# Patient Record
Sex: MALE | Race: WHITE | NOT HISPANIC OR LATINO | Employment: FULL TIME | URBAN - METROPOLITAN AREA
[De-identification: names, ages, dates, MRNs, and addresses within clinical notes are randomized per-mention and may not be internally consistent; named-entity substitution may affect disease eponyms.]

---

## 2018-01-19 ENCOUNTER — OFFICE VISIT (OUTPATIENT)
Dept: INTERNAL MEDICINE | Facility: CLINIC | Age: 58
End: 2018-01-19

## 2018-01-19 VITALS
BODY MASS INDEX: 30.77 KG/M2 | DIASTOLIC BLOOD PRESSURE: 80 MMHG | SYSTOLIC BLOOD PRESSURE: 132 MMHG | HEART RATE: 88 BPM | RESPIRATION RATE: 16 BRPM | WEIGHT: 203 LBS | HEIGHT: 68 IN | OXYGEN SATURATION: 95 %

## 2018-01-19 DIAGNOSIS — E11.9 TYPE 2 DIABETES MELLITUS WITHOUT COMPLICATION, WITHOUT LONG-TERM CURRENT USE OF INSULIN (HCC): Primary | ICD-10-CM

## 2018-01-19 DIAGNOSIS — M1A.09X0 CHRONIC GOUT OF MULTIPLE SITES, UNSPECIFIED CAUSE: ICD-10-CM

## 2018-01-19 DIAGNOSIS — E78.2 MIXED HYPERLIPIDEMIA: ICD-10-CM

## 2018-01-19 DIAGNOSIS — R35.1 NOCTURIA: ICD-10-CM

## 2018-01-19 DIAGNOSIS — I10 ESSENTIAL HYPERTENSION: ICD-10-CM

## 2018-01-19 PROBLEM — M10.9 GOUT OF MULTIPLE SITES: Status: ACTIVE | Noted: 2018-01-19

## 2018-01-19 LAB
ALBUMIN SERPL-MCNC: 4.9 G/DL (ref 3.5–5.2)
ALBUMIN/GLOB SERPL: 2.3 G/DL
ALP SERPL-CCNC: 66 U/L (ref 40–129)
ALT SERPL-CCNC: 33 U/L (ref 5–41)
AST SERPL-CCNC: 29 U/L (ref 5–40)
BASOPHILS # BLD AUTO: 0.04 10*3/MM3 (ref 0–0.2)
BASOPHILS NFR BLD AUTO: 0.5 % (ref 0–2)
BILIRUB SERPL-MCNC: 0.4 MG/DL (ref 0.2–1.2)
BUN SERPL-MCNC: 17 MG/DL (ref 6–20)
BUN/CREAT SERPL: 13.8 (ref 7–25)
CALCIUM SERPL-MCNC: 9.8 MG/DL (ref 8.6–10.5)
CHLORIDE SERPL-SCNC: 100 MMOL/L (ref 98–107)
CHOLEST SERPL-MCNC: 185 MG/DL (ref 0–200)
CO2 SERPL-SCNC: 28.2 MMOL/L (ref 22–29)
CREAT SERPL-MCNC: 1.23 MG/DL (ref 0.76–1.27)
EOSINOPHIL # BLD AUTO: 0.15 10*3/MM3 (ref 0.1–0.3)
EOSINOPHIL NFR BLD AUTO: 1.8 % (ref 0–4)
ERYTHROCYTE [DISTWIDTH] IN BLOOD BY AUTOMATED COUNT: 13 % (ref 11.5–14.5)
GLOBULIN SER CALC-MCNC: 2.1 GM/DL
GLUCOSE SERPL-MCNC: 113 MG/DL (ref 65–99)
HBA1C MFR BLD: 7 % (ref 4.8–5.6)
HCT VFR BLD AUTO: 44.1 % (ref 42–52)
HDLC SERPL-MCNC: 36 MG/DL (ref 40–60)
HGB BLD-MCNC: 14.8 G/DL (ref 14–18)
IMM GRANULOCYTES # BLD: 0.04 10*3/MM3 (ref 0–0.03)
IMM GRANULOCYTES NFR BLD: 0.5 % (ref 0–0.5)
LDLC SERPL CALC-MCNC: 103 MG/DL (ref 0–100)
LDLC/HDLC SERPL: 2.86 {RATIO}
LYMPHOCYTES # BLD AUTO: 1.88 10*3/MM3 (ref 0.6–4.8)
LYMPHOCYTES NFR BLD AUTO: 21.9 % (ref 20–45)
MCH RBC QN AUTO: 29.3 PG (ref 27–31)
MCHC RBC AUTO-ENTMCNC: 33.6 G/DL (ref 31–37)
MCV RBC AUTO: 87.3 FL (ref 80–94)
MONOCYTES # BLD AUTO: 0.6 10*3/MM3 (ref 0–1)
MONOCYTES NFR BLD AUTO: 7 % (ref 3–8)
NEUTROPHILS # BLD AUTO: 5.86 10*3/MM3 (ref 1.5–8.3)
NEUTROPHILS NFR BLD AUTO: 68.3 % (ref 45–70)
NRBC BLD AUTO-RTO: 0 /100 WBC (ref 0–0)
PLATELET # BLD AUTO: 299 10*3/MM3 (ref 140–500)
POTASSIUM SERPL-SCNC: 4.6 MMOL/L (ref 3.5–5.2)
PROT SERPL-MCNC: 7 G/DL (ref 6–8.5)
PSA SERPL-MCNC: 1.17 NG/ML (ref 0–4)
RBC # BLD AUTO: 5.05 10*6/MM3 (ref 4.7–6.1)
SODIUM SERPL-SCNC: 141 MMOL/L (ref 136–145)
TRIGL SERPL-MCNC: 230 MG/DL (ref 0–150)
TSH SERPL DL<=0.005 MIU/L-ACNC: 2.5 MIU/ML (ref 0.27–4.2)
URATE SERPL-MCNC: 5.2 MG/DL (ref 3.4–7)
VLDLC SERPL CALC-MCNC: 46 MG/DL (ref 8–32)
WBC # BLD AUTO: 8.57 10*3/MM3 (ref 4.8–10.8)

## 2018-01-19 PROCEDURE — 99204 OFFICE O/P NEW MOD 45 MIN: CPT | Performed by: INTERNAL MEDICINE

## 2018-01-19 RX ORDER — AMLODIPINE BESYLATE 5 MG/1
5 TABLET ORAL DAILY
Qty: 90 TABLET | Refills: 3 | Status: SHIPPED | OUTPATIENT
Start: 2018-01-19 | End: 2018-04-02 | Stop reason: SDUPTHER

## 2018-01-19 RX ORDER — ASCORBIC ACID 500 MG
500 TABLET ORAL DAILY
COMMUNITY
End: 2022-12-02 | Stop reason: SDDI

## 2018-01-19 RX ORDER — CHOLECALCIFEROL (VITAMIN D3) 25 MCG
500 TABLET,CHEWABLE ORAL DAILY
COMMUNITY
End: 2022-12-02 | Stop reason: SDDI

## 2018-01-19 RX ORDER — ALLOPURINOL 300 MG/1
300 TABLET ORAL DAILY
COMMUNITY
End: 2018-01-19 | Stop reason: SDUPTHER

## 2018-01-19 RX ORDER — LISINOPRIL AND HYDROCHLOROTHIAZIDE 20; 12.5 MG/1; MG/1
1 TABLET ORAL DAILY
Qty: 90 TABLET | Refills: 3 | Status: SHIPPED | OUTPATIENT
Start: 2018-01-19 | End: 2019-06-28

## 2018-01-19 RX ORDER — FENOFIBRATE 160 MG/1
160 TABLET ORAL DAILY
COMMUNITY
End: 2018-01-19 | Stop reason: SDUPTHER

## 2018-01-19 RX ORDER — METOPROLOL TARTRATE 50 MG/1
50 TABLET, FILM COATED ORAL 2 TIMES DAILY
COMMUNITY
End: 2018-01-19 | Stop reason: SDUPTHER

## 2018-01-19 RX ORDER — IBUPROFEN 800 MG/1
800 TABLET ORAL EVERY 6 HOURS PRN
Qty: 30 TABLET | Refills: 1 | Status: SHIPPED | OUTPATIENT
Start: 2018-01-19 | End: 2019-05-02 | Stop reason: SDUPTHER

## 2018-01-19 RX ORDER — ALLOPURINOL 300 MG/1
300 TABLET ORAL 2 TIMES DAILY
Qty: 180 TABLET | Refills: 3 | Status: SHIPPED | OUTPATIENT
Start: 2018-01-19 | End: 2019-02-01 | Stop reason: SDUPTHER

## 2018-01-19 RX ORDER — METOPROLOL TARTRATE 50 MG/1
50 TABLET, FILM COATED ORAL EVERY 12 HOURS SCHEDULED
Qty: 180 TABLET | Refills: 3 | Status: SHIPPED | OUTPATIENT
Start: 2018-01-19 | End: 2019-02-01 | Stop reason: SDUPTHER

## 2018-01-19 RX ORDER — AMLODIPINE BESYLATE 5 MG/1
5 TABLET ORAL DAILY
COMMUNITY
End: 2018-01-19 | Stop reason: SDUPTHER

## 2018-01-19 RX ORDER — FENOFIBRATE 160 MG/1
160 TABLET ORAL DAILY
Qty: 90 TABLET | Refills: 3 | Status: SHIPPED | OUTPATIENT
Start: 2018-01-19 | End: 2018-05-03 | Stop reason: SDUPTHER

## 2018-01-19 NOTE — PROGRESS NOTES
Subjective     Pablo Mendoza is a 57 y.o. male, who presents with a chief complaint of   Chief Complaint   Patient presents with   • [facility]       HPI     56 yo female with pmhx HTN, HL, DM2 - on treatment for several years.  Stat HTN medication in 1995.    Prior history of TORSTEN - CPAP in past but got better.     Diverticulitis - prior colonoscopy x 2.       Also longstanding gout,well controlled at present.     Previously seeing Dr. Franco.  He was working on diet and exercise with pretty regularly.  He is very stressed and has trouble keeping his weight off.  Recent divorce 2013.  Believes he had parasites and fungi in his job.  Had personally travelled to foreign countries and believes he got infected with these parasites.   He did not ever see diverticulitis.      The following portions of the patient's history were reviewed and updated as appropriate: allergies, current medications, past family history, past medical history, past social history, past surgical history and problem list.    Allergies: Review of patient's allergies indicates no known allergies.    Current Outpatient Prescriptions:   •  allopurinol (ZYLOPRIM) 300 MG tablet, Take 1 tablet by mouth 2 (Two) Times a Day., Disp: 180 tablet, Rfl: 3  •  amLODIPine (NORVASC) 5 MG tablet, Take 1 tablet by mouth Daily., Disp: 90 tablet, Rfl: 3  •  Cholecalciferol (VITAMIN D3) 5000 units capsule capsule, Take 5,000 Units by mouth Daily., Disp: , Rfl:   •  cyanocobalamin (VITAMIN B-12) 2500 MCG tablet tablet, Take 500 mcg by mouth Daily., Disp: , Rfl:   •  fenofibrate 160 MG tablet, Take 1 tablet by mouth Daily., Disp: 90 tablet, Rfl: 3  •  lisinopril 10 MG tablet 20 mg, hydrochlorothiazide 25 MG tablet 12.5 mg, Take 1 dose by mouth Daily., Disp: 90 tablet, Rfl: 3  •  metFORMIN (GLUCOPHAGE) 1000 MG tablet, Take 1 tablet by mouth 2 (Two) Times a Day With Meals., Disp: 180 tablet, Rfl: 2  •  metoprolol tartrate (LOPRESSOR) 50 MG tablet, Take 1  "tablet by mouth Every 12 (Twelve) Hours., Disp: 180 tablet, Rfl: 3  •  vitamin C (ASCORBIC ACID) 500 MG tablet, Take 500 mg by mouth Daily., Disp: , Rfl:   Medications Discontinued During This Encounter   Medication Reason   • allopurinol (ZYLOPRIM) 100 MG tablet Dose adjustment   • UNKNOWN TO PATIENT Therapy completed   • allopurinol (ZYLOPRIM) 300 MG tablet Reorder   • amLODIPine (NORVASC) 5 MG tablet Reorder   • fenofibrate 160 MG tablet Reorder   • lisinopril 10 MG tablet 20 mg, hydrochlorothiazide 25 MG tablet 12.5 mg Reorder   • metFORMIN (GLUCOPHAGE) 1000 MG tablet Reorder   • metoprolol tartrate (LOPRESSOR) 50 MG tablet Reorder       Review of Systems   Constitutional:        Lower energy   Eyes: Negative.    Respiratory: Negative.    Cardiovascular: Negative.  Negative for chest pain and palpitations.   Gastrointestinal: Negative.  Negative for abdominal pain, nausea and rectal pain.   Endocrine: Negative.    Genitourinary: Negative.    Neurological:        Claims stroke in 2007, last IA 2015, records here   Psychiatric/Behavioral: Negative.        Objective     /80  Pulse 88  Resp 16  Ht 172.7 cm (68\")  Wt 92.1 kg (203 lb)  SpO2 95%  BMI 30.87 kg/m2      Physical Exam   Constitutional: He is oriented to person, place, and time. He appears well-developed and well-nourished.   HENT:   Head: Normocephalic and atraumatic.   Right Ear: External ear normal.   Left Ear: External ear normal.   Nose: Nose normal.   Mouth/Throat: No oropharyngeal exudate.   Eyes: Conjunctivae and EOM are normal. Pupils are equal, round, and reactive to light.   Neck: Normal range of motion. Neck supple. No JVD present. No tracheal deviation present. No thyromegaly present.   Cardiovascular: Normal rate, regular rhythm, normal heart sounds and intact distal pulses.  Exam reveals no gallop and no friction rub.    No murmur heard.  Pulmonary/Chest: Effort normal and breath sounds normal.   Abdominal: Soft. Bowel sounds are " normal. He exhibits no distension and no mass. There is no tenderness. There is no rebound and no guarding. No hernia.   Genitourinary: Prostate normal and penis normal. Rectal exam shows guaiac negative stool. No penile tenderness.   Musculoskeletal: Normal range of motion. He exhibits no edema.   Neurological: He is alert and oriented to person, place, and time. He has normal reflexes.   Skin: Skin is warm and dry.   Psychiatric: He has a normal mood and affect. His behavior is normal. Judgment and thought content normal.   Nursing note and vitals reviewed.      Lab Results (most recent)     None          Results for orders placed or performed during the hospital encounter of 09/21/16   POCT glucose   Result Value Ref Range    Glucose 178 (A) 70 - 130 mg/dL       Assessment/Plan   Pablo was seen today for establish care.    Diagnoses and all orders for this visit:    Type 2 diabetes mellitus without complication, without long-term current use of insulin  -     Hemoglobin A1c  -     Comprehensive metabolic panel  -     TSH  -     Uric acid  -     CBC w AUTO Differential  -     metFORMIN (GLUCOPHAGE) 1000 MG tablet; Take 1 tablet by mouth 2 (Two) Times a Day With Meals.    Mixed hyperlipidemia  -     Lipid Panel With LDL/HDL Ratio  -     fenofibrate 160 MG tablet; Take 1 tablet by mouth Daily.    Essential hypertension  -     Comprehensive metabolic panel  -     amLODIPine (NORVASC) 5 MG tablet; Take 1 tablet by mouth Daily.  -     lisinopril 10 MG tablet 20 mg, hydrochlorothiazide 25 MG tablet 12.5 mg; Take 1 dose by mouth Daily.  -     metoprolol tartrate (LOPRESSOR) 50 MG tablet; Take 1 tablet by mouth Every 12 (Twelve) Hours.    Chronic gout of multiple sites, unspecified cause  -     allopurinol (ZYLOPRIM) 300 MG tablet; Take 1 tablet by mouth 2 (Two) Times a Day.    Nocturia  -     PSA Screen      Keep taking vitamin D regularly  ED- prn viagra ok, no new script today    Return in about 6 months (around  7/19/2018) for Annual physical.    Damián Brown MD  01/19/2018

## 2018-01-23 ENCOUNTER — TELEPHONE (OUTPATIENT)
Dept: INTERNAL MEDICINE | Facility: CLINIC | Age: 58
End: 2018-01-23

## 2018-01-23 NOTE — TELEPHONE ENCOUNTER
Patient advised.    ----- Message from Damián Brown MD sent at 1/23/2018  8:08 AM EST -----  1. Cholesterol not to goal.  His TG also elevated. Stay on fenofibrate, but really need to watch all fried foods and limit meat.  2.  Diabetes at goal.   3. Thyroid, gout and PSA are in good range  4.  Prior anemia has corrected.  FU 6months.

## 2018-04-02 ENCOUNTER — OFFICE VISIT (OUTPATIENT)
Dept: INTERNAL MEDICINE | Facility: CLINIC | Age: 58
End: 2018-04-02

## 2018-04-02 VITALS
RESPIRATION RATE: 16 BRPM | SYSTOLIC BLOOD PRESSURE: 146 MMHG | WEIGHT: 203 LBS | TEMPERATURE: 98.6 F | DIASTOLIC BLOOD PRESSURE: 84 MMHG | HEART RATE: 85 BPM | BODY MASS INDEX: 30.77 KG/M2 | HEIGHT: 68 IN | OXYGEN SATURATION: 98 %

## 2018-04-02 DIAGNOSIS — B96.89 ACUTE BACTERIAL SINUSITIS: ICD-10-CM

## 2018-04-02 DIAGNOSIS — G43.801 OTHER MIGRAINE WITH STATUS MIGRAINOSUS, NOT INTRACTABLE: ICD-10-CM

## 2018-04-02 DIAGNOSIS — G44.40 MEDICATION OVERUSE HEADACHE: ICD-10-CM

## 2018-04-02 DIAGNOSIS — I10 ESSENTIAL HYPERTENSION: Primary | ICD-10-CM

## 2018-04-02 DIAGNOSIS — J01.90 ACUTE BACTERIAL SINUSITIS: ICD-10-CM

## 2018-04-02 PROBLEM — G43.909 MIGRAINE HEADACHE: Status: ACTIVE | Noted: 2018-04-02

## 2018-04-02 PROCEDURE — 96372 THER/PROPH/DIAG INJ SC/IM: CPT | Performed by: INTERNAL MEDICINE

## 2018-04-02 PROCEDURE — 99214 OFFICE O/P EST MOD 30 MIN: CPT | Performed by: INTERNAL MEDICINE

## 2018-04-02 RX ORDER — CEFDINIR 300 MG/1
300 CAPSULE ORAL 2 TIMES DAILY
Qty: 14 CAPSULE | Refills: 0 | Status: SHIPPED | OUTPATIENT
Start: 2018-04-02 | End: 2018-04-09

## 2018-04-02 RX ORDER — RIZATRIPTAN BENZOATE 10 MG/1
10 TABLET ORAL ONCE AS NEEDED
Qty: 15 TABLET | Refills: 0 | Status: SHIPPED | OUTPATIENT
Start: 2018-04-02 | End: 2019-08-05 | Stop reason: SDUPTHER

## 2018-04-02 RX ORDER — AMLODIPINE BESYLATE 10 MG/1
10 TABLET ORAL DAILY
Qty: 30 TABLET | Refills: 3 | Status: SHIPPED | OUTPATIENT
Start: 2018-04-02 | End: 2018-07-27 | Stop reason: SDUPTHER

## 2018-04-02 NOTE — PROGRESS NOTES
Pablo Mendoza is a 57 y.o. male, who presents with a chief complaint of   Chief Complaint   Patient presents with   • Migraine     x 2 months   • Sinus Problem       56 yo M with history of stroke/TIA's per him, HTN and migraines here with worsening headache for the last 2 months.     He puts a medication under his tongue one per week for headache, unsure of it's name and not on his med list. He has also used anti-histamines and ibuprofen that has not helped. He does work around chemicals in factory. He takes Ibuprofen 800mg once every other day.    He does have vision issues that he describes as light and dark alternating prior to his headache that he has had for the last 2 months. The pain that he has now is in his forehead in the front as well as sinus pressure. He was taking medication with pseudophed, tylenol and anti-histamine in addition to the above.     He has congestion and sinus drainage. No fever and no sick contacts. No tingling or numbness. BP has been running higher over the last few weeks, normal for him is usually in the 120 range.          The following portions of the patient's history were reviewed and updated as appropriate: allergies, current medications, past family history, past medical history, past social history, past surgical history and problem list.    Allergies: Review of patient's allergies indicates no known allergies.    Current Outpatient Prescriptions:   •  allopurinol (ZYLOPRIM) 300 MG tablet, Take 1 tablet by mouth 2 (Two) Times a Day., Disp: 180 tablet, Rfl: 3  •  amLODIPine (NORVASC) 10 MG tablet, Take 1 tablet by mouth Daily., Disp: 30 tablet, Rfl: 3  •  Cholecalciferol (VITAMIN D3) 5000 units capsule capsule, Take 5,000 Units by mouth Daily., Disp: , Rfl:   •  cyanocobalamin (VITAMIN B-12) 2500 MCG tablet tablet, Take 500 mcg by mouth Daily., Disp: , Rfl:   •  fenofibrate 160 MG tablet, Take 1 tablet by mouth Daily., Disp: 90 tablet, Rfl: 3  •  ibuprofen  "(ADVIL,MOTRIN) 800 MG tablet, Take 1 tablet by mouth Every 6 (Six) Hours As Needed for Mild Pain ., Disp: 30 tablet, Rfl: 1  •  lisinopril-hydrochlorothiazide (PRINZIDE,ZESTORETIC) 20-12.5 MG per tablet, Take 1 tablet by mouth Daily., Disp: 90 tablet, Rfl: 3  •  metFORMIN (GLUCOPHAGE) 1000 MG tablet, Take 1 tablet by mouth 2 (Two) Times a Day With Meals., Disp: 180 tablet, Rfl: 2  •  metoprolol tartrate (LOPRESSOR) 50 MG tablet, Take 1 tablet by mouth Every 12 (Twelve) Hours., Disp: 180 tablet, Rfl: 3  •  vitamin C (ASCORBIC ACID) 500 MG tablet, Take 500 mg by mouth Daily., Disp: , Rfl:   •  cefdinir (OMNICEF) 300 MG capsule, Take 1 capsule by mouth 2 (Two) Times a Day for 7 days., Disp: 14 capsule, Rfl: 0  •  rizatriptan (MAXALT) 10 MG tablet, Take 1 tablet by mouth 1 (One) Time As Needed for Migraine for up to 1 dose. May repeat in 2 hours if needed, Disp: 15 tablet, Rfl: 0  Medications Discontinued During This Encounter   Medication Reason   • amLODIPine (NORVASC) 5 MG tablet Reorder       Review of Systems   Constitutional: Positive for fatigue. Negative for chills and fever.   HENT: Positive for congestion, sinus pain and sinus pressure.    Respiratory: Negative for cough.    Neurological: Positive for headaches. Negative for seizures, speech difficulty and numbness.             /84 (BP Location: Left arm, Patient Position: Sitting, Cuff Size: Adult)   Pulse 85   Temp 98.6 °F (37 °C) (Oral)   Resp 16   Ht 172.7 cm (67.99\")   Wt 92.1 kg (203 lb)   SpO2 98%   BMI 30.87 kg/m²       Physical Exam   Constitutional: He is oriented to person, place, and time. He appears well-developed and well-nourished. No distress.   HENT:   Head: Normocephalic and atraumatic.   Right Ear: Hearing, tympanic membrane and external ear normal.   Left Ear: Hearing, tympanic membrane and external ear normal.   Nose: Right sinus exhibits frontal sinus tenderness. Left sinus exhibits frontal sinus tenderness.   Mouth/Throat: " Uvula is midline and oropharynx is clear and moist. No oropharyngeal exudate, posterior oropharyngeal edema or posterior oropharyngeal erythema.   Eyes: Conjunctivae are normal. Right eye exhibits no discharge. Left eye exhibits no discharge. No scleral icterus.   Neck: Neck supple.   Cardiovascular: Normal rate, regular rhythm and normal heart sounds.  Exam reveals no gallop and no friction rub.    No murmur heard.  Pulmonary/Chest: Effort normal and breath sounds normal. No respiratory distress. He has no wheezes. He has no rales.   Lymphadenopathy:     He has no cervical adenopathy.   Neurological: He is alert and oriented to person, place, and time. He displays no tremor. No cranial nerve deficit or sensory deficit. Coordination and gait normal.   Skin: Skin is warm. No rash noted.   Psychiatric: His behavior is normal. His mood appears anxious. His speech is tangential.   Nursing note and vitals reviewed.        Results for orders placed or performed in visit on 01/19/18   Lipid Panel With LDL/HDL Ratio   Result Value Ref Range    Total Cholesterol 185 0 - 200 mg/dL    Triglycerides 230 (H) 0 - 150 mg/dL    HDL Cholesterol 36 (L) 40 - 60 mg/dL    VLDL Cholesterol 46 (H) 8 - 32 mg/dL    LDL Cholesterol  103 (H) 0 - 100 mg/dL    LDL/HDL Ratio 2.86    Hemoglobin A1c   Result Value Ref Range    Hemoglobin A1C 7.00 (H) 4.80 - 5.60 %   Comprehensive metabolic panel   Result Value Ref Range    Glucose 113 (H) 65 - 99 mg/dL    BUN 17 6 - 20 mg/dL    Creatinine 1.23 0.76 - 1.27 mg/dL    eGFR Non African Am 61 >60 mL/min/1.73    eGFR African Am 74 >60 mL/min/1.73    BUN/Creatinine Ratio 13.8 7.0 - 25.0    Sodium 141 136 - 145 mmol/L    Potassium 4.6 3.5 - 5.2 mmol/L    Chloride 100 98 - 107 mmol/L    Total CO2 28.2 22.0 - 29.0 mmol/L    Calcium 9.8 8.6 - 10.5 mg/dL    Total Protein 7.0 6.0 - 8.5 g/dL    Albumin 4.90 3.50 - 5.20 g/dL    Globulin 2.1 gm/dL    A/G Ratio 2.3 g/dL    Total Bilirubin 0.4 0.2 - 1.2 mg/dL     Alkaline Phosphatase 66 40 - 129 U/L    AST (SGOT) 29 5 - 40 U/L    ALT (SGPT) 33 5 - 41 U/L   TSH   Result Value Ref Range    TSH 2.500 0.270 - 4.200 mIU/mL   Uric acid   Result Value Ref Range    Uric Acid 5.2 3.4 - 7.0 mg/dL   PSA Screen   Result Value Ref Range    PSA 1.170 0.000 - 4.000 ng/mL   CBC w AUTO Differential   Result Value Ref Range    WBC 8.57 4.80 - 10.80 10*3/mm3    RBC 5.05 4.70 - 6.10 10*6/mm3    Hemoglobin 14.8 14.0 - 18.0 g/dL    Hematocrit 44.1 42.0 - 52.0 %    MCV 87.3 80.0 - 94.0 fL    MCH 29.3 27.0 - 31.0 pg    MCHC 33.6 31.0 - 37.0 g/dL    RDW 13.0 11.5 - 14.5 %    Platelets 299 140 - 500 10*3/mm3    Neutrophil Rel % 68.3 45.0 - 70.0 %    Lymphocyte Rel % 21.9 20.0 - 45.0 %    Monocyte Rel % 7.0 3.0 - 8.0 %    Eosinophil Rel % 1.8 0.0 - 4.0 %    Basophil Rel % 0.5 0.0 - 2.0 %    Neutrophils Absolute 5.86 1.50 - 8.30 10*3/mm3    Lymphocytes Absolute 1.88 0.60 - 4.80 10*3/mm3    Monocytes Absolute 0.60 0.00 - 1.00 10*3/mm3    Eosinophils Absolute 0.15 0.10 - 0.30 10*3/mm3    Basophils Absolute 0.04 0.00 - 0.20 10*3/mm3    Immature Granulocyte Rel % 0.5 0.0 - 0.5 %    Immature Grans Absolute 0.04 (H) 0.00 - 0.03 10*3/mm3    nRBC 0.0 0.0 - 0.0 /100 WBC           Pablo was seen today for migraine and sinus problem.    Diagnoses and all orders for this visit:    Essential hypertension  -     amLODIPine (NORVASC) 10 MG tablet; Take 1 tablet by mouth Daily.    Other migraine with status migrainosus, not intractable  -     ketorolac (TORADOL) injection 60 mg; Inject 2 mL into the shoulder, thigh, or buttocks 1 (One) Time.    Acute bacterial sinusitis  -     ketorolac (TORADOL) injection 60 mg; Inject 2 mL into the shoulder, thigh, or buttocks 1 (One) Time.    Medication overuse headache    Other orders  -     rizatriptan (MAXALT) 10 MG tablet; Take 1 tablet by mouth 1 (One) Time As Needed for Migraine for up to 1 dose. May repeat in 2 hours if needed  -     cefdinir (OMNICEF) 300 MG capsule; Take  1 capsule by mouth 2 (Two) Times a Day for 7 days.        I think that patient has multiple reasons for headache including HTN, medication over use and possible sinus infection. I am going up on his Norvasc today to 10mg and have asked him to stop all the OTC medication that he is taking for headache including Tylenol cold and sinus, ibuprofen and the anti-histamine.     He was given Toradol shot today to help with headache and will use Maxalt for acute migraine only. If needing more than twice per week, then may consider adding BB to regimen for prophylaxis particularly if BP is still up. We could also consider switching out ACE/HCZT for ARB which might give him better control.     I reviewed his MRI and MRA from 11/2015 when he had stroke like symptoms, but all was normal. Neurology felt that this was factitious in nature.     Follow up with Dr. WORKMAN in one week and if he chooses, can establish with Dr. DOMINGO who say him previously and who is his original PMD. Discussed case with Dr. WORKMAN     Return in about 7 days (around 4/9/2018) for Recheck of headache with  .    Sumi Long MD  04/02/2018

## 2018-04-10 ENCOUNTER — HOSPITAL ENCOUNTER (OUTPATIENT)
Dept: GENERAL RADIOLOGY | Facility: HOSPITAL | Age: 58
Discharge: HOME | End: 2018-04-10
Attending: INTERNAL MEDICINE | Admitting: INTERNAL MEDICINE

## 2018-04-10 ENCOUNTER — OFFICE VISIT (OUTPATIENT)
Dept: INTERNAL MEDICINE | Facility: CLINIC | Age: 58
End: 2018-04-10

## 2018-04-10 VITALS
DIASTOLIC BLOOD PRESSURE: 88 MMHG | HEIGHT: 68 IN | SYSTOLIC BLOOD PRESSURE: 128 MMHG | OXYGEN SATURATION: 97 % | BODY MASS INDEX: 30.46 KG/M2 | WEIGHT: 201 LBS | RESPIRATION RATE: 16 BRPM | HEART RATE: 89 BPM

## 2018-04-10 DIAGNOSIS — J32.0 CHRONIC MAXILLARY SINUSITIS: ICD-10-CM

## 2018-04-10 DIAGNOSIS — J30.1 CHRONIC ALLERGIC RHINITIS DUE TO POLLEN, UNSPECIFIED SEASONALITY: ICD-10-CM

## 2018-04-10 DIAGNOSIS — I10 ESSENTIAL HYPERTENSION: ICD-10-CM

## 2018-04-10 DIAGNOSIS — G43.801 OTHER MIGRAINE WITH STATUS MIGRAINOSUS, NOT INTRACTABLE: Primary | ICD-10-CM

## 2018-04-10 PROCEDURE — 70220 X-RAY EXAM OF SINUSES: CPT

## 2018-04-10 PROCEDURE — 99214 OFFICE O/P EST MOD 30 MIN: CPT | Performed by: INTERNAL MEDICINE

## 2018-04-10 RX ORDER — LORATADINE 10 MG/1
10 TABLET ORAL DAILY
Qty: 30 TABLET | Refills: 0 | Status: SHIPPED | OUTPATIENT
Start: 2018-04-10 | End: 2018-05-09 | Stop reason: SDUPTHER

## 2018-04-10 NOTE — PROGRESS NOTES
Pablo Mendoza is a 57 y.o. male, who presents with a chief complaint of   Chief Complaint   Patient presents with   • Migraine     follow up       HPI     56yo male with pmhx migraine, HTN. He reports TIA in past, but the notes from the neurology physician do not agree about that, concern about factitious nature to the complaints at the time.  He did see me previously, once a pt of Dr. DOMINGO.     He struggles with HTN, although now much better controlled. He is doing better on increased norvasc.  Still on lisinopril hctz.      He did stop otc medications and was given maxalt, which he had to use once, which did help. He denies any fever, still congestion and headache all over his head.  Went to [place] for the weekend which did help. He works around oils and solvents and wonders if that is making his headache worse.  No vision changes or photophobia.  Still has mild headache today which is top of head. No lacrimation.  No other neurologic symptoms.           The following portions of the patient's history were reviewed and updated as appropriate: allergies, current medications, past family history, past medical history, past social history, past surgical history and problem list.    Allergies: Review of patient's allergies indicates no known allergies.    Current Outpatient Prescriptions:   •  allopurinol (ZYLOPRIM) 300 MG tablet, Take 1 tablet by mouth 2 (Two) Times a Day., Disp: 180 tablet, Rfl: 3  •  amLODIPine (NORVASC) 10 MG tablet, Take 1 tablet by mouth Daily., Disp: 30 tablet, Rfl: 3  •  Cholecalciferol (VITAMIN D3) 5000 units capsule capsule, Take 5,000 Units by mouth Daily., Disp: , Rfl:   •  cyanocobalamin (VITAMIN B-12) 2500 MCG tablet tablet, Take 500 mcg by mouth Daily., Disp: , Rfl:   •  fenofibrate 160 MG tablet, Take 1 tablet by mouth Daily., Disp: 90 tablet, Rfl: 3  •  ibuprofen (ADVIL,MOTRIN) 800 MG tablet, Take 1 tablet by mouth Every 6 (Six) Hours As Needed for Mild Pain ., Disp: 30  "tablet, Rfl: 1  •  lisinopril-hydrochlorothiazide (PRINZIDE,ZESTORETIC) 20-12.5 MG per tablet, Take 1 tablet by mouth Daily., Disp: 90 tablet, Rfl: 3  •  metFORMIN (GLUCOPHAGE) 1000 MG tablet, Take 1 tablet by mouth 2 (Two) Times a Day With Meals., Disp: 180 tablet, Rfl: 2  •  metoprolol tartrate (LOPRESSOR) 50 MG tablet, Take 1 tablet by mouth Every 12 (Twelve) Hours., Disp: 180 tablet, Rfl: 3  •  rizatriptan (MAXALT) 10 MG tablet, Take 1 tablet by mouth 1 (One) Time As Needed for Migraine for up to 1 dose. May repeat in 2 hours if needed, Disp: 15 tablet, Rfl: 0  •  vitamin C (ASCORBIC ACID) 500 MG tablet, Take 500 mg by mouth Daily., Disp: , Rfl:   •  loratadine (CLARITIN) 10 MG tablet, Take 1 tablet by mouth Daily., Disp: 30 tablet, Rfl: 0  There are no discontinued medications.    Review of Systems   Constitutional: Positive for fatigue. Negative for appetite change.   Eyes: Negative for discharge.   Respiratory: Negative.    Cardiovascular: Negative.    Genitourinary: Negative.    Musculoskeletal: Negative.    Neurological: Positive for headaches.   Hematological: Negative.    Psychiatric/Behavioral: Negative.    All other systems reviewed and are negative.            /88   Pulse 89   Resp 16   Ht 172.7 cm (68\")   Wt 91.2 kg (201 lb)   SpO2 97%   BMI 30.56 kg/m²       Physical Exam   Constitutional: He appears well-developed and well-nourished.   HENT:   Head: Normocephalic and atraumatic.   Right Ear: External ear normal.   Left Ear: External ear normal.   Cardiovascular: Normal rate and regular rhythm.    No murmur heard.  Pulmonary/Chest: Effort normal and breath sounds normal. No respiratory distress.   Abdominal: Soft.   Musculoskeletal: Normal range of motion. He exhibits no edema.   Neurological: He is alert.   nonfocal   Skin: Skin is warm. Capillary refill takes less than 2 seconds.   Psychiatric: He has a normal mood and affect. His behavior is normal.   Nursing note and vitals " reviewed.      Lab Results (most recent)     None          Results for orders placed or performed in visit on 01/19/18   Lipid Panel With LDL/HDL Ratio   Result Value Ref Range    Total Cholesterol 185 0 - 200 mg/dL    Triglycerides 230 (H) 0 - 150 mg/dL    HDL Cholesterol 36 (L) 40 - 60 mg/dL    VLDL Cholesterol 46 (H) 8 - 32 mg/dL    LDL Cholesterol  103 (H) 0 - 100 mg/dL    LDL/HDL Ratio 2.86    Hemoglobin A1c   Result Value Ref Range    Hemoglobin A1C 7.00 (H) 4.80 - 5.60 %   Comprehensive metabolic panel   Result Value Ref Range    Glucose 113 (H) 65 - 99 mg/dL    BUN 17 6 - 20 mg/dL    Creatinine 1.23 0.76 - 1.27 mg/dL    eGFR Non African Am 61 >60 mL/min/1.73    eGFR African Am 74 >60 mL/min/1.73    BUN/Creatinine Ratio 13.8 7.0 - 25.0    Sodium 141 136 - 145 mmol/L    Potassium 4.6 3.5 - 5.2 mmol/L    Chloride 100 98 - 107 mmol/L    Total CO2 28.2 22.0 - 29.0 mmol/L    Calcium 9.8 8.6 - 10.5 mg/dL    Total Protein 7.0 6.0 - 8.5 g/dL    Albumin 4.90 3.50 - 5.20 g/dL    Globulin 2.1 gm/dL    A/G Ratio 2.3 g/dL    Total Bilirubin 0.4 0.2 - 1.2 mg/dL    Alkaline Phosphatase 66 40 - 129 U/L    AST (SGOT) 29 5 - 40 U/L    ALT (SGPT) 33 5 - 41 U/L   TSH   Result Value Ref Range    TSH 2.500 0.270 - 4.200 mIU/mL   Uric acid   Result Value Ref Range    Uric Acid 5.2 3.4 - 7.0 mg/dL   PSA Screen   Result Value Ref Range    PSA 1.170 0.000 - 4.000 ng/mL   CBC w AUTO Differential   Result Value Ref Range    WBC 8.57 4.80 - 10.80 10*3/mm3    RBC 5.05 4.70 - 6.10 10*6/mm3    Hemoglobin 14.8 14.0 - 18.0 g/dL    Hematocrit 44.1 42.0 - 52.0 %    MCV 87.3 80.0 - 94.0 fL    MCH 29.3 27.0 - 31.0 pg    MCHC 33.6 31.0 - 37.0 g/dL    RDW 13.0 11.5 - 14.5 %    Platelets 299 140 - 500 10*3/mm3    Neutrophil Rel % 68.3 45.0 - 70.0 %    Lymphocyte Rel % 21.9 20.0 - 45.0 %    Monocyte Rel % 7.0 3.0 - 8.0 %    Eosinophil Rel % 1.8 0.0 - 4.0 %    Basophil Rel % 0.5 0.0 - 2.0 %    Neutrophils Absolute 5.86 1.50 - 8.30 10*3/mm3     Lymphocytes Absolute 1.88 0.60 - 4.80 10*3/mm3    Monocytes Absolute 0.60 0.00 - 1.00 10*3/mm3    Eosinophils Absolute 0.15 0.10 - 0.30 10*3/mm3    Basophils Absolute 0.04 0.00 - 0.20 10*3/mm3    Immature Granulocyte Rel % 0.5 0.0 - 0.5 %    Immature Grans Absolute 0.04 (H) 0.00 - 0.03 10*3/mm3    nRBC 0.0 0.0 - 0.0 /100 WBC           Pablo was seen today for migraine.    Diagnoses and all orders for this visit:    Other migraine with status migrainosus, not intractable    Essential hypertension    Chronic maxillary sinusitis  -     XR sinuses 3+ vw; Future    Chronic allergic rhinitis due to pollen, unspecified seasonality  -     loratadine (CLARITIN) 10 MG tablet; Take 1 tablet by mouth Daily.    DM per report well controlled. Doubt exacerbation leading to any sort of symptoms.   BP much better controlled  Recommend adding claritin back  X ray to ensure no chronic sinusitis.  Continue maxalt prn.  Monitor BP at home.       Return in about 4 weeks (around 5/8/2018) for Recheck.    Damián Brown MD  04/10/2018

## 2018-04-10 NOTE — PATIENT INSTRUCTIONS
Pablo was seen today for migraine.    Diagnoses and all orders for this visit:    Other migraine with status migrainosus, not intractable    Essential hypertension    Chronic maxillary sinusitis  -     XR sinuses 3+ vw; Future    Chronic allergic rhinitis due to pollen, unspecified seasonality  -     loratadine (CLARITIN) 10 MG tablet; Take 1 tablet by mouth Daily.    DM per report well controlled. Doubt exacerbation leading to any sort of symptoms.   BP much better controlled  Recommend adding claritin back  X ray to ensure no chronic sinusitis.

## 2018-04-11 ENCOUNTER — TELEPHONE (OUTPATIENT)
Dept: INTERNAL MEDICINE | Facility: CLINIC | Age: 58
End: 2018-04-11

## 2018-04-11 NOTE — TELEPHONE ENCOUNTER
----- Message from Damián Brown MD sent at 4/11/2018  7:28 AM EDT -----  Sinus x ray is negative, I bet his congestion is largely all allergy which is good news. He does not need more antibiotic.  I think he needs to stick to allergy medication and nasal rinses for now.  He is welcome to start antihistamine like claritin which we discussed. It sounded like headaches are getting better.  I want him to FU in 4 weeks if he is not getting better.    Pt was given the results of xrays and other info from Dr. Brown.dg

## 2018-05-03 DIAGNOSIS — E78.2 MIXED HYPERLIPIDEMIA: ICD-10-CM

## 2018-05-03 RX ORDER — FENOFIBRATE 160 MG/1
160 TABLET ORAL DAILY
Qty: 112 TABLET | Refills: 3 | Status: SHIPPED | OUTPATIENT
Start: 2018-05-03 | End: 2018-05-03 | Stop reason: SDUPTHER

## 2018-05-03 RX ORDER — FENOFIBRATE 160 MG/1
160 TABLET ORAL DAILY
Qty: 112 TABLET | Refills: 3 | Status: SHIPPED | OUTPATIENT
Start: 2018-05-03 | End: 2019-06-28

## 2018-05-09 DIAGNOSIS — J30.1 CHRONIC ALLERGIC RHINITIS DUE TO POLLEN, UNSPECIFIED SEASONALITY: ICD-10-CM

## 2018-05-09 RX ORDER — LORATADINE 10 MG/1
TABLET ORAL
Qty: 30 TABLET | Refills: 0 | Status: SHIPPED | OUTPATIENT
Start: 2018-05-09

## 2018-06-04 ENCOUNTER — TELEPHONE (OUTPATIENT)
Dept: INTERNAL MEDICINE | Facility: CLINIC | Age: 58
End: 2018-06-04

## 2018-06-04 NOTE — TELEPHONE ENCOUNTER
----- Message from Miryam Degroot MA sent at 6/1/2018  4:21 PM EDT -----  Needs appt  ----- Message -----  From: Damián Brown MD  Sent: 6/1/2018   4:06 PM  To: Miryam Degroot MA, Kathy Ocampo MA    Sorry he will need appt.  ----- Message -----  From: Miryam Degroot MA  Sent: 6/1/2018  12:58 PM  To: Damián Brown MD, Kathy Ocampo MA        ----- Message -----  From: Sumi Jackelin  Sent: 6/1/2018  12:49 PM  To: [facility] [place]    Pt is wanting a referral. He sees Dr Brown. Wants to see a hemotologist. His step mother just passed away from [facility]. He is saying that he has all the symptoms of this. No immediately family history of this.   States he has had all of the symptoms over the last twenty years.   Call back is [phone].   He states he has gone over this with Dr Brown some but we should have the records from Dr Sutherland's office.

## 2018-06-06 ENCOUNTER — OFFICE VISIT (OUTPATIENT)
Dept: INTERNAL MEDICINE | Facility: CLINIC | Age: 58
End: 2018-06-06

## 2018-06-06 VITALS
DIASTOLIC BLOOD PRESSURE: 70 MMHG | WEIGHT: 205 LBS | HEIGHT: 68 IN | SYSTOLIC BLOOD PRESSURE: 136 MMHG | RESPIRATION RATE: 16 BRPM | HEART RATE: 76 BPM | OXYGEN SATURATION: 98 % | BODY MASS INDEX: 31.07 KG/M2 | TEMPERATURE: 98.4 F

## 2018-06-06 DIAGNOSIS — R53.83 OTHER FATIGUE: ICD-10-CM

## 2018-06-06 DIAGNOSIS — M1A.09X0 CHRONIC GOUT OF MULTIPLE SITES, UNSPECIFIED CAUSE: Primary | ICD-10-CM

## 2018-06-06 DIAGNOSIS — E11.9 TYPE 2 DIABETES MELLITUS WITHOUT COMPLICATION, WITHOUT LONG-TERM CURRENT USE OF INSULIN (HCC): ICD-10-CM

## 2018-06-06 DIAGNOSIS — M19.90 ARTHRITIS: ICD-10-CM

## 2018-06-06 DIAGNOSIS — I10 ESSENTIAL HYPERTENSION: ICD-10-CM

## 2018-06-06 LAB
BILIRUB BLD-MCNC: NEGATIVE MG/DL
CLARITY, POC: CLEAR
COLOR UR: YELLOW
GLUCOSE UR STRIP-MCNC: NEGATIVE MG/DL
KETONES UR QL: NEGATIVE
LEUKOCYTE EST, POC: NEGATIVE
NITRITE UR-MCNC: NEGATIVE MG/ML
PH UR: 5.5 [PH] (ref 5–8)
PROT UR STRIP-MCNC: NEGATIVE MG/DL
RBC # UR STRIP: NEGATIVE /UL
SP GR UR: 1.01 (ref 1–1.03)
UROBILINOGEN UR QL: NORMAL

## 2018-06-06 PROCEDURE — 81003 URINALYSIS AUTO W/O SCOPE: CPT | Performed by: INTERNAL MEDICINE

## 2018-06-06 PROCEDURE — 99214 OFFICE O/P EST MOD 30 MIN: CPT | Performed by: INTERNAL MEDICINE

## 2018-06-06 NOTE — PROGRESS NOTES
Pablo Mendoza is a 57 y.o. male, who presents with a chief complaint of   Chief Complaint   Patient presents with   • Hyperlipidemia   • Hypertension       HPI     58 yo male with obesity, HTN, HL, DM2 - last hba1c 1/2018 7.0  He is a previous patient of Dr. Sutherland and was able to review his prior records.    He is worried today about multiple complaints. He just lost his stepmother to some sort of vasculitis. He does not know specifics although she was on hemodialysis.  He brings several pages from the internet as screen shots to validate that his symptoms are consistent with vasculitis.  He is very respectful today but is insistent there is something wrong. He is a new patient to me within the last year.  He is also concerned about gadolinium poisoning, which he read about online.     His current symptoms include chronic headache.  Prior SANCHEZ does include MRI/MRA 11/10/15.  He had stutter in past with gabapentin that resolved.  He denies any syncope or presyncope.     TORSTEN on CPAP, compliant.     Most pain in his head is diffuse. Recent water view negative, done at our last visit because he was insistent on antibiotic therapy without what I though was good reason.  Still have some sinus congestion. Using allergy medication every day as flonase and antihistamine which he admits is helping.     He complains of severe fatigue, but does go to work regularly. He states he has scarring in his lungs although no history of restrictive or obstructive lung disease.    He tells a story about vasculitis. He has seen Dr. Benz in the past. Was diagnosed with migraine. He claims to have had a TIA, although the records do not show this. He is not a great historian, I think a little overhwhelmed today. He denies any new weakness or tremor.    The following portions of the patient's history were reviewed and updated as appropriate: allergies, current medications, past family history, past medical history, past social history,  past surgical history and problem list.    Allergies: Patient has no known allergies.    Current Outpatient Prescriptions:   •  allopurinol (ZYLOPRIM) 300 MG tablet, Take 1 tablet by mouth 2 (Two) Times a Day., Disp: 180 tablet, Rfl: 3  •  amLODIPine (NORVASC) 10 MG tablet, Take 1 tablet by mouth Daily., Disp: 30 tablet, Rfl: 3  •  Cholecalciferol (VITAMIN D3) 5000 units capsule capsule, Take 5,000 Units by mouth Daily., Disp: , Rfl:   •  cyanocobalamin (VITAMIN B-12) 2500 MCG tablet tablet, Take 500 mcg by mouth Daily., Disp: , Rfl:   •  fenofibrate 160 MG tablet, Take 1 tablet by mouth Daily., Disp: 112 tablet, Rfl: 3  •  ibuprofen (ADVIL,MOTRIN) 800 MG tablet, Take 1 tablet by mouth Every 6 (Six) Hours As Needed for Mild Pain ., Disp: 30 tablet, Rfl: 1  •  lisinopril-hydrochlorothiazide (PRINZIDE,ZESTORETIC) 20-12.5 MG per tablet, Take 1 tablet by mouth Daily., Disp: 90 tablet, Rfl: 3  •  loratadine (CLARITIN) 10 MG tablet, TAKE ONE TABLET BY MOUTH DAILY, Disp: 30 tablet, Rfl: 0  •  metFORMIN (GLUCOPHAGE) 1000 MG tablet, Take 1 tablet by mouth 2 (Two) Times a Day With Meals., Disp: 180 tablet, Rfl: 2  •  metoprolol tartrate (LOPRESSOR) 50 MG tablet, Take 1 tablet by mouth Every 12 (Twelve) Hours., Disp: 180 tablet, Rfl: 3  •  rizatriptan (MAXALT) 10 MG tablet, Take 1 tablet by mouth 1 (One) Time As Needed for Migraine for up to 1 dose. May repeat in 2 hours if needed, Disp: 15 tablet, Rfl: 0  •  vitamin C (ASCORBIC ACID) 500 MG tablet, Take 500 mg by mouth Daily., Disp: , Rfl:   There are no discontinued medications.    Review of Systems   Constitutional: Positive for fatigue.   Respiratory: Negative for shortness of breath and wheezing.    Gastrointestinal:        Diverticulitis in past   Genitourinary: Negative for difficulty urinating, flank pain and hematuria.   Musculoskeletal: Positive for arthralgias and myalgias.        Polyarticular mild arthritis, history of gout.   Neurological: Positive for headaches.  "  Psychiatric/Behavioral: The patient is nervous/anxious.    All other systems reviewed and are negative.            /70   Pulse 76   Temp 98.4 °F (36.9 °C)   Resp 16   Ht 172.7 cm (68\")   Wt 93 kg (205 lb)   SpO2 98%   BMI 31.17 kg/m²       Physical Exam   Constitutional: He is oriented to person, place, and time. He appears well-developed and well-nourished.   HENT:   Head: Normocephalic and atraumatic.   Right Ear: External ear normal.   Left Ear: External ear normal.   Mouth/Throat: Oropharynx is clear and moist.   Eyes: EOM are normal. Pupils are equal, round, and reactive to light. Right eye exhibits no discharge. Left eye exhibits no discharge.   Neck: Normal range of motion. No tracheal deviation present. No thyromegaly present.   Cardiovascular: Normal rate and regular rhythm.    No murmur heard.  Pulmonary/Chest: Effort normal and breath sounds normal. No respiratory distress.   Abdominal: Soft. He exhibits no distension. There is no tenderness.   Musculoskeletal: Normal range of motion. He exhibits no edema.   Lymphadenopathy:     He has no cervical adenopathy.   Neurological: He is alert and oriented to person, place, and time.   He is appropriate for education level   Skin: Capillary refill takes less than 2 seconds.   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      Lab Results (most recent)     None          Results for orders placed or performed in visit on 06/06/18   POC Urinalysis Dipstick, Automated   Result Value Ref Range    Color Yellow Yellow, Straw, Dark Yellow, Simona    Clarity, UA Clear Clear    Specific Gravity  1.010 1.005 - 1.030    pH, Urine 5.5 5.0 - 8.0    Leukocytes Negative Negative    Nitrite, UA Negative Negative    Protein, POC Negative Negative mg/dL    Glucose, UA Negative Negative, 1000 mg/dL (3+) mg/dL    Ketones, UA Negative Negative    Urobilinogen, UA Normal Normal    Bilirubin Negative " Negative    Blood, UA Negative Negative     MRI/MRA brain reviewed  UA today clear  Labs from 1/2018 and prior reviewed  Records from Dr. Sutherland reviewed - 71 pages in media      Pablo was seen today for hyperlipidemia and hypertension.    Diagnoses and all orders for this visit:    Chronic gout of multiple sites, unspecified cause  -     Uric Acid  -     POC Urinalysis Dipstick, Automated    Other fatigue  -     Sedimentation rate, automated  -     TSH  -     Vitamin B12  -     Vitamin D 25 hydroxy  -     FELY  -     Hepatitis C antibody  -     POC Urinalysis Dipstick, Automated    Essential hypertension  -     Comprehensive metabolic panel  -     CBC w AUTO Differential  -     POC Urinalysis Dipstick, Automated    Type 2 diabetes mellitus without complication, without long-term current use of insulin  -     Hemoglobin A1c  -     POC Urinalysis Dipstick, Automated    Arthritis  -     TSH  -     POC Urinalysis Dipstick, Automated    I suspect his fatigue is multifactorial. I reviewed old records today with him in an effort to reassure him. Dr. Sutherland had previously run FELY and MPO which were negative. MRA not consistent with vasculitis.    He has obesity and TORSTEN which are more likely to cause chronic fatigue  UA today is clear.  Reassurance that there is no proteinuria or hematuria    Cognition seems appropriate. May consider neurocog testing in future.    Prior hypogonadism - defer checking that today.  It is a consideration for future  Prior B12 on lower side with Dr. Sutherland in 2014 - will supplement.     I also reassured him that his step mother is not related genetically to him.  He voiced understanding  Prior granulomas on CXR -reassurance, he was worried I think about renopulmonary vasculitis.  Did not do anca testing today, FELY will reflex if positive.   Return if symptoms worsen or fail to improve.    Damián Brown MD  06/06/2018

## 2018-06-07 LAB
25(OH)D3+25(OH)D2 SERPL-MCNC: 48.2 NG/ML
ALBUMIN SERPL-MCNC: 4.6 G/DL (ref 3.5–5.2)
ALBUMIN/GLOB SERPL: 2 G/DL
ALP SERPL-CCNC: 65 U/L (ref 40–129)
ALT SERPL-CCNC: 35 U/L (ref 5–41)
ANA SER QL: NEGATIVE
AST SERPL-CCNC: 31 U/L (ref 5–40)
BASOPHILS # BLD AUTO: 0.05 10*3/MM3 (ref 0–0.2)
BASOPHILS NFR BLD AUTO: 0.6 % (ref 0–2)
BILIRUB SERPL-MCNC: 0.3 MG/DL (ref 0.2–1.2)
BUN SERPL-MCNC: 27 MG/DL (ref 6–20)
BUN/CREAT SERPL: 21.3 (ref 7–25)
CALCIUM SERPL-MCNC: 10.3 MG/DL (ref 8.6–10.5)
CHLORIDE SERPL-SCNC: 103 MMOL/L (ref 98–107)
CO2 SERPL-SCNC: 25.4 MMOL/L (ref 22–29)
CREAT SERPL-MCNC: 1.27 MG/DL (ref 0.76–1.27)
EOSINOPHIL # BLD AUTO: 0.12 10*3/MM3 (ref 0.1–0.3)
EOSINOPHIL NFR BLD AUTO: 1.4 % (ref 0–4)
ERYTHROCYTE [DISTWIDTH] IN BLOOD BY AUTOMATED COUNT: 13.1 % (ref 11.5–14.5)
ERYTHROCYTE [SEDIMENTATION RATE] IN BLOOD BY WESTERGREN METHOD: 15 MM/HR (ref 0–20)
GFR SERPLBLD CREATININE-BSD FMLA CKD-EPI: 59 ML/MIN/1.73
GFR SERPLBLD CREATININE-BSD FMLA CKD-EPI: 71 ML/MIN/1.73
GLOBULIN SER CALC-MCNC: 2.3 GM/DL
GLUCOSE SERPL-MCNC: 192 MG/DL (ref 65–99)
HBA1C MFR BLD: 7.7 % (ref 4.8–5.6)
HCT VFR BLD AUTO: 41.3 % (ref 42–52)
HCV AB S/CO SERPL IA: <0.1 S/CO RATIO (ref 0–0.9)
HGB BLD-MCNC: 14.1 G/DL (ref 14–18)
IMM GRANULOCYTES # BLD: 0.03 10*3/MM3 (ref 0–0.03)
IMM GRANULOCYTES NFR BLD: 0.3 % (ref 0–0.5)
LYMPHOCYTES # BLD AUTO: 1.79 10*3/MM3 (ref 0.6–4.8)
LYMPHOCYTES NFR BLD AUTO: 20.5 % (ref 20–45)
MCH RBC QN AUTO: 29.9 PG (ref 27–31)
MCHC RBC AUTO-ENTMCNC: 34.1 G/DL (ref 31–37)
MCV RBC AUTO: 87.7 FL (ref 80–94)
MONOCYTES # BLD AUTO: 0.5 10*3/MM3 (ref 0–1)
MONOCYTES NFR BLD AUTO: 5.7 % (ref 3–8)
NEUTROPHILS # BLD AUTO: 6.24 10*3/MM3 (ref 1.5–8.3)
NEUTROPHILS NFR BLD AUTO: 71.5 % (ref 45–70)
NRBC BLD AUTO-RTO: 0 /100 WBC (ref 0–0)
PLATELET # BLD AUTO: 265 10*3/MM3 (ref 140–500)
POTASSIUM SERPL-SCNC: 4.4 MMOL/L (ref 3.5–5.2)
PROT SERPL-MCNC: 6.9 G/DL (ref 6–8.5)
RBC # BLD AUTO: 4.71 10*6/MM3 (ref 4.7–6.1)
SODIUM SERPL-SCNC: 141 MMOL/L (ref 136–145)
TSH SERPL DL<=0.005 MIU/L-ACNC: 2.31 MIU/ML (ref 0.27–4.2)
URATE SERPL-MCNC: 6.2 MG/DL (ref 3.4–7)
VIT B12 SERPL-MCNC: 579 PG/ML
WBC # BLD AUTO: 8.73 10*3/MM3 (ref 4.8–10.8)

## 2018-06-27 ENCOUNTER — TELEPHONE (OUTPATIENT)
Dept: INTERNAL MEDICINE | Facility: CLINIC | Age: 58
End: 2018-06-27

## 2018-06-27 NOTE — TELEPHONE ENCOUNTER
Patient is aware    ----- Message from Damián Brown MD sent at 6/27/2018  8:09 AM EDT -----  Diabetes number is a little up.  Recommend avoid bread, rice and noodle. b12 is a little low. Recommend B complex. D is in great shape. Gout not controlled.

## 2018-07-27 DIAGNOSIS — I10 ESSENTIAL HYPERTENSION: ICD-10-CM

## 2018-07-27 RX ORDER — AMLODIPINE BESYLATE 10 MG/1
TABLET ORAL
Qty: 90 TABLET | Refills: 2 | Status: SHIPPED | OUTPATIENT
Start: 2018-07-27 | End: 2019-04-24 | Stop reason: SDUPTHER

## 2018-08-10 ENCOUNTER — OFFICE VISIT (OUTPATIENT)
Dept: INTERNAL MEDICINE | Facility: CLINIC | Age: 58
End: 2018-08-10

## 2018-08-10 VITALS
OXYGEN SATURATION: 97 % | RESPIRATION RATE: 16 BRPM | BODY MASS INDEX: 30.71 KG/M2 | HEART RATE: 81 BPM | DIASTOLIC BLOOD PRESSURE: 78 MMHG | HEIGHT: 68 IN | WEIGHT: 202.6 LBS | SYSTOLIC BLOOD PRESSURE: 132 MMHG

## 2018-08-10 DIAGNOSIS — I10 ESSENTIAL HYPERTENSION: ICD-10-CM

## 2018-08-10 DIAGNOSIS — E78.2 MIXED HYPERLIPIDEMIA: ICD-10-CM

## 2018-08-10 DIAGNOSIS — Z00.00 ROUTINE HEALTH MAINTENANCE: Primary | ICD-10-CM

## 2018-08-10 DIAGNOSIS — E11.9 TYPE 2 DIABETES MELLITUS WITHOUT COMPLICATION, WITHOUT LONG-TERM CURRENT USE OF INSULIN (HCC): ICD-10-CM

## 2018-08-10 LAB
ALBUMIN SERPL-MCNC: 4.9 G/DL (ref 3.5–5.2)
ALBUMIN/GLOB SERPL: 1.8 G/DL
ALP SERPL-CCNC: 71 U/L (ref 40–129)
ALT SERPL-CCNC: 28 U/L (ref 5–41)
AST SERPL-CCNC: 34 U/L (ref 5–40)
BASOPHILS # BLD AUTO: 0.06 10*3/MM3 (ref 0–0.2)
BASOPHILS NFR BLD AUTO: 0.7 % (ref 0–2)
BILIRUB BLD-MCNC: NEGATIVE MG/DL
BILIRUB SERPL-MCNC: 0.5 MG/DL (ref 0.2–1.2)
BUN SERPL-MCNC: 36 MG/DL (ref 6–20)
BUN/CREAT SERPL: 20.5 (ref 7–25)
CALCIUM SERPL-MCNC: 10.1 MG/DL (ref 8.6–10.5)
CHLORIDE SERPL-SCNC: 100 MMOL/L (ref 98–107)
CHOLEST SERPL-MCNC: 163 MG/DL (ref 0–200)
CLARITY, POC: CLEAR
CO2 SERPL-SCNC: 25.2 MMOL/L (ref 22–29)
COLOR UR: YELLOW
CREAT SERPL-MCNC: 1.76 MG/DL (ref 0.76–1.27)
EOSINOPHIL # BLD AUTO: 0.23 10*3/MM3 (ref 0.1–0.3)
EOSINOPHIL NFR BLD AUTO: 2.9 % (ref 0–4)
ERYTHROCYTE [DISTWIDTH] IN BLOOD BY AUTOMATED COUNT: 12.8 % (ref 11.5–14.5)
GLOBULIN SER CALC-MCNC: 2.7 GM/DL
GLUCOSE SERPL-MCNC: 173 MG/DL (ref 65–99)
GLUCOSE UR STRIP-MCNC: NEGATIVE MG/DL
HBA1C MFR BLD: 7.3 % (ref 4.8–5.6)
HCT VFR BLD AUTO: 42.7 % (ref 42–52)
HDLC SERPL-MCNC: 32 MG/DL (ref 40–60)
HGB BLD-MCNC: 14.5 G/DL (ref 14–18)
IMM GRANULOCYTES # BLD: 0.04 10*3/MM3 (ref 0–0.03)
IMM GRANULOCYTES NFR BLD: 0.5 % (ref 0–0.5)
KETONES UR QL: NEGATIVE
LDLC SERPL CALC-MCNC: 84 MG/DL (ref 0–100)
LDLC/HDLC SERPL: 2.63 {RATIO}
LEUKOCYTE EST, POC: ABNORMAL
LYMPHOCYTES # BLD AUTO: 1.5 10*3/MM3 (ref 0.6–4.8)
LYMPHOCYTES NFR BLD AUTO: 18.7 % (ref 20–45)
MCH RBC QN AUTO: 30.1 PG (ref 27–31)
MCHC RBC AUTO-ENTMCNC: 34 G/DL (ref 31–37)
MCV RBC AUTO: 88.8 FL (ref 80–94)
MONOCYTES # BLD AUTO: 0.55 10*3/MM3 (ref 0–1)
MONOCYTES NFR BLD AUTO: 6.8 % (ref 3–8)
NEUTROPHILS # BLD AUTO: 5.65 10*3/MM3 (ref 1.5–8.3)
NEUTROPHILS NFR BLD AUTO: 70.4 % (ref 45–70)
NITRITE UR-MCNC: NEGATIVE MG/ML
NRBC BLD AUTO-RTO: 0 /100 WBC (ref 0–0)
PH UR: 5.5 [PH] (ref 5–8)
PLATELET # BLD AUTO: 298 10*3/MM3 (ref 140–500)
POTASSIUM SERPL-SCNC: 5 MMOL/L (ref 3.5–5.2)
PROT SERPL-MCNC: 7.6 G/DL (ref 6–8.5)
PROT UR STRIP-MCNC: NEGATIVE MG/DL
RBC # BLD AUTO: 4.81 10*6/MM3 (ref 4.7–6.1)
RBC # UR STRIP: NEGATIVE /UL
SODIUM SERPL-SCNC: 140 MMOL/L (ref 136–145)
SP GR UR: 1.01 (ref 1–1.03)
TRIGL SERPL-MCNC: 235 MG/DL (ref 0–150)
TSH SERPL DL<=0.005 MIU/L-ACNC: 1.9 MIU/ML (ref 0.27–4.2)
URATE SERPL-MCNC: 7.3 MG/DL (ref 3.4–7)
UROBILINOGEN UR QL: NORMAL
VLDLC SERPL CALC-MCNC: 47 MG/DL (ref 8–32)
WBC # BLD AUTO: 8.03 10*3/MM3 (ref 4.8–10.8)

## 2018-08-10 PROCEDURE — 99396 PREV VISIT EST AGE 40-64: CPT | Performed by: INTERNAL MEDICINE

## 2018-08-10 PROCEDURE — 93000 ELECTROCARDIOGRAM COMPLETE: CPT | Performed by: INTERNAL MEDICINE

## 2018-08-10 PROCEDURE — 81003 URINALYSIS AUTO W/O SCOPE: CPT | Performed by: INTERNAL MEDICINE

## 2018-08-10 NOTE — PROGRESS NOTES
Pablo Mendoza is a 57 y.o. male, who presents with a chief complaint of   Chief Complaint   Patient presents with   • Annual Exam       HPI   58 yo male with pmhx gout, HTN, HL, DM, migraine, TORSTEN   Here for his wellness exam.   Previously here in June concerned he had PAN.  We had long discussion about his symptoms, but he was concerned because an unrelated person developed this problem.  Did HCV and autoimmune screening labs which were normal.     He does continue to have exposure to particulates at work. He does not wear a mask.     He does avoid bread but eats carbs fairly regularly. Has not lost weight.       The following portions of the patient's history were reviewed and updated as appropriate: allergies, current medications, past family history, past medical history, past social history, past surgical history and problem list.    Allergies: Patient has no known allergies.    Current Outpatient Prescriptions:   •  allopurinol (ZYLOPRIM) 300 MG tablet, Take 1 tablet by mouth 2 (Two) Times a Day., Disp: 180 tablet, Rfl: 3  •  amLODIPine (NORVASC) 10 MG tablet, TAKE ONE TABLET BY MOUTH DAILY, Disp: 90 tablet, Rfl: 2  •  Cholecalciferol (VITAMIN D3) 5000 units capsule capsule, Take 5,000 Units by mouth Daily., Disp: , Rfl:   •  cyanocobalamin (VITAMIN B-12) 2500 MCG tablet tablet, Take 500 mcg by mouth Daily., Disp: , Rfl:   •  fenofibrate 160 MG tablet, Take 1 tablet by mouth Daily., Disp: 112 tablet, Rfl: 3  •  ibuprofen (ADVIL,MOTRIN) 800 MG tablet, Take 1 tablet by mouth Every 6 (Six) Hours As Needed for Mild Pain ., Disp: 30 tablet, Rfl: 1  •  lisinopril-hydrochlorothiazide (PRINZIDE,ZESTORETIC) 20-12.5 MG per tablet, Take 1 tablet by mouth Daily., Disp: 90 tablet, Rfl: 3  •  loratadine (CLARITIN) 10 MG tablet, TAKE ONE TABLET BY MOUTH DAILY, Disp: 30 tablet, Rfl: 0  •  metFORMIN (GLUCOPHAGE) 1000 MG tablet, Take 1 tablet by mouth 2 (Two) Times a Day With Meals., Disp: 180 tablet, Rfl: 2  •   "metoprolol tartrate (LOPRESSOR) 50 MG tablet, Take 1 tablet by mouth Every 12 (Twelve) Hours., Disp: 180 tablet, Rfl: 3  •  rizatriptan (MAXALT) 10 MG tablet, Take 1 tablet by mouth 1 (One) Time As Needed for Migraine for up to 1 dose. May repeat in 2 hours if needed, Disp: 15 tablet, Rfl: 0  •  vitamin C (ASCORBIC ACID) 500 MG tablet, Take 500 mg by mouth Daily., Disp: , Rfl:   There are no discontinued medications.    Review of Systems   Constitutional: Negative for activity change, appetite change, fatigue, fever and unexpected weight change.   HENT: Positive for congestion. Negative for ear pain, sinus pressure, sore throat, trouble swallowing and voice change.    Respiratory: Negative.    Cardiovascular: Negative.  Negative for chest pain, palpitations and leg swelling.   Gastrointestinal: Negative for diarrhea, nausea and vomiting.   Endocrine: Negative.    Genitourinary: Negative for decreased urine volume and urgency.        No erectile problems  No nocturia   Musculoskeletal: Positive for back pain.        Intermittent gout  Mild back pain   Allergic/Immunologic: Positive for environmental allergies.   Neurological: Positive for headaches. Negative for dizziness.        1-2 per month headache better with maxalt.   Hematological: Negative.    Psychiatric/Behavioral: Negative.    All other systems reviewed and are negative.            /78   Pulse 81   Resp 16   Ht 172.7 cm (68\")   Wt 91.9 kg (202 lb 9.6 oz)   SpO2 97%   BMI 30.81 kg/m²       Physical Exam   Constitutional: He is oriented to person, place, and time. He appears well-developed and well-nourished.   HENT:   Head: Normocephalic and atraumatic.   Right Ear: External ear normal.   Left Ear: External ear normal.   Nose: Nose normal.   Mouth/Throat: No oropharyngeal exudate.   Eyes: Pupils are equal, round, and reactive to light. Conjunctivae and EOM are normal.   Neck: Normal range of motion. Neck supple. No thyromegaly present. "   Cardiovascular: Normal rate, regular rhythm, normal heart sounds and intact distal pulses.  Exam reveals no gallop and no friction rub.    No murmur heard.  Pulmonary/Chest: Effort normal and breath sounds normal.   Abdominal: Soft. Bowel sounds are normal. He exhibits no distension.   Musculoskeletal: Normal range of motion. He exhibits no edema.   Neurological: He is alert and oriented to person, place, and time. He has normal reflexes.   Skin: Skin is warm and dry. Capillary refill takes less than 2 seconds.   Mild facial rosacea   Psychiatric: He has a normal mood and affect. His behavior is normal. Judgment and thought content normal.   Nursing note and vitals reviewed.      Lab Results (most recent)     None          Results for orders placed or performed in visit on 06/06/18   Comprehensive metabolic panel   Result Value Ref Range    Glucose 192 (H) 65 - 99 mg/dL    BUN 27 (H) 6 - 20 mg/dL    Creatinine 1.27 0.76 - 1.27 mg/dL    eGFR Non African Am 59 (L) >60 mL/min/1.73    eGFR African Am 71 >60 mL/min/1.73    BUN/Creatinine Ratio 21.3 7.0 - 25.0    Sodium 141 136 - 145 mmol/L    Potassium 4.4 3.5 - 5.2 mmol/L    Chloride 103 98 - 107 mmol/L    Total CO2 25.4 22.0 - 29.0 mmol/L    Calcium 10.3 8.6 - 10.5 mg/dL    Total Protein 6.9 6.0 - 8.5 g/dL    Albumin 4.60 3.50 - 5.20 g/dL    Globulin 2.3 gm/dL    A/G Ratio 2.0 g/dL    Total Bilirubin 0.3 0.2 - 1.2 mg/dL    Alkaline Phosphatase 65 40 - 129 U/L    AST (SGOT) 31 5 - 40 U/L    ALT (SGPT) 35 5 - 41 U/L   Sedimentation rate, automated   Result Value Ref Range    Sed Rate 15 0 - 20 mm/hr   TSH   Result Value Ref Range    TSH 2.310 0.270 - 4.200 mIU/mL   Vitamin B12   Result Value Ref Range    Vitamin B-12 579 232-1,245 pg/mL   Vitamin D 25 hydroxy   Result Value Ref Range    25 Hydroxy, Vitamin D 48.2 ng/ml   FELY   Result Value Ref Range    FELY Direct Negative Negative   Hepatitis C antibody   Result Value Ref Range    Hep C Virus Ab <0.1 0.0 - 0.9 s/co  ratio   Uric Acid   Result Value Ref Range    Uric Acid 6.2 3.4 - 7.0 mg/dL   Hemoglobin A1c   Result Value Ref Range    Hemoglobin A1C 7.70 (H) 4.80 - 5.60 %   POC Urinalysis Dipstick, Automated   Result Value Ref Range    Color Yellow Yellow, Straw, Dark Yellow, Simona    Clarity, UA Clear Clear    Specific Gravity  1.010 1.005 - 1.030    pH, Urine 5.5 5.0 - 8.0    Leukocytes Negative Negative    Nitrite, UA Negative Negative    Protein, POC Negative Negative mg/dL    Glucose, UA Negative Negative, 1000 mg/dL (3+) mg/dL    Ketones, UA Negative Negative    Urobilinogen, UA Normal Normal    Bilirubin Negative Negative    Blood, UA Negative Negative   CBC w AUTO Differential   Result Value Ref Range    WBC 8.73 4.80 - 10.80 10*3/mm3    RBC 4.71 4.70 - 6.10 10*6/mm3    Hemoglobin 14.1 14.0 - 18.0 g/dL    Hematocrit 41.3 (L) 42.0 - 52.0 %    MCV 87.7 80.0 - 94.0 fL    MCH 29.9 27.0 - 31.0 pg    MCHC 34.1 31.0 - 37.0 g/dL    RDW 13.1 11.5 - 14.5 %    Platelets 265 140 - 500 10*3/mm3    Neutrophil Rel % 71.5 (H) 45.0 - 70.0 %    Lymphocyte Rel % 20.5 20.0 - 45.0 %    Monocyte Rel % 5.7 3.0 - 8.0 %    Eosinophil Rel % 1.4 0.0 - 4.0 %    Basophil Rel % 0.6 0.0 - 2.0 %    Neutrophils Absolute 6.24 1.50 - 8.30 10*3/mm3    Lymphocytes Absolute 1.79 0.60 - 4.80 10*3/mm3    Monocytes Absolute 0.50 0.00 - 1.00 10*3/mm3    Eosinophils Absolute 0.12 0.10 - 0.30 10*3/mm3    Basophils Absolute 0.05 0.00 - 0.20 10*3/mm3    Immature Granulocyte Rel % 0.3 0.0 - 0.5 %    Immature Grans Absolute 0.03 0.00 - 0.03 10*3/mm3    nRBC 0.0 0.0 - 0.0 /100 WBC       ECG 12 Lead  Date/Time: 8/10/2018 8:46 AM  Performed by: MADI WATERMAN  Authorized by: MADI WATERMAN   Rhythm: sinus rhythm  Rate: normal  BPM: 63  ST Segments: ST segments normal  T Waves: T waves normal  QRS axis: normal  Other: no other findings                  Pablo was seen today for annual exam.    Diagnoses and all orders for this visit:    Routine health maintenance  -      Comprehensive metabolic panel  -     PSA SCREENING  -     TSH  -     Uric acid  -     Lipid Panel With LDL/HDL Ratio  -     Hemoglobin A1c  -     CBC w AUTO Differential    Type 2 diabetes mellitus without complication, without long-term current use of insulin (CMS/[facility])    Essential hypertension    Mixed hyperlipidemia      Declines both pna and shingles shot.  Would like to stay on nutritional supplements instead.     No Follow-up on file.    Damián Brown MD  08/10/2018

## 2018-08-12 LAB
BACTERIA UR CULT: NO GROWTH
BACTERIA UR CULT: NORMAL

## 2018-08-24 ENCOUNTER — LAB (OUTPATIENT)
Dept: INTERNAL MEDICINE | Facility: CLINIC | Age: 58
End: 2018-08-24

## 2018-08-24 DIAGNOSIS — E11.9 TYPE 2 DIABETES MELLITUS WITHOUT COMPLICATION, WITHOUT LONG-TERM CURRENT USE OF INSULIN (HCC): ICD-10-CM

## 2018-08-24 DIAGNOSIS — I10 ESSENTIAL HYPERTENSION: ICD-10-CM

## 2018-08-24 DIAGNOSIS — E78.2 MIXED HYPERLIPIDEMIA: Primary | ICD-10-CM

## 2018-08-24 DIAGNOSIS — R53.83 OTHER FATIGUE: ICD-10-CM

## 2018-08-25 LAB
ALBUMIN SERPL-MCNC: 4.7 G/DL (ref 3.5–5.2)
ALBUMIN/GLOB SERPL: 1.8 G/DL
ALP SERPL-CCNC: 67 U/L (ref 40–129)
ALT SERPL-CCNC: 27 U/L (ref 5–41)
APPEARANCE UR: CLEAR
AST SERPL-CCNC: 31 U/L (ref 5–40)
BACTERIA #/AREA URNS HPF: NORMAL /HPF
BASOPHILS # BLD AUTO: 0.06 10*3/MM3 (ref 0–0.2)
BASOPHILS NFR BLD AUTO: 0.7 % (ref 0–2)
BILIRUB SERPL-MCNC: 0.4 MG/DL (ref 0.2–1.2)
BILIRUB UR QL STRIP: NEGATIVE
BUN SERPL-MCNC: 22 MG/DL (ref 6–20)
BUN/CREAT SERPL: 19.1 (ref 7–25)
CALCIUM SERPL-MCNC: 10.2 MG/DL (ref 8.6–10.5)
CHLORIDE SERPL-SCNC: 101 MMOL/L (ref 98–107)
CHOLEST SERPL-MCNC: 167 MG/DL (ref 0–200)
CO2 SERPL-SCNC: 25.6 MMOL/L (ref 22–29)
COLOR UR: YELLOW
CREAT SERPL-MCNC: 1.15 MG/DL (ref 0.76–1.27)
EOSINOPHIL # BLD AUTO: 0.16 10*3/MM3 (ref 0.1–0.3)
EOSINOPHIL NFR BLD AUTO: 1.8 % (ref 0–4)
EPI CELLS #/AREA URNS HPF: NORMAL /HPF
ERYTHROCYTE [DISTWIDTH] IN BLOOD BY AUTOMATED COUNT: 12.8 % (ref 11.5–14.5)
GLOBULIN SER CALC-MCNC: 2.6 GM/DL
GLUCOSE SERPL-MCNC: 128 MG/DL (ref 65–99)
GLUCOSE UR QL: NEGATIVE
HBA1C MFR BLD: 7.3 % (ref 4.8–5.6)
HCT VFR BLD AUTO: 42.4 % (ref 42–52)
HDLC SERPL-MCNC: 36 MG/DL (ref 40–60)
HGB BLD-MCNC: 14.4 G/DL (ref 14–18)
HGB UR QL STRIP: NEGATIVE
IMM GRANULOCYTES # BLD: 0.03 10*3/MM3 (ref 0–0.03)
IMM GRANULOCYTES NFR BLD: 0.3 % (ref 0–0.5)
KETONES UR QL STRIP: NEGATIVE
LDLC SERPL CALC-MCNC: 95 MG/DL (ref 0–100)
LDLC/HDLC SERPL: 2.64 {RATIO}
LEUKOCYTE ESTERASE UR QL STRIP: NEGATIVE
LYMPHOCYTES # BLD AUTO: 1.72 10*3/MM3 (ref 0.6–4.8)
LYMPHOCYTES NFR BLD AUTO: 19.7 % (ref 20–45)
MCH RBC QN AUTO: 30 PG (ref 27–31)
MCHC RBC AUTO-ENTMCNC: 34 G/DL (ref 31–37)
MCV RBC AUTO: 88.3 FL (ref 80–94)
MICRO URNS: ABNORMAL
MONOCYTES # BLD AUTO: 0.47 10*3/MM3 (ref 0–1)
MONOCYTES NFR BLD AUTO: 5.4 % (ref 3–8)
MUCOUS THREADS URNS QL MICRO: PRESENT /HPF
NEUTROPHILS # BLD AUTO: 6.31 10*3/MM3 (ref 1.5–8.3)
NEUTROPHILS NFR BLD AUTO: 72.1 % (ref 45–70)
NITRITE UR QL STRIP: NEGATIVE
NRBC BLD AUTO-RTO: 0 /100 WBC (ref 0–0)
PH UR STRIP: 5.5 [PH] (ref 5–7.5)
PLATELET # BLD AUTO: 288 10*3/MM3 (ref 140–500)
POTASSIUM SERPL-SCNC: 4.7 MMOL/L (ref 3.5–5.2)
PROT SERPL-MCNC: 7.3 G/DL (ref 6–8.5)
PROT UR QL STRIP: ABNORMAL
RBC # BLD AUTO: 4.8 10*6/MM3 (ref 4.7–6.1)
RBC #/AREA URNS HPF: NORMAL /HPF
SODIUM SERPL-SCNC: 141 MMOL/L (ref 136–145)
SP GR UR: 1.02 (ref 1–1.03)
TRIGL SERPL-MCNC: 179 MG/DL (ref 0–150)
TSH SERPL DL<=0.005 MIU/L-ACNC: 1.72 MIU/ML (ref 0.27–4.2)
URINALYSIS REFLEX: ABNORMAL
UROBILINOGEN UR STRIP-MCNC: 0.2 MG/DL (ref 0.2–1)
VLDLC SERPL CALC-MCNC: 35.8 MG/DL (ref 8–32)
WBC # BLD AUTO: 8.75 10*3/MM3 (ref 4.8–10.8)
WBC #/AREA URNS HPF: NORMAL /HPF

## 2018-09-07 ENCOUNTER — OFFICE VISIT (OUTPATIENT)
Dept: INTERNAL MEDICINE | Facility: CLINIC | Age: 58
End: 2018-09-07

## 2018-09-07 VITALS
BODY MASS INDEX: 30.8 KG/M2 | WEIGHT: 203.2 LBS | RESPIRATION RATE: 16 BRPM | OXYGEN SATURATION: 98 % | HEIGHT: 68 IN | HEART RATE: 81 BPM | SYSTOLIC BLOOD PRESSURE: 150 MMHG | DIASTOLIC BLOOD PRESSURE: 90 MMHG

## 2018-09-07 DIAGNOSIS — I10 ESSENTIAL HYPERTENSION: Primary | ICD-10-CM

## 2018-09-07 DIAGNOSIS — E11.9 TYPE 2 DIABETES MELLITUS WITHOUT COMPLICATION, WITHOUT LONG-TERM CURRENT USE OF INSULIN (HCC): ICD-10-CM

## 2018-09-07 PROCEDURE — 99214 OFFICE O/P EST MOD 30 MIN: CPT | Performed by: INTERNAL MEDICINE

## 2018-09-07 NOTE — PATIENT INSTRUCTIONS
Pablo was seen today for hyperlipidemia and diabetes.    Diagnoses and all orders for this visit:    Essential hypertension    Type 2 diabetes mellitus without complication, without long-term current use of insulin (CMS/[facility])      Will stay off lisinopril due to renal dysfunction  In 6-12 months will do a trial of losartan 50, will need for renoprotection  Cotniue norvasc 10  Check daily bp and write down  Check Bp after 15 min of rest  Avoid ibuprofen, aleve as much as possible  Tylenol is a better option       Return in about 6 months (around 3/7/2019) for Recheck.

## 2018-09-07 NOTE — PROGRESS NOTES
Pablo Mendoza is a 57 y.o. male, who presents with a chief complaint of   Chief Complaint   Patient presents with   • Hyperlipidemia   • Diabetes       HPI     56 yo male with recent trouble with increased creatinine, corrected to normal for his baseline to 1.2 after off ace inhibitor.  He does take advil as well.  He reports home bp off lisinopril is 130/80.  He is otherwise doing well.  No Cp or palpitations. No increase in headaches.       The following portions of the patient's history were reviewed and updated as appropriate: allergies, current medications, past family history, past medical history, past social history, past surgical history and problem list.    Allergies: Lisinopril-hydrochlorothiazide    Current Outpatient Prescriptions:   •  allopurinol (ZYLOPRIM) 300 MG tablet, Take 1 tablet by mouth 2 (Two) Times a Day., Disp: 180 tablet, Rfl: 3  •  amLODIPine (NORVASC) 10 MG tablet, TAKE ONE TABLET BY MOUTH DAILY, Disp: 90 tablet, Rfl: 2  •  Cholecalciferol (VITAMIN D3) 5000 units capsule capsule, Take 5,000 Units by mouth Daily., Disp: , Rfl:   •  cyanocobalamin (VITAMIN B-12) 2500 MCG tablet tablet, Take 500 mcg by mouth Daily., Disp: , Rfl:   •  fenofibrate 160 MG tablet, Take 1 tablet by mouth Daily., Disp: 112 tablet, Rfl: 3  •  ibuprofen (ADVIL,MOTRIN) 800 MG tablet, Take 1 tablet by mouth Every 6 (Six) Hours As Needed for Mild Pain ., Disp: 30 tablet, Rfl: 1  •  lisinopril-hydrochlorothiazide (PRINZIDE,ZESTORETIC) 20-12.5 MG per tablet, Take 1 tablet by mouth Daily., Disp: 90 tablet, Rfl: 3  •  loratadine (CLARITIN) 10 MG tablet, TAKE ONE TABLET BY MOUTH DAILY, Disp: 30 tablet, Rfl: 0  •  metFORMIN (GLUCOPHAGE) 1000 MG tablet, Take 1 tablet by mouth 2 (Two) Times a Day With Meals., Disp: 180 tablet, Rfl: 2  •  metoprolol tartrate (LOPRESSOR) 50 MG tablet, Take 1 tablet by mouth Every 12 (Twelve) Hours., Disp: 180 tablet, Rfl: 3  •  rizatriptan (MAXALT) 10 MG tablet, Take 1 tablet by mouth  "1 (One) Time As Needed for Migraine for up to 1 dose. May repeat in 2 hours if needed, Disp: 15 tablet, Rfl: 0  •  vitamin C (ASCORBIC ACID) 500 MG tablet, Take 500 mg by mouth Daily., Disp: , Rfl:   There are no discontinued medications.    Review of Systems   Constitutional: Negative.    HENT: Negative.    Respiratory: Negative.    Cardiovascular: Negative.    Gastrointestinal: Negative.    Genitourinary: Negative.         Recent renal issue as discussed   Musculoskeletal: Negative.    Neurological: Negative.    Hematological: Negative.    Psychiatric/Behavioral: Negative.              /90   Pulse 81   Resp 16   Ht 172.7 cm (68\")   Wt 92.2 kg (203 lb 3.2 oz)   SpO2 98%   BMI 30.90 kg/m²       Physical Exam   Constitutional: He appears well-developed and well-nourished.   HENT:   Head: Normocephalic and atraumatic.   Cardiovascular: Normal rate and regular rhythm.    No murmur heard.  Pulmonary/Chest: Effort normal and breath sounds normal. No respiratory distress.   Musculoskeletal: He exhibits no edema.   Neurological: He is alert.   Skin: Skin is warm. Capillary refill takes less than 2 seconds.   Psychiatric: He has a normal mood and affect. His behavior is normal.   Nursing note and vitals reviewed.      Lab Results (most recent)     None          Results for orders placed or performed in visit on 08/24/18   Comprehensive Metabolic Panel   Result Value Ref Range    Glucose 128 (H) 65 - 99 mg/dL    BUN 22 (H) 6 - 20 mg/dL    Creatinine 1.15 0.76 - 1.27 mg/dL    eGFR Non African Am 66 >60 mL/min/1.73    eGFR African Am 80 >60 mL/min/1.73    BUN/Creatinine Ratio 19.1 7.0 - 25.0    Sodium 141 136 - 145 mmol/L    Potassium 4.7 3.5 - 5.2 mmol/L    Chloride 101 98 - 107 mmol/L    Total CO2 25.6 22.0 - 29.0 mmol/L    Calcium 10.2 8.6 - 10.5 mg/dL    Total Protein 7.3 6.0 - 8.5 g/dL    Albumin 4.70 3.50 - 5.20 g/dL    Globulin 2.6 gm/dL    A/G Ratio 1.8 g/dL    Total Bilirubin 0.4 0.2 - 1.2 mg/dL    " Alkaline Phosphatase 67 40 - 129 U/L    AST (SGOT) 31 5 - 40 U/L    ALT (SGPT) 27 5 - 41 U/L   Lipid Panel With LDL / HDL Ratio   Result Value Ref Range    Total Cholesterol 167 0 - 200 mg/dL    Triglycerides 179 (H) 0 - 150 mg/dL    HDL Cholesterol 36 (L) 40 - 60 mg/dL    VLDL Cholesterol 35.8 (H) 8 - 32 mg/dL    LDL Cholesterol  95 0 - 100 mg/dL    LDL/HDL Ratio 2.64    Hemoglobin A1c   Result Value Ref Range    Hemoglobin A1C 7.30 (H) 4.80 - 5.60 %   TSH   Result Value Ref Range    TSH 1.720 0.270 - 4.200 mIU/mL   Urinalysis With Culture If Indicated - Urine, Clean Catch   Result Value Ref Range    Specific Gravity, UA 1.016 1.005 - 1.030    pH, UA 5.5 5.0 - 7.5    Color, UA Yellow Yellow    Appearance, UA Clear Clear    Leukocytes, UA Negative Negative    Protein 2+ (A) Negative/Trace    Glucose, UA Negative Negative    Ketones Negative Negative    Blood, UA Negative Negative    Bilirubin, UA Negative Negative    Urobilinogen, UA 0.2 0.2 - 1.0 mg/dL    Nitrite, UA Negative Negative    Microscopic Examination See below:     Urinalysis Reflex Comment    Microscopic Examination   Result Value Ref Range    WBC, UA 0-5 0 - 5 /hpf    RBC, UA 0-2 0 - 2 /hpf    Epithelial Cells (non renal) 0-10 0 - 10 /hpf    Mucus, UA Present Not Estab. /hpf    Bacteria, UA None seen None seen/Few /hpf   CBC & Differential   Result Value Ref Range    WBC 8.75 4.80 - 10.80 10*3/mm3    RBC 4.80 4.70 - 6.10 10*6/mm3    Hemoglobin 14.4 14.0 - 18.0 g/dL    Hematocrit 42.4 42.0 - 52.0 %    MCV 88.3 80.0 - 94.0 fL    MCH 30.0 27.0 - 31.0 pg    MCHC 34.0 31.0 - 37.0 g/dL    RDW 12.8 11.5 - 14.5 %    Platelets 288 140 - 500 10*3/mm3    Neutrophil Rel % 72.1 (H) 45.0 - 70.0 %    Lymphocyte Rel % 19.7 (L) 20.0 - 45.0 %    Monocyte Rel % 5.4 3.0 - 8.0 %    Eosinophil Rel % 1.8 0.0 - 4.0 %    Basophil Rel % 0.7 0.0 - 2.0 %    Neutrophils Absolute 6.31 1.50 - 8.30 10*3/mm3    Lymphocytes Absolute 1.72 0.60 - 4.80 10*3/mm3    Monocytes Absolute 0.47  0.00 - 1.00 10*3/mm3    Eosinophils Absolute 0.16 0.10 - 0.30 10*3/mm3    Basophils Absolute 0.06 0.00 - 0.20 10*3/mm3    Immature Granulocyte Rel % 0.3 0.0 - 0.5 %    Immature Grans Absolute 0.03 0.00 - 0.03 10*3/mm3    nRBC 0.0 0.0 - 0.0 /100 WBC           Pablo was seen today for hyperlipidemia and diabetes.    Diagnoses and all orders for this visit:    Essential hypertension    Type 2 diabetes mellitus without complication, without long-term current use of insulin (CMS/[facility])      Will stay off lisinopril due to renal dysfunction  In 6-12 months will do a trial of losartan 50, will need for renoprotection  Cotniue norvasc 10  Check daily bp and write down  Check Bp after 15 min of rest  Avoid ibuprofen, aleve as much as possible  Tylenol is a better option   If more than 140/90    Return in about 6 months (around 3/7/2019) for Recheck - labs first.    Damián Brown MD  09/07/2018

## 2018-11-21 DIAGNOSIS — E11.9 TYPE 2 DIABETES MELLITUS WITHOUT COMPLICATION, WITHOUT LONG-TERM CURRENT USE OF INSULIN (HCC): ICD-10-CM

## 2019-02-01 DIAGNOSIS — E11.9 TYPE 2 DIABETES MELLITUS WITHOUT COMPLICATION, WITHOUT LONG-TERM CURRENT USE OF INSULIN (HCC): ICD-10-CM

## 2019-02-01 DIAGNOSIS — I10 ESSENTIAL HYPERTENSION: ICD-10-CM

## 2019-02-01 DIAGNOSIS — M1A.09X0 CHRONIC GOUT OF MULTIPLE SITES, UNSPECIFIED CAUSE: ICD-10-CM

## 2019-02-01 RX ORDER — ALLOPURINOL 300 MG/1
TABLET ORAL
Qty: 180 TABLET | Refills: 1 | Status: SHIPPED | OUTPATIENT
Start: 2019-02-01 | End: 2019-07-29 | Stop reason: SDUPTHER

## 2019-02-01 RX ORDER — METOPROLOL TARTRATE 50 MG/1
TABLET, FILM COATED ORAL
Qty: 180 TABLET | Refills: 1 | Status: SHIPPED | OUTPATIENT
Start: 2019-02-01 | End: 2019-03-08 | Stop reason: SDUPTHER

## 2019-03-04 ENCOUNTER — TELEPHONE (OUTPATIENT)
Dept: INTERNAL MEDICINE | Facility: CLINIC | Age: 59
End: 2019-03-04

## 2019-03-04 NOTE — TELEPHONE ENCOUNTER
3/4 - lm for pt to call and set up appointment for BP  And also make appt with Nurse Practitioner - Faith Samayoa    ----- Message from ANUSHA Dalton sent at 3/4/2019  9:02 AM EST -----  He will need a BP follow up appt.    ----- Message -----  From: Kathy Ocampo MA  Sent: 3/1/2019   3:26 PM  To: ANUSHA Dalton    Can you help advise on this? Thank you sorry  ----- Message -----  From: Miryam Degroot MA  Sent: 3/1/2019   2:22 PM  To: Damián Brown MD        ----- Message -----  From: Sumi Benítez  Sent: 3/1/2019   2:10 PM  To: [facility] Floresge2 [facility] Clinical Pool    Pt saw alyce. States his BP has been running high for about two weeks. This is the first time he has attempted to call us about this. States its running about 160/100. He is wanting to switch to jayla but is wanting to know what to do in the mean time. Call back is [phone].

## 2019-03-08 ENCOUNTER — OFFICE VISIT (OUTPATIENT)
Dept: INTERNAL MEDICINE | Facility: CLINIC | Age: 59
End: 2019-03-08

## 2019-03-08 VITALS
SYSTOLIC BLOOD PRESSURE: 150 MMHG | RESPIRATION RATE: 14 BRPM | HEART RATE: 87 BPM | TEMPERATURE: 98.4 F | DIASTOLIC BLOOD PRESSURE: 78 MMHG | WEIGHT: 199 LBS | HEIGHT: 68 IN | BODY MASS INDEX: 30.16 KG/M2 | OXYGEN SATURATION: 99 %

## 2019-03-08 DIAGNOSIS — R80.9 PROTEINURIA, UNSPECIFIED TYPE: ICD-10-CM

## 2019-03-08 DIAGNOSIS — E78.2 MIXED HYPERLIPIDEMIA: Primary | ICD-10-CM

## 2019-03-08 DIAGNOSIS — E78.2 MIXED HYPERLIPIDEMIA: ICD-10-CM

## 2019-03-08 DIAGNOSIS — E11.9 TYPE 2 DIABETES MELLITUS WITHOUT COMPLICATION, WITHOUT LONG-TERM CURRENT USE OF INSULIN (HCC): ICD-10-CM

## 2019-03-08 DIAGNOSIS — J30.9 ALLERGIC RHINITIS, UNSPECIFIED SEASONALITY, UNSPECIFIED TRIGGER: ICD-10-CM

## 2019-03-08 DIAGNOSIS — I10 ESSENTIAL HYPERTENSION: ICD-10-CM

## 2019-03-08 LAB
ALBUMIN SERPL-MCNC: 4.3 G/DL (ref 3.5–5.2)
ALBUMIN/GLOB SERPL: 1.5 G/DL
ALP SERPL-CCNC: 85 U/L (ref 40–129)
ALT SERPL-CCNC: 32 U/L (ref 5–41)
APPEARANCE UR: CLEAR
AST SERPL-CCNC: 38 U/L (ref 5–40)
BACTERIA #/AREA URNS HPF: ABNORMAL /HPF
BASOPHILS # BLD AUTO: 0.06 10*3/MM3 (ref 0–0.2)
BASOPHILS NFR BLD AUTO: 0.8 % (ref 0–1.5)
BILIRUB SERPL-MCNC: 0.6 MG/DL (ref 0.2–1.2)
BILIRUB UR QL STRIP: NEGATIVE
BUN SERPL-MCNC: 12 MG/DL (ref 6–20)
BUN/CREAT SERPL: 12.1 (ref 7–25)
CALCIUM SERPL-MCNC: 9.4 MG/DL (ref 8.6–10.5)
CHLORIDE SERPL-SCNC: 101 MMOL/L (ref 98–107)
CO2 SERPL-SCNC: 26.5 MMOL/L (ref 22–29)
COLOR UR: (no result)
CREAT SERPL-MCNC: 0.99 MG/DL (ref 0.76–1.27)
CREAT UR-MCNC: 73.9 MG/DL
EOSINOPHIL # BLD AUTO: 0.16 10*3/MM3 (ref 0–0.4)
EOSINOPHIL NFR BLD AUTO: 2.1 % (ref 0.3–6.2)
EPI CELLS #/AREA URNS HPF: ABNORMAL /HPF
ERYTHROCYTE [DISTWIDTH] IN BLOOD BY AUTOMATED COUNT: 14 % (ref 12.3–15.4)
GLOBULIN SER CALC-MCNC: 2.9 GM/DL
GLUCOSE SERPL-MCNC: 169 MG/DL (ref 65–99)
GLUCOSE UR QL: NEGATIVE
HBA1C MFR BLD: 8 % (ref 4.8–5.6)
HCT VFR BLD AUTO: 38.4 % (ref 37.5–51)
HGB BLD-MCNC: 12.7 G/DL (ref 13–17.7)
HGB UR QL STRIP: NEGATIVE
IMM GRANULOCYTES # BLD AUTO: 0.05 10*3/MM3 (ref 0–0.05)
IMM GRANULOCYTES NFR BLD AUTO: 0.7 % (ref 0–0.5)
KETONES UR QL STRIP: NEGATIVE
LEUKOCYTE ESTERASE UR QL STRIP: NEGATIVE
LYMPHOCYTES # BLD AUTO: 2.57 10*3/MM3 (ref 0.7–3.1)
LYMPHOCYTES NFR BLD AUTO: 34 % (ref 19.6–45.3)
MCH RBC QN AUTO: 29.6 PG (ref 26.6–33)
MCHC RBC AUTO-ENTMCNC: 33.1 G/DL (ref 31.5–35.7)
MCV RBC AUTO: 89.5 FL (ref 79–97)
MONOCYTES # BLD AUTO: 0.46 10*3/MM3 (ref 0.1–0.9)
MONOCYTES NFR BLD AUTO: 6.1 % (ref 5–12)
NEUTROPHILS # BLD AUTO: 4.25 10*3/MM3 (ref 1.4–7)
NEUTROPHILS NFR BLD AUTO: 56.3 % (ref 42.7–76)
NITRITE UR QL STRIP: NEGATIVE
NRBC BLD AUTO-RTO: 0 /100 WBC (ref 0–0)
PH UR STRIP: 7 [PH] (ref 4.5–8)
PLATELET # BLD AUTO: 256 10*3/MM3 (ref 140–450)
POTASSIUM SERPL-SCNC: 4.5 MMOL/L (ref 3.5–5.2)
PROT SERPL-MCNC: 7.2 G/DL (ref 6–8.5)
PROT UR QL STRIP: (no result)
PROT UR-MCNC: 125 MG/DL
PROT/CREAT UR: 1691.5 MG/G CREA
RBC # BLD AUTO: 4.29 10*6/MM3 (ref 4.14–5.8)
RBC #/AREA URNS HPF: ABNORMAL /HPF
SODIUM SERPL-SCNC: 139 MMOL/L (ref 136–145)
SP GR UR: 1.02 (ref 1–1.03)
T4 FREE SERPL-MCNC: 1.03 NG/DL (ref 0.93–1.7)
TSH SERPL DL<=0.005 MIU/L-ACNC: 2.56 MIU/ML (ref 0.27–4.2)
URATE SERPL-MCNC: 3.8 MG/DL (ref 3.4–7)
UROBILINOGEN UR STRIP-MCNC: (no result) MG/DL
WBC # BLD AUTO: 7.55 10*3/MM3 (ref 3.4–10.8)
WBC #/AREA URNS HPF: ABNORMAL /HPF

## 2019-03-08 PROCEDURE — 99214 OFFICE O/P EST MOD 30 MIN: CPT | Performed by: INTERNAL MEDICINE

## 2019-03-08 RX ORDER — ASPIRIN 81 MG/1
81 TABLET ORAL DAILY
COMMUNITY
End: 2019-11-28

## 2019-03-08 RX ORDER — TRIAMCINOLONE ACETONIDE 55 UG/1
2 SPRAY, METERED NASAL DAILY
Qty: 1 BOTTLE | Refills: 3 | Status: SHIPPED | OUTPATIENT
Start: 2019-03-08 | End: 2019-06-28

## 2019-03-08 RX ORDER — METOPROLOL TARTRATE 75 MG/1
50 TABLET, FILM COATED ORAL EVERY 12 HOURS
Qty: 180 TABLET | Refills: 0 | Status: SHIPPED | OUTPATIENT
Start: 2019-03-08 | End: 2019-03-12 | Stop reason: SDUPTHER

## 2019-03-08 NOTE — PROGRESS NOTES
"      Pablo Mendoza is a 58 y.o. male, who presents with a chief complaint of   Chief Complaint   Patient presents with   • Hypertension   • Blood in Urine       HPI   Pt has had recent issues with his bp's being elevated.  He uses wrist cuff to check bp at home. Highest readings 170/100's.  Normal for him is around 138/80.  He is on amlodipine, metoprolol. He was on lisinopril but this was stopped about 6 mo ago bc renal dysfunction.  The amlodipine was started at that time.  He was told to avoid NSAID and he only takes the med PRN fo headaches.   He is on asa 81mg daily.     He has had dots of blood in his underwear from urine lately.  No flank pain or abd pain.  No hx kidney stones.     He has had sinus issues for the past week.  He has had lots of cough,congestion, and fatigue.  No current fever.      Uncontrolled diabetes - last a1c 7.3 about 7 mo ago.  He says his glucoses have been a little \"high\"  He has been doing well lately so he hasnt been checking glucoses lately.  He says his range us usually 115-130's.      HLD - on fenofibrate.      The following portions of the patient's history were reviewed and updated as appropriate: allergies, current medications, past family history, past medical history, past social history, past surgical history and problem list.    Allergies: Lisinopril-hydrochlorothiazide    Review of Systems   Constitutional: Negative.    HENT: Negative.    Eyes: Negative.    Respiratory: Negative.    Cardiovascular: Negative.    Gastrointestinal: Negative.    Endocrine: Negative.    Genitourinary: Negative.    Musculoskeletal: Negative.    Skin: Negative.    Allergic/Immunologic: Negative.    Neurological: Negative.    Hematological: Negative.    Psychiatric/Behavioral: Negative.    All other systems reviewed and are negative.            Wt Readings from Last 3 Encounters:   03/08/19 90.3 kg (199 lb)   09/07/18 92.2 kg (203 lb 3.2 oz)   08/10/18 91.9 kg (202 lb 9.6 oz)     Temp " Readings from Last 3 Encounters:   03/08/19 98.4 °F (36.9 °C)   06/06/18 98.4 °F (36.9 °C)   04/02/18 98.6 °F (37 °C) (Oral)     BP Readings from Last 3 Encounters:   03/08/19 150/78   09/07/18 150/90   08/10/18 132/78     Pulse Readings from Last 3 Encounters:   03/08/19 87   09/07/18 81   08/10/18 81     Body mass index is 30.26 kg/m².  @LASTSAO2(3)@    Physical Exam   Constitutional: He is oriented to person, place, and time. He appears well-developed and well-nourished. No distress.   HENT:   Head: Normocephalic and atraumatic.   Right Ear: External ear normal.   Left Ear: External ear normal.   Nose: Nose normal.   Mouth/Throat: Oropharynx is clear and moist.   Eyes: Conjunctivae and EOM are normal. Pupils are equal, round, and reactive to light.   Neck: Normal range of motion. Neck supple.   Cardiovascular: Normal rate, regular rhythm, normal heart sounds and intact distal pulses.   Pulmonary/Chest: Effort normal and breath sounds normal. No respiratory distress. He has no wheezes.   Musculoskeletal: Normal range of motion.   Normal gait   Neurological: He is alert and oriented to person, place, and time.   Skin: Skin is warm and dry.   Psychiatric: He has a normal mood and affect. His behavior is normal. Judgment and thought content normal.   Nursing note and vitals reviewed.      Results for orders placed or performed in visit on 08/24/18   Comprehensive Metabolic Panel   Result Value Ref Range    Glucose 128 (H) 65 - 99 mg/dL    BUN 22 (H) 6 - 20 mg/dL    Creatinine 1.15 0.76 - 1.27 mg/dL    eGFR Non African Am 66 >60 mL/min/1.73    eGFR African Am 80 >60 mL/min/1.73    BUN/Creatinine Ratio 19.1 7.0 - 25.0    Sodium 141 136 - 145 mmol/L    Potassium 4.7 3.5 - 5.2 mmol/L    Chloride 101 98 - 107 mmol/L    Total CO2 25.6 22.0 - 29.0 mmol/L    Calcium 10.2 8.6 - 10.5 mg/dL    Total Protein 7.3 6.0 - 8.5 g/dL    Albumin 4.70 3.50 - 5.20 g/dL    Globulin 2.6 gm/dL    A/G Ratio 1.8 g/dL    Total Bilirubin 0.4 0.2  - 1.2 mg/dL    Alkaline Phosphatase 67 40 - 129 U/L    AST (SGOT) 31 5 - 40 U/L    ALT (SGPT) 27 5 - 41 U/L   Lipid Panel With LDL / HDL Ratio   Result Value Ref Range    Total Cholesterol 167 0 - 200 mg/dL    Triglycerides 179 (H) 0 - 150 mg/dL    HDL Cholesterol 36 (L) 40 - 60 mg/dL    VLDL Cholesterol 35.8 (H) 8 - 32 mg/dL    LDL Cholesterol  95 0 - 100 mg/dL    LDL/HDL Ratio 2.64    Hemoglobin A1c   Result Value Ref Range    Hemoglobin A1C 7.30 (H) 4.80 - 5.60 %   TSH   Result Value Ref Range    TSH 1.720 0.270 - 4.200 mIU/mL   Urinalysis With Culture If Indicated - Urine, Clean Catch   Result Value Ref Range    Specific Gravity, UA 1.016 1.005 - 1.030    pH, UA 5.5 5.0 - 7.5    Color, UA Yellow Yellow    Appearance, UA Clear Clear    Leukocytes, UA Negative Negative    Protein 2+ (A) Negative/Trace    Glucose, UA Negative Negative    Ketones Negative Negative    Blood, UA Negative Negative    Bilirubin, UA Negative Negative    Urobilinogen, UA 0.2 0.2 - 1.0 mg/dL    Nitrite, UA Negative Negative    Microscopic Examination See below:     Urinalysis Reflex Comment    Microscopic Examination   Result Value Ref Range    WBC, UA 0-5 0 - 5 /hpf    RBC, UA 0-2 0 - 2 /hpf    Epithelial Cells (non renal) 0-10 0 - 10 /hpf    Mucus, UA Present Not Estab. /hpf    Bacteria, UA None seen None seen/Few /hpf   CBC & Differential   Result Value Ref Range    WBC 8.75 4.80 - 10.80 10*3/mm3    RBC 4.80 4.70 - 6.10 10*6/mm3    Hemoglobin 14.4 14.0 - 18.0 g/dL    Hematocrit 42.4 42.0 - 52.0 %    MCV 88.3 80.0 - 94.0 fL    MCH 30.0 27.0 - 31.0 pg    MCHC 34.0 31.0 - 37.0 g/dL    RDW 12.8 11.5 - 14.5 %    Platelets 288 140 - 500 10*3/mm3    Neutrophil Rel % 72.1 (H) 45.0 - 70.0 %    Lymphocyte Rel % 19.7 (L) 20.0 - 45.0 %    Monocyte Rel % 5.4 3.0 - 8.0 %    Eosinophil Rel % 1.8 0.0 - 4.0 %    Basophil Rel % 0.7 0.0 - 2.0 %    Neutrophils Absolute 6.31 1.50 - 8.30 10*3/mm3    Lymphocytes Absolute 1.72 0.60 - 4.80 10*3/mm3    Monocytes  Absolute 0.47 0.00 - 1.00 10*3/mm3    Eosinophils Absolute 0.16 0.10 - 0.30 10*3/mm3    Basophils Absolute 0.06 0.00 - 0.20 10*3/mm3    Immature Granulocyte Rel % 0.3 0.0 - 0.5 %    Immature Grans Absolute 0.03 0.00 - 0.03 10*3/mm3    nRBC 0.0 0.0 - 0.0 /100 WBC           Pablo was seen today for hypertension and blood in urine.    Diagnoses and all orders for this visit:    Mixed hyperlipidemia  -     Protein / Creatinine Ratio, Urine - Urine, Clean Catch; Future  -     Comprehensive Metabolic Panel; Future  -     CBC & Differential; Future  -     T4, Free; Future  -     TSH; Future  -     Hemoglobin A1c; Future  -     Uric acid; Future    Essential hypertension  -     Protein / Creatinine Ratio, Urine - Urine, Clean Catch; Future  -     Comprehensive Metabolic Panel; Future  -     CBC & Differential; Future  -     T4, Free; Future  -     TSH; Future  -     Hemoglobin A1c; Future  -     Uric acid; Future  -     metoprolol tartrate 75 MG tablet; Take 50 mg by mouth Every 12 (Twelve) Hours.    Type 2 diabetes mellitus without complication, without long-term current use of insulin (CMS/[facility])  -     Protein / Creatinine Ratio, Urine - Urine, Clean Catch; Future  -     Comprehensive Metabolic Panel; Future  -     CBC & Differential; Future  -     T4, Free; Future  -     TSH; Future  -     Hemoglobin A1c; Future  -     Uric acid; Future    Proteinuria, unspecified type  -     Protein / Creatinine Ratio, Urine - Urine, Clean Catch; Future  -     Comprehensive Metabolic Panel; Future  -     CBC & Differential; Future  -     T4, Free; Future  -     TSH; Future  -     Hemoglobin A1c; Future  -     Uric acid; Future  -     Urinalysis With Microscopic - Urine, Clean Catch; Future    Allergic rhinitis, unspecified seasonality, unspecified trigger  -     Triamcinolone Acetonide (NASACORT) 55 MCG/ACT nasal inhaler; 2 sprays into the nostril(s) as directed by provider Daily.          Outpatient Medications Prior to Visit    Medication Sig Dispense Refill   • allopurinol (ZYLOPRIM) 300 MG tablet TAKE ONE TABLET BY MOUTH TWICE A  tablet 1   • amLODIPine (NORVASC) 10 MG tablet TAKE ONE TABLET BY MOUTH DAILY 90 tablet 2   • aspirin 81 MG EC tablet Take 81 mg by mouth Daily.     • Cholecalciferol (VITAMIN D3) 5000 units capsule capsule Take 5,000 Units by mouth Daily.     • cyanocobalamin (VITAMIN B-12) 2500 MCG tablet tablet Take 500 mcg by mouth Daily.     • fenofibrate 160 MG tablet Take 1 tablet by mouth Daily. 112 tablet 3   • ibuprofen (ADVIL,MOTRIN) 800 MG tablet Take 1 tablet by mouth Every 6 (Six) Hours As Needed for Mild Pain . 30 tablet 1   • lisinopril-hydrochlorothiazide (PRINZIDE,ZESTORETIC) 20-12.5 MG per tablet Take 1 tablet by mouth Daily. 90 tablet 3   • loratadine (CLARITIN) 10 MG tablet TAKE ONE TABLET BY MOUTH DAILY 30 tablet 0   • metFORMIN (GLUCOPHAGE) 1000 MG tablet TAKE ONE TABLET BY MOUTH TWICE A DAY WITH MEALS 180 tablet 1   • rizatriptan (MAXALT) 10 MG tablet Take 1 tablet by mouth 1 (One) Time As Needed for Migraine for up to 1 dose. May repeat in 2 hours if needed 15 tablet 0   • vitamin C (ASCORBIC ACID) 500 MG tablet Take 500 mg by mouth Daily.     • metoprolol tartrate (LOPRESSOR) 50 MG tablet TAKE ONE TABLET BY MOUTH EVERY 12 HOURS 180 tablet 1     No facility-administered medications prior to visit.      New Medications Ordered This Visit   Medications   • Triamcinolone Acetonide (NASACORT) 55 MCG/ACT nasal inhaler     Si sprays into the nostril(s) as directed by provider Daily.     Dispense:  1 bottle     Refill:  3   • metoprolol tartrate 75 MG tablet     Sig: Take 50 mg by mouth Every 12 (Twelve) Hours.     Dispense:  180 tablet     Refill:  0     [unfilled]  Medications Discontinued During This Encounter   Medication Reason   • metoprolol tartrate (LOPRESSOR) 50 MG tablet Reorder         Return in about 1 month (around 2019) for Recheck.

## 2019-03-12 DIAGNOSIS — I10 ESSENTIAL HYPERTENSION: ICD-10-CM

## 2019-03-12 RX ORDER — METOPROLOL TARTRATE 75 MG/1
50 TABLET, FILM COATED ORAL EVERY 12 HOURS
Qty: 180 TABLET | Refills: 1 | Status: SHIPPED | OUTPATIENT
Start: 2019-03-12 | End: 2019-03-13 | Stop reason: SDUPTHER

## 2019-03-13 DIAGNOSIS — I10 ESSENTIAL HYPERTENSION: ICD-10-CM

## 2019-03-13 RX ORDER — METOPROLOL TARTRATE 75 MG/1
50 TABLET, FILM COATED ORAL EVERY 12 HOURS
Qty: 270 TABLET | Refills: 1 | Status: SHIPPED | OUTPATIENT
Start: 2019-03-13 | End: 2019-03-18 | Stop reason: SDUPTHER

## 2019-03-18 DIAGNOSIS — I10 ESSENTIAL HYPERTENSION: ICD-10-CM

## 2019-03-18 RX ORDER — METOPROLOL TARTRATE 50 MG/1
TABLET, FILM COATED ORAL
Qty: 270 TABLET | Refills: 1 | Status: SHIPPED | OUTPATIENT
Start: 2019-03-18 | End: 2019-04-12 | Stop reason: SDUPTHER

## 2019-04-12 ENCOUNTER — OFFICE VISIT (OUTPATIENT)
Dept: INTERNAL MEDICINE | Facility: CLINIC | Age: 59
End: 2019-04-12

## 2019-04-12 VITALS
WEIGHT: 199 LBS | BODY MASS INDEX: 30.16 KG/M2 | HEIGHT: 68 IN | HEART RATE: 71 BPM | OXYGEN SATURATION: 98 % | RESPIRATION RATE: 14 BRPM | SYSTOLIC BLOOD PRESSURE: 142 MMHG | DIASTOLIC BLOOD PRESSURE: 70 MMHG

## 2019-04-12 DIAGNOSIS — E11.9 TYPE 2 DIABETES MELLITUS WITHOUT COMPLICATION, WITHOUT LONG-TERM CURRENT USE OF INSULIN (HCC): ICD-10-CM

## 2019-04-12 DIAGNOSIS — I10 ESSENTIAL HYPERTENSION: ICD-10-CM

## 2019-04-12 DIAGNOSIS — R21 RASH: Primary | ICD-10-CM

## 2019-04-12 PROCEDURE — 99214 OFFICE O/P EST MOD 30 MIN: CPT | Performed by: INTERNAL MEDICINE

## 2019-04-12 RX ORDER — METOPROLOL TARTRATE 100 MG/1
100 TABLET ORAL 2 TIMES DAILY
Qty: 180 TABLET | Refills: 1 | Status: SHIPPED | OUTPATIENT
Start: 2019-04-12 | End: 2019-04-12 | Stop reason: SDUPTHER

## 2019-04-12 RX ORDER — METOPROLOL TARTRATE 100 MG/1
100 TABLET ORAL 2 TIMES DAILY
Qty: 180 TABLET | Refills: 1 | Status: SHIPPED | OUTPATIENT
Start: 2019-04-12 | End: 2019-04-15 | Stop reason: SDUPTHER

## 2019-04-12 RX ORDER — NYSTATIN 100000 U/G
CREAM TOPICAL 2 TIMES DAILY
Qty: 30 G | Refills: 0 | Status: SHIPPED | OUTPATIENT
Start: 2019-04-12 | End: 2021-01-29 | Stop reason: SDDI

## 2019-04-12 NOTE — PROGRESS NOTES
Pablo Mendoza is a 58 y.o. male, who presents with a chief complaint of   Chief Complaint   Patient presents with   • Diabetes   • Hypertension       HPI   Pt here for follow up.     DM - a1c had been up to 8.0.  He was changed from metformin to janumet xr.  Tolerating new med well.  He feels like his sx were more related to diet rather than medications.  had been eating lots of fruits.  He says that glucoses are now back down to the 115-130 range when he is fasting in the morning.        HTN - most bp's in 130's at home.  When he did increase his metoprolol to 100mg bid at times and this got bp down to 120/80's. Ha's better when bp down.     He has a rash on his right thumb and right middle finger.      The following portions of the patient's history were reviewed and updated as appropriate: allergies, current medications, past family history, past medical history, past social history, past surgical history and problem list.    Allergies: Lisinopril-hydrochlorothiazide    Review of Systems   Constitutional: Negative.    HENT: Negative.    Eyes: Negative.    Respiratory: Negative.    Cardiovascular: Negative.    Gastrointestinal: Negative.    Endocrine: Negative.    Genitourinary: Negative.    Musculoskeletal: Negative.    Skin: Negative.    Allergic/Immunologic: Negative.    Neurological: Negative.    Hematological: Negative.    Psychiatric/Behavioral: Negative.    All other systems reviewed and are negative.            Wt Readings from Last 3 Encounters:   04/12/19 90.3 kg (199 lb)   03/08/19 90.3 kg (199 lb)   09/07/18 92.2 kg (203 lb 3.2 oz)     Temp Readings from Last 3 Encounters:   03/08/19 98.4 °F (36.9 °C)   06/06/18 98.4 °F (36.9 °C)   04/02/18 98.6 °F (37 °C) (Oral)     BP Readings from Last 3 Encounters:   04/12/19 142/70   03/08/19 150/78   09/07/18 150/90     Pulse Readings from Last 3 Encounters:   04/12/19 71   03/08/19 87   09/07/18 81     Body mass index is 30.26  kg/m².  @LASTSAO2(3)@    Physical Exam   Constitutional: He is oriented to person, place, and time. He appears well-developed and well-nourished. No distress.   HENT:   Head: Normocephalic and atraumatic.   Right Ear: External ear normal.   Left Ear: External ear normal.   Nose: Nose normal.   Mouth/Throat: Oropharynx is clear and moist.   Eyes: Conjunctivae and EOM are normal. Pupils are equal, round, and reactive to light.   Neck: Normal range of motion. Neck supple.   Cardiovascular: Normal rate, regular rhythm, normal heart sounds and intact distal pulses.   Pulmonary/Chest: Effort normal and breath sounds normal. No respiratory distress. He has no wheezes.   Musculoskeletal: Normal range of motion.   Normal gait   Neurological: He is alert and oriented to person, place, and time.   Skin: Skin is warm and dry.   Psychiatric: He has a normal mood and affect. His behavior is normal. Judgment and thought content normal.   Nursing note and vitals reviewed.      Results for orders placed or performed in visit on 03/08/19   Uric acid   Result Value Ref Range    Uric Acid 3.8 3.4 - 7.0 mg/dL   Hemoglobin A1c   Result Value Ref Range    Hemoglobin A1C 8.00 (H) 4.80 - 5.60 %   TSH   Result Value Ref Range    TSH 2.560 0.270 - 4.200 mIU/mL   T4, Free   Result Value Ref Range    Free T4 1.03 0.93 - 1.70 ng/dL   Comprehensive Metabolic Panel   Result Value Ref Range    Glucose 169 (H) 65 - 99 mg/dL    BUN 12 6 - 20 mg/dL    Creatinine 0.99 0.76 - 1.27 mg/dL    eGFR Non African Am 78 >60 mL/min/1.73    eGFR African Am 95 >60 mL/min/1.73    BUN/Creatinine Ratio 12.1 7.0 - 25.0    Sodium 139 136 - 145 mmol/L    Potassium 4.5 3.5 - 5.2 mmol/L    Chloride 101 98 - 107 mmol/L    Total CO2 26.5 22.0 - 29.0 mmol/L    Calcium 9.4 8.6 - 10.5 mg/dL    Total Protein 7.2 6.0 - 8.5 g/dL    Albumin 4.30 3.50 - 5.20 g/dL    Globulin 2.9 gm/dL    A/G Ratio 1.5 g/dL    Total Bilirubin 0.6 0.2 - 1.2 mg/dL    Alkaline Phosphatase 85 40 - 129  U/L    AST (SGOT) 38 5 - 40 U/L    ALT (SGPT) 32 5 - 41 U/L   Microscopic Examination -   Result Value Ref Range    WBC, UA 0-2 (A) None Seen /HPF    RBC, UA Comment None Seen /HPF    Epithelial Cells (non renal) Comment /HPF    Bacteria, UA Comment None Seen /HPF   Urinalysis With Microscopic - Urine, Clean Catch   Result Value Ref Range    Specific Gravity, UA 1.020 1.003 - 1.030    pH, UA 7.0 4.5 - 8.0    Color, UA Straw     Appearance, UA Clear Clear    Leukocytes, UA Negative Negative    Protein See below: (A) Negative    Glucose, UA Negative Negative    Ketones Negative Negative    Blood, UA Negative Negative    Bilirubin, UA Negative Negative    Urobilinogen, UA Comment     Nitrite, UA Negative Negative   CBC & Differential   Result Value Ref Range    WBC 7.55 3.40 - 10.80 10*3/mm3    RBC 4.29 4.14 - 5.80 10*6/mm3    Hemoglobin 12.7 (L) 13.0 - 17.7 g/dL    Hematocrit 38.4 37.5 - 51.0 %    MCV 89.5 79.0 - 97.0 fL    MCH 29.6 26.6 - 33.0 pg    MCHC 33.1 31.5 - 35.7 g/dL    RDW 14.0 12.3 - 15.4 %    Platelets 256 140 - 450 10*3/mm3    Neutrophil Rel % 56.3 42.7 - 76.0 %    Lymphocyte Rel % 34.0 19.6 - 45.3 %    Monocyte Rel % 6.1 5.0 - 12.0 %    Eosinophil Rel % 2.1 0.3 - 6.2 %    Basophil Rel % 0.8 0.0 - 1.5 %    Neutrophils Absolute 4.25 1.40 - 7.00 10*3/mm3    Lymphocytes Absolute 2.57 0.70 - 3.10 10*3/mm3    Monocytes Absolute 0.46 0.10 - 0.90 10*3/mm3    Eosinophils Absolute 0.16 0.00 - 0.40 10*3/mm3    Basophils Absolute 0.06 0.00 - 0.20 10*3/mm3    Immature Granulocyte Rel % 0.7 (H) 0.0 - 0.5 %    Immature Grans Absolute 0.05 0.00 - 0.05 10*3/mm3    nRBC 0.0 0.0 - 0.0 /100 WBC           Pablo was seen today for diabetes and hypertension.    Diagnoses and all orders for this visit:    Rash  -     nystatin (MYCOSTATIN) 578031 UNIT/GM cream; Apply  topically to the appropriate area as directed 2 (Two) Times a Day.    Type 2 diabetes mellitus without complication, without long-term current use of insulin  (CMS/[facility]) - Cont janumet.  Doing better.    Essential hypertension - increase metoprolol  -     metoprolol tartrate (LOPRESSOR) 100 MG tablet; Take 1 tablet by mouth 2 (Two) Times a Day.          Outpatient Medications Prior to Visit   Medication Sig Dispense Refill   • allopurinol (ZYLOPRIM) 300 MG tablet TAKE ONE TABLET BY MOUTH TWICE A  tablet 1   • amLODIPine (NORVASC) 10 MG tablet TAKE ONE TABLET BY MOUTH DAILY 90 tablet 2   • aspirin 81 MG EC tablet Take 81 mg by mouth Daily.     • Cholecalciferol (VITAMIN D3) 5000 units capsule capsule Take 5,000 Units by mouth Daily.     • cyanocobalamin (VITAMIN B-12) 2500 MCG tablet tablet Take 500 mcg by mouth Daily.     • fenofibrate 160 MG tablet Take 1 tablet by mouth Daily. 112 tablet 3   • ibuprofen (ADVIL,MOTRIN) 800 MG tablet Take 1 tablet by mouth Every 6 (Six) Hours As Needed for Mild Pain . 30 tablet 1   • lisinopril-hydrochlorothiazide (PRINZIDE,ZESTORETIC) 20-12.5 MG per tablet Take 1 tablet by mouth Daily. 90 tablet 3   • loratadine (CLARITIN) 10 MG tablet TAKE ONE TABLET BY MOUTH DAILY 30 tablet 0   • rizatriptan (MAXALT) 10 MG tablet Take 1 tablet by mouth 1 (One) Time As Needed for Migraine for up to 1 dose. May repeat in 2 hours if needed 15 tablet 0   • SITagliptin-metFORMIN HCl ER (JANUMET XR)  MG tablet Take 1 tablet by mouth 2 (Two) Times a Day. 60 tablet 2   • Triamcinolone Acetonide (NASACORT) 55 MCG/ACT nasal inhaler 2 sprays into the nostril(s) as directed by provider Daily. 1 bottle 3   • vitamin C (ASCORBIC ACID) 500 MG tablet Take 500 mg by mouth Daily.     • metFORMIN (GLUCOPHAGE) 1000 MG tablet TAKE ONE TABLET BY MOUTH TWICE A DAY WITH MEALS 180 tablet 1   • metoprolol tartrate (LOPRESSOR) 50 MG tablet TAKE    1 1/2  TABS  PO  tablet 1     No facility-administered medications prior to visit.      New Medications Ordered This Visit   Medications   • nystatin (MYCOSTATIN) 045023 UNIT/GM cream     Sig: Apply  topically to  the appropriate area as directed 2 (Two) Times a Day.     Dispense:  30 g     Refill:  0   • metoprolol tartrate (LOPRESSOR) 100 MG tablet     Sig: Take 1 tablet by mouth 2 (Two) Times a Day.     Dispense:  180 tablet     Refill:  1     Dose increased to 100mg po bid.     [unfilled]  Medications Discontinued During This Encounter   Medication Reason   • metFORMIN (GLUCOPHAGE) 1000 MG tablet    • metoprolol tartrate (LOPRESSOR) 50 MG tablet Reorder   • metoprolol tartrate (LOPRESSOR) 100 MG tablet Reorder         Return in about 2 months (around 6/12/2019) for Recheck, labs.

## 2019-04-15 DIAGNOSIS — I10 ESSENTIAL HYPERTENSION: ICD-10-CM

## 2019-04-15 RX ORDER — METOPROLOL TARTRATE 100 MG/1
100 TABLET ORAL 2 TIMES DAILY
Qty: 180 TABLET | Refills: 1 | Status: SHIPPED | OUTPATIENT
Start: 2019-04-15 | End: 2020-05-11

## 2019-04-24 DIAGNOSIS — I10 ESSENTIAL HYPERTENSION: ICD-10-CM

## 2019-04-24 RX ORDER — AMLODIPINE BESYLATE 10 MG/1
TABLET ORAL
Qty: 90 TABLET | Refills: 1 | Status: SHIPPED | OUTPATIENT
Start: 2019-04-24 | End: 2019-10-31 | Stop reason: SDUPTHER

## 2019-05-02 ENCOUNTER — OFFICE VISIT (OUTPATIENT)
Dept: INTERNAL MEDICINE | Facility: CLINIC | Age: 59
End: 2019-05-02

## 2019-05-02 VITALS
TEMPERATURE: 98.2 F | HEART RATE: 67 BPM | WEIGHT: 199 LBS | OXYGEN SATURATION: 99 % | HEIGHT: 68 IN | SYSTOLIC BLOOD PRESSURE: 120 MMHG | DIASTOLIC BLOOD PRESSURE: 68 MMHG | BODY MASS INDEX: 30.16 KG/M2 | RESPIRATION RATE: 16 BRPM

## 2019-05-02 DIAGNOSIS — L03.116 CELLULITIS OF LEFT FOOT: Primary | ICD-10-CM

## 2019-05-02 PROCEDURE — 99213 OFFICE O/P EST LOW 20 MIN: CPT | Performed by: NURSE PRACTITIONER

## 2019-05-02 RX ORDER — IBUPROFEN 800 MG/1
800 TABLET ORAL EVERY 8 HOURS PRN
Qty: 30 TABLET | Refills: 1 | Status: SHIPPED | OUTPATIENT
Start: 2019-05-02 | End: 2019-11-21 | Stop reason: SDUPTHER

## 2019-05-02 RX ORDER — SULFAMETHOXAZOLE AND TRIMETHOPRIM 800; 160 MG/1; MG/1
1 TABLET ORAL 2 TIMES DAILY
Qty: 20 TABLET | Refills: 0 | Status: SHIPPED | OUTPATIENT
Start: 2019-05-02 | End: 2019-10-04

## 2019-05-02 NOTE — PATIENT INSTRUCTIONS
Cellulitis, Adult  Cellulitis is a skin infection. The infected area is usually red and tender. This condition occurs most often in the arms and lower legs. The infection can travel to the muscles, blood, and underlying tissue and become serious. It is very important to get treated for this condition.  What are the causes?  Cellulitis is caused by bacteria. The bacteria enter through a break in the skin, such as a cut, burn, insect bite, open sore, or crack.  What increases the risk?  This condition is more likely to occur in people who:  · Have a weak defense system (immune system).  · Have open wounds on the skin such as cuts, burns, bites, and scrapes. Bacteria can enter the body through these open wounds.  · Are older.  · Have diabetes.  · Have a type of long-lasting (chronic) liver disease (cirrhosis) or kidney disease.  · Use IV drugs.    What are the signs or symptoms?  Symptoms of this condition include:  · Redness, streaking, or spotting on the skin.  · Swollen area of the skin.  · Tenderness or pain when an area of the skin is touched.  · Warm skin.  · Fever.  · Chills.  · Blisters.    How is this diagnosed?  This condition is diagnosed based on a medical history and physical exam. You may also have tests, including:  · Blood tests.  · Lab tests.  · Imaging tests.    How is this treated?  Treatment for this condition may include:  · Medicines, such as antibiotic medicines or antihistamines.  · Supportive care, such as rest and application of cold or warm cloths (cold or warm compresses) to the skin.  · Hospital care, if the condition is severe.    The infection usually gets better within 1-2 days of treatment.  Follow these instructions at home:  · Take over-the-counter and prescription medicines only as told by your health care provider.  · If you were prescribed an antibiotic medicine, take it as told by your health care provider. Do not stop taking the antibiotic even if you start to feel  better.  · Drink enough fluid to keep your urine clear or pale yellow.  · Do not touch or rub the infected area.  · Raise (elevate) the infected area above the level of your heart while you are sitting or lying down.  · Apply warm or cold compresses to the affected area as told by your health care provider.  · Keep all follow-up visits as told by your health care provider. This is important. These visits let your health care provider make sure a more serious infection is not developing.  Contact a health care provider if:  · You have a fever.  · Your symptoms do not improve within 1-2 days of starting treatment.  · Your bone or joint underneath the infected area becomes painful after the skin has healed.  · Your infection returns in the same area or another area.  · You notice a swollen bump in the infected area.  · You develop new symptoms.  · You have a general ill feeling (malaise) with muscle aches and pains.  Get help right away if:  · Your symptoms get worse.  · You feel very sleepy.  · You develop vomiting or diarrhea that persists.  · You notice red streaks coming from the infected area.  · Your red area gets larger or turns dark in color.  This information is not intended to replace advice given to you by your health care provider. Make sure you discuss any questions you have with your health care provider.  Document Released: 09/27/2006 Document Revised: 04/27/2017 Document Reviewed: 10/26/2016  [employer] Interactive Patient Education © 2019 [employer]

## 2019-05-02 NOTE — PROGRESS NOTES
"Chief Complaint   Patient presents with   • Ankle Pain     LT   • Nail Problem     Toe nail problem; Big toe on LT foot    • Leg Pain     LT   • Leg Swelling     LT   • Fatigue       Subjective   Pablo YUSEF Mendoza is a 58 y.o. male is being seen for an acute appointment for left foot pain for 1 week. He began with a \"sock irritation\" on the top of foot. Then, he developed pain, swelling, and great toe nail discoloration. He elevated it. He reports an increaase in symptoms for the past week. He is a diabetic, but he has not checked his glucose in over a week. He reports that this is due to brother having a stroke. He does not have podiatrist.     History of Present Illness     Current Outpatient Medications on File Prior to Visit   Medication Sig Dispense Refill   • allopurinol (ZYLOPRIM) 300 MG tablet TAKE ONE TABLET BY MOUTH TWICE A  tablet 1   • amLODIPine (NORVASC) 10 MG tablet TAKE ONE TABLET BY MOUTH DAILY 90 tablet 1   • aspirin 81 MG EC tablet Take 81 mg by mouth Daily.     • Cholecalciferol (VITAMIN D3) 5000 units capsule capsule Take 5,000 Units by mouth Daily.     • cyanocobalamin (VITAMIN B-12) 2500 MCG tablet tablet Take 500 mcg by mouth Daily.     • fenofibrate 160 MG tablet Take 1 tablet by mouth Daily. 112 tablet 3   • ibuprofen (ADVIL,MOTRIN) 800 MG tablet Take 1 tablet by mouth Every 6 (Six) Hours As Needed for Mild Pain . 30 tablet 1   • lisinopril-hydrochlorothiazide (PRINZIDE,ZESTORETIC) 20-12.5 MG per tablet Take 1 tablet by mouth Daily. 90 tablet 3   • loratadine (CLARITIN) 10 MG tablet TAKE ONE TABLET BY MOUTH DAILY 30 tablet 0   • metoprolol tartrate (LOPRESSOR) 100 MG tablet Take 1 tablet by mouth 2 (Two) Times a Day. 180 tablet 1   • nystatin (MYCOSTATIN) 191570 UNIT/GM cream Apply  topically to the appropriate area as directed 2 (Two) Times a Day. 30 g 0   • rizatriptan (MAXALT) 10 MG tablet Take 1 tablet by mouth 1 (One) Time As Needed for Migraine for up to 1 dose. May repeat in 2 " hours if needed 15 tablet 0   • SITagliptin-metFORMIN HCl ER (JANUMET XR)  MG tablet Take 1 tablet by mouth 2 (Two) Times a Day. 60 tablet 2   • Triamcinolone Acetonide (NASACORT) 55 MCG/ACT nasal inhaler 2 sprays into the nostril(s) as directed by provider Daily. 1 bottle 3   • vitamin C (ASCORBIC ACID) 500 MG tablet Take 500 mg by mouth Daily.       No current facility-administered medications on file prior to visit.        The following portions of the patient's history were reviewed and updated as appropriate: allergies, current medications, past family history, past medical history, past social history, past surgical history and problem list.    Review of Systems   Constitutional: Negative.    HENT: Negative.    Cardiovascular: Negative for chest pain, palpitations and leg swelling.   Gastrointestinal: Negative.    Musculoskeletal: Positive for arthralgias.   Skin: Positive for rash.   Allergic/Immunologic: Negative.    Neurological: Negative.    Psychiatric/Behavioral: Negative.        Objective   Physical Exam   Constitutional: He is oriented to person, place, and time. He appears well-developed and well-nourished.   HENT:   Head: Normocephalic.   Cardiovascular: Normal rate, regular rhythm and normal heart sounds.   No murmur heard.  Pulmonary/Chest: Effort normal and breath sounds normal. No stridor. No respiratory distress.   Musculoskeletal: He exhibits edema (Left pedal 1 plus).    Pablo had a diabetic foot exam performed today.  Vascular Status -  His left foot exhibits normal foot vasculature  and no edema.  Skin Integrity  -   His left foot skin is not intact (3cm by 2cm erythematous abrasion to left anterior foot. Warm to touch. )..  Neurological: He is alert and oriented to person, place, and time.   Psychiatric: He has a normal mood and affect. His behavior is normal.   Vitals reviewed.      Assessment/Plan    Diagnosis Plan   1. Cellulitis of left foot  sulfamethoxazole-trimethoprim  (BACTRIM DS,SEPTRA DS) 800-160 MG per tablet    ibuprofen (ADVIL,MOTRIN) 800 MG tablet       Discussed etiology of cellulitis. Apply low Heat to Left foot twice daily, elevate above heart.     Keep a blood glucose, bring in next week.     Follow up with Dr. Hood in 1 week

## 2019-05-10 ENCOUNTER — OFFICE VISIT (OUTPATIENT)
Dept: INTERNAL MEDICINE | Facility: CLINIC | Age: 59
End: 2019-05-10

## 2019-05-10 VITALS
HEIGHT: 68 IN | TEMPERATURE: 98.4 F | RESPIRATION RATE: 16 BRPM | WEIGHT: 195.6 LBS | BODY MASS INDEX: 29.64 KG/M2 | SYSTOLIC BLOOD PRESSURE: 126 MMHG | OXYGEN SATURATION: 99 % | DIASTOLIC BLOOD PRESSURE: 68 MMHG | HEART RATE: 68 BPM

## 2019-05-10 DIAGNOSIS — L03.116 CELLULITIS OF LEFT FOOT: Primary | ICD-10-CM

## 2019-05-10 DIAGNOSIS — B35.1 ONYCHOMYCOSIS: ICD-10-CM

## 2019-05-10 DIAGNOSIS — E11.9 TYPE 2 DIABETES MELLITUS WITHOUT COMPLICATION, WITHOUT LONG-TERM CURRENT USE OF INSULIN (HCC): ICD-10-CM

## 2019-05-10 DIAGNOSIS — B35.6 JOCK ITCH: ICD-10-CM

## 2019-05-10 DIAGNOSIS — R04.0 EPISTAXIS: ICD-10-CM

## 2019-05-10 PROCEDURE — 99214 OFFICE O/P EST MOD 30 MIN: CPT | Performed by: INTERNAL MEDICINE

## 2019-05-10 RX ORDER — CLOTRIMAZOLE 1 %
CREAM (GRAM) TOPICAL 2 TIMES DAILY
Qty: 28 G | Refills: 0 | Status: SHIPPED | OUTPATIENT
Start: 2019-05-10 | End: 2019-06-28

## 2019-05-10 RX ORDER — TERBINAFINE HYDROCHLORIDE 250 MG/1
250 TABLET ORAL DAILY
Qty: 90 TABLET | Refills: 0 | Status: SHIPPED | OUTPATIENT
Start: 2019-05-10 | End: 2019-11-28

## 2019-05-10 NOTE — PROGRESS NOTES
Pablo Mendoza is a 58 y.o. male, who presents with a chief complaint of   Chief Complaint   Patient presents with   • Cellulitis     follow up       HPI   Pt here for follow up on left foot cellulitis.  He is on bactrim and doing much better.  He still has a black spot on the left great toe.      DM - glucoses have been high over the past week and now glucoses trending down as the cellulitis improving.  Last night he was 178 and this am 148.      His brother is in the hospital here at [place] in the ICU.  He had a stroke and has been very sick.  This stress has made it hard for him to stay on track with his diet.      He also has a rash in his groin that he thinks is ring worm.    Right nostril cont to bleed. Past sinus/nasal surgery    The following portions of the patient's history were reviewed and updated as appropriate: allergies, current medications, past family history, past medical history, past social history, past surgical history and problem list.    Allergies: Lisinopril-hydrochlorothiazide    Review of Systems   Constitutional: Negative.    HENT: Negative.    Eyes: Negative.    Respiratory: Negative.    Cardiovascular: Negative.    Gastrointestinal: Negative.    Endocrine: Negative.    Genitourinary: Negative.    Musculoskeletal: Negative.    Skin: Negative.    Allergic/Immunologic: Negative.    Neurological: Negative.    Hematological: Negative.    Psychiatric/Behavioral: Negative.    All other systems reviewed and are negative.            Wt Readings from Last 3 Encounters:   05/10/19 88.7 kg (195 lb 9.6 oz)   05/02/19 90.3 kg (199 lb)   04/12/19 90.3 kg (199 lb)     Temp Readings from Last 3 Encounters:   05/10/19 98.4 °F (36.9 °C)   05/02/19 98.2 °F (36.8 °C) (Oral)   03/08/19 98.4 °F (36.9 °C)     BP Readings from Last 3 Encounters:   05/10/19 126/68   05/02/19 120/68   04/12/19 142/70     Pulse Readings from Last 3 Encounters:   05/10/19 68   05/02/19 67   04/12/19 71     Body mass  index is 29.74 kg/m².  @LASTSAO2(3)@    Physical Exam   Constitutional: He is oriented to person, place, and time. He appears well-developed and well-nourished. No distress.   HENT:   Head: Normocephalic and atraumatic.   Right Ear: External ear normal.   Left Ear: External ear normal.   Nose: Nose normal.   Mouth/Throat: Oropharynx is clear and moist.   Eyes: Conjunctivae and EOM are normal. Pupils are equal, round, and reactive to light.   Neck: Normal range of motion. Neck supple.   Cardiovascular: Normal rate, regular rhythm, normal heart sounds and intact distal pulses.   Pulmonary/Chest: Effort normal and breath sounds normal. No respiratory distress. He has no wheezes.   Musculoskeletal: Normal range of motion.   Normal gait   Neurological: He is alert and oriented to person, place, and time.   Skin: Skin is warm and dry. Rash noted.   Right groin with salmon colored rash with central clearing.  Left great toe with yellowed/thick nail   Psychiatric: He has a normal mood and affect. His behavior is normal. Judgment and thought content normal.   Nursing note and vitals reviewed.      Results for orders placed or performed in visit on 03/08/19   Uric acid   Result Value Ref Range    Uric Acid 3.8 3.4 - 7.0 mg/dL   Hemoglobin A1c   Result Value Ref Range    Hemoglobin A1C 8.00 (H) 4.80 - 5.60 %   TSH   Result Value Ref Range    TSH 2.560 0.270 - 4.200 mIU/mL   T4, Free   Result Value Ref Range    Free T4 1.03 0.93 - 1.70 ng/dL   Comprehensive Metabolic Panel   Result Value Ref Range    Glucose 169 (H) 65 - 99 mg/dL    BUN 12 6 - 20 mg/dL    Creatinine 0.99 0.76 - 1.27 mg/dL    eGFR Non African Am 78 >60 mL/min/1.73    eGFR African Am 95 >60 mL/min/1.73    BUN/Creatinine Ratio 12.1 7.0 - 25.0    Sodium 139 136 - 145 mmol/L    Potassium 4.5 3.5 - 5.2 mmol/L    Chloride 101 98 - 107 mmol/L    Total CO2 26.5 22.0 - 29.0 mmol/L    Calcium 9.4 8.6 - 10.5 mg/dL    Total Protein 7.2 6.0 - 8.5 g/dL    Albumin 4.30 3.50 -  5.20 g/dL    Globulin 2.9 gm/dL    A/G Ratio 1.5 g/dL    Total Bilirubin 0.6 0.2 - 1.2 mg/dL    Alkaline Phosphatase 85 40 - 129 U/L    AST (SGOT) 38 5 - 40 U/L    ALT (SGPT) 32 5 - 41 U/L   Microscopic Examination -   Result Value Ref Range    WBC, UA 0-2 (A) None Seen /HPF    RBC, UA Comment None Seen /HPF    Epithelial Cells (non renal) Comment /HPF    Bacteria, UA Comment None Seen /HPF   Urinalysis With Microscopic - Urine, Clean Catch   Result Value Ref Range    Specific Gravity, UA 1.020 1.003 - 1.030    pH, UA 7.0 4.5 - 8.0    Color, UA Straw     Appearance, UA Clear Clear    Leukocytes, UA Negative Negative    Protein See below: (A) Negative    Glucose, UA Negative Negative    Ketones Negative Negative    Blood, UA Negative Negative    Bilirubin, UA Negative Negative    Urobilinogen, UA Comment     Nitrite, UA Negative Negative   CBC & Differential   Result Value Ref Range    WBC 7.55 3.40 - 10.80 10*3/mm3    RBC 4.29 4.14 - 5.80 10*6/mm3    Hemoglobin 12.7 (L) 13.0 - 17.7 g/dL    Hematocrit 38.4 37.5 - 51.0 %    MCV 89.5 79.0 - 97.0 fL    MCH 29.6 26.6 - 33.0 pg    MCHC 33.1 31.5 - 35.7 g/dL    RDW 14.0 12.3 - 15.4 %    Platelets 256 140 - 450 10*3/mm3    Neutrophil Rel % 56.3 42.7 - 76.0 %    Lymphocyte Rel % 34.0 19.6 - 45.3 %    Monocyte Rel % 6.1 5.0 - 12.0 %    Eosinophil Rel % 2.1 0.3 - 6.2 %    Basophil Rel % 0.8 0.0 - 1.5 %    Neutrophils Absolute 4.25 1.40 - 7.00 10*3/mm3    Lymphocytes Absolute 2.57 0.70 - 3.10 10*3/mm3    Monocytes Absolute 0.46 0.10 - 0.90 10*3/mm3    Eosinophils Absolute 0.16 0.00 - 0.40 10*3/mm3    Basophils Absolute 0.06 0.00 - 0.20 10*3/mm3    Immature Granulocyte Rel % 0.7 (H) 0.0 - 0.5 %    Immature Grans Absolute 0.05 0.00 - 0.05 10*3/mm3    nRBC 0.0 0.0 - 0.0 /100 WBC           Pablo was seen today for cellulitis.    Diagnoses and all orders for this visit:    Cellulitis of left foot - much improved.  Finish course of bactrim.    Type 2 diabetes mellitus without  complication, without long-term current use of insulin (CMS/HCC) - cont close monitoring.    Jock itch  -     clotrimazole (LOTRIMIN) 1 % cream; Apply  topically to the appropriate area as directed 2 (Two) Times a Day.    Onychomycosis  -     terbinafine (LAMISIL) 250 MG tablet; Take 1 tablet by mouth Daily.          Outpatient Medications Prior to Visit   Medication Sig Dispense Refill   • allopurinol (ZYLOPRIM) 300 MG tablet TAKE ONE TABLET BY MOUTH TWICE A  tablet 1   • amLODIPine (NORVASC) 10 MG tablet TAKE ONE TABLET BY MOUTH DAILY 90 tablet 1   • aspirin 81 MG EC tablet Take 81 mg by mouth Daily.     • Cholecalciferol (VITAMIN D3) 5000 units capsule capsule Take 5,000 Units by mouth Daily.     • cyanocobalamin (VITAMIN B-12) 2500 MCG tablet tablet Take 500 mcg by mouth Daily.     • fenofibrate 160 MG tablet Take 1 tablet by mouth Daily. 112 tablet 3   • ibuprofen (ADVIL,MOTRIN) 800 MG tablet Take 1 tablet by mouth Every 8 (Eight) Hours As Needed for Mild Pain . 30 tablet 1   • lisinopril-hydrochlorothiazide (PRINZIDE,ZESTORETIC) 20-12.5 MG per tablet Take 1 tablet by mouth Daily. 90 tablet 3   • loratadine (CLARITIN) 10 MG tablet TAKE ONE TABLET BY MOUTH DAILY 30 tablet 0   • metoprolol tartrate (LOPRESSOR) 100 MG tablet Take 1 tablet by mouth 2 (Two) Times a Day. 180 tablet 1   • nystatin (MYCOSTATIN) 564756 UNIT/GM cream Apply  topically to the appropriate area as directed 2 (Two) Times a Day. 30 g 0   • rizatriptan (MAXALT) 10 MG tablet Take 1 tablet by mouth 1 (One) Time As Needed for Migraine for up to 1 dose. May repeat in 2 hours if needed 15 tablet 0   • SITagliptin-metFORMIN HCl ER (JANUMET XR)  MG tablet Take 1 tablet by mouth 2 (Two) Times a Day. 60 tablet 2   • sulfamethoxazole-trimethoprim (BACTRIM DS,SEPTRA DS) 800-160 MG per tablet Take 1 tablet by mouth 2 (Two) Times a Day. 20 tablet 0   • Triamcinolone Acetonide (NASACORT) 55 MCG/ACT nasal inhaler 2 sprays into the nostril(s) as  directed by provider Daily. 1 bottle 3   • vitamin C (ASCORBIC ACID) 500 MG tablet Take 500 mg by mouth Daily.       No facility-administered medications prior to visit.      New Medications Ordered This Visit   Medications   • clotrimazole (LOTRIMIN) 1 % cream     Sig: Apply  topically to the appropriate area as directed 2 (Two) Times a Day.     Dispense:  28 g     Refill:  0   • terbinafine (LAMISIL) 250 MG tablet     Sig: Take 1 tablet by mouth Daily.     Dispense:  90 tablet     Refill:  0     [unfilled]  There are no discontinued medications.      Return if symptoms worsen or fail to improve.

## 2019-05-30 DIAGNOSIS — E11.9 TYPE 2 DIABETES MELLITUS WITHOUT COMPLICATION, WITHOUT LONG-TERM CURRENT USE OF INSULIN (HCC): ICD-10-CM

## 2019-05-30 DIAGNOSIS — R53.83 OTHER FATIGUE: ICD-10-CM

## 2019-05-30 DIAGNOSIS — E78.2 MIXED HYPERLIPIDEMIA: Primary | ICD-10-CM

## 2019-05-30 DIAGNOSIS — M1A.09X0 CHRONIC GOUT OF MULTIPLE SITES, UNSPECIFIED CAUSE: ICD-10-CM

## 2019-06-04 ENCOUNTER — RESULTS ENCOUNTER (OUTPATIENT)
Dept: INTERNAL MEDICINE | Facility: CLINIC | Age: 59
End: 2019-06-04

## 2019-06-04 DIAGNOSIS — E78.2 MIXED HYPERLIPIDEMIA: ICD-10-CM

## 2019-06-04 DIAGNOSIS — R53.83 OTHER FATIGUE: ICD-10-CM

## 2019-06-04 DIAGNOSIS — E11.9 TYPE 2 DIABETES MELLITUS WITHOUT COMPLICATION, WITHOUT LONG-TERM CURRENT USE OF INSULIN (HCC): ICD-10-CM

## 2019-06-04 DIAGNOSIS — M1A.09X0 CHRONIC GOUT OF MULTIPLE SITES, UNSPECIFIED CAUSE: ICD-10-CM

## 2019-06-07 LAB
ALBUMIN SERPL-MCNC: 4.6 G/DL (ref 3.5–5.2)
ALBUMIN/GLOB SERPL: 1.6 G/DL
ALP SERPL-CCNC: 66 U/L (ref 39–117)
ALT SERPL-CCNC: 20 U/L (ref 1–41)
AST SERPL-CCNC: 32 U/L (ref 1–40)
BASOPHILS # BLD AUTO: 0.04 10*3/MM3 (ref 0–0.2)
BASOPHILS NFR BLD AUTO: 0.6 % (ref 0–1.5)
BILIRUB SERPL-MCNC: 0.4 MG/DL (ref 0.2–1.2)
BUN SERPL-MCNC: 16 MG/DL (ref 6–20)
BUN/CREAT SERPL: 13.4 (ref 7–25)
CALCIUM SERPL-MCNC: 10.5 MG/DL (ref 8.6–10.5)
CHLORIDE SERPL-SCNC: 106 MMOL/L (ref 98–107)
CHOLEST SERPL-MCNC: 184 MG/DL (ref 0–200)
CO2 SERPL-SCNC: 26.1 MMOL/L (ref 22–29)
CREAT SERPL-MCNC: 1.19 MG/DL (ref 0.76–1.27)
EOSINOPHIL # BLD AUTO: 0.18 10*3/MM3 (ref 0–0.4)
EOSINOPHIL NFR BLD AUTO: 2.7 % (ref 0.3–6.2)
ERYTHROCYTE [DISTWIDTH] IN BLOOD BY AUTOMATED COUNT: 13.4 % (ref 12.3–15.4)
GLOBULIN SER CALC-MCNC: 2.8 GM/DL
GLUCOSE SERPL-MCNC: 148 MG/DL (ref 65–99)
HBA1C MFR BLD: 6.9 % (ref 4.8–5.6)
HCT VFR BLD AUTO: 43.7 % (ref 37.5–51)
HDLC SERPL-MCNC: 34 MG/DL (ref 40–60)
HGB BLD-MCNC: 14.3 G/DL (ref 13–17.7)
IMM GRANULOCYTES # BLD AUTO: 0.03 10*3/MM3 (ref 0–0.05)
IMM GRANULOCYTES NFR BLD AUTO: 0.5 % (ref 0–0.5)
LDLC SERPL CALC-MCNC: 115 MG/DL (ref 0–100)
LDLC/HDLC SERPL: 3.39 {RATIO}
LYMPHOCYTES # BLD AUTO: 1.98 10*3/MM3 (ref 0.7–3.1)
LYMPHOCYTES NFR BLD AUTO: 29.8 % (ref 19.6–45.3)
MCH RBC QN AUTO: 28.8 PG (ref 26.6–33)
MCHC RBC AUTO-ENTMCNC: 32.7 G/DL (ref 31.5–35.7)
MCV RBC AUTO: 87.9 FL (ref 79–97)
MONOCYTES # BLD AUTO: 0.45 10*3/MM3 (ref 0.1–0.9)
MONOCYTES NFR BLD AUTO: 6.8 % (ref 5–12)
NEUTROPHILS # BLD AUTO: 3.96 10*3/MM3 (ref 1.7–7)
NEUTROPHILS NFR BLD AUTO: 59.6 % (ref 42.7–76)
NRBC BLD AUTO-RTO: 0 /100 WBC (ref 0–0.2)
PLATELET # BLD AUTO: 304 10*3/MM3 (ref 140–450)
POTASSIUM SERPL-SCNC: 4.8 MMOL/L (ref 3.5–5.2)
PROT SERPL-MCNC: 7.4 G/DL (ref 6–8.5)
RBC # BLD AUTO: 4.97 10*6/MM3 (ref 4.14–5.8)
SODIUM SERPL-SCNC: 143 MMOL/L (ref 136–145)
TRIGL SERPL-MCNC: 173 MG/DL (ref 0–150)
URATE SERPL-MCNC: 4.5 MG/DL (ref 3.4–7)
VIT B12 SERPL-MCNC: 468 PG/ML (ref 211–946)
VLDLC SERPL CALC-MCNC: 34.6 MG/DL
WBC # BLD AUTO: 6.64 10*3/MM3 (ref 3.4–10.8)

## 2019-06-10 RX ORDER — SITAGLIPTIN AND METFORMIN HYDROCHLORIDE 1000; 50 MG/1; MG/1
TABLET, FILM COATED, EXTENDED RELEASE ORAL
Qty: 180 TABLET | Refills: 1 | Status: SHIPPED | OUTPATIENT
Start: 2019-06-10 | End: 2019-12-09 | Stop reason: SDUPTHER

## 2019-06-28 ENCOUNTER — OFFICE VISIT (OUTPATIENT)
Dept: INTERNAL MEDICINE | Facility: CLINIC | Age: 59
End: 2019-06-28

## 2019-06-28 VITALS
SYSTOLIC BLOOD PRESSURE: 120 MMHG | TEMPERATURE: 98 F | BODY MASS INDEX: 30.07 KG/M2 | OXYGEN SATURATION: 96 % | DIASTOLIC BLOOD PRESSURE: 62 MMHG | RESPIRATION RATE: 18 BRPM | HEART RATE: 72 BPM | HEIGHT: 68 IN | WEIGHT: 198.4 LBS

## 2019-06-28 DIAGNOSIS — E11.9 TYPE 2 DIABETES MELLITUS WITHOUT COMPLICATION, WITHOUT LONG-TERM CURRENT USE OF INSULIN (HCC): Primary | ICD-10-CM

## 2019-06-28 DIAGNOSIS — E78.2 MIXED HYPERLIPIDEMIA: ICD-10-CM

## 2019-06-28 DIAGNOSIS — I10 ESSENTIAL HYPERTENSION: ICD-10-CM

## 2019-06-28 DIAGNOSIS — B35.1 ONYCHOMYCOSIS: ICD-10-CM

## 2019-06-28 PROCEDURE — 99214 OFFICE O/P EST MOD 30 MIN: CPT | Performed by: INTERNAL MEDICINE

## 2019-06-28 RX ORDER — ATORVASTATIN CALCIUM 20 MG/1
20 TABLET, FILM COATED ORAL DAILY
Qty: 90 TABLET | Refills: 1 | Status: SHIPPED | OUTPATIENT
Start: 2019-06-28 | End: 2019-12-23

## 2019-06-28 NOTE — PROGRESS NOTES
Pablo Mendoza is a 58 y.o. male, who presents with a chief complaint of   Chief Complaint   Patient presents with   • Follow-up   • Hyperlipidemia   • Hypertension   • Rash     DURING WORK HOURS AND HEAT AND SOCKS       HPI   Pt here for follow up.      DM - a1c had been up to 8.0-> 6.9.  He was changed from metformin to janumet xr.  Tolerating new med well.  He feels like his sx were more related to diet rather than medications.  He is trying to eat a much healthier diet. He says that glucoses are now back down to the 115-130 range when he is fasting in the morning.         HTN - most bp's in 130's at home.  When he did increase his metoprolol to 100mg bid at times and this got bp down to 120/80's. Ha's better when bp down.   Renal function back to normal off ace-I     His brother was in the hospital here at [place] in the ICU bc of a stroke.  He is now in rehab and making slow improvements.       Onychomycosis - on terbinafine and doing well.      Past sinus/nasal surgery and nose bleeds.  Sx now much better so he didn't end up going to see ent.       Pt is getting red bumps on legs when he gets red and sweaty.  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      hld - on fenofibrate bc it was cheaper.      The following portions of the patient's history were reviewed and updated as appropriate: allergies, current medications, past family history, past medical history, past social history, past surgical history and problem list.    Allergies: Lisinopril-hydrochlorothiazide    Review of Systems   Constitutional: Negative.    HENT: Negative.    Eyes: Negative.    Respiratory: Negative.    Cardiovascular: Negative.    Gastrointestinal: Negative.    Endocrine: Negative.    Genitourinary: Negative.    Musculoskeletal: Negative.    Skin: Negative.    Allergic/Immunologic: Negative.    Neurological: Negative.    Hematological: Negative.    Psychiatric/Behavioral: Negative.    All other systems reviewed and are  negative.            Wt Readings from Last 3 Encounters:   06/28/19 90 kg (198 lb 6.4 oz)   05/10/19 88.7 kg (195 lb 9.6 oz)   05/02/19 90.3 kg (199 lb)     Temp Readings from Last 3 Encounters:   06/28/19 98 °F (36.7 °C) (Oral)   05/10/19 98.4 °F (36.9 °C)   05/02/19 98.2 °F (36.8 °C) (Oral)     BP Readings from Last 3 Encounters:   06/28/19 120/62   05/10/19 126/68   05/02/19 120/68     Pulse Readings from Last 3 Encounters:   06/28/19 72   05/10/19 68   05/02/19 67     Body mass index is 30.17 kg/m².  @LASTSAO2(3)@    Physical Exam   Constitutional: He is oriented to person, place, and time. He appears well-developed and well-nourished. No distress.   HENT:   Head: Normocephalic and atraumatic.   Right Ear: External ear normal.   Left Ear: External ear normal.   Nose: Nose normal.   Mouth/Throat: Oropharynx is clear and moist.   Eyes: Conjunctivae and EOM are normal. Pupils are equal, round, and reactive to light.   Neck: Normal range of motion. Neck supple.   Cardiovascular: Normal rate, regular rhythm, normal heart sounds and intact distal pulses.   Pulmonary/Chest: Effort normal and breath sounds normal. No respiratory distress. He has no wheezes.   Musculoskeletal: Normal range of motion.   Normal gait   Neurological: He is alert and oriented to person, place, and time.   Skin: Skin is warm and dry.   Psychiatric: He has a normal mood and affect. His behavior is normal. Judgment and thought content normal.   Nursing note and vitals reviewed.      Results for orders placed or performed in visit on 06/04/19   Lipid Panel With LDL / HDL Ratio   Result Value Ref Range    Total Cholesterol 184 0 - 200 mg/dL    Triglycerides 173 (H) 0 - 150 mg/dL    HDL Cholesterol 34 (L) 40 - 60 mg/dL    VLDL Cholesterol 34.6 mg/dL    LDL Cholesterol  115 (H) 0 - 100 mg/dL    LDL/HDL Ratio 3.39    Hemoglobin A1c   Result Value Ref Range    Hemoglobin A1C 6.90 (H) 4.80 - 5.60 %   Comprehensive Metabolic Panel   Result Value Ref  Range    Glucose 148 (H) 65 - 99 mg/dL    BUN 16 6 - 20 mg/dL    Creatinine 1.19 0.76 - 1.27 mg/dL    eGFR Non African Am 63 >60 mL/min/1.73    eGFR African Am 77 >60 mL/min/1.73    BUN/Creatinine Ratio 13.4 7.0 - 25.0    Sodium 143 136 - 145 mmol/L    Potassium 4.8 3.5 - 5.2 mmol/L    Chloride 106 98 - 107 mmol/L    Total CO2 26.1 22.0 - 29.0 mmol/L    Calcium 10.5 8.6 - 10.5 mg/dL    Total Protein 7.4 6.0 - 8.5 g/dL    Albumin 4.60 3.50 - 5.20 g/dL    Globulin 2.8 gm/dL    A/G Ratio 1.6 g/dL    Total Bilirubin 0.4 0.2 - 1.2 mg/dL    Alkaline Phosphatase 66 39 - 117 U/L    AST (SGOT) 32 1 - 40 U/L    ALT (SGPT) 20 1 - 41 U/L   Vitamin B12   Result Value Ref Range    Vitamin B-12 468 211 - 946 pg/mL   Uric Acid   Result Value Ref Range    Uric Acid 4.5 3.4 - 7.0 mg/dL   CBC & Differential   Result Value Ref Range    WBC 6.64 3.40 - 10.80 10*3/mm3    RBC 4.97 4.14 - 5.80 10*6/mm3    Hemoglobin 14.3 13.0 - 17.7 g/dL    Hematocrit 43.7 37.5 - 51.0 %    MCV 87.9 79.0 - 97.0 fL    MCH 28.8 26.6 - 33.0 pg    MCHC 32.7 31.5 - 35.7 g/dL    RDW 13.4 12.3 - 15.4 %    Platelets 304 140 - 450 10*3/mm3    Neutrophil Rel % 59.6 42.7 - 76.0 %    Lymphocyte Rel % 29.8 19.6 - 45.3 %    Monocyte Rel % 6.8 5.0 - 12.0 %    Eosinophil Rel % 2.7 0.3 - 6.2 %    Basophil Rel % 0.6 0.0 - 1.5 %    Neutrophils Absolute 3.96 1.70 - 7.00 10*3/mm3    Lymphocytes Absolute 1.98 0.70 - 3.10 10*3/mm3    Monocytes Absolute 0.45 0.10 - 0.90 10*3/mm3    Eosinophils Absolute 0.18 0.00 - 0.40 10*3/mm3    Basophils Absolute 0.04 0.00 - 0.20 10*3/mm3    Immature Granulocyte Rel % 0.5 0.0 - 0.5 %    Immature Grans Absolute 0.03 0.00 - 0.05 10*3/mm3    nRBC 0.0 0.0 - 0.2 /100 WBC           Pablo was seen today for follow-up, hyperlipidemia, hypertension and rash.    Diagnoses and all orders for this visit:    Type 2 diabetes mellitus without complication, without long-term current use of insulin (CMS/[facility]) - encouraged healthy diet/exercise.  Cont current  meds    Mixed hyperlipidemia - stop fibrate and change to statin  -     atorvastatin (LIPITOR) 20 MG tablet; Take 1 tablet by mouth Daily.    Onychomycosis - on lamisil month #2    Essential hypertension - cont current meds.       Rash - discussed changing socks to a moisture wick type to decrease breakouts.    Outpatient Medications Prior to Visit   Medication Sig Dispense Refill   • allopurinol (ZYLOPRIM) 300 MG tablet TAKE ONE TABLET BY MOUTH TWICE A  tablet 1   • amLODIPine (NORVASC) 10 MG tablet TAKE ONE TABLET BY MOUTH DAILY 90 tablet 1   • aspirin 81 MG EC tablet Take 81 mg by mouth Daily.     • Cholecalciferol (VITAMIN D3) 5000 units capsule capsule Take 5,000 Units by mouth Daily.     • cyanocobalamin (VITAMIN B-12) 2500 MCG tablet tablet Take 500 mcg by mouth Daily.     • ibuprofen (ADVIL,MOTRIN) 800 MG tablet Take 1 tablet by mouth Every 8 (Eight) Hours As Needed for Mild Pain . 30 tablet 1   • JANUMET XR  MG tablet TAKE ONE TABLET BY MOUTH TWICE A  tablet 1   • loratadine (CLARITIN) 10 MG tablet TAKE ONE TABLET BY MOUTH DAILY 30 tablet 0   • metoprolol tartrate (LOPRESSOR) 100 MG tablet Take 1 tablet by mouth 2 (Two) Times a Day. 180 tablet 1   • nystatin (MYCOSTATIN) 552624 UNIT/GM cream Apply  topically to the appropriate area as directed 2 (Two) Times a Day. 30 g 0   • rizatriptan (MAXALT) 10 MG tablet Take 1 tablet by mouth 1 (One) Time As Needed for Migraine for up to 1 dose. May repeat in 2 hours if needed 15 tablet 0   • sulfamethoxazole-trimethoprim (BACTRIM DS,SEPTRA DS) 800-160 MG per tablet Take 1 tablet by mouth 2 (Two) Times a Day. 20 tablet 0   • terbinafine (LAMISIL) 250 MG tablet Take 1 tablet by mouth Daily. 90 tablet 0   • vitamin C (ASCORBIC ACID) 500 MG tablet Take 500 mg by mouth Daily.     • fenofibrate 160 MG tablet Take 1 tablet by mouth Daily. 112 tablet 3   • clotrimazole (LOTRIMIN) 1 % cream Apply  topically to the appropriate area as directed 2 (Two) Times  a Day. 28 g 0   • lisinopril-hydrochlorothiazide (PRINZIDE,ZESTORETIC) 20-12.5 MG per tablet Take 1 tablet by mouth Daily. 90 tablet 3   • Triamcinolone Acetonide (NASACORT) 55 MCG/ACT nasal inhaler 2 sprays into the nostril(s) as directed by provider Daily. 1 bottle 3     No facility-administered medications prior to visit.      New Medications Ordered This Visit   Medications   • atorvastatin (LIPITOR) 20 MG tablet     Sig: Take 1 tablet by mouth Daily.     Dispense:  90 tablet     Refill:  1     [unfilled]  Medications Discontinued During This Encounter   Medication Reason   • clotrimazole (LOTRIMIN) 1 % cream *Therapy completed   • lisinopril-hydrochlorothiazide (PRINZIDE,ZESTORETIC) 20-12.5 MG per tablet *Therapy completed   • Triamcinolone Acetonide (NASACORT) 55 MCG/ACT nasal inhaler *Therapy completed   • fenofibrate 160 MG tablet          Return in about 3 months (around 9/28/2019) for Recheck, labs.

## 2019-07-29 DIAGNOSIS — M1A.09X0 CHRONIC GOUT OF MULTIPLE SITES, UNSPECIFIED CAUSE: ICD-10-CM

## 2019-07-29 RX ORDER — ALLOPURINOL 300 MG/1
300 TABLET ORAL 2 TIMES DAILY
Qty: 180 TABLET | Refills: 1 | Status: SHIPPED | OUTPATIENT
Start: 2019-07-29 | End: 2020-01-29

## 2019-08-05 RX ORDER — RIZATRIPTAN BENZOATE 10 MG/1
TABLET ORAL
Qty: 15 TABLET | Refills: 0 | Status: SHIPPED | OUTPATIENT
Start: 2019-08-05 | End: 2020-02-06 | Stop reason: SDUPTHER

## 2019-09-12 ENCOUNTER — TELEPHONE (OUTPATIENT)
Dept: INTERNAL MEDICINE | Facility: CLINIC | Age: 59
End: 2019-09-12

## 2019-09-12 NOTE — TELEPHONE ENCOUNTER
Spoke with patient, states he had a recent run-in with a new supervisor that was requesting further documentation but that it is no longer needed.

## 2019-09-12 NOTE — TELEPHONE ENCOUNTER
----- Message from Cristel Quintana MA sent at 9/11/2019  4:23 PM EDT -----      ----- Message -----  From: Sumi Benítez  Sent: 9/10/2019   1:00 PM  To: [facility]    Pt states he is needing some sort of medical statement or records for his work stating he has chronic migraines. States they have his FMLA ppw but are needing something more. Can we send over notes about his migraines and a letter stating he does in fact have these? Pt sees Dr Hood. Call back is [phone].

## 2019-09-18 ENCOUNTER — TELEPHONE (OUTPATIENT)
Dept: INTERNAL MEDICINE | Facility: CLINIC | Age: 59
End: 2019-09-18

## 2019-09-18 NOTE — TELEPHONE ENCOUNTER
----- Message from Kayla King sent at 9/18/2019  1:34 PM EDT -----  Contact: patient  tamara pt    Patient called to request to speak to medical assistant in regards to his migraines. He is in need of an explanation for migraines, as to why he would need to close his eyes at his desk. He has been accused of sleeping at work while he had his eyes closed do to a headache.  Also need information clarification on his diabetes.  Please call to discuss or he can come in to speak to dr mcdonald if necessary.  Please call him back  [phone]

## 2019-09-26 ENCOUNTER — TELEPHONE (OUTPATIENT)
Dept: INTERNAL MEDICINE | Facility: CLINIC | Age: 59
End: 2019-09-26

## 2019-09-26 DIAGNOSIS — E78.2 MIXED HYPERLIPIDEMIA: Primary | ICD-10-CM

## 2019-09-26 DIAGNOSIS — E11.9 TYPE 2 DIABETES MELLITUS WITHOUT COMPLICATION, WITHOUT LONG-TERM CURRENT USE OF INSULIN (HCC): ICD-10-CM

## 2019-09-27 ENCOUNTER — LAB (OUTPATIENT)
Dept: INTERNAL MEDICINE | Facility: CLINIC | Age: 59
End: 2019-09-27

## 2019-09-27 DIAGNOSIS — E11.9 TYPE 2 DIABETES MELLITUS WITHOUT COMPLICATION, WITHOUT LONG-TERM CURRENT USE OF INSULIN (HCC): ICD-10-CM

## 2019-09-27 DIAGNOSIS — E78.2 MIXED HYPERLIPIDEMIA: ICD-10-CM

## 2019-09-27 LAB
ALBUMIN SERPL-MCNC: 4.3 G/DL (ref 3.5–5.2)
ALBUMIN/GLOB SERPL: 1.6 G/DL
ALP SERPL-CCNC: 111 U/L (ref 39–117)
ALT SERPL-CCNC: 20 U/L (ref 1–41)
AST SERPL-CCNC: 24 U/L (ref 1–40)
BASOPHILS # BLD AUTO: 0.04 10*3/MM3 (ref 0–0.2)
BASOPHILS NFR BLD AUTO: 0.6 % (ref 0–1.5)
BILIRUB SERPL-MCNC: 0.6 MG/DL (ref 0.2–1.2)
BUN SERPL-MCNC: 13 MG/DL (ref 6–20)
BUN/CREAT SERPL: 14.6 (ref 7–25)
CALCIUM SERPL-MCNC: 9.3 MG/DL (ref 8.6–10.5)
CHLORIDE SERPL-SCNC: 104 MMOL/L (ref 98–107)
CO2 SERPL-SCNC: 27.1 MMOL/L (ref 22–29)
CREAT SERPL-MCNC: 0.89 MG/DL (ref 0.76–1.27)
EOSINOPHIL # BLD AUTO: 0.1 10*3/MM3 (ref 0–0.4)
EOSINOPHIL NFR BLD AUTO: 1.4 % (ref 0.3–6.2)
ERYTHROCYTE [DISTWIDTH] IN BLOOD BY AUTOMATED COUNT: 13.7 % (ref 12.3–15.4)
GLOBULIN SER CALC-MCNC: 2.7 GM/DL
GLUCOSE SERPL-MCNC: 158 MG/DL (ref 65–99)
HBA1C MFR BLD: 6.8 % (ref 4.8–5.6)
HCT VFR BLD AUTO: 43.8 % (ref 37.5–51)
HGB BLD-MCNC: 14.3 G/DL (ref 13–17.7)
IMM GRANULOCYTES # BLD AUTO: 0.04 10*3/MM3 (ref 0–0.05)
IMM GRANULOCYTES NFR BLD AUTO: 0.6 % (ref 0–0.5)
LYMPHOCYTES # BLD AUTO: 1.94 10*3/MM3 (ref 0.7–3.1)
LYMPHOCYTES NFR BLD AUTO: 27 % (ref 19.6–45.3)
MCH RBC QN AUTO: 28.9 PG (ref 26.6–33)
MCHC RBC AUTO-ENTMCNC: 32.6 G/DL (ref 31.5–35.7)
MCV RBC AUTO: 88.7 FL (ref 79–97)
MONOCYTES # BLD AUTO: 0.42 10*3/MM3 (ref 0.1–0.9)
MONOCYTES NFR BLD AUTO: 5.8 % (ref 5–12)
NEUTROPHILS # BLD AUTO: 4.65 10*3/MM3 (ref 1.7–7)
NEUTROPHILS NFR BLD AUTO: 64.6 % (ref 42.7–76)
NRBC BLD AUTO-RTO: 0 /100 WBC (ref 0–0.2)
PLATELET # BLD AUTO: 204 10*3/MM3 (ref 140–450)
POTASSIUM SERPL-SCNC: 4.5 MMOL/L (ref 3.5–5.2)
PROT SERPL-MCNC: 7 G/DL (ref 6–8.5)
RBC # BLD AUTO: 4.94 10*6/MM3 (ref 4.14–5.8)
SODIUM SERPL-SCNC: 142 MMOL/L (ref 136–145)
TSH SERPL DL<=0.005 MIU/L-ACNC: 3.83 UIU/ML (ref 0.27–4.2)
WBC # BLD AUTO: 7.19 10*3/MM3 (ref 3.4–10.8)

## 2019-10-04 ENCOUNTER — OFFICE VISIT (OUTPATIENT)
Dept: INTERNAL MEDICINE | Facility: CLINIC | Age: 59
End: 2019-10-04

## 2019-10-04 VITALS
HEIGHT: 68 IN | SYSTOLIC BLOOD PRESSURE: 142 MMHG | HEART RATE: 71 BPM | RESPIRATION RATE: 16 BRPM | DIASTOLIC BLOOD PRESSURE: 88 MMHG | OXYGEN SATURATION: 96 % | WEIGHT: 200.6 LBS | BODY MASS INDEX: 30.4 KG/M2

## 2019-10-04 DIAGNOSIS — I10 ESSENTIAL HYPERTENSION: ICD-10-CM

## 2019-10-04 DIAGNOSIS — E11.9 TYPE 2 DIABETES MELLITUS WITHOUT COMPLICATION, WITHOUT LONG-TERM CURRENT USE OF INSULIN (HCC): ICD-10-CM

## 2019-10-04 DIAGNOSIS — Z23 NEED FOR VACCINATION: Primary | ICD-10-CM

## 2019-10-04 DIAGNOSIS — E78.2 MIXED HYPERLIPIDEMIA: ICD-10-CM

## 2019-10-04 DIAGNOSIS — G43.801 OTHER MIGRAINE WITH STATUS MIGRAINOSUS, NOT INTRACTABLE: ICD-10-CM

## 2019-10-04 LAB
CHOLEST SERPL-MCNC: 114 MG/DL (ref 0–200)
HDLC SERPL-MCNC: 33 MG/DL (ref 40–60)
LDLC SERPL CALC-MCNC: 46 MG/DL (ref 0–100)
LDLC/HDLC SERPL: 1.41 {RATIO}
TRIGL SERPL-MCNC: 173 MG/DL (ref 0–150)
VLDLC SERPL CALC-MCNC: 34.6 MG/DL

## 2019-10-04 PROCEDURE — 90715 TDAP VACCINE 7 YRS/> IM: CPT | Performed by: INTERNAL MEDICINE

## 2019-10-04 PROCEDURE — 99214 OFFICE O/P EST MOD 30 MIN: CPT | Performed by: INTERNAL MEDICINE

## 2019-10-04 PROCEDURE — 90471 IMMUNIZATION ADMIN: CPT | Performed by: INTERNAL MEDICINE

## 2019-10-04 RX ORDER — HYDROCHLOROTHIAZIDE 25 MG/1
25 TABLET ORAL DAILY
Qty: 90 TABLET | Refills: 0 | Status: SHIPPED | OUTPATIENT
Start: 2019-10-04 | End: 2020-01-02

## 2019-10-04 NOTE — PROGRESS NOTES
Pablo Mendoza is a 58 y.o. male, who presents with a chief complaint of   Chief Complaint   Patient presents with   • Headache       HPI   Pt here for follow up.      DM - a1c had been up to 8.0-> 6.9->6.8.  He was changed from metformin to janumet xr.  Tolerating new med well.  He feels like his sx were more related to diet rather than medications.  He is trying to eat a much healthier diet. He says that glucoses are now back down to the 115-130 range when he is fasting in the morning.    last eye exam 2 years ago.       HTN - bp up some today.  No dizziness.  Renal function back to normal off ace-I.  He has had more LE edema lately.  He hasnt been eating as well as he should for a while.  No cp/soa.  He is tired all the time.      His brother was in the hospital here at [facility] in the ICU bc of a stroke.  His brother is finally now home but has lots of right sided weakness, can't talk well, and struggles with memory and communication.  He had a previous injury to the left hand from a  so it has been even harder for him to compensate.      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  He used to wear dark glasses and this did help but he isnt allowed to wear them now bc of safety issues - they want everyone to be able to see eyes to make eye contact for communication.  He now will sometimes sit in quiet areas with eyes closed to help with this.   He does fine in the morning but often gets headaches from this situation.  He takes ibuprofen if the headaches are bad.  He has a hx of migraine and possibly chronic tension headaches.  He says work has [employer] papers about his headaches but is requesting another letter to document symptoms.     The following portions of the patient's history were reviewed and updated as appropriate: allergies, current medications, past family history, past  medical history, past social history, past surgical history and problem list.    Allergies: Lisinopril    Review of Systems   Constitutional: Negative.    HENT: Negative.    Eyes: Negative.    Respiratory: Negative.    Cardiovascular: Negative.    Gastrointestinal: Negative.    Endocrine: Negative.    Genitourinary: Negative.    Musculoskeletal: Negative.    Skin: Negative.    Allergic/Immunologic: Negative.    Neurological: Negative.    Hematological: Negative.    Psychiatric/Behavioral: Negative.    All other systems reviewed and are negative.            Wt Readings from Last 3 Encounters:   10/04/19 91 kg (200 lb 9.6 oz)   06/28/19 90 kg (198 lb 6.4 oz)   05/10/19 88.7 kg (195 lb 9.6 oz)     Temp Readings from Last 3 Encounters:   06/28/19 98 °F (36.7 °C) (Oral)   05/10/19 98.4 °F (36.9 °C)   05/02/19 98.2 °F (36.8 °C) (Oral)     BP Readings from Last 3 Encounters:   10/04/19 142/88   06/28/19 120/62   05/10/19 126/68     Pulse Readings from Last 3 Encounters:   10/04/19 71   06/28/19 72   05/10/19 68     Body mass index is 30.5 kg/m².  @LASTSAO2(3)@    Physical Exam   Constitutional: He is oriented to person, place, and time. He appears well-developed and well-nourished. No distress.   HENT:   Head: Normocephalic and atraumatic.   Right Ear: External ear normal.   Left Ear: External ear normal.   Nose: Nose normal.   Mouth/Throat: Oropharynx is clear and moist.   Eyes: Conjunctivae and EOM are normal. Pupils are equal, round, and reactive to light.   Neck: Normal range of motion. Neck supple.   Cardiovascular: Normal rate, regular rhythm, normal heart sounds and intact distal pulses.   Pulmonary/Chest: Effort normal and breath sounds normal. No respiratory distress. He has no wheezes.   Musculoskeletal: Normal range of motion.   Normal gait   Neurological: He is alert and oriented to person, place, and time.   Skin: Skin is warm and dry.   Psychiatric: He has a normal mood and affect. His behavior is normal.  Judgment and thought content normal.   Nursing note and vitals reviewed.      Results for orders placed or performed in visit on 09/27/19   Comprehensive Metabolic Panel   Result Value Ref Range    Glucose 158 (H) 65 - 99 mg/dL    BUN 13 6 - 20 mg/dL    Creatinine 0.89 0.76 - 1.27 mg/dL    eGFR Non African Am 88 >60 mL/min/1.73    eGFR African Am 107 >60 mL/min/1.73    BUN/Creatinine Ratio 14.6 7.0 - 25.0    Sodium 142 136 - 145 mmol/L    Potassium 4.5 3.5 - 5.2 mmol/L    Chloride 104 98 - 107 mmol/L    Total CO2 27.1 22.0 - 29.0 mmol/L    Calcium 9.3 8.6 - 10.5 mg/dL    Total Protein 7.0 6.0 - 8.5 g/dL    Albumin 4.30 3.50 - 5.20 g/dL    Globulin 2.7 gm/dL    A/G Ratio 1.6 g/dL    Total Bilirubin 0.6 0.2 - 1.2 mg/dL    Alkaline Phosphatase 111 39 - 117 U/L    AST (SGOT) 24 1 - 40 U/L    ALT (SGPT) 20 1 - 41 U/L   Hemoglobin A1c   Result Value Ref Range    Hemoglobin A1C 6.80 (H) 4.80 - 5.60 %   TSH Rfx On Abnormal To Free T4   Result Value Ref Range    TSH 3.830 0.270 - 4.200 uIU/mL   CBC & Differential   Result Value Ref Range    WBC 7.19 3.40 - 10.80 10*3/mm3    RBC 4.94 4.14 - 5.80 10*6/mm3    Hemoglobin 14.3 13.0 - 17.7 g/dL    Hematocrit 43.8 37.5 - 51.0 %    MCV 88.7 79.0 - 97.0 fL    MCH 28.9 26.6 - 33.0 pg    MCHC 32.6 31.5 - 35.7 g/dL    RDW 13.7 12.3 - 15.4 %    Platelets 204 140 - 450 10*3/mm3    Neutrophil Rel % 64.6 42.7 - 76.0 %    Lymphocyte Rel % 27.0 19.6 - 45.3 %    Monocyte Rel % 5.8 5.0 - 12.0 %    Eosinophil Rel % 1.4 0.3 - 6.2 %    Basophil Rel % 0.6 0.0 - 1.5 %    Neutrophils Absolute 4.65 1.70 - 7.00 10*3/mm3    Lymphocytes Absolute 1.94 0.70 - 3.10 10*3/mm3    Monocytes Absolute 0.42 0.10 - 0.90 10*3/mm3    Eosinophils Absolute 0.10 0.00 - 0.40 10*3/mm3    Basophils Absolute 0.04 0.00 - 0.20 10*3/mm3    Immature Granulocyte Rel % 0.6 (H) 0.0 - 0.5 %    Immature Grans Absolute 0.04 0.00 - 0.05 10*3/mm3    nRBC 0.0 0.0 - 0.2 /100 WBC           Pablo was seen today for headache.    Diagnoses  and all orders for this visit:    Need for vaccination  -     Tdap Vaccine Greater Than or Equal To 6yo IM    Type 2 diabetes mellitus without complication, without long-term current use of insulin (CMS/HCC)    Mixed hyperlipidemia  -     Lipid Panel With LDL / HDL Ratio    Essential hypertension  -     hydroCHLOROthiazide (HYDRODIURIL) 25 MG tablet; Take 1 tablet by mouth Daily.    Other migraine with status migrainosus, not intractable - cont conservative methods to help control noise and bright light exposure to help control migraine frequency.     Encouraged healthy diet and exercise.  Lower salt intake.  Ov/labs in 3mo.     Encouraged pt to get eye exam    Declined flu and pneumonia vaccinations but ok with tdap    Outpatient Medications Prior to Visit   Medication Sig Dispense Refill   • allopurinol (ZYLOPRIM) 300 MG tablet Take 1 tablet by mouth 2 (Two) Times a Day. 180 tablet 1   • amLODIPine (NORVASC) 10 MG tablet TAKE ONE TABLET BY MOUTH DAILY 90 tablet 1   • aspirin 81 MG EC tablet Take 81 mg by mouth Daily.     • atorvastatin (LIPITOR) 20 MG tablet Take 1 tablet by mouth Daily. 90 tablet 1   • Cholecalciferol (VITAMIN D3) 5000 units capsule capsule Take 5,000 Units by mouth Daily.     • cyanocobalamin (VITAMIN B-12) 2500 MCG tablet tablet Take 500 mcg by mouth Daily.     • ibuprofen (ADVIL,MOTRIN) 800 MG tablet Take 1 tablet by mouth Every 8 (Eight) Hours As Needed for Mild Pain . 30 tablet 1   • JANUMET XR  MG tablet TAKE ONE TABLET BY MOUTH TWICE A  tablet 1   • loratadine (CLARITIN) 10 MG tablet TAKE ONE TABLET BY MOUTH DAILY 30 tablet 0   • metoprolol tartrate (LOPRESSOR) 100 MG tablet Take 1 tablet by mouth 2 (Two) Times a Day. 180 tablet 1   • nystatin (MYCOSTATIN) 374136 UNIT/GM cream Apply  topically to the appropriate area as directed 2 (Two) Times a Day. 30 g 0   • rizatriptan (MAXALT) 10 MG tablet TAKE ONE TABLET BY MOUTH AT ONSET OF HEADACHE; MAY REPEAT ONE TABLET IN 2 HOURS IF  NEEDED. 15 tablet 0   • terbinafine (LAMISIL) 250 MG tablet Take 1 tablet by mouth Daily. 90 tablet 0   • vitamin C (ASCORBIC ACID) 500 MG tablet Take 500 mg by mouth Daily.     • sulfamethoxazole-trimethoprim (BACTRIM DS,SEPTRA DS) 800-160 MG per tablet Take 1 tablet by mouth 2 (Two) Times a Day. 20 tablet 0     No facility-administered medications prior to visit.      New Medications Ordered This Visit   Medications   • hydroCHLOROthiazide (HYDRODIURIL) 25 MG tablet     Sig: Take 1 tablet by mouth Daily.     Dispense:  90 tablet     Refill:  0     [unfilled]  Medications Discontinued During This Encounter   Medication Reason   • sulfamethoxazole-trimethoprim (BACTRIM DS,SEPTRA DS) 800-160 MG per tablet *Therapy completed         Return in about 3 months (around 1/4/2020) for Recheck.

## 2019-10-07 ENCOUNTER — TELEPHONE (OUTPATIENT)
Dept: INTERNAL MEDICINE | Facility: CLINIC | Age: 59
End: 2019-10-07

## 2019-10-07 NOTE — TELEPHONE ENCOUNTER
----- Message from Liana Hood MD sent at 10/5/2019  9:08 AM EDT -----  Call pt about labs.  Cholesterol much better.  Cont meds.

## 2019-10-23 ENCOUNTER — TELEPHONE (OUTPATIENT)
Dept: INTERNAL MEDICINE | Facility: CLINIC | Age: 59
End: 2019-10-23

## 2019-10-23 NOTE — TELEPHONE ENCOUNTER
Patient advised as per below and voiced understanding.    ----- Message from Sumi Long MD sent at 10/23/2019  9:36 AM EDT -----  Contact: PATIENT  Would stay on HCTZ and try compression stockings which he can get online or through DME place like [facility]. Would follow up with Dr. Hoffman regarding issue. Reduce salt and elevate as well. Call if pain in legs or raised areas or fever or anything else worrisome.   ----- Message -----  From: Cristel Quintana MA  Sent: 10/23/2019   9:35 AM  To: Sumi Long MD        ----- Message -----  From: Christin Hale  Sent: 10/23/2019   9:09 AM  To: [facility]    PATIENT CALLED TO SAY THAT DR. HOFFMAN CHANGED HIS BP MED BECAUSE HIS LEGS WERE SWELLING. THEY STOPPED SWELLING, BUT NOW IT IS COMING BACK. WHAT DOES SHE RECOMMEND?    ALSO, A BIG THANK YOU TO THE PERSON WHO DID THE PAPERWORK FOR HIS JOB.

## 2019-10-31 DIAGNOSIS — I10 ESSENTIAL HYPERTENSION: ICD-10-CM

## 2019-11-01 RX ORDER — AMLODIPINE BESYLATE 10 MG/1
TABLET ORAL
Qty: 30 TABLET | Refills: 5 | Status: SHIPPED | OUTPATIENT
Start: 2019-11-01 | End: 2020-04-29

## 2019-11-21 DIAGNOSIS — L03.116 CELLULITIS OF LEFT FOOT: ICD-10-CM

## 2019-11-21 RX ORDER — IBUPROFEN 800 MG/1
TABLET ORAL
Qty: 30 TABLET | Refills: 0 | Status: SHIPPED | OUTPATIENT
Start: 2019-11-21 | End: 2020-02-06 | Stop reason: SDUPTHER

## 2019-11-27 ENCOUNTER — TELEPHONE (OUTPATIENT)
Dept: INTERNAL MEDICINE | Facility: CLINIC | Age: 59
End: 2019-11-27

## 2019-11-27 NOTE — TELEPHONE ENCOUNTER
Patient advised as per below.  Suggested he go to UC over weekend if gets worse.  Patient voiced understanding.

## 2019-11-27 NOTE — TELEPHONE ENCOUNTER
Patient asking if some eye drops can be sent to pharmacy for what feels like a scratch with eye being red and build up of sticky substance.    [facility]

## 2019-11-28 ENCOUNTER — HOSPITAL ENCOUNTER (EMERGENCY)
Facility: HOSPITAL | Age: 59
Discharge: HOME | End: 2019-11-28
Attending: EMERGENCY MEDICINE | Admitting: EMERGENCY MEDICINE

## 2019-11-28 VITALS
OXYGEN SATURATION: 95 % | DIASTOLIC BLOOD PRESSURE: 95 MMHG | HEIGHT: 67 IN | BODY MASS INDEX: 31.39 KG/M2 | RESPIRATION RATE: 16 BRPM | WEIGHT: 200 LBS | HEART RATE: 82 BPM | TEMPERATURE: 98.4 F | SYSTOLIC BLOOD PRESSURE: 157 MMHG

## 2019-11-28 DIAGNOSIS — H10.9 CONJUNCTIVITIS OF LEFT EYE, UNSPECIFIED CONJUNCTIVITIS TYPE: Primary | ICD-10-CM

## 2019-11-28 PROCEDURE — 99282 EMERGENCY DEPT VISIT SF MDM: CPT | Performed by: EMERGENCY MEDICINE

## 2019-11-28 PROCEDURE — 99283 EMERGENCY DEPT VISIT LOW MDM: CPT

## 2019-11-28 RX ORDER — CIPROFLOXACIN HYDROCHLORIDE 3.5 MG/ML
1 SOLUTION/ DROPS TOPICAL 4 TIMES DAILY
Qty: 10 ML | Refills: 0 | Status: SHIPPED | OUTPATIENT
Start: 2019-11-28 | End: 2019-12-05

## 2019-11-28 RX ORDER — PROPARACAINE HYDROCHLORIDE 5 MG/ML
2 SOLUTION/ DROPS OPHTHALMIC ONCE
Status: COMPLETED | OUTPATIENT
Start: 2019-11-28 | End: 2019-11-28

## 2019-11-28 RX ADMIN — FLUORESCEIN SODIUM 1 STRIP: 1 STRIP OPHTHALMIC at 10:53

## 2019-11-28 RX ADMIN — PROPARACAINE HYDROCHLORIDE 2 DROP: 5 SOLUTION/ DROPS OPHTHALMIC at 10:52

## 2019-12-10 RX ORDER — SITAGLIPTIN AND METFORMIN HYDROCHLORIDE 1000; 50 MG/1; MG/1
TABLET, FILM COATED, EXTENDED RELEASE ORAL
Qty: 60 TABLET | Refills: 0 | Status: SHIPPED | OUTPATIENT
Start: 2019-12-10 | End: 2020-01-10

## 2019-12-23 DIAGNOSIS — E78.2 MIXED HYPERLIPIDEMIA: ICD-10-CM

## 2019-12-23 RX ORDER — ATORVASTATIN CALCIUM 20 MG/1
TABLET, FILM COATED ORAL
Qty: 30 TABLET | Refills: 0 | Status: SHIPPED | OUTPATIENT
Start: 2019-12-23 | End: 2020-01-21

## 2020-01-01 DIAGNOSIS — I10 ESSENTIAL HYPERTENSION: ICD-10-CM

## 2020-01-02 RX ORDER — HYDROCHLOROTHIAZIDE 25 MG/1
TABLET ORAL
Qty: 30 TABLET | Refills: 0 | Status: SHIPPED | OUTPATIENT
Start: 2020-01-02 | End: 2020-01-29

## 2020-01-04 LAB
ALBUMIN SERPL-MCNC: 4.5 G/DL (ref 3.5–5.2)
ALBUMIN/GLOB SERPL: 1.7 G/DL
ALP SERPL-CCNC: 125 U/L (ref 39–117)
ALT SERPL-CCNC: 24 U/L (ref 1–41)
AST SERPL-CCNC: 32 U/L (ref 1–40)
BASOPHILS # BLD AUTO: 0.06 10*3/MM3 (ref 0–0.2)
BASOPHILS NFR BLD AUTO: 0.6 % (ref 0–1.5)
BILIRUB SERPL-MCNC: 0.5 MG/DL (ref 0.2–1.2)
BUN SERPL-MCNC: 20 MG/DL (ref 6–20)
BUN/CREAT SERPL: 19.2 (ref 7–25)
CALCIUM SERPL-MCNC: 9.5 MG/DL (ref 8.6–10.5)
CHLORIDE SERPL-SCNC: 97 MMOL/L (ref 98–107)
CO2 SERPL-SCNC: 25.6 MMOL/L (ref 22–29)
CREAT SERPL-MCNC: 1.04 MG/DL (ref 0.76–1.27)
EOSINOPHIL # BLD AUTO: 0.19 10*3/MM3 (ref 0–0.4)
EOSINOPHIL NFR BLD AUTO: 2 % (ref 0.3–6.2)
ERYTHROCYTE [DISTWIDTH] IN BLOOD BY AUTOMATED COUNT: 14.4 % (ref 12.3–15.4)
GLOBULIN SER CALC-MCNC: 2.7 GM/DL
GLUCOSE SERPL-MCNC: 199 MG/DL (ref 65–99)
HBA1C MFR BLD: 8.1 % (ref 4.8–5.6)
HCT VFR BLD AUTO: 43 % (ref 37.5–51)
HGB BLD-MCNC: 14.9 G/DL (ref 13–17.7)
IMM GRANULOCYTES # BLD AUTO: 0.04 10*3/MM3 (ref 0–0.05)
IMM GRANULOCYTES NFR BLD AUTO: 0.4 % (ref 0–0.5)
LYMPHOCYTES # BLD AUTO: 2.13 10*3/MM3 (ref 0.7–3.1)
LYMPHOCYTES NFR BLD AUTO: 21.9 % (ref 19.6–45.3)
MCH RBC QN AUTO: 29.3 PG (ref 26.6–33)
MCHC RBC AUTO-ENTMCNC: 34.7 G/DL (ref 31.5–35.7)
MCV RBC AUTO: 84.6 FL (ref 79–97)
MONOCYTES # BLD AUTO: 0.5 10*3/MM3 (ref 0.1–0.9)
MONOCYTES NFR BLD AUTO: 5.1 % (ref 5–12)
NEUTROPHILS # BLD AUTO: 6.79 10*3/MM3 (ref 1.7–7)
NEUTROPHILS NFR BLD AUTO: 70 % (ref 42.7–76)
NRBC BLD AUTO-RTO: 0 /100 WBC (ref 0–0.2)
PLATELET # BLD AUTO: 258 10*3/MM3 (ref 140–450)
POTASSIUM SERPL-SCNC: 4.2 MMOL/L (ref 3.5–5.2)
PROT SERPL-MCNC: 7.2 G/DL (ref 6–8.5)
RBC # BLD AUTO: 5.08 10*6/MM3 (ref 4.14–5.8)
SODIUM SERPL-SCNC: 139 MMOL/L (ref 136–145)
T4 FREE SERPL-MCNC: 1.14 NG/DL (ref 0.93–1.7)
TSH SERPL DL<=0.005 MIU/L-ACNC: 5.83 UIU/ML (ref 0.27–4.2)
WBC # BLD AUTO: 9.71 10*3/MM3 (ref 3.4–10.8)

## 2020-01-06 ENCOUNTER — TELEPHONE (OUTPATIENT)
Dept: INTERNAL MEDICINE | Facility: CLINIC | Age: 60
End: 2020-01-06

## 2020-01-06 NOTE — TELEPHONE ENCOUNTER
----- Message from Liana Hood MD sent at 1/4/2020 10:52 AM EST -----  a1c 8.1. Will discuss at upcoming appt.

## 2020-01-10 ENCOUNTER — OFFICE VISIT (OUTPATIENT)
Dept: INTERNAL MEDICINE | Facility: CLINIC | Age: 60
End: 2020-01-10

## 2020-01-10 VITALS
BODY MASS INDEX: 32.02 KG/M2 | TEMPERATURE: 98.2 F | HEART RATE: 67 BPM | SYSTOLIC BLOOD PRESSURE: 128 MMHG | OXYGEN SATURATION: 95 % | WEIGHT: 204 LBS | HEIGHT: 67 IN | RESPIRATION RATE: 16 BRPM | DIASTOLIC BLOOD PRESSURE: 80 MMHG

## 2020-01-10 DIAGNOSIS — E78.2 MIXED HYPERLIPIDEMIA: ICD-10-CM

## 2020-01-10 DIAGNOSIS — R79.89 ABNORMAL THYROID BLOOD TEST: ICD-10-CM

## 2020-01-10 DIAGNOSIS — E11.9 TYPE 2 DIABETES MELLITUS WITHOUT COMPLICATION, WITHOUT LONG-TERM CURRENT USE OF INSULIN (HCC): Primary | ICD-10-CM

## 2020-01-10 DIAGNOSIS — I10 ESSENTIAL HYPERTENSION: ICD-10-CM

## 2020-01-10 PROCEDURE — 99214 OFFICE O/P EST MOD 30 MIN: CPT | Performed by: INTERNAL MEDICINE

## 2020-01-10 RX ORDER — SITAGLIPTIN AND METFORMIN HYDROCHLORIDE 1000; 50 MG/1; MG/1
TABLET, FILM COATED, EXTENDED RELEASE ORAL
Qty: 60 TABLET | Refills: 0 | Status: SHIPPED | OUTPATIENT
Start: 2020-01-10 | End: 2020-02-12

## 2020-01-10 NOTE — PROGRESS NOTES
Pablo Mendoza is a 59 y.o. male, who presents with a chief complaint of   Chief Complaint   Patient presents with   • Follow-up     3 x month f/u, lab results   • Diabetes   • Hypertension   • Nasal Congestion     on tuesday night, chills, using otc cold/flu rx, feeling better today   • Edema     bilateral at times, lower extrem        HPI   Pt here for follow up.      DM - a1c had been up to 8.0-> 6.9->6.8->8.1.  Pt has recently moved and is trying to get back on track. He has just moved to an apartment by himself.  He was taking care of his brother after he had a stroke.  His brother has now gone to [place] to say with his daughter.  The pt feels like he has much less stress now.      HTN - bp up some today.  No dizziness.  Renal function back to normal off ace-I.  He has had more LE edema lately.  He hasnt been eating as well as he should for a while.  No cp/soa.  He is tired all the time.     hld - on statin. No cramps/myalgias.     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  When he is on his feet all the time he has more LE edema.  His headaches have been much better since he has been on HCTZ.    He has had a cold this week but is now starting to feel better.     tsh slightly elevated.  No skin/hair changes.     The following portions of the patient's history were reviewed and updated as appropriate: allergies, current medications, past family history, past medical history, past social history, past surgical history and problem list.    Allergies: Lisinopril    Review of Systems   Constitutional: Negative.    HENT: Negative.    Eyes: Negative.    Respiratory: Negative.    Cardiovascular: Negative.    Gastrointestinal: Negative.    Endocrine: Negative.    Genitourinary: Negative.    Musculoskeletal: Negative.    Skin: Negative.    Allergic/Immunologic: Negative.    Neurological: Negative.    Hematological: Negative.    Psychiatric/Behavioral:  Negative.    All other systems reviewed and are negative.            Wt Readings from Last 3 Encounters:   01/10/20 92.5 kg (204 lb)   11/28/19 90.7 kg (200 lb)   10/04/19 91 kg (200 lb 9.6 oz)     Temp Readings from Last 3 Encounters:   01/10/20 98.2 °F (36.8 °C) (Oral)   11/28/19 98.4 °F (36.9 °C) (Oral)   06/28/19 98 °F (36.7 °C) (Oral)     BP Readings from Last 3 Encounters:   01/10/20 128/80   11/28/19 157/95   10/04/19 142/88     Pulse Readings from Last 3 Encounters:   01/10/20 67   11/28/19 82   10/04/19 71     Body mass index is 31.95 kg/m².  @LASTSAO2(3)@    Physical Exam   Constitutional: He is oriented to person, place, and time. He appears well-developed and well-nourished. No distress.   HENT:   Head: Normocephalic and atraumatic.   Right Ear: External ear normal.   Left Ear: External ear normal.   Nose: Nose normal.   Mouth/Throat: Oropharynx is clear and moist.   Eyes: Pupils are equal, round, and reactive to light. Conjunctivae and EOM are normal.   Neck: Normal range of motion. Neck supple.   Cardiovascular: Normal rate, regular rhythm, normal heart sounds and intact distal pulses.   Pulmonary/Chest: Effort normal and breath sounds normal. No respiratory distress. He has no wheezes.   Musculoskeletal: Normal range of motion.   Normal gait   Neurological: He is alert and oriented to person, place, and time.   Skin: Skin is warm and dry.   Psychiatric: He has a normal mood and affect. His behavior is normal. Judgment and thought content normal.   Nursing note and vitals reviewed.      Results for orders placed or performed in visit on 10/04/19   Lipid Panel With LDL / HDL Ratio   Result Value Ref Range    Total Cholesterol 114 0 - 200 mg/dL    Triglycerides 173 (H) 0 - 150 mg/dL    HDL Cholesterol 33 (L) 40 - 60 mg/dL    VLDL Cholesterol 34.6 mg/dL    LDL Cholesterol  46 0 - 100 mg/dL    LDL/HDL Ratio 1.41            Pablo was seen today for follow-up, diabetes, hypertension, nasal congestion and  edema.    Diagnoses and all orders for this visit:    Type 2 diabetes mellitus without complication, without long-term current use of insulin (CMS/[facility])    Essential hypertension    Mixed hyperlipidemia    Abnormal thyroid blood test      Cont current meds.  Discussed healthy diet/exercise.      Outpatient Medications Prior to Visit   Medication Sig Dispense Refill   • allopurinol (ZYLOPRIM) 300 MG tablet Take 1 tablet by mouth 2 (Two) Times a Day. 180 tablet 1   • amLODIPine (NORVASC) 10 MG tablet TAKE ONE TABLET BY MOUTH DAILY 30 tablet 5   • atorvastatin (LIPITOR) 20 MG tablet TAKE ONE TABLET BY MOUTH DAILY 30 tablet 0   • Cholecalciferol (VITAMIN D3) 5000 units capsule capsule Take 5,000 Units by mouth Daily.     • cyanocobalamin (VITAMIN B-12) 2500 MCG tablet tablet Take 500 mcg by mouth Daily.     • hydroCHLOROthiazide (HYDRODIURIL) 25 MG tablet TAKE ONE TABLET BY MOUTH DAILY 30 tablet 0   • ibuprofen (ADVIL,MOTRIN) 800 MG tablet TAKE ONE TABLET BY MOUTH EVERY 8 HOURS AS NEEDED FOR MILD PAIN 30 tablet 0   • JANUMET XR  MG tablet TAKE ONE TABLET BY MOUTH TWICE A DAY 60 tablet 0   • loratadine (CLARITIN) 10 MG tablet TAKE ONE TABLET BY MOUTH DAILY 30 tablet 0   • metoprolol tartrate (LOPRESSOR) 100 MG tablet Take 1 tablet by mouth 2 (Two) Times a Day. 180 tablet 1   • nystatin (MYCOSTATIN) 934315 UNIT/GM cream Apply  topically to the appropriate area as directed 2 (Two) Times a Day. 30 g 0   • rizatriptan (MAXALT) 10 MG tablet TAKE ONE TABLET BY MOUTH AT ONSET OF HEADACHE; MAY REPEAT ONE TABLET IN 2 HOURS IF NEEDED. 15 tablet 0   • vitamin C (ASCORBIC ACID) 500 MG tablet Take 500 mg by mouth Daily.       No facility-administered medications prior to visit.      No orders of the defined types were placed in this encounter.    [unfilled]  There are no discontinued medications.      Return in about 3 months (around 4/10/2020) for Recheck, labs.

## 2020-01-21 DIAGNOSIS — E78.2 MIXED HYPERLIPIDEMIA: ICD-10-CM

## 2020-01-21 RX ORDER — ATORVASTATIN CALCIUM 20 MG/1
TABLET, FILM COATED ORAL
Qty: 30 TABLET | Refills: 0 | Status: SHIPPED | OUTPATIENT
Start: 2020-01-21 | End: 2020-02-18

## 2020-01-29 DIAGNOSIS — M1A.09X0 CHRONIC GOUT OF MULTIPLE SITES, UNSPECIFIED CAUSE: ICD-10-CM

## 2020-01-29 DIAGNOSIS — I10 ESSENTIAL HYPERTENSION: ICD-10-CM

## 2020-01-29 RX ORDER — HYDROCHLOROTHIAZIDE 25 MG/1
TABLET ORAL
Qty: 30 TABLET | Refills: 0 | Status: SHIPPED | OUTPATIENT
Start: 2020-01-29 | End: 2020-02-28

## 2020-01-29 RX ORDER — ALLOPURINOL 300 MG/1
TABLET ORAL
Qty: 60 TABLET | Refills: 0 | Status: SHIPPED | OUTPATIENT
Start: 2020-01-29 | End: 2020-02-28

## 2020-02-06 ENCOUNTER — OFFICE VISIT (OUTPATIENT)
Dept: INTERNAL MEDICINE | Facility: CLINIC | Age: 60
End: 2020-02-06

## 2020-02-06 VITALS
SYSTOLIC BLOOD PRESSURE: 138 MMHG | TEMPERATURE: 98.5 F | WEIGHT: 200 LBS | RESPIRATION RATE: 16 BRPM | DIASTOLIC BLOOD PRESSURE: 84 MMHG | OXYGEN SATURATION: 98 % | BODY MASS INDEX: 31.39 KG/M2 | HEART RATE: 68 BPM | HEIGHT: 67 IN

## 2020-02-06 DIAGNOSIS — G43.801 OTHER MIGRAINE WITH STATUS MIGRAINOSUS, NOT INTRACTABLE: Primary | ICD-10-CM

## 2020-02-06 PROCEDURE — 99213 OFFICE O/P EST LOW 20 MIN: CPT | Performed by: NURSE PRACTITIONER

## 2020-02-06 PROCEDURE — 96372 THER/PROPH/DIAG INJ SC/IM: CPT | Performed by: NURSE PRACTITIONER

## 2020-02-06 RX ORDER — IBUPROFEN 800 MG/1
800 TABLET ORAL EVERY 8 HOURS PRN
Qty: 30 TABLET | Refills: 0 | Status: SHIPPED | OUTPATIENT
Start: 2020-02-06 | End: 2020-05-20

## 2020-02-06 RX ORDER — RIZATRIPTAN BENZOATE 10 MG/1
10 TABLET ORAL ONCE
Qty: 15 TABLET | Refills: 0 | Status: SHIPPED | OUTPATIENT
Start: 2020-02-06 | End: 2023-03-15

## 2020-02-06 RX ORDER — KETOROLAC TROMETHAMINE 30 MG/ML
60 INJECTION, SOLUTION INTRAMUSCULAR; INTRAVENOUS ONCE
Status: COMPLETED | OUTPATIENT
Start: 2020-02-06 | End: 2020-02-06

## 2020-02-06 RX ADMIN — KETOROLAC TROMETHAMINE 60 MG: 30 INJECTION, SOLUTION INTRAMUSCULAR; INTRAVENOUS at 16:39

## 2020-02-06 NOTE — PROGRESS NOTES
"Subjective   Pablo Mendoza is a 59 y.o. male presenting today for   Chief Complaint   Patient presents with   • Migraine     past 2 days, off work past 2 days, painful, pt was better last month due to starting HCTZ   • Dizziness       Migraine    This is a new problem. Episode onset: 2 days ago. The problem occurs constantly. The problem has been unchanged. The pain is located in the bilateral, frontal and parietal region. The pain quality is similar to prior headaches. Quality: pressure. The pain is at a severity of 7/10. Associated symptoms include dizziness and nausea. Pertinent negatives include no fever, rhinorrhea or vomiting. Treatments tried: maxalt and ibu. The treatment provided mild relief.        The following portions of the patient's history were reviewed and updated as appropriate: allergies, current medications, past family history, past medical history, past social history, past surgical history and problem list.    Review of Systems   Constitutional: Negative.  Negative for fever.   HENT: Negative.  Negative for congestion and rhinorrhea.    Eyes: Negative.    Respiratory: Negative.  Negative for shortness of breath.    Cardiovascular: Negative.  Negative for chest pain.   Gastrointestinal: Positive for nausea. Negative for diarrhea and vomiting.   Genitourinary: Negative.    Musculoskeletal: Negative.    Skin: Negative.    Neurological: Positive for dizziness and headache.   Psychiatric/Behavioral: Negative.        Objective   Vitals:    02/06/20 1522   BP: 138/84   BP Location: Left arm   Patient Position: Sitting   Cuff Size: Large Adult   Pulse: 68   Resp: 16   Temp: 98.5 °F (36.9 °C)   TempSrc: Oral   SpO2: 98%   Weight: 90.7 kg (200 lb)   Height: 170.2 cm (67\")     Body mass index is 31.32 kg/m².  Nursing notes and vitals reviewed.    Physical Exam   Constitutional: He is oriented to person, place, and time. He appears well-developed and well-nourished. No distress.   HENT:   Right Ear: " Tympanic membrane, external ear and ear canal normal.   Left Ear: Tympanic membrane, external ear and ear canal normal.   Nose: Nose normal.   Mouth/Throat: Uvula is midline, oropharynx is clear and moist and mucous membranes are normal.   Eyes: Pupils are equal, round, and reactive to light. Conjunctivae and EOM are normal.   Neck: Neck supple. No thyroid mass and no thyromegaly present.   Cardiovascular: Regular rhythm, S1 normal, S2 normal and normal pulses. Exam reveals no gallop and no friction rub.   No murmur heard.  Pulmonary/Chest: Effort normal and breath sounds normal. He has no wheezes. He has no rhonchi. He has no rales.   Lymphadenopathy:     He has no cervical adenopathy.   Neurological: He is alert and oriented to person, place, and time. He has normal strength and normal reflexes. No cranial nerve deficit or sensory deficit. Coordination and gait normal.   Psychiatric: He has a normal mood and affect. His speech is normal and behavior is normal. Thought content normal. He is attentive.         Assessment/Plan   Pablo was seen today for migraine and dizziness.    Diagnoses and all orders for this visit:    Other migraine with status migrainosus, not intractable  -     rizatriptan (MAXALT) 10 MG tablet; Take 1 tablet by mouth 1 (One) Time for 1 dose. AT ONSET OF HEADACHE MAY REPEAT ONE TABLET IN 2 HOURS IF NEEDED  -     ibuprofen (ADVIL,MOTRIN) 800 MG tablet; Take 1 tablet by mouth Every 8 (Eight) Hours As Needed for Mild Pain .  -     ketorolac (TORADOL) injection 60 mg          Return if symptoms worsen or fail to improve and as previously scheduled w/ PCP.

## 2020-02-12 RX ORDER — SITAGLIPTIN AND METFORMIN HYDROCHLORIDE 1000; 50 MG/1; MG/1
TABLET, FILM COATED, EXTENDED RELEASE ORAL
Qty: 60 TABLET | Refills: 0 | Status: SHIPPED | OUTPATIENT
Start: 2020-02-12 | End: 2020-03-11

## 2020-02-18 DIAGNOSIS — E78.2 MIXED HYPERLIPIDEMIA: ICD-10-CM

## 2020-02-18 RX ORDER — ATORVASTATIN CALCIUM 20 MG/1
TABLET, FILM COATED ORAL
Qty: 30 TABLET | Refills: 0 | Status: SHIPPED | OUTPATIENT
Start: 2020-02-18 | End: 2020-03-18

## 2020-02-28 DIAGNOSIS — M1A.09X0 CHRONIC GOUT OF MULTIPLE SITES, UNSPECIFIED CAUSE: ICD-10-CM

## 2020-02-28 DIAGNOSIS — I10 ESSENTIAL HYPERTENSION: ICD-10-CM

## 2020-02-28 RX ORDER — ALLOPURINOL 300 MG/1
TABLET ORAL
Qty: 60 TABLET | Refills: 0 | Status: SHIPPED | OUTPATIENT
Start: 2020-02-28 | End: 2020-03-31

## 2020-02-28 RX ORDER — HYDROCHLOROTHIAZIDE 25 MG/1
TABLET ORAL
Qty: 30 TABLET | Refills: 0 | Status: SHIPPED | OUTPATIENT
Start: 2020-02-28 | End: 2020-03-31

## 2020-03-11 ENCOUNTER — HOSPITAL ENCOUNTER (EMERGENCY)
Facility: HOSPITAL | Age: 60
Discharge: HOME | End: 2020-03-11
Attending: EMERGENCY MEDICINE | Admitting: EMERGENCY MEDICINE

## 2020-03-11 ENCOUNTER — APPOINTMENT (OUTPATIENT)
Dept: CT IMAGING | Facility: HOSPITAL | Age: 60
End: 2020-03-11

## 2020-03-11 VITALS
BODY MASS INDEX: 30.31 KG/M2 | RESPIRATION RATE: 18 BRPM | HEART RATE: 66 BPM | DIASTOLIC BLOOD PRESSURE: 84 MMHG | SYSTOLIC BLOOD PRESSURE: 148 MMHG | OXYGEN SATURATION: 95 % | TEMPERATURE: 98.4 F | HEIGHT: 68 IN | WEIGHT: 200 LBS

## 2020-03-11 DIAGNOSIS — G43.709 CHRONIC MIGRAINE WITHOUT AURA WITHOUT STATUS MIGRAINOSUS, NOT INTRACTABLE: Primary | ICD-10-CM

## 2020-03-11 LAB
ALBUMIN SERPL-MCNC: 4.5 G/DL (ref 3.5–5.2)
ALBUMIN/GLOB SERPL: 1.2 G/DL
ALP SERPL-CCNC: 116 U/L (ref 39–117)
ALT SERPL W P-5'-P-CCNC: 25 U/L (ref 1–41)
ANION GAP SERPL CALCULATED.3IONS-SCNC: 15.3 MMOL/L (ref 5–15)
APTT PPP: 29.2 SECONDS (ref 24.3–38.1)
AST SERPL-CCNC: 42 U/L (ref 1–40)
BACTERIA UR QL AUTO: ABNORMAL /HPF
BASOPHILS # BLD AUTO: 0.07 10*3/MM3 (ref 0–0.2)
BASOPHILS NFR BLD AUTO: 0.7 % (ref 0–1.5)
BILIRUB SERPL-MCNC: 0.6 MG/DL (ref 0.2–1.2)
BILIRUB UR QL STRIP: NEGATIVE
BUN BLD-MCNC: 20 MG/DL (ref 6–20)
BUN/CREAT SERPL: 21.1 (ref 7–25)
CALCIUM SPEC-SCNC: 10.6 MG/DL (ref 8.6–10.5)
CHLORIDE SERPL-SCNC: 102 MMOL/L (ref 98–107)
CLARITY UR: CLEAR
CO2 SERPL-SCNC: 27.7 MMOL/L (ref 22–29)
COLOR UR: YELLOW
CREAT BLD-MCNC: 0.95 MG/DL (ref 0.76–1.27)
DEPRECATED RDW RBC AUTO: 42.9 FL (ref 37–54)
EOSINOPHIL # BLD AUTO: 0.16 10*3/MM3 (ref 0–0.4)
EOSINOPHIL NFR BLD AUTO: 1.6 % (ref 0.3–6.2)
ERYTHROCYTE [DISTWIDTH] IN BLOOD BY AUTOMATED COUNT: 13.6 % (ref 12.3–15.4)
GFR SERPL CREATININE-BSD FRML MDRD: 82 ML/MIN/1.73
GLOBULIN UR ELPH-MCNC: 3.8 GM/DL
GLUCOSE BLD-MCNC: 177 MG/DL (ref 65–99)
GLUCOSE UR STRIP-MCNC: NEGATIVE MG/DL
HCT VFR BLD AUTO: 46.2 % (ref 37.5–51)
HGB BLD-MCNC: 15.7 G/DL (ref 13–17.7)
HGB UR QL STRIP.AUTO: NEGATIVE
HYALINE CASTS UR QL AUTO: ABNORMAL /LPF
IMM GRANULOCYTES # BLD AUTO: 0.06 10*3/MM3 (ref 0–0.05)
IMM GRANULOCYTES NFR BLD AUTO: 0.6 % (ref 0–0.5)
INR PPP: 1.04 (ref 0.9–1.1)
KETONES UR QL STRIP: NEGATIVE
LEUKOCYTE ESTERASE UR QL STRIP.AUTO: NEGATIVE
LYMPHOCYTES # BLD AUTO: 2.06 10*3/MM3 (ref 0.7–3.1)
LYMPHOCYTES NFR BLD AUTO: 20 % (ref 19.6–45.3)
MCH RBC QN AUTO: 29.7 PG (ref 26.6–33)
MCHC RBC AUTO-ENTMCNC: 34 G/DL (ref 31.5–35.7)
MCV RBC AUTO: 87.3 FL (ref 79–97)
MONOCYTES # BLD AUTO: 0.54 10*3/MM3 (ref 0.1–0.9)
MONOCYTES NFR BLD AUTO: 5.2 % (ref 5–12)
NEUTROPHILS # BLD AUTO: 7.4 10*3/MM3 (ref 1.7–7)
NEUTROPHILS NFR BLD AUTO: 71.9 % (ref 42.7–76)
NITRITE UR QL STRIP: NEGATIVE
NRBC BLD AUTO-RTO: 0 /100 WBC (ref 0–0.2)
PH UR STRIP.AUTO: 5.5 [PH] (ref 4.5–8)
PLATELET # BLD AUTO: 246 10*3/MM3 (ref 140–450)
PMV BLD AUTO: 10.6 FL (ref 6–12)
POTASSIUM BLD-SCNC: 4.2 MMOL/L (ref 3.5–5.2)
PROT SERPL-MCNC: 8.3 G/DL (ref 6–8.5)
PROT UR QL STRIP: ABNORMAL
PROTHROMBIN TIME: 13.3 SECONDS (ref 12.1–15)
RBC # BLD AUTO: 5.29 10*6/MM3 (ref 4.14–5.8)
RBC # UR: ABNORMAL /HPF
REF LAB TEST METHOD: ABNORMAL
SODIUM BLD-SCNC: 145 MMOL/L (ref 136–145)
SP GR UR STRIP: 1.02 (ref 1–1.03)
SQUAMOUS #/AREA URNS HPF: ABNORMAL /HPF
UROBILINOGEN UR QL STRIP: ABNORMAL
WBC NRBC COR # BLD: 10.29 10*3/MM3 (ref 3.4–10.8)
WBC UR QL AUTO: ABNORMAL /HPF

## 2020-03-11 PROCEDURE — 93005 ELECTROCARDIOGRAM TRACING: CPT | Performed by: EMERGENCY MEDICINE

## 2020-03-11 PROCEDURE — P9612 CATHETERIZE FOR URINE SPEC: HCPCS

## 2020-03-11 PROCEDURE — 70450 CT HEAD/BRAIN W/O DYE: CPT

## 2020-03-11 PROCEDURE — 85610 PROTHROMBIN TIME: CPT | Performed by: EMERGENCY MEDICINE

## 2020-03-11 PROCEDURE — 80053 COMPREHEN METABOLIC PANEL: CPT | Performed by: EMERGENCY MEDICINE

## 2020-03-11 PROCEDURE — 81001 URINALYSIS AUTO W/SCOPE: CPT | Performed by: EMERGENCY MEDICINE

## 2020-03-11 PROCEDURE — 99283 EMERGENCY DEPT VISIT LOW MDM: CPT

## 2020-03-11 PROCEDURE — 93010 ELECTROCARDIOGRAM REPORT: CPT | Performed by: INTERNAL MEDICINE

## 2020-03-11 PROCEDURE — 85025 COMPLETE CBC W/AUTO DIFF WBC: CPT | Performed by: EMERGENCY MEDICINE

## 2020-03-11 PROCEDURE — 85730 THROMBOPLASTIN TIME PARTIAL: CPT | Performed by: EMERGENCY MEDICINE

## 2020-03-11 PROCEDURE — 99284 EMERGENCY DEPT VISIT MOD MDM: CPT | Performed by: EMERGENCY MEDICINE

## 2020-03-11 RX ORDER — SODIUM CHLORIDE 0.9 % (FLUSH) 0.9 %
10 SYRINGE (ML) INJECTION AS NEEDED
Status: DISCONTINUED | OUTPATIENT
Start: 2020-03-11 | End: 2020-03-11 | Stop reason: HOSPADM

## 2020-03-11 RX ORDER — SITAGLIPTIN AND METFORMIN HYDROCHLORIDE 1000; 50 MG/1; MG/1
TABLET, FILM COATED, EXTENDED RELEASE ORAL
Qty: 60 TABLET | Refills: 0 | Status: SHIPPED | OUTPATIENT
Start: 2020-03-11 | End: 2020-04-07

## 2020-03-11 NOTE — TELEPHONE ENCOUNTER
Patient is calling and wants to make an appointment today if possible. His legs are very swollen today, and he can hardly walk. He took off of work today because they are so painful and swollen.  Also, patient is having more severe migraines than they were before, and having nose bleeds with them.    Can we get him in today if possible?   .

## 2020-03-11 NOTE — TELEPHONE ENCOUNTER
I relayed the message regarding the nose bleeds / migraines. Patient was more concerned about the pain in his legs. He said that the swelling has went down because he has had them propped up, but they are still very painful.  In this discussion he said that he does not have a migraine right now, but he is feeling pressure. I advised him to go directly to the ER as dr mcdonald recommended. Patient agreed to go to ER.

## 2020-03-11 NOTE — ED PROVIDER NOTES
EMERGENCY DEPARTMENT ENCOUNTER      Room Number: 6/06      HPI:    Chief complaint: Headache, dizziness, ataxia, mild confusion, blurred vision and pedal edema    Location: Headache is frontal and bitemporal and the pedal edema is bilateral    Quality/Severity: Moderate symptoms    Timing/Duration: Patient relates gradual worsening of symptoms over the past 3 months    Modifying Factors: Being on feet for prolonged periods of time increases pedal edema and these partially resolved with rest    Associated Symptoms: None    Narrative: Pt is a 59 y.o. male who presents complaining of multiple complaints as noted above.  Patient thinks that he may be having problems with TIAs or possibly even a stroke.  Patient relates that some 8 or 9 years ago he was diagnosed with poor cerebral circulation on the right side.  Patient also states TIAs in 2015 and 2017.  Somewhat contradictory information and that the patient did have MRI of the brain with and without contrast in 2015 which were read as completely normal.  Patient states that since early December he is noticed more trouble with pedal edema, chronic migraines, forgetfulness and feeling off balance.  Patient taken off of lisinopril last summer due to renal dysfunction.  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      PMD: Liana Hood MD    REVIEW OF SYSTEMS  Review of Systems   Constitutional: Negative for fatigue and fever.   HENT: Negative for congestion.    Eyes: Positive for visual disturbance (Intermittently blurred).   Respiratory: Negative for cough, shortness of breath and wheezing.    Cardiovascular: Positive for leg swelling. Negative for chest pain and palpitations.   Gastrointestinal: Negative for abdominal pain, diarrhea, nausea and vomiting.   Genitourinary: Negative for dysuria, flank pain, hematuria and urgency.   Musculoskeletal: Negative for back pain.   Skin: Negative for rash.   Neurological: Positive  for dizziness (Chronic intermittent), weakness and headaches (Chronic).   Psychiatric/Behavioral: Positive for confusion and decreased concentration.   All other systems reviewed and are negative.      PAST MEDICAL HISTORY  Active Ambulatory Problems     Diagnosis Date Noted   • Type 2 diabetes mellitus, without long-term current use of insulin (CMS/[facility]) 01/19/2018   • Mixed hyperlipidemia 01/19/2018   • Essential hypertension 01/19/2018   • Gout of multiple sites 01/19/2018   • Nocturia 01/19/2018   • Migraine headache 04/02/2018   • Chronic maxillary sinusitis 04/10/2018   • Other fatigue 06/06/2018   • Arthritis 06/06/2018   • Routine health maintenance 08/10/2018   • Proteinuria 03/08/2019   • Cellulitis of left foot 05/02/2019     Resolved Ambulatory Problems     Diagnosis Date Noted   • No Resolved Ambulatory Problems     Past Medical History:   Diagnosis Date   • Diabetes mellitus (CMS/[facility])    • Stroke (CMS/[facility])    • TIA (transient ischemic attack)        PAST SURGICAL HISTORY  Past Surgical History:   Procedure Laterality Date   • CHOLECYSTECTOMY     • HERNIA REPAIR     • SINUS SURGERY     • TYMPANOPLASTY         FAMILY HISTORY  Family History   Problem Relation Age of Onset   • Heart disease Mother    • Hypertension Father    • Throat cancer Maternal Grandmother    • Diabetes Paternal Grandmother        SOCIAL HISTORY  Social History     Socioeconomic History   • Marital status: Single     Spouse name: Not on file   • Number of children: Not on file   • Years of education: Not on file   • Highest education level: Not on file   Tobacco Use   • Smoking status: Never Smoker   Substance and Sexual Activity   • Alcohol use: Yes   • Sexual activity: Yes     Partners: Female   Social History Narrative    Full time [employer]    Girlfriend in [facility] trying to move    Has restaurant       ALLERGIES  Lisinopril    PHYSICAL EXAM  ED Triage Vitals   Temp Heart Rate Resp BP SpO2   03/11/20  1309 03/11/20 1308 03/11/20 1309 03/11/20 1308 03/11/20 1308   98.4 °F (36.9 °C) 82 16 166/84 98 %      Temp src Heart Rate Source Patient Position BP Location FiO2 (%)   -- -- -- -- --              Physical Exam   Constitutional: He is oriented to person, place, and time.   Moderately obese, 59-year-old, white male in no acute distress.   HENT:   Head: Normocephalic and atraumatic.   Eyes: Pupils are equal, round, and reactive to light. Conjunctivae and EOM are normal.   Neck: Normal range of motion. Neck supple. No thyromegaly present.   No carotid bruits   Cardiovascular: Normal rate, regular rhythm and normal heart sounds.   No murmur heard.  Pulmonary/Chest: Effort normal and breath sounds normal. No respiratory distress.   Abdominal: Soft. Bowel sounds are normal. There is no tenderness.   Musculoskeletal: Normal range of motion. He exhibits edema (1+ bilateral).   Lymphadenopathy:     He has no cervical adenopathy.   Neurological: He is alert and oriented to person, place, and time. No cranial nerve deficit. Coordination normal.   Skin: Skin is warm and dry.   Psychiatric: Affect and judgment normal.   Nursing note and vitals reviewed.      LAB RESULTS  Results for orders placed or performed during the hospital encounter of 03/11/20   Comprehensive Metabolic Panel   Result Value Ref Range    Glucose 177 (H) 65 - 99 mg/dL    BUN 20 6 - 20 mg/dL    Creatinine 0.95 0.76 - 1.27 mg/dL    Sodium 145 136 - 145 mmol/L    Potassium 4.2 3.5 - 5.2 mmol/L    Chloride 102 98 - 107 mmol/L    CO2 27.7 22.0 - 29.0 mmol/L    Calcium 10.6 (H) 8.6 - 10.5 mg/dL    Total Protein 8.3 6.0 - 8.5 g/dL    Albumin 4.50 3.50 - 5.20 g/dL    ALT (SGPT) 25 1 - 41 U/L    AST (SGOT) 42 (H) 1 - 40 U/L    Alkaline Phosphatase 116 39 - 117 U/L    Total Bilirubin 0.6 0.2 - 1.2 mg/dL    eGFR Non African Amer 82 >60 mL/min/1.73    Globulin 3.8 gm/dL    A/G Ratio 1.2 g/dL    BUN/Creatinine Ratio 21.1 7.0 - 25.0    Anion Gap 15.3 (H) 5.0 - 15.0  mmol/L   Protime-INR   Result Value Ref Range    Protime 13.3 12.1 - 15.0 Seconds    INR 1.04 0.90 - 1.10   aPTT   Result Value Ref Range    PTT 29.2 24.3 - 38.1 seconds   Urinalysis With Microscopic If Indicated (No Culture) - Urine, Catheter   Result Value Ref Range    Color, UA Yellow Yellow, Straw    Appearance, UA Clear Clear    pH, UA 5.5 4.5 - 8.0    Specific Gravity, UA 1.020 1.003 - 1.030    Glucose, UA Negative Negative    Ketones, UA Negative Negative    Bilirubin, UA Negative Negative    Blood, UA Negative Negative    Protein, UA >=300 mg/dL (3+) (A) Negative    Leuk Esterase, UA Negative Negative    Nitrite, UA Negative Negative    Urobilinogen, UA 0.2 E.U./dL 0.2 - 1.0 E.U./dL   CBC Auto Differential   Result Value Ref Range    WBC 10.29 3.40 - 10.80 10*3/mm3    RBC 5.29 4.14 - 5.80 10*6/mm3    Hemoglobin 15.7 13.0 - 17.7 g/dL    Hematocrit 46.2 37.5 - 51.0 %    MCV 87.3 79.0 - 97.0 fL    MCH 29.7 26.6 - 33.0 pg    MCHC 34.0 31.5 - 35.7 g/dL    RDW 13.6 12.3 - 15.4 %    RDW-SD 42.9 37.0 - 54.0 fl    MPV 10.6 6.0 - 12.0 fL    Platelets 246 140 - 450 10*3/mm3    Neutrophil % 71.9 42.7 - 76.0 %    Lymphocyte % 20.0 19.6 - 45.3 %    Monocyte % 5.2 5.0 - 12.0 %    Eosinophil % 1.6 0.3 - 6.2 %    Basophil % 0.7 0.0 - 1.5 %    Immature Grans % 0.6 (H) 0.0 - 0.5 %    Neutrophils, Absolute 7.40 (H) 1.70 - 7.00 10*3/mm3    Lymphocytes, Absolute 2.06 0.70 - 3.10 10*3/mm3    Monocytes, Absolute 0.54 0.10 - 0.90 10*3/mm3    Eosinophils, Absolute 0.16 0.00 - 0.40 10*3/mm3    Basophils, Absolute 0.07 0.00 - 0.20 10*3/mm3    Immature Grans, Absolute 0.06 (H) 0.00 - 0.05 10*3/mm3    nRBC 0.0 0.0 - 0.2 /100 WBC   Urinalysis, Microscopic Only - Urine, Catheter   Result Value Ref Range    RBC, UA 0-2 (A) None Seen /HPF    WBC, UA 6-12 (A) None Seen /HPF    Bacteria, UA Trace (A) None Seen /HPF    Squamous Epithelial Cells, UA 0-2 None Seen, 0-2 /HPF    Hyaline Casts, UA None Seen None Seen /LPF    Methodology Manual Light  Microscopy          I ordered the above labs and reviewed the results    RADIOLOGY  Ct Head Without Contrast    Result Date: 3/11/2020  Narrative: CT Head WO HISTORY: 59-year-old emergency department patient with complaint of headache with dizziness for several months. TECHNIQUE: Axial unenhanced head CT. Radiation dose reduction techniques included automated exposure control or exposure modulation based on body size. Count of known CT and cardiac nuc med studies performed in previous 12 months: 0. Time of scan: 1748 hours COMPARISON: None. FINDINGS: No intracranial hemorrhage, mass, or infarct. No hydrocephalus or extra-axial fluid collection. Brain parenchymal density is normal. The skull base, calvarium, and extracranial soft tissues are normal.     Impression: Normal, negative unenhanced head CT. Signer Name: Megan Vazquez MD  Signed: 3/11/2020 5:58 PM  Workstation Name: [email or web address]  [facility]      I ordered the above radiologic testing and reviewed the results    PROCEDURES  Procedures      PROGRESS AND CONSULTS  ED Course as of Mar 11 1843   Wed Mar 11, 2020   1655 EKG tracing was contemporaneously reviewed at 1555 hrs. and showed a normal sinus rhythm with a rate of 72 bpm.  ST segments and T waves unremarkable.  No ectopy.  Normal ECG.    [ML]   1829 Patient was informed of unremarkable findings during work-up.  Exact etiology of the patient's multiple complaints unclear, but currently non-worrisome.  Patient will be referred back to Dr. Hood for further work-up if indicated.    [ML]      ED Course User Index  [ML] Jayy Lopes MD           MEDICAL DECISION MAKING  Results were reviewed/discussed with the patient and they were also made aware of online access. Pt also made aware that some labs, such as cultures, will not be resulted during ER visit and follow up with PMD is necessary.     MDM       DIAGNOSIS  Final diagnoses:   Chronic migraine without aura without  status migrainosus, not intractable       Latest Documented Vital Signs:  As of 18:43  BP- 158/87 HR- 69 Temp- 98.4 °F (36.9 °C) O2 sat- 94%    DISPOSITION  Discharged in good condition       Medication List      No changes were made to your prescriptions during this visit.       Follow-up Information     Schedule an appointment as soon as possible for a visit  with Liana Hood MD.    Specialties:  Internal Medicine & Pediatrics, Pediatrics  Contact information:  [address] [address] [phone]                      Jayy Lopes MD  03/11/20 5187

## 2020-03-16 ENCOUNTER — HOSPITAL ENCOUNTER (OUTPATIENT)
Dept: GENERAL RADIOLOGY | Facility: HOSPITAL | Age: 60
Discharge: HOME | End: 2020-03-16
Admitting: INTERNAL MEDICINE

## 2020-03-16 ENCOUNTER — OFFICE VISIT (OUTPATIENT)
Dept: INTERNAL MEDICINE | Facility: CLINIC | Age: 60
End: 2020-03-16

## 2020-03-16 VITALS
RESPIRATION RATE: 16 BRPM | DIASTOLIC BLOOD PRESSURE: 82 MMHG | SYSTOLIC BLOOD PRESSURE: 130 MMHG | HEART RATE: 78 BPM | WEIGHT: 204.4 LBS | TEMPERATURE: 97.9 F | HEIGHT: 68 IN | OXYGEN SATURATION: 96 % | BODY MASS INDEX: 30.98 KG/M2

## 2020-03-16 DIAGNOSIS — M54.50 CHRONIC BILATERAL LOW BACK PAIN WITHOUT SCIATICA: ICD-10-CM

## 2020-03-16 DIAGNOSIS — G45.9 TIA (TRANSIENT ISCHEMIC ATTACK): ICD-10-CM

## 2020-03-16 DIAGNOSIS — G44.89 OTHER HEADACHE SYNDROME: ICD-10-CM

## 2020-03-16 DIAGNOSIS — M54.50 CHRONIC BILATERAL LOW BACK PAIN WITHOUT SCIATICA: Primary | ICD-10-CM

## 2020-03-16 DIAGNOSIS — G89.29 CHRONIC BILATERAL LOW BACK PAIN WITHOUT SCIATICA: Primary | ICD-10-CM

## 2020-03-16 DIAGNOSIS — G89.29 CHRONIC BILATERAL LOW BACK PAIN WITHOUT SCIATICA: ICD-10-CM

## 2020-03-16 PROCEDURE — 99214 OFFICE O/P EST MOD 30 MIN: CPT | Performed by: INTERNAL MEDICINE

## 2020-03-16 PROCEDURE — 72100 X-RAY EXAM L-S SPINE 2/3 VWS: CPT

## 2020-03-16 RX ORDER — ASPIRIN 81 MG/1
81 TABLET, CHEWABLE ORAL DAILY
COMMUNITY

## 2020-03-16 NOTE — PROGRESS NOTES
"      Pablo Mendoza is a 59 y.o. male, who presents with a chief complaint of   Chief Complaint   Patient presents with   • Hospital Follow Up   • Diabetes Mellitus       HPI   Pt here for f/u.  He was in the ed with headache, dizziness, confusion, intermittent speech issues and Le edema. He was having trouble moving his left leg.  Pt was worried that he might have had a stroke or tia.  Records reviewed in Epic.  Head CT neg.  ekg neg.  Concern for pos tia vs complex migraine.  Pt changed job and is now back on his feet 10-12 hours.  This change at work has made sx worse.  He knows he has had lots of stress at work.  He saw dr. Benz a long time ago but no neuro eval in >5 years.  He says he has chronic pain and arthritis in his back as well. He denies sciatica but has constant chronic bilateral calf pain L>R.  He denies soa.  He had some \"spasms\" in his chest last week while at rest.  No chest pain or soa with activity or at work.  He says he did have a stroke back in 2007.  He recently started asa 81mg daily.  He feels ok today.        The following portions of the patient's history were reviewed and updated as appropriate: allergies, current medications, past family history, past medical history, past social history, past surgical history and problem list.    Allergies: Lisinopril    Review of Systems   Constitutional: Positive for fatigue.   Eyes: Negative.    Respiratory: Negative.    Cardiovascular: Negative.    Gastrointestinal: Negative.    Endocrine: Negative.    Genitourinary: Negative.    Musculoskeletal: Negative.    Skin: Negative.    Allergic/Immunologic: Negative.    Neurological: Positive for dizziness, speech difficulty, weakness, numbness and headaches. Negative for tremors, seizures, syncope, facial asymmetry and light-headedness.   Hematological: Negative.    Psychiatric/Behavioral: Negative.    All other systems reviewed and are negative.            Wt Readings from Last 3 Encounters: "   03/16/20 92.7 kg (204 lb 6.4 oz)   03/11/20 90.7 kg (200 lb)   02/06/20 90.7 kg (200 lb)     Temp Readings from Last 3 Encounters:   03/16/20 97.9 °F (36.6 °C) (Oral)   03/11/20 98.4 °F (36.9 °C) (Oral)   02/06/20 98.5 °F (36.9 °C) (Oral)     BP Readings from Last 3 Encounters:   03/16/20 130/82   03/11/20 148/84   02/06/20 138/84     Pulse Readings from Last 3 Encounters:   03/16/20 78   03/11/20 66   02/06/20 68     Body mass index is 31.08 kg/m².  @LASTSAO2(3)@    Physical Exam   Constitutional: He is oriented to person, place, and time. He appears well-developed and well-nourished. No distress.   HENT:   Head: Normocephalic and atraumatic.   Right Ear: External ear normal.   Left Ear: External ear normal.   Nose: Nose normal.   Mouth/Throat: Oropharynx is clear and moist.   Eyes: Pupils are equal, round, and reactive to light. Conjunctivae and EOM are normal.   Neck: Normal range of motion. Neck supple.   Cardiovascular: Normal rate, regular rhythm, normal heart sounds and intact distal pulses.   Pulmonary/Chest: Effort normal and breath sounds normal. No respiratory distress. He has no wheezes.   Musculoskeletal: Normal range of motion.   Normal gait  1+ bilat LE edema  Strength 5/5 bilat in LE   Neurological: He is alert and oriented to person, place, and time. He displays normal reflexes. No cranial nerve deficit or sensory deficit. He exhibits normal muscle tone.   Skin: Skin is warm and dry.   Psychiatric: He has a normal mood and affect. His behavior is normal. Judgment and thought content normal.   Nursing note and vitals reviewed.      Results for orders placed or performed during the hospital encounter of 03/11/20   Comprehensive Metabolic Panel   Result Value Ref Range    Glucose 177 (H) 65 - 99 mg/dL    BUN 20 6 - 20 mg/dL    Creatinine 0.95 0.76 - 1.27 mg/dL    Sodium 145 136 - 145 mmol/L    Potassium 4.2 3.5 - 5.2 mmol/L    Chloride 102 98 - 107 mmol/L    CO2 27.7 22.0 - 29.0 mmol/L    Calcium  10.6 (H) 8.6 - 10.5 mg/dL    Total Protein 8.3 6.0 - 8.5 g/dL    Albumin 4.50 3.50 - 5.20 g/dL    ALT (SGPT) 25 1 - 41 U/L    AST (SGOT) 42 (H) 1 - 40 U/L    Alkaline Phosphatase 116 39 - 117 U/L    Total Bilirubin 0.6 0.2 - 1.2 mg/dL    eGFR Non African Amer 82 >60 mL/min/1.73    Globulin 3.8 gm/dL    A/G Ratio 1.2 g/dL    BUN/Creatinine Ratio 21.1 7.0 - 25.0    Anion Gap 15.3 (H) 5.0 - 15.0 mmol/L   Protime-INR   Result Value Ref Range    Protime 13.3 12.1 - 15.0 Seconds    INR 1.04 0.90 - 1.10   aPTT   Result Value Ref Range    PTT 29.2 24.3 - 38.1 seconds   Urinalysis With Microscopic If Indicated (No Culture) - Urine, Catheter   Result Value Ref Range    Color, UA Yellow Yellow, Straw    Appearance, UA Clear Clear    pH, UA 5.5 4.5 - 8.0    Specific Gravity, UA 1.020 1.003 - 1.030    Glucose, UA Negative Negative    Ketones, UA Negative Negative    Bilirubin, UA Negative Negative    Blood, UA Negative Negative    Protein, UA >=300 mg/dL (3+) (A) Negative    Leuk Esterase, UA Negative Negative    Nitrite, UA Negative Negative    Urobilinogen, UA 0.2 E.U./dL 0.2 - 1.0 E.U./dL   CBC Auto Differential   Result Value Ref Range    WBC 10.29 3.40 - 10.80 10*3/mm3    RBC 5.29 4.14 - 5.80 10*6/mm3    Hemoglobin 15.7 13.0 - 17.7 g/dL    Hematocrit 46.2 37.5 - 51.0 %    MCV 87.3 79.0 - 97.0 fL    MCH 29.7 26.6 - 33.0 pg    MCHC 34.0 31.5 - 35.7 g/dL    RDW 13.6 12.3 - 15.4 %    RDW-SD 42.9 37.0 - 54.0 fl    MPV 10.6 6.0 - 12.0 fL    Platelets 246 140 - 450 10*3/mm3    Neutrophil % 71.9 42.7 - 76.0 %    Lymphocyte % 20.0 19.6 - 45.3 %    Monocyte % 5.2 5.0 - 12.0 %    Eosinophil % 1.6 0.3 - 6.2 %    Basophil % 0.7 0.0 - 1.5 %    Immature Grans % 0.6 (H) 0.0 - 0.5 %    Neutrophils, Absolute 7.40 (H) 1.70 - 7.00 10*3/mm3    Lymphocytes, Absolute 2.06 0.70 - 3.10 10*3/mm3    Monocytes, Absolute 0.54 0.10 - 0.90 10*3/mm3    Eosinophils, Absolute 0.16 0.00 - 0.40 10*3/mm3    Basophils, Absolute 0.07 0.00 - 0.20 10*3/mm3     Immature Grans, Absolute 0.06 (H) 0.00 - 0.05 10*3/mm3    nRBC 0.0 0.0 - 0.2 /100 WBC   Urinalysis, Microscopic Only - Urine, Catheter   Result Value Ref Range    RBC, UA 0-2 (A) None Seen /HPF    WBC, UA 6-12 (A) None Seen /HPF    Bacteria, UA Trace (A) None Seen /HPF    Squamous Epithelial Cells, UA 0-2 None Seen, 0-2 /HPF    Hyaline Casts, UA None Seen None Seen /LPF    Methodology Manual Light Microscopy            Pablo was seen today for hospital follow up and diabetes mellitus.    Diagnoses and all orders for this visit:    Chronic bilateral low back pain without sciatica  -     XR Spine Lumbar 2 or 3 View; Future    TIA (transient ischemic attack)  -     MRI Brain With & Without Contrast; Future  -     US carotid bilateral; Future  -     Ambulatory Referral to Neurology    Other headache syndrome  -     MRI Brain With & Without Contrast; Future  -     US carotid bilateral; Future  -     Ambulatory Referral to Neurology          Outpatient Medications Prior to Visit   Medication Sig Dispense Refill   • allopurinol (ZYLOPRIM) 300 MG tablet TAKE ONE TABLET BY MOUTH TWICE A DAY 60 tablet 0   • amLODIPine (NORVASC) 10 MG tablet TAKE ONE TABLET BY MOUTH DAILY 30 tablet 5   • aspirin 81 MG chewable tablet Chew 81 mg Daily.     • atorvastatin (LIPITOR) 20 MG tablet TAKE ONE TABLET BY MOUTH DAILY 30 tablet 0   • Cholecalciferol (VITAMIN D3) 5000 units capsule capsule Take 5,000 Units by mouth Daily.     • cyanocobalamin (VITAMIN B-12) 2500 MCG tablet tablet Take 500 mcg by mouth Daily.     • hydroCHLOROthiazide (HYDRODIURIL) 25 MG tablet TAKE ONE TABLET BY MOUTH DAILY 30 tablet 0   • ibuprofen (ADVIL,MOTRIN) 800 MG tablet Take 1 tablet by mouth Every 8 (Eight) Hours As Needed for Mild Pain . 30 tablet 0   • JANUMET XR  MG tablet TAKE ONE TABLET BY MOUTH TWICE A DAY 60 tablet 0   • loratadine (CLARITIN) 10 MG tablet TAKE ONE TABLET BY MOUTH DAILY 30 tablet 0   • metoprolol tartrate (LOPRESSOR) 100 MG tablet  Take 1 tablet by mouth 2 (Two) Times a Day. 180 tablet 1   • nystatin (MYCOSTATIN) 191653 UNIT/GM cream Apply  topically to the appropriate area as directed 2 (Two) Times a Day. 30 g 0   • vitamin C (ASCORBIC ACID) 500 MG tablet Take 500 mg by mouth Daily.       No facility-administered medications prior to visit.      No orders of the defined types were placed in this encounter.    [unfilled]  There are no discontinued medications.      Return in about 4 weeks (around 4/13/2020) for Recheck.

## 2020-03-18 ENCOUNTER — HOSPITAL ENCOUNTER (OUTPATIENT)
Dept: MRI IMAGING | Facility: HOSPITAL | Age: 60
Discharge: HOME | End: 2020-03-18
Admitting: INTERNAL MEDICINE

## 2020-03-18 ENCOUNTER — HOSPITAL ENCOUNTER (OUTPATIENT)
Dept: ULTRASOUND IMAGING | Facility: HOSPITAL | Age: 60
Discharge: HOME | End: 2020-03-18

## 2020-03-18 DIAGNOSIS — G44.89 OTHER HEADACHE SYNDROME: ICD-10-CM

## 2020-03-18 DIAGNOSIS — E78.2 MIXED HYPERLIPIDEMIA: ICD-10-CM

## 2020-03-18 DIAGNOSIS — G45.9 TIA (TRANSIENT ISCHEMIC ATTACK): ICD-10-CM

## 2020-03-18 PROCEDURE — 93880 EXTRACRANIAL BILAT STUDY: CPT

## 2020-03-18 PROCEDURE — 0 GADOBENATE DIMEGLUMINE 529 MG/ML SOLUTION: Performed by: INTERNAL MEDICINE

## 2020-03-18 PROCEDURE — 70553 MRI BRAIN STEM W/O & W/DYE: CPT

## 2020-03-18 PROCEDURE — A9577 INJ MULTIHANCE: HCPCS | Performed by: INTERNAL MEDICINE

## 2020-03-18 RX ORDER — ATORVASTATIN CALCIUM 20 MG/1
TABLET, FILM COATED ORAL
Qty: 30 TABLET | Refills: 0 | Status: SHIPPED | OUTPATIENT
Start: 2020-03-18 | End: 2020-04-20

## 2020-03-18 RX ADMIN — GADOBENATE DIMEGLUMINE 18 ML: 529 INJECTION, SOLUTION INTRAVENOUS at 14:03

## 2020-03-20 ENCOUNTER — TELEPHONE (OUTPATIENT)
Dept: INTERNAL MEDICINE | Facility: CLINIC | Age: 60
End: 2020-03-20

## 2020-03-20 DIAGNOSIS — M54.50 LUMBAR PAIN: Primary | ICD-10-CM

## 2020-03-20 DIAGNOSIS — M51.36 DEGENERATIVE DISC DISEASE, LUMBAR: ICD-10-CM

## 2020-03-20 NOTE — TELEPHONE ENCOUNTER
Ok to refer pt to pain management but this is likely considered non-emergent and all non-emergent issues currently on hold per covid-19 procedures.

## 2020-03-20 NOTE — TELEPHONE ENCOUNTER
----- Message from Cristel Quintana MA sent at 3/20/2020  1:33 PM EDT -----  Advised patient as per below, but he is wanting pain relief.  He is open to an epidural.  Would this be applicable to his situation?  ----- Message -----  From: Liana Hood MD  Sent: 3/17/2020  11:15 AM EDT  To: [facility] Clinical Pool    Call pt about x-ray - shows arthitic chanes in the back.  Chronic wedging at t12 unchanged from 2013.

## 2020-03-31 DIAGNOSIS — M1A.09X0 CHRONIC GOUT OF MULTIPLE SITES, UNSPECIFIED CAUSE: ICD-10-CM

## 2020-03-31 DIAGNOSIS — I10 ESSENTIAL HYPERTENSION: ICD-10-CM

## 2020-03-31 RX ORDER — HYDROCHLOROTHIAZIDE 25 MG/1
TABLET ORAL
Qty: 30 TABLET | Refills: 0 | Status: SHIPPED | OUTPATIENT
Start: 2020-03-31 | End: 2020-05-04

## 2020-03-31 RX ORDER — ALLOPURINOL 300 MG/1
TABLET ORAL
Qty: 60 TABLET | Refills: 0 | Status: SHIPPED | OUTPATIENT
Start: 2020-03-31 | End: 2020-05-04

## 2020-04-07 RX ORDER — SITAGLIPTIN AND METFORMIN HYDROCHLORIDE 1000; 50 MG/1; MG/1
TABLET, FILM COATED, EXTENDED RELEASE ORAL
Qty: 60 TABLET | Refills: 0 | Status: SHIPPED | OUTPATIENT
Start: 2020-04-07 | End: 2020-05-03

## 2020-04-10 DIAGNOSIS — E11.9 TYPE 2 DIABETES MELLITUS WITHOUT COMPLICATION, WITHOUT LONG-TERM CURRENT USE OF INSULIN (HCC): ICD-10-CM

## 2020-04-10 DIAGNOSIS — E78.2 MIXED HYPERLIPIDEMIA: Primary | ICD-10-CM

## 2020-04-10 DIAGNOSIS — I10 ESSENTIAL HYPERTENSION: ICD-10-CM

## 2020-04-10 LAB
ALBUMIN SERPL-MCNC: 4.7 G/DL (ref 3.5–5.2)
ALBUMIN/GLOB SERPL: 1.7 G/DL
ALP SERPL-CCNC: 130 U/L (ref 39–117)
ALT SERPL-CCNC: 31 U/L (ref 1–41)
AST SERPL-CCNC: 37 U/L (ref 1–40)
BASOPHILS # BLD AUTO: 0.06 10*3/MM3 (ref 0–0.2)
BASOPHILS NFR BLD AUTO: 0.7 % (ref 0–1.5)
BILIRUB SERPL-MCNC: 0.6 MG/DL (ref 0.2–1.2)
BUN SERPL-MCNC: 14 MG/DL (ref 6–20)
BUN/CREAT SERPL: 14.3 (ref 7–25)
CALCIUM SERPL-MCNC: 9.7 MG/DL (ref 8.6–10.5)
CHLORIDE SERPL-SCNC: 99 MMOL/L (ref 98–107)
CHOLEST SERPL-MCNC: 117 MG/DL (ref 0–200)
CO2 SERPL-SCNC: 29.7 MMOL/L (ref 22–29)
CREAT SERPL-MCNC: 0.98 MG/DL (ref 0.76–1.27)
EOSINOPHIL # BLD AUTO: 0.16 10*3/MM3 (ref 0–0.4)
EOSINOPHIL NFR BLD AUTO: 1.9 % (ref 0.3–6.2)
ERYTHROCYTE [DISTWIDTH] IN BLOOD BY AUTOMATED COUNT: 13.8 % (ref 12.3–15.4)
GLOBULIN SER CALC-MCNC: 2.8 GM/DL
GLUCOSE SERPL-MCNC: 188 MG/DL (ref 65–99)
HBA1C MFR BLD: 8.43 % (ref 4.8–5.6)
HCT VFR BLD AUTO: 44.3 % (ref 37.5–51)
HDLC SERPL-MCNC: 31 MG/DL (ref 40–60)
HGB BLD-MCNC: 15.2 G/DL (ref 13–17.7)
IMM GRANULOCYTES # BLD AUTO: 0.03 10*3/MM3 (ref 0–0.05)
IMM GRANULOCYTES NFR BLD AUTO: 0.4 % (ref 0–0.5)
LDLC SERPL CALC-MCNC: 46 MG/DL (ref 0–100)
LDLC/HDLC SERPL: 1.5 {RATIO}
LYMPHOCYTES # BLD AUTO: 1.92 10*3/MM3 (ref 0.7–3.1)
LYMPHOCYTES NFR BLD AUTO: 23.4 % (ref 19.6–45.3)
MCH RBC QN AUTO: 29.4 PG (ref 26.6–33)
MCHC RBC AUTO-ENTMCNC: 34.3 G/DL (ref 31.5–35.7)
MCV RBC AUTO: 85.7 FL (ref 79–97)
MONOCYTES # BLD AUTO: 0.5 10*3/MM3 (ref 0.1–0.9)
MONOCYTES NFR BLD AUTO: 6.1 % (ref 5–12)
NEUTROPHILS # BLD AUTO: 5.54 10*3/MM3 (ref 1.7–7)
NEUTROPHILS NFR BLD AUTO: 67.5 % (ref 42.7–76)
NRBC BLD AUTO-RTO: 0 /100 WBC (ref 0–0.2)
PLATELET # BLD AUTO: 256 10*3/MM3 (ref 140–450)
POTASSIUM SERPL-SCNC: 4.1 MMOL/L (ref 3.5–5.2)
PROT SERPL-MCNC: 7.5 G/DL (ref 6–8.5)
RBC # BLD AUTO: 5.17 10*6/MM3 (ref 4.14–5.8)
SODIUM SERPL-SCNC: 143 MMOL/L (ref 136–145)
TRIGL SERPL-MCNC: 198 MG/DL (ref 0–150)
VLDLC SERPL CALC-MCNC: 39.6 MG/DL (ref 5–40)
WBC # BLD AUTO: 8.21 10*3/MM3 (ref 3.4–10.8)

## 2020-04-14 ENCOUNTER — TELEPHONE (OUTPATIENT)
Dept: INTERNAL MEDICINE | Facility: CLINIC | Age: 60
End: 2020-04-14

## 2020-04-14 NOTE — TELEPHONE ENCOUNTER
----- Message from Liana Hood MD sent at 4/13/2020 11:46 AM EDT -----  a1c uncontroled at 8.4. Will discuss at upcoming appt this week.

## 2020-04-16 DIAGNOSIS — E78.2 MIXED HYPERLIPIDEMIA: ICD-10-CM

## 2020-04-17 ENCOUNTER — OFFICE VISIT (OUTPATIENT)
Dept: INTERNAL MEDICINE | Facility: CLINIC | Age: 60
End: 2020-04-17

## 2020-04-17 VITALS
HEART RATE: 72 BPM | OXYGEN SATURATION: 98 % | TEMPERATURE: 97.8 F | WEIGHT: 202 LBS | RESPIRATION RATE: 16 BRPM | DIASTOLIC BLOOD PRESSURE: 70 MMHG | HEIGHT: 68 IN | BODY MASS INDEX: 30.62 KG/M2 | SYSTOLIC BLOOD PRESSURE: 118 MMHG

## 2020-04-17 DIAGNOSIS — E11.9 TYPE 2 DIABETES MELLITUS WITHOUT COMPLICATION, WITHOUT LONG-TERM CURRENT USE OF INSULIN (HCC): Primary | ICD-10-CM

## 2020-04-17 DIAGNOSIS — I10 ESSENTIAL HYPERTENSION: ICD-10-CM

## 2020-04-17 DIAGNOSIS — E78.2 MIXED HYPERLIPIDEMIA: ICD-10-CM

## 2020-04-17 PROCEDURE — 99214 OFFICE O/P EST MOD 30 MIN: CPT | Performed by: INTERNAL MEDICINE

## 2020-04-17 NOTE — PROGRESS NOTES
Pablo Mendoza is a 59 y.o. male, who presents with a chief complaint of   Chief Complaint   Patient presents with   • Follow-up     3 x month f/u, lab results    • Diabetes   • Hyperlipidemia       HPI   Pt here for follow up.      Pt has recently moved to the apartment he wanted and is trying to get back on track. He is off work right now at [employer] bc of [facility]. His stress levels are way down and he is feeling better overall.      DM - a1c had been up to 8.0-> 6.9->6.8->8.1->8.43.  his diet has been off bc of moving.  He is eating whatever.  He has been resting more at home and not as active. He has been drinking more soda.  He plans on starting walking now.       HTN - bp up some today.  No dizziness.  Renal function back to normal off ace-I.     He has continued to haveLE edema lately.  it is some better being off work but still has the edema. He hasnt been eating as well as he should for a while.  No cp/soa.  He is tired all the time.      hld - on statin. No cramps/myalgias.     He has a hx of migranes and poss tia.  He had an appt with neurology but it has been pushed out to May.      The following portions of the patient's history were reviewed and updated as appropriate: allergies, current medications, past family history, past medical history, past social history, past surgical history and problem list.    Allergies: Lisinopril    Review of Systems   Constitutional: Negative.    HENT: Negative.    Eyes: Negative.    Respiratory: Negative.    Cardiovascular: Negative.    Gastrointestinal: Negative.    Endocrine: Negative.    Genitourinary: Negative.    Musculoskeletal: Negative.    Skin: Negative.    Allergic/Immunologic: Negative.    Neurological: Negative.    Hematological: Negative.    Psychiatric/Behavioral: Negative.    All other systems reviewed and are negative.            Wt Readings from Last 3 Encounters:   04/17/20 91.6 kg (202 lb)   03/18/20 90.7 kg (200 lb)   03/16/20 92.7 kg (204 lb 6.4  oz)     Temp Readings from Last 3 Encounters:   04/17/20 97.8 °F (36.6 °C) (Oral)   03/16/20 97.9 °F (36.6 °C) (Oral)   03/11/20 98.4 °F (36.9 °C) (Oral)     BP Readings from Last 3 Encounters:   04/17/20 118/70   03/16/20 130/82   03/11/20 148/84     Pulse Readings from Last 3 Encounters:   04/17/20 72   03/16/20 78   03/11/20 66     Body mass index is 30.71 kg/m².  @LASTSAO2(3)@    Physical Exam   Constitutional: He is oriented to person, place, and time. He appears well-developed and well-nourished. No distress.   HENT:   Head: Normocephalic and atraumatic.   Right Ear: External ear normal.   Left Ear: External ear normal.   Nose: Nose normal.   Mouth/Throat: Oropharynx is clear and moist.   Eyes: Pupils are equal, round, and reactive to light. Conjunctivae and EOM are normal.   Neck: Normal range of motion. Neck supple.   Cardiovascular: Normal rate, regular rhythm, normal heart sounds and intact distal pulses.   Pulmonary/Chest: Effort normal and breath sounds normal. No respiratory distress. He has no wheezes.   Musculoskeletal: Normal range of motion.   Normal gait   Neurological: He is alert and oriented to person, place, and time.   Skin: Skin is warm and dry.   Psychiatric: He has a normal mood and affect. His behavior is normal. Judgment and thought content normal.   Nursing note and vitals reviewed.      Results for orders placed or performed in visit on 04/10/20   Comprehensive metabolic panel   Result Value Ref Range    Glucose 188 (H) 65 - 99 mg/dL    BUN 14 6 - 20 mg/dL    Creatinine 0.98 0.76 - 1.27 mg/dL    eGFR Non African Am 79 >60 mL/min/1.73    eGFR African Am 96 >60 mL/min/1.73    BUN/Creatinine Ratio 14.3 7.0 - 25.0    Sodium 143 136 - 145 mmol/L    Potassium 4.1 3.5 - 5.2 mmol/L    Chloride 99 98 - 107 mmol/L    Total CO2 29.7 (H) 22.0 - 29.0 mmol/L    Calcium 9.7 8.6 - 10.5 mg/dL    Total Protein 7.5 6.0 - 8.5 g/dL    Albumin 4.70 3.50 - 5.20 g/dL    Globulin 2.8 gm/dL    A/G Ratio 1.7 g/dL     Total Bilirubin 0.6 0.2 - 1.2 mg/dL    Alkaline Phosphatase 130 (H) 39 - 117 U/L    AST (SGOT) 37 1 - 40 U/L    ALT (SGPT) 31 1 - 41 U/L   Lipid Panel With LDL / HDL Ratio   Result Value Ref Range    Total Cholesterol 117 0 - 200 mg/dL    Triglycerides 198 (H) 0 - 150 mg/dL    HDL Cholesterol 31 (L) 40 - 60 mg/dL    VLDL Cholesterol 39.6 5 - 40 mg/dL    LDL Cholesterol  46 0 - 100 mg/dL    LDL/HDL Ratio 1.50    Hemoglobin A1c   Result Value Ref Range    Hemoglobin A1C 8.43 (H) 4.80 - 5.60 %   CBC & Differential   Result Value Ref Range    WBC 8.21 3.40 - 10.80 10*3/mm3    RBC 5.17 4.14 - 5.80 10*6/mm3    Hemoglobin 15.2 13.0 - 17.7 g/dL    Hematocrit 44.3 37.5 - 51.0 %    MCV 85.7 79.0 - 97.0 fL    MCH 29.4 26.6 - 33.0 pg    MCHC 34.3 31.5 - 35.7 g/dL    RDW 13.8 12.3 - 15.4 %    Platelets 256 140 - 450 10*3/mm3    Neutrophil Rel % 67.5 42.7 - 76.0 %    Lymphocyte Rel % 23.4 19.6 - 45.3 %    Monocyte Rel % 6.1 5.0 - 12.0 %    Eosinophil Rel % 1.9 0.3 - 6.2 %    Basophil Rel % 0.7 0.0 - 1.5 %    Neutrophils Absolute 5.54 1.70 - 7.00 10*3/mm3    Lymphocytes Absolute 1.92 0.70 - 3.10 10*3/mm3    Monocytes Absolute 0.50 0.10 - 0.90 10*3/mm3    Eosinophils Absolute 0.16 0.00 - 0.40 10*3/mm3    Basophils Absolute 0.06 0.00 - 0.20 10*3/mm3    Immature Granulocyte Rel % 0.4 0.0 - 0.5 %    Immature Grans Absolute 0.03 0.00 - 0.05 10*3/mm3    nRBC 0.0 0.0 - 0.2 /100 WBC           Pablo was seen today for follow-up, diabetes and hyperlipidemia.    Diagnoses and all orders for this visit:    Type 2 diabetes mellitus without complication, without long-term current use of insulin (CMS/[facility])    Mixed hyperlipidemia    Essential hypertension      Discussed healthy diet and exercise and getting diabetes under control.  Pt says he knows what he needs to do but has to actually do it.  Cont current meds.  Ov/labs in 3 mo. If glucoses remain uncontrolled with appropriate diet/exercise will adjust meds.       Outpatient Medications  Prior to Visit   Medication Sig Dispense Refill   • allopurinol (ZYLOPRIM) 300 MG tablet TAKE ONE TABLET BY MOUTH TWICE A DAY 60 tablet 0   • amLODIPine (NORVASC) 10 MG tablet TAKE ONE TABLET BY MOUTH DAILY 30 tablet 5   • aspirin 81 MG chewable tablet Chew 81 mg Daily.     • atorvastatin (LIPITOR) 20 MG tablet TAKE ONE TABLET BY MOUTH DAILY 30 tablet 0   • Cholecalciferol (VITAMIN D3) 5000 units capsule capsule Take 5,000 Units by mouth Daily.     • cyanocobalamin (VITAMIN B-12) 2500 MCG tablet tablet Take 500 mcg by mouth Daily.     • hydroCHLOROthiazide (HYDRODIURIL) 25 MG tablet TAKE ONE TABLET BY MOUTH DAILY 30 tablet 0   • ibuprofen (ADVIL,MOTRIN) 800 MG tablet Take 1 tablet by mouth Every 8 (Eight) Hours As Needed for Mild Pain . 30 tablet 0   • JANUMET XR  MG tablet TAKE ONE TABLET BY MOUTH TWICE A DAY 60 tablet 0   • loratadine (CLARITIN) 10 MG tablet TAKE ONE TABLET BY MOUTH DAILY 30 tablet 0   • metoprolol tartrate (LOPRESSOR) 100 MG tablet Take 1 tablet by mouth 2 (Two) Times a Day. 180 tablet 1   • nystatin (MYCOSTATIN) 347073 UNIT/GM cream Apply  topically to the appropriate area as directed 2 (Two) Times a Day. 30 g 0   • vitamin C (ASCORBIC ACID) 500 MG tablet Take 500 mg by mouth Daily.       No facility-administered medications prior to visit.      No orders of the defined types were placed in this encounter.    [unfilled]  There are no discontinued medications.      Return in about 3 months (around 7/17/2020) for Recheck, labs.   No

## 2020-04-20 RX ORDER — ATORVASTATIN CALCIUM 20 MG/1
TABLET, FILM COATED ORAL
Qty: 90 TABLET | Refills: 1 | Status: SHIPPED | OUTPATIENT
Start: 2020-04-20 | End: 2020-07-17 | Stop reason: SDUPTHER

## 2020-04-29 DIAGNOSIS — I10 ESSENTIAL HYPERTENSION: ICD-10-CM

## 2020-04-29 RX ORDER — AMLODIPINE BESYLATE 10 MG/1
TABLET ORAL
Qty: 90 TABLET | Refills: 1 | Status: SHIPPED | OUTPATIENT
Start: 2020-04-29 | End: 2020-07-17 | Stop reason: SDUPTHER

## 2020-05-03 DIAGNOSIS — M1A.09X0 CHRONIC GOUT OF MULTIPLE SITES, UNSPECIFIED CAUSE: ICD-10-CM

## 2020-05-03 DIAGNOSIS — I10 ESSENTIAL HYPERTENSION: ICD-10-CM

## 2020-05-03 RX ORDER — SITAGLIPTIN AND METFORMIN HYDROCHLORIDE 1000; 50 MG/1; MG/1
TABLET, FILM COATED, EXTENDED RELEASE ORAL
Qty: 60 TABLET | Refills: 5 | Status: SHIPPED | OUTPATIENT
Start: 2020-05-03 | End: 2020-10-30

## 2020-05-04 RX ORDER — ALLOPURINOL 300 MG/1
TABLET ORAL
Qty: 180 TABLET | Refills: 3 | Status: SHIPPED | OUTPATIENT
Start: 2020-05-04 | End: 2021-05-03

## 2020-05-04 RX ORDER — HYDROCHLOROTHIAZIDE 25 MG/1
TABLET ORAL
Qty: 90 TABLET | Refills: 3 | Status: SHIPPED | OUTPATIENT
Start: 2020-05-04 | End: 2020-07-17 | Stop reason: SDUPTHER

## 2020-05-11 DIAGNOSIS — I10 ESSENTIAL HYPERTENSION: ICD-10-CM

## 2020-05-11 RX ORDER — METOPROLOL TARTRATE 100 MG/1
TABLET ORAL
Qty: 60 TABLET | Refills: 0 | Status: SHIPPED | OUTPATIENT
Start: 2020-05-11 | End: 2020-06-12

## 2020-05-12 ENCOUNTER — APPOINTMENT (OUTPATIENT)
Dept: PAIN MEDICINE | Facility: HOSPITAL | Age: 60
End: 2020-05-12

## 2020-05-19 DIAGNOSIS — G43.801 OTHER MIGRAINE WITH STATUS MIGRAINOSUS, NOT INTRACTABLE: ICD-10-CM

## 2020-05-20 RX ORDER — IBUPROFEN 800 MG/1
TABLET ORAL
Qty: 30 TABLET | Refills: 0 | Status: SHIPPED | OUTPATIENT
Start: 2020-05-20 | End: 2020-07-17 | Stop reason: SDUPTHER

## 2020-06-12 DIAGNOSIS — I10 ESSENTIAL HYPERTENSION: ICD-10-CM

## 2020-06-12 RX ORDER — METOPROLOL TARTRATE 100 MG/1
TABLET ORAL
Qty: 60 TABLET | Refills: 0 | Status: SHIPPED | OUTPATIENT
Start: 2020-06-12 | End: 2020-07-17 | Stop reason: SDUPTHER

## 2020-07-10 DIAGNOSIS — R53.83 OTHER FATIGUE: ICD-10-CM

## 2020-07-10 DIAGNOSIS — Z00.00 ROUTINE HEALTH MAINTENANCE: ICD-10-CM

## 2020-07-10 DIAGNOSIS — E11.9 TYPE 2 DIABETES MELLITUS WITHOUT COMPLICATION, WITHOUT LONG-TERM CURRENT USE OF INSULIN (HCC): Primary | ICD-10-CM

## 2020-07-10 DIAGNOSIS — E78.2 MIXED HYPERLIPIDEMIA: ICD-10-CM

## 2020-07-10 DIAGNOSIS — I10 ESSENTIAL HYPERTENSION: ICD-10-CM

## 2020-07-11 LAB
ALBUMIN SERPL-MCNC: 4.6 G/DL (ref 3.5–5.2)
ALBUMIN/GLOB SERPL: 1.8 G/DL
ALP SERPL-CCNC: 113 U/L (ref 39–117)
ALT SERPL-CCNC: 23 U/L (ref 1–41)
AST SERPL-CCNC: 32 U/L (ref 1–40)
BASOPHILS # BLD AUTO: 0.06 10*3/MM3 (ref 0–0.2)
BASOPHILS NFR BLD AUTO: 0.8 % (ref 0–1.5)
BILIRUB SERPL-MCNC: 0.6 MG/DL (ref 0–1.2)
BUN SERPL-MCNC: 21 MG/DL (ref 6–20)
BUN/CREAT SERPL: 20.4 (ref 7–25)
CALCIUM SERPL-MCNC: 9.7 MG/DL (ref 8.6–10.5)
CHLORIDE SERPL-SCNC: 100 MMOL/L (ref 98–107)
CHOLEST SERPL-MCNC: 99 MG/DL (ref 0–200)
CO2 SERPL-SCNC: 27.3 MMOL/L (ref 22–29)
CREAT SERPL-MCNC: 1.03 MG/DL (ref 0.76–1.27)
EOSINOPHIL # BLD AUTO: 0.15 10*3/MM3 (ref 0–0.4)
EOSINOPHIL NFR BLD AUTO: 1.9 % (ref 0.3–6.2)
ERYTHROCYTE [DISTWIDTH] IN BLOOD BY AUTOMATED COUNT: 14.2 % (ref 12.3–15.4)
GLOBULIN SER CALC-MCNC: 2.5 GM/DL
GLUCOSE SERPL-MCNC: 210 MG/DL (ref 65–99)
HBA1C MFR BLD: 8.6 % (ref 4.8–5.6)
HCT VFR BLD AUTO: 43.3 % (ref 37.5–51)
HDLC SERPL-MCNC: 27 MG/DL (ref 40–60)
HGB BLD-MCNC: 14.8 G/DL (ref 13–17.7)
IMM GRANULOCYTES # BLD AUTO: 0.03 10*3/MM3 (ref 0–0.05)
IMM GRANULOCYTES NFR BLD AUTO: 0.4 % (ref 0–0.5)
LDLC SERPL CALC-MCNC: 38 MG/DL (ref 0–100)
LDLC/HDLC SERPL: 1.41 {RATIO}
LYMPHOCYTES # BLD AUTO: 1.51 10*3/MM3 (ref 0.7–3.1)
LYMPHOCYTES NFR BLD AUTO: 19 % (ref 19.6–45.3)
MCH RBC QN AUTO: 29.7 PG (ref 26.6–33)
MCHC RBC AUTO-ENTMCNC: 34.2 G/DL (ref 31.5–35.7)
MCV RBC AUTO: 86.8 FL (ref 79–97)
MONOCYTES # BLD AUTO: 0.41 10*3/MM3 (ref 0.1–0.9)
MONOCYTES NFR BLD AUTO: 5.2 % (ref 5–12)
NEUTROPHILS # BLD AUTO: 5.8 10*3/MM3 (ref 1.7–7)
NEUTROPHILS NFR BLD AUTO: 72.7 % (ref 42.7–76)
NRBC BLD AUTO-RTO: 0.1 /100 WBC (ref 0–0.2)
PLATELET # BLD AUTO: 228 10*3/MM3 (ref 140–450)
POTASSIUM SERPL-SCNC: 4.1 MMOL/L (ref 3.5–5.2)
PROT SERPL-MCNC: 7.1 G/DL (ref 6–8.5)
RBC # BLD AUTO: 4.99 10*6/MM3 (ref 4.14–5.8)
SODIUM SERPL-SCNC: 140 MMOL/L (ref 136–145)
T4 FREE SERPL-MCNC: 1.22 NG/DL (ref 0.93–1.7)
TRIGL SERPL-MCNC: 169 MG/DL (ref 0–150)
TSH SERPL DL<=0.005 MIU/L-ACNC: 3.6 UIU/ML (ref 0.27–4.2)
VLDLC SERPL CALC-MCNC: 33.8 MG/DL
WBC # BLD AUTO: 7.96 10*3/MM3 (ref 3.4–10.8)

## 2020-07-17 ENCOUNTER — OFFICE VISIT (OUTPATIENT)
Dept: INTERNAL MEDICINE | Facility: CLINIC | Age: 60
End: 2020-07-17

## 2020-07-17 VITALS
HEIGHT: 67 IN | TEMPERATURE: 97.3 F | BODY MASS INDEX: 31.27 KG/M2 | OXYGEN SATURATION: 97 % | SYSTOLIC BLOOD PRESSURE: 126 MMHG | DIASTOLIC BLOOD PRESSURE: 68 MMHG | WEIGHT: 199.2 LBS | HEART RATE: 70 BPM

## 2020-07-17 DIAGNOSIS — G43.801 OTHER MIGRAINE WITH STATUS MIGRAINOSUS, NOT INTRACTABLE: ICD-10-CM

## 2020-07-17 DIAGNOSIS — E11.9 TYPE 2 DIABETES MELLITUS WITHOUT COMPLICATION, WITHOUT LONG-TERM CURRENT USE OF INSULIN (HCC): Primary | ICD-10-CM

## 2020-07-17 DIAGNOSIS — E78.2 MIXED HYPERLIPIDEMIA: ICD-10-CM

## 2020-07-17 DIAGNOSIS — I10 ESSENTIAL HYPERTENSION: ICD-10-CM

## 2020-07-17 PROCEDURE — 99214 OFFICE O/P EST MOD 30 MIN: CPT | Performed by: INTERNAL MEDICINE

## 2020-07-17 RX ORDER — IBUPROFEN 800 MG/1
800 TABLET ORAL EVERY 8 HOURS PRN
Qty: 30 TABLET | Refills: 0 | Status: SHIPPED | OUTPATIENT
Start: 2020-07-17 | End: 2020-11-30

## 2020-07-17 RX ORDER — ATORVASTATIN CALCIUM 20 MG/1
20 TABLET, FILM COATED ORAL DAILY
Qty: 90 TABLET | Refills: 1 | Status: SHIPPED | OUTPATIENT
Start: 2020-07-17 | End: 2021-05-18

## 2020-07-17 RX ORDER — AMLODIPINE BESYLATE 10 MG/1
10 TABLET ORAL DAILY
Qty: 90 TABLET | Refills: 1 | Status: SHIPPED | OUTPATIENT
Start: 2020-07-17 | End: 2021-05-03

## 2020-07-17 RX ORDER — HYDROCHLOROTHIAZIDE 25 MG/1
25 TABLET ORAL DAILY
Qty: 90 TABLET | Refills: 1 | Status: SHIPPED | OUTPATIENT
Start: 2020-07-17 | End: 2021-06-01

## 2020-07-17 RX ORDER — METOPROLOL TARTRATE 100 MG/1
100 TABLET ORAL 2 TIMES DAILY
Qty: 180 TABLET | Refills: 1 | Status: SHIPPED | OUTPATIENT
Start: 2020-07-17 | End: 2020-07-20

## 2020-07-17 NOTE — PROGRESS NOTES
"      Pablo Mendoza is a 59 y.o. male, who presents with a chief complaint of   Chief Complaint   Patient presents with   • Diabetes     3 month follow up       HPI   Pt here for follow up    His brother recently passed away from stroke complications.  His dad had ischemic bowel in the past year at age 84 but is still living.    On 6/23 he had a migraine and ended up \"passing out\"  He couldn't feel his legs/arms for a while.  This came back but it takes a while.  He has a video of this episode where he is on the floor and having difficulty with speech.  The EMS was called but pt didn't want to go to the ED bc he says he has already had this testing.  He says bp and glucoses were elevated.  He saw Dr. Benz with neurology earlier in the year.  I do not have records from this visit.  Pt says testing was unchanged.  He says he has seizures where he \"zones out\" and that EEG was unchanged.  He says that this is part of his \"migraine headaches\".  His headaches have recently been better.      LE edema -  Improved.     DM - a1c 8.4 -> 8.6.  On janumet XR.  He denies hypoglycemia    HLD - on statin.  No cramps/myalgias    HTN - on metoprolol, hctz, amlodipine.      The following portions of the patient's history were reviewed and updated as appropriate: allergies, current medications, past family history, past medical history, past social history, past surgical history and problem list.    Allergies: Aspirin and Lisinopril    Review of Systems   Constitutional: Negative.    HENT: Negative.    Eyes: Negative.    Respiratory: Negative.    Cardiovascular: Negative.    Gastrointestinal: Negative.    Endocrine: Negative.    Genitourinary: Negative.    Musculoskeletal: Negative.    Skin: Negative.    Allergic/Immunologic: Negative.    Neurological: Negative.    Hematological: Negative.    Psychiatric/Behavioral: Negative.    All other systems reviewed and are negative.            Wt Readings from Last 3 Encounters:   07/17/20 " 90.4 kg (199 lb 3.2 oz)   04/17/20 91.6 kg (202 lb)   03/18/20 90.7 kg (200 lb)     Temp Readings from Last 3 Encounters:   07/17/20 97.3 °F (36.3 °C)   04/17/20 97.8 °F (36.6 °C) (Oral)   03/16/20 97.9 °F (36.6 °C) (Oral)     BP Readings from Last 3 Encounters:   07/17/20 126/68   04/17/20 118/70   03/16/20 130/82     Pulse Readings from Last 3 Encounters:   07/17/20 70   04/17/20 72   03/16/20 78     Body mass index is 31.2 kg/m².  @LASTSAO2(3)@    Physical Exam   Constitutional: He is oriented to person, place, and time. He appears well-developed and well-nourished. No distress.   HENT:   Head: Normocephalic and atraumatic.   Right Ear: External ear normal.   Left Ear: External ear normal.   Nose: Nose normal.   Mouth/Throat: Oropharynx is clear and moist.   Eyes: Pupils are equal, round, and reactive to light. Conjunctivae and EOM are normal.   Neck: Normal range of motion. Neck supple.   Cardiovascular: Normal rate, regular rhythm, normal heart sounds and intact distal pulses.   Pulmonary/Chest: Effort normal and breath sounds normal. No respiratory distress. He has no wheezes.   Musculoskeletal: Normal range of motion.   Normal gait   Neurological: He is alert and oriented to person, place, and time.   Skin: Skin is warm and dry.   Psychiatric: He has a normal mood and affect. His behavior is normal. Judgment and thought content normal.   Nursing note and vitals reviewed.      Results for orders placed or performed in visit on 07/10/20   T4, Free   Result Value Ref Range    Free T4 1.22 0.93 - 1.70 ng/dL   Hemoglobin A1c   Result Value Ref Range    Hemoglobin A1C 8.60 (H) 4.80 - 5.60 %   Comprehensive Metabolic Panel   Result Value Ref Range    Glucose 210 (H) 65 - 99 mg/dL    BUN 21 (H) 6 - 20 mg/dL    Creatinine 1.03 0.76 - 1.27 mg/dL    eGFR Non African Am 74 >60 mL/min/1.73    eGFR African Am 90 >60 mL/min/1.73    BUN/Creatinine Ratio 20.4 7.0 - 25.0    Sodium 140 136 - 145 mmol/L    Potassium 4.1 3.5 -  5.2 mmol/L    Chloride 100 98 - 107 mmol/L    Total CO2 27.3 22.0 - 29.0 mmol/L    Calcium 9.7 8.6 - 10.5 mg/dL    Total Protein 7.1 6.0 - 8.5 g/dL    Albumin 4.60 3.50 - 5.20 g/dL    Globulin 2.5 gm/dL    A/G Ratio 1.8 g/dL    Total Bilirubin 0.6 0.0 - 1.2 mg/dL    Alkaline Phosphatase 113 39 - 117 U/L    AST (SGOT) 32 1 - 40 U/L    ALT (SGPT) 23 1 - 41 U/L   TSH   Result Value Ref Range    TSH 3.600 0.270 - 4.200 uIU/mL   Lipid Panel With LDL / HDL Ratio   Result Value Ref Range    Total Cholesterol 99 0 - 200 mg/dL    Triglycerides 169 (H) 0 - 150 mg/dL    HDL Cholesterol 27 (L) 40 - 60 mg/dL    VLDL Cholesterol 33.8 mg/dL    LDL Cholesterol  38 0 - 100 mg/dL    LDL/HDL Ratio 1.41    CBC & Differential   Result Value Ref Range    WBC 7.96 3.40 - 10.80 10*3/mm3    RBC 4.99 4.14 - 5.80 10*6/mm3    Hemoglobin 14.8 13.0 - 17.7 g/dL    Hematocrit 43.3 37.5 - 51.0 %    MCV 86.8 79.0 - 97.0 fL    MCH 29.7 26.6 - 33.0 pg    MCHC 34.2 31.5 - 35.7 g/dL    RDW 14.2 12.3 - 15.4 %    Platelets 228 140 - 450 10*3/mm3    Neutrophil Rel % 72.7 42.7 - 76.0 %    Lymphocyte Rel % 19.0 (L) 19.6 - 45.3 %    Monocyte Rel % 5.2 5.0 - 12.0 %    Eosinophil Rel % 1.9 0.3 - 6.2 %    Basophil Rel % 0.8 0.0 - 1.5 %    Neutrophils Absolute 5.80 1.70 - 7.00 10*3/mm3    Lymphocytes Absolute 1.51 0.70 - 3.10 10*3/mm3    Monocytes Absolute 0.41 0.10 - 0.90 10*3/mm3    Eosinophils Absolute 0.15 0.00 - 0.40 10*3/mm3    Basophils Absolute 0.06 0.00 - 0.20 10*3/mm3    Immature Granulocyte Rel % 0.4 0.0 - 0.5 %    Immature Grans Absolute 0.03 0.00 - 0.05 10*3/mm3    nRBC 0.1 0.0 - 0.2 /100 WBC           Pablo was seen today for diabetes.    Diagnoses and all orders for this visit:    Type 2 diabetes mellitus without complication, without long-term current use of insulin (CMS/[facility])    Mixed hyperlipidemia  -     atorvastatin (LIPITOR) 20 MG tablet; Take 1 tablet by mouth Daily.    Essential hypertension  -     hydroCHLOROthiazide (HYDRODIURIL) 25 MG  tablet; Take 1 tablet by mouth Daily.  -     amLODIPine (NORVASC) 10 MG tablet; Take 1 tablet by mouth Daily.  -     metoprolol tartrate (LOPRESSOR) 100 MG tablet; Take 1 tablet by mouth 2 (Two) Times a Day.    Other migraine with status migrainosus, not intractable  -     ibuprofen (ADVIL,MOTRIN) 800 MG tablet; Take 1 tablet by mouth Every 8 (Eight) Hours As Needed for Mild Pain .      Pt [place] his headaches but he also describes possible tia episodes and has a history of seizures with recent abnormal eeg.  He has already been to neurology this year.  Encouraged pt to f/u with neurology about these issues.      Outpatient Medications Prior to Visit   Medication Sig Dispense Refill   • allopurinol (ZYLOPRIM) 300 MG tablet TAKE ONE TABLET BY MOUTH TWICE A  tablet 3   • aspirin 81 MG chewable tablet Chew 81 mg Daily.     • Cholecalciferol (VITAMIN D3) 5000 units capsule capsule Take 5,000 Units by mouth Daily.     • cyanocobalamin (VITAMIN B-12) 2500 MCG tablet tablet Take 500 mcg by mouth Daily.     • JANUMET XR  MG tablet TAKE ONE TABLET BY MOUTH TWICE A DAY 60 tablet 5   • loratadine (CLARITIN) 10 MG tablet TAKE ONE TABLET BY MOUTH DAILY 30 tablet 0   • nystatin (MYCOSTATIN) 336369 UNIT/GM cream Apply  topically to the appropriate area as directed 2 (Two) Times a Day. 30 g 0   • vitamin C (ASCORBIC ACID) 500 MG tablet Take 500 mg by mouth Daily.     • amLODIPine (NORVASC) 10 MG tablet TAKE ONE TABLET BY MOUTH DAILY 90 tablet 1   • atorvastatin (LIPITOR) 20 MG tablet TAKE ONE TABLET BY MOUTH DAILY 90 tablet 1   • hydroCHLOROthiazide (HYDRODIURIL) 25 MG tablet TAKE ONE TABLET BY MOUTH DAILY 90 tablet 3   • ibuprofen (ADVIL,MOTRIN) 800 MG tablet TAKE ONE TABLET BY MOUTH EVERY 8 HOURS AS NEEDED FOR MILD PAIN 30 tablet 0   • metoprolol tartrate (LOPRESSOR) 100 MG tablet TAKE ONE TABLET BY MOUTH TWICE A DAY 60 tablet 0     No facility-administered medications prior to visit.       New Medications Ordered This Visit   Medications   • atorvastatin (LIPITOR) 20 MG tablet     Sig: Take 1 tablet by mouth Daily.     Dispense:  90 tablet     Refill:  1   • hydroCHLOROthiazide (HYDRODIURIL) 25 MG tablet     Sig: Take 1 tablet by mouth Daily.     Dispense:  90 tablet     Refill:  1   • amLODIPine (NORVASC) 10 MG tablet     Sig: Take 1 tablet by mouth Daily.     Dispense:  90 tablet     Refill:  1   • metoprolol tartrate (LOPRESSOR) 100 MG tablet     Sig: Take 1 tablet by mouth 2 (Two) Times a Day.     Dispense:  180 tablet     Refill:  1   • ibuprofen (ADVIL,MOTRIN) 800 MG tablet     Sig: Take 1 tablet by mouth Every 8 (Eight) Hours As Needed for Mild Pain .     Dispense:  30 tablet     Refill:  0     [unfilled]  Medications Discontinued During This Encounter   Medication Reason   • atorvastatin (LIPITOR) 20 MG tablet Reorder   • hydroCHLOROthiazide (HYDRODIURIL) 25 MG tablet Reorder   • amLODIPine (NORVASC) 10 MG tablet Reorder   • metoprolol tartrate (LOPRESSOR) 100 MG tablet Reorder   • ibuprofen (ADVIL,MOTRIN) 800 MG tablet Reorder         Return in about 3 months (around 10/17/2020) for Recheck, labs.

## 2020-07-19 DIAGNOSIS — I10 ESSENTIAL HYPERTENSION: ICD-10-CM

## 2020-07-20 RX ORDER — METOPROLOL TARTRATE 100 MG/1
TABLET ORAL
Qty: 180 TABLET | Refills: 1 | Status: SHIPPED | OUTPATIENT
Start: 2020-07-20 | End: 2021-07-21

## 2020-07-27 ENCOUNTER — TELEPHONE (OUTPATIENT)
Dept: INTERNAL MEDICINE | Facility: CLINIC | Age: 60
End: 2020-07-27

## 2020-07-27 NOTE — TELEPHONE ENCOUNTER
Patient calling for status on [place] papers to [employer]. They were to be sent last week. Please call back and advise so he can update his employer of status.    Best call back [phone]

## 2020-07-29 NOTE — TELEPHONE ENCOUNTER
Patient calling back for status update on [employer] [facility] papers. He received a letter from [employer] that they have not received the paperwork yet. Please call back asap with update at [phone]

## 2020-09-14 ENCOUNTER — TELEPHONE (OUTPATIENT)
Dept: INTERNAL MEDICINE | Facility: CLINIC | Age: 60
End: 2020-09-14

## 2020-09-14 ENCOUNTER — HOSPITAL ENCOUNTER (EMERGENCY)
Facility: HOSPITAL | Age: 60
Discharge: HOME | End: 2020-09-14
Attending: EMERGENCY MEDICINE | Admitting: EMERGENCY MEDICINE

## 2020-09-14 ENCOUNTER — OFFICE VISIT (OUTPATIENT)
Dept: INTERNAL MEDICINE | Facility: CLINIC | Age: 60
End: 2020-09-14

## 2020-09-14 VITALS
RESPIRATION RATE: 16 BRPM | HEART RATE: 81 BPM | WEIGHT: 202.2 LBS | DIASTOLIC BLOOD PRESSURE: 80 MMHG | HEIGHT: 67 IN | SYSTOLIC BLOOD PRESSURE: 142 MMHG | BODY MASS INDEX: 31.74 KG/M2 | TEMPERATURE: 97.6 F | OXYGEN SATURATION: 98 %

## 2020-09-14 VITALS
DIASTOLIC BLOOD PRESSURE: 86 MMHG | OXYGEN SATURATION: 96 % | WEIGHT: 202 LBS | BODY MASS INDEX: 31.71 KG/M2 | TEMPERATURE: 97.3 F | SYSTOLIC BLOOD PRESSURE: 148 MMHG | RESPIRATION RATE: 16 BRPM | HEIGHT: 67 IN | HEART RATE: 74 BPM
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
## 2020-09-14 DIAGNOSIS — G43.009 MIGRAINE WITHOUT AURA AND WITHOUT STATUS MIGRAINOSUS, NOT INTRACTABLE: Primary | ICD-10-CM

## 2020-09-14 DIAGNOSIS — Z87.898 HISTORY OF SEIZURE: ICD-10-CM
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
## 2020-09-14 DIAGNOSIS — G43.811 OTHER MIGRAINE WITH STATUS MIGRAINOSUS, INTRACTABLE: ICD-10-CM

## 2020-09-14 PROCEDURE — 25010000002 DEXAMETHASONE PER 1 MG

## 2020-09-14 PROCEDURE — 25010000002 KETOROLAC TROMETHAMINE PER 15 MG: Performed by: EMERGENCY MEDICINE

## 2020-09-14 PROCEDURE — 25010000002 DEXAMETHASONE PER 1 MG: Performed by: EMERGENCY MEDICINE

## 2020-09-14 PROCEDURE — 99282 EMERGENCY DEPT VISIT SF MDM: CPT

## 2020-09-14 PROCEDURE — 99283 EMERGENCY DEPT VISIT LOW MDM: CPT | Performed by: EMERGENCY MEDICINE

## 2020-09-14 PROCEDURE — 25010000002 DIPHENHYDRAMINE PER 50 MG: Performed by: EMERGENCY MEDICINE

## 2020-09-14 PROCEDURE — 96375 TX/PRO/DX INJ NEW DRUG ADDON: CPT

## 2020-09-14 PROCEDURE — 96374 THER/PROPH/DIAG INJ IV PUSH: CPT

## 2020-09-14 PROCEDURE — 99215 OFFICE O/P EST HI 40 MIN: CPT | Performed by: INTERNAL MEDICINE

## 2020-09-14 PROCEDURE — 25010000002 PROCHLORPERAZINE 10 MG/2ML SOLUTION: Performed by: EMERGENCY MEDICINE

## 2020-09-14 RX ORDER — DEXAMETHASONE SODIUM PHOSPHATE 4 MG/ML
INJECTION, SOLUTION INTRA-ARTICULAR; INTRALESIONAL; INTRAMUSCULAR; INTRAVENOUS; SOFT TISSUE
Status: COMPLETED
Start: 2020-09-14 | End: 2020-09-14

## 2020-09-14 RX ORDER — DEXAMETHASONE SODIUM PHOSPHATE 10 MG/ML
10 INJECTION INTRAMUSCULAR; INTRAVENOUS ONCE
Status: COMPLETED | OUTPATIENT
Start: 2020-09-14 | End: 2020-09-14

## 2020-09-14 RX ORDER — KETOROLAC TROMETHAMINE 30 MG/ML
30 INJECTION, SOLUTION INTRAMUSCULAR; INTRAVENOUS ONCE
Status: COMPLETED | OUTPATIENT
Start: 2020-09-14 | End: 2020-09-14

## 2020-09-14 RX ORDER — PROCHLORPERAZINE EDISYLATE 5 MG/ML
10 INJECTION INTRAMUSCULAR; INTRAVENOUS ONCE
Status: COMPLETED | OUTPATIENT
Start: 2020-09-14 | End: 2020-09-14

## 2020-09-14 RX ORDER — DIPHENHYDRAMINE HYDROCHLORIDE 50 MG/ML
25 INJECTION INTRAMUSCULAR; INTRAVENOUS ONCE
Status: COMPLETED | OUTPATIENT
Start: 2020-09-14 | End: 2020-09-14

## 2020-09-14 RX ORDER — SODIUM CHLORIDE 9 MG/ML
INJECTION, SOLUTION INTRAVENOUS
Status: DISCONTINUED
Start: 2020-09-14 | End: 2020-09-14 | Stop reason: HOSPADM

## 2020-09-14 RX ADMIN — DEXAMETHASONE SODIUM PHOSPHATE 10 MG: 4 INJECTION, SOLUTION INTRAMUSCULAR; INTRAVENOUS at 17:38

## 2020-09-14 RX ADMIN — PROCHLORPERAZINE EDISYLATE 10 MG: 5 INJECTION INTRAMUSCULAR; INTRAVENOUS at 17:30

## 2020-09-14 RX ADMIN — DIPHENHYDRAMINE HYDROCHLORIDE 25 MG: 50 INJECTION, SOLUTION INTRAMUSCULAR; INTRAVENOUS at 17:33

## 2020-09-14 RX ADMIN — DEXAMETHASONE SODIUM PHOSPHATE 10 MG: 10 INJECTION INTRAMUSCULAR; INTRAVENOUS at 17:35

## 2020-09-14 RX ADMIN — SODIUM CHLORIDE 1000 ML: 9 INJECTION, SOLUTION INTRAVENOUS at 17:38

## 2020-09-14 RX ADMIN — KETOROLAC TROMETHAMINE 30 MG: 30 INJECTION, SOLUTION INTRAMUSCULAR at 17:36

## 2020-09-14 NOTE — ED PROVIDER NOTES
Subjective   History of Present Illness  History of Present Illness    Chief complaint: Headache    Location: Involving whole head    Quality/Severity: Exactly like usual migraine    Timing/Onset/Duration: Started last night    Modifying Factors: Light makes it worse    Associated Symptoms: Patient has had nausea and vomiting.  No fever chills or cough.  No sore throat.  Patient had nasal congestion last week.  He has been having intermittent palpitations chest pain or shortness of breath.  No abdominal pain.  No diarrhea or burning when he urinates.    Narrative: This 59-year-old white male with a history of migraines, presents with a headache exactly like his usual migraine.  He states that last night he felt nauseated and went to stand up and went down on the knee, he had a near syncopal episode.  The patient had CT and MRI and work-up of his migraines have been unremarkable.    PCP: Dr. Hood      Review of Systems   Constitutional: Negative for chills and fever.   HENT: Positive for congestion. Negative for sore throat.    Eyes: Positive for photophobia.   Respiratory: Negative for shortness of breath.    Cardiovascular: Positive for palpitations. Negative for chest pain.   Gastrointestinal: Positive for nausea and vomiting. Negative for abdominal pain and diarrhea.   Genitourinary: Negative for difficulty urinating.   Musculoskeletal: Negative for back pain and neck pain.   Skin: Negative for rash.   Neurological: Positive for dizziness and headaches. Negative for weakness and numbness.   Psychiatric/Behavioral: Negative for confusion.        Medication List      ASK your doctor about these medications    allopurinol 300 MG tablet  Commonly known as: ZYLOPRIM  TAKE ONE TABLET BY MOUTH TWICE A DAY     amLODIPine 10 MG tablet  Commonly known as: NORVASC  Take 1 tablet by mouth Daily.     aspirin 81 MG chewable tablet     atorvastatin 20 MG tablet  Commonly known as: LIPITOR  Take 1 tablet by mouth Daily.      cyanocobalamin 2500 MCG tablet tablet  Commonly known as: VITAMIN B-12     hydroCHLOROthiazide 25 MG tablet  Commonly known as: HYDRODIURIL  Take 1 tablet by mouth Daily.     ibuprofen 800 MG tablet  Commonly known as: ADVIL,MOTRIN  Take 1 tablet by mouth Every 8 (Eight) Hours As Needed for Mild Pain .     Janumet XR  MG tablet  Generic drug: SITagliptin-metFORMIN HCl ER  TAKE ONE TABLET BY MOUTH TWICE A DAY     loratadine 10 MG tablet  Commonly known as: CLARITIN  TAKE ONE TABLET BY MOUTH DAILY     metoprolol tartrate 100 MG tablet  Commonly known as: LOPRESSOR  TAKE ONE TABLET BY MOUTH TWICE A DAY     nystatin 315343 UNIT/GM cream  Commonly known as: MYCOSTATIN  Apply  topically to the appropriate area as directed 2 (Two) Times a Day.     vitamin C 500 MG tablet  Commonly known as: ASCORBIC ACID     vitamin D3 125 MCG (5000 UT) capsule capsule            Past Medical History:   Diagnosis Date   • Diabetes mellitus (CMS/HCC)    • Gout of multiple sites 1/19/2018   • Mixed hyperlipidemia 1/19/2018   • Nocturia 1/19/2018   • Seizures (CMS/[facility])    • Stroke (CMS/[facility])    • TIA (transient ischemic attack)    • Type 2 diabetes mellitus, without long-term current use of insulin (CMS/[facility]) 1/19/2018       Allergies   Allergen Reactions   • Aspirin Nausea Only   • Lisinopril Other (See Comments)     Elevated creatinine           Past Surgical History:   Procedure Laterality Date   • CHOLECYSTECTOMY     • HERNIA REPAIR     • SINUS SURGERY     • TYMPANOPLASTY         Family History   Problem Relation Age of Onset   • Heart disease Mother    • Hypertension Father    • Throat cancer Maternal Grandmother    • Diabetes Paternal Grandmother    • Diabetes Sister    • Stroke Brother 59   • Diabetes Sister    • Hypertension Brother    • Hyperlipidemia Brother        Social History     Socioeconomic History   • Marital status: Single     Spouse name: Not on file   • Number of children: Not on file   • Years of education: Not on  file   • Highest education level: Not on file   Tobacco Use   • Smoking status: Never Smoker   Substance and Sexual Activity   • Alcohol use: Yes   • Sexual activity: Yes     Partners: Female   Social History Narrative    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   Constitutional:       General: He is not in acute distress.     Appearance: He is well-developed. He is not diaphoretic.      Comments: ED Triage Vitals (09/14/20 1542)  Temp: 97.3 °F (36.3 °C)  Heart Rate: 74  Resp: 16  BP: 148/86  SpO2: 96 %  Temp src: Skin  Heart Rate Source: n/a  Patient Position: n/a  BP Location: n/a  FiO2 (%): n/a    The patient's vitals were reviewed by me.  Unless otherwise noted they are within normal limits.     HENT:      Head: Normocephalic and atraumatic.      Right Ear: External ear normal.      Left Ear: External ear normal.      Nose: Nose normal.   Eyes:      General:         Right eye: No discharge.         Left eye: No discharge.      Conjunctiva/sclera: Conjunctivae normal.      Pupils: Pupils are equal, round, and reactive to light.   Neck:      Musculoskeletal: Normal range of motion and neck supple.      Thyroid: No thyromegaly.      Vascular: No JVD.      Trachea: No tracheal deviation.      Comments: No meningeal signs  Cardiovascular:      Rate and Rhythm: Normal rate and regular rhythm.      Heart sounds: Normal heart sounds. No murmur. No friction rub. No gallop.    Pulmonary:      Effort: Pulmonary effort is normal. No respiratory distress.      Breath sounds: Normal breath sounds. No stridor. No wheezing or rales.   Chest:      Chest wall: No tenderness.   Abdominal:      General: Bowel sounds are normal. There is no distension.      Palpations: Abdomen is soft. There is no  mass.      Tenderness: There is no abdominal tenderness. There is no guarding or rebound.      Hernia: No hernia is present.   Musculoskeletal: Normal range of motion.         General: No deformity.   Lymphadenopathy:      Cervical: No cervical adenopathy.   Skin:     General: Skin is warm and dry.      Coloration: Skin is not pale.      Findings: No erythema or rash.   Neurological:      Mental Status: He is alert and oriented to person, place, and time.      Cranial Nerves: No cranial nerve deficit.      Sensory: Sensory deficit present.      Motor: No weakness.      Coordination: Coordination abnormal.      Gait: Gait normal.   Psychiatric:         Behavior: Behavior normal.         Procedures           ED Course      18:58 EDT, 09/14/20:  Patient was reassessed.  He feels better.  His vital signs reviewed and are stable.  Her logic exam: Conscious alert oriented x4 with no focal deficits noted.  There is no meningeal signs.    18:59 EDT, 09/14/20:  The patient's diagnosis of migraine was discussed with him.  He should follow-up with Dr. Hood within 1 week.  He should return to the emergency department if he has increased pain, vomiting, fever, worse in any way at all.  Patient's questions were answered he will be discharged in good condition.                                     MDM  No orders to display     Labs Reviewed - No data to display  No results found.    Final diagnoses:   Migraine without aura and without status migrainosus, not intractable         ED Medications:  Medications - No data to display    New Medications:     Medication List      ASK your doctor about these medications    allopurinol 300 MG tablet  Commonly known as: ZYLOPRIM  TAKE ONE TABLET BY MOUTH TWICE A DAY     amLODIPine 10 MG tablet  Commonly known as: NORVASC  Take 1 tablet by mouth Daily.     aspirin 81 MG chewable tablet     atorvastatin 20 MG tablet  Commonly known as: LIPITOR  Take 1 tablet by mouth Daily.     cyanocobalamin  2500 MCG tablet tablet  Commonly known as: VITAMIN B-12     hydroCHLOROthiazide 25 MG tablet  Commonly known as: HYDRODIURIL  Take 1 tablet by mouth Daily.     ibuprofen 800 MG tablet  Commonly known as: ADVIL,MOTRIN  Take 1 tablet by mouth Every 8 (Eight) Hours As Needed for Mild Pain .     Janumet XR  MG tablet  Generic drug: SITagliptin-metFORMIN HCl ER  TAKE ONE TABLET BY MOUTH TWICE A DAY     loratadine 10 MG tablet  Commonly known as: CLARITIN  TAKE ONE TABLET BY MOUTH DAILY     metoprolol tartrate 100 MG tablet  Commonly known as: LOPRESSOR  TAKE ONE TABLET BY MOUTH TWICE A DAY     nystatin 105628 UNIT/GM cream  Commonly known as: MYCOSTATIN  Apply  topically to the appropriate area as directed 2 (Two) Times a Day.     vitamin C 500 MG tablet  Commonly known as: ASCORBIC ACID     vitamin D3 125 MCG (5000 UT) capsule capsule            Stopped Medications:     Medication List      ASK your doctor about these medications    allopurinol 300 MG tablet  Commonly known as: ZYLOPRIM  TAKE ONE TABLET BY MOUTH TWICE A DAY     amLODIPine 10 MG tablet  Commonly known as: NORVASC  Take 1 tablet by mouth Daily.     aspirin 81 MG chewable tablet     atorvastatin 20 MG tablet  Commonly known as: LIPITOR  Take 1 tablet by mouth Daily.     cyanocobalamin 2500 MCG tablet tablet  Commonly known as: VITAMIN B-12     hydroCHLOROthiazide 25 MG tablet  Commonly known as: HYDRODIURIL  Take 1 tablet by mouth Daily.     ibuprofen 800 MG tablet  Commonly known as: ADVIL,MOTRIN  Take 1 tablet by mouth Every 8 (Eight) Hours As Needed for Mild Pain .     Janumet XR  MG tablet  Generic drug: SITagliptin-metFORMIN HCl ER  TAKE ONE TABLET BY MOUTH TWICE A DAY     loratadine 10 MG tablet  Commonly known as: CLARITIN  TAKE ONE TABLET BY MOUTH DAILY     metoprolol tartrate 100 MG tablet  Commonly known as: LOPRESSOR  TAKE ONE TABLET BY MOUTH TWICE A DAY     nystatin 498909 UNIT/GM cream  Commonly known as: MYCOSTATIN  Apply   topically to the appropriate area as directed 2 (Two) Times a Day.     vitamin C 500 MG tablet  Commonly known as: ASCORBIC ACID     vitamin D3 125 MCG (5000 UT) capsule capsule              Final diagnoses:   Migraine without aura and without status migrainosus, not intractable            Kris Love MD  09/14/20 7270

## 2020-09-14 NOTE — PROGRESS NOTES
"      Palbo Mendoza is a 59 y.o. male, who presents with a chief complaint of   Chief Complaint   Patient presents with   • Fall   • Migraine   • Balance Issues       HPI   Pt here after \"fall\"  Last week he had cold sx including runny nose and congestion.  No fever. Then last night he had more of a headache.  He took ibuprofen and maxalt.  He says he was very tired.   He was walking to the bathroom and woke up on the floor.   He had heart racing prior to passing out.  He has had seizues in the past and also has a hx of complex migraine.  He got up this morning and was off balance and aching all over.   He says he developed a stutter today and was worried about speech.   He says he has a headache currently.  He denies hurting himself when he fell.  He says it was a \"soft fall\" where he went down on his left knee and then fell.  If he tries to change positions he is dizzy.  He saw Dr. Benz earlier this year and has been referred back but has not called to make an appt.  His last EEG w/ dr. Benz was abnormal but \"unchanged.\"   He says that he is ok right now other than his migraine.  He says his headache right now is a 8/10.  He wants to know if I have a shot for the pain.  He denies chest pain since.  He took an otc \"sinus pill\" this am to help with the congestion.  He denies known exposure to Covid.  His complex migraine episodes have become more frequent over the past year.  He lives alone.       Allergies: Aspirin and Lisinopril    Review of Systems   Constitutional: Negative.  Negative for chills and fever.   HENT: Positive for congestion and rhinorrhea.    Eyes: Positive for discharge. Negative for redness.   Respiratory: Negative.  Negative for cough and shortness of breath.    Cardiovascular: Positive for palpitations. Negative for chest pain.   Gastrointestinal: Negative.  Negative for diarrhea and vomiting.   Endocrine: Negative.    Genitourinary: Negative.  Negative for dysuria and hematuria. "   Musculoskeletal: Positive for myalgias. Negative for arthralgias.   Skin: Negative.  Negative for color change and rash.   Allergic/Immunologic: Negative.    Neurological: Positive for syncope and headaches.   Hematological: Negative.    Psychiatric/Behavioral: Positive for sleep disturbance.   All other systems reviewed and are negative.              Wt Readings from Last 3 Encounters:   09/14/20 91.6 kg (202 lb)   09/14/20 91.7 kg (202 lb 3.2 oz)   07/17/20 90.4 kg (199 lb 3.2 oz)     Temp Readings from Last 3 Encounters:   09/14/20 97.3 °F (36.3 °C) (Skin)   09/14/20 97.6 °F (36.4 °C) (Temporal)   07/17/20 97.3 °F (36.3 °C)     BP Readings from Last 3 Encounters:   09/14/20 148/86   09/14/20 142/80   07/17/20 126/68     Pulse Readings from Last 3 Encounters:   09/14/20 74   09/14/20 81   07/17/20 70     Body mass index is 31.67 kg/m².  @LASTSAO2(3)@    Physical Exam  Vitals signs and nursing note reviewed.   Constitutional:       General: He is not in acute distress.     Appearance: He is well-developed.   HENT:      Head: Normocephalic and atraumatic.      Right Ear: External ear normal.      Left Ear: External ear normal.      Nose: Nose normal.   Eyes:      Conjunctiva/sclera: Conjunctivae normal.      Pupils: Pupils are equal, round, and reactive to light.   Neck:      Musculoskeletal: Normal range of motion and neck supple.   Cardiovascular:      Rate and Rhythm: Normal rate and regular rhythm.      Heart sounds: Normal heart sounds.   Pulmonary:      Effort: Pulmonary effort is normal. No respiratory distress.      Breath sounds: Normal breath sounds. No wheezing.   Musculoskeletal: Normal range of motion.      Comments: Normal gait   Skin:     General: Skin is warm and dry.   Neurological:      Mental Status: He is alert and oriented to person, place, and time.      Cranial Nerves: No cranial nerve deficit.      Sensory: No sensory deficit.      Gait: Gait normal.      Comments: Strength 5/5 through  upper extremities.  Slowed speech.  rhomberg slightly unsteady   Psychiatric:         Behavior: Behavior normal.         Thought Content: Thought content normal.         Judgment: Judgment normal.         Results for orders placed or performed in visit on 07/10/20   T4, Free    Specimen: Blood   Result Value Ref Range    Free T4 1.22 0.93 - 1.70 ng/dL   Hemoglobin A1c    Specimen: Blood   Result Value Ref Range    Hemoglobin A1C 8.60 (H) 4.80 - 5.60 %   Comprehensive Metabolic Panel    Specimen: Blood   Result Value Ref Range    Glucose 210 (H) 65 - 99 mg/dL    BUN 21 (H) 6 - 20 mg/dL    Creatinine 1.03 0.76 - 1.27 mg/dL    eGFR Non African Am 74 >60 mL/min/1.73    eGFR African Am 90 >60 mL/min/1.73    BUN/Creatinine Ratio 20.4 7.0 - 25.0    Sodium 140 136 - 145 mmol/L    Potassium 4.1 3.5 - 5.2 mmol/L    Chloride 100 98 - 107 mmol/L    Total CO2 27.3 22.0 - 29.0 mmol/L    Calcium 9.7 8.6 - 10.5 mg/dL    Total Protein 7.1 6.0 - 8.5 g/dL    Albumin 4.60 3.50 - 5.20 g/dL    Globulin 2.5 gm/dL    A/G Ratio 1.8 g/dL    Total Bilirubin 0.6 0.0 - 1.2 mg/dL    Alkaline Phosphatase 113 39 - 117 U/L    AST (SGOT) 32 1 - 40 U/L    ALT (SGPT) 23 1 - 41 U/L   TSH    Specimen: Blood   Result Value Ref Range    TSH 3.600 0.270 - 4.200 uIU/mL   Lipid Panel With LDL / HDL Ratio    Specimen: Blood   Result Value Ref Range    Total Cholesterol 99 0 - 200 mg/dL    Triglycerides 169 (H) 0 - 150 mg/dL    HDL Cholesterol 27 (L) 40 - 60 mg/dL    VLDL Cholesterol 33.8 mg/dL    LDL Cholesterol  38 0 - 100 mg/dL    LDL/HDL Ratio 1.41    CBC & Differential    Specimen: Blood   Result Value Ref Range    WBC 7.96 3.40 - 10.80 10*3/mm3    RBC 4.99 4.14 - 5.80 10*6/mm3    Hemoglobin 14.8 13.0 - 17.7 g/dL    Hematocrit 43.3 37.5 - 51.0 %    MCV 86.8 79.0 - 97.0 fL    MCH 29.7 26.6 - 33.0 pg    MCHC 34.2 31.5 - 35.7 g/dL    RDW 14.2 12.3 - 15.4 %    Platelets 228 140 - 450 10*3/mm3    Neutrophil Rel % 72.7 42.7 - 76.0 %    Lymphocyte Rel % 19.0 (L)  19.6 - 45.3 %    Monocyte Rel % 5.2 5.0 - 12.0 %    Eosinophil Rel % 1.9 0.3 - 6.2 %    Basophil Rel % 0.8 0.0 - 1.5 %    Neutrophils Absolute 5.80 1.70 - 7.00 10*3/mm3    Lymphocytes Absolute 1.51 0.70 - 3.10 10*3/mm3    Monocytes Absolute 0.41 0.10 - 0.90 10*3/mm3    Eosinophils Absolute 0.15 0.00 - 0.40 10*3/mm3    Basophils Absolute 0.06 0.00 - 0.20 10*3/mm3    Immature Granulocyte Rel % 0.4 0.0 - 0.5 %    Immature Grans Absolute 0.03 0.00 - 0.05 10*3/mm3    nRBC 0.1 0.0 - 0.2 /100 WBC           Pablo was seen today for fall, migraine and balance issues.    Diagnoses and all orders for this visit:    Fall in home, initial encounter    Other migraine with status migrainosus, intractable    History of seizure      Pt with complex somewhat inconsistent history.  Headache still persistent at 8/10 and speech slowed.  Case discussed with dr. Love.  Will refer pt to ED for further work up.     40 minutes was spent in patient care with >50% in counseling about the above issues including medications and disease management plan.         Outpatient Medications Prior to Visit   Medication Sig Dispense Refill   • allopurinol (ZYLOPRIM) 300 MG tablet TAKE ONE TABLET BY MOUTH TWICE A  tablet 3   • amLODIPine (NORVASC) 10 MG tablet Take 1 tablet by mouth Daily. 90 tablet 1   • aspirin 81 MG chewable tablet Chew 81 mg Daily.     • atorvastatin (LIPITOR) 20 MG tablet Take 1 tablet by mouth Daily. 90 tablet 1   • Cholecalciferol (VITAMIN D3) 5000 units capsule capsule Take 5,000 Units by mouth Daily.     • cyanocobalamin (VITAMIN B-12) 2500 MCG tablet tablet Take 500 mcg by mouth Daily.     • hydroCHLOROthiazide (HYDRODIURIL) 25 MG tablet Take 1 tablet by mouth Daily. 90 tablet 1   • ibuprofen (ADVIL,MOTRIN) 800 MG tablet Take 1 tablet by mouth Every 8 (Eight) Hours As Needed for Mild Pain . 30 tablet 0   • JANUMET XR  MG tablet TAKE ONE TABLET BY MOUTH TWICE A DAY 60 tablet 5   • loratadine (CLARITIN) 10 MG  tablet TAKE ONE TABLET BY MOUTH DAILY 30 tablet 0   • metoprolol tartrate (LOPRESSOR) 100 MG tablet TAKE ONE TABLET BY MOUTH TWICE A  tablet 1   • nystatin (MYCOSTATIN) 352247 UNIT/GM cream Apply  topically to the appropriate area as directed 2 (Two) Times a Day. 30 g 0   • vitamin C (ASCORBIC ACID) 500 MG tablet Take 500 mg by mouth Daily.       No facility-administered medications prior to visit.      No orders of the defined types were placed in this encounter.    [unfilled]  There are no discontinued medications.      Return in about 1 week (around 9/21/2020) for Recheck.

## 2020-09-14 NOTE — DISCHARGE INSTRUCTIONS
Follow-up with Dr. Hood within 1 week.  Return to the emergency department if there is increased pain, fever, numbness, tingling, weakness, change in bladder or bowel function, worse in any way at all.

## 2020-09-14 NOTE — TELEPHONE ENCOUNTER
PATIENT STATES: that he had another fall and would like for someone to call him to see what he should do. He took the say off please advise     PATIENT CAN BE REACHED ON:[phone]

## 2020-10-15 DIAGNOSIS — R79.89 ABNORMAL THYROID BLOOD TEST: ICD-10-CM

## 2020-10-15 DIAGNOSIS — I10 ESSENTIAL HYPERTENSION: Primary | ICD-10-CM

## 2020-10-15 DIAGNOSIS — E78.2 MIXED HYPERLIPIDEMIA: ICD-10-CM

## 2020-10-15 DIAGNOSIS — E11.9 TYPE 2 DIABETES MELLITUS WITHOUT COMPLICATION, WITHOUT LONG-TERM CURRENT USE OF INSULIN (HCC): ICD-10-CM

## 2020-10-24 LAB
ALBUMIN SERPL-MCNC: 4.4 G/DL (ref 3.5–5.2)
ALBUMIN/CREAT UR: 1737 MG/G CREAT (ref 0–29)
ALBUMIN/GLOB SERPL: 1.8 G/DL
ALP SERPL-CCNC: 131 U/L (ref 39–117)
ALT SERPL-CCNC: 27 U/L (ref 1–41)
AST SERPL-CCNC: 51 U/L (ref 1–40)
BASOPHILS # BLD AUTO: 0.05 10*3/MM3 (ref 0–0.2)
BASOPHILS NFR BLD AUTO: 0.7 % (ref 0–1.5)
BILIRUB SERPL-MCNC: 0.7 MG/DL (ref 0–1.2)
BUN SERPL-MCNC: 24 MG/DL (ref 6–20)
BUN/CREAT SERPL: 24 (ref 7–25)
CALCIUM SERPL-MCNC: 9.6 MG/DL (ref 8.6–10.5)
CHLORIDE SERPL-SCNC: 101 MMOL/L (ref 98–107)
CHOLEST SERPL-MCNC: 107 MG/DL (ref 0–200)
CO2 SERPL-SCNC: 27 MMOL/L (ref 22–29)
CREAT SERPL-MCNC: 1 MG/DL (ref 0.76–1.27)
CREAT UR-MCNC: 57.5 MG/DL
EOSINOPHIL # BLD AUTO: 0.16 10*3/MM3 (ref 0–0.4)
EOSINOPHIL NFR BLD AUTO: 2.1 % (ref 0.3–6.2)
ERYTHROCYTE [DISTWIDTH] IN BLOOD BY AUTOMATED COUNT: 14 % (ref 12.3–15.4)
GLOBULIN SER CALC-MCNC: 2.4 GM/DL
GLUCOSE SERPL-MCNC: 194 MG/DL (ref 65–99)
HBA1C MFR BLD: 8.5 % (ref 4.8–5.6)
HCT VFR BLD AUTO: 42.5 % (ref 37.5–51)
HDLC SERPL-MCNC: 30 MG/DL (ref 40–60)
HGB BLD-MCNC: 14.6 G/DL (ref 13–17.7)
IMM GRANULOCYTES # BLD AUTO: 0.05 10*3/MM3 (ref 0–0.05)
IMM GRANULOCYTES NFR BLD AUTO: 0.7 % (ref 0–0.5)
LDLC SERPL CALC-MCNC: 41 MG/DL (ref 0–100)
LDLC/HDLC SERPL: 1.05 {RATIO}
LYMPHOCYTES # BLD AUTO: 1.7 10*3/MM3 (ref 0.7–3.1)
LYMPHOCYTES NFR BLD AUTO: 22.2 % (ref 19.6–45.3)
MCH RBC QN AUTO: 28.9 PG (ref 26.6–33)
MCHC RBC AUTO-ENTMCNC: 34.4 G/DL (ref 31.5–35.7)
MCV RBC AUTO: 84.2 FL (ref 79–97)
MICROALBUMIN UR-MCNC: 998.5 UG/ML
MONOCYTES # BLD AUTO: 0.39 10*3/MM3 (ref 0.1–0.9)
MONOCYTES NFR BLD AUTO: 5.1 % (ref 5–12)
NEUTROPHILS # BLD AUTO: 5.31 10*3/MM3 (ref 1.7–7)
NEUTROPHILS NFR BLD AUTO: 69.2 % (ref 42.7–76)
NRBC BLD AUTO-RTO: 0 /100 WBC (ref 0–0.2)
PLATELET # BLD AUTO: 226 10*3/MM3 (ref 140–450)
POTASSIUM SERPL-SCNC: 4 MMOL/L (ref 3.5–5.2)
PROT SERPL-MCNC: 6.8 G/DL (ref 6–8.5)
RBC # BLD AUTO: 5.05 10*6/MM3 (ref 4.14–5.8)
SODIUM SERPL-SCNC: 141 MMOL/L (ref 136–145)
T4 FREE SERPL-MCNC: 1.14 NG/DL (ref 0.93–1.7)
TRIGL SERPL-MCNC: 227 MG/DL (ref 0–150)
TSH SERPL DL<=0.005 MIU/L-ACNC: 3.37 UIU/ML (ref 0.27–4.2)
VLDLC SERPL CALC-MCNC: 36 MG/DL (ref 5–40)
WBC # BLD AUTO: 7.66 10*3/MM3 (ref 3.4–10.8)

## 2020-10-30 ENCOUNTER — OFFICE VISIT (OUTPATIENT)
Dept: INTERNAL MEDICINE | Facility: CLINIC | Age: 60
End: 2020-10-30

## 2020-10-30 VITALS
HEART RATE: 77 BPM | OXYGEN SATURATION: 97 % | BODY MASS INDEX: 32.02 KG/M2 | DIASTOLIC BLOOD PRESSURE: 78 MMHG | SYSTOLIC BLOOD PRESSURE: 150 MMHG | HEIGHT: 67 IN | RESPIRATION RATE: 16 BRPM | TEMPERATURE: 97.3 F | WEIGHT: 204 LBS

## 2020-10-30 DIAGNOSIS — E78.2 MIXED HYPERLIPIDEMIA: ICD-10-CM

## 2020-10-30 DIAGNOSIS — E11.29 TYPE 2 DIABETES MELLITUS WITH PROTEINURIA (HCC): ICD-10-CM

## 2020-10-30 DIAGNOSIS — E11.9 TYPE 2 DIABETES MELLITUS WITHOUT COMPLICATION, WITHOUT LONG-TERM CURRENT USE OF INSULIN (HCC): Primary | ICD-10-CM

## 2020-10-30 DIAGNOSIS — R80.9 TYPE 2 DIABETES MELLITUS WITH PROTEINURIA (HCC): ICD-10-CM

## 2020-10-30 DIAGNOSIS — I10 ESSENTIAL HYPERTENSION: ICD-10-CM

## 2020-10-30 PROCEDURE — 99214 OFFICE O/P EST MOD 30 MIN: CPT | Performed by: INTERNAL MEDICINE

## 2020-10-30 RX ORDER — EMPAGLIFLOZIN 25 MG/1
1 TABLET, FILM COATED ORAL DAILY
Qty: 30 TABLET | Refills: 5 | Status: SHIPPED | OUTPATIENT
Start: 2020-10-30 | End: 2021-05-03

## 2020-10-30 RX ORDER — SITAGLIPTIN AND METFORMIN HYDROCHLORIDE 1000; 50 MG/1; MG/1
TABLET, FILM COATED, EXTENDED RELEASE ORAL
Qty: 60 TABLET | Refills: 4 | Status: SHIPPED | OUTPATIENT
Start: 2020-10-30 | End: 2021-04-26

## 2020-10-30 NOTE — PROGRESS NOTES
Pablo Mendoza is a 59 y.o. male, who presents with a chief complaint of   Chief Complaint   Patient presents with   • Diabetes   • Hypertension   • Hyperlipidemia   • Skin Lesion     left forearm   • Nail Problem     right great toe       HPI   Pt here for f/u.     Pt had bad migraine in September and ended up in ED.  He has felt much better since.      LE edema -  Improved.      DM - a1c 8.4 -> 8.6->8.5.  On janumet XR.  He denies hypoglycemia     HLD - on statin.  No cramps/myalgias     HTN - on metoprolol, hctz, amlodipine.     He had a recent right great toe paronychia that is now improving.     The following portions of the patient's history were reviewed and updated as appropriate: allergies, current medications, past family history, past medical history, past social history, past surgical history and problem list.    Allergies: Aspirin and Lisinopril    Review of Systems   Constitutional: Negative.    HENT: Negative.    Eyes: Negative.    Respiratory: Negative.    Cardiovascular: Negative.    Gastrointestinal: Negative.    Endocrine: Negative.    Genitourinary: Negative.    Musculoskeletal: Negative.    Skin: Negative.    Allergic/Immunologic: Negative.    Neurological: Negative.    Hematological: Negative.    Psychiatric/Behavioral: Negative.    All other systems reviewed and are negative.            Wt Readings from Last 3 Encounters:   10/30/20 92.5 kg (204 lb)   09/14/20 91.6 kg (202 lb)   09/14/20 91.7 kg (202 lb 3.2 oz)     Temp Readings from Last 3 Encounters:   10/30/20 97.3 °F (36.3 °C) (Temporal)   09/14/20 97.3 °F (36.3 °C) (Skin)   09/14/20 97.6 °F (36.4 °C) (Temporal)     BP Readings from Last 3 Encounters:   10/30/20 150/78   09/14/20 148/86   09/14/20 142/80     Pulse Readings from Last 3 Encounters:   10/30/20 77   09/14/20 74   09/14/20 81     Body mass index is 31.95 kg/m².  @LASTSAO2(3)@    Physical Exam  Vitals signs and nursing note reviewed.   Constitutional:       General: He  is not in acute distress.     Appearance: He is well-developed.   HENT:      Head: Normocephalic and atraumatic.      Right Ear: External ear normal.      Left Ear: External ear normal.      Nose: Nose normal.   Eyes:      Conjunctiva/sclera: Conjunctivae normal.      Pupils: Pupils are equal, round, and reactive to light.   Neck:      Musculoskeletal: Normal range of motion and neck supple.   Cardiovascular:      Rate and Rhythm: Normal rate and regular rhythm.      Heart sounds: Normal heart sounds.   Pulmonary:      Effort: Pulmonary effort is normal. No respiratory distress.      Breath sounds: Normal breath sounds. No wheezing.   Musculoskeletal: Normal range of motion.      Comments: Normal gait   Skin:     General: Skin is warm and dry.   Neurological:      Mental Status: He is alert and oriented to person, place, and time.   Psychiatric:         Behavior: Behavior normal.         Thought Content: Thought content normal.         Judgment: Judgment normal.         Results for orders placed or performed in visit on 10/15/20   Comprehensive Metabolic Panel    Specimen: Blood   Result Value Ref Range    Glucose 194 (H) 65 - 99 mg/dL    BUN 24 (H) 6 - 20 mg/dL    Creatinine 1.00 0.76 - 1.27 mg/dL    eGFR Non African Am 77 >60 mL/min/1.73    eGFR African Am 93 >60 mL/min/1.73    BUN/Creatinine Ratio 24.0 7.0 - 25.0    Sodium 141 136 - 145 mmol/L    Potassium 4.0 3.5 - 5.2 mmol/L    Chloride 101 98 - 107 mmol/L    Total CO2 27.0 22.0 - 29.0 mmol/L    Calcium 9.6 8.6 - 10.5 mg/dL    Total Protein 6.8 6.0 - 8.5 g/dL    Albumin 4.40 3.50 - 5.20 g/dL    Globulin 2.4 gm/dL    A/G Ratio 1.8 g/dL    Total Bilirubin 0.7 0.0 - 1.2 mg/dL    Alkaline Phosphatase 131 (H) 39 - 117 U/L    AST (SGOT) 51 (H) 1 - 40 U/L    ALT (SGPT) 27 1 - 41 U/L   Hemoglobin A1c    Specimen: Blood   Result Value Ref Range    Hemoglobin A1C 8.50 (H) 4.80 - 5.60 %   TSH    Specimen: Blood   Result Value Ref Range    TSH 3.370 0.270 - 4.200 uIU/mL    Lipid Panel With LDL / HDL Ratio    Specimen: Blood   Result Value Ref Range    Total Cholesterol 107 0 - 200 mg/dL    Triglycerides 227 (H) 0 - 150 mg/dL    HDL Cholesterol 30 (L) 40 - 60 mg/dL    VLDL Cholesterol Tavon 36 5 - 40 mg/dL    LDL Chol Calc (NIH) 41 0 - 100 mg/dL    LDL/HDL RATIO 1.05    T4, Free    Specimen: Blood   Result Value Ref Range    Free T4 1.14 0.93 - 1.70 ng/dL   Microalbumin / Creatinine Urine Ratio - Urine, Clean Catch    Specimen: Urine, Clean Catch   Result Value Ref Range    Creatinine, Urine 57.5 Not Estab. mg/dL    Microalbumin, Urine 998.5 Not Estab. ug/mL    Microalbumin/Creatinine Ratio 1,737 (H) 0 - 29 mg/g creat   CBC & Differential    Specimen: Blood   Result Value Ref Range    WBC 7.66 3.40 - 10.80 10*3/mm3    RBC 5.05 4.14 - 5.80 10*6/mm3    Hemoglobin 14.6 13.0 - 17.7 g/dL    Hematocrit 42.5 37.5 - 51.0 %    MCV 84.2 79.0 - 97.0 fL    MCH 28.9 26.6 - 33.0 pg    MCHC 34.4 31.5 - 35.7 g/dL    RDW 14.0 12.3 - 15.4 %    Platelets 226 140 - 450 10*3/mm3    Neutrophil Rel % 69.2 42.7 - 76.0 %    Lymphocyte Rel % 22.2 19.6 - 45.3 %    Monocyte Rel % 5.1 5.0 - 12.0 %    Eosinophil Rel % 2.1 0.3 - 6.2 %    Basophil Rel % 0.7 0.0 - 1.5 %    Neutrophils Absolute 5.31 1.70 - 7.00 10*3/mm3    Lymphocytes Absolute 1.70 0.70 - 3.10 10*3/mm3    Monocytes Absolute 0.39 0.10 - 0.90 10*3/mm3    Eosinophils Absolute 0.16 0.00 - 0.40 10*3/mm3    Basophils Absolute 0.05 0.00 - 0.20 10*3/mm3    Immature Granulocyte Rel % 0.7 (H) 0.0 - 0.5 %    Immature Grans Absolute 0.05 0.00 - 0.05 10*3/mm3    nRBC 0.0 0.0 - 0.2 /100 WBC           Diagnoses and all orders for this visit:    1. Type 2 diabetes mellitus without complication, without long-term current use of insulin (CMS/[facility]) (Primary) - cont janumet.  Discussed lower carb diet and increasing exerercise.   -     Empagliflozin (Jardiance) 25 MG tablet; Take 25 mg by mouth Daily.  Dispense: 30 tablet; Refill: 5    2. Essential hypertension - bp high.   Will see if jardiance helps bp some in addition to diabetes    3. Mixed hyperlipidemia - cont statin    4. Type 2 diabetes mellitus with proteinuria (CMS/HCC)  -     Empagliflozin (Jardiance) 25 MG tablet; Take 25 mg by mouth Daily.  Dispense: 30 tablet; Refill: 5          Outpatient Medications Prior to Visit   Medication Sig Dispense Refill   • allopurinol (ZYLOPRIM) 300 MG tablet TAKE ONE TABLET BY MOUTH TWICE A  tablet 3   • amLODIPine (NORVASC) 10 MG tablet Take 1 tablet by mouth Daily. 90 tablet 1   • aspirin 81 MG chewable tablet Chew 81 mg Daily.     • atorvastatin (LIPITOR) 20 MG tablet Take 1 tablet by mouth Daily. 90 tablet 1   • Cholecalciferol (VITAMIN D3) 5000 units capsule capsule Take 5,000 Units by mouth Daily.     • cyanocobalamin (VITAMIN B-12) 2500 MCG tablet tablet Take 500 mcg by mouth Daily.     • hydroCHLOROthiazide (HYDRODIURIL) 25 MG tablet Take 1 tablet by mouth Daily. 90 tablet 1   • ibuprofen (ADVIL,MOTRIN) 800 MG tablet Take 1 tablet by mouth Every 8 (Eight) Hours As Needed for Mild Pain . 30 tablet 0   • JANUMET XR  MG tablet TAKE ONE TABLET BY MOUTH TWICE A DAY 60 tablet 5   • loratadine (CLARITIN) 10 MG tablet TAKE ONE TABLET BY MOUTH DAILY 30 tablet 0   • metoprolol tartrate (LOPRESSOR) 100 MG tablet TAKE ONE TABLET BY MOUTH TWICE A  tablet 1   • nystatin (MYCOSTATIN) 782448 UNIT/GM cream Apply  topically to the appropriate area as directed 2 (Two) Times a Day. 30 g 0   • vitamin C (ASCORBIC ACID) 500 MG tablet Take 500 mg by mouth Daily.       No facility-administered medications prior to visit.      New Medications Ordered This Visit   Medications   • Empagliflozin (Jardiance) 25 MG tablet     Sig: Take 25 mg by mouth Daily.     Dispense:  30 tablet     Refill:  5     [unfilled]  There are no discontinued medications.      Return in about 3 months (around 1/30/2021) for Recheck, labs.

## 2020-11-28 DIAGNOSIS — G43.801 OTHER MIGRAINE WITH STATUS MIGRAINOSUS, NOT INTRACTABLE: ICD-10-CM

## 2020-11-30 RX ORDER — IBUPROFEN 800 MG/1
TABLET ORAL
Qty: 30 TABLET | Refills: 0 | Status: SHIPPED | OUTPATIENT
Start: 2020-11-30 | End: 2021-05-05

## 2020-12-04 ENCOUNTER — OFFICE VISIT (OUTPATIENT)
Dept: INTERNAL MEDICINE | Facility: CLINIC | Age: 60
End: 2020-12-04

## 2020-12-04 VITALS
WEIGHT: 195 LBS | OXYGEN SATURATION: 94 % | SYSTOLIC BLOOD PRESSURE: 132 MMHG | RESPIRATION RATE: 16 BRPM | BODY MASS INDEX: 30.61 KG/M2 | TEMPERATURE: 98.1 F | HEIGHT: 67 IN | DIASTOLIC BLOOD PRESSURE: 72 MMHG | HEART RATE: 91 BPM

## 2020-12-04 DIAGNOSIS — R50.9 FEVER AND CHILLS: ICD-10-CM

## 2020-12-04 DIAGNOSIS — J40 BRONCHITIS: ICD-10-CM

## 2020-12-04 DIAGNOSIS — R53.83 OTHER FATIGUE: Primary | ICD-10-CM

## 2020-12-04 DIAGNOSIS — M79.10 MUSCLE PAIN: ICD-10-CM

## 2020-12-04 LAB
EXPIRATION DATE: NORMAL
FLUAV AG NPH QL: NEGATIVE
FLUBV AG NPH QL: NEGATIVE
INTERNAL CONTROL: NORMAL
Lab: NORMAL

## 2020-12-04 PROCEDURE — 99213 OFFICE O/P EST LOW 20 MIN: CPT | Performed by: INTERNAL MEDICINE

## 2020-12-04 PROCEDURE — 87804 INFLUENZA ASSAY W/OPTIC: CPT | Performed by: INTERNAL MEDICINE

## 2020-12-04 RX ORDER — AZITHROMYCIN 250 MG/1
TABLET, FILM COATED ORAL
Qty: 6 TABLET | Refills: 0 | Status: SHIPPED | OUTPATIENT
Start: 2020-12-04 | End: 2021-01-29 | Stop reason: SDDI

## 2020-12-04 RX ORDER — FLUTICASONE PROPIONATE 50 MCG
2 SPRAY, SUSPENSION (ML) NASAL DAILY
Qty: 1 BOTTLE | Refills: 0 | Status: SHIPPED | OUTPATIENT
Start: 2020-12-04

## 2020-12-04 NOTE — PATIENT INSTRUCTIONS

## 2020-12-04 NOTE — PROGRESS NOTES
Pablo Mendoza is a 60 y.o. male, who presents with a chief complaint of   Chief Complaint   Patient presents with   • URI   • Muscle Pain   • low grade fever   • Nasal Congestion       HPI   Pt has been sick for the past 8 days. He had a temp of 100.  He has had aches all over.  He has had congestion and just doesn't feel well. His grand kids have colds as well and he has been around them over the past couple of weeks.  He has lots of PND and nasal drainage.  He had been taking coricitin hbp cold med.  He had a nose bleed as well.  He is coughing drainage up and hacking.     The following portions of the patient's history were reviewed and updated as appropriate: allergies, current medications, past family history, past medical history, past social history, past surgical history and problem list.    Allergies: Aspirin and Lisinopril    Review of Systems   Constitutional: Positive for fever.   HENT: Positive for congestion.    Eyes: Negative.    Respiratory: Positive for cough.    Cardiovascular: Negative.    Gastrointestinal: Negative.    Endocrine: Negative.    Genitourinary: Negative.    Musculoskeletal: Negative.    Skin: Negative.    Allergic/Immunologic: Negative.    Neurological: Negative.    Hematological: Negative.    Psychiatric/Behavioral: Negative.    All other systems reviewed and are negative.            Wt Readings from Last 3 Encounters:   12/04/20 88.5 kg (195 lb)   10/30/20 92.5 kg (204 lb)   09/14/20 91.6 kg (202 lb)     Temp Readings from Last 3 Encounters:   12/04/20 98.1 °F (36.7 °C) (Temporal)   10/30/20 97.3 °F (36.3 °C) (Temporal)   09/14/20 97.3 °F (36.3 °C) (Skin)     BP Readings from Last 3 Encounters:   12/04/20 132/72   10/30/20 150/78   09/14/20 148/86     Pulse Readings from Last 3 Encounters:   12/04/20 91   10/30/20 77   09/14/20 74     Body mass index is 30.54 kg/m².  @LASTSAO2(3)@    Physical Exam  Vitals signs and nursing note reviewed.   Constitutional:        Appearance: He is well-developed.   HENT:      Head: Normocephalic and atraumatic.      Right Ear: External ear normal.      Left Ear: External ear normal.   Eyes:      General: Lids are normal.      Conjunctiva/sclera: Conjunctivae normal.   Neck:      Musculoskeletal: Normal range of motion and neck supple.   Cardiovascular:      Rate and Rhythm: Normal rate and regular rhythm.   Pulmonary:      Effort: Pulmonary effort is normal. No respiratory distress.      Breath sounds: Normal breath sounds. No wheezing.   Musculoskeletal: Normal range of motion.   Lymphadenopathy:      Cervical: Cervical adenopathy present.   Skin:     General: Skin is warm and dry.      Findings: No rash.   Neurological:      Mental Status: He is alert and oriented to person, place, and time.      Cranial Nerves: No cranial nerve deficit.   Psychiatric:         Behavior: Behavior normal.         Thought Content: Thought content normal.         Results for orders placed or performed in visit on 12/04/20   POC Influenza A / B    Specimen: Swab   Result Value Ref Range    Rapid Influenza A Ag Negative Negative    Rapid Influenza B Ag Negative Negative    Internal Control Passed Passed    Lot Number [id number]     Expiration Date 12/4/22            Diagnoses and all orders for this visit:    1. Other fatigue (Primary)  -     COVID-19,[facility] ROUTINE, NP/OP SWAB IN TRANSPORT MEDIA OR ESWAB 72 HR TAT - Swab, Nasopharynx  -     POC Influenza A / B    2. Muscle pain  -     COVID-19,[facility] ROUTINE, NP/OP SWAB IN TRANSPORT MEDIA OR ESWAB 72 HR TAT - Swab, Nasopharynx  -     POC Influenza A / B    3. Fever and chills  -     COVID-19,[facility] ROUTINE, NP/OP SWAB IN TRANSPORT MEDIA OR ESWAB 72 HR TAT - Swab, Nasopharynx  -     POC Influenza A / B    4. Bronchitis  -     azithromycin (Zithromax) 250 MG tablet; Take 2 tablets the first day, then 1 tablet daily for 4 days.  Dispense: 6 tablet; Refill: 0  -     fluticasone (Flonase) 50 MCG/ACT nasal spray; 2  sprays into the nostril(s) as directed by provider Daily.  Dispense: 1 bottle; Refill: 0      Quarantine instructions given.    Outpatient Medications Prior to Visit   Medication Sig Dispense Refill   • allopurinol (ZYLOPRIM) 300 MG tablet TAKE ONE TABLET BY MOUTH TWICE A  tablet 3   • amLODIPine (NORVASC) 10 MG tablet Take 1 tablet by mouth Daily. 90 tablet 1   • aspirin 81 MG chewable tablet Chew 81 mg Daily.     • atorvastatin (LIPITOR) 20 MG tablet Take 1 tablet by mouth Daily. 90 tablet 1   • Cholecalciferol (VITAMIN D3) 5000 units capsule capsule Take 5,000 Units by mouth Daily.     • cyanocobalamin (VITAMIN B-12) 2500 MCG tablet tablet Take 500 mcg by mouth Daily.     • Empagliflozin (Jardiance) 25 MG tablet Take 25 mg by mouth Daily. 30 tablet 5   • hydroCHLOROthiazide (HYDRODIURIL) 25 MG tablet Take 1 tablet by mouth Daily. 90 tablet 1   • ibuprofen (ADVIL,MOTRIN) 800 MG tablet TAKE ONE TABLET BY MOUTH EVERY 8 HOURS AS NEEDED FOR MILD PAIN 30 tablet 0   • Janumet XR  MG tablet TAKE ONE TABLET BY MOUTH TWICE A DAY 60 tablet 4   • loratadine (CLARITIN) 10 MG tablet TAKE ONE TABLET BY MOUTH DAILY 30 tablet 0   • metoprolol tartrate (LOPRESSOR) 100 MG tablet TAKE ONE TABLET BY MOUTH TWICE A  tablet 1   • nystatin (MYCOSTATIN) 432493 UNIT/GM cream Apply  topically to the appropriate area as directed 2 (Two) Times a Day. 30 g 0   • vitamin C (ASCORBIC ACID) 500 MG tablet Take 500 mg by mouth Daily.       No facility-administered medications prior to visit.      New Medications Ordered This Visit   Medications   • azithromycin (Zithromax) 250 MG tablet     Sig: Take 2 tablets the first day, then 1 tablet daily for 4 days.     Dispense:  6 tablet     Refill:  0   • fluticasone (Flonase) 50 MCG/ACT nasal spray     Si sprays into the nostril(s) as directed by provider Daily.     Dispense:  1 bottle     Refill:  0     [unfilled]  There are no discontinued medications.      Return if  symptoms worsen or fail to improve.

## 2020-12-06 LAB — SARS-COV-2 RNA RESP QL NAA+PROBE: DETECTED

## 2020-12-07 DIAGNOSIS — J40 BRONCHITIS: ICD-10-CM

## 2020-12-07 RX ORDER — AZITHROMYCIN 250 MG/1
TABLET, FILM COATED ORAL
Qty: 6 TABLET | Refills: 0 | OUTPATIENT
Start: 2020-12-07

## 2021-01-15 DIAGNOSIS — E11.29 TYPE 2 DIABETES MELLITUS WITH PROTEINURIA (HCC): Primary | ICD-10-CM

## 2021-01-15 DIAGNOSIS — R80.9 TYPE 2 DIABETES MELLITUS WITH PROTEINURIA (HCC): Primary | ICD-10-CM

## 2021-01-23 LAB
ALBUMIN/CREAT UR: 514 MG/G CREAT (ref 0–29)
CREAT UR-MCNC: 28 MG/DL
MICROALBUMIN UR-MCNC: 143.8 UG/ML

## 2021-01-29 ENCOUNTER — OFFICE VISIT (OUTPATIENT)
Dept: INTERNAL MEDICINE | Facility: CLINIC | Age: 61
End: 2021-01-29

## 2021-01-29 VITALS
SYSTOLIC BLOOD PRESSURE: 118 MMHG | HEART RATE: 75 BPM | TEMPERATURE: 96.6 F | OXYGEN SATURATION: 96 % | RESPIRATION RATE: 18 BRPM | HEIGHT: 67 IN | WEIGHT: 193.2 LBS | DIASTOLIC BLOOD PRESSURE: 80 MMHG | BODY MASS INDEX: 30.32 KG/M2

## 2021-01-29 DIAGNOSIS — I10 ESSENTIAL HYPERTENSION: ICD-10-CM

## 2021-01-29 DIAGNOSIS — E11.29 TYPE 2 DIABETES MELLITUS WITH PROTEINURIA (HCC): Primary | ICD-10-CM

## 2021-01-29 DIAGNOSIS — R80.9 TYPE 2 DIABETES MELLITUS WITH PROTEINURIA (HCC): Primary | ICD-10-CM

## 2021-01-29 DIAGNOSIS — R04.0 BLEEDING FROM THE NOSE: ICD-10-CM

## 2021-01-29 DIAGNOSIS — E78.2 MIXED HYPERLIPIDEMIA: ICD-10-CM

## 2021-01-29 DIAGNOSIS — U07.1 COVID-19 VIRUS INFECTION: ICD-10-CM

## 2021-01-29 PROCEDURE — 99214 OFFICE O/P EST MOD 30 MIN: CPT | Performed by: INTERNAL MEDICINE

## 2021-01-29 NOTE — PROGRESS NOTES
"Chief Complaint  Type 2 diabetes mellitus with proteinuria, essential hypertension, Mixed hyperlipidemia, Migraine, and Sore (sore inside nose that wont heal )    Subjective          Pablo Mendoza presents to [facility] for   History of Present Illness       Pt here for follow up.  At his last OV on 12/4/20 he had coronavirus.  He is slowly feeling better.  He has had recurrent nose bleeds on the left side of his nostril.  The right side bled some but this is better. Overall his breathing is getting better.  He is still tired.  He feels like he had some brain fog.     DM - a1c 8.4 -> 8.6->8.5->8.0.  he says his diet has actually been worse since he was sick w/ coronavirus.  He has had cravings for sweets.  His sense of taste is coming back.  On janumet XR and jardiance.  He has lost about 10 pounds since September.  He denies hypoglycemia     HLD - on statin.  No cramps/myalgias     HTN - on metoprolol, hctz, amlodipine. no ha/dizziness.         Objective   Vital Signs:   /80 (BP Location: Left arm, Patient Position: Sitting, Cuff Size: Adult)   Pulse 75   Temp 96.6 °F (35.9 °C) (Temporal)   Resp 18   Ht 170.2 cm (67.01\")   Wt 87.6 kg (193 lb 3.2 oz)   SpO2 96%   BMI 30.25 kg/m²     Physical Exam  Vitals signs and nursing note reviewed.   Constitutional:       General: He is not in acute distress.     Appearance: He is well-developed.   HENT:      Head: Normocephalic and atraumatic.      Right Ear: External ear normal.      Left Ear: External ear normal.      Nose: Nose normal.   Eyes:      Conjunctiva/sclera: Conjunctivae normal.      Pupils: Pupils are equal, round, and reactive to light.   Neck:      Musculoskeletal: Normal range of motion and neck supple.   Cardiovascular:      Rate and Rhythm: Normal rate and regular rhythm.      Heart sounds: Normal heart sounds.   Pulmonary:      Effort: Pulmonary effort is normal. No respiratory distress.      Breath " sounds: Normal breath sounds. No wheezing.   Musculoskeletal: Normal range of motion.      Comments: Normal gait   Skin:     General: Skin is warm and dry.   Neurological:      Mental Status: He is alert and oriented to person, place, and time.   Psychiatric:         Behavior: Behavior normal.         Thought Content: Thought content normal.         Judgment: Judgment normal.        Result Review :   The following data was reviewed by: Liana Hood MD on 01/29/2021:  Common labs    Common Labsle 7/10/20 7/10/20 7/10/20 7/10/20 10/23/20 10/23/20 10/23/20 10/23/20 10/23/20 1/22/21 1/22/21 1/22/21 1/22/21 1/22/21    0928 0928 0928 0928 0914 0914 0914 0914 0914 1011 1011 1011 1011 1011   Glucose   210 (A)   194 (A)      174 (A)     BUN   21 (A)   24 (A)      28 (A)     Creatinine   1.03   1.00      1.02     eGFR Non African Am   74   77      75     eGFR  Am   90   93      91     Sodium   140   141      141     Potassium   4.1   4.0      4.0     Chloride   100   101      98     Calcium   9.7   9.6      9.8     Total Protein   7.1   6.8      7.3     Albumin   4.60   4.40      4.50     Total Bilirubin   0.6   0.7      0.6     Alkaline Phosphatase   113   131 (A)      115     AST (SGOT)   32   51 (A)      33     ALT (SGPT)   23   27      24     WBC 7.96    7.66     7.65       Hemoglobin 14.8    14.6     16.0       Hematocrit 43.3    42.5     46.8       Platelets 228    226     225       Total Cholesterol    99    107     116    Triglycerides    169 (A)    227 (A)     280 (A)    HDL Cholesterol    27 (A)    30 (A)     28 (A)    LDL Cholesterol     38    41     45    Hemoglobin A1C  8.60 (A)     8.50 (A)    8.00 (A)      Microalbumin, Urine         998.5     143.8   (A) Abnormal value       Comments are available for some flowsheets but are not being displayed.                     Assessment and Plan    Problem List Items Addressed This Visit        Cardiac and Vasculature    Mixed hyperlipidemia    Essential  hypertension      Other Visit Diagnoses     Type 2 diabetes mellitus with proteinuria (CMS/HCC)    -  Primary    COVID-19 virus infection          Continue current medications.  Encouraged healthy diet/exercise.  OV labs in  3  months.  Pt to call sooner if any other issues arise.        Follow Up   Return in about 3 months (around 4/29/2021) for Recheck, labs.  Patient was given instructions and counseling regarding his condition or for health maintenance advice. Please see specific information pulled into the AVS if appropriate.

## 2021-04-16 ENCOUNTER — LAB (OUTPATIENT)
Dept: INTERNAL MEDICINE | Facility: CLINIC | Age: 61
End: 2021-04-16

## 2021-04-16 DIAGNOSIS — I10 ESSENTIAL HYPERTENSION: Primary | ICD-10-CM

## 2021-04-16 DIAGNOSIS — E78.2 MIXED HYPERLIPIDEMIA: ICD-10-CM

## 2021-04-16 DIAGNOSIS — E11.9 CONTROLLED TYPE 2 DIABETES MELLITUS WITHOUT COMPLICATION, WITHOUT LONG-TERM CURRENT USE OF INSULIN (HCC): ICD-10-CM

## 2021-04-16 LAB
ALBUMIN SERPL-MCNC: 4.7 G/DL (ref 3.5–5.2)
ALBUMIN/GLOB SERPL: 1.9 G/DL
ALP SERPL-CCNC: 129 U/L (ref 39–117)
ALT SERPL-CCNC: 24 U/L (ref 1–41)
AST SERPL-CCNC: 34 U/L (ref 1–40)
BASOPHILS # BLD AUTO: 0.06 10*3/MM3 (ref 0–0.2)
BASOPHILS NFR BLD AUTO: 0.8 % (ref 0–1.5)
BILIRUB SERPL-MCNC: 0.5 MG/DL (ref 0–1.2)
BUN SERPL-MCNC: 26 MG/DL (ref 6–20)
BUN/CREAT SERPL: 24.8 (ref 7–25)
CALCIUM SERPL-MCNC: 10.1 MG/DL (ref 8.6–10.5)
CHLORIDE SERPL-SCNC: 97 MMOL/L (ref 98–107)
CHOLEST SERPL-MCNC: 132 MG/DL (ref 0–200)
CHOLEST/HDLC SERPL: 4.4 {RATIO}
CO2 SERPL-SCNC: 28.1 MMOL/L (ref 22–29)
CREAT SERPL-MCNC: 1.05 MG/DL (ref 0.76–1.27)
EOSINOPHIL # BLD AUTO: 0.2 10*3/MM3 (ref 0–0.4)
EOSINOPHIL NFR BLD AUTO: 2.5 % (ref 0.3–6.2)
ERYTHROCYTE [DISTWIDTH] IN BLOOD BY AUTOMATED COUNT: 14.9 % (ref 12.3–15.4)
GLOBULIN SER CALC-MCNC: 2.5 GM/DL
GLUCOSE SERPL-MCNC: 174 MG/DL (ref 65–99)
HBA1C MFR BLD: 8.5 % (ref 4.8–5.6)
HCT VFR BLD AUTO: 49.3 % (ref 37.5–51)
HDLC SERPL-MCNC: 30 MG/DL (ref 40–60)
HGB BLD-MCNC: 16.8 G/DL (ref 13–17.7)
IMM GRANULOCYTES # BLD AUTO: 0.04 10*3/MM3 (ref 0–0.05)
IMM GRANULOCYTES NFR BLD AUTO: 0.5 % (ref 0–0.5)
LDLC SERPL CALC-MCNC: 53 MG/DL (ref 0–100)
LYMPHOCYTES # BLD AUTO: 1.89 10*3/MM3 (ref 0.7–3.1)
LYMPHOCYTES NFR BLD AUTO: 24.1 % (ref 19.6–45.3)
MCH RBC QN AUTO: 28.9 PG (ref 26.6–33)
MCHC RBC AUTO-ENTMCNC: 34.1 G/DL (ref 31.5–35.7)
MCV RBC AUTO: 84.7 FL (ref 79–97)
MONOCYTES # BLD AUTO: 0.44 10*3/MM3 (ref 0.1–0.9)
MONOCYTES NFR BLD AUTO: 5.6 % (ref 5–12)
NEUTROPHILS # BLD AUTO: 5.22 10*3/MM3 (ref 1.7–7)
NEUTROPHILS NFR BLD AUTO: 66.5 % (ref 42.7–76)
NRBC BLD AUTO-RTO: 0 /100 WBC (ref 0–0.2)
PLATELET # BLD AUTO: 229 10*3/MM3 (ref 140–450)
POTASSIUM SERPL-SCNC: 3.6 MMOL/L (ref 3.5–5.2)
PROT SERPL-MCNC: 7.2 G/DL (ref 6–8.5)
RBC # BLD AUTO: 5.82 10*6/MM3 (ref 4.14–5.8)
SODIUM SERPL-SCNC: 140 MMOL/L (ref 136–145)
TRIGL SERPL-MCNC: 319 MG/DL (ref 0–150)
VLDLC SERPL CALC-MCNC: 49 MG/DL (ref 5–40)
WBC # BLD AUTO: 7.85 10*3/MM3 (ref 3.4–10.8)

## 2021-04-26 RX ORDER — SITAGLIPTIN AND METFORMIN HYDROCHLORIDE 1000; 50 MG/1; MG/1
TABLET, FILM COATED, EXTENDED RELEASE ORAL
Qty: 60 TABLET | Refills: 3 | Status: SHIPPED | OUTPATIENT
Start: 2021-04-26 | End: 2021-08-30

## 2021-05-02 DIAGNOSIS — E11.29 TYPE 2 DIABETES MELLITUS WITH PROTEINURIA (HCC): ICD-10-CM

## 2021-05-02 DIAGNOSIS — R80.9 TYPE 2 DIABETES MELLITUS WITH PROTEINURIA (HCC): ICD-10-CM

## 2021-05-02 DIAGNOSIS — M1A.09X0 CHRONIC GOUT OF MULTIPLE SITES, UNSPECIFIED CAUSE: ICD-10-CM

## 2021-05-02 DIAGNOSIS — I10 ESSENTIAL HYPERTENSION: ICD-10-CM

## 2021-05-02 DIAGNOSIS — E11.9 TYPE 2 DIABETES MELLITUS WITHOUT COMPLICATION, WITHOUT LONG-TERM CURRENT USE OF INSULIN (HCC): ICD-10-CM

## 2021-05-03 RX ORDER — AMLODIPINE BESYLATE 10 MG/1
TABLET ORAL
Qty: 30 TABLET | Refills: 0 | Status: SHIPPED | OUTPATIENT
Start: 2021-05-03 | End: 2021-06-01

## 2021-05-03 RX ORDER — EMPAGLIFLOZIN 25 MG/1
TABLET, FILM COATED ORAL
Qty: 30 TABLET | Refills: 4 | Status: SHIPPED | OUTPATIENT
Start: 2021-05-03 | End: 2021-10-18 | Stop reason: SDUPTHER

## 2021-05-03 RX ORDER — ALLOPURINOL 300 MG/1
TABLET ORAL
Qty: 60 TABLET | Refills: 2 | Status: SHIPPED | OUTPATIENT
Start: 2021-05-03 | End: 2022-04-15

## 2021-05-04 DIAGNOSIS — G43.801 OTHER MIGRAINE WITH STATUS MIGRAINOSUS, NOT INTRACTABLE: ICD-10-CM

## 2021-05-05 RX ORDER — IBUPROFEN 800 MG/1
TABLET ORAL
Qty: 30 TABLET | Refills: 0 | Status: SHIPPED | OUTPATIENT
Start: 2021-05-05 | End: 2021-07-12

## 2021-05-07 ENCOUNTER — OFFICE VISIT (OUTPATIENT)
Dept: INTERNAL MEDICINE | Facility: CLINIC | Age: 61
End: 2021-05-07

## 2021-05-07 VITALS
HEIGHT: 67 IN | RESPIRATION RATE: 18 BRPM | DIASTOLIC BLOOD PRESSURE: 72 MMHG | HEART RATE: 78 BPM | WEIGHT: 194 LBS | SYSTOLIC BLOOD PRESSURE: 120 MMHG | OXYGEN SATURATION: 96 % | BODY MASS INDEX: 30.45 KG/M2 | TEMPERATURE: 97.6 F

## 2021-05-07 DIAGNOSIS — E11.29 TYPE 2 DIABETES MELLITUS WITH PROTEINURIA (HCC): Primary | ICD-10-CM

## 2021-05-07 DIAGNOSIS — R80.9 TYPE 2 DIABETES MELLITUS WITH PROTEINURIA (HCC): Primary | ICD-10-CM

## 2021-05-07 DIAGNOSIS — G43.801 OTHER MIGRAINE WITH STATUS MIGRAINOSUS, NOT INTRACTABLE: ICD-10-CM

## 2021-05-07 DIAGNOSIS — M54.41 CHRONIC BILATERAL LOW BACK PAIN WITH RIGHT-SIDED SCIATICA: ICD-10-CM

## 2021-05-07 DIAGNOSIS — E78.2 MIXED HYPERLIPIDEMIA: ICD-10-CM

## 2021-05-07 DIAGNOSIS — G89.29 CHRONIC BILATERAL LOW BACK PAIN WITH RIGHT-SIDED SCIATICA: ICD-10-CM

## 2021-05-07 DIAGNOSIS — I10 ESSENTIAL HYPERTENSION: ICD-10-CM

## 2021-05-07 PROCEDURE — 99214 OFFICE O/P EST MOD 30 MIN: CPT | Performed by: INTERNAL MEDICINE

## 2021-05-07 RX ORDER — CYCLOBENZAPRINE HCL 10 MG
10 TABLET ORAL 2 TIMES DAILY PRN
Qty: 60 TABLET | Refills: 0 | Status: SHIPPED | OUTPATIENT
Start: 2021-05-07 | End: 2022-05-10 | Stop reason: SDUPTHER

## 2021-05-07 RX ORDER — RIZATRIPTAN BENZOATE 10 MG/1
10 TABLET ORAL ONCE AS NEEDED
COMMUNITY
End: 2022-03-14 | Stop reason: SDUPTHER

## 2021-05-07 RX ORDER — GLIPIZIDE 5 MG/1
5 TABLET ORAL
Qty: 180 TABLET | Refills: 1 | Status: SHIPPED | OUTPATIENT
Start: 2021-05-07 | End: 2021-11-30

## 2021-05-07 NOTE — PROGRESS NOTES
Pablo Mendoza is a 60 y.o. male, who presents with a chief complaint of   Chief Complaint   Patient presents with   • Type 2 diabetes mellitus, without long-term current use of i   • Mixed hyperlipidemia   • Essential hypertension   • Migraine           HPI   Pt here for follow up.      He has been having more back pain.  Sometimes the pain goes down his right leg.  Sometimes he has muscle spasms.  He occ says the left leg feels weaker. He sometimes has some tingling in his legs if he is working or aggravates them.  Rest makes the pain better. He had xrays and was sent to pain management last year but didn't go.  He feels like it is arthritis.      At his  OV on 12/4/20 he had coronavirus.  he still has some fatigue but overall much better.  No further nose bleeds.      DM - a1c 8.4 -> 8.6->8.5->8.0->8.5.  he says he has been trying to eat a healthier diet but was eating out more in preparation to move.  His sense of taste is coming back.  On janumet XR and jardiance.  He denies hypoglycemia. He does not want to take insulin at this time.     HLD - on statin.  No cramps/myalgias     HTN - on metoprolol, hctz, amlodipine. no ha/dizziness.       Headaches - doing better overall.  Last weekend he did have an episode but compared to last year less frequent episodes.  He thinks stress does contribute. His brother passing away last year was hard.  He is moving this weekend ot a new home he is buying.  He also had some episodes concerning for tia's bc he has stuttering speech with this.  He was referred to neurology but didn't go to the appt.      The following portions of the patient's history were reviewed and updated as appropriate: allergies, current medications, past family history, past medical history, past social history, past surgical history and problem list.    Allergies: Aspirin and Lisinopril    Review of Systems   Constitutional: Negative.    HENT: Negative.    Eyes: Negative.    Respiratory:  Negative.    Cardiovascular: Negative.    Gastrointestinal: Negative.    Endocrine: Negative.    Genitourinary: Negative.    Musculoskeletal: Positive for back pain.   Skin: Negative.    Allergic/Immunologic: Negative.    Neurological: Negative.    Hematological: Negative.    Psychiatric/Behavioral: Negative.    All other systems reviewed and are negative.            Wt Readings from Last 3 Encounters:   05/07/21 88 kg (194 lb)   01/29/21 87.6 kg (193 lb 3.2 oz)   12/04/20 88.5 kg (195 lb)     Temp Readings from Last 3 Encounters:   05/07/21 97.6 °F (36.4 °C) (Temporal)   01/29/21 96.6 °F (35.9 °C) (Temporal)   12/04/20 98.1 °F (36.7 °C) (Temporal)     BP Readings from Last 3 Encounters:   05/07/21 120/72   01/29/21 118/80   12/04/20 132/72     Pulse Readings from Last 3 Encounters:   05/07/21 78   01/29/21 75   12/04/20 91     Body mass index is 30.38 kg/m².  SpO2 Readings from Last 3 Encounters:   05/07/21 96%   01/29/21 96%   12/04/20 94%          Physical Exam  Vitals and nursing note reviewed.   Constitutional:       General: He is not in acute distress.     Appearance: He is well-developed.   HENT:      Head: Normocephalic and atraumatic.      Right Ear: External ear normal.      Left Ear: External ear normal.      Nose: Nose normal.   Eyes:      Conjunctiva/sclera: Conjunctivae normal.      Pupils: Pupils are equal, round, and reactive to light.   Cardiovascular:      Rate and Rhythm: Normal rate and regular rhythm.      Heart sounds: Normal heart sounds.   Pulmonary:      Effort: Pulmonary effort is normal. No respiratory distress.      Breath sounds: Normal breath sounds. No wheezing.   Musculoskeletal:         General: Normal range of motion.      Cervical back: Normal range of motion and neck supple.      Comments: Normal gait  ttp of right lumbar paraspinal muscles  slr neg bilat  Strength 5/5 bilat   Skin:     General: Skin is warm and dry.   Neurological:      General: No focal deficit present.       Mental Status: He is alert and oriented to person, place, and time.      Gait: Gait normal.      Deep Tendon Reflexes: Reflexes normal.   Psychiatric:         Behavior: Behavior normal.         Thought Content: Thought content normal.         Judgment: Judgment normal.         Results for orders placed or performed in visit on 04/16/21   Lipid Panel With / Chol / HDL Ratio    Specimen: Blood   Result Value Ref Range    Total Cholesterol 132 0 - 200 mg/dL    Triglycerides 319 (H) 0 - 150 mg/dL    HDL Cholesterol 30 (L) 40 - 60 mg/dL    VLDL Cholesterol Tavon 49 (H) 5 - 40 mg/dL    LDL Chol Calc (NIH) 53 0 - 100 mg/dL    Chol/HDL Ratio 4.40    Comprehensive metabolic panel    Specimen: Blood   Result Value Ref Range    Glucose 174 (H) 65 - 99 mg/dL    BUN 26 (H) 6 - 20 mg/dL    Creatinine 1.05 0.76 - 1.27 mg/dL    eGFR Non African Am 73 >60 mL/min/1.73    eGFR African Am 88 >60 mL/min/1.73    BUN/Creatinine Ratio 24.8 7.0 - 25.0    Sodium 140 136 - 145 mmol/L    Potassium 3.6 3.5 - 5.2 mmol/L    Chloride 97 (L) 98 - 107 mmol/L    Total CO2 28.1 22.0 - 29.0 mmol/L    Calcium 10.1 8.6 - 10.5 mg/dL    Total Protein 7.2 6.0 - 8.5 g/dL    Albumin 4.70 3.50 - 5.20 g/dL    Globulin 2.5 gm/dL    A/G Ratio 1.9 g/dL    Total Bilirubin 0.5 0.0 - 1.2 mg/dL    Alkaline Phosphatase 129 (H) 39 - 117 U/L    AST (SGOT) 34 1 - 40 U/L    ALT (SGPT) 24 1 - 41 U/L   Hemoglobin A1c    Specimen: Blood   Result Value Ref Range    Hemoglobin A1C 8.50 (H) 4.80 - 5.60 %   CBC & Differential    Specimen: Blood   Result Value Ref Range    WBC 7.85 3.40 - 10.80 10*3/mm3    RBC 5.82 (H) 4.14 - 5.80 10*6/mm3    Hemoglobin 16.8 13.0 - 17.7 g/dL    Hematocrit 49.3 37.5 - 51.0 %    MCV 84.7 79.0 - 97.0 fL    MCH 28.9 26.6 - 33.0 pg    MCHC 34.1 31.5 - 35.7 g/dL    RDW 14.9 12.3 - 15.4 %    Platelets 229 140 - 450 10*3/mm3    Neutrophil Rel % 66.5 42.7 - 76.0 %    Lymphocyte Rel % 24.1 19.6 - 45.3 %    Monocyte Rel % 5.6 5.0 - 12.0 %    Eosinophil Rel %  2.5 0.3 - 6.2 %    Basophil Rel % 0.8 0.0 - 1.5 %    Neutrophils Absolute 5.22 1.70 - 7.00 10*3/mm3    Lymphocytes Absolute 1.89 0.70 - 3.10 10*3/mm3    Monocytes Absolute 0.44 0.10 - 0.90 10*3/mm3    Eosinophils Absolute 0.20 0.00 - 0.40 10*3/mm3    Basophils Absolute 0.06 0.00 - 0.20 10*3/mm3    Immature Granulocyte Rel % 0.5 0.0 - 0.5 %    Immature Grans Absolute 0.04 0.00 - 0.05 10*3/mm3    nRBC 0.0 0.0 - 0.2 /100 WBC     Result Review :                  Assessment and Plan    Diagnoses and all orders for this visit:    1. Type 2 diabetes mellitus with proteinuria (CMS/HCC) (Primary)  -     glipizide (Glucotrol) 5 MG tablet; Take 1 tablet by mouth 2 (Two) Times a Day Before Meals.  Dispense: 180 tablet; Refill: 1  -     Comprehensive Metabolic Panel; Future  -     CBC & Differential; Future  -     T4, Free; Future  -     TSH; Future  -     Lipid Panel With LDL / HDL Ratio; Future  -     Hemoglobin A1c; Future  -     Microalbumin / Creatinine Urine Ratio - Urine, Clean Catch    2. Other migraine with status migrainosus, not intractable    3. Essential hypertension    4. Mixed hyperlipidemia    5. Chronic bilateral low back pain with right-sided sciatica  -     Ambulatory Referral to Physical Therapy Evaluate and treat  -     MRI Lumbar Spine Without Contrast; Future  -     cyclobenzaprine (FLEXERIL) 10 MG tablet; Take 1 tablet by mouth 2 (Two) Times a Day As Needed for Muscle Spasms.  Dispense: 60 tablet; Refill: 0               Outpatient Medications Prior to Visit   Medication Sig Dispense Refill   • allopurinol (ZYLOPRIM) 300 MG tablet TAKE ONE TABLET BY MOUTH TWICE A DAY 60 tablet 2   • amLODIPine (NORVASC) 10 MG tablet TAKE ONE TABLET BY MOUTH DAILY 30 tablet 0   • aspirin 81 MG chewable tablet Chew 81 mg Daily.     • atorvastatin (LIPITOR) 20 MG tablet Take 1 tablet by mouth Daily. 90 tablet 1   • Cholecalciferol (VITAMIN D3) 5000 units capsule capsule Take 5,000 Units by mouth Daily.     • cyanocobalamin  (VITAMIN B-12) 2500 MCG tablet tablet Take 500 mcg by mouth Daily.     • fluticasone (Flonase) 50 MCG/ACT nasal spray 2 sprays into the nostril(s) as directed by provider Daily. 1 bottle 0   • hydroCHLOROthiazide (HYDRODIURIL) 25 MG tablet Take 1 tablet by mouth Daily. 90 tablet 1   • ibuprofen (ADVIL,MOTRIN) 800 MG tablet TAKE ONE TABLET BY MOUTH EVERY 8 HOURS AS NEEDED FOR MILD PAIN 30 tablet 0   • Janumet XR  MG tablet TAKE ONE TABLET BY MOUTH TWICE A DAY 60 tablet 3   • Jardiance 25 MG tablet TAKE ONE TABLET BY MOUTH DAILY 30 tablet 4   • loratadine (CLARITIN) 10 MG tablet TAKE ONE TABLET BY MOUTH DAILY 30 tablet 0   • metoprolol tartrate (LOPRESSOR) 100 MG tablet TAKE ONE TABLET BY MOUTH TWICE A  tablet 1   • rizatriptan (MAXALT) 10 MG tablet Take 10 mg by mouth 1 (One) Time As Needed for Migraine. May repeat in 2 hours if needed     • vitamin C (ASCORBIC ACID) 500 MG tablet Take 500 mg by mouth Daily.       No facility-administered medications prior to visit.     New Medications Ordered This Visit   Medications   • glipizide (Glucotrol) 5 MG tablet     Sig: Take 1 tablet by mouth 2 (Two) Times a Day Before Meals.     Dispense:  180 tablet     Refill:  1   • cyclobenzaprine (FLEXERIL) 10 MG tablet     Sig: Take 1 tablet by mouth 2 (Two) Times a Day As Needed for Muscle Spasms.     Dispense:  60 tablet     Refill:  0     [unfilled]  There are no discontinued medications.      Return in about 3 months (around 8/7/2021) for Recheck.    Patient was given instructions and counseling regarding her condition or for health maintenance advice. Please see specific information pulled into the AVS if appropriate.

## 2021-05-17 ENCOUNTER — TELEPHONE (OUTPATIENT)
Dept: INTERNAL MEDICINE | Facility: CLINIC | Age: 61
End: 2021-05-17

## 2021-05-17 DIAGNOSIS — E78.2 MIXED HYPERLIPIDEMIA: ICD-10-CM

## 2021-05-18 RX ORDER — ATORVASTATIN CALCIUM 20 MG/1
TABLET, FILM COATED ORAL
Qty: 90 TABLET | Refills: 1 | Status: SHIPPED | OUTPATIENT
Start: 2021-05-18 | End: 2021-11-19

## 2021-05-21 ENCOUNTER — HOSPITAL ENCOUNTER (OUTPATIENT)
Dept: PHYSICAL THERAPY | Facility: HOSPITAL | Age: 61
Setting detail: THERAPIES SERIES
Discharge: HOME | End: 2021-05-21

## 2021-05-21 DIAGNOSIS — M54.41 CHRONIC BILATERAL LOW BACK PAIN WITH RIGHT-SIDED SCIATICA: Primary | ICD-10-CM

## 2021-05-21 DIAGNOSIS — G89.29 CHRONIC BILATERAL LOW BACK PAIN WITH RIGHT-SIDED SCIATICA: Primary | ICD-10-CM

## 2021-05-21 PROCEDURE — 97161 PT EVAL LOW COMPLEX 20 MIN: CPT | Performed by: PHYSICAL THERAPIST

## 2021-05-22 NOTE — THERAPY EVALUATION
Outpatient Physical Therapy Ortho Initial Evaluation  [facility]     Patient Name: Pablo Mendoza  : 1960  MRN: [id number]  Today's Date: 2021      Visit Date: 2021    Patient Active Problem List   Diagnosis   • Type 2 diabetes mellitus, without long-term current use of insulin (CMS/HCC)   • Mixed hyperlipidemia   • Essential hypertension   • Gout of multiple sites   • Nocturia   • Migraine headache   • Chronic maxillary sinusitis   • Other fatigue   • Arthritis   • Routine health maintenance   • Proteinuria   • Cellulitis of left foot        Past Medical History:   Diagnosis Date   • Diabetes mellitus (CMS/HCC)    • Gout of multiple sites 2018   • Mixed hyperlipidemia 2018   • Nocturia 2018   • Seizures (CMS/[facility])    • Stroke (CMS/HCC)    • TIA (transient ischemic attack)    • Type 2 diabetes mellitus, without long-term current use of insulin (CMS/HCC) 2018        Past Surgical History:   Procedure Laterality Date   • CHOLECYSTECTOMY     • HERNIA REPAIR     • SINUS SURGERY     • TYMPANOPLASTY         Visit Dx:     ICD-10-CM ICD-9-CM   1. Chronic bilateral low back pain with right-sided sciatica  M54.41 724.2    G89.29 724.3     338.29         Patient History     Row Name 21 1000             History    Chief Complaint  Pain;Difficulty with daily activities  -GC      Type of Pain  Back pain;Lower Extremity / Leg right LE to foot  -GC      Brief Description of Current Complaint  Pt reports a long history of LBP with his present condition having developed over the last several months. He states he has noticed a gradually increasing LBP and stiffness with pain into his rigth LE to his toes.He was seen by Dr. Hood who has referred him for therapy. The pt also has a significant history for multiple TIAs having been hospitalized for 3 of them. He states he has always made a full recovery from these.  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  Pt c/o pain the more he is on his feet, feels his worst in evening after being on his feet all day at work  -      What Performance Factors Make the Current Problem(s) BETTER?  pt feels best if he can get off his feet and rest, feels best first thing in the morning  -      Difficulties at work?  Pt has pain being on his feet for his shift at work  -      Difficulties with ADL's?  Pt has difficulty with those tasks that require him to be on his feet for long periods  -         Daily Activities    Primary Language  English  -      How does patient learn best?  Listening  -      Teaching needs identified  Home Exercise Program;Management of Condition  -      Patient is concerned about/has problems with  Flexibility;Performing home management (household chores, shopping, care of dependents);Performing job responsibilities/community activities (work, school,;Standing;Walking  -GC      Does patient have problems with the following?  None  -GC      Barriers to learning  None  -GC      Pt Participated in POC and Goals  Yes  -GC         Safety    Are you being hurt, hit, or frightened by anyone at home or in your life?  No  -GC      Are you being neglected by a caregiver  No  -GC        User Key  (r) = Recorded By, (t) = Taken By, (c) = Cosigned By    Initials Name Provider Type     Julien Fall PT Physical Therapist          PT Ortho     Row Name 05/21/21 1000       Posture/Observations    Posture/Observations Comments  Pt has decreased lumbar  lordosis with mild scoliosis noted.  -GC       DTR- Lower Quarter Clearing    Patellar tendon (L2-4)  Bilateral:;2- Normal response  -GC    Achilles tendon (S1-2)  Bilateral:;2- Normal response  -GC       Sensory Screen for Light Touch- Lower Quarter Clearing    L2 (anterior mid thigh)  Bilateral:;Intact  -GC    L3 (distal anterior thigh)  Bilateral:;Intact  -GC    L4 (medial lower leg/foot)  Bilateral:;Intact  -GC    L5 (lateral lower leg/great toe)  Bilateral:;Intact  -GC    S1 (bottom of foot)  Bilateral:;Intact  -GC       Myotomal Screen- Lower Quarter Clearing    Hip flexion (L2)  Bilateral:;5 (Normal)  -GC    Knee extension (L3)  Bilateral:;5 (Normal)  -GC    Ankle DF (L4)  Bilateral:;5 (Normal)  -GC    Ankle PF (S1)  Bilateral:;5 (Normal)  -GC    Knee flexion (S2)  Bilateral:;5 (Normal)  -GC       Lumbosacral Accessory Motions    PA [place]- L1  WNL  -GC    PA [place]- L2  Hypomobile  -GC    PA Glide- L3  Hypomobile  -GC    PA Glide- L4  Hypomobile  -GC    PA Glide- L5  Hypomobile  -GC    PA glide- Sacral base  Hypomobile  -GC       Lumbar/SI Special Tests    Standing Flexion Test (SI Dysfunction)  Bilateral:;Negative  -GC    SLR (Neural Tension)  Bilateral:;Negative  -GC    JORDEN (hip vs. SI Dysfunction)  Left:;Positive  -GC       Lumbosacral Palpation    SI  Bilateral:;Tender  -GC    Lumbosacral Segment  Tender L3-S1  -GC    Quadratus Lumborum  Bilateral:;Tender;Guarded/taut  -GC    Erector Spinae (Paraspinals)  Bilateral:;Tender;Guarded/taut  -GC       Head/Neck/Trunk    Trunk Extension AROM  50% range  -GC    Trunk Flexion AROM  75% range with pain  -GC    Trunk Lt Lateral Flexion AROM  75% range with pain  -GC    Trunk Rt Lateral Flexion AROM  WFL  -GC    Trunk Lt Rotation AROM  75% range with pain  -GC    Trunk Rt Rotation AROM  WFL  -GC       MMT Neck/Trunk    Trunk Flexion MMT, Gross Movement  (4/5) good  -GC    Trunk Extension MMT, Gross Movement  (4/5) good  -GC       Lower Extremity Flexibility     Hamstrings  Bilateral:;Moderately limited  -GC    Hip Flexors  Bilateral:;Mildly limited  -GC    Hip External Rotators  Bilateral:;Moderately limited  -GC    Hip Internal Rotators  Bilateral:;Moderately limited  -GC    Quadratus Lumborum  Bilateral:;Moderately limited  -GC       Transfers    Comment (Transfers)  Pt is independent with all bed mobility and transfers but does have some pain rolling supine to/from prone  -       Gait/Stairs (Locomotion)    Comment (Gait/Stairs)  Pt ambulates normally on level surfaces  -      User Key  (r) = Recorded By, (t) = Taken By, (c) = Cosigned By    Initials Name Provider Type    GC Julien Fall, [place] Physical Therapist                      Therapy Education  Given: HEP, Symptoms/condition management, Pain management, Posture/body mechanics  Program: New  How Provided: Verbal, Demonstration, Written  Provided to: Patient  Level of Understanding: Teach back education performed, Verbalized, Demonstrated     PT OP Goals     Row Name 05/21/21 1000          PT Short Term Goals    STG Date to Achieve  06/04/21  -     STG 1  Decrease LBP and right LE pain to 3-4/10 with activity.  -     STG 2  Increase trunk ROM to at least 75% range without pain with testing.  -     STG 3  Increase hamstring, piriformis, and quadratus flexibility to only a minimal restriction with testing.  -     STG 4  Pt will be independent with his HEP isued by this therapist.  -        Long Term Goals    LTG Date to Achieve  06/18/21  -     LTG 1  Decrease LBP and right LE pain to 0-1/10 with activity.  -     LTG 2  Increase trunk ROM to WFL all planes with testing.  -     LTG 3  Increase hamstring, piriformis, and quadratus flexibility to WFL with testing.  -     LTG 4  Increase core strength to 5/5 all planes with testing.  -     LTG 5  Pt will be independent with all ADLs without pain.  -        Time Calculation    PT Goal Re-Cert Due Date  06/18/21  -       User Key  (r) = Recorded  By, (t) = Taken By, (c) = Cosigned By    Initials Name Provider Type     Julien Fall, PT Physical Therapist          PT Assessment/Plan     Row Name 05/21/21 1000          PT Assessment    Functional Limitations  Limitation in home management;Limitations in community activities;Limitations in functional capacity and performance;Performance in leisure activities;Performance in self-care ADL;Performance in work activities  -     Impairments  Impaired flexibility;Range of motion;Pain;Muscle strength;Joint mobility  -GC     Assessment Comments  Pt presnets with a several month history of gradually increasing LBP withright LE pain. He rates this pain up to an 8/10 and states it is worse with being on his feet for prolonged periods, such as working his shift on the production line. He does have decreased trunk ROM, decreased trunk and LE flexibility, decreased core strength, and decreased funciton secondary to the above.  -     Please refer to paper survey for additional self-reported information  Yes  -GC     Rehab Potential  Good  -GC     Patient/caregiver participated in establishment of treatment plan and goals  Yes  -GC     Patient would benefit from skilled therapy intervention  Yes  -GC        PT Plan    PT Frequency  1x/week;2x/week  -     Predicted Duration of Therapy Intervention (PT)  4 weeks  -     Planned CPT's?  PT EVAL LOW COMPLEXITY: 50554;PT THER PROC EA 15 MIN: 46677;PT HOT OR COLD PACK TREAT MCARE;PT ELECTRICAL STIM UNATTEND:   -     PT Plan Comments  Pt is to continue his HEP 2x daily  -       User Key  (r) = Recorded By, (t) = Taken By, (c) = Cosigned By    Initials Name Provider Type     Julien Fall, PT Physical Therapist          Modalities     Row Name 05/21/21 1000             Moist Heat    MH Applied  Yes  -GC      Location  LS spine with IFC with pt prone over 1 pillow  -GC      MH Prior to Rx  Yes  -GC         ELECTRICAL STIMULATION    Attended/Unattended  Unattended   -GC      Stimulation Type  IFC  -GC      Location/Electrode Placement/Other  LS spine with MH with pt prone over 1 pillow  -GC      PT E-Stim Unattended Minutes  15  -GC        User Key  (r) = Recorded By, (t) = Taken By, (c) = Cosigned By    Initials Name Provider Type     Julien Fall, PT Physical Therapist        OP Exercises     Row Name 05/21/21 1000             Exercise 1    Exercise Name 1  AP mobilizations L2-5  -GC      Cueing 1  Verbal;Tactile  -GC      Time 1  3 min  -GC         Exercise 2    Exercise Name 2  Hamstring stretch-bilateral  -GC      Cueing 2  Verbal;Tactile  -GC      Reps 2  10  -GC      Time 2  10 secs  -GC         Exercise 3    Exercise Name 3  Piriformis stretch-bilateral  -GC      Cueing 3  Verbal;Tactile  -GC      Reps 3  10  -GC      Time 3  10 secs  -GC         Exercise 4    Exercise Name 4  LTR stretches-bilateral  -GC      Cueing 4  Verbal;Tactile  -GC      Reps 4  10   -GC      Time 4  10 secs  -GC        User Key  (r) = Recorded By, (t) = Taken By, (c) = Cosigned By    Initials Name Provider Type     Julien Fall, PT Physical Therapist                                  Time Calculation:     Start Time: 1000  Stop Time: 1104  Time Calculation (min): 64 min  Untimed Charges  PT E-Stim Unattended Minutes: 15  Total Minutes  Untimed Charges Total Minutes: 15   Total Minutes: 15     Therapy Charges for Today     Code Description Service Date Service Provider Modifiers Qty    [id number]  PT EVAL LOW COMPLEXITY 3 5/21/2021 Julien Fall, PT GP 1                    Julien Fall PT  5/22/2021

## 2021-05-28 ENCOUNTER — APPOINTMENT (OUTPATIENT)
Dept: PHYSICAL THERAPY | Facility: HOSPITAL | Age: 61
End: 2021-05-28

## 2021-05-31 DIAGNOSIS — I10 ESSENTIAL HYPERTENSION: ICD-10-CM

## 2021-06-01 RX ORDER — HYDROCHLOROTHIAZIDE 25 MG/1
TABLET ORAL
Qty: 30 TABLET | Refills: 2 | Status: SHIPPED | OUTPATIENT
Start: 2021-06-01 | End: 2022-03-03

## 2021-06-01 RX ORDER — AMLODIPINE BESYLATE 10 MG/1
TABLET ORAL
Qty: 30 TABLET | Refills: 0 | Status: SHIPPED | OUTPATIENT
Start: 2021-06-01 | End: 2021-06-30

## 2021-06-11 ENCOUNTER — HOSPITAL ENCOUNTER (OUTPATIENT)
Dept: MRI IMAGING | Facility: HOSPITAL | Age: 61
Discharge: HOME | End: 2021-06-11
Admitting: INTERNAL MEDICINE

## 2021-06-11 DIAGNOSIS — M54.41 CHRONIC BILATERAL LOW BACK PAIN WITH RIGHT-SIDED SCIATICA: ICD-10-CM

## 2021-06-11 DIAGNOSIS — G89.29 CHRONIC BILATERAL LOW BACK PAIN WITH RIGHT-SIDED SCIATICA: ICD-10-CM

## 2021-06-11 PROCEDURE — 72148 MRI LUMBAR SPINE W/O DYE: CPT

## 2021-06-22 DIAGNOSIS — M51.36 ANNULAR TEAR OF LUMBAR DISC: Primary | ICD-10-CM

## 2021-06-30 DIAGNOSIS — I10 ESSENTIAL HYPERTENSION: ICD-10-CM

## 2021-06-30 RX ORDER — AMLODIPINE BESYLATE 10 MG/1
TABLET ORAL
Qty: 90 TABLET | Refills: 1 | Status: SHIPPED | OUTPATIENT
Start: 2021-06-30 | End: 2022-01-03

## 2021-07-12 DIAGNOSIS — G43.801 OTHER MIGRAINE WITH STATUS MIGRAINOSUS, NOT INTRACTABLE: ICD-10-CM

## 2021-07-12 RX ORDER — IBUPROFEN 800 MG/1
TABLET ORAL
Qty: 30 TABLET | Refills: 0 | Status: SHIPPED | OUTPATIENT
Start: 2021-07-12 | End: 2021-09-14 | Stop reason: SDUPTHER

## 2021-07-20 DIAGNOSIS — G43.801 OTHER MIGRAINE WITH STATUS MIGRAINOSUS, NOT INTRACTABLE: ICD-10-CM

## 2021-07-20 RX ORDER — IBUPROFEN 800 MG/1
TABLET ORAL
Qty: 30 TABLET | Refills: 0 | OUTPATIENT
Start: 2021-07-20

## 2021-07-21 DIAGNOSIS — I10 ESSENTIAL HYPERTENSION: ICD-10-CM

## 2021-07-21 RX ORDER — METOPROLOL TARTRATE 100 MG/1
TABLET ORAL
Qty: 180 TABLET | Refills: 0 | Status: SHIPPED | OUTPATIENT
Start: 2021-07-21 | End: 2021-10-25

## 2021-08-02 ENCOUNTER — OFFICE VISIT (OUTPATIENT)
Dept: NEUROSURGERY | Facility: CLINIC | Age: 61
End: 2021-08-02

## 2021-08-02 VITALS
HEART RATE: 88 BPM | DIASTOLIC BLOOD PRESSURE: 75 MMHG | HEIGHT: 67 IN | BODY MASS INDEX: 30.45 KG/M2 | TEMPERATURE: 98 F | WEIGHT: 194 LBS | SYSTOLIC BLOOD PRESSURE: 135 MMHG

## 2021-08-02 DIAGNOSIS — M48.062 SPINAL STENOSIS OF LUMBAR REGION WITH NEUROGENIC CLAUDICATION: Primary | ICD-10-CM

## 2021-08-02 PROCEDURE — 99204 OFFICE O/P NEW MOD 45 MIN: CPT | Performed by: NEUROLOGICAL SURGERY

## 2021-08-02 RX ORDER — DEXAMETHASONE 4 MG/1
8 TABLET ORAL TAKE AS DIRECTED
Qty: 2 TABLET | Refills: 0 | Status: SHIPPED | OUTPATIENT
Start: 2021-08-02 | End: 2021-08-05 | Stop reason: HOSPADM

## 2021-08-02 NOTE — H&P (VIEW-ONLY)
"Subjective   Patient ID: Pablo Mendoza is a 60 y.o. male is being seen for consultation today at the request of Liana Hood* for Annular tear of lumbar disc. Patient had an MRI Lumbar on 06/11/2021 at [facility].     Treatment: no recent treatment    Today patient is having low back pain, that radiates to B/L lower extremities. Patient states that he is also having N/T in B/L legs. Patient states that his legs give out intermittently.     Patient, Provider, and MA are all wearing a mask in our office today.     History of Present Illness    This patient has been having pain in his back with radiation to his legs for several years.  The problem has been relatively tolerable until the last year or so when it has gotten worse.  He has no difficulty with bowel bladder control but the pain is severe.  It radiates all the way across his lower back and then into his posterior lateral thigh posterior lateral calf on both legs.  It is worse when he walks any distance.  He has been treated with physical therapy, but no other treatment so far.    The following portions of the patient's history were reviewed and updated as appropriate: allergies, current medications, past family history, past medical history, past social history, past surgical history and problem list.    Review of Systems   Constitutional: Negative for chills and fever.   HENT: Negative for congestion.    Genitourinary: Negative for difficulty urinating and dysuria.   Musculoskeletal: Positive for back pain, gait problem and myalgias.        B/L leg pain   Neurological: Positive for weakness and numbness.        B/L leg N/T, weakness       I have reviewed the review of systems as documented by my MA.      Objective     Vitals:    08/02/21 1344   BP: 135/75   Cuff Size: Adult   Pulse: 88   Temp: 98 °F (36.7 °C)   Weight: 88 kg (194 lb)   Height: 170.2 cm (67\")     Body mass index is 30.38 kg/m².      Physical Exam  Constitutional:       Appearance: He " is well-developed.   HENT:      Head: Normocephalic and atraumatic.   Eyes:      Extraocular Movements: EOM normal.      Conjunctiva/sclera: Conjunctivae normal.      Pupils: Pupils are equal, round, and reactive to light.   Neck:      Vascular: No carotid bruit.   Neurological:      Mental Status: He is oriented to person, place, and time.      Coordination: Finger-Nose-Finger Test and Heel to Shin Test normal.      Gait: Gait is intact.      Deep Tendon Reflexes:      Reflex Scores:       Tricep reflexes are 2+ on the right side and 2+ on the left side.       Bicep reflexes are 2+ on the right side and 2+ on the left side.       Brachioradialis reflexes are 2+ on the right side and 2+ on the left side.       Patellar reflexes are 2+ on the right side and 2+ on the left side.       Achilles reflexes are 2+ on the right side and 2+ on the left side.  Psychiatric:         Speech: Speech normal.       Neurologic Exam     Mental Status   Oriented to person, place, and time.   Registration of memory: Good recent and remote memory.   Attention: normal. Concentration: normal.   Speech: speech is normal   Level of consciousness: alert  Knowledge: consistent with education.     Cranial Nerves     CN II   Visual fields full to confrontation.   Visual acuity: normal    CN III, IV, VI   Pupils are equal, round, and reactive to light.  Extraocular motions are normal.     CN V   Facial sensation intact.   Right corneal reflex: normal  Left corneal reflex: normal    CN VII   Facial expression full, symmetric.   Right facial weakness: none  Left facial weakness: none    CN VIII   Hearing: intact    CN IX, X   Palate: symmetric    CN XI   Right sternocleidomastoid strength: normal  Left sternocleidomastoid strength: normal    CN XII   Tongue: not atrophic  Tongue deviation: none    Motor Exam   Muscle bulk: normal  Right arm tone: normal  Left arm tone: normal  Right leg tone: normal  Left leg tone: normal    Strength   Strength  5/5 except as noted.     Sensory Exam   Light touch normal.     Gait, Coordination, and Reflexes     Gait  Gait: normal    Coordination   Finger to nose coordination: normal  Heel to shin coordination: normal    Reflexes   Right brachioradialis: 2+  Left brachioradialis: 2+  Right biceps: 2+  Left biceps: 2+  Right triceps: 2+  Left triceps: 2+  Right patellar: 2+  Left patellar: 2+  Right achilles: 2+  Left achilles: 2+  Right : 2+  Left : 2+          Assessment/Plan   Independent Review of Radiographic Studies:      I personally reviewed the images from the following studies.    I reviewed an MRI of his lumbar spine done on 11 June.  This does show some bulging and some stenosis at what appears to be L4-5.  On the axial images the T12-L1 and L1-2 levels are widely open as is L2-3.  L3-4 is open as well.  L4-5 shows fluid in the joints and moderate stenosis of the central canal.  The foramina are fairly open.  L5-S1 is open as well.    Medical Decision Making:      I told the patient and his wife about the imaging.  I told him that we could certainly try some lumbar epidural blocks but they are like to work long-term since he has had the problem for so long.  The other option would be to proceed with a lumbar myelogram.  I told the patient what a myelogram involves.  I explained that there is a 50% chance of developing a bad headache and nausea as a result of the test.  I explained that there is also a very small chance of infection, seizures, and bleeding.  I explained how we would treat a post myelogram headache including bedrest, caffeinated fluids, steroids, and blood patch.  The patient does ask to proceed.    Diagnoses and all orders for this visit:    1. Spinal stenosis of lumbar region with neurogenic claudication (Primary)  -     Obtain Informed Consent; Standing  -     IR Myelogram Lumbar Spine; Future  -     CT Lumbar Spine With Intrathecal Contrast; Future  -     XR Spine Lumbar Complete With  Flex & Ext; Future  -     No Lab Testing Needed; Standing  -     dexamethasone (DECADRON) 4 MG tablet; Take 2 tablets by mouth Take As Directed. Take both tablets by mouth 2 hours before myelogram  Dispense: 2 tablet; Refill: 0      Return for After radiology test.

## 2021-08-02 NOTE — PROGRESS NOTES
"Subjective   Patient ID: Pablo Mendoza is a 60 y.o. male is being seen for consultation today at the request of Liana Hood* for Annular tear of lumbar disc. Patient had an MRI Lumbar on 06/11/2021 at [facility].     Treatment: no recent treatment    Today patient is having low back pain, that radiates to B/L lower extremities. Patient states that he is also having N/T in B/L legs. Patient states that his legs give out intermittently.     Patient, Provider, and MA are all wearing a mask in our office today.     History of Present Illness    This patient has been having pain in his back with radiation to his legs for several years.  The problem has been relatively tolerable until the last year or so when it has gotten worse.  He has no difficulty with bowel bladder control but the pain is severe.  It radiates all the way across his lower back and then into his posterior lateral thigh posterior lateral calf on both legs.  It is worse when he walks any distance.  He has been treated with physical therapy, but no other treatment so far.    The following portions of the patient's history were reviewed and updated as appropriate: allergies, current medications, past family history, past medical history, past social history, past surgical history and problem list.    Review of Systems   Constitutional: Negative for chills and fever.   HENT: Negative for congestion.    Genitourinary: Negative for difficulty urinating and dysuria.   Musculoskeletal: Positive for back pain, gait problem and myalgias.        B/L leg pain   Neurological: Positive for weakness and numbness.        B/L leg N/T, weakness       I have reviewed the review of systems as documented by my MA.      Objective     Vitals:    08/02/21 1344   BP: 135/75   Cuff Size: Adult   Pulse: 88   Temp: 98 °F (36.7 °C)   Weight: 88 kg (194 lb)   Height: 170.2 cm (67\")     Body mass index is 30.38 kg/m².      Physical Exam  Constitutional:       Appearance: He " is well-developed.   HENT:      Head: Normocephalic and atraumatic.   Eyes:      Extraocular Movements: EOM normal.      Conjunctiva/sclera: Conjunctivae normal.      Pupils: Pupils are equal, round, and reactive to light.   Neck:      Vascular: No carotid bruit.   Neurological:      Mental Status: He is oriented to person, place, and time.      Coordination: Finger-Nose-Finger Test and Heel to Shin Test normal.      Gait: Gait is intact.      Deep Tendon Reflexes:      Reflex Scores:       Tricep reflexes are 2+ on the right side and 2+ on the left side.       Bicep reflexes are 2+ on the right side and 2+ on the left side.       Brachioradialis reflexes are 2+ on the right side and 2+ on the left side.       Patellar reflexes are 2+ on the right side and 2+ on the left side.       Achilles reflexes are 2+ on the right side and 2+ on the left side.  Psychiatric:         Speech: Speech normal.       Neurologic Exam     Mental Status   Oriented to person, place, and time.   Registration of memory: Good recent and remote memory.   Attention: normal. Concentration: normal.   Speech: speech is normal   Level of consciousness: alert  Knowledge: consistent with education.     Cranial Nerves     CN II   Visual fields full to confrontation.   Visual acuity: normal    CN III, IV, VI   Pupils are equal, round, and reactive to light.  Extraocular motions are normal.     CN V   Facial sensation intact.   Right corneal reflex: normal  Left corneal reflex: normal    CN VII   Facial expression full, symmetric.   Right facial weakness: none  Left facial weakness: none    CN VIII   Hearing: intact    CN IX, X   Palate: symmetric    CN XI   Right sternocleidomastoid strength: normal  Left sternocleidomastoid strength: normal    CN XII   Tongue: not atrophic  Tongue deviation: none    Motor Exam   Muscle bulk: normal  Right arm tone: normal  Left arm tone: normal  Right leg tone: normal  Left leg tone: normal    Strength   Strength  5/5 except as noted.     Sensory Exam   Light touch normal.     Gait, Coordination, and Reflexes     Gait  Gait: normal    Coordination   Finger to nose coordination: normal  Heel to shin coordination: normal    Reflexes   Right brachioradialis: 2+  Left brachioradialis: 2+  Right biceps: 2+  Left biceps: 2+  Right triceps: 2+  Left triceps: 2+  Right patellar: 2+  Left patellar: 2+  Right achilles: 2+  Left achilles: 2+  Right : 2+  Left : 2+          Assessment/Plan   Independent Review of Radiographic Studies:      I personally reviewed the images from the following studies.    I reviewed an MRI of his lumbar spine done on 11 June.  This does show some bulging and some stenosis at what appears to be L4-5.  On the axial images the T12-L1 and L1-2 levels are widely open as is L2-3.  L3-4 is open as well.  L4-5 shows fluid in the joints and moderate stenosis of the central canal.  The foramina are fairly open.  L5-S1 is open as well.    Medical Decision Making:      I told the patient and his wife about the imaging.  I told him that we could certainly try some lumbar epidural blocks but they are like to work long-term since he has had the problem for so long.  The other option would be to proceed with a lumbar myelogram.  I told the patient what a myelogram involves.  I explained that there is a 50% chance of developing a bad headache and nausea as a result of the test.  I explained that there is also a very small chance of infection, seizures, and bleeding.  I explained how we would treat a post myelogram headache including bedrest, caffeinated fluids, steroids, and blood patch.  The patient does ask to proceed.    Diagnoses and all orders for this visit:    1. Spinal stenosis of lumbar region with neurogenic claudication (Primary)  -     Obtain Informed Consent; Standing  -     IR Myelogram Lumbar Spine; Future  -     CT Lumbar Spine With Intrathecal Contrast; Future  -     XR Spine Lumbar Complete With  Flex & Ext; Future  -     No Lab Testing Needed; Standing  -     dexamethasone (DECADRON) 4 MG tablet; Take 2 tablets by mouth Take As Directed. Take both tablets by mouth 2 hours before myelogram  Dispense: 2 tablet; Refill: 0      Return for After radiology test.

## 2021-08-05 ENCOUNTER — HOSPITAL ENCOUNTER (OUTPATIENT)
Dept: CT IMAGING | Facility: HOSPITAL | Age: 61
Discharge: HOME | End: 2021-08-05

## 2021-08-05 ENCOUNTER — HOSPITAL ENCOUNTER (OUTPATIENT)
Dept: GENERAL RADIOLOGY | Facility: HOSPITAL | Age: 61
Discharge: HOME | End: 2021-08-05

## 2021-08-05 VITALS
HEIGHT: 67 IN | DIASTOLIC BLOOD PRESSURE: 72 MMHG | RESPIRATION RATE: 18 BRPM | WEIGHT: 195 LBS | OXYGEN SATURATION: 97 % | BODY MASS INDEX: 30.61 KG/M2 | HEART RATE: 74 BPM | TEMPERATURE: 97.8 F | SYSTOLIC BLOOD PRESSURE: 124 MMHG

## 2021-08-05 DIAGNOSIS — M48.062 SPINAL STENOSIS OF LUMBAR REGION WITH NEUROGENIC CLAUDICATION: ICD-10-CM

## 2021-08-05 PROCEDURE — 62304 MYELOGRAPHY LUMBAR INJECTION: CPT

## 2021-08-05 PROCEDURE — 0 IOPAMIDOL 41 % SOLUTION: Performed by: NEUROLOGICAL SURGERY

## 2021-08-05 PROCEDURE — 72114 X-RAY EXAM L-S SPINE BENDING: CPT

## 2021-08-05 PROCEDURE — 72240 MYELOGRAPHY NECK SPINE: CPT

## 2021-08-05 PROCEDURE — 62284 INJECTION FOR MYELOGRAM: CPT | Performed by: NEUROLOGICAL SURGERY

## 2021-08-05 PROCEDURE — 25010000003 LIDOCAINE 1 % SOLUTION: Performed by: NEUROLOGICAL SURGERY

## 2021-08-05 PROCEDURE — 72132 CT LUMBAR SPINE W/DYE: CPT

## 2021-08-05 RX ORDER — HYDROCODONE BITARTRATE AND ACETAMINOPHEN 5; 325 MG/1; MG/1
1 TABLET ORAL EVERY 4 HOURS PRN
Status: DISCONTINUED | OUTPATIENT
Start: 2021-08-05 | End: 2021-08-06 | Stop reason: HOSPADM

## 2021-08-05 RX ORDER — LIDOCAINE HYDROCHLORIDE 10 MG/ML
10 INJECTION, SOLUTION INFILTRATION; PERINEURAL ONCE
Status: COMPLETED | OUTPATIENT
Start: 2021-08-05 | End: 2021-08-05

## 2021-08-05 RX ORDER — ACETAMINOPHEN 325 MG/1
650 TABLET ORAL EVERY 4 HOURS PRN
Status: DISCONTINUED | OUTPATIENT
Start: 2021-08-05 | End: 2021-08-06 | Stop reason: HOSPADM

## 2021-08-05 RX ADMIN — LIDOCAINE HYDROCHLORIDE 5 ML: 10 INJECTION, SOLUTION INFILTRATION; PERINEURAL at 07:32

## 2021-08-05 RX ADMIN — HYDROCODONE BITARTRATE AND ACETAMINOPHEN 1 TABLET: 5; 325 TABLET ORAL at 08:27

## 2021-08-05 RX ADMIN — IOPAMIDOL 20 ML: 408 INJECTION, SOLUTION INTRATHECAL at 07:34

## 2021-08-05 NOTE — NURSING NOTE
Patient arrived for myelogram.    Protective goggles and mask in place with all patient interactions today

## 2021-08-05 NOTE — DISCHARGE INSTRUCTIONS
EDUCATION /DISCHARGE INSTRUCTIONS:    A myelogram is a special radiology procedure of the spinal cord, spinal nerves and other related structures.  You will be awake during the examination.  An area of your lower back will be cleansed with an antiseptic solution.  The physician will inject a numbing medication in your lower back.  While your back is numb, a needle will be placed in the lower back area.  A small amount of spinal fluid may be withdrawn and sent to the lab if ordered by your physician. While the needle is in the back, an injection of a contrast material (xray dye) will be given through the needle.  The contrast material will allow the physician to see the spinal cord and spinal nerves.  Once injected, the needle will be removed and a band aid will be placed over the injection site.  The table will be tilted during the process to allow the contrast material to flow to particular areas in the spine.  Following the injection and xrays, you will be taken to the CT scanner where more pictures will be taken. After the procedure is finished, the contrast material will be absorbed by your body and eliminated through your kidneys.  The radiologist will study and interpret your myelogram and send the results to your physician.  Procedure risks of a myelogram include, but are not limited to:  *  Bleeding   *  Seizure  *  Infection   *  Headache, possibly severe requiring a blood patch  *  Contrast reaction  *  Nerve or cord injury  *  Paralysis and death    Benefits of the procedure:  *  Best examination for delineating pathology related to spinal cord compression from a disc and/or nerve root compression  Alternatives to the procedure:  MRI - a non invasive procedure requiring intravenous contrast injection.  Cannot be done on patients with certain pacemakers or metal in the body.  MRI risks include possible reaction to the contrast material, movement of metal located in the body.Benefit to MRI:  Non-invasive  and usually painless procedure.  THIS EDUCATION INFORMATION WAS REVIEWED PRIOR TO PROCEDURE AND CONSENT. Patient initials______CGM__________Time______7:18 am____________    24 hour rest period ends ____11am Friday 8/6/21________________.  Important information following your myelogram:  * ACTIVITY:   *  You may sit up in the car to go home.  *  When you get home, remain on bed rest (flat on your back or on your side) for 24 hours. You may place a rolled up towel under your neck for support   * You may get up to the bathroom and sit up to eat and drink then lie back down  * Drink additional fluids for 24 hours after the myelogram.   * Continue to drink additional fluids for the next 2-3 days. Water and caffeinated beverages are encouraged.  * Remain less active for the next two to three days.  * Do not drive for 24 hours following a myelogram.  * You may remove the bandage and shower in the morning.  * Resume taking  (Glucophage/Metformin) in 48 hrs. Your next dose will be: _____8/7/21_________  *Resume taking aspirin today  CALL YOUR PHYSICIAN FOR THE FOLLOWING:  * Pain at the injection site  * Redness, swelling, bruising or drainage at the injection site.  * A fever by mouth of 101.0 or any new symptoms  Headaches are a common side effect after a myelogram.  If you get a headache, you should stay flat in bed and drink plenty of fluids. If the headache persists and does not go away with rest/medication, CALL Dr. Webster at [phone]

## 2021-08-16 NOTE — PROGRESS NOTES
"Subjective   Patient ID: Pablo Mendoza is a 60 y.o. male is here today for follow-up with a Lumbar Myelogram that was ordered on 05.07.2021 for back pain and B/L leg pain.    Today patient's symptoms are worsening. Patient states that his legs are becoming weaker. Patient is having low back pain that radiates to B/L lower extremities. Patient is also having N/T in B/L legs.     Patient, Provider, and MA are all wearing a mask in our office today.     History of Present Illness     This patient returns today.  He continues with pain in both of his legs as well as his back.  It is a little worse on the left than the right but is bad on both sides.    The following portions of the patient's history were reviewed and updated as appropriate: allergies, current medications, past family history, past medical history, past social history, past surgical history and problem list.    Review of Systems   Constitutional: Negative for chills and fever.   HENT: Negative for congestion.    Genitourinary: Negative for difficulty urinating and dysuria.   Musculoskeletal: Positive for back pain, gait problem and myalgias.        B.L leg pain   Neurological: Positive for weakness and numbness.       I have reviewed the review of systems as documented by my MA.      Objective     Vitals:    08/19/21 0908   BP: 139/82   Cuff Size: Adult   Pulse: 98   Temp: 97.9 °F (36.6 °C)   Weight: 88.5 kg (195 lb)   Height: 170.2 cm (67\")     Body mass index is 30.54 kg/m².      Physical Exam  Neurological:      Mental Status: He is alert and oriented to person, place, and time.       Neurologic Exam     Mental Status   Oriented to person, place, and time.           Assessment/Plan   Independent Review of Radiographic Studies:      I personally reviewed the images from the following studies.    I reviewed his plain films, myelogram, and CT scan myself.  The plain films show some mild degenerative change but not severe.  There is no overt " instability on flexion and extension.  On the myelogram films there is a definite nerve root cut out on the right side at L4-5.  Left side at that level appears to fill out pretty well.  On the standing films there was an initial subtotal block at that level.  On the post myelographic CT scan the lower thoracic spine down to the L3 vertebrae looks okay.  L3-4 is fairly open but L4-5 shows a disc herniation to the left side into the neuroforamen and also the lateral recess.  L5-S1 mostly looks okay.    Medical Decision Making:      I told the patient and his wife about the imaging.  I told him that from my point of view there is nothing here so severe that we absolutely have to do surgery but if his pain is such that he cannot live with it it is reasonable to proceed.  I recommended a lumbar laminectomy and fusion.  I think he needs a fusion because his facets are so sagittally oriented at L4-5 that there is no way that we can adequately decompress him without destabilizing him.  I told the patient about the risks, complications and expected outcome of the lumbar surgery.  I explained that there was an 80% chance of getting rid of the pain in the leg.  I explained that there would still be back pain after the surgery.  Initially this will be quite severe but will improve over time.  There is a 2 or 3% chance of infection, bleeding, CSF leak, damage to the nerve as a result of surgery, paralysis, as well as anesthetic risk.  There is a 10% chance of recurrent problems.  There is a 10% chance of nonunion or failure of the instrumentation.  We discussed the postoperative hospital and home course.  The patient does ask proceed.    He will need to be scheduled for a: Lumbar 4 lumbar 5 laminectomy with a fusion    Diagnoses and all orders for this visit:    1. Spinal stenosis of lumbar region with neurogenic claudication (Primary)      Return for 2-3 week post op.

## 2021-08-19 ENCOUNTER — TELEPHONE (OUTPATIENT)
Dept: NEUROSURGERY | Facility: CLINIC | Age: 61
End: 2021-08-19

## 2021-08-19 ENCOUNTER — OFFICE VISIT (OUTPATIENT)
Dept: NEUROSURGERY | Facility: CLINIC | Age: 61
End: 2021-08-19

## 2021-08-19 ENCOUNTER — PREP FOR SURGERY (OUTPATIENT)
Dept: OTHER | Facility: HOSPITAL | Age: 61
End: 2021-08-19

## 2021-08-19 VITALS
HEART RATE: 98 BPM | WEIGHT: 195 LBS | SYSTOLIC BLOOD PRESSURE: 139 MMHG | BODY MASS INDEX: 30.61 KG/M2 | HEIGHT: 67 IN | DIASTOLIC BLOOD PRESSURE: 82 MMHG | TEMPERATURE: 97.9 F

## 2021-08-19 DIAGNOSIS — M48.062 SPINAL STENOSIS OF LUMBAR REGION WITH NEUROGENIC CLAUDICATION: Primary | ICD-10-CM

## 2021-08-19 PROCEDURE — 99213 OFFICE O/P EST LOW 20 MIN: CPT | Performed by: NEUROLOGICAL SURGERY

## 2021-08-19 RX ORDER — CEFAZOLIN SODIUM 2 G/100ML
2 INJECTION, SOLUTION INTRAVENOUS ONCE
Status: CANCELLED | OUTPATIENT
Start: 2021-09-15 | End: 2021-08-19

## 2021-08-19 NOTE — TELEPHONE ENCOUNTER
Caller: Pablo Mendoza    Relationship: Self    Best call back number: [phone]    What form or medical record are you requesting: WORK NOTE FOR BEING SEEN TODAY    Who is requesting this form or medical record from you: PT FOR HIS EMPLOYER.    How would you like to receive the form or medical records (pick-up, mail, fax):PT ASKS FOR LETTER TO BE EMAILED TO HIM.  NEEDS TO TAKE IT TO EMPLOYER GO BACK TO WORK TOMORROW 8/20/2021      Timeframe paperwork needed: DONE TODAY    PLEASE E-MAIL TO [email or web address]    PLEASE REVIEW AND ADVISE    THANK YOU

## 2021-08-23 ENCOUNTER — TELEPHONE (OUTPATIENT)
Dept: INTERNAL MEDICINE | Facility: CLINIC | Age: 61
End: 2021-08-23

## 2021-08-23 NOTE — TELEPHONE ENCOUNTER
Note typed. Pt is going to try to print from [employer] he cannot, he will c/b and let us know that his wife will  in office.

## 2021-08-25 ENCOUNTER — TELEPHONE (OUTPATIENT)
Dept: INTERNAL MEDICINE | Facility: CLINIC | Age: 61
End: 2021-08-25

## 2021-08-25 NOTE — TELEPHONE ENCOUNTER
Caller: Pablo Mendoza    Relationship to patient: Self    Best call back number: [phone]    Patient is needing: PATIENT CALLING TO THAT HE BLACKED OUT AT WORK EARLIER TODAY. PATIENT STATES HE WAS HAVING SEVERE BACK PAIN AND BECAME OVER HEATED AND DISORIENTED AND BLACKED OUT. PATIENT'S JOB CALLED 911 AND AMBULANCE CHECKED PATIENT'S BP THAT READ 169/130. PATIENT SIGNED AMA WITH AMBULANCE AND DECIDED HE WOULD GO HOME AND REST SINCE HE WAS FEELING BETTER.

## 2021-08-25 NOTE — TELEPHONE ENCOUNTER
Pt needs to be evaluated today in ED.  bp high and is he blacked out from the heat needs evaluation for heat stroke.

## 2021-08-25 NOTE — TELEPHONE ENCOUNTER
Called and spoke with pt and advised him to go to the ED and he states his BP is 148/86 and he is feeling better, he is laying down resting. He said that he would rather not go to the ED due to covid right now.

## 2021-08-30 RX ORDER — SITAGLIPTIN AND METFORMIN HYDROCHLORIDE 1000; 50 MG/1; MG/1
TABLET, FILM COATED, EXTENDED RELEASE ORAL
Qty: 60 TABLET | Refills: 3 | Status: SHIPPED | OUTPATIENT
Start: 2021-08-30 | End: 2022-02-07

## 2021-09-02 ENCOUNTER — TELEPHONE (OUTPATIENT)
Dept: NEUROSURGERY | Facility: CLINIC | Age: 61
End: 2021-09-02

## 2021-09-02 NOTE — TELEPHONE ENCOUNTER
Called the patient to let him know we unfortunately have to post-pone his surgery that was scheduled for 9/15/2021 due to current Covid restrictions around elective surgery's requiring overnight stay. He was highly upset cursing at me that he is in pain and we don't know how much work he has put into getting his short-term disability and paperwork with work. I apologized to him and said it is due to bed availability and the elective surgery's that require an overnight stay must be post-poned. I asked him if he needed us to fill anything out. He is requesting a letter from us just stating that his elective surgery was postponed due to Covid restrictions at the hospital and it will be r/s at a later date that is currently unknown. I told him we will get him r/s for surgery as soon as possible once the restrictions are lifted. He would like to  the letter from our office.    Called surgery scheduling and spoke with Kelsy and took the patient off of 9/15/2021 and placed him back in the depot.

## 2021-09-03 ENCOUNTER — TELEPHONE (OUTPATIENT)
Dept: INTERNAL MEDICINE | Facility: CLINIC | Age: 61
End: 2021-09-03

## 2021-09-03 NOTE — TELEPHONE ENCOUNTER
Caller: Pablo Mendoza    Relationship: Self    Best call back number: [phone]    What is the best time to reach you: ANY TIME    Who are you requesting to speak with (clinical staff, provider,  specific staff member): CLINICAL    What was the call regarding: PATIENT CALLED TO CHECK ON THE STATUS OF THE PAPERWORK HE DROPPED OFF FOR DR. HOFFMAN TO SIGN FOR HIS JOB.  PATIENT HAS REQUESTED FOR THE OFFICE TO CALL ONCE THIS IS READY.

## 2021-09-10 ENCOUNTER — APPOINTMENT (OUTPATIENT)
Dept: PREADMISSION TESTING | Facility: HOSPITAL | Age: 61
End: 2021-09-10

## 2021-09-13 DIAGNOSIS — G43.801 OTHER MIGRAINE WITH STATUS MIGRAINOSUS, NOT INTRACTABLE: ICD-10-CM

## 2021-09-14 ENCOUNTER — TELEPHONE (OUTPATIENT)
Dept: INTERNAL MEDICINE | Facility: CLINIC | Age: 61
End: 2021-09-14

## 2021-09-14 DIAGNOSIS — G43.801 OTHER MIGRAINE WITH STATUS MIGRAINOSUS, NOT INTRACTABLE: ICD-10-CM

## 2021-09-14 RX ORDER — IBUPROFEN 800 MG/1
800 TABLET ORAL EVERY 8 HOURS PRN
Qty: 30 TABLET | Refills: 0 | Status: SHIPPED | OUTPATIENT
Start: 2021-09-14 | End: 2021-09-22

## 2021-09-14 NOTE — TELEPHONE ENCOUNTER
----- Message from Queta Mcbride MA sent at 9/13/2021  8:13 AM EDT -----  Regarding: FW: Prescription Question  Contact: [phone]  Please advise.   ----- Message -----  From: Pablo Mendoza  Sent: 9/11/2021   7:03 PM EDT  To: [facility]  Subject: Prescription Question                            could you refill my ibuprofen please, i in a requested for it at the pharmacy, they had to send it to your office for refill , but no response as of yet , it's been two weeks.  they are rescheduling my sugery on my back for october 22nd.  thank.s

## 2021-09-22 DIAGNOSIS — G43.801 OTHER MIGRAINE WITH STATUS MIGRAINOSUS, NOT INTRACTABLE: ICD-10-CM

## 2021-09-22 RX ORDER — IBUPROFEN 800 MG/1
TABLET ORAL
Qty: 30 TABLET | Refills: 0 | Status: SHIPPED | OUTPATIENT
Start: 2021-09-22 | End: 2021-10-04

## 2021-09-27 ENCOUNTER — TELEPHONE (OUTPATIENT)
Dept: NEUROSURGERY | Facility: CLINIC | Age: 61
End: 2021-09-27

## 2021-09-27 NOTE — TELEPHONE ENCOUNTER
Caller: Pablo Mendoza    Relationship to patient: Self    Best call back number: [phone]    Chief complaint: LUC SURGERY    Type of visit: SURGERY    Requested date: 1ST WEEK OF NOVEMEBER     If rescheduling, when is the original appointment: 10/22/2021    Additional notes:    PT CALLED TO SEE IF HE IS ABLE TO RECHED HIS SURGERY FROM 10/22 TO THE 1ST WEEK OF November. HE WOULD LIKE A CALL BACK TODAY SINCE HE IS OFF WORK.     ATTEMPTED TO WARM TRANSFER SPOKE TO CHIKIS AND SHE ATTEMPTED TO REACH DAVID. SENDING ENCOUNTER FOR A CALL BACK     THANK YOU

## 2021-09-27 NOTE — TELEPHONE ENCOUNTER
PATIENT CALLED REQUESTING A STATUS ON THE PAPERWORK AND WONDERING IF HE COULD PICK IT UP TODAY    CALLBACK [phone]

## 2021-10-01 DIAGNOSIS — E11.29 TYPE 2 DIABETES MELLITUS WITH PROTEINURIA (HCC): ICD-10-CM

## 2021-10-01 DIAGNOSIS — R80.9 TYPE 2 DIABETES MELLITUS WITH PROTEINURIA (HCC): ICD-10-CM

## 2021-10-01 DIAGNOSIS — E11.9 TYPE 2 DIABETES MELLITUS WITHOUT COMPLICATION, WITHOUT LONG-TERM CURRENT USE OF INSULIN (HCC): ICD-10-CM

## 2021-10-01 RX ORDER — EMPAGLIFLOZIN 25 MG/1
TABLET, FILM COATED ORAL
Qty: 30 TABLET | Refills: 4 | OUTPATIENT
Start: 2021-10-01

## 2021-10-03 DIAGNOSIS — G43.801 OTHER MIGRAINE WITH STATUS MIGRAINOSUS, NOT INTRACTABLE: ICD-10-CM

## 2021-10-04 RX ORDER — IBUPROFEN 800 MG/1
TABLET ORAL
Qty: 30 TABLET | Refills: 0 | Status: SHIPPED | OUTPATIENT
Start: 2021-10-04 | End: 2021-10-14

## 2021-10-14 DIAGNOSIS — G43.801 OTHER MIGRAINE WITH STATUS MIGRAINOSUS, NOT INTRACTABLE: ICD-10-CM

## 2021-10-14 RX ORDER — IBUPROFEN 800 MG/1
TABLET ORAL
Qty: 30 TABLET | Refills: 0 | Status: SHIPPED | OUTPATIENT
Start: 2021-10-14 | End: 2021-10-29

## 2021-10-18 ENCOUNTER — TELEPHONE (OUTPATIENT)
Dept: INTERNAL MEDICINE | Facility: CLINIC | Age: 61
End: 2021-10-18

## 2021-10-18 ENCOUNTER — TELEPHONE (OUTPATIENT)
Dept: NEUROSURGERY | Facility: CLINIC | Age: 61
End: 2021-10-18

## 2021-10-18 DIAGNOSIS — E11.9 TYPE 2 DIABETES MELLITUS WITHOUT COMPLICATION, WITHOUT LONG-TERM CURRENT USE OF INSULIN: ICD-10-CM

## 2021-10-18 DIAGNOSIS — R80.9 TYPE 2 DIABETES MELLITUS WITH PROTEINURIA: ICD-10-CM

## 2021-10-18 DIAGNOSIS — E11.29 TYPE 2 DIABETES MELLITUS WITH PROTEINURIA: ICD-10-CM

## 2021-10-18 NOTE — TELEPHONE ENCOUNTER
Caller: Pablo Mendoza    Relationship: Self      Medication requested (name and dosage):Jardiance 25 MG tablet  Pharmacy where request should be sent: [facility] - [phone]  - [phone] FX      Additional details provided by patient: PATIENT IS HAVING BACK SURGERY ON 11/03 IT WAS RESCHEDULED A COUPLE OF TIMES.  IS ASKING IF THIS CAN BE REFILLED.  HE WILL BE OFF FROM WORK FOR 12 WEEKS AFTER SURGERY   Best call back number:[phone]    Does the patient have less than a 3 day supply:  [] Yes  [] No    King Euceda Rep   10/18/21 12:17 EDT         DELETE AFTER READING TO PATIENT: “Thank you for sharing this information with me. I will send a message to the clinical team. Please allow 48 hours for the clinical staff to follow up on this request.”

## 2021-10-19 ENCOUNTER — APPOINTMENT (OUTPATIENT)
Dept: PREADMISSION TESTING | Facility: HOSPITAL | Age: 61
End: 2021-10-19

## 2021-10-23 DIAGNOSIS — I10 ESSENTIAL HYPERTENSION: ICD-10-CM

## 2021-10-25 ENCOUNTER — PRE-ADMISSION TESTING (OUTPATIENT)
Dept: PREADMISSION TESTING | Facility: HOSPITAL | Age: 61
End: 2021-10-25

## 2021-10-25 VITALS
WEIGHT: 198.5 LBS | BODY MASS INDEX: 31.16 KG/M2 | RESPIRATION RATE: 18 BRPM | HEART RATE: 67 BPM | SYSTOLIC BLOOD PRESSURE: 138 MMHG | HEIGHT: 67 IN | DIASTOLIC BLOOD PRESSURE: 83 MMHG | OXYGEN SATURATION: 96 % | TEMPERATURE: 98.4 F

## 2021-10-25 LAB
ANION GAP SERPL CALCULATED.3IONS-SCNC: 16.5 MMOL/L (ref 5–15)
BUN SERPL-MCNC: 21 MG/DL (ref 6–20)
BUN/CREAT SERPL: 20.6 (ref 7–25)
CALCIUM SPEC-SCNC: 10.1 MG/DL (ref 8.6–10.5)
CHLORIDE SERPL-SCNC: 101 MMOL/L (ref 98–107)
CO2 SERPL-SCNC: 25.5 MMOL/L (ref 22–29)
CREAT SERPL-MCNC: 1.02 MG/DL (ref 0.76–1.27)
DEPRECATED RDW RBC AUTO: 49.2 FL (ref 37–54)
ERYTHROCYTE [DISTWIDTH] IN BLOOD BY AUTOMATED COUNT: 15.2 % (ref 12.3–15.4)
GFR SERPL CREATININE-BSD FRML MDRD: 75 ML/MIN/1.73
GLUCOSE SERPL-MCNC: 137 MG/DL (ref 65–99)
HCT VFR BLD AUTO: 52.3 % (ref 37.5–51)
HGB BLD-MCNC: 17 G/DL (ref 13–17.7)
MCH RBC QN AUTO: 29.1 PG (ref 26.6–33)
MCHC RBC AUTO-ENTMCNC: 32.5 G/DL (ref 31.5–35.7)
MCV RBC AUTO: 89.4 FL (ref 79–97)
PLATELET # BLD AUTO: 200 10*3/MM3 (ref 140–450)
PMV BLD AUTO: 10.2 FL (ref 6–12)
POTASSIUM SERPL-SCNC: 3.8 MMOL/L (ref 3.5–5.2)
QT INTERVAL: 426 MS
RBC # BLD AUTO: 5.85 10*6/MM3 (ref 4.14–5.8)
SODIUM SERPL-SCNC: 143 MMOL/L (ref 136–145)
WBC # BLD AUTO: 8.91 10*3/MM3 (ref 3.4–10.8)

## 2021-10-25 PROCEDURE — 80048 BASIC METABOLIC PNL TOTAL CA: CPT

## 2021-10-25 PROCEDURE — 36415 COLL VENOUS BLD VENIPUNCTURE: CPT

## 2021-10-25 PROCEDURE — 93010 ELECTROCARDIOGRAM REPORT: CPT | Performed by: INTERNAL MEDICINE

## 2021-10-25 PROCEDURE — 93005 ELECTROCARDIOGRAM TRACING: CPT

## 2021-10-25 PROCEDURE — 85027 COMPLETE CBC AUTOMATED: CPT

## 2021-10-25 RX ORDER — MULTIPLE VITAMINS W/ MINERALS TAB 9MG-400MCG
1 TAB ORAL DAILY
COMMUNITY

## 2021-10-25 RX ORDER — METOPROLOL TARTRATE 100 MG/1
TABLET ORAL
Qty: 60 TABLET | Refills: 1 | Status: SHIPPED | OUTPATIENT
Start: 2021-10-25 | End: 2021-12-27

## 2021-10-25 RX ORDER — ACETAMINOPHEN 500 MG
500 TABLET ORAL EVERY 6 HOURS PRN
COMMUNITY

## 2021-10-25 NOTE — DISCHARGE INSTRUCTIONS
Arrive to hospital on your day of surgery at 930AM    Take the following medications the morning of surgery: AMLODIPINE AND METOPROLOL      If you are on prescription narcotic pain medication to control your pain you may also take that medication the morning of surgery.    General Instructions:  • Do not eat solid food after midnight the night before surgery.  • You may drink clear liquids day of surgery but must stop at least one hour before your hospital arrival time.  • It is beneficial for you to have a clear drink that contains carbohydrates the day of surgery.  We suggest a 12 to 20 ounce bottle of Gatorade or Powerade for non-diabetic patients or a 12 to 20 ounce bottle of G2 or Powerade Zero for diabetic patients. (Pediatric patients, are not advised to drink a 12 to 20 ounce carbohydrate drink)    Clear liquids are liquids you can see through.  Nothing red in color.     Plain water                               Sports drinks  Sodas                                   Gelatin (Jell-O)  Fruit juices without pulp such as white grape juice and apple juice  Popsicles that contain no fruit or yogurt  Tea or coffee (no cream or milk added)  Gatorade / Powerade  G2 / Powerade Zero    • Infants may have breast milk up to four hours before surgery.  • Infants drinking formula may drink formula up to six hours before surgery.   • Patients who avoid smoking, chewing tobacco and alcohol for 4 weeks prior to surgery have a reduced risk of post-operative complications.  Quit smoking as many days before surgery as you can.  • Do not smoke, use chewing tobacco or drink alcohol the day of surgery.   • If applicable bring your C-PAP/ BI-PAP machine.  • Bring any papers given to you in the doctor’s office.  • Wear clean comfortable clothes.  • Do not wear contact lenses, false eyelashes or make-up.  Bring a case for your glasses.   • Bring crutches or walker if applicable.  • Remove all piercings.  Leave jewelry and any other  valuables at home.  • Hair extensions with metal clips must be removed prior to surgery.  • The Pre-Admission Testing nurse will instruct you to bring medications if unable to obtain an accurate list in Pre-Admission Testing.        If you were given a blood bank ID arm band remember to bring it with you the day of surgery.    Preventing a Surgical Site Infection:  • For 2 to 3 days before surgery, avoid shaving with a razor because the razor can irritate skin and make it easier to develop an infection.    • Any areas of open skin can increase the risk of a post-operative wound infection by allowing bacteria to enter and travel throughout the body.  Notify your surgeon if you have any skin wounds / rashes even if it is not near the expected surgical site.  The area will need assessed to determine if surgery should be delayed until it is healed.  • The night prior to surgery shower using a fresh bar of anti-bacterial soap (such as Dial) and clean washcloth.  Sleep in a clean bed with clean clothing.  Do not allow pets to sleep with you.  • Shower on the morning of surgery using a fresh bar of anti-bacterial soap (such as Dial) and clean washcloth.  Dry with a clean towel and dress in clean clothing.  • Ask your surgeon if you will be receiving antibiotics prior to surgery.  • Make sure you, your family, and all healthcare providers clean their hands with soap and water or an alcohol based hand  before caring for you or your wound.    Day of surgery:  Your arrival time is approximately two hours before your scheduled surgery time.  Upon arrival, a Pre-op nurse and Anesthesiologist will review your health history, obtain vital signs, and answer questions you may have.  The only belongings needed at this time will be a list of your home medications and if applicable your C-PAP/BI-PAP machine.  A Pre-op nurse will start an IV and you may receive medication in preparation for surgery, including something to help  you relax.     Please be aware that surgery does come with discomfort.  We want to make every effort to control your discomfort so please discuss any uncontrolled symptoms with your nurse.   Your doctor will most likely have prescribed pain medications.      If you are going home after surgery you will receive individualized written care instructions before being discharged.  A responsible adult must drive you to and from the hospital on the day of your surgery and stay with you for 24 hours.  Discharge prescriptions can be filled by the hospital pharmacy during regular pharmacy hours.  If you are having surgery late in the day/evening your prescription may be e-prescribed to your pharmacy.  Please verify your pharmacy hours or chose a 24 hour pharmacy to avoid not having access to your prescription because your pharmacy has closed for the day.    If you are staying overnight following surgery, you will be transported to your hospital room following the recovery period.  [facility] has all private rooms.    If you have any questions please call Pre-Admission Testing at [phone].  Deductibles and co-payments are collected on the day of service. Please be prepared to pay the required co-pay, deductible or deposit on the day of service as defined by your plan.    Patient Education for Self-Quarantine Process    • Following your COVID testing, we strongly recommend that you wear a mask when you are with other people and practice social distancing.   • Limit your activities to only required outings.  • Wash your hands with soap and water frequently for at least 20 seconds.   • Avoid touching your eyes, nose and mouth with unwashed hands.  • Do not share anything - utensils, drinking glasses, food from the same bowl.   • Sanitize household surfaces daily. Include all high touch areas (door handles, light switches, phones, countertops, etc.)    Call your surgeon immediately if you experience any of the  following symptoms:  • Sore Throat  • Shortness of Breath or difficulty breathing  • Cough  • Chills  • Body soreness or muscle pain  • Headache  • Fever  • New loss of taste or smell  • Do not arrive for your surgery ill.  Your procedure will need to be rescheduled to another time.  You will need to call your physician before the day of surgery to avoid any unnecessary exposure to hospital staff as well as other patients.      CHLORHEXIDINE CLOTH INSTRUCTIONS  The morning of surgery follow these instructions using the Chlorhexidine cloths you've been given.  These steps reduce bacteria on the body.  Do not use the cloths near your eyes, ears mouth, genitalia or on open wounds.  Throw the cloths away after use but do not try to flush them down a toilet.      • Open and remove one cloth at a time from the package.    • Leave the cloth unfolded and begin the bathing.  • Massage the skin with the cloths using gentle pressure to remove bacteria.  Do not scrub harshly.   • Follow the steps below with one 2% CHG cloth per area (6 total cloths).  • One cloth for neck, shoulders and chest.  • One cloth for both arms, hands, fingers and underarms (do underarms last).  • One cloth for the abdomen followed by groin.  • One cloth for right leg and foot including between the toes.  • One cloth for left leg and foot including between the toes.  • The last cloth is to be used for the back of the neck, back and buttocks.    Allow the CHG to air dry 3 minutes on the skin which will give it time to work and decrease the chance of irritation.  The skin may feel sticky until it is dry.  Do not rinse with water or any other liquid or you will lose the beneficial effects of the CHG.  If mild skin irritation occurs, do rinse the skin to remove the CHG.  Report this to the nurse at time of admission.  Do not apply lotions, creams, ointments, deodorants or perfumes after using the clothes. Dress in clean clothes before coming to the  [facility] [place].

## 2021-10-29 DIAGNOSIS — G43.801 OTHER MIGRAINE WITH STATUS MIGRAINOSUS, NOT INTRACTABLE: ICD-10-CM

## 2021-10-29 RX ORDER — IBUPROFEN 800 MG/1
TABLET ORAL
Qty: 30 TABLET | Refills: 0 | Status: SHIPPED | OUTPATIENT
Start: 2021-10-29 | End: 2021-11-06 | Stop reason: HOSPADM

## 2021-11-01 NOTE — PROGRESS NOTES
Subjective   Patient ID: Pablo Mendoza is a 60 y.o. male is here today via telephone for PRE-OP follow-up.    You have chosen to receive care through a telephone visit. Do you consent to use a telephone visit for your medical care today? Yes    We had a telephone visit today.  The patient was at home and I was in the office.  We talked for 5 minutes.    History of Present Illness    This patient continues with pain in his back with radiation into both of his legs.  Is worse on the left than the right but it is severe on both sides.    The following portions of the patient's history were reviewed and updated as appropriate: allergies, current medications, past family history, past medical history, past social history, past surgical history and problem list.    Review of Systems    I have reviewed the review of systems as documented by my MA.      Objective       Physical Exam  Neurological:      Mental Status: He is alert and oriented to person, place, and time.       Neurologic Exam     Mental Status   Oriented to person, place, and time.           Assessment/Plan   Independent Review of Radiographic Studies:      I personally reviewed the images from the following studies.    I again reviewed his plain films, myelogram, and CT scan.  There is definite compression of the nerve on the right side at L4-5 but on the post myelographic CT scan there is a large disc herniation at L4-5 on the left with a little foraminal stenosis on the right.  The other levels all look okay.  He has very sagittally oriented facets and I do not see any way to adequately decompress him without destabilizing him.    Medical Decision Making:      I told the patient again about the surgery.  I told the patient about the risks, complications and expected outcome of the lumbar surgery.  I explained that there was an 80% chance of getting rid of the pain in the leg.  I explained that there would still be back pain after the surgery.   Initially this will be quite severe but will improve over time.  There is a 2 or 3% chance of infection, bleeding, CSF leak, damage to the nerve as a result of surgery, paralysis, as well as anesthetic risk.  There is a 10% chance of recurrent problems.  There is a 10% chance of nonunion or failure of the instrumentation.  We discussed the postoperative hospital and home course.  The patient does ask proceed.    He will need to be scheduled for a: Lumbar 4 lumbar 5 laminectomy with a fusion    Diagnoses and all orders for this visit:    1. Spinal stenosis of lumbar region with neurogenic claudication (Primary)      Return for 2-3 week post op.

## 2021-11-01 NOTE — H&P (VIEW-ONLY)
Subjective   Patient ID: Pablo Mnedoza is a 60 y.o. male is here today via telephone for PRE-OP follow-up.    You have chosen to receive care through a telephone visit. Do you consent to use a telephone visit for your medical care today? Yes    We had a telephone visit today.  The patient was at home and I was in the office.  We talked for 5 minutes.    History of Present Illness    This patient continues with pain in his back with radiation into both of his legs.  Is worse on the left than the right but it is severe on both sides.    The following portions of the patient's history were reviewed and updated as appropriate: allergies, current medications, past family history, past medical history, past social history, past surgical history and problem list.    Review of Systems    I have reviewed the review of systems as documented by my MA.      Objective       Physical Exam  Neurological:      Mental Status: He is alert and oriented to person, place, and time.       Neurologic Exam     Mental Status   Oriented to person, place, and time.           Assessment/Plan   Independent Review of Radiographic Studies:      I personally reviewed the images from the following studies.    I again reviewed his plain films, myelogram, and CT scan.  There is definite compression of the nerve on the right side at L4-5 but on the post myelographic CT scan there is a large disc herniation at L4-5 on the left with a little foraminal stenosis on the right.  The other levels all look okay.  He has very sagittally oriented facets and I do not see any way to adequately decompress him without destabilizing him.    Medical Decision Making:      I told the patient again about the surgery.  I told the patient about the risks, complications and expected outcome of the lumbar surgery.  I explained that there was an 80% chance of getting rid of the pain in the leg.  I explained that there would still be back pain after the surgery.   Initially this will be quite severe but will improve over time.  There is a 2 or 3% chance of infection, bleeding, CSF leak, damage to the nerve as a result of surgery, paralysis, as well as anesthetic risk.  There is a 10% chance of recurrent problems.  There is a 10% chance of nonunion or failure of the instrumentation.  We discussed the postoperative hospital and home course.  The patient does ask proceed.    He will need to be scheduled for a: Lumbar 4 lumbar 5 laminectomy with a fusion    Diagnoses and all orders for this visit:    1. Spinal stenosis of lumbar region with neurogenic claudication (Primary)      Return for 2-3 week post op.

## 2021-11-02 ENCOUNTER — APPOINTMENT (OUTPATIENT)
Dept: LAB | Facility: HOSPITAL | Age: 61
End: 2021-11-02

## 2021-11-02 ENCOUNTER — ANESTHESIA EVENT (OUTPATIENT)
Dept: PERIOP | Facility: HOSPITAL | Age: 61
End: 2021-11-02

## 2021-11-02 ENCOUNTER — OFFICE VISIT (OUTPATIENT)
Dept: NEUROSURGERY | Facility: CLINIC | Age: 61
End: 2021-11-02

## 2021-11-02 ENCOUNTER — LAB (OUTPATIENT)
Dept: LAB | Facility: HOSPITAL | Age: 61
End: 2021-11-02

## 2021-11-02 DIAGNOSIS — M48.062 SPINAL STENOSIS OF LUMBAR REGION WITH NEUROGENIC CLAUDICATION: Primary | ICD-10-CM

## 2021-11-02 LAB — SARS-COV-2 ORF1AB RESP QL NAA+PROBE: NOT DETECTED

## 2021-11-02 PROCEDURE — U0004 COV-19 TEST NON-CDC HGH THRU: HCPCS

## 2021-11-02 PROCEDURE — 99441 PR PHYS/QHP TELEPHONE EVALUATION 5-10 MIN: CPT | Performed by: NEUROLOGICAL SURGERY

## 2021-11-02 PROCEDURE — C9803 HOPD COVID-19 SPEC COLLECT: HCPCS

## 2021-11-03 ENCOUNTER — ANESTHESIA (OUTPATIENT)
Dept: PERIOP | Facility: HOSPITAL | Age: 61
End: 2021-11-03

## 2021-11-03 ENCOUNTER — APPOINTMENT (OUTPATIENT)
Dept: GENERAL RADIOLOGY | Facility: HOSPITAL | Age: 61
End: 2021-11-03

## 2021-11-03 ENCOUNTER — HOSPITAL ENCOUNTER (OUTPATIENT)
Facility: HOSPITAL | Age: 61
Discharge: HOME | End: 2021-11-06
Attending: NEUROLOGICAL SURGERY | Admitting: NEUROLOGICAL SURGERY

## 2021-11-03 DIAGNOSIS — Z98.1 S/P LUMBAR FUSION: Primary | ICD-10-CM

## 2021-11-03 DIAGNOSIS — M48.062 SPINAL STENOSIS OF LUMBAR REGION WITH NEUROGENIC CLAUDICATION: ICD-10-CM

## 2021-11-03 LAB
GLUCOSE BLDC GLUCOMTR-MCNC: 162 MG/DL (ref 70–130)
GLUCOSE BLDC GLUCOMTR-MCNC: 199 MG/DL (ref 70–130)
GLUCOSE BLDC GLUCOMTR-MCNC: 224 MG/DL (ref 70–130)
HBA1C MFR BLD: 6.74 % (ref 4.8–5.6)

## 2021-11-03 PROCEDURE — 0 CEFAZOLIN IN DEXTROSE 2-4 GM/100ML-% SOLUTION: Performed by: NURSE ANESTHETIST, CERTIFIED REGISTERED

## 2021-11-03 PROCEDURE — C1713 ANCHOR/SCREW BN/BN,TIS/BN: HCPCS | Performed by: NEUROLOGICAL SURGERY

## 2021-11-03 PROCEDURE — 25010000002 ONDANSETRON PER 1 MG: Performed by: NURSE ANESTHETIST, CERTIFIED REGISTERED

## 2021-11-03 PROCEDURE — 22630 ARTHRD PST TQ 1NTRSPC LUM: CPT | Performed by: NEUROLOGICAL SURGERY

## 2021-11-03 PROCEDURE — C1889 IMPLANT/INSERT DEVICE, NOC: HCPCS | Performed by: NEUROLOGICAL SURGERY

## 2021-11-03 PROCEDURE — 22853 INSJ BIOMECHANICAL DEVICE: CPT | Performed by: NEUROLOGICAL SURGERY

## 2021-11-03 PROCEDURE — 25010000002 SODIUM CHLORIDE 0.9 % WITH KCL 20 MEQ 20-0.9 MEQ/L-% SOLUTION: Performed by: NEUROLOGICAL SURGERY

## 2021-11-03 PROCEDURE — 22842 INSERT SPINE FIXATION DEVICE: CPT | Performed by: NEUROLOGICAL SURGERY

## 2021-11-03 PROCEDURE — 22632 ARTHRD PST TQ 1NTRSPC LM EA: CPT | Performed by: SPECIALIST/TECHNOLOGIST, OTHER

## 2021-11-03 PROCEDURE — 25010000002 FENTANYL CITRATE (PF) 50 MCG/ML SOLUTION: Performed by: ANESTHESIOLOGY

## 2021-11-03 PROCEDURE — 25010000002 DEXAMETHASONE PER 1 MG: Performed by: NURSE ANESTHETIST, CERTIFIED REGISTERED

## 2021-11-03 PROCEDURE — 25010000002 HYDROMORPHONE PER 4 MG: Performed by: ANESTHESIOLOGY

## 2021-11-03 PROCEDURE — G0378 HOSPITAL OBSERVATION PER HR: HCPCS

## 2021-11-03 PROCEDURE — 0 CEFAZOLIN IN DEXTROSE 2-4 GM/100ML-% SOLUTION: Performed by: NEUROLOGICAL SURGERY

## 2021-11-03 PROCEDURE — 22853 INSJ BIOMECHANICAL DEVICE: CPT | Performed by: SPECIALIST/TECHNOLOGIST, OTHER

## 2021-11-03 PROCEDURE — P9041 ALBUMIN (HUMAN),5%, 50ML: HCPCS | Performed by: NURSE ANESTHETIST, CERTIFIED REGISTERED

## 2021-11-03 PROCEDURE — 25010000002 ALBUMIN HUMAN 5% PER 50 ML: Performed by: NURSE ANESTHETIST, CERTIFIED REGISTERED

## 2021-11-03 PROCEDURE — 25010000002 NEOSTIGMINE 5 MG/10ML SOLUTION: Performed by: NURSE ANESTHETIST, CERTIFIED REGISTERED

## 2021-11-03 PROCEDURE — 25010000002 PHENYLEPHRINE 10 MG/ML SOLUTION: Performed by: NURSE ANESTHETIST, CERTIFIED REGISTERED

## 2021-11-03 PROCEDURE — 20939 BONE MARROW ASPIR BONE GRFG: CPT | Performed by: NEUROLOGICAL SURGERY

## 2021-11-03 PROCEDURE — 25010000002 VANCOMYCIN 1 G RECONSTITUTED SOLUTION 1 EACH VIAL: Performed by: NEUROLOGICAL SURGERY

## 2021-11-03 PROCEDURE — 22630 ARTHRD PST TQ 1NTRSPC LUM: CPT | Performed by: SPECIALIST/TECHNOLOGIST, OTHER

## 2021-11-03 PROCEDURE — 25010000002 MORPHINE PER 10 MG: Performed by: NEUROLOGICAL SURGERY

## 2021-11-03 PROCEDURE — 22632 ARTHRD PST TQ 1NTRSPC LM EA: CPT | Performed by: NEUROLOGICAL SURGERY

## 2021-11-03 PROCEDURE — 83036 HEMOGLOBIN GLYCOSYLATED A1C: CPT | Performed by: NURSE PRACTITIONER

## 2021-11-03 PROCEDURE — 22842 INSERT SPINE FIXATION DEVICE: CPT | Performed by: SPECIALIST/TECHNOLOGIST, OTHER

## 2021-11-03 PROCEDURE — 72100 X-RAY EXAM L-S SPINE 2/3 VWS: CPT

## 2021-11-03 PROCEDURE — 25010000002 MIDAZOLAM PER 1 MG: Performed by: ANESTHESIOLOGY

## 2021-11-03 PROCEDURE — 63710000001 INSULIN LISPRO (HUMAN) PER 5 UNITS: Performed by: NURSE PRACTITIONER

## 2021-11-03 PROCEDURE — 76000 FLUOROSCOPY <1 HR PHYS/QHP: CPT

## 2021-11-03 PROCEDURE — 25010000002 PROPOFOL 10 MG/ML EMULSION: Performed by: NURSE ANESTHETIST, CERTIFIED REGISTERED

## 2021-11-03 PROCEDURE — 25010000002 FENTANYL CITRATE (PF) 50 MCG/ML SOLUTION: Performed by: NURSE ANESTHETIST, CERTIFIED REGISTERED

## 2021-11-03 PROCEDURE — 82962 GLUCOSE BLOOD TEST: CPT

## 2021-11-03 DEVICE — ALLOGRFT DBM GRAFTON DS INJ FIBR 9CC: Type: IMPLANTABLE DEVICE | Site: SPINE LUMBAR | Status: FUNCTIONAL

## 2021-11-03 DEVICE — ROD 1475501070 4.75 CCM NS CURV 70MM
Type: IMPLANTABLE DEVICE | Site: SPINE LUMBAR | Status: FUNCTIONAL
Brand: CD HORIZON® SPINAL SYSTEM

## 2021-11-03 DEVICE — PUTTY DBM GRAFTON 6CC: Type: IMPLANTABLE DEVICE | Site: SPINE LUMBAR | Status: FUNCTIONAL

## 2021-11-03 DEVICE — FLOSEAL HEMOSTATIC MATRIX, 5ML
Type: IMPLANTABLE DEVICE | Site: SPINE LUMBAR | Status: FUNCTIONAL
Brand: FLOSEAL HEMOSTATIC MATRIX

## 2021-11-03 DEVICE — SSC BONE WAX
Type: IMPLANTABLE DEVICE | Site: SPINE LUMBAR | Status: FUNCTIONAL
Brand: SSC BONE WAX

## 2021-11-03 DEVICE — SPACER 6068076 CATALYFT PL LONG 7MM
Type: IMPLANTABLE DEVICE | Site: SPINE LUMBAR | Status: FUNCTIONAL
Brand: CATALYFT PL EXPANDABLE INTERBODY SYSTEM

## 2021-11-03 RX ORDER — PROMETHAZINE HYDROCHLORIDE 25 MG/1
25 SUPPOSITORY RECTAL ONCE AS NEEDED
Status: DISCONTINUED | OUTPATIENT
Start: 2021-11-03 | End: 2021-11-03 | Stop reason: HOSPADM

## 2021-11-03 RX ORDER — FLUMAZENIL 0.1 MG/ML
0.2 INJECTION INTRAVENOUS AS NEEDED
Status: DISCONTINUED | OUTPATIENT
Start: 2021-11-03 | End: 2021-11-03 | Stop reason: HOSPADM

## 2021-11-03 RX ORDER — METHOCARBAMOL 500 MG/1
1000 TABLET, FILM COATED ORAL EVERY 6 HOURS SCHEDULED
Status: DISCONTINUED | OUTPATIENT
Start: 2021-11-04 | End: 2021-11-06 | Stop reason: HOSPADM

## 2021-11-03 RX ORDER — EPHEDRINE SULFATE 50 MG/ML
INJECTION, SOLUTION INTRAVENOUS AS NEEDED
Status: DISCONTINUED | OUTPATIENT
Start: 2021-11-03 | End: 2021-11-03 | Stop reason: SURG

## 2021-11-03 RX ORDER — ATORVASTATIN CALCIUM 20 MG/1
20 TABLET, FILM COATED ORAL NIGHTLY
Status: DISCONTINUED | OUTPATIENT
Start: 2021-11-03 | End: 2021-11-06 | Stop reason: HOSPADM

## 2021-11-03 RX ORDER — AMLODIPINE BESYLATE 10 MG/1
10 TABLET ORAL DAILY
Status: DISCONTINUED | OUTPATIENT
Start: 2021-11-03 | End: 2021-11-03

## 2021-11-03 RX ORDER — GLIPIZIDE 5 MG/1
5 TABLET ORAL
Status: DISCONTINUED | OUTPATIENT
Start: 2021-11-03 | End: 2021-11-03

## 2021-11-03 RX ORDER — LIDOCAINE HYDROCHLORIDE 10 MG/ML
0.5 INJECTION, SOLUTION EPIDURAL; INFILTRATION; INTRACAUDAL; PERINEURAL ONCE AS NEEDED
Status: DISCONTINUED | OUTPATIENT
Start: 2021-11-03 | End: 2021-11-03 | Stop reason: HOSPADM

## 2021-11-03 RX ORDER — EPHEDRINE SULFATE 50 MG/ML
5 INJECTION, SOLUTION INTRAVENOUS ONCE AS NEEDED
Status: DISCONTINUED | OUTPATIENT
Start: 2021-11-03 | End: 2021-11-03 | Stop reason: HOSPADM

## 2021-11-03 RX ORDER — OXYCODONE AND ACETAMINOPHEN 10; 325 MG/1; MG/1
1 TABLET ORAL EVERY 4 HOURS PRN
Status: DISCONTINUED | OUTPATIENT
Start: 2021-11-03 | End: 2021-11-03 | Stop reason: HOSPADM

## 2021-11-03 RX ORDER — FENTANYL CITRATE 50 UG/ML
50 INJECTION, SOLUTION INTRAMUSCULAR; INTRAVENOUS
Status: DISCONTINUED | OUTPATIENT
Start: 2021-11-03 | End: 2021-11-03 | Stop reason: HOSPADM

## 2021-11-03 RX ORDER — SODIUM CHLORIDE 0.9 % (FLUSH) 0.9 %
3-10 SYRINGE (ML) INJECTION AS NEEDED
Status: DISCONTINUED | OUTPATIENT
Start: 2021-11-03 | End: 2021-11-03 | Stop reason: HOSPADM

## 2021-11-03 RX ORDER — PHENYLEPHRINE HYDROCHLORIDE 10 MG/ML
INJECTION INTRAVENOUS AS NEEDED
Status: DISCONTINUED | OUTPATIENT
Start: 2021-11-03 | End: 2021-11-03 | Stop reason: SURG

## 2021-11-03 RX ORDER — HYDROCODONE BITARTRATE AND ACETAMINOPHEN 7.5; 325 MG/1; MG/1
1 TABLET ORAL ONCE AS NEEDED
Status: DISCONTINUED | OUTPATIENT
Start: 2021-11-03 | End: 2021-11-03 | Stop reason: HOSPADM

## 2021-11-03 RX ORDER — DIPHENHYDRAMINE HCL 25 MG
25 CAPSULE ORAL
Status: DISCONTINUED | OUTPATIENT
Start: 2021-11-03 | End: 2021-11-03 | Stop reason: HOSPADM

## 2021-11-03 RX ORDER — HYDROMORPHONE HCL 110MG/55ML
PATIENT CONTROLLED ANALGESIA SYRINGE INTRAVENOUS AS NEEDED
Status: DISCONTINUED | OUTPATIENT
Start: 2021-11-03 | End: 2021-11-03 | Stop reason: SURG

## 2021-11-03 RX ORDER — NEOSTIGMINE METHYLSULFATE 0.5 MG/ML
INJECTION, SOLUTION INTRAVENOUS AS NEEDED
Status: DISCONTINUED | OUTPATIENT
Start: 2021-11-03 | End: 2021-11-03 | Stop reason: SURG

## 2021-11-03 RX ORDER — SODIUM CHLORIDE 0.9 % (FLUSH) 0.9 %
10 SYRINGE (ML) INJECTION AS NEEDED
Status: DISCONTINUED | OUTPATIENT
Start: 2021-11-03 | End: 2021-11-06 | Stop reason: HOSPADM

## 2021-11-03 RX ORDER — HYDROCHLOROTHIAZIDE 25 MG/1
25 TABLET ORAL DAILY
Status: DISCONTINUED | OUTPATIENT
Start: 2021-11-03 | End: 2021-11-03

## 2021-11-03 RX ORDER — PROPOFOL 10 MG/ML
VIAL (ML) INTRAVENOUS AS NEEDED
Status: DISCONTINUED | OUTPATIENT
Start: 2021-11-03 | End: 2021-11-03 | Stop reason: SURG

## 2021-11-03 RX ORDER — METOPROLOL TARTRATE 50 MG/1
100 TABLET, FILM COATED ORAL 2 TIMES DAILY
Status: DISCONTINUED | OUTPATIENT
Start: 2021-11-03 | End: 2021-11-06 | Stop reason: HOSPADM

## 2021-11-03 RX ORDER — NALOXONE HCL 0.4 MG/ML
0.4 VIAL (ML) INJECTION
Status: DISCONTINUED | OUTPATIENT
Start: 2021-11-03 | End: 2021-11-06 | Stop reason: HOSPADM

## 2021-11-03 RX ORDER — MIDAZOLAM HYDROCHLORIDE 1 MG/ML
1 INJECTION INTRAMUSCULAR; INTRAVENOUS
Status: DISCONTINUED | OUTPATIENT
Start: 2021-11-03 | End: 2021-11-03 | Stop reason: HOSPADM

## 2021-11-03 RX ORDER — DIPHENHYDRAMINE HYDROCHLORIDE 50 MG/ML
12.5 INJECTION INTRAMUSCULAR; INTRAVENOUS
Status: DISCONTINUED | OUTPATIENT
Start: 2021-11-03 | End: 2021-11-03 | Stop reason: HOSPADM

## 2021-11-03 RX ORDER — NICOTINE POLACRILEX 4 MG
15 LOZENGE BUCCAL
Status: DISCONTINUED | OUTPATIENT
Start: 2021-11-03 | End: 2021-11-06 | Stop reason: HOSPADM

## 2021-11-03 RX ORDER — CYCLOBENZAPRINE HCL 10 MG
10 TABLET ORAL 2 TIMES DAILY PRN
Status: DISCONTINUED | OUTPATIENT
Start: 2021-11-03 | End: 2021-11-03

## 2021-11-03 RX ORDER — DEXAMETHASONE SODIUM PHOSPHATE 10 MG/ML
INJECTION INTRAMUSCULAR; INTRAVENOUS AS NEEDED
Status: DISCONTINUED | OUTPATIENT
Start: 2021-11-03 | End: 2021-11-03 | Stop reason: SURG

## 2021-11-03 RX ORDER — LABETALOL HYDROCHLORIDE 5 MG/ML
5 INJECTION, SOLUTION INTRAVENOUS
Status: DISCONTINUED | OUTPATIENT
Start: 2021-11-03 | End: 2021-11-03 | Stop reason: HOSPADM

## 2021-11-03 RX ORDER — ROCURONIUM BROMIDE 10 MG/ML
INJECTION, SOLUTION INTRAVENOUS AS NEEDED
Status: DISCONTINUED | OUTPATIENT
Start: 2021-11-03 | End: 2021-11-03 | Stop reason: SURG

## 2021-11-03 RX ORDER — INSULIN LISPRO 100 [IU]/ML
0-9 INJECTION, SOLUTION INTRAVENOUS; SUBCUTANEOUS
Status: DISCONTINUED | OUTPATIENT
Start: 2021-11-03 | End: 2021-11-06 | Stop reason: HOSPADM

## 2021-11-03 RX ORDER — ONDANSETRON 2 MG/ML
4 INJECTION INTRAMUSCULAR; INTRAVENOUS ONCE AS NEEDED
Status: DISCONTINUED | OUTPATIENT
Start: 2021-11-03 | End: 2021-11-03 | Stop reason: HOSPADM

## 2021-11-03 RX ORDER — NALOXONE HCL 0.4 MG/ML
0.2 VIAL (ML) INJECTION AS NEEDED
Status: DISCONTINUED | OUTPATIENT
Start: 2021-11-03 | End: 2021-11-03 | Stop reason: HOSPADM

## 2021-11-03 RX ORDER — SODIUM CHLORIDE AND POTASSIUM CHLORIDE 150; 900 MG/100ML; MG/100ML
50 INJECTION, SOLUTION INTRAVENOUS CONTINUOUS
Status: DISCONTINUED | OUTPATIENT
Start: 2021-11-03 | End: 2021-11-05

## 2021-11-03 RX ORDER — CEFAZOLIN SODIUM 2 G/100ML
INJECTION, SOLUTION INTRAVENOUS AS NEEDED
Status: DISCONTINUED | OUTPATIENT
Start: 2021-11-03 | End: 2021-11-03 | Stop reason: SURG

## 2021-11-03 RX ORDER — IBUPROFEN 600 MG/1
600 TABLET ORAL ONCE AS NEEDED
Status: DISCONTINUED | OUTPATIENT
Start: 2021-11-03 | End: 2021-11-03 | Stop reason: HOSPADM

## 2021-11-03 RX ORDER — PROMETHAZINE HYDROCHLORIDE 25 MG/1
25 TABLET ORAL ONCE AS NEEDED
Status: DISCONTINUED | OUTPATIENT
Start: 2021-11-03 | End: 2021-11-03 | Stop reason: HOSPADM

## 2021-11-03 RX ORDER — AMLODIPINE BESYLATE 5 MG/1
5 TABLET ORAL
Status: DISCONTINUED | OUTPATIENT
Start: 2021-11-04 | End: 2021-11-04

## 2021-11-03 RX ORDER — ALBUMIN, HUMAN INJ 5% 5 %
SOLUTION INTRAVENOUS CONTINUOUS PRN
Status: DISCONTINUED | OUTPATIENT
Start: 2021-11-03 | End: 2021-11-03 | Stop reason: SURG

## 2021-11-03 RX ORDER — HYDROCODONE BITARTRATE AND ACETAMINOPHEN 5; 325 MG/1; MG/1
1 TABLET ORAL EVERY 4 HOURS PRN
Status: DISCONTINUED | OUTPATIENT
Start: 2021-11-03 | End: 2021-11-06 | Stop reason: HOSPADM

## 2021-11-03 RX ORDER — HYDROMORPHONE HYDROCHLORIDE 1 MG/ML
0.5 INJECTION, SOLUTION INTRAMUSCULAR; INTRAVENOUS; SUBCUTANEOUS
Status: DISCONTINUED | OUTPATIENT
Start: 2021-11-03 | End: 2021-11-03 | Stop reason: HOSPADM

## 2021-11-03 RX ORDER — SODIUM CHLORIDE, SODIUM LACTATE, POTASSIUM CHLORIDE, CALCIUM CHLORIDE 600; 310; 30; 20 MG/100ML; MG/100ML; MG/100ML; MG/100ML
9 INJECTION, SOLUTION INTRAVENOUS CONTINUOUS
Status: DISCONTINUED | OUTPATIENT
Start: 2021-11-03 | End: 2021-11-03

## 2021-11-03 RX ORDER — FENTANYL CITRATE 50 UG/ML
INJECTION, SOLUTION INTRAMUSCULAR; INTRAVENOUS AS NEEDED
Status: DISCONTINUED | OUTPATIENT
Start: 2021-11-03 | End: 2021-11-03 | Stop reason: SURG

## 2021-11-03 RX ORDER — SODIUM CHLORIDE 0.9 % (FLUSH) 0.9 %
3 SYRINGE (ML) INJECTION EVERY 12 HOURS SCHEDULED
Status: DISCONTINUED | OUTPATIENT
Start: 2021-11-03 | End: 2021-11-03 | Stop reason: HOSPADM

## 2021-11-03 RX ORDER — ASPIRIN 81 MG/1
81 TABLET, CHEWABLE ORAL DAILY
Status: DISCONTINUED | OUTPATIENT
Start: 2021-11-03 | End: 2021-11-06 | Stop reason: HOSPADM

## 2021-11-03 RX ORDER — ONDANSETRON 4 MG/1
4 TABLET, FILM COATED ORAL EVERY 6 HOURS PRN
Status: DISCONTINUED | OUTPATIENT
Start: 2021-11-03 | End: 2021-11-06 | Stop reason: HOSPADM

## 2021-11-03 RX ORDER — SODIUM CHLORIDE 0.9 % (FLUSH) 0.9 %
3 SYRINGE (ML) INJECTION EVERY 12 HOURS SCHEDULED
Status: DISCONTINUED | OUTPATIENT
Start: 2021-11-03 | End: 2021-11-06 | Stop reason: HOSPADM

## 2021-11-03 RX ORDER — GLYCOPYRROLATE 0.2 MG/ML
INJECTION INTRAMUSCULAR; INTRAVENOUS AS NEEDED
Status: DISCONTINUED | OUTPATIENT
Start: 2021-11-03 | End: 2021-11-03 | Stop reason: SURG

## 2021-11-03 RX ORDER — ALLOPURINOL 300 MG/1
300 TABLET ORAL 2 TIMES DAILY
Status: DISCONTINUED | OUTPATIENT
Start: 2021-11-03 | End: 2021-11-06 | Stop reason: HOSPADM

## 2021-11-03 RX ORDER — ONDANSETRON 2 MG/ML
4 INJECTION INTRAMUSCULAR; INTRAVENOUS EVERY 6 HOURS PRN
Status: DISCONTINUED | OUTPATIENT
Start: 2021-11-03 | End: 2021-11-06 | Stop reason: HOSPADM

## 2021-11-03 RX ORDER — HYDRALAZINE HYDROCHLORIDE 20 MG/ML
5 INJECTION INTRAMUSCULAR; INTRAVENOUS
Status: DISCONTINUED | OUTPATIENT
Start: 2021-11-03 | End: 2021-11-03 | Stop reason: HOSPADM

## 2021-11-03 RX ORDER — CEFAZOLIN SODIUM 2 G/100ML
2 INJECTION, SOLUTION INTRAVENOUS ONCE
Status: COMPLETED | OUTPATIENT
Start: 2021-11-03 | End: 2021-11-03

## 2021-11-03 RX ORDER — FAMOTIDINE 10 MG/ML
20 INJECTION, SOLUTION INTRAVENOUS ONCE
Status: COMPLETED | OUTPATIENT
Start: 2021-11-03 | End: 2021-11-03

## 2021-11-03 RX ORDER — ONDANSETRON 2 MG/ML
INJECTION INTRAMUSCULAR; INTRAVENOUS AS NEEDED
Status: DISCONTINUED | OUTPATIENT
Start: 2021-11-03 | End: 2021-11-03 | Stop reason: SURG

## 2021-11-03 RX ORDER — DEXTROSE MONOHYDRATE 25 G/50ML
25 INJECTION, SOLUTION INTRAVENOUS
Status: DISCONTINUED | OUTPATIENT
Start: 2021-11-03 | End: 2021-11-06 | Stop reason: HOSPADM

## 2021-11-03 RX ORDER — MORPHINE SULFATE 2 MG/ML
2 INJECTION, SOLUTION INTRAMUSCULAR; INTRAVENOUS EVERY 4 HOURS PRN
Status: DISCONTINUED | OUTPATIENT
Start: 2021-11-03 | End: 2021-11-06 | Stop reason: HOSPADM

## 2021-11-03 RX ORDER — CYCLOBENZAPRINE HCL 10 MG
10 TABLET ORAL EVERY 8 HOURS PRN
Status: DISCONTINUED | OUTPATIENT
Start: 2021-11-03 | End: 2021-11-06 | Stop reason: HOSPADM

## 2021-11-03 RX ORDER — LIDOCAINE HYDROCHLORIDE 20 MG/ML
INJECTION, SOLUTION INFILTRATION; PERINEURAL AS NEEDED
Status: DISCONTINUED | OUTPATIENT
Start: 2021-11-03 | End: 2021-11-03 | Stop reason: SURG

## 2021-11-03 RX ADMIN — METHOCARBAMOL TABLETS 1000 MG: 500 TABLET, COATED ORAL at 23:43

## 2021-11-03 RX ADMIN — FENTANYL CITRATE 50 MCG: 0.05 INJECTION, SOLUTION INTRAMUSCULAR; INTRAVENOUS at 13:35

## 2021-11-03 RX ADMIN — ALBUMIN HUMAN: 0.05 INJECTION, SOLUTION INTRAVENOUS at 14:30

## 2021-11-03 RX ADMIN — PROPOFOL 50 MG: 10 INJECTION, EMULSION INTRAVENOUS at 13:56

## 2021-11-03 RX ADMIN — CEFAZOLIN SODIUM 2 G: 2 INJECTION, SOLUTION INTRAVENOUS at 17:14

## 2021-11-03 RX ADMIN — EPHEDRINE SULFATE 10 MG: 50 INJECTION INTRAVENOUS at 14:26

## 2021-11-03 RX ADMIN — MORPHINE SULFATE 2 MG: 2 INJECTION, SOLUTION INTRAMUSCULAR; INTRAVENOUS at 19:59

## 2021-11-03 RX ADMIN — CEFAZOLIN SODIUM 2 G: 2 INJECTION, SOLUTION INTRAVENOUS at 13:18

## 2021-11-03 RX ADMIN — FENTANYL CITRATE 100 MCG: 0.05 INJECTION, SOLUTION INTRAMUSCULAR; INTRAVENOUS at 13:30

## 2021-11-03 RX ADMIN — HYDROMORPHONE HYDROCHLORIDE 0.5 MG: 2 INJECTION, SOLUTION INTRAMUSCULAR; INTRAVENOUS; SUBCUTANEOUS at 17:29

## 2021-11-03 RX ADMIN — NEOSTIGMINE METHYLSULFATE 2 MG: 0.5 INJECTION INTRAVENOUS at 17:10

## 2021-11-03 RX ADMIN — ONDANSETRON 4 MG: 2 INJECTION INTRAMUSCULAR; INTRAVENOUS at 16:56

## 2021-11-03 RX ADMIN — SODIUM CHLORIDE, POTASSIUM CHLORIDE, SODIUM LACTATE AND CALCIUM CHLORIDE 9 ML/HR: 600; 310; 30; 20 INJECTION, SOLUTION INTRAVENOUS at 11:20

## 2021-11-03 RX ADMIN — ROCURONIUM BROMIDE 50 MG: 50 INJECTION INTRAVENOUS at 13:30

## 2021-11-03 RX ADMIN — MORPHINE SULFATE 2 MG: 2 INJECTION, SOLUTION INTRAMUSCULAR; INTRAVENOUS at 23:43

## 2021-11-03 RX ADMIN — MIDAZOLAM 1 MG: 1 INJECTION INTRAMUSCULAR; INTRAVENOUS at 11:21

## 2021-11-03 RX ADMIN — METOPROLOL TARTRATE 100 MG: 50 TABLET, FILM COATED ORAL at 20:20

## 2021-11-03 RX ADMIN — CYCLOBENZAPRINE 10 MG: 10 TABLET, FILM COATED ORAL at 19:59

## 2021-11-03 RX ADMIN — INSULIN LISPRO 2 UNITS: 100 INJECTION, SOLUTION INTRAVENOUS; SUBCUTANEOUS at 21:34

## 2021-11-03 RX ADMIN — LIDOCAINE HYDROCHLORIDE 100 MG: 20 INJECTION, SOLUTION INFILTRATION; PERINEURAL at 13:30

## 2021-11-03 RX ADMIN — ALLOPURINOL 300 MG: 300 TABLET ORAL at 20:25

## 2021-11-03 RX ADMIN — ATORVASTATIN CALCIUM 20 MG: 20 TABLET, FILM COATED ORAL at 20:20

## 2021-11-03 RX ADMIN — FENTANYL CITRATE 50 MCG: 50 INJECTION INTRAMUSCULAR; INTRAVENOUS at 11:21

## 2021-11-03 RX ADMIN — POTASSIUM CHLORIDE AND SODIUM CHLORIDE 100 ML/HR: 900; 150 INJECTION, SOLUTION INTRAVENOUS at 20:25

## 2021-11-03 RX ADMIN — FENTANYL CITRATE 50 MCG: 0.05 INJECTION, SOLUTION INTRAMUSCULAR; INTRAVENOUS at 15:20

## 2021-11-03 RX ADMIN — SODIUM CHLORIDE, POTASSIUM CHLORIDE, SODIUM LACTATE AND CALCIUM CHLORIDE: 600; 310; 30; 20 INJECTION, SOLUTION INTRAVENOUS at 14:16

## 2021-11-03 RX ADMIN — ASPIRIN 81 MG: 81 TABLET, CHEWABLE ORAL at 20:25

## 2021-11-03 RX ADMIN — FENTANYL CITRATE 50 MCG: 0.05 INJECTION, SOLUTION INTRAMUSCULAR; INTRAVENOUS at 13:56

## 2021-11-03 RX ADMIN — GLYCOPYRROLATE 0.4 MG: 0.2 INJECTION INTRAMUSCULAR; INTRAVENOUS at 17:10

## 2021-11-03 RX ADMIN — FENTANYL CITRATE 50 MCG: 50 INJECTION INTRAMUSCULAR; INTRAVENOUS at 12:39

## 2021-11-03 RX ADMIN — FAMOTIDINE 20 MG: 10 INJECTION INTRAVENOUS at 11:20

## 2021-11-03 RX ADMIN — PHENYLEPHRINE HYDROCHLORIDE 100 MCG: 10 INJECTION, SOLUTION INTRAVENOUS at 16:45

## 2021-11-03 RX ADMIN — HYDROMORPHONE HYDROCHLORIDE 0.5 MG: 2 INJECTION, SOLUTION INTRAMUSCULAR; INTRAVENOUS; SUBCUTANEOUS at 17:16

## 2021-11-03 RX ADMIN — DEXAMETHASONE SODIUM PHOSPHATE 6 MG: 10 INJECTION INTRAMUSCULAR; INTRAVENOUS at 14:00

## 2021-11-03 RX ADMIN — PROPOFOL 200 MG: 10 INJECTION, EMULSION INTRAVENOUS at 13:30

## 2021-11-03 RX ADMIN — HYDROCODONE BITARTRATE AND ACETAMINOPHEN 1 TABLET: 5; 325 TABLET ORAL at 19:58

## 2021-11-03 NOTE — ANESTHESIA PREPROCEDURE EVALUATION
Anesthesia Evaluation     Patient summary reviewed and Nursing notes reviewed   no history of anesthetic complications:  NPO Solid Status: > 8 hours  NPO Liquid Status: > 2 hours           Airway   Mallampati: II  TM distance: >3 FB  Neck ROM: full  no difficulty expected  Dental - normal exam     Pulmonary - normal exam   (+) sleep apnea on CPAP,   (-) COPD, asthma, not a smoker, lung cancer  Cardiovascular - normal exam  Exercise tolerance: good (4-7 METS)    ECG reviewed  Rhythm: regular  Rate: normal    (+) hypertension well controlled 2 medications or greater, hyperlipidemia,   (-) valvular problems/murmurs, past MI, CAD, dysrhythmias, angina, CHF, cardiac stents, CABG      Neuro/Psych  (+) seizures well controlled, TIA, CVA residual symptoms, headaches,     GI/Hepatic/Renal/Endo    (+)   diabetes mellitus type 2 well controlled,   (-) hiatal hernia, GERD, PUD, hepatitis, liver disease, no renal disease, GI bleed    Musculoskeletal     (+) back pain,   Abdominal  - normal exam   Substance History - negative use     OB/GYN negative ob/gyn ROS         Other   arthritis,                      Anesthesia Plan    ASA 3     general     intravenous induction     Anesthetic plan, all risks, benefits, and alternatives have been provided, discussed and informed consent has been obtained with: patient.    Plan discussed with CRNA.

## 2021-11-03 NOTE — BRIEF OP NOTE
LUMBAR FUSION POSTERIOR AND INSTRUMENTATION WITH NEURO ROBOT  Progress Note    Pablo Garciascminnie  11/3/2021    Pre-op Diagnosis:   Spinal stenosis of lumbar region with neurogenic claudication [M48.062]       Post-Op Diagnosis Codes:     * Spinal stenosis of lumbar region with neurogenic claudication [M48.062]    Procedure/CPT® Codes:        Procedure(s):  Lumbar 3 to Lumbar 4 and Lumbar 4 to Lumbar 5 laminectomy with a fusion    Surgeon(s):  Jose Webster MD    Anesthesia: General    Staff:   Cell Saver : Abram Nunes  Circulator: Cristel Valero, TRAE; Alpa Palacio RN; Jocelyn Gan RN  Scrub Person: Sandie Velázquez; Jasmina Rubin; Shalonda Amaya  Vendor Representative: Dennis Pierce  Assistant: Tahira Gray CSA  Assistant: Tahira Gray CSA      Estimated Blood Loss: 400 mL    Urine Voided: 440 mL    Specimens:                None          Drains:   Closed/Suction Drain 1 Back Bulb 10 Fr. (Active)       Urethral Catheter Straight-tip; Silicone 16 Fr. (Active)       Findings: Severe stenosis L3-4 and L4-5    Complications: None      Jose Webster MD     Date: 11/3/2021  Time: 17:26 EDT

## 2021-11-03 NOTE — ANESTHESIA PROCEDURE NOTES
Airway  Urgency: elective    Date/Time: 11/3/2021 1:34 PM  Airway not difficult    General Information and Staff    Patient location during procedure: OR  Anesthesiologist: Joel Ruiz MD  CRNA: Geovanny Greenberg CRNA    Indications and Patient Condition  Indications for airway management: airway protection    Preoxygenated: yes  MILS maintained throughout  Mask difficulty assessment: 2 - vent by mask + OA or adjuvant +/- NMBA    Final Airway Details  Final airway type: endotracheal airway      Successful airway: ETT  Cuffed: yes   Successful intubation technique: direct laryngoscopy  Endotracheal tube insertion site: oral  Blade: Brittney  Blade size: 4  ETT size (mm): 7.5  Cormack-Lehane Classification: grade I - full view of glottis  Placement verified by: chest auscultation and capnometry   Measured from: lips  ETT/EBT  to lips (cm): 20  Number of attempts at approach: 1  Assessment: lips, teeth, and gum same as pre-op and atraumatic intubation

## 2021-11-03 NOTE — OP NOTE
Preoperative diagnosis: Lumbar stenosis with neurogenic claudication L4-5    Postoperative diagnosis: Lumbar stenosis neurogenic claudication L4-5 and L3-4    Procedure performed: Bilateral L3-4 and L4-5 laminectomy, medial facetectomy, foraminotomy with a transforaminal interbody lumbar fusion and instrumentation    Surgeon: Jose Webster M.D.    Assistant: Tahira Gray CFA who was instrumental in helping with hemostasis, visualization of neural structures, and retraction of neural structures.  Her skilled assistance was necessary for the success of this case    Indications for procedure: This patient has been having severe pain in the legs and the back.  Imaging shows severe stenosis with a grade 1 spondylolisthesis at L4-5.  There has been no improvement with nonsurgical treatment in the patient elected to proceed with surgery.  The facets at L4-5 were quite sagittally oriented I was very concerned about decompressing him without a fusion.  He did not appear to be stenotic at L3-4 but during the course of the surgery I was able to feel superiorly with the Mcmahon ball probe and found that the lateral recess and foramina at L3-4 were quite narrow as well.  Consequently I elected to decompress those also.    Operative summary: After induction of general anesthesia with intravenous agents patient was intubated and placed on the operating table in the prone position.  All pressure points were padded including peripheral points of entrapment.  The back was then prepped with ChloraPrep.  After a 3 minute drying time the back was draped with Ioban, towels, half sheets and a split sheet.    An incision was made in the posterior iliac crest ridge and a Jamshidi needle was placed into the marrow cavity and 15 cc of marrow were aspirated and placed over Master graft.  Following this a pin was placed into the iliac crest to anchor the Timeetor robot.    An incision was then made in the skin in the midline.  The dissection was  carried down to the thoracolumbar fascia which was opened in the midline. The muscles were stripped off the L4 laminar arch and spinous process and the L5 laminar arch and spinous process.  Following this the entire spinous process of I was also able to palpate more superiorly and there appeared to be quite severe stenosis at the L3-4 level using the Mcmahon ball probe.  L4 were removed along with the respective laminar arch by thinning it down with the Midas Valerio drill and cracking it directly back off the dura.  The superior spinous process and laminar arch of L5 were also removed.  Once the dura was exposed the remainder of the operation was done under the high power magnification of the operating microscope using microsurgical technique and microsurgical instrumentation.  The Penfield 4 was used to dissect the dura and the nerve root off of the medial pedicle of L4 and L5 bilaterally.  A combination of the Kerrison's and the Midas Valerio were used to open the lateral recess out to the level of the medial pedicle.  This was both at the top and the lower decompressed level.  Once this was done the medial aspect of the facet was removed in order to open the superior foramen.  Following this each of the 4 nerve roots were checked to be sure they were decompressed.  Consequently I proceeded to remove the entire spinous process of L3 and laminar arch of L3 as well.  I also was able to dissect out to the medial pedicle of L3 and open the lateral recess by removing the medial facet at that level as well.  I then checked L6 nerve roots to be sure they were all thoroughly decompressed.  Once the nerve roots were thoroughly decompressed the dural sac was then mobilized medially and the discs were exposed.  The discs were incised and disc material was removed from the disc spaces using the down-biting curettes and the pituitary Falk.  Once the discs were emptied as much as possible ulises were inserted into the disc space to  clear the cartilaginous endplate completely and make a space for the cages.  I was then able to take 2 Medtronic catalyft cages measuring 27 mm in length and place them into the disc space at L3-4 and also L4-5.  The master graft and marrow as well as locally harvested bone were also placed into the disc spaces.  The cages were then expanded until they were tight and the disc base and had produced a lordosis.    Next the ORDISSIMOor robot was referenced to the case using C arm and the previously obtained CT scan.  We had previously planned placement of the screws and then a following this 6.5 mm by 35 mm screws were placed into the lateral mass of L3 and L4 bilaterally and 6.5 mm by 35 mm screws were placed bilaterally at L5.  Intraoperative x-ray confirmed good placement of the hardware and they were then connected with a donna.  Following this the area was copiously irrigated with antibiotic solution and then another dose of Kefzol was given prior to closure.  The paraspinous musculature was injected with 100 cc of 1/8% Marcaine with 1-200,000 epinephrine solution.  A drain was placed in the epidural space and tunneled subcutaneously.  The incision was then closed in layers bedrest and the patient was taken to the recovery room in stable condition.  There were no apparent complications and sponge instrument and needle counts were correct at the end of the procedure.

## 2021-11-03 NOTE — ANESTHESIA POSTPROCEDURE EVALUATION
"Patient: Pablo Mendoza    Procedure Summary     Date: 11/03/21 Room / Location: [facility] OR [address] [place] [address] MAIN OR    Anesthesia Start: 1320 Anesthesia Stop: 1753    Procedure: Lumbar 3 to Lumbar 4 and Lumbar 4 to Lumbar 5 laminectomy with a fusion (N/A Spine Lumbar) Diagnosis:       Spinal stenosis of lumbar region with neurogenic claudication      (Spinal stenosis of lumbar region with neurogenic claudication [M48.062])    Surgeons: Jose Webster MD Provider: Joel Ruiz MD    Anesthesia Type: general ASA Status: 3          Anesthesia Type: general    Vitals  Vitals Value Taken Time   /80 11/03/21 1845   Temp 36.7 °C (98.1 °F) 11/03/21 1830   Pulse 101 11/03/21 1845   Resp 14 11/03/21 1845   SpO2 93 % 11/03/21 1845           Post Anesthesia Care and Evaluation    Patient location during evaluation: bedside  Patient participation: complete - patient participated  Level of consciousness: awake and alert  Pain management: adequate  Airway patency: patent  Anesthetic complications: No anesthetic complications    Cardiovascular status: acceptable  Respiratory status: acceptable  Hydration status: acceptable    Comments: /78 (BP Location: Right arm, Patient Position: Lying)   Pulse 101   Temp 36.7 °C (98.1 °F) (Oral)   Resp 16   Ht 170.2 cm (67\")   Wt 89.2 kg (196 lb 10.4 oz)   SpO2 93%   BMI 30.80 kg/m²       "

## 2021-11-03 NOTE — CONSULTS
Patient Name:  Pablo Mendoza  YOB: 1960  MRN:  [id number]  Date of Admission:  11/3/2021  Date of Consult:  11/3/2021  Patient Care Team:  Liana Hood MD as PCP - General (Internal Medicine & Pediatrics)    Inpatient Internal Medicine Consult  Consult performed by: Stephenie Leach APRN  Consult ordered by: Jose Webster MD  Reason for consult: Medical management specifically related to type II diabetes and hypertension        Subjective   History of Present Illness  Mr. Mendoza is a 60 y.o. male that has been admitted to [facility] following elective Lumbar 3 to Lumbar 4 and Lumbar 4 to Lumbar 5 laminectomy with a fusion.  He has been admitted to an orthopedic floor following surgery and we were asked to see and assist with his medical problems, specifically relating to his diabetes and hypertension.  At the time of my visit he denies any chest pain, SOA, nausea, vomiting or diarrhea.  He has just arrived to the floor and has not attempted to a diet just yet.  He does complain of expected postoperative discomfort.  He reports being in a normal state of health leading up to surgery.        Past Medical History:   Diagnosis Date   • Anesthesia complication     STATES HARD TO WAKE UP W/ALL SURGERIES   • COVID-19     DEC 2020   • Diabetes mellitus (HCC)    • Elevated cholesterol    • Gout    • Hypertension    • Low back pain    • Migraines    • Mixed hyperlipidemia 1/19/2018   • Neuropathy    • Nocturia 1/19/2018   • Numbness and tingling of both lower extremities     LEGS AND FEET    • Seizures (HCC)     AS A CHILD   • Sleep apnea     NO DEVICE USED   • Staph infection 2010    UNSURE OF SITE   • Stroke (HCC)     COGNITIVE DELAY   • TIA (transient ischemic attack)    • Type 2 diabetes mellitus, without long-term current use of insulin (HCC) 1/19/2018   • Weakness of both legs     LEFT WORSE      Past Surgical History:   Procedure Laterality Date   •  CHOLECYSTECTOMY     • COLONOSCOPY     • HERNIA REPAIR     • SINUS SURGERY     • TYMPANOPLASTY Bilateral      Family History   Problem Relation Age of Onset   • Heart disease Mother    • Hypertension Father    • Throat cancer Maternal Grandmother    • Diabetes Paternal Grandmother    • Diabetes Sister    • Stroke Brother 59   • Diabetes Sister    • Hypertension Brother    • Hyperlipidemia Brother    • Malig Hyperthermia Neg Hx      Social History     Tobacco Use   • Smoking status: Never Smoker   • Smokeless tobacco: Never Used   Vaping Use   • Vaping Use: Never used   Substance Use Topics   • Alcohol use: Yes     Comment: 3 DRINKS WEEKLY   • Drug use: Not Currently     Medications Prior to Admission   Medication Sig Dispense Refill Last Dose   • acetaminophen (TYLENOL) 500 MG tablet Take 500 mg by mouth Every 6 (Six) Hours As Needed for Mild Pain .   11/2/2021 at 0900   • allopurinol (ZYLOPRIM) 300 MG tablet TAKE ONE TABLET BY MOUTH TWICE A DAY (Patient taking differently: Take 300 mg by mouth 2 (Two) Times a Day.) 60 tablet 2 11/2/2021 at 1900   • amLODIPine (NORVASC) 10 MG tablet TAKE ONE TABLET BY MOUTH DAILY (Patient taking differently: Take 10 mg by mouth Daily.) 90 tablet 1 11/3/2021 at 0700   • aspirin 81 MG chewable tablet Chew 81 mg Daily. FOLLOW MD GUIDELINES ON HOLDING FOR OR   Past Week at Unknown time   • atorvastatin (LIPITOR) 20 MG tablet TAKE ONE TABLET BY MOUTH DAILY (Patient taking differently: Take 20 mg by mouth Every Night.) 90 tablet 1 11/2/2021 at 1900   • Cholecalciferol (VITAMIN D3) 5000 units capsule capsule Take 5,000 Units by mouth Daily. HOLDING FOR 7 DAYS PRIOR  TO OR   Past Week at Unknown time   • cyanocobalamin (VITAMIN B-12) 2500 MCG tablet tablet Take 500 mcg by mouth Daily. HOLDING FOR 7 DAYS PRIOR TO OR   Past Week at Unknown time   • cyclobenzaprine (FLEXERIL) 10 MG tablet Take 1 tablet by mouth 2 (Two) Times a Day As Needed for Muscle Spasms. 60 tablet 0 Past Week at Unknown  time   • empagliflozin (Jardiance) 25 MG tablet tablet Take 1 tablet by mouth Daily. 30 tablet 4 11/2/2021 at 0900   • fluticasone (Flonase) 50 MCG/ACT nasal spray 2 sprays into the nostril(s) as directed by provider Daily. (Patient taking differently: 2 sprays into the nostril(s) as directed by provider As Needed.) 1 bottle 0 11/2/2021 at 0900   • glipizide (Glucotrol) 5 MG tablet Take 1 tablet by mouth 2 (Two) Times a Day Before Meals. 180 tablet 1 11/2/2021 at 1900   • hydroCHLOROthiazide (HYDRODIURIL) 25 MG tablet TAKE ONE TABLET BY MOUTH DAILY (Patient taking differently: Take 25 mg by mouth Daily.) 30 tablet 2 11/2/2021 at 0900   • ibuprofen (ADVIL,MOTRIN) 800 MG tablet TAKE ONE TABLET BY MOUTH EVERY 8 HOURS AS NEEDED FOR MILD PAIN (Patient taking differently: Take 800 mg by mouth Every 8 (Eight) Hours As Needed.) 30 tablet 0 11/2/2021 at 0900   • Janumet XR  MG tablet TAKE ONE TABLET BY MOUTH TWICE A DAY (Patient taking differently: Take 1 tablet by mouth Daily.) 60 tablet 3 11/2/2021 at 0900   • loratadine (CLARITIN) 10 MG tablet TAKE ONE TABLET BY MOUTH DAILY (Patient taking differently: Take 10 mg by mouth As Needed.) 30 tablet 0 Past Week at Unknown time   • metoprolol tartrate (LOPRESSOR) 100 MG tablet TAKE ONE TABLET BY MOUTH TWICE A DAY 60 tablet 1 11/3/2021 at 0700   • multivitamin with minerals (CENTRUM ADULTS PO) Take 1 tablet by mouth Daily. HOLDING FOR 7 DAYS PRIOR TO OR   Past Week at Unknown time   • rizatriptan (MAXALT) 10 MG tablet Take 10 mg by mouth 1 (One) Time As Needed for Migraine. May repeat in 2 hours if needed   Past Month at Unknown time   • vitamin C (ASCORBIC ACID) 500 MG tablet Take 500 mg by mouth Daily. HOLDING FOR 7 DAYS PRIOR TO OR   Past Week at Unknown time     Allergies:  Aspirin and Lisinopril    Review of Systems   Constitutional: Negative for chills and fever.   HENT: Negative for congestion and rhinorrhea.    Eyes: Negative for photophobia and visual disturbance.    Respiratory: Negative for cough and shortness of breath.    Cardiovascular: Negative for chest pain and palpitations.   Gastrointestinal: Negative for constipation, diarrhea, nausea and vomiting.   Endocrine: Negative for cold intolerance and heat intolerance.   Genitourinary: Negative for difficulty urinating and dysuria.   Musculoskeletal: Positive for back pain. Negative for gait problem and joint swelling.   Skin: Negative for rash and wound.   Neurological: Positive for numbness ( LUE 4th and 5th digits). Negative for dizziness, light-headedness and headaches.   Psychiatric/Behavioral: Negative for sleep disturbance and suicidal ideas.       Objective      Vital Signs  Temp:  [98 °F (36.7 °C)-98.3 °F (36.8 °C)] 98.1 °F (36.7 °C)  Heart Rate:  [] 101  Resp:  [14-18] 16  BP: (137-159)/() 145/78  Body mass index is 30.8 kg/m².    Physical Exam  Vitals and nursing note reviewed.   Constitutional:       General: He is not in acute distress.     Appearance: He is well-developed. He is obese. He is not toxic-appearing.   HENT:      Head: Normocephalic and atraumatic.   Eyes:      General: No scleral icterus.        Right eye: No discharge.         Left eye: No discharge.      Conjunctiva/sclera: Conjunctivae normal.   Neck:      Vascular: No JVD.   Cardiovascular:      Rate and Rhythm: Regular rhythm. Tachycardia present.      Heart sounds: Normal heart sounds. No murmur heard.  No friction rub. No gallop.    Pulmonary:      Effort: Pulmonary effort is normal. No respiratory distress.      Breath sounds: Normal breath sounds. No wheezing or rales.      Comments: 2 L of supplemental O2  Abdominal:      General: Bowel sounds are normal. There is no distension.      Palpations: Abdomen is soft.      Tenderness: There is no abdominal tenderness. There is no guarding.   Musculoskeletal:         General: No tenderness or deformity. Normal range of motion.      Cervical back: Normal range of motion and neck  supple.   Skin:     General: Skin is warm and dry.      Capillary Refill: Capillary refill takes less than 2 seconds.   Neurological:      Mental Status: He is alert and oriented to person, place, and time.   Psychiatric:         Behavior: Behavior normal.         Results Review:   I reviewed the patient's new clinical results.  I reviewed the patient's new imaging results and agree with the interpretation.  I reviewed the patient's other test results and agree with the interpretation         Assessment/Plan     Active Hospital Problems    Diagnosis  POA   • **Spinal stenosis of lumbar region with neurogenic claudication [M48.062]  Yes   • Type 2 diabetes mellitus, without long-term current use of insulin (HCC) [E11.9]  Yes   • Mixed hyperlipidemia [E78.2]  Yes   • Essential hypertension [I10]  Yes      Resolved Hospital Problems   No resolved problems to display.       Mr. Mendoza is a 60 y.o. male who is s/p Lumbar 3 to Lumbar 4 and Lumbar 4 to Lumbar 5 laminectomy with a fusion.    · He seems to be doing well thus far postoperatively.   · Hold home oral diabetic medications.  · ADMELOG - moderate dose correctional factor as needed.  · Monitor glucose TID AC. For any BG less than 70 mg/dL, treat per hospital protocol  · HgbA1C and fasting BMP ordered for in the morning.  Will review.    · NCS diet.  · Most recent BP is 145/78.  Will hold Norvasc and HCTZ.  Anticipate fluctuations in BP due to blood loss, hypovolemia, anesthesia, narcotics etc.   · Holding parameters have been written for Lopressor.   · Continue IVFs as ordered.    · Monitor renal function.  · DVT prophylaxis per surgery.  · He was encouraged to use incentive spirometer as instructed.      Thank you very much for asking A to be involved in this patient's care. We will follow along with you.      ANUSHA Rocha  [facility]  11/03/21  19:53 EDT

## 2021-11-04 ENCOUNTER — APPOINTMENT (OUTPATIENT)
Dept: GENERAL RADIOLOGY | Facility: HOSPITAL | Age: 61
End: 2021-11-04

## 2021-11-04 LAB
ANION GAP SERPL CALCULATED.3IONS-SCNC: 12.7 MMOL/L (ref 5–15)
BASOPHILS # BLD AUTO: 0.03 10*3/MM3 (ref 0–0.2)
BASOPHILS NFR BLD AUTO: 0.2 % (ref 0–1.5)
BUN SERPL-MCNC: 22 MG/DL (ref 6–20)
BUN/CREAT SERPL: 23.2 (ref 7–25)
CALCIUM SPEC-SCNC: 9.2 MG/DL (ref 8.6–10.5)
CHLORIDE SERPL-SCNC: 101 MMOL/L (ref 98–107)
CO2 SERPL-SCNC: 25.3 MMOL/L (ref 22–29)
CREAT SERPL-MCNC: 0.95 MG/DL (ref 0.76–1.27)
DEPRECATED RDW RBC AUTO: 46.5 FL (ref 37–54)
EOSINOPHIL # BLD AUTO: 0 10*3/MM3 (ref 0–0.4)
EOSINOPHIL NFR BLD AUTO: 0 % (ref 0.3–6.2)
ERYTHROCYTE [DISTWIDTH] IN BLOOD BY AUTOMATED COUNT: 14.6 % (ref 12.3–15.4)
GFR SERPL CREATININE-BSD FRML MDRD: 81 ML/MIN/1.73
GLUCOSE BLDC GLUCOMTR-MCNC: 175 MG/DL (ref 70–130)
GLUCOSE BLDC GLUCOMTR-MCNC: 219 MG/DL (ref 70–130)
GLUCOSE BLDC GLUCOMTR-MCNC: 234 MG/DL (ref 70–130)
GLUCOSE BLDC GLUCOMTR-MCNC: 285 MG/DL (ref 70–130)
GLUCOSE SERPL-MCNC: 179 MG/DL (ref 65–99)
HCT VFR BLD AUTO: 48.9 % (ref 37.5–51)
HGB BLD-MCNC: 16.1 G/DL (ref 13–17.7)
IMM GRANULOCYTES # BLD AUTO: 0.08 10*3/MM3 (ref 0–0.05)
IMM GRANULOCYTES NFR BLD AUTO: 0.6 % (ref 0–0.5)
LYMPHOCYTES # BLD AUTO: 1.01 10*3/MM3 (ref 0.7–3.1)
LYMPHOCYTES NFR BLD AUTO: 7 % (ref 19.6–45.3)
MCH RBC QN AUTO: 29 PG (ref 26.6–33)
MCHC RBC AUTO-ENTMCNC: 32.9 G/DL (ref 31.5–35.7)
MCV RBC AUTO: 88.1 FL (ref 79–97)
MONOCYTES # BLD AUTO: 0.48 10*3/MM3 (ref 0.1–0.9)
MONOCYTES NFR BLD AUTO: 3.3 % (ref 5–12)
NEUTROPHILS NFR BLD AUTO: 12.77 10*3/MM3 (ref 1.7–7)
NEUTROPHILS NFR BLD AUTO: 88.9 % (ref 42.7–76)
NRBC BLD AUTO-RTO: 0.1 /100 WBC (ref 0–0.2)
PLATELET # BLD AUTO: 209 10*3/MM3 (ref 140–450)
PMV BLD AUTO: 10.2 FL (ref 6–12)
POTASSIUM SERPL-SCNC: 4.1 MMOL/L (ref 3.5–5.2)
RBC # BLD AUTO: 5.55 10*6/MM3 (ref 4.14–5.8)
SODIUM SERPL-SCNC: 139 MMOL/L (ref 136–145)
WBC # BLD AUTO: 14.37 10*3/MM3 (ref 3.4–10.8)

## 2021-11-04 PROCEDURE — 82962 GLUCOSE BLOOD TEST: CPT

## 2021-11-04 PROCEDURE — 97161 PT EVAL LOW COMPLEX 20 MIN: CPT

## 2021-11-04 PROCEDURE — 85025 COMPLETE CBC W/AUTO DIFF WBC: CPT | Performed by: NEUROLOGICAL SURGERY

## 2021-11-04 PROCEDURE — 72100 X-RAY EXAM L-S SPINE 2/3 VWS: CPT

## 2021-11-04 PROCEDURE — 80048 BASIC METABOLIC PNL TOTAL CA: CPT | Performed by: NURSE PRACTITIONER

## 2021-11-04 PROCEDURE — 25010000002 SODIUM CHLORIDE 0.9 % WITH KCL 20 MEQ 20-0.9 MEQ/L-% SOLUTION: Performed by: NEUROLOGICAL SURGERY

## 2021-11-04 PROCEDURE — 63710000001 INSULIN LISPRO (HUMAN) PER 5 UNITS: Performed by: NURSE PRACTITIONER

## 2021-11-04 PROCEDURE — 97110 THERAPEUTIC EXERCISES: CPT

## 2021-11-04 PROCEDURE — 99024 POSTOP FOLLOW-UP VISIT: CPT | Performed by: NURSE PRACTITIONER

## 2021-11-04 RX ORDER — AMLODIPINE BESYLATE 2.5 MG/1
2.5 TABLET ORAL
Status: DISCONTINUED | OUTPATIENT
Start: 2021-11-05 | End: 2021-11-06

## 2021-11-04 RX ORDER — DOCUSATE SODIUM 100 MG/1
100 CAPSULE, LIQUID FILLED ORAL 2 TIMES DAILY
Status: DISCONTINUED | OUTPATIENT
Start: 2021-11-04 | End: 2021-11-06 | Stop reason: HOSPADM

## 2021-11-04 RX ORDER — POLYETHYLENE GLYCOL 3350 17 G/17G
17 POWDER, FOR SOLUTION ORAL DAILY
Status: DISCONTINUED | OUTPATIENT
Start: 2021-11-04 | End: 2021-11-06 | Stop reason: HOSPADM

## 2021-11-04 RX ORDER — BISACODYL 10 MG
10 SUPPOSITORY, RECTAL RECTAL DAILY PRN
Status: DISCONTINUED | OUTPATIENT
Start: 2021-11-04 | End: 2021-11-06 | Stop reason: HOSPADM

## 2021-11-04 RX ADMIN — INSULIN LISPRO 4 UNITS: 100 INJECTION, SOLUTION INTRAVENOUS; SUBCUTANEOUS at 18:02

## 2021-11-04 RX ADMIN — ALLOPURINOL 300 MG: 300 TABLET ORAL at 23:00

## 2021-11-04 RX ADMIN — SODIUM CHLORIDE, PRESERVATIVE FREE 3 ML: 5 INJECTION INTRAVENOUS at 08:21

## 2021-11-04 RX ADMIN — POLYETHYLENE GLYCOL 3350 17 G: 17 POWDER, FOR SOLUTION ORAL at 18:02

## 2021-11-04 RX ADMIN — METOPROLOL TARTRATE 100 MG: 50 TABLET, FILM COATED ORAL at 23:01

## 2021-11-04 RX ADMIN — INSULIN LISPRO 6 UNITS: 100 INJECTION, SOLUTION INTRAVENOUS; SUBCUTANEOUS at 23:06

## 2021-11-04 RX ADMIN — METHOCARBAMOL TABLETS 1000 MG: 500 TABLET, COATED ORAL at 12:02

## 2021-11-04 RX ADMIN — ATORVASTATIN CALCIUM 20 MG: 20 TABLET, FILM COATED ORAL at 23:01

## 2021-11-04 RX ADMIN — METHOCARBAMOL TABLETS 1000 MG: 500 TABLET, COATED ORAL at 18:02

## 2021-11-04 RX ADMIN — HYDROCODONE BITARTRATE AND ACETAMINOPHEN 1 TABLET: 5; 325 TABLET ORAL at 05:24

## 2021-11-04 RX ADMIN — POTASSIUM CHLORIDE AND SODIUM CHLORIDE 100 ML/HR: 900; 150 INJECTION, SOLUTION INTRAVENOUS at 06:44

## 2021-11-04 RX ADMIN — HYDROCODONE BITARTRATE AND ACETAMINOPHEN 1 TABLET: 5; 325 TABLET ORAL at 13:48

## 2021-11-04 RX ADMIN — DOCUSATE SODIUM 100 MG: 100 CAPSULE, LIQUID FILLED ORAL at 23:01

## 2021-11-04 RX ADMIN — INSULIN LISPRO 4 UNITS: 100 INJECTION, SOLUTION INTRAVENOUS; SUBCUTANEOUS at 12:03

## 2021-11-04 RX ADMIN — METHOCARBAMOL TABLETS 1000 MG: 500 TABLET, COATED ORAL at 05:24

## 2021-11-04 RX ADMIN — AMLODIPINE BESYLATE 5 MG: 5 TABLET ORAL at 08:21

## 2021-11-04 RX ADMIN — SODIUM CHLORIDE, PRESERVATIVE FREE 3 ML: 5 INJECTION INTRAVENOUS at 23:08

## 2021-11-04 RX ADMIN — SODIUM CHLORIDE, PRESERVATIVE FREE 10 ML: 5 INJECTION INTRAVENOUS at 23:07

## 2021-11-04 RX ADMIN — HYDROCODONE BITARTRATE AND ACETAMINOPHEN 1 TABLET: 5; 325 TABLET ORAL at 22:56

## 2021-11-04 RX ADMIN — HYDROCODONE BITARTRATE AND ACETAMINOPHEN 1 TABLET: 5; 325 TABLET ORAL at 18:02

## 2021-11-04 RX ADMIN — HYDROCODONE BITARTRATE AND ACETAMINOPHEN 1 TABLET: 5; 325 TABLET ORAL at 00:53

## 2021-11-04 RX ADMIN — METOPROLOL TARTRATE 100 MG: 50 TABLET, FILM COATED ORAL at 08:21

## 2021-11-04 RX ADMIN — LINAGLIPTIN 5 MG: 5 TABLET, FILM COATED ORAL at 10:10

## 2021-11-04 RX ADMIN — ASPIRIN 81 MG: 81 TABLET, CHEWABLE ORAL at 08:21

## 2021-11-04 RX ADMIN — HYDROCODONE BITARTRATE AND ACETAMINOPHEN 1 TABLET: 5; 325 TABLET ORAL at 09:29

## 2021-11-04 RX ADMIN — SODIUM CHLORIDE, PRESERVATIVE FREE 10 ML: 5 INJECTION INTRAVENOUS at 23:03

## 2021-11-04 RX ADMIN — INSULIN LISPRO 2 UNITS: 100 INJECTION, SOLUTION INTRAVENOUS; SUBCUTANEOUS at 08:21

## 2021-11-04 RX ADMIN — ALLOPURINOL 300 MG: 300 TABLET ORAL at 08:21

## 2021-11-04 NOTE — PLAN OF CARE
Goal Outcome Evaluation:  Plan of Care Reviewed With: patient        Progress: improving  Outcome Summary: Pt is 59 y/o male, pod 1 L3-4, 4-5 lami and fusion. Dressing is cdi, vss, n/v intact with some numbness to left lateral hand. Pain controlled with po meds. Pt ambulates with standby assist. Carrera catheter removed, dtv by 2000. Will continue to monitor glucose r/t history of DM.

## 2021-11-04 NOTE — PROGRESS NOTES
[facility] Medicine Services  PROGRESS NOTE    Patient Name: Pablo Mendoza  : 1960  MRN: [id number]    Date of Admission: 11/3/2021  Primary Care Physician: Liana Hood MD    Subjective   Subjective     CC:  Follow-up medical management    HPI:  Patient feels he is doing well postoperatively.  Denies other new complaints.    Review of Systems  No current fevers or chills  No current shortness of breath or cough  No current nausea, vomiting, or diarrhea  No current chest pain or palpitations      Objective   Objective     Vital Signs:   Temp:  [97.1 °F (36.2 °C)-98.3 °F (36.8 °C)] 97.5 °F (36.4 °C)  Heart Rate:  [] 76  Resp:  [14-18] 16  BP: (118-159)/() 122/70        Physical Exam:  Constitutional:Awake, alert  HENT: NCAT, mucous membranes moist, neck supple  Respiratory: Clear to auscultation bilaterally, respiratory effort normal, nonlabored breathing   Cardiovascular: RRR, normal radial pulses  Gastrointestinal: Positive bowel sounds, soft, nontender, nondistended  Musculoskeletal: Normal musculature for age, no lower extremity edema, BMI 30  Psychiatric: Appropriate affect, cooperative, conversational  Neurologic: No slurred speech or facial droop, follows commands  Skin: No rashes or jaundice, warm      Results Reviewed:  Results from last 7 days   Lab Units 21  0741   WBC 10*3/mm3 14.37*   HEMOGLOBIN g/dL 16.1   HEMATOCRIT % 48.9   PLATELETS 10*3/mm3 209     Results from last 7 days   Lab Units 21  0741   SODIUM mmol/L 139   POTASSIUM mmol/L 4.1   CHLORIDE mmol/L 101   CO2 mmol/L 25.3   BUN mg/dL 22*   CREATININE mg/dL 0.95   GLUCOSE mg/dL 179*   CALCIUM mg/dL 9.2     Estimated Creatinine Clearance: 90.3 mL/min (by C-G formula based on SCr of 0.95 mg/dL).    Microbiology Results Abnormal     None          Imaging Results (Last 24 Hours)     Procedure Component Value Units Date/Time    XR Spine Lumbar 2 or 3 View [[id number]] Collected: 21 0753      Updated: 11/04/21 0759    Narrative:      LUMBAR SPINE 2 VIEWS      HISTORY: Lumbar fusion.     FINDINGS: AP and lateral views of the lumbar spine demonstrate fusion  from L3 to L5 with bilateral transpedicular screws, posterior rods and  interbody graft material. The interbody grafts and transpedicular screws  appear to be in satisfactory position. The alignment of the lumbar spine  is within normal limits. Decompressive laminectomies have been performed  from L3 to L5.       XR Spine Lumbar 2 or 3 View [[id number]] Collected: 11/03/21 1834     Updated: 11/03/21 1837    Narrative:      XR SPINE LUMBAR 2 OR 3 VW-, FL C ARM DURING SURGERY-     INDICATIONS: Lumbar fusion     TECHNIQUE: FLUOROSCOPIC ASSISTANCE IN THE OPERATING ROOM.     FINDINGS:     6 intraoperative fluoroscopic spot views were obtained and recorded the  PACS for review, revealing posterior fusion hardware at L3, L4, L5, with  intervertebral disc spacers. Please see operative report for full  details.     Fluoroscopy time: 18.3 seconds       Impression:         As described.     This report was finalized on 11/3/2021 6:34 PM by Dr. Junito Regalado M.D.       FL C Arm During Surgery [[id number]] Collected: 11/03/21 1834     Updated: 11/03/21 1837    Narrative:      XR SPINE LUMBAR 2 OR 3 VW-, FL C ARM DURING SURGERY-     INDICATIONS: Lumbar fusion     TECHNIQUE: FLUOROSCOPIC ASSISTANCE IN THE OPERATING ROOM.     FINDINGS:     6 intraoperative fluoroscopic spot views were obtained and recorded the  PACS for review, revealing posterior fusion hardware at L3, L4, L5, with  intervertebral disc spacers. Please see operative report for full  details.     Fluoroscopy time: 18.3 seconds       Impression:         As described.     This report was finalized on 11/3/2021 6:34 PM by Dr. Junito Regalado M.D.                 I have reviewed the medications:  Scheduled Meds:allopurinol, 300 mg, Oral, BID  [START ON 11/5/2021] amLODIPine, 2.5 mg, Oral,  Q24H  aspirin, 81 mg, Oral, Daily  atorvastatin, 20 mg, Oral, Nightly  insulin lispro, 0-9 Units, Subcutaneous, 4x Daily With Meals & Nightly  linagliptin, 5 mg, Oral, Daily  methocarbamol, 1,000 mg, Oral, Q6H  metoprolol tartrate, 100 mg, Oral, BID  sodium chloride, 3 mL, Intravenous, Q12H      Continuous Infusions:sodium chloride 0.9 % with KCl 20 mEq, 100 mL/hr, Last Rate: Stopped (11/04/21 0725)      PRN Meds:.cyclobenzaprine  •  dextrose  •  dextrose  •  glucagon (human recombinant)  •  HYDROcodone-acetaminophen  •  Morphine **AND** naloxone  •  ondansetron **OR** ondansetron  •  sodium chloride    Assessment/Plan   Assessment & Plan     Active Hospital Problems    Diagnosis  POA   • **Spinal stenosis of lumbar region with neurogenic claudication [M48.062]  Yes   • Type 2 diabetes mellitus, without long-term current use of insulin (HCC) [E11.9]  Yes   • Mixed hyperlipidemia [E78.2]  Yes   • Essential hypertension [I10]  Yes      Resolved Hospital Problems   No resolved problems to display.        Brief Hospital Course to date:  Pablo Mendoza is a 60 y.o. male with lumbar spinal stenosis and claudication status post neurosurgery on 11/4/2021.  Hospital medicine is following for medical management of hypertension and diabetes.    Discussion/plan:  Glucose reviewed.  Continue correction scale.  Add Tradjenta.  Blood pressure normotensive.  Decrease amlodipine back to 2.5 mg for now and monitor blood pressure.  Can always be adjusted up if needed.  Continue supportive care and symptom treatment.    DVT Prophylaxis: Mechanical and aspirin, defer to surgery team    CODE STATUS:   Code Status and Medical Interventions:   Ordered at: 11/03/21 1858     Code Status (Patient has no pulse and is not breathing):    CPR (Attempt to Resuscitate)     Medical Interventions (Patient has pulse or is breathing):    Full Support       Jovany Vásquez MD  11/04/21

## 2021-11-04 NOTE — THERAPY EVALUATION
Patient Name: Pablo Mendoza  : 1960    MRN: [id number]                              Today's Date: 2021       Admit Date: 11/3/2021    Visit Dx:     ICD-10-CM ICD-9-CM   1. Spinal stenosis of lumbar region with neurogenic claudication  M48.062 724.03     Patient Active Problem List   Diagnosis   • Type 2 diabetes mellitus, without long-term current use of insulin ([facility])   • Mixed hyperlipidemia   • Essential hypertension   • Gout of multiple sites   • Nocturia   • Migraine headache   • Chronic maxillary sinusitis   • Other fatigue   • Arthritis   • Routine health maintenance   • Proteinuria   • Cellulitis of left foot   • Spinal stenosis of lumbar region with neurogenic claudication     Past Medical History:   Diagnosis Date   • Anesthesia complication     STATES HARD TO WAKE UP W/ALL SURGERIES   • COVID-19     DEC 2020   • Diabetes mellitus ([facility])    • Elevated cholesterol    • Gout    • Hypertension    • Low back pain    • Migraines    • Mixed hyperlipidemia 2018   • Neuropathy    • Nocturia 2018   • Numbness and tingling of both lower extremities     LEGS AND FEET    • Seizures ([facility])     AS A CHILD   • Sleep apnea     NO DEVICE USED   • Staph infection     UNSURE OF SITE   • Stroke ([facility])     COGNITIVE DELAY   • TIA (transient ischemic attack)    • Type 2 diabetes mellitus, without long-term current use of insulin ([facility]) 2018   • Weakness of both legs     LEFT WORSE      Past Surgical History:   Procedure Laterality Date   • CHOLECYSTECTOMY     • COLONOSCOPY     • HERNIA REPAIR     • SINUS SURGERY     • TYMPANOPLASTY Bilateral       General Information     Row Name 21 0956          Physical Therapy Time and Intention    Document Type evaluation  -AR     Mode of Treatment physical therapy  -AR     Row Name 21 0956          General Information    Patient Profile Reviewed yes  -AR     Prior Level of Function independent:  no AD, some falls due to LLE giving out  -AR      Barriers to Rehab none identified  -AR     Row Name 11/04/21 0956          Living Environment    Lives With alone  -AR     Row Name 11/04/21 0956          Home Main Entrance    Number of Stairs, Main Entrance none  -AR     Stair Railings, Main Entrance none  -AR     Row Name 11/04/21 0956          Stairs Within Home, Primary    Number of Stairs, Within Home, Primary none  -AR     Row Name 11/04/21 0956          Cognition    Orientation Status (Cognition) oriented x 4  -AR     Row Name 11/04/21 0956          Safety Issues, Functional Mobility    Impairments Affecting Function (Mobility) balance; endurance/activity tolerance; strength; pain  -AR           User Key  (r) = Recorded By, (t) = Taken By, (c) = Cosigned By    Initials Name Provider Type    Vicki Anne, PT Physical Therapist               Mobility     Row Name 11/04/21 0956          Bed Mobility    Bed Mobility supine-sit; sit-supine  -AR     Supine-Sit [place] (Bed Mobility) verbal cues; contact guard  -AR     Sit-Supine [place] (Bed Mobility) contact guard; verbal cues  -AR     Comment (Bed Mobility) HOB flat, used bed rail. Few cues for log rolling  -AR     Row Name 11/04/21 0956          Sit-Stand Transfer    Sit-Stand [place] (Transfers) contact guard  -AR     Row Name 11/04/21 0956          Gait/Stairs (Locomotion)    [place] Level (Gait) contact guard  -AR     Assistive Device (Gait) walker, front-wheeled  -AR     Distance in Feet (Gait) 100' guarded antalgic gait pattern, cues for posture and decreasing UE uspport on RW.  LImited by pain and some dizziness  -AR     Deviations/Abnormal Patterns (Gait) gait speed decreased; antalgic  -AR           User Key  (r) = Recorded By, (t) = Taken By, (c) = Cosigned By    Initials Name Provider Type    Vicki Anne, PT Physical Therapist               Obj/Interventions     Row Name 11/04/21 0957          Range of Motion Comprehensive    Comment, General Range of Motion B LE WFL   -AR     Row Name 11/04/21 0957          Strength Comprehensive (MMT)    Comment, General Manual Muscle Testing (MMT) Assessment B LE 4/5  -AR     Row Name 11/04/21 0957          Balance    Balance Assessment sitting static balance; standing static balance; standing dynamic balance  -AR     Static Sitting Balance WNL  -AR     Static Standing Balance WNL; supported  -AR     Dynamic Standing Balance mild impairment; supported  -AR           User Key  (r) = Recorded By, (t) = Taken By, (c) = Cosigned By    Initials Name Provider Type    AR Vicki Seymour, PT Physical Therapist               Goals/Plan     Row Name 11/04/21 1001          Bed Mobility Goal 1 (PT)    Activity/Assistive Device (Bed Mobility Goal 1, PT) bed mobility activities, all  -AR     [place] Level/Cues Needed (Bed Mobility Goal 1, PT) independent  -AR     Time Frame (Bed Mobility Goal 1, PT) 1 week  -AR     Row Name 11/04/21 1001          Transfer Goal 1 (PT)    Activity/Assistive Device (Transfer Goal 1, PT) sit-to-stand/stand-to-sit; bed-to-chair/chair-to-bed  -AR     [place] Level/Cues Needed (Transfer Goal 1, PT) standby assist  -AR     Time Frame (Transfer Goal 1, PT) 1 week  -AR     Row Name 11/04/21 1001          Gait Training Goal 1 (PT)    Activity/Assistive Device (Gait Training Goal 1, PT) gait (walking locomotion)  -AR     [place] Level (Gait Training Goal 1, PT) standby assist  -AR     Distance (Gait Training Goal 1, PT) 200  -AR     Time Frame (Gait Training Goal 1, PT) 1 week  -AR           User Key  (r) = Recorded By, (t) = Taken By, (c) = Cosigned By    Initials Name Provider Type    AR Vicki Seymour, PT Physical Therapist               Clinical Impression     Row Name 11/04/21 0957          Pain    Additional Documentation Pain Scale: Numbers Pre/Post-Treatment (Group)  -AR     Row Name 11/04/21 0957          Pain Scale: Numbers Pre/Post-Treatment    Pretreatment Pain Rating 6/10  -AR     Posttreatment Pain  Rating 6/10  -AR     Pain Location back  -AR     Pain Intervention(s) Repositioned; Medication (See MAR)  -AR     Row Name 11/04/21 0957          Plan of Care Review    Plan of Care Reviewed With patient  -AR     Outcome Summary POD 1 L3-5 lami w/ fusion by Dr. Webster.  Pt reports normally independent, no use of AD, some recent falls due to LLE giving.  Pt reports legs already feel better/ more stable after surgery.  Today pt demonstrates generlized post op weakness, impaired gait pattern and ind. w/ mobility from baseline but able to ambulate 100' w/ contact assist using RW; slow guarded gait pattern.  Educated on spinal precautions, activity/walking recommendations; also discussed pt not sitting in a chair unless Dr. Webster reports okay.  ANticipate DC to home w assist as needed, possible need for RW, but anticipating progress.  -AR     Row Name 11/04/21 0957          Therapy Assessment/Plan (PT)    Rehab Potential (PT) good, to achieve stated therapy goals  -AR     Criteria for Skilled Interventions Met (PT) yes  -AR     Row Name 11/04/21 0957          Vital Signs    O2 Delivery Pre Treatment room air  -AR     Row Name 11/04/21 0957          Positioning and Restraints    Pre-Treatment Position in bed  -AR     Post Treatment Position bed  -AR     In Bed notified nsg; supine; call light within reach; encouraged to call for assist  -AR           User Key  (r) = Recorded By, (t) = Taken By, (c) = Cosigned By    Initials Name Provider Type    Vicki Anne, PT Physical Therapist               Outcome Measures     Row Name 11/04/21 1001          How much help from another person do you currently need...    Turning from your back to your side while in flat bed without using bedrails? 4  -AR     Moving from lying on back to sitting on the side of a flat bed without bedrails? 4  -AR     Moving to and from a bed to a chair (including a wheelchair)? 3  -AR     Standing up from a chair using your arms (e.g., wheelchair,  bedside chair)? 3  -AR     Climbing 3-5 steps with a railing? 2  -AR     To walk in hospital room? 3  -AR     AM-PAC 6 Clicks Score (PT) 19  -AR     Row Name 11/04/21 1001          Functional Assessment    Outcome Measure Options AM-PAC 6 Clicks Basic Mobility (PT)  -AR           User Key  (r) = Recorded By, (t) = Taken By, (c) = Cosigned By    Initials Name Provider Type    AR Vicki Seymour, PT Physical Therapist                             Physical Therapy Education                 Title: PT OT SLP Therapies (In Progress)     Topic: Physical Therapy (In Progress)     Point: Mobility training (In Progress)     Learning Progress Summary           Patient Acceptance, E, NR by AR at 11/4/2021 1001                   Point: Home exercise program (In Progress)     Learning Progress Summary           Patient Acceptance, E, NR by AR at 11/4/2021 1001                   Point: Body mechanics (In Progress)     Learning Progress Summary           Patient Acceptance, E, NR by AR at 11/4/2021 1001                   Point: Precautions (In Progress)     Learning Progress Summary           Patient Acceptance, E, NR by AR at 11/4/2021 1001                               User Key     Initials Effective Dates Name Provider Type Discipline    AR 06/16/21 -  Vicki Seymour, PT Physical Therapist PT              PT Recommendation and Plan  Planned Therapy Interventions (PT): balance training, bed mobility training, gait training, home exercise program, patient/family education, transfer training, strengthening  Plan of Care Reviewed With: patient  Outcome Summary: POD 1 L3-5 lami w/ fusion by Dr. Webster.  Pt reports normally independent, no use of AD, some recent falls due to LLE giving.  Pt reports legs already feel better/ more stable after surgery.  Today pt demonstrates generlized post op weakness, impaired gait pattern and ind. w/ mobility from baseline but able to ambulate 100' w/ contact assist using RW; slow guarded gait  pattern.  Educated on spinal precautions, activity/walking recommendations; also discussed pt not sitting in a chair unless Dr. Webster reports okay.  ANticipate DC to home w assist as needed, possible need for RW, but anticipating progress.     Time Calculation:    PT Charges     Row Name 11/04/21 0955             Time Calculation    Start Time 0925  -AR      Stop Time 0952  -AR      Time Calculation (min) 27 min  -AR      PT Received On 11/04/21  -AR      PT - Next Appointment 11/05/21  -AR      PT Goal Re-Cert Due Date 11/11/21  -AR            User Key  (r) = Recorded By, (t) = Taken By, (c) = Cosigned By    Initials Name Provider Type    AR Vicki Seymour, PT Physical Therapist              Therapy Charges for Today     Code Description Service Date Service Provider Modifiers Qty    54705397008 HC PT EVAL LOW COMPLEXITY 3 11/4/2021 Vicki Seymour, PT GP 1    50369625808 HC PT THER PROC EA 15 MIN 11/4/2021 Vicki Seymour, PT GP 1          PT G-Codes  Outcome Measure Options: AM-PAC 6 Clicks Basic Mobility (PT)  AM-PAC 6 Clicks Score (PT): 19    Vicki Seymour PT  11/4/2021

## 2021-11-04 NOTE — PLAN OF CARE
Goal Outcome Evaluation:  Plan of Care Reviewed With: patient        Progress: improving  Outcome Summary: patient having increased pain at beginning of shift, IV and PO medications given without relief, scheduled robaxin added on and patient having better pain control at this time, unable to ambulate at beginning of shift r/t pain, after robaxin was able to ambulate with assistance, f/c in place, educated on hygiene to prevent CAUTI and b/p and glucose monitoring

## 2021-11-04 NOTE — PROGRESS NOTES
[facility] NEUROSURGERY PROGRESS NOTE      CC:     Subjective     Interval History: POD #1 Bilateral L3-4 and L4-5 laminectomy, medial facetectomy, foraminotomy with a transforaminal interbody lumbar fusion and instrumentation    ROS:  Constitutional: No fever, chills  GI: No nausea, vomiting  MS: No back pain  Neuro: No numbness, tingling, or weakness,  no balance difficulties  : No difficulty voiding, no incontinence    Objective     Vital signs in last 24 hours:  Temp:  [97.1 °F (36.2 °C)-98.2 °F (36.8 °C)] 98.1 °F (36.7 °C)  Heart Rate:  [] 82  Resp:  [14-18] 16  BP: (118-159)/() 130/70    Intake/Output this shift:  I/O this shift:  In: -   Out: 50 [Drains:50]    LABS:  Results from last 7 days   Lab Units 11/04/21  0741   WBC 10*3/mm3 14.37*   HEMOGLOBIN g/dL 16.1   HEMATOCRIT % 48.9   PLATELETS 10*3/mm3 209     Results from last 7 days   Lab Units 11/04/21  0741   SODIUM mmol/L 139   POTASSIUM mmol/L 4.1   CHLORIDE mmol/L 101   CO2 mmol/L 25.3   BUN mg/dL 22*   CREATININE mg/dL 0.95   CALCIUM mg/dL 9.2   GLUCOSE mg/dL 179*     IMAGING STUDIES:  Lumbar xrays reveal fusion instrumentation from L3-L5 in satisfactory position.      I personally reviewed and interpreted the patient's the report and imaging with Dr. Webstre.    Meds reviewed/changed: Yes    Current Facility-Administered Medications:   •  allopurinol (ZYLOPRIM) tablet 300 mg, 300 mg, Oral, BID, Jose Webster MD, 300 mg at 11/04/21 0821  •  [START ON 11/5/2021] amLODIPine (NORVASC) tablet 2.5 mg, 2.5 mg, Oral, Q24H, Jovany Vásquez MD  •  aspirin chewable tablet 81 mg, 81 mg, Oral, Daily, Jose Webster MD, 81 mg at 11/04/21 0821  •  atorvastatin (LIPITOR) tablet 20 mg, 20 mg, Oral, Nightly, Jose Webster MD, 20 mg at 11/03/21 2020  •  cyclobenzaprine (FLEXERIL) tablet 10 mg, 10 mg, Oral, Q8H PRN, Le Kline, APRN  •  dextrose (D50W) (25 g/50 mL) IV injection 25 g, 25 g, Intravenous, Q15 Min PRN, Stephenie Leach  APRN  •  dextrose (GLUTOSE) oral gel 15 g, 15 g, Oral, Q15 Min PRN, Stephenie Leach, APRN  •  glucagon (human recombinant) (GLUCAGEN DIAGNOSTIC) injection 1 mg, 1 mg, Subcutaneous, Q15 Min PRN, Stephenie Leach, APRN  •  HYDROcodone-acetaminophen (NORCO) 5-325 MG per tablet 1 tablet, 1 tablet, Oral, Q4H PRN, Jose Webster MD, 1 tablet at 11/04/21 0929  •  insulin lispro (ADMELOG) injection 0-9 Units, 0-9 Units, Subcutaneous, 4x Daily With Meals & Nightly, Stephenie Leach APRN, 4 Units at 11/04/21 1203  •  linagliptin (TRADJENTA) tablet 5 mg, 5 mg, Oral, Daily, FahadJovany MD, 5 mg at 11/04/21 1010  •  methocarbamol (ROBAXIN) tablet 1,000 mg, 1,000 mg, Oral, Q6H, Le Kline, APRN, 1,000 mg at 11/04/21 1202  •  metoprolol tartrate (LOPRESSOR) tablet 100 mg, 100 mg, Oral, BID, Stephenie Leach APRN, 100 mg at 11/04/21 0821  •  morphine injection 2 mg, 2 mg, Intravenous, Q4H PRN, 2 mg at 11/03/21 2343 **AND** naloxone (NARCAN) injection 0.4 mg, 0.4 mg, Intravenous, Q5 Min PRN, Jose Webster MD  •  ondansetron (ZOFRAN) tablet 4 mg, 4 mg, Oral, Q6H PRN **OR** ondansetron (ZOFRAN) injection 4 mg, 4 mg, Intravenous, Q6H PRN, Jose Webster MD  •  sodium chloride 0.9 % flush 10 mL, 10 mL, Intravenous, PRN, Jose Webster MD  •  sodium chloride 0.9 % flush 3 mL, 3 mL, Intravenous, Q12H, Jose Webster MD, 3 mL at 11/04/21 0821  •  sodium chloride 0.9 % with KCl 20 mEq/L infusion, 100 mL/hr, Intravenous, Continuous, Jose Webster MD, Stopped at 11/04/21 0766      Physical Exam:    General:   Awake, alert, oriented x3. Speech clear with no aphasia  Back:    Incision with dressing c/d/i, ANA drain 50cc's (150cc overnight)  Motor: Normal muscle strength, bulk and tone in upper and lower extremities.  No fasciculations, rigidity, spasticity, or abnormal movements.  Sensation: Normal to light touch  Coordination: Moving all extremities  Station and Gait:             Normal gait and  "station.  Extremities:   Wearing SCD      Assessment/Plan   Flat in bed, states that his bilateral lower extremities are feeling much better than before surgery.  He states that before surgery he felt like his leg had a void, he states that left leg feels so much stronger now and he is very pleased with the surgery thus far.    ASSESSMENT:      Spinal stenosis of lumbar region with neurogenic claudication    Type 2 diabetes mellitus, without long-term current use of insulin ([facility]CC)    Mixed hyperlipidemia    Essential hypertension      PLAN:   CBC in AM  D/C f/c  Encouraged and gave instruction on IS  Colace 100mg BID  Miralax 17gm Daily  Dulcolax supp 10mg daily, prn constipation    I discussed the patient's findings and my recommendations with patient, nursing staff and Dr. Webster       LOS: 0 days       Magnolia Hurley, APRN  11/4/2021  12:37 EDT    \"Dictated utilizing Dragon dictation\".      "

## 2021-11-05 LAB
BASOPHILS # BLD AUTO: 0.08 10*3/MM3 (ref 0–0.2)
BASOPHILS NFR BLD AUTO: 0.6 % (ref 0–1.5)
DEPRECATED RDW RBC AUTO: 43.7 FL (ref 37–54)
EOSINOPHIL # BLD AUTO: 0.16 10*3/MM3 (ref 0–0.4)
EOSINOPHIL NFR BLD AUTO: 1.2 % (ref 0.3–6.2)
ERYTHROCYTE [DISTWIDTH] IN BLOOD BY AUTOMATED COUNT: 14.3 % (ref 12.3–15.4)
GLUCOSE BLDC GLUCOMTR-MCNC: 157 MG/DL (ref 70–130)
GLUCOSE BLDC GLUCOMTR-MCNC: 190 MG/DL (ref 70–130)
GLUCOSE BLDC GLUCOMTR-MCNC: 208 MG/DL (ref 70–130)
GLUCOSE BLDC GLUCOMTR-MCNC: 278 MG/DL (ref 70–130)
HCT VFR BLD AUTO: 43.5 % (ref 37.5–51)
HGB BLD-MCNC: 15 G/DL (ref 13–17.7)
IMM GRANULOCYTES # BLD AUTO: 0.17 10*3/MM3 (ref 0–0.05)
IMM GRANULOCYTES NFR BLD AUTO: 1.3 % (ref 0–0.5)
LYMPHOCYTES # BLD AUTO: 2.01 10*3/MM3 (ref 0.7–3.1)
LYMPHOCYTES NFR BLD AUTO: 15.2 % (ref 19.6–45.3)
MCH RBC QN AUTO: 29.6 PG (ref 26.6–33)
MCHC RBC AUTO-ENTMCNC: 34.5 G/DL (ref 31.5–35.7)
MCV RBC AUTO: 86 FL (ref 79–97)
MONOCYTES # BLD AUTO: 0.79 10*3/MM3 (ref 0.1–0.9)
MONOCYTES NFR BLD AUTO: 6 % (ref 5–12)
NEUTROPHILS NFR BLD AUTO: 10.01 10*3/MM3 (ref 1.7–7)
NEUTROPHILS NFR BLD AUTO: 75.7 % (ref 42.7–76)
NRBC BLD AUTO-RTO: 0 /100 WBC (ref 0–0.2)
PLATELET # BLD AUTO: 194 10*3/MM3 (ref 140–450)
PMV BLD AUTO: 10.2 FL (ref 6–12)
RBC # BLD AUTO: 5.06 10*6/MM3 (ref 4.14–5.8)
WBC # BLD AUTO: 13.22 10*3/MM3 (ref 3.4–10.8)

## 2021-11-05 PROCEDURE — 63710000001 INSULIN LISPRO (HUMAN) PER 5 UNITS: Performed by: INTERNAL MEDICINE

## 2021-11-05 PROCEDURE — 99024 POSTOP FOLLOW-UP VISIT: CPT | Performed by: NURSE PRACTITIONER

## 2021-11-05 PROCEDURE — 85025 COMPLETE CBC W/AUTO DIFF WBC: CPT | Performed by: NURSE PRACTITIONER

## 2021-11-05 PROCEDURE — 82962 GLUCOSE BLOOD TEST: CPT

## 2021-11-05 PROCEDURE — 97110 THERAPEUTIC EXERCISES: CPT

## 2021-11-05 PROCEDURE — 63710000001 INSULIN GLARGINE PER 5 UNITS: Performed by: INTERNAL MEDICINE

## 2021-11-05 PROCEDURE — 63710000001 INSULIN LISPRO (HUMAN) PER 5 UNITS: Performed by: NURSE PRACTITIONER

## 2021-11-05 RX ORDER — INSULIN GLARGINE 100 [IU]/ML
4 INJECTION, SOLUTION SUBCUTANEOUS NIGHTLY
Status: DISCONTINUED | OUTPATIENT
Start: 2021-11-05 | End: 2021-11-06

## 2021-11-05 RX ORDER — INSULIN LISPRO 100 [IU]/ML
2 INJECTION, SOLUTION INTRAVENOUS; SUBCUTANEOUS
Status: DISCONTINUED | OUTPATIENT
Start: 2021-11-05 | End: 2021-11-06

## 2021-11-05 RX ORDER — CHOLECALCIFEROL (VITAMIN D3) 125 MCG
5 CAPSULE ORAL NIGHTLY PRN
Status: DISCONTINUED | OUTPATIENT
Start: 2021-11-05 | End: 2021-11-06 | Stop reason: HOSPADM

## 2021-11-05 RX ADMIN — SODIUM CHLORIDE, PRESERVATIVE FREE 3 ML: 5 INJECTION INTRAVENOUS at 07:41

## 2021-11-05 RX ADMIN — ASPIRIN 81 MG: 81 TABLET, CHEWABLE ORAL at 07:38

## 2021-11-05 RX ADMIN — HYDROCODONE BITARTRATE AND ACETAMINOPHEN 1 TABLET: 5; 325 TABLET ORAL at 03:34

## 2021-11-05 RX ADMIN — INSULIN LISPRO 2 UNITS: 100 INJECTION, SOLUTION INTRAVENOUS; SUBCUTANEOUS at 22:41

## 2021-11-05 RX ADMIN — METFORMIN HYDROCHLORIDE 500 MG: 500 TABLET, FILM COATED ORAL at 18:38

## 2021-11-05 RX ADMIN — INSULIN GLARGINE 4 UNITS: 100 INJECTION, SOLUTION SUBCUTANEOUS at 22:43

## 2021-11-05 RX ADMIN — SODIUM CHLORIDE, PRESERVATIVE FREE 3 ML: 5 INJECTION INTRAVENOUS at 22:43

## 2021-11-05 RX ADMIN — ALLOPURINOL 300 MG: 300 TABLET ORAL at 22:41

## 2021-11-05 RX ADMIN — INSULIN LISPRO 2 UNITS: 100 INJECTION, SOLUTION INTRAVENOUS; SUBCUTANEOUS at 17:53

## 2021-11-05 RX ADMIN — DOCUSATE SODIUM 100 MG: 100 CAPSULE, LIQUID FILLED ORAL at 22:42

## 2021-11-05 RX ADMIN — INSULIN LISPRO 4 UNITS: 100 INJECTION, SOLUTION INTRAVENOUS; SUBCUTANEOUS at 17:53

## 2021-11-05 RX ADMIN — HYDROCODONE BITARTRATE AND ACETAMINOPHEN 1 TABLET: 5; 325 TABLET ORAL at 07:38

## 2021-11-05 RX ADMIN — INSULIN LISPRO 2 UNITS: 100 INJECTION, SOLUTION INTRAVENOUS; SUBCUTANEOUS at 07:39

## 2021-11-05 RX ADMIN — METHOCARBAMOL TABLETS 1000 MG: 500 TABLET, COATED ORAL at 12:45

## 2021-11-05 RX ADMIN — LINAGLIPTIN 5 MG: 5 TABLET, FILM COATED ORAL at 07:38

## 2021-11-05 RX ADMIN — METOPROLOL TARTRATE 100 MG: 50 TABLET, FILM COATED ORAL at 22:41

## 2021-11-05 RX ADMIN — ATORVASTATIN CALCIUM 20 MG: 20 TABLET, FILM COATED ORAL at 22:41

## 2021-11-05 RX ADMIN — METHOCARBAMOL TABLETS 1000 MG: 500 TABLET, COATED ORAL at 05:59

## 2021-11-05 RX ADMIN — INSULIN LISPRO 2 UNITS: 100 INJECTION, SOLUTION INTRAVENOUS; SUBCUTANEOUS at 12:45

## 2021-11-05 RX ADMIN — POLYETHYLENE GLYCOL 3350 17 G: 17 POWDER, FOR SOLUTION ORAL at 07:39

## 2021-11-05 RX ADMIN — ALLOPURINOL 300 MG: 300 TABLET ORAL at 07:38

## 2021-11-05 RX ADMIN — METFORMIN HYDROCHLORIDE 500 MG: 500 TABLET, FILM COATED ORAL at 10:01

## 2021-11-05 RX ADMIN — INSULIN LISPRO 6 UNITS: 100 INJECTION, SOLUTION INTRAVENOUS; SUBCUTANEOUS at 12:45

## 2021-11-05 RX ADMIN — METHOCARBAMOL TABLETS 1000 MG: 500 TABLET, COATED ORAL at 17:53

## 2021-11-05 RX ADMIN — METOPROLOL TARTRATE 100 MG: 50 TABLET, FILM COATED ORAL at 07:37

## 2021-11-05 RX ADMIN — AMLODIPINE BESYLATE 2.5 MG: 2.5 TABLET ORAL at 07:38

## 2021-11-05 RX ADMIN — HYDROCODONE BITARTRATE AND ACETAMINOPHEN 1 TABLET: 5; 325 TABLET ORAL at 12:45

## 2021-11-05 RX ADMIN — METHOCARBAMOL TABLETS 1000 MG: 500 TABLET, COATED ORAL at 01:29

## 2021-11-05 RX ADMIN — HYDROCODONE BITARTRATE AND ACETAMINOPHEN 1 TABLET: 5; 325 TABLET ORAL at 22:42

## 2021-11-05 RX ADMIN — DOCUSATE SODIUM 100 MG: 100 CAPSULE, LIQUID FILLED ORAL at 07:39

## 2021-11-05 RX ADMIN — HYDROCODONE BITARTRATE AND ACETAMINOPHEN 1 TABLET: 5; 325 TABLET ORAL at 17:54

## 2021-11-05 NOTE — DISCHARGE INSTRUCTIONS
[facility] Neurological Surgery    [address], [address]    [place] [address]    Phone: [phone]    Fax: [phone]    Jose Webster M.D., F.A.C.S.        CARE AND INSTRUCTIONS AFTER LUMBAR FUSION    1. Sitting is allowed but not for an extended amount of time. You may lie on a firm couch BUT no waterbed or in a recliner. Either lie on your side or stand at a counter for meals. Do not lie on your stomach. You may use one pillow under your head when laying down and you may use pillows under or in between your knees for comfort.    2. No driving. You can ride short distances in a car in the front passenger’s seat that reclines, or you may lie down in the back seat.    3. Don’t lift anything heavier than a coffee cup or paperback book.    4. Gradually increase your activity each day. You should be out of bed every hour during the day. Walk outside as soon as you feel up to doing so. Walk short distances frequently rather than making a long trip. Your goal is to be walking 1 to 3 miles per day when you return for your post-operative office visit. (NEVER DO THIS IN ONE TRIP)    5. You may climb stairs BUT take them slowly.    6. Keep your incision clean and dry. If you notice any redness, swelling or drainage call the office @ [phone]. There will be glue over your incision. This will peel off on its own and should not be pulled off.    7. You may shower five (5) days after surgery. Do not let the water hit directly on the incision, gently pat dry.    8. You should have a post-operative appointment scheduled for 2 to 2 ½ weeks after surgery with our Physician Assistant or Nurse Practitioner. If you do not, please call the office when you return home from the hospital to schedule this appointment.    9. Your prescription for pain medication may be refilled for only half the original amount prior to your return office visit. In order to have this medication refilled you must contact the office four days  prior to the due date.    10. Don’t be alarmed if you continue to experience some of your pre-operative symptoms after going home. This is not un-common and usually improves within a few days but could last longer. If you have any questions please call our office at [phone].

## 2021-11-05 NOTE — PROGRESS NOTES
[facility] NEUROSURGERY PROGRESS NOTE      CC: Back pain    Subjective     Interval History: POD #2 Bilateral L3-4 and L4-5 laminectomy, medial facetectomy, foraminotomy with a transforaminal interbody lumbar fusion and instrumentation    ROS:  Constitutional: No fever, chills  GI: No nausea, vomiting  MS:  back pain  Neuro: No numbness, tingling, or weakness,  no balance difficulties  : No difficulty voiding, no incontinence    Objective     Vital signs in last 24 hours:  Temp:  [97 °F (36.1 °C)-97.8 °F (36.6 °C)] 97.8 °F (36.6 °C)  Heart Rate:  [71-78] 76  Resp:  [16] 16  BP: (114-132)/(64-74) 118/66    Intake/Output this shift:  I/O this shift:  In: -   Out: 700 [Urine:700]    LABS:  Results from last 7 days   Lab Units 11/05/21  0551 11/04/21  0741   WBC 10*3/mm3 13.22* 14.37*   HEMOGLOBIN g/dL 15.0 16.1   HEMATOCRIT % 43.5 48.9   PLATELETS 10*3/mm3 194 209     Results from last 7 days   Lab Units 11/04/21  0741   SODIUM mmol/L 139   POTASSIUM mmol/L 4.1   CHLORIDE mmol/L 101   CO2 mmol/L 25.3   BUN mg/dL 22*   CREATININE mg/dL 0.95   CALCIUM mg/dL 9.2   GLUCOSE mg/dL 179*     IMAGING STUDIES:  No new imaging    Meds reviewed/changed: Yes    Current Facility-Administered Medications:   •  allopurinol (ZYLOPRIM) tablet 300 mg, 300 mg, Oral, BID, Jose Webster MD, 300 mg at 11/05/21 0738  •  amLODIPine (NORVASC) tablet 2.5 mg, 2.5 mg, Oral, Q24H, Jovany Vásquez MD, 2.5 mg at 11/05/21 0738  •  aspirin chewable tablet 81 mg, 81 mg, Oral, Daily, Jose Webster MD, 81 mg at 11/05/21 0738  •  atorvastatin (LIPITOR) tablet 20 mg, 20 mg, Oral, Nightly, Jose Webster MD, 20 mg at 11/04/21 2301  •  bisacodyl (DULCOLAX) suppository 10 mg, 10 mg, Rectal, Daily PRN, Magnolia Hurley APRN  •  cyclobenzaprine (FLEXERIL) tablet 10 mg, 10 mg, Oral, Q8H PRN, Le Kline APRN  •  dextrose (D50W) (25 g/50 mL) IV injection 25 g, 25 g, Intravenous, Q15 Min PRN, Stephenie Leach APRN  •  dextrose (GLUTOSE) oral  gel 15 g, 15 g, Oral, Q15 Min PRN, Stephenie Leach, APRN  •  docusate sodium (COLACE) capsule 100 mg, 100 mg, Oral, BID, Magnolia Hurley APRN, 100 mg at 11/05/21 0739  •  glucagon (human recombinant) (GLUCAGEN DIAGNOSTIC) injection 1 mg, 1 mg, Subcutaneous, Q15 Min PRN, Stephenie Leach, APRN  •  HYDROcodone-acetaminophen (NORCO) 5-325 MG per tablet 1 tablet, 1 tablet, Oral, Q4H PRN, Jose Webster MD, 1 tablet at 11/05/21 0738  •  insulin glargine (LANTUS, SEMGLEE) injection 4 Units, 4 Units, Subcutaneous, Nightly, Jovany Vásquez MD  •  insulin lispro (ADMELOG) injection 0-9 Units, 0-9 Units, Subcutaneous, 4x Daily With Meals & Nightly, Stephenie Leach APRN, 2 Units at 11/05/21 0739  •  insulin lispro (ADMELOG) injection 2 Units, 2 Units, Subcutaneous, TID With Meals, Jovany Vásquez MD, 2 Units at 11/05/21 0739  •  linagliptin (TRADJENTA) tablet 5 mg, 5 mg, Oral, Daily, Jovany Vásquez MD, 5 mg at 11/05/21 0738  •  melatonin tablet 5 mg, 5 mg, Oral, Nightly PRN, Jovany Vásquez MD  •  metFORMIN (GLUCOPHAGE) tablet 500 mg, 500 mg, Oral, BID With Meals, Jovany Vásquez MD, 500 mg at 11/05/21 1001  •  methocarbamol (ROBAXIN) tablet 1,000 mg, 1,000 mg, Oral, Q6H, Le Kline APRN, 1,000 mg at 11/05/21 0559  •  metoprolol tartrate (LOPRESSOR) tablet 100 mg, 100 mg, Oral, BID, Stephenie Leach APRN, 100 mg at 11/05/21 0737  •  morphine injection 2 mg, 2 mg, Intravenous, Q4H PRN, 2 mg at 11/03/21 2343 **AND** naloxone (NARCAN) injection 0.4 mg, 0.4 mg, Intravenous, Q5 Min PRN, oJse Webster MD  •  ondansetron (ZOFRAN) tablet 4 mg, 4 mg, Oral, Q6H PRN **OR** ondansetron (ZOFRAN) injection 4 mg, 4 mg, Intravenous, Q6H PRN, Jose Webster MD  •  polyethylene glycol (MIRALAX) packet 17 g, 17 g, Oral, Daily, Magnolia Hurley, APRN, 17 g at 11/05/21 0739  •  sodium chloride 0.9 % flush 10 mL, 10 mL, Intravenous, PRN, Jose Webster MD, 10 mL at 11/04/21  "2307  •  sodium chloride 0.9 % flush 3 mL, 3 mL, Intravenous, Q12H, Jose Webster MD, 3 mL at 11/05/21 0742      Physical Exam:    General:   Awake, alert, oriented x3. Speech clear with no aphasia  Back:    Incision with dressing c/d/i, ANA drain 20cc's  Motor: Normal muscle strength, bulk and tone in upper and lower extremities.  No fasciculations, rigidity, spasticity, or abnormal movements.  Sensation: Normal to light touch  Coordination: Moving all extremities  Station and Gait:             Normal gait and station.  Extremities:   Wearing SCD      Assessment/Plan   Doing well, moving all extremities, ANA drain intact with amount of serosanguineous drainage.  Dressing is C/D/I.  Ambulating in room without problem.    ASSESSMENT:      Spinal stenosis of lumbar region with neurogenic claudication    Type 2 diabetes mellitus, without long-term current use of insulin (HCC)    Mixed hyperlipidemia    Essential hypertension      PLAN:   CBC in AM  D/C ANA drain  Encourage IS  Possible d/c home in AM    I discussed the patient's findings and my recommendations with patient, nursing staff and Dr. Webster       LOS: 0 days       Magnolia Hurley, APRN  11/5/2021  11:21 EDT    \"Dictated utilizing Dragon dictation\".      "

## 2021-11-05 NOTE — PROGRESS NOTES
[facility] Medicine Services  PROGRESS NOTE    Patient Name: Pablo Mendoza  : 1960  MRN: [id number]    Date of Admission: 11/3/2021  Primary Care Physician: Liana Hood MD    Subjective   Subjective     CC:  Follow-up medical management    HPI:  No new complaints.     Review of Systems  No current fevers or chills  No current shortness of breath or cough  No current nausea, vomiting, or diarrhea  No current chest pain or palpitations      Objective   Objective     Vital Signs:   Temp:  [97 °F (36.1 °C)-98.1 °F (36.7 °C)] 97.8 °F (36.6 °C)  Heart Rate:  [71-82] 76  Resp:  [16] 16  BP: (114-132)/(64-74) 118/66        Physical Exam:  Constitutional:Awake, alert  HENT: NCAT, mucous membranes moist, neck supple  Respiratory: Clear to auscultation bilaterally, respiratory effort normal, nonlabored breathing   Cardiovascular: RRR, normal radial pulses  Gastrointestinal: Positive bowel sounds, soft, nontender, nondistended  Musculoskeletal: Normal musculature for age, no lower extremity edema, BMI 30  Psychiatric: Appropriate affect, cooperative, conversational  Neurologic: No slurred speech or facial droop, follows commands  Skin: No rashes or jaundice, warm      Results Reviewed:  Results from last 7 days   Lab Units 21  0551 21  0741   WBC 10*3/mm3 13.22* 14.37*   HEMOGLOBIN g/dL 15.0 16.1   HEMATOCRIT % 43.5 48.9   PLATELETS 10*3/mm3 194 209     Results from last 7 days   Lab Units 21  0741   SODIUM mmol/L 139   POTASSIUM mmol/L 4.1   CHLORIDE mmol/L 101   CO2 mmol/L 25.3   BUN mg/dL 22*   CREATININE mg/dL 0.95   GLUCOSE mg/dL 179*   CALCIUM mg/dL 9.2     Estimated Creatinine Clearance: 90.3 mL/min (by C-G formula based on SCr of 0.95 mg/dL).    Microbiology Results Abnormal     None          Imaging Results (Last 24 Hours)     Procedure Component Value Units Date/Time    XR Spine Lumbar 2 or 3 View [[id number]] Collected: 21 0759     Updated: 21 3287     Narrative:      LUMBAR SPINE 2 VIEWS      HISTORY: Lumbar fusion.     FINDINGS: AP and lateral views of the lumbar spine demonstrate fusion  from L3 to L5 with bilateral transpedicular screws, posterior rods and  interbody graft material. The interbody grafts and transpedicular screws  appear to be in satisfactory position. The alignment of the lumbar spine  is within normal limits. Decompressive laminectomies have been performed  from L3 to L5.     This report was finalized on 11/4/2021 3:33 PM by Dr. Jovany Jauregui M.D.                 I have reviewed the medications:  Scheduled Meds:allopurinol, 300 mg, Oral, BID  amLODIPine, 2.5 mg, Oral, Q24H  aspirin, 81 mg, Oral, Daily  atorvastatin, 20 mg, Oral, Nightly  docusate sodium, 100 mg, Oral, BID  insulin lispro, 0-9 Units, Subcutaneous, 4x Daily With Meals & Nightly  insulin lispro, 2 Units, Subcutaneous, TID With Meals  linagliptin, 5 mg, Oral, Daily  metFORMIN, 500 mg, Oral, BID With Meals  methocarbamol, 1,000 mg, Oral, Q6H  metoprolol tartrate, 100 mg, Oral, BID  polyethylene glycol, 17 g, Oral, Daily  sodium chloride, 3 mL, Intravenous, Q12H      Continuous Infusions:sodium chloride 0.9 % with KCl 20 mEq, 50 mL/hr, Last Rate: Stopped (11/05/21 1011)      PRN Meds:.bisacodyl  •  cyclobenzaprine  •  dextrose  •  dextrose  •  glucagon (human recombinant)  •  HYDROcodone-acetaminophen  •  Morphine **AND** naloxone  •  ondansetron **OR** ondansetron  •  sodium chloride    Assessment/Plan   Assessment & Plan     Active Hospital Problems    Diagnosis  POA   • **Spinal stenosis of lumbar region with neurogenic claudication [M48.062]  Yes   • Type 2 diabetes mellitus, without long-term current use of insulin (HCC) [E11.9]  Yes   • Mixed hyperlipidemia [E78.2]  Yes   • Essential hypertension [I10]  Yes      Resolved Hospital Problems   No resolved problems to display.        Brief Hospital Course to date:  Pablo Mendoza is a 60 y.o. male with lumbar spinal stenosis  and claudication status post neurosurgery on 11/4/2021.  Hospital medicine is following for medical management of hypertension and diabetes.    Discussion/plan:  Glucose reviewed. Insulin further adjusted today. Continue Tradjenta. Amlodipine has been decreased to 2.5 mg for now. Currently normotensive. Continue postoperative supportive care and symptom treatment. Continue atorvastatin for hyperlipidemia. Discontinue IV fluid as reportedly eating and drinking adequately. We will continue to follow. Please do not hesitate to call us with questions.    DVT Prophylaxis: Mechanical and aspirin, defer to surgery team    CODE STATUS:   Code Status and Medical Interventions:   Ordered at: 11/03/21 1858     Code Status (Patient has no pulse and is not breathing):    CPR (Attempt to Resuscitate)     Medical Interventions (Patient has pulse or is breathing):    Full Support       Jovany Vásquez MD  11/05/21

## 2021-11-05 NOTE — PLAN OF CARE
Goal Outcome Evaluation:  Plan of Care Reviewed With: patient           Outcome Summary: Pt is POD#2 L3-4, 4-5 lami and fusion. Pt up ambulating to BR this shift with standby assistance, pain controlled with PRN pain medication, Pt educated on fall risk and verbalized understanding.

## 2021-11-05 NOTE — PLAN OF CARE
Goal Outcome Evaluation:  Plan of Care Reviewed With: patient        Progress: improving  Outcome Summary: Pt is 61 y/o male, pod 2 L3-4, 4-5 lamia nd fusion. Dressing is cdi, vss, n/v intact. Pt voiding per BRP. Pain controlled with po meds. Ambulating with standby assist and walker. Will continue to monitor glucose r/t history of DM. Possible d/c home tomorrow.

## 2021-11-05 NOTE — PLAN OF CARE
Goal Outcome Evaluation:  Plan of Care Reviewed With: patient        Progress: improving  Outcome Summary: Pt agreed to PT fabienne, already amb inhall today w/nsg and was getting ready for another walkw/nsg upon my entry ; pt improved amb dist to 250ft, perf tfer into bed w/o rail and HOB flat w/SBA, cues, confident w/log roll and back safety protocol per MD orders-no sitting ; [place] home at [place] w/some fam checking in on him, will need rwx for indep amb at home , also for safety , plans home over weekend, reports NO stairs    Patient was wearing a face mask during this therapy encounter. Therapist used appropriate personal protective equipment including eye protection, mask, and gloves.  Mask used was standard procedure mask. Appropriate PPE was worn during the entire therapy session. Hand hygiene was completed before and after therapy session. Patient is not in enhanced droplet precautions.

## 2021-11-05 NOTE — THERAPY TREATMENT NOTE
Patient Name: Pablo Mendoza  : 1960    MRN: [id number]                              Today's Date: 2021       Admit Date: 11/3/2021    Visit Dx:     ICD-10-CM ICD-9-CM   1. Spinal stenosis of lumbar region with neurogenic claudication  M48.062 724.03     Patient Active Problem List   Diagnosis   • Type 2 diabetes mellitus, without long-term current use of insulin ([facility])   • Mixed hyperlipidemia   • Essential hypertension   • Gout of multiple sites   • Nocturia   • Migraine headache   • Chronic maxillary sinusitis   • Other fatigue   • Arthritis   • Routine health maintenance   • Proteinuria   • Cellulitis of left foot   • Spinal stenosis of lumbar region with neurogenic claudication     Past Medical History:   Diagnosis Date   • Anesthesia complication     STATES HARD TO WAKE UP W/ALL SURGERIES   • COVID-19     DEC 2020   • Diabetes mellitus ([facility])    • Elevated cholesterol    • Gout    • Hypertension    • Low back pain    • Migraines    • Mixed hyperlipidemia 2018   • Neuropathy    • Nocturia 2018   • Numbness and tingling of both lower extremities     LEGS AND FEET    • Seizures ([facility])     AS A CHILD   • Sleep apnea     NO DEVICE USED   • Staph infection     UNSURE OF SITE   • Stroke ([facility])     COGNITIVE DELAY   • TIA (transient ischemic attack)    • Type 2 diabetes mellitus, without long-term current use of insulin ([facility]) 2018   • Weakness of both legs     LEFT WORSE      Past Surgical History:   Procedure Laterality Date   • CHOLECYSTECTOMY     • COLONOSCOPY     • HERNIA REPAIR     • SINUS SURGERY     • TYMPANOPLASTY Bilateral       General Information     Row Name 21 1855          Physical Therapy Time and Intention    Document Type therapy note (daily note)  -ZORAIDA     Mode of Treatment individual therapy; physical therapy  -     Row Name 21 1855          General Information    Patient Profile Reviewed yes  -ZORAIDA     Existing Precautions/Restrictions fall; spinal  -      Row Name 11/05/21 1855          Living Environment    Lives With alone  -     Row Name 11/05/21 1855          Home Main Entrance    Number of Stairs, Main Entrance none  -     Row Name 11/05/21 1855          Cognition    Orientation Status (Cognition) oriented x 4  -     Row Name 11/05/21 1855          Safety Issues, Functional Mobility    Impairments Affecting Function (Mobility) pain  -           User Key  (r) = Recorded By, (t) = Taken By, (c) = Cosigned By    Initials Name Provider Type    Tahira Currie PTA Physical Therapy Assistant               Mobility     Row Name 11/05/21 1855          Bed Mobility    Supine-Sit Brandon (Bed Mobility) not tested  -     Sit-Supine Brandon (Bed Mobility) supervision; verbal cues  -     Comment (Bed Mobility) perf log roll , no rail used , HOB flat  -     Row Name 11/05/21 1855          Sit-Stand Transfer    Sit-Stand Brandon (Transfers) supervision  -     Assistive Device (Sit-Stand Transfers) walker, front-wheeled  -     Row Name 11/05/21 1855          Gait/Stairs (Locomotion)    Brandon Level (Gait) standby assist; verbal cues  -     Assistive Device (Gait) walker, front-wheeled  -     Distance in Feet (Gait) 250ft, no dizziness today  -     Deviations/Abnormal Patterns (Gait) demetri decreased  -           User Key  (r) = Recorded By, (t) = Taken By, (c) = Cosigned By    Initials Name Provider Type    Tahira Currie PTA Physical Therapy Assistant               Obj/Interventions    No documentation.                Goals/Plan    No documentation.                Clinical Impression     Row Name 11/05/21 1857          Pain Scale: Numbers Pre/Post-Treatment    Pretreatment Pain Rating 3/10  -     Posttreatment Pain Rating 5/10  -     Pain Location back  -     Pre/Posttreatment Pain Comment meds given prior to PT  -     Pain Intervention(s) Medication (See MAR); Repositioned; Rest  -     Row Name 11/05/21  1857          Plan of Care Review    Plan of Care Reviewed With patient  -     Progress improving  -     Outcome Summary Pt agreed to PT fabienne, already amb inhall today w/nsg and was getting ready for another walkw/nsg upon my entry ; pt improved amb dist to 250ft, perf tfer into bed w/o rail and HOB flat w/SBA, cues, confident w/log roll and back safety protocol per MD orders-no sitting ; [place] w/some fam checking in on him, will need rwx for indep amb at home , also for safety , plans home over weekend, reports NO stairs  -     Row Name 11/05/21 1857          Therapy Assessment/Plan (PT)    Rehab Potential (PT) good, to achieve stated therapy goals  -     Row Name 11/05/21 1857          Vital Signs    O2 Delivery Pre Treatment room air  -     Row Name 11/05/21 1857          Positioning and Restraints    Pre-Treatment Position standing in room  -     Post Treatment Position bed  -JM     In Bed supine; call light within reach; encouraged to call for assist; exit alarm on; notified nsg  -           User Key  (r) = Recorded By, (t) = Taken By, (c) = Cosigned By    Initials Name Provider Type    Tahira Currie PTA Physical Therapy Assistant               Outcome Measures     Row Name 11/05/21 1902          How much help from another person do you currently need...    Turning from your back to your side while in flat bed without using bedrails? 4  -JM     Moving from lying on back to sitting on the side of a flat bed without bedrails? 4  -JM     Moving to and from a bed to a chair (including a wheelchair)? 4  -JM     Standing up from a chair using your arms (e.g., wheelchair, bedside chair)? 4  -JM     Climbing 3-5 steps with a railing? 3  -JM     To walk in hospital room? 3  -JM     AM-PAC 6 Clicks Score (PT) 22  -           User Key  (r) = Recorded By, (t) = Taken By, (c) = Cosigned By    Initials Name Provider Type    Tahira Currie PTA Physical Therapy Assistant                              Physical Therapy Education                 Title: PT OT SLP Therapies (Done)     Topic: Physical Therapy (Done)     Point: Mobility training (Done)     Learning Progress Summary           Patient Eager, E,TB,D, VU,DU by  at 11/5/2021 1903    Acceptance, E, NR by AR at 11/4/2021 1001                   Point: Home exercise program (Done)     Learning Progress Summary           Patient Eager, E,TB,D, VU,DU by  at 11/5/2021 1903    Acceptance, E, NR by AR at 11/4/2021 1001                   Point: Body mechanics (Done)     Learning Progress Summary           Patient Eager, E,TB,D, VU,DU by  at 11/5/2021 1903    Acceptance, E, NR by AR at 11/4/2021 1001                   Point: Precautions (Done)     Learning Progress Summary           Patient Eager, E,TB,D, VU,DU by  at 11/5/2021 1903    Acceptance, E, NR by AR at 11/4/2021 1001                               User Key     Initials Effective Dates Name Provider Type Discipline     03/07/18 -  Tahira Vera, PTA Physical Therapy Assistant PT    AR 06/16/21 -  Vicki Seymour PT Physical Therapist PT              PT Recommendation and Plan     Plan of Care Reviewed With: patient  Progress: improving  Outcome Summary: Pt agreed to PT fabienne, already amb inhall today w/nsg and was getting ready for another walkw/nsg upon my entry ; pt improved amb dist to 250ft, perf tfer into bed w/o rail and HOB flat w/SBA, cues, confident w/log roll and back safety protocol per MD orders-no sitting ; [place] w/some fam checking in on him, will need rwx for indep amb at home , also for safety , plans home over weekend, reports NO stairs     Time Calculation:    PT Charges     Row Name 11/05/21 1904 11/05/21 1903          Time Calculation    Start Time -- 1151  -     Stop Time -- 1214  -     Time Calculation (min) -- 23 min  -ZORAIDA     PT Received On -- 11/05/21  -ZORAIDA     PT - Next Appointment 11/06/21  - --           User Key  (r) = Recorded By, (t)  = Taken By, (c) = Cosigned By    Initials Name Provider Type     Tahira Vera PTA Physical Therapy Assistant              Therapy Charges for Today     Code Description Service Date Service Provider Modifiers Qty    [id number] HC PT THER PROC EA 15 MIN 11/5/2021 Tahira Vera PTA GP 2          PT G-Codes  Outcome Measure Options: AM-PAC 6 Clicks Basic Mobility (PT)  AM-PAC 6 Clicks Score (PT): 22    Tahira Vera PTA  11/5/2021

## 2021-11-06 VITALS
HEART RATE: 85 BPM | DIASTOLIC BLOOD PRESSURE: 82 MMHG | WEIGHT: 196.65 LBS | HEIGHT: 67 IN | BODY MASS INDEX: 30.87 KG/M2 | TEMPERATURE: 98.8 F | RESPIRATION RATE: 16 BRPM | SYSTOLIC BLOOD PRESSURE: 145 MMHG | OXYGEN SATURATION: 90 %

## 2021-11-06 LAB
BASOPHILS # BLD AUTO: 0.07 10*3/MM3 (ref 0–0.2)
BASOPHILS NFR BLD AUTO: 0.6 % (ref 0–1.5)
DEPRECATED RDW RBC AUTO: 42.9 FL (ref 37–54)
EOSINOPHIL # BLD AUTO: 0.27 10*3/MM3 (ref 0–0.4)
EOSINOPHIL NFR BLD AUTO: 2.2 % (ref 0.3–6.2)
ERYTHROCYTE [DISTWIDTH] IN BLOOD BY AUTOMATED COUNT: 14.1 % (ref 12.3–15.4)
GLUCOSE BLDC GLUCOMTR-MCNC: 188 MG/DL (ref 70–130)
HCT VFR BLD AUTO: 45.1 % (ref 37.5–51)
HGB BLD-MCNC: 15.6 G/DL (ref 13–17.7)
IMM GRANULOCYTES # BLD AUTO: 0.18 10*3/MM3 (ref 0–0.05)
IMM GRANULOCYTES NFR BLD AUTO: 1.5 % (ref 0–0.5)
LYMPHOCYTES # BLD AUTO: 2.21 10*3/MM3 (ref 0.7–3.1)
LYMPHOCYTES NFR BLD AUTO: 18.1 % (ref 19.6–45.3)
MCH RBC QN AUTO: 29.5 PG (ref 26.6–33)
MCHC RBC AUTO-ENTMCNC: 34.6 G/DL (ref 31.5–35.7)
MCV RBC AUTO: 85.3 FL (ref 79–97)
MONOCYTES # BLD AUTO: 0.91 10*3/MM3 (ref 0.1–0.9)
MONOCYTES NFR BLD AUTO: 7.5 % (ref 5–12)
NEUTROPHILS NFR BLD AUTO: 70.1 % (ref 42.7–76)
NEUTROPHILS NFR BLD AUTO: 8.57 10*3/MM3 (ref 1.7–7)
NRBC BLD AUTO-RTO: 0.2 /100 WBC (ref 0–0.2)
PLATELET # BLD AUTO: 182 10*3/MM3 (ref 140–450)
PMV BLD AUTO: 9.9 FL (ref 6–12)
RBC # BLD AUTO: 5.29 10*6/MM3 (ref 4.14–5.8)
WBC # BLD AUTO: 12.21 10*3/MM3 (ref 3.4–10.8)

## 2021-11-06 PROCEDURE — 99024 POSTOP FOLLOW-UP VISIT: CPT | Performed by: NURSE PRACTITIONER

## 2021-11-06 PROCEDURE — 82962 GLUCOSE BLOOD TEST: CPT

## 2021-11-06 PROCEDURE — 63710000001 INSULIN LISPRO (HUMAN) PER 5 UNITS: Performed by: INTERNAL MEDICINE

## 2021-11-06 PROCEDURE — 85025 COMPLETE CBC W/AUTO DIFF WBC: CPT | Performed by: NURSE PRACTITIONER

## 2021-11-06 PROCEDURE — 63710000001 INSULIN LISPRO (HUMAN) PER 5 UNITS: Performed by: NURSE PRACTITIONER

## 2021-11-06 RX ORDER — HYDROCODONE BITARTRATE AND ACETAMINOPHEN 5; 325 MG/1; MG/1
1 TABLET ORAL EVERY 6 HOURS PRN
Qty: 40 TABLET | Refills: 0 | Status: SHIPPED | OUTPATIENT
Start: 2021-11-06 | End: 2021-11-23 | Stop reason: SDUPTHER

## 2021-11-06 RX ORDER — INSULIN GLARGINE 100 [IU]/ML
6 INJECTION, SOLUTION SUBCUTANEOUS NIGHTLY
Status: DISCONTINUED | OUTPATIENT
Start: 2021-11-06 | End: 2021-11-06 | Stop reason: HOSPADM

## 2021-11-06 RX ORDER — AMLODIPINE BESYLATE 5 MG/1
5 TABLET ORAL
Status: DISCONTINUED | OUTPATIENT
Start: 2021-11-07 | End: 2021-11-06 | Stop reason: HOSPADM

## 2021-11-06 RX ORDER — INSULIN LISPRO 100 [IU]/ML
3 INJECTION, SOLUTION INTRAVENOUS; SUBCUTANEOUS
Status: DISCONTINUED | OUTPATIENT
Start: 2021-11-06 | End: 2021-11-06 | Stop reason: HOSPADM

## 2021-11-06 RX ADMIN — METHOCARBAMOL TABLETS 1000 MG: 500 TABLET, COATED ORAL at 13:23

## 2021-11-06 RX ADMIN — HYDROCODONE BITARTRATE AND ACETAMINOPHEN 1 TABLET: 5; 325 TABLET ORAL at 02:41

## 2021-11-06 RX ADMIN — METFORMIN HYDROCHLORIDE 500 MG: 500 TABLET, FILM COATED ORAL at 09:44

## 2021-11-06 RX ADMIN — ALLOPURINOL 300 MG: 300 TABLET ORAL at 09:40

## 2021-11-06 RX ADMIN — METHOCARBAMOL TABLETS 1000 MG: 500 TABLET, COATED ORAL at 05:36

## 2021-11-06 RX ADMIN — LINAGLIPTIN 5 MG: 5 TABLET, FILM COATED ORAL at 09:41

## 2021-11-06 RX ADMIN — ASPIRIN 81 MG: 81 TABLET, CHEWABLE ORAL at 09:40

## 2021-11-06 RX ADMIN — METHOCARBAMOL TABLETS 1000 MG: 500 TABLET, COATED ORAL at 00:04

## 2021-11-06 RX ADMIN — HYDROCODONE BITARTRATE AND ACETAMINOPHEN 1 TABLET: 5; 325 TABLET ORAL at 06:21

## 2021-11-06 RX ADMIN — METOPROLOL TARTRATE 100 MG: 50 TABLET, FILM COATED ORAL at 09:40

## 2021-11-06 RX ADMIN — SODIUM CHLORIDE, PRESERVATIVE FREE 3 ML: 5 INJECTION INTRAVENOUS at 09:43

## 2021-11-06 RX ADMIN — AMLODIPINE BESYLATE 2.5 MG: 2.5 TABLET ORAL at 09:40

## 2021-11-06 RX ADMIN — HYDROCODONE BITARTRATE AND ACETAMINOPHEN 1 TABLET: 5; 325 TABLET ORAL at 10:26

## 2021-11-06 RX ADMIN — INSULIN LISPRO 3 UNITS: 100 INJECTION, SOLUTION INTRAVENOUS; SUBCUTANEOUS at 08:13

## 2021-11-06 RX ADMIN — POLYETHYLENE GLYCOL 3350 17 G: 17 POWDER, FOR SOLUTION ORAL at 09:41

## 2021-11-06 RX ADMIN — INSULIN LISPRO 2 UNITS: 100 INJECTION, SOLUTION INTRAVENOUS; SUBCUTANEOUS at 08:12

## 2021-11-06 RX ADMIN — DOCUSATE SODIUM 100 MG: 100 CAPSULE, LIQUID FILLED ORAL at 09:41

## 2021-11-06 NOTE — PLAN OF CARE
Goal Outcome Evaluation:           Progress: improving  Outcome Summary: VSS, ambulating well with one assist, po pain meds and muscle relaxants effective for pain control. voiding well, anticipating home today.

## 2021-11-06 NOTE — PROGRESS NOTES
[facility] Medicine Services  PROGRESS NOTE    Patient Name: Pablo Mendoza  : 1960  MRN: [id number]    Date of Admission: 11/3/2021  Primary Care Physician: Liana Hood MD    Subjective   Subjective     CC:  Follow-up medical management    HPI:  No new complaints.     Review of Systems  No current fevers or chills  No current shortness of breath or cough  No current nausea, vomiting, or diarrhea  No current chest pain or palpitations      Objective   Objective     Vital Signs:   Temp:  [97.3 °F (36.3 °C)-98.8 °F (37.1 °C)] 98.8 °F (37.1 °C)  Heart Rate:  [81-91] 85  Resp:  [14-16] 16  BP: (110-145)/(67-82) 145/82        Physical Exam:  Constitutional:Awake, alert  HENT: NCAT, mucous membranes moist, neck supple  Respiratory: Clear to auscultation bilaterally, respiratory effort normal, nonlabored breathing   Cardiovascular: RRR, normal radial pulses  Gastrointestinal: Positive bowel sounds, soft, nontender, nondistended  Musculoskeletal: Normal musculature for age, no lower extremity edema, BMI 30  Psychiatric: Appropriate affect, cooperative, conversational  Neurologic: No slurred speech or facial droop, follows commands  Skin: No rashes or jaundice, warm      Results Reviewed:  Results from last 7 days   Lab Units 21  0616 21  0551 21  0741   WBC 10*3/mm3 12.21* 13.22* 14.37*   HEMOGLOBIN g/dL 15.6 15.0 16.1   HEMATOCRIT % 45.1 43.5 48.9   PLATELETS 10*3/mm3 182 194 209     Results from last 7 days   Lab Units 21  0741   SODIUM mmol/L 139   POTASSIUM mmol/L 4.1   CHLORIDE mmol/L 101   CO2 mmol/L 25.3   BUN mg/dL 22*   CREATININE mg/dL 0.95   GLUCOSE mg/dL 179*   CALCIUM mg/dL 9.2     Estimated Creatinine Clearance: 90.3 mL/min (by C-G formula based on SCr of 0.95 mg/dL).    Microbiology Results Abnormal     None          Imaging Results (Last 24 Hours)     ** No results found for the last 24 hours. **              I have reviewed the medications:  Scheduled  Meds:allopurinol, 300 mg, Oral, BID  amLODIPine, 2.5 mg, Oral, Q24H  aspirin, 81 mg, Oral, Daily  atorvastatin, 20 mg, Oral, Nightly  docusate sodium, 100 mg, Oral, BID  insulin glargine, 6 Units, Subcutaneous, Nightly  insulin lispro, 0-9 Units, Subcutaneous, 4x Daily With Meals & Nightly  insulin lispro, 3 Units, Subcutaneous, TID With Meals  linagliptin, 5 mg, Oral, Daily  metFORMIN, 500 mg, Oral, BID With Meals  methocarbamol, 1,000 mg, Oral, Q6H  metoprolol tartrate, 100 mg, Oral, BID  polyethylene glycol, 17 g, Oral, Daily  sodium chloride, 3 mL, Intravenous, Q12H      Continuous Infusions:   PRN Meds:.bisacodyl  •  cyclobenzaprine  •  dextrose  •  dextrose  •  glucagon (human recombinant)  •  HYDROcodone-acetaminophen  •  melatonin  •  Morphine **AND** naloxone  •  ondansetron **OR** ondansetron  •  sodium chloride    Assessment/Plan   Assessment & Plan     Active Hospital Problems    Diagnosis  POA   • **Spinal stenosis of lumbar region with neurogenic claudication [M48.062]  Yes   • Type 2 diabetes mellitus, without long-term current use of insulin (HCC) [E11.9]  Yes   • Mixed hyperlipidemia [E78.2]  Yes   • Essential hypertension [I10]  Yes      Resolved Hospital Problems   No resolved problems to display.        Brief Hospital Course to date:  Pablo Mendoza is a 60 y.o. male with lumbar spinal stenosis and claudication status post neurosurgery on 11/4/2021.  Hospital medicine is following for medical management of hypertension and diabetes.    Discussion/plan:  Glucose reviewed.  Insulin further adjusted.  Increase amlodipine to 5 mg.   Continue Tradjenta.Continue postoperative supportive care and symptom treatment. Continue atorvastatin for hyperlipidemia. Discontinued IV fluid as reportedly eating and drinking adequately. We will continue to follow. Please do not hesitate to call us with questions.    DVT Prophylaxis: Mechanical and aspirin, defer to surgery team    CODE STATUS:   Code Status and  Medical Interventions:   Ordered at: 11/03/21 1858     Code Status (Patient has no pulse and is not breathing):    CPR (Attempt to Resuscitate)     Medical Interventions (Patient has pulse or is breathing):    Full Support       Jovany Vásquez MD  11/06/21

## 2021-11-06 NOTE — DISCHARGE SUMMARY
Pablo Mendoza  1960    Patient Care Team:  Liana Hood MD as PCP - General (Internal Medicine & Pediatrics)    Date of Admit: 11/3/2021    Date of Discharge:  11/6/2021    Discharge Diagnosis:  Spinal stenosis of lumbar region with neurogenic claudication    Type 2 diabetes mellitus, without long-term current use of insulin (HCC)    Mixed hyperlipidemia    Essential hypertension      Procedures Performed  Procedure(s):  Lumbar 3 to Lumbar 4 and Lumbar 4 to Lumbar 5 laminectomy with a fusion       Complications: None    Consultants:   Consults     Date and Time Order Name Status Description    11/3/2021  6:57 PM Inpatient Internal Medicine Consult Completed           Condition on Discharge: stable    Discharge disposition: home    HPI: Pablo Mendoza is a 60 y.o. male who presented with severe pain in the legs and the back.  Imaging shows severe stenosis with a grade 1 spondylolisthesis at L4-5.  There has been no improvement with nonsurgical treatment in the patient elected to proceed with surgery.  He underwent an L4-L5 posterior fusion with Dr. Webster.    Hospital Course: Patient admitted for above procedure. The patient was transferred to [facility] after recovery.  Patient is postop day 3 L4-L5 posterior fusion.  Patient has done very well.  He has no acute complaints and has expected incisional pain.  No weakness in his lower extremities.  His drain was removed postop day 2.  Patient has been aching in good p.o. and urinating with no difficulty.  He did experience some constipation postop day 1 and bowel regimen was added and patient has had bowel movement.  He will be provided a Rollator walker per PT recommendations for extra stability.  His hemoglobin is stable.  His incisional pain is well controlled with p.o. medication.  His vital signs have been stable and he has been deemed safe for discharge at this time.    Vitals:    11/06/21 0723   BP: 145/82   Pulse: 85   Resp: 16   Temp: 98.8  °F (37.1 °C)   SpO2: 90%         Lab Results (last 24 hours)     Procedure Component Value Units Date/Time    CBC & Differential [[id number]]  (Abnormal) Collected: 11/06/21 0616    Specimen: Blood Updated: 11/06/21 0658    Narrative:      The following orders were created for panel order CBC & Differential.  Procedure                               Abnormality         Status                     ---------                               -----------         ------                     CBC Auto Differential[[id number]]        Abnormal            Final result                 Please view results for these tests on the individual orders.    CBC Auto Differential [[id number]]  (Abnormal) Collected: 11/06/21 0616    Specimen: Blood Updated: 11/06/21 0658     WBC 12.21 10*3/mm3      RBC 5.29 10*6/mm3      Hemoglobin 15.6 g/dL      Hematocrit 45.1 %      MCV 85.3 fL      MCH 29.5 pg      MCHC 34.6 g/dL      RDW 14.1 %      RDW-SD 42.9 fl      MPV 9.9 fL      Platelets 182 10*3/mm3      Neutrophil % 70.1 %      Lymphocyte % 18.1 %      Monocyte % 7.5 %      Eosinophil % 2.2 %      Basophil % 0.6 %      Immature Grans % 1.5 %      Neutrophils, Absolute 8.57 10*3/mm3      Lymphocytes, Absolute 2.21 10*3/mm3      Monocytes, Absolute 0.91 10*3/mm3      Eosinophils, Absolute 0.27 10*3/mm3      Basophils, Absolute 0.07 10*3/mm3      Immature Grans, Absolute 0.18 10*3/mm3      nRBC 0.2 /100 WBC     POC Glucose Once [[id number]]  (Abnormal) Collected: 11/06/21 0616    Specimen: Blood Updated: 11/06/21 0618     Glucose 188 mg/dL      Comment: Meter: [id number] : [id number] Puri Beverly NA       POC Glucose Once [[id number]]  (Abnormal) Collected: 11/05/21 2115    Specimen: Blood Updated: 11/05/21 2117     Glucose 157 mg/dL      Comment: Meter: [id number] : [id number] Puri Beverly NA       POC Glucose Once [[id number]]  (Abnormal) Collected: 11/05/21 1601    Specimen: Blood Updated: 11/05/21 1603     Glucose 208 mg/dL      Comment: Meter:  [id number] : [id number] Basilio OLSON             Imaging Results (Last 24 Hours)     ** No results found for the last 24 hours. **            Discharge Physical Exam:      General Appearance:    Alert, cooperative, in no acute distress   Head:    Normocephalic, without obvious abnormality, atraumatic   Back:    Incision is well approximated with no obvious swelling, drainage or redness.   Abdomen:    non-tender, non-distended   Extremities/MSK:   Moves all extremities well, no edema, no cyanosis, no             Redness no sign of DVT   Skin:   No bleeding, bruising or rash   Neurologic:  Patient is alert and oriented x3, speech is articulate and coherent, lower extremity strength equal bilaterally, sensation intact, DTRs present and symmetrical.       Discharge Medications  Inspect has been reviewed and narcotic consent is on file in the patient's chart.     Your medication list      START taking these medications      Instructions Last Dose Given Next Dose Due   HYDROcodone-acetaminophen 5-325 MG per tablet  Commonly known as: NORCO      Take 1 tablet by mouth Every 6 (Six) Hours As Needed (Pain).          CHANGE how you take these medications      Instructions Last Dose Given Next Dose Due   atorvastatin 20 MG tablet  Commonly known as: LIPITOR  What changed: when to take this      TAKE ONE TABLET BY MOUTH DAILY       fluticasone 50 MCG/ACT nasal spray  Commonly known as: Flonase  What changed:   · when to take this  · reasons to take this      2 sprays into the nostril(s) as directed by provider Daily.       Janumet XR  MG tablet  Generic drug: SITagliptin-metFORMIN HCl ER  What changed: when to take this      TAKE ONE TABLET BY MOUTH TWICE A DAY       loratadine 10 MG tablet  Commonly known as: CLARITIN  What changed:   · when to take this  · reasons to take this      TAKE ONE TABLET BY MOUTH DAILY          CONTINUE taking these medications      Instructions Last Dose Given Next Dose Due    acetaminophen 500 MG tablet  Commonly known as: TYLENOL      Take 500 mg by mouth Every 6 (Six) Hours As Needed for Mild Pain .       allopurinol 300 MG tablet  Commonly known as: ZYLOPRIM      TAKE ONE TABLET BY MOUTH TWICE A DAY       amLODIPine 10 MG tablet  Commonly known as: NORVASC      TAKE ONE TABLET BY MOUTH DAILY       aspirin 81 MG chewable tablet      Chew 81 mg Daily. FOLLOW MD GUIDELINES ON HOLDING FOR OR       cyanocobalamin 2500 MCG tablet tablet  Commonly known as: VITAMIN B-12      Take 500 mcg by mouth Daily. HOLDING FOR 7 DAYS PRIOR TO OR       cyclobenzaprine 10 MG tablet  Commonly known as: FLEXERIL      Take 1 tablet by mouth 2 (Two) Times a Day As Needed for Muscle Spasms.       empagliflozin 25 MG tablet tablet  Commonly known as: Jardiance      Take 1 tablet by mouth Daily.       glipizide 5 MG tablet  Commonly known as: Glucotrol      Take 1 tablet by mouth 2 (Two) Times a Day Before Meals.       hydroCHLOROthiazide 25 MG tablet  Commonly known as: HYDRODIURIL      TAKE ONE TABLET BY MOUTH DAILY       metoprolol tartrate 100 MG tablet  Commonly known as: LOPRESSOR      TAKE ONE TABLET BY MOUTH TWICE A DAY       multivitamin with minerals tablet tablet      Take 1 tablet by mouth Daily. HOLDING FOR 7 DAYS PRIOR TO OR       rizatriptan 10 MG tablet  Commonly known as: MAXALT      Take 10 mg by mouth 1 (One) Time As Needed for Migraine. May repeat in 2 hours if needed       vitamin C 500 MG tablet  Commonly known as: ASCORBIC ACID      Take 500 mg by mouth Daily. HOLDING FOR 7 DAYS PRIOR TO OR       vitamin D3 125 MCG (5000 UT) capsule capsule      Take 5,000 Units by mouth Daily. HOLDING FOR 7 DAYS PRIOR  TO OR          STOP taking these medications    ibuprofen 800 MG tablet  Commonly known as: SANDI GALINDO              Where to Get Your Medications      These medications were sent to [facility] - [address] [address]    Hours: 7:00 AM-6:00 PM Mon-Fri,  8:00 AM-4:30 PM Sat-Sun (Closed 12-12:30PM) Phone: [phone]   · HYDROcodone-acetaminophen 5-325 MG per tablet         Discharge Diet:   Diet Instructions     Diet:      Diet Texture / Consistency: Regular    Common Modifiers: Consistent Carbohydrate          Activity at Discharge:   Activity Instructions     Discharge Activity      [facility]    [address], [address]    [place] [address]    Phone: [phone]    Fax: [phone]    Jose Webster M.D., F.A.C.S.        CARE AND INSTRUCTIONS AFTER LUMBAR FUSION    1. Sitting is allowed but not for an extended amount of time. You may lie on a firm couch BUT no waterbed or in a recliner. Either lie on your side or stand at a counter for meals. Do not lie on your stomach. You may use one pillow under your head when laying down and you may use pillows under or in between your knees for comfort.    2. No driving. You can ride short distances in a car in the front passenger's seat that reclines, or you may lie down in the back seat.    3. Don't lift anything heavier than a coffee cup or paperback book.    4. Gradually increase your activity each day. You should be out of bed every hour during the day. Walk outside as soon as you feel up to doing so. Walk short distances frequently rather than making a long trip. Your goal is to be walking 1 to 3 miles per day when you return for your post-operative office visit. (NEVER DO THIS IN ONE TRIP)    5. You may climb stairs BUT take them slowly.    6. Keep your incision clean and dry. If you notice any redness, swelling or drainage call the office @ [phone]. There will be glue over your incision. This will peel off on its own and should not be pulled off.    7. You may shower five (5) days after surgery. Do not let the water hit directly on the incision, gently pat dry.    8. You should have a post-operative appointment scheduled for 2 to 2 ½ weeks after surgery with our Physician Assistant or  Nurse Practitioner. If you do not, please call the office when you return home from the hospital to schedule this appointment.    9. Your prescription for pain medication may be refilled for only half the original amount prior to your return office visit. In order to have this medication refilled you must contact the office four days prior to the due date.    10. Don't be alarmed if you continue to experience some of your pre-operative symptoms after going home. This is not un-common and usually improves within a few days but could last longer. If you have any questions please call our office at [phone].          Call for: questions or concerns    Follow-up Appointments  Future Appointments   Date Time Provider Department Center   11/23/2021 11:45 AM Jose Webster MD [facility]      Follow-up Information     Liana Hood MD .    Specialties: Internal Medicine & Pediatrics, Pediatrics  Contact information:  [address]  [address]  [place] [id number] [phone]                       This patient was examined wearing appropriate personal protective equipment.       I discussed the discharge instructions with patient, Dr. Feliz.     Oriana Doshi, APRN  11/06/21  11:24 EDT

## 2021-11-08 RX ORDER — IBUPROFEN 800 MG/1
TABLET ORAL
Qty: 30 TABLET | Refills: 0 | Status: SHIPPED | OUTPATIENT
Start: 2021-11-08 | End: 2023-03-03 | Stop reason: SDUPTHER

## 2021-11-08 NOTE — TELEPHONE ENCOUNTER
Rx Refill Note  Requested Prescriptions     Pending Prescriptions Disp Refills    ibuprofen (ADVIL,MOTRIN) 800 MG tablet [Pharmacy Med Name: IBUPROFEN 800 MG TABLET] 30 tablet      Sig: TAKE ONE TABLET BY MOUTH EVERY 8 HOURS AS NEEDED FOR MILD PAIN      Last office visit with prescribing clinician: 5/7/2021      Next office visit with prescribing clinician: Visit date not found            Jayla Cochran MA  11/08/21, 09:12 EST

## 2021-11-09 ENCOUNTER — TELEPHONE (OUTPATIENT)
Dept: NEUROSURGERY | Facility: CLINIC | Age: 61
End: 2021-11-09

## 2021-11-09 NOTE — TELEPHONE ENCOUNTER
How are you feeling? alright    Are you having any pain? Where? Both legs but left is worse, better than before surgery    Rate pain from 1-10 -  3    Are you taking the pain RX? yes    Do you think it's helping? yes    Do you feel better than before surgery? Yes, left leg feels stronger that it was before    Is you incision red, swollen or bleeding? None, no fever    Are you having any trouble with nausea or constipation? no    Were your discharge instructions easy to understand? yes    Any other questions?    Confirm post op appt - yes

## 2021-11-09 NOTE — CASE MANAGEMENT/SOCIAL WORK
Case Management Discharge Note      Final Note: Home.         Selected Continued Care - Discharged on 11/6/2021 Admission date: 11/3/2021 - Discharge disposition: Home or Self Care    Destination    No services have been selected for the patient.              Durable Medical Equipment    No services have been selected for the patient.              Dialysis/Infusion    No services have been selected for the patient.              Home Medical Care    No services have been selected for the patient.              Therapy    No services have been selected for the patient.              Community Resources    No services have been selected for the patient.              Community & DME    No services have been selected for the patient.                       Final Discharge Disposition Code: 01 - home or self-care

## 2021-11-18 DIAGNOSIS — E78.2 MIXED HYPERLIPIDEMIA: ICD-10-CM

## 2021-11-19 RX ORDER — ATORVASTATIN CALCIUM 20 MG/1
20 TABLET, FILM COATED ORAL NIGHTLY
Qty: 90 TABLET | Refills: 0 | Status: SHIPPED | OUTPATIENT
Start: 2021-11-19 | End: 2022-02-25

## 2021-11-23 ENCOUNTER — OFFICE VISIT (OUTPATIENT)
Dept: NEUROSURGERY | Facility: CLINIC | Age: 61
End: 2021-11-23

## 2021-11-23 DIAGNOSIS — Z09 FOLLOW-UP EXAMINATION FOLLOWING SURGERY: ICD-10-CM

## 2021-11-23 DIAGNOSIS — M48.062 SPINAL STENOSIS OF LUMBAR REGION WITH NEUROGENIC CLAUDICATION: Primary | ICD-10-CM

## 2021-11-23 PROCEDURE — 99024 POSTOP FOLLOW-UP VISIT: CPT | Performed by: NEUROLOGICAL SURGERY

## 2021-11-23 RX ORDER — HYDROCODONE BITARTRATE AND ACETAMINOPHEN 5; 325 MG/1; MG/1
1 TABLET ORAL EVERY 8 HOURS PRN
Qty: 25 TABLET | Refills: 0 | Status: SHIPPED | OUTPATIENT
Start: 2021-11-23 | End: 2022-05-13 | Stop reason: HOSPADM

## 2021-11-23 NOTE — PROGRESS NOTES
Subjective   Patient ID: Pablo Mendoza is a 61 y.o. male is here today via telephone for 2 week POST OP follow-up. Patient had a Lumbar 3 to Lumbar 4 and Lumbar 4 to Lumbar 5 laminectomy with a fusion on 11.03.2021.     You have chosen to receive care through a telephone visit. Do you consent to use a telephone visit for your medical care today? Yes    We had a telephone visit today.  The patient was at home and I was in the office.  We talked for 5 minutes.    History of Present Illness     I talked to this patient today on the phone.  He is doing well.  He feels a lot better than when he first started coming in.  He has no leg pain and normal amount of back pain.    The following portions of the patient's history were reviewed and updated as appropriate: allergies, current medications, past family history, past medical history, past social history, past surgical history and problem list.    Review of Systems    I have reviewed the review of systems as documented by my MA.      Objective       Physical Exam  Neurological:      Mental Status: He is alert and oriented to person, place, and time.       Neurologic Exam     Mental Status   Oriented to person, place, and time.           Assessment/Plan   Independent Review of Radiographic Studies:      I personally reviewed the images from the following studies.    There are no new images to review    Medical Decision Making:      I told the patient that I would like to not start physical therapy quite yet since this was a fusion.  I recommended that we see him again in about 3 weeks with an x-ray and then we can discuss starting therapy.    Diagnoses and all orders for this visit:    1. Spinal stenosis of lumbar region with neurogenic claudication (Primary)  -     HYDROcodone-acetaminophen (NORCO) 5-325 MG per tablet; Take 1 tablet by mouth Every 8 (Eight) Hours As Needed for Severe Pain .  Dispense: 25 tablet; Refill: 0    2. Follow-up examination following  surgery  -     XR Spine Lumbar 2 or 3 View; Future      Return in about 3 weeks (around 12/14/2021).

## 2021-11-30 DIAGNOSIS — R80.9 TYPE 2 DIABETES MELLITUS WITH PROTEINURIA (HCC): ICD-10-CM

## 2021-11-30 DIAGNOSIS — E11.29 TYPE 2 DIABETES MELLITUS WITH PROTEINURIA (HCC): ICD-10-CM

## 2021-11-30 RX ORDER — GLIPIZIDE 5 MG/1
TABLET ORAL
Qty: 180 TABLET | Refills: 1 | Status: SHIPPED | OUTPATIENT
Start: 2021-11-30 | End: 2022-06-16

## 2021-11-30 NOTE — TELEPHONE ENCOUNTER
Rx Refill Note  Requested Prescriptions     Pending Prescriptions Disp Refills    glipizide (GLUCOTROL) 5 MG tablet [Pharmacy Med Name: glipiZIDE 5 MG TABLET] 60 tablet      Sig: TAKE ONE TABLET BY MOUTH TWICE A DAY BEFORE MEALS      Last office visit with prescribing clinician: 5/7/2021      Next office visit with prescribing clinician: Visit date not found            Jayla Cochran MA  11/30/21, 08:16 EST

## 2021-12-03 ENCOUNTER — TELEPHONE (OUTPATIENT)
Dept: NEUROSURGERY | Facility: CLINIC | Age: 61
End: 2021-12-03

## 2021-12-03 NOTE — TELEPHONE ENCOUNTER
Caller: WES VIRGEN     Relationship to patient: SELF    Best call back number: [phone]    Patient is needing: PATIENT IS CALLING REQUESTING AN UPDATE ON HIS INSURANCE PAPERWORK HE BROUGHT TO THE OFFICE RIGHT BEFORE THANKSGIVING. PLEASE REVIEW AND ADVISE.

## 2021-12-20 ENCOUNTER — HOSPITAL ENCOUNTER (OUTPATIENT)
Dept: GENERAL RADIOLOGY | Facility: HOSPITAL | Age: 61
Discharge: HOME | End: 2021-12-20
Admitting: NEUROLOGICAL SURGERY

## 2021-12-20 DIAGNOSIS — Z09 FOLLOW-UP EXAMINATION FOLLOWING SURGERY: ICD-10-CM

## 2021-12-20 PROCEDURE — 72100 X-RAY EXAM L-S SPINE 2/3 VWS: CPT

## 2021-12-23 ENCOUNTER — OFFICE VISIT (OUTPATIENT)
Dept: NEUROSURGERY | Facility: CLINIC | Age: 61
End: 2021-12-23

## 2021-12-23 VITALS
TEMPERATURE: 98 F | WEIGHT: 196.65 LBS | BODY MASS INDEX: 30.87 KG/M2 | SYSTOLIC BLOOD PRESSURE: 135 MMHG | DIASTOLIC BLOOD PRESSURE: 80 MMHG | HEIGHT: 67 IN

## 2021-12-23 DIAGNOSIS — Z09 FOLLOW-UP EXAMINATION FOLLOWING SURGERY: Primary | ICD-10-CM

## 2021-12-23 PROCEDURE — 99024 POSTOP FOLLOW-UP VISIT: CPT | Performed by: NEUROLOGICAL SURGERY

## 2021-12-23 NOTE — PROGRESS NOTES
"Subjective   Patient ID: Pablo Mendoza is a 61 y.o. male is here today for 3 week PO follow-up with a  New XR Lumbar that was done on 12.20.2021. Patient had a Lumbar 3 to Lumbar 4 and Lumbar 4 to Lumbar 5 laminectomy with a fusion on 11.03.2021.     Today patient states that he feels well overall. Patient states that he has soreness in his low back.     Patient, Provider, and MA are all wearing a mask in our office today.     History of Present Illness     This patient returns today.  He is doing well.  He is about 6 weeks out from his surgery.  He says he has a little stiffness in his back but that is it.  He says he feels so much better than preop.    The following portions of the patient's history were reviewed and updated as appropriate: allergies, current medications, past family history, past medical history, past social history, past surgical history and problem list.    Review of Systems   Constitutional: Negative for chills and fever.   HENT: Negative for congestion.    Genitourinary: Negative for difficulty urinating and dysuria.   Musculoskeletal: Positive for back pain. Negative for gait problem and myalgias.   Neurological: Negative for weakness and numbness.       I have reviewed the review of systems as documented by my MA.      Objective     Vitals:    12/23/21 1255   BP: 135/80   Cuff Size: Adult   Temp: 98 °F (36.7 °C)   Weight: 89.2 kg (196 lb 10.4 oz)   Height: 170.2 cm (67\")     Body mass index is 30.8 kg/m².      Physical Exam  Neurological:      Mental Status: He is alert and oriented to person, place, and time.       Neurologic Exam     Mental Status   Oriented to person, place, and time.           Assessment/Plan   Independent Review of Radiographic Studies:      I personally reviewed the images from the following studies.    I reviewed his x-rays done on 20 December.  This shows good alignment of the construct at L3-4 and L4-5.    Medical Decision Making:      I told the patient that " he can go ahead and start some physical therapy at this point.  He is not sure he really needs it I told him to go at least once just to get a formal exercise program in mind.  Otherwise I would like to see him again in about 6 weeks and we should be able to release him to return to work at that time.  In order to avoid putting stress on the fusion however I do not think he can return before that.    Diagnoses and all orders for this visit:    1. Follow-up examination following surgery (Primary)  -     XR Spine Lumbar 2 or 3 View; Future  -     Ambulatory Referral to Physical Therapy      Return in about 6 weeks (around 2/3/2022).

## 2021-12-25 DIAGNOSIS — I10 ESSENTIAL HYPERTENSION: ICD-10-CM

## 2021-12-27 ENCOUNTER — HOSPITAL ENCOUNTER (OUTPATIENT)
Dept: PHYSICAL THERAPY | Facility: HOSPITAL | Age: 61
Setting detail: THERAPIES SERIES
Discharge: HOME | End: 2021-12-27

## 2021-12-27 DIAGNOSIS — Z98.1 STATUS POST LUMBAR SPINAL FUSION: Primary | ICD-10-CM

## 2021-12-27 DIAGNOSIS — M54.41 CHRONIC BILATERAL LOW BACK PAIN WITH RIGHT-SIDED SCIATICA: ICD-10-CM

## 2021-12-27 DIAGNOSIS — G89.29 CHRONIC BILATERAL LOW BACK PAIN WITH RIGHT-SIDED SCIATICA: ICD-10-CM

## 2021-12-27 PROCEDURE — 97161 PT EVAL LOW COMPLEX 20 MIN: CPT

## 2021-12-27 PROCEDURE — 97110 THERAPEUTIC EXERCISES: CPT

## 2021-12-27 RX ORDER — METOPROLOL TARTRATE 100 MG/1
TABLET ORAL
Qty: 60 TABLET | Refills: 1 | Status: SHIPPED | OUTPATIENT
Start: 2021-12-27 | End: 2022-03-03

## 2021-12-27 NOTE — THERAPY EVALUATION
Outpatient Physical Therapy Ortho Initial Evaluation  [facility]     Patient Name: Pablo Mendoza  : 1960  MRN: [id number]  Today's Date: 2021      Visit Date: 2021    Patient Active Problem List   Diagnosis   • Type 2 diabetes mellitus, without long-term current use of insulin ([facility])   • Mixed hyperlipidemia   • Essential hypertension   • Gout of multiple sites   • Nocturia   • Migraine headache   • Chronic maxillary sinusitis   • Other fatigue   • Arthritis   • Routine health maintenance   • Proteinuria   • Cellulitis of left foot   • Spinal stenosis of lumbar region with neurogenic claudication        Past Medical History:   Diagnosis Date   • Anesthesia complication     STATES HARD TO WAKE UP W/ALL SURGERIES   • COVID-19     DEC 2020   • Diabetes mellitus (HCC)    • Elevated cholesterol    • Gout    • Hypertension    • Low back pain    • Migraines    • Mixed hyperlipidemia 2018   • Neuropathy    • Nocturia 2018   • Numbness and tingling of both lower extremities     LEGS AND FEET    • Seizures ([facility])     AS A CHILD   • Staph infection     UNSURE OF SITE   • Stroke ([facility])     COGNITIVE DELAY   • TIA (transient ischemic attack)    • Type 2 diabetes mellitus, without long-term current use of insulin ([facility]) 2018   • Weakness of both legs     LEFT WORSE         Past Surgical History:   Procedure Laterality Date   • CHOLECYSTECTOMY     • COLONOSCOPY     • HERNIA REPAIR     • LUMBAR FUSION N/A 11/3/2021    Procedure: Lumbar 3 to Lumbar 4 and Lumbar 4 to Lumbar 5 laminectomy with a fusion;  Surgeon: Jose Webster MD;  Location: [facility];  Service: Robotics - Neuro;  Laterality: N/A;   • SINUS SURGERY     • TYMPANOPLASTY Bilateral        Visit Dx:     ICD-10-CM ICD-9-CM   1. Status post lumbar spinal fusion  Z98.1 V45.4   2. Chronic bilateral low back pain with right-sided sciatica  M54.41 724.2    G89.29 724.3     338.29          Patient History     Row Name  12/27/21 1300             History    Chief Complaint Pain; Joint stiffness  -KA      Type of Pain Back pain  -KA      Date Current Problem(s) Began 11/03/21  -KA      Brief Description of Current Complaint Pt reports that prior to surgery he was having severe pain in legs and back to the point that he was in danger of passing out due to pain, had significant weakness in L leg.  Reports that he underwent surgery on November 3rd, fusion of L3-5 and has been doing much better since that time.  He denies numbness and tingling since surgery, has minor weakness in L leg that makes him feel like leg could buckle as well as tightness in the back.  X-rays show good alignment of fusion.  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 X-ray  -KA      Results of Clinical Tests Good alignment of hardware  -KA              Pain     Pain Location Back  -KA      Pain at Present 0  -KA      Pain at Best 0  -KA      Pain at Worst 2  -KA      Pain Frequency Intermittent  -KA      Pain Description Tightness  Stiffness  -KA      What Performance Factors Make the Current Problem(s) WORSE? Riding in car long distance, sit to stand  -KA      What Performance Factors Make the Current Problem(s) BETTER? Laying flat on the bed  -KA      Is your sleep disturbed? No  -KA      Difficulties at work? Not at work currently; will need to be able to stand prolonged and lift  -KA      Difficulties with ADL's? Difficulty sitting prolonged, performing sit to stand  -KA              Fall Risk Assessment    Any falls in the past year: No  -KA              Services    Prior Rehab/Home Health Experiences Yes  -KA      When was the prior experience with Rehab/Home Health May 2021  -KA      Where was the prior experience with Rehab/Home Health [facility]  "[facility]  -KA      Are you currently receiving Home Health services No  -KA      Do you plan to receive Home Health services in the near future No  -KA              Daily Activities    Primary Language English  -KA      How does patient learn best? Listening  -KA      Teaching needs identified Home Exercise Program; Management of Condition  -KA      Patient is concerned about/has problems with Flexibility; Performing job responsibilities/community activities (work, school,; Performing home management (household chores, shopping, care of dependents); Sitting; Transfers (getting out of a chair, bed)  -KA      Does patient have problems with the following? None  -KA      Barriers to learning None  -KA      Pt Participated in POC and Goals Yes  -KA              Safety    Are you being hurt, hit, or frightened by anyone at home or in your life? No  -KA      Are you being neglected by a caregiver No  -KA      Have you had any of the following issues with Anxiety  -KA            User Key  (r) = Recorded By, (t) = Taken By, (c) = Cosigned By    Initials Name Provider Type    Jo-Ann Chun, PT Physical Therapist                 PT Ortho     Row Name 12/27/21 1400       Subjective Comments    Subjective Comments Pt reports \"I have a little pain in my back, but it's nothing compared to what I was having before the surgery.  I don't think I'll need much therapy.\"  -KA       Subjective Pain    Able to rate subjective pain? yes  -KA    Pre-Treatment Pain Level 0  -KA    Post-Treatment Pain Level 0  -KA    Subjective Pain Comment Pt reports \"I only have pain if I sit too long or get up from a chair.  -KA       Posture/Observations    Posture/Observations Comments Anterior pelvic tilt; good scar mobility, R IC elevated  -KA       Special Tests/Palpation    Special Tests/Palpation Lumbar/SI  -KA       Lumbosacral Palpation    Lumbosacral Palpation? No Tenderness/Abnormality  -KA       General ROM    GENERAL ROM COMMENTS Lumbar " flexion approximately 50% moving through pain free range; Side bending also approximately 50% bilaterally, limited by tightness  -       MMT (Manual Muscle Testing)    General MMT Comments Grossly 5/5 except L knee extension and flexion both 4/5  -BARB       Sensation    Sensation WNL? WFL  -    Light Touch No apparent deficits  -          User Key  (r) = Recorded By, (t) = Taken By, (c) = Cosigned By    Initials Name Provider Type    Jo-Ann Chun, PT Physical Therapist                            Therapy Education  Education Details: Pt provided with written HEP handouts, performed all exercise in clinic Medbridge code [id number]  Given: HEP, Symptoms/condition management  Program: New  How Provided: Verbal, Demonstration, Written  Provided to: Patient  Level of Understanding: Verbalized, Demonstrated      PT OP Goals     Row Name 12/27/21 1700          PT Short Term Goals    STG Date to Achieve 01/10/22  -KA     STG 1 Pt will be independent with initial HEP for symptom management.  -            Long Term Goals    LTG Date to Achieve 01/24/22  -     LTG 1 Pt will be independent and compliant with advanced HEP for long term management of symptoms and prevention of future occurrence.  -     LTG 1 Progress New  -     LTG 2 Pt will reduce level of perceived disability as measured by the Modified oswestry  to 6% or less in order to improve QOL.  -     LTG 2 Progress New  -     LTG 3 Pt will demonstrate appropriate squatting form to reduce strain on back during lifting activities.  -     LTG 3 Progress New  -            Time Calculation    PT Goal Re-Cert Due Date 03/27/22  -           User Key  (r) = Recorded By, (t) = Taken By, (c) = Cosigned By    Initials Name Provider Type    Jo-Ann Chun, PT Physical Therapist                 PT Assessment/Plan     Row Name 12/27/21 1743          PT Assessment    Impairments Muscle strength; Posture; Range of motion  -     Assessment Comments 61  y.o. male referred to outpatient physical therapy for evaluation and treatment of low back pain, s/p L3-4, L4-5 fusion on November 3, 2021.  Patient presents with decreased postural awareness, gait deviations, and mild ROM and strength deficits. Pt's signs and symptoms are consistent with referring diagnosis.  Pertinent comorbidities and personal factors that may affect progress include, but are not limited to, hypertension, diabetes mellitus, and history of stroke.  Pt scored 16% on the Modified oswestry where 0 is no disability and is in stable clinical condition. Pt would benefit from skilled PT to address functional deficits and return to PLOF. Pt requesting to come in for therapy just for exercise progression, may be ready to discharge prior to 4th visit.  -BARB     Rehab Potential Good  -KA     Patient/caregiver participated in establishment of treatment plan and goals Yes  -KA     Patient would benefit from skilled therapy intervention Yes  -KA            PT Plan    PT Frequency 1x/week  -     Predicted Duration of Therapy Intervention (PT) 3-4 visits  -KA     Planned CPT's? PT EVAL LOW COMPLEXITY: 58250; PT THER PROC EA 15 MIN: 24416; PT THER ACT EA 15 MIN: 80427; PT MANUAL THERAPY EA 15 MIN: 87599; PT NEUROMUSC RE-EDUCATION EA 15 MIN: 78835; PT GAIT TRAINING EA 15 MIN: 54664; PT SELF CARE/HOME MGMT/TRAIN EA 15: 84096  -     Physical Therapy Interventions (Optional Details) home exercise program; lumbar stabilization; manual therapy techniques; neuromuscular re-education; postural re-education; patient/family education  -     PT Plan Comments Progress HEP each visit; try Plantigrade PPT, standing core strengthening including mini squats next visit.  -BARB           User Key  (r) = Recorded By, (t) = Taken By, (c) = Cosigned By    Initials Name Provider Type    Jo-Ann Chun, PT Physical Therapist                   OP Exercises     Row Name 12/27/21 1400 12/27/21 1300          Subjective Comments     "Subjective Comments Pt reports \"I have a little pain in my back, but it's nothing compared to what I was having before the surgery.  I don't think I'll need much therapy.\"  -KA --            Subjective Pain    Able to rate subjective pain? yes  -KA --     Pre-Treatment Pain Level 0  -KA --     Post-Treatment Pain Level 0  -KA --     Subjective Pain Comment Pt reports \"I only have pain if I sit too long or get up from a chair.  -KA --            Total Minutes    43334 - PT Therapeutic Exercise Minutes -- 15  -KA            Exercise 1    Exercise Name 1 -- Supine PPT  -KA     Cueing 1 -- Verbal; Tactile  -KA     Sets 1 -- 1  -KA     Reps 1 -- 10  -KA            Exercise 2    Exercise Name 2 -- Supine march with PPT  -KA     Cueing 2 -- Verbal; Tactile  -KA     Sets 2 -- 1  -KA     Reps 2 -- 10  -KA            Exercise 3    Exercise Name 3 -- Supine bridge with PPT  -KA     Cueing 3 -- Verbal; Tactile  -KA     Sets 3 -- 1  -KA     Reps 3 -- 10  -KA            Exercise 4    Exercise Name 4 -- Supine SLR with PPT  -KA     Cueing 4 -- Verbal; Tactile  -KA     Sets 4 -- 1  -KA     Reps 4 -- 10  -KA            Exercise 5    Exercise Name 5 -- Clamshell  -KA     Cueing 5 -- Verbal; Tactile  -KA     Sets 5 -- 1  -KA     Reps 5 -- 10  -KA           User Key  (r) = Recorded By, (t) = Taken By, (c) = Cosigned By    Initials Name Provider Type    Jo-Ann Chun PT Physical Therapist                              Outcome Measure Options: Modifed Owestry  Modified Oswestry  Modified Oswestry Score/Comments: 16%      Time Calculation:     Start Time: 1335  Stop Time: 1415  Time Calculation (min): 40 min  Timed Charges  80528 - PT Therapeutic Exercise Minutes: 15  Total Minutes  Timed Charges Total Minutes: 15   Total Minutes: 15     Therapy Charges for Today     Code Description Service Date Service Provider Modifiers Qty    [id number]  PT THER PROC EA 15 MIN 12/27/2021 Jo-Ann Bowman, PT GP 1    [id number]  PT EVAL " LOW COMPLEXITY 2 12/27/2021 Jo-Ann Bowman, PT GP 1          PT G-Codes  Outcome Measure Options: Modifed Owestry  Modified Oswestry Score/Comments: 16%         Jo-Ann Bowman, PT  12/27/2021

## 2021-12-28 DIAGNOSIS — M48.062 SPINAL STENOSIS OF LUMBAR REGION WITH NEUROGENIC CLAUDICATION: Primary | ICD-10-CM

## 2022-01-01 DIAGNOSIS — I10 ESSENTIAL HYPERTENSION: ICD-10-CM

## 2022-01-03 ENCOUNTER — OFFICE VISIT (OUTPATIENT)
Dept: INTERNAL MEDICINE | Facility: CLINIC | Age: 62
End: 2022-01-03

## 2022-01-03 VITALS
TEMPERATURE: 98.6 F | HEART RATE: 78 BPM | SYSTOLIC BLOOD PRESSURE: 128 MMHG | DIASTOLIC BLOOD PRESSURE: 76 MMHG | HEIGHT: 67 IN | WEIGHT: 198.6 LBS | RESPIRATION RATE: 19 BRPM | BODY MASS INDEX: 31.17 KG/M2 | OXYGEN SATURATION: 99 %

## 2022-01-03 DIAGNOSIS — E78.2 MIXED HYPERLIPIDEMIA: ICD-10-CM

## 2022-01-03 DIAGNOSIS — R80.9 TYPE 2 DIABETES MELLITUS WITH PROTEINURIA: ICD-10-CM

## 2022-01-03 DIAGNOSIS — E11.29 TYPE 2 DIABETES MELLITUS WITH PROTEINURIA: ICD-10-CM

## 2022-01-03 DIAGNOSIS — I10 ESSENTIAL HYPERTENSION: Primary | ICD-10-CM

## 2022-01-03 DIAGNOSIS — Z98.1 S/P LUMBAR SPINAL FUSION: ICD-10-CM

## 2022-01-03 PROCEDURE — 99214 OFFICE O/P EST MOD 30 MIN: CPT | Performed by: INTERNAL MEDICINE

## 2022-01-03 RX ORDER — AMLODIPINE BESYLATE 10 MG/1
TABLET ORAL
Qty: 30 TABLET | Refills: 0 | Status: SHIPPED | OUTPATIENT
Start: 2022-01-03 | End: 2022-01-31

## 2022-01-03 NOTE — PROGRESS NOTES
Pablo Mendoza is a 61 y.o. male, who presents with a chief complaint of   Chief Complaint   Patient presents with   • Diabetes   • Hypertension   • Hyperlipidemia           HPI   Pt here for follow up.  On 11/3 he had a L3-L4 L4-L5 laminectomy with fusion.  He is feeling much better.  He starts PT soon.  His pain is much better and continues to improve.  His headaches/migraines have been better with his pain being better.     DM - a1c 8.4 -> 8.6->8.5->8.0->8.5->6.74 on 11/3/21.  he says he has been trying to eat a healthier diet.  On janumet XR and jardiance.  He denies hypoglycemia.      HLD - on statin.  No cramps/myalgias     HTN - well controlled.  on metoprolol, hctz, amlodipine. no ha/dizziness.        The following portions of the patient's history were reviewed and updated as appropriate: allergies, current medications, past family history, past medical history, past social history, past surgical history and problem list.    Allergies: Aspirin and Lisinopril    Review of Systems   Constitutional: Negative.    HENT: Negative.    Eyes: Negative.    Respiratory: Negative.    Cardiovascular: Negative.    Gastrointestinal: Negative.    Endocrine: Negative.    Genitourinary: Negative.    Musculoskeletal: Negative.    Skin: Negative.    Allergic/Immunologic: Negative.    Neurological: Negative.    Hematological: Negative.    Psychiatric/Behavioral: Negative.    All other systems reviewed and are negative.            Wt Readings from Last 3 Encounters:   01/03/22 90.1 kg (198 lb 9.6 oz)   12/23/21 89.2 kg (196 lb 10.4 oz)   11/03/21 89.2 kg (196 lb 10.4 oz)     Temp Readings from Last 3 Encounters:   01/03/22 98.6 °F (37 °C) (Temporal)   12/23/21 98 °F (36.7 °C)   11/06/21 98.8 °F (37.1 °C) (Oral)     BP Readings from Last 3 Encounters:   01/03/22 128/76   12/23/21 135/80   11/06/21 145/82     Pulse Readings from Last 3 Encounters:   01/03/22 78   11/06/21 85   10/25/21 67     Body mass index is 31.1  kg/m².  SpO2 Readings from Last 3 Encounters:   01/03/22 99%   11/06/21 90%   10/25/21 96%          Physical Exam  Vitals and nursing note reviewed.   Constitutional:       General: He is not in acute distress.     Appearance: He is well-developed.   HENT:      Head: Normocephalic and atraumatic.      Right Ear: External ear normal.      Left Ear: External ear normal.      Nose: Nose normal.   Eyes:      Conjunctiva/sclera: Conjunctivae normal.      Pupils: Pupils are equal, round, and reactive to light.   Cardiovascular:      Rate and Rhythm: Normal rate and regular rhythm.      Heart sounds: Normal heart sounds.   Pulmonary:      Effort: Pulmonary effort is normal. No respiratory distress.      Breath sounds: Normal breath sounds. No wheezing.   Musculoskeletal:         General: Normal range of motion.      Cervical back: Normal range of motion and neck supple.      Comments: Normal gait  Well healed lumbar spine scar.   Skin:     General: Skin is warm and dry.   Neurological:      Mental Status: He is alert and oriented to person, place, and time.   Psychiatric:         Behavior: Behavior normal.         Thought Content: Thought content normal.         Judgment: Judgment normal.         Results for orders placed or performed during the hospital encounter of 11/03/21   Hemoglobin A1c    Specimen: Blood   Result Value Ref Range    Hemoglobin A1C 6.74 (H) 4.80 - 5.60 %   Basic Metabolic Panel    Specimen: Blood   Result Value Ref Range    Glucose 179 (H) 65 - 99 mg/dL    BUN 22 (H) 6 - 20 mg/dL    Creatinine 0.95 0.76 - 1.27 mg/dL    Sodium 139 136 - 145 mmol/L    Potassium 4.1 3.5 - 5.2 mmol/L    Chloride 101 98 - 107 mmol/L    CO2 25.3 22.0 - 29.0 mmol/L    Calcium 9.2 8.6 - 10.5 mg/dL    eGFR Non African Amer 81 >60 mL/min/1.73    BUN/Creatinine Ratio 23.2 7.0 - 25.0    Anion Gap 12.7 5.0 - 15.0 mmol/L   CBC Auto Differential    Specimen: Blood   Result Value Ref Range    WBC 14.37 (H) 3.40 - 10.80 10*3/mm3     RBC 5.55 4.14 - 5.80 10*6/mm3    Hemoglobin 16.1 13.0 - 17.7 g/dL    Hematocrit 48.9 37.5 - 51.0 %    MCV 88.1 79.0 - 97.0 fL    MCH 29.0 26.6 - 33.0 pg    MCHC 32.9 31.5 - 35.7 g/dL    RDW 14.6 12.3 - 15.4 %    RDW-SD 46.5 37.0 - 54.0 fl    MPV 10.2 6.0 - 12.0 fL    Platelets 209 140 - 450 10*3/mm3    Neutrophil % 88.9 (H) 42.7 - 76.0 %    Lymphocyte % 7.0 (L) 19.6 - 45.3 %    Monocyte % 3.3 (L) 5.0 - 12.0 %    Eosinophil % 0.0 (L) 0.3 - 6.2 %    Basophil % 0.2 0.0 - 1.5 %    Immature Grans % 0.6 (H) 0.0 - 0.5 %    Neutrophils, Absolute 12.77 (H) 1.70 - 7.00 10*3/mm3    Lymphocytes, Absolute 1.01 0.70 - 3.10 10*3/mm3    Monocytes, Absolute 0.48 0.10 - 0.90 10*3/mm3    Eosinophils, Absolute 0.00 0.00 - 0.40 10*3/mm3    Basophils, Absolute 0.03 0.00 - 0.20 10*3/mm3    Immature Grans, Absolute 0.08 (H) 0.00 - 0.05 10*3/mm3    nRBC 0.1 0.0 - 0.2 /100 WBC   CBC Auto Differential    Specimen: Blood   Result Value Ref Range    WBC 13.22 (H) 3.40 - 10.80 10*3/mm3    RBC 5.06 4.14 - 5.80 10*6/mm3    Hemoglobin 15.0 13.0 - 17.7 g/dL    Hematocrit 43.5 37.5 - 51.0 %    MCV 86.0 79.0 - 97.0 fL    MCH 29.6 26.6 - 33.0 pg    MCHC 34.5 31.5 - 35.7 g/dL    RDW 14.3 12.3 - 15.4 %    RDW-SD 43.7 37.0 - 54.0 fl    MPV 10.2 6.0 - 12.0 fL    Platelets 194 140 - 450 10*3/mm3    Neutrophil % 75.7 42.7 - 76.0 %    Lymphocyte % 15.2 (L) 19.6 - 45.3 %    Monocyte % 6.0 5.0 - 12.0 %    Eosinophil % 1.2 0.3 - 6.2 %    Basophil % 0.6 0.0 - 1.5 %    Immature Grans % 1.3 (H) 0.0 - 0.5 %    Neutrophils, Absolute 10.01 (H) 1.70 - 7.00 10*3/mm3    Lymphocytes, Absolute 2.01 0.70 - 3.10 10*3/mm3    Monocytes, Absolute 0.79 0.10 - 0.90 10*3/mm3    Eosinophils, Absolute 0.16 0.00 - 0.40 10*3/mm3    Basophils, Absolute 0.08 0.00 - 0.20 10*3/mm3    Immature Grans, Absolute 0.17 (H) 0.00 - 0.05 10*3/mm3    nRBC 0.0 0.0 - 0.2 /100 WBC   CBC Auto Differential    Specimen: Blood   Result Value Ref Range    WBC 12.21 (H) 3.40 - 10.80 10*3/mm3    RBC 5.29  4.14 - 5.80 10*6/mm3    Hemoglobin 15.6 13.0 - 17.7 g/dL    Hematocrit 45.1 37.5 - 51.0 %    MCV 85.3 79.0 - 97.0 fL    MCH 29.5 26.6 - 33.0 pg    MCHC 34.6 31.5 - 35.7 g/dL    RDW 14.1 12.3 - 15.4 %    RDW-SD 42.9 37.0 - 54.0 fl    MPV 9.9 6.0 - 12.0 fL    Platelets 182 140 - 450 10*3/mm3    Neutrophil % 70.1 42.7 - 76.0 %    Lymphocyte % 18.1 (L) 19.6 - 45.3 %    Monocyte % 7.5 5.0 - 12.0 %    Eosinophil % 2.2 0.3 - 6.2 %    Basophil % 0.6 0.0 - 1.5 %    Immature Grans % 1.5 (H) 0.0 - 0.5 %    Neutrophils, Absolute 8.57 (H) 1.70 - 7.00 10*3/mm3    Lymphocytes, Absolute 2.21 0.70 - 3.10 10*3/mm3    Monocytes, Absolute 0.91 (H) 0.10 - 0.90 10*3/mm3    Eosinophils, Absolute 0.27 0.00 - 0.40 10*3/mm3    Basophils, Absolute 0.07 0.00 - 0.20 10*3/mm3    Immature Grans, Absolute 0.18 (H) 0.00 - 0.05 10*3/mm3    nRBC 0.2 0.0 - 0.2 /100 WBC   POC Glucose Once    Specimen: Blood   Result Value Ref Range    Glucose 162 (H) 70 - 130 mg/dL   POC Glucose Once    Specimen: Blood   Result Value Ref Range    Glucose 199 (H) 70 - 130 mg/dL   POC Glucose Once    Specimen: Blood   Result Value Ref Range    Glucose 224 (H) 70 - 130 mg/dL   POC Glucose Once    Specimen: Blood   Result Value Ref Range    Glucose 175 (H) 70 - 130 mg/dL   POC Glucose Once    Specimen: Blood   Result Value Ref Range    Glucose 219 (H) 70 - 130 mg/dL   POC Glucose Once    Specimen: Blood   Result Value Ref Range    Glucose 234 (H) 70 - 130 mg/dL   POC Glucose Once    Specimen: Blood   Result Value Ref Range    Glucose 285 (H) 70 - 130 mg/dL   POC Glucose Once    Specimen: Blood   Result Value Ref Range    Glucose 190 (H) 70 - 130 mg/dL   POC Glucose Once    Specimen: Blood   Result Value Ref Range    Glucose 278 (H) 70 - 130 mg/dL   POC Glucose Once    Specimen: Blood   Result Value Ref Range    Glucose 208 (H) 70 - 130 mg/dL   POC Glucose Once    Specimen: Blood   Result Value Ref Range    Glucose 157 (H) 70 - 130 mg/dL   POC Glucose Once    Specimen:  Blood   Result Value Ref Range    Glucose 188 (H) 70 - 130 mg/dL     Result Review :                  Assessment and Plan    Diagnoses and all orders for this visit:    1. Essential hypertension (Primary) - cont metoprolol and amlodipine  -     Comprehensive Metabolic Panel; Future  -     CBC & Differential; Future  -     T4, Free; Future  -     TSH; Future  -     Lipid Panel With LDL / HDL Ratio; Future    2. Type 2 diabetes mellitus with proteinuria (HCC) - cont janumet, glipizide, jardiance  -     Comprehensive Metabolic Panel; Future  -     CBC & Differential; Future  -     T4, Free; Future  -     TSH; Future  -     Lipid Panel With LDL / HDL Ratio; Future  -     Hemoglobin A1c; Future    3. Mixed hyperlipidemia - cont statin  -     Comprehensive Metabolic Panel; Future  -     Lipid Panel With LDL / HDL Ratio; Future    4. S/P lumbar spinal fusion                   Outpatient Medications Prior to Visit   Medication Sig Dispense Refill   • acetaminophen (TYLENOL) 500 MG tablet Take 500 mg by mouth Every 6 (Six) Hours As Needed for Mild Pain .     • allopurinol (ZYLOPRIM) 300 MG tablet TAKE ONE TABLET BY MOUTH TWICE A DAY (Patient taking differently: Take 300 mg by mouth 2 (Two) Times a Day.) 60 tablet 2   • amLODIPine (NORVASC) 10 MG tablet TAKE ONE TABLET BY MOUTH DAILY 30 tablet 0   • aspirin 81 MG chewable tablet Chew 81 mg Daily. FOLLOW MD GUIDELINES ON HOLDING FOR OR     • atorvastatin (LIPITOR) 20 MG tablet Take 1 tablet by mouth Every Night. 90 tablet 0   • Cholecalciferol (VITAMIN D3) 5000 units capsule capsule Take 5,000 Units by mouth Daily. HOLDING FOR 7 DAYS PRIOR  TO OR     • cyanocobalamin (VITAMIN B-12) 2500 MCG tablet tablet Take 500 mcg by mouth Daily. HOLDING FOR 7 DAYS PRIOR TO OR     • cyclobenzaprine (FLEXERIL) 10 MG tablet Take 1 tablet by mouth 2 (Two) Times a Day As Needed for Muscle Spasms. 60 tablet 0   • empagliflozin (Jardiance) 25 MG tablet tablet Take 1 tablet by mouth Daily. 30  tablet 4   • fluticasone (Flonase) 50 MCG/ACT nasal spray 2 sprays into the nostril(s) as directed by provider Daily. (Patient taking differently: 2 sprays into the nostril(s) as directed by provider As Needed.) 1 bottle 0   • glipizide (GLUCOTROL) 5 MG tablet TAKE ONE TABLET BY MOUTH TWICE A DAY BEFORE MEALS 180 tablet 1   • hydroCHLOROthiazide (HYDRODIURIL) 25 MG tablet TAKE ONE TABLET BY MOUTH DAILY (Patient taking differently: Take 25 mg by mouth Daily.) 30 tablet 2   • HYDROcodone-acetaminophen (NORCO) 5-325 MG per tablet Take 1 tablet by mouth Every 8 (Eight) Hours As Needed for Severe Pain . 25 tablet 0   • ibuprofen (ADVIL,MOTRIN) 800 MG tablet TAKE ONE TABLET BY MOUTH EVERY 8 HOURS AS NEEDED FOR MILD PAIN 30 tablet 0   • Janumet XR  MG tablet TAKE ONE TABLET BY MOUTH TWICE A DAY (Patient taking differently: Take 1 tablet by mouth Daily.) 60 tablet 3   • loratadine (CLARITIN) 10 MG tablet TAKE ONE TABLET BY MOUTH DAILY (Patient taking differently: Take 10 mg by mouth As Needed.) 30 tablet 0   • metoprolol tartrate (LOPRESSOR) 100 MG tablet TAKE ONE TABLET BY MOUTH TWICE A DAY 60 tablet 1   • multivitamin with minerals (CENTRUM ADULTS PO) Take 1 tablet by mouth Daily. HOLDING FOR 7 DAYS PRIOR TO OR     • rizatriptan (MAXALT) 10 MG tablet Take 10 mg by mouth 1 (One) Time As Needed for Migraine. May repeat in 2 hours if needed     • vitamin C (ASCORBIC ACID) 500 MG tablet Take 500 mg by mouth Daily. HOLDING FOR 7 DAYS PRIOR TO OR       No facility-administered medications prior to visit.     No orders of the defined types were placed in this encounter.    [unfilled]  There are no discontinued medications.      Return in about 4 months (around 5/3/2022) for Recheck, labs.    Patient was given instructions and counseling regarding her condition or for health maintenance advice. Please see specific information pulled into the AVS if appropriate.

## 2022-01-04 ENCOUNTER — HOSPITAL ENCOUNTER (OUTPATIENT)
Dept: PHYSICAL THERAPY | Facility: HOSPITAL | Age: 62
Setting detail: THERAPIES SERIES
Discharge: HOME | End: 2022-01-04

## 2022-01-04 DIAGNOSIS — G89.29 CHRONIC BILATERAL LOW BACK PAIN WITH RIGHT-SIDED SCIATICA: Primary | ICD-10-CM

## 2022-01-04 DIAGNOSIS — M54.41 CHRONIC BILATERAL LOW BACK PAIN WITH RIGHT-SIDED SCIATICA: Primary | ICD-10-CM

## 2022-01-04 DIAGNOSIS — Z98.1 STATUS POST LUMBAR SPINAL FUSION: ICD-10-CM

## 2022-01-04 PROCEDURE — 97110 THERAPEUTIC EXERCISES: CPT

## 2022-01-04 NOTE — THERAPY TREATMENT NOTE
Outpatient Physical Therapy Ortho Treatment Note  [facility]     Patient Name: Pablo Mendoza  : 1960  MRN: [id number]  Today's Date: 2022      Visit Date: 2022    Visit Dx:    ICD-10-CM ICD-9-CM   1. Chronic bilateral low back pain with right-sided sciatica  M54.41 724.2    G89.29 724.3     338.29   2. Status post lumbar spinal fusion  Z98.1 V45.4       Patient Active Problem List   Diagnosis   • Type 2 diabetes mellitus, without long-term current use of insulin (HCC)   • Mixed hyperlipidemia   • Essential hypertension   • Gout of multiple sites   • Nocturia   • Migraine headache   • Chronic maxillary sinusitis   • Other fatigue   • Arthritis   • Routine health maintenance   • Proteinuria   • Cellulitis of left foot   • Spinal stenosis of lumbar region with neurogenic claudication        Past Medical History:   Diagnosis Date   • Anesthesia complication     STATES HARD TO WAKE UP W/ALL SURGERIES   • COVID-19     DEC 2020   • Diabetes mellitus (HCC)    • Elevated cholesterol    • Gout    • Hypertension    • Low back pain    • Migraines    • Mixed hyperlipidemia 2018   • Neuropathy    • Nocturia 2018   • Numbness and tingling of both lower extremities     LEGS AND FEET    • Seizures (HCC)     AS A CHILD   • Staph infection     UNSURE OF SITE   • Stroke (HCC)     COGNITIVE DELAY   • TIA (transient ischemic attack)    • Type 2 diabetes mellitus, without long-term current use of insulin ([facility]) 2018   • Weakness of both legs     LEFT WORSE         Past Surgical History:   Procedure Laterality Date   • CHOLECYSTECTOMY     • COLONOSCOPY     • HERNIA REPAIR     • LUMBAR FUSION N/A 11/3/2021    Procedure: Lumbar 3 to Lumbar 4 and Lumbar 4 to Lumbar 5 laminectomy with a fusion;  Surgeon: Jose Webster MD;  Location: [facility] OR;  Service: Robotics - Neuro;  Laterality: N/A;   • SINUS SURGERY     • TYMPANOPLASTY Bilateral                         PT Assessment/Plan     Row  Name 01/04/22 1600          PT Assessment    Assessment Comments Pablo returns to first f/u visit since initial evaluation. Reports good HEP compliance. Overall he is pleased with his response to the surgery, as his pain is fairly minimal since surgery. We reviewed his current HEP with some cueing for form and breathing with his PPT/core activation. Progressed gentle LE/hip/core strengthening. Pt reports good response to today's session. He remains a good candidate for skilled PT.  -CC            PT Plan    PT Plan Comments Pt reports he may want to discharge next visit.  -CC           User Key  (r) = Recorded By, (t) = Taken By, (c) = Cosigned By    Initials Name Provider Type    Marisa Hector, PT Physical Therapist                   OP Exercises     Row Name 01/04/22 1600             Subjective Comments    Subjective Comments Pt reports he feels great, some tightness but no pain. Very pleased wtih surgery  -CC              Subjective Pain    Able to rate subjective pain? yes  -CC      Pre-Treatment Pain Level 0  -CC              Total Minutes    90079 - PT Therapeutic Exercise Minutes 40  -CC              Exercise 1    Exercise Name 1 nu step  -CC      Cueing 1 Verbal  -CC      Time 1 5 min, lvl 5  -CC              Exercise 2    Exercise Name 2 seated HS str  -CC      Cueing 2 Verbal  -CC      Reps 2 3 b  -CC      Time 2 20 sec  -CC      Additional Comments cues for toes up  -CC              Exercise 3    Exercise Name 3 supine PPT  -CC      Cueing 3 Verbal  -CC      Sets 3 1  -CC      Reps 3 10  -CC      Time 3 5 sec  -CC              Exercise 4    Exercise Name 4 LTR  -CC      Cueing 4 Verbal  -CC      Sets 4 1 b  -CC      Reps 4 10  -CC      Time 4 3 sec  -CC      Additional Comments cues for pain free range, shoulders down  -CC              Exercise 5    Exercise Name 5 Clamshell  -CC      Cueing 5 Verbal; Tactile  -CC      Sets 5 2 b  -CC      Reps 5 10  -CC      Time 5 RTB  -CC               Exercise 6    Exercise Name 6 piriformis str  -CC      Cueing 6 Verbal  -CC      Reps 6 3 b  -CC      Time 6 20 sec  -CC              Exercise 7    Exercise Name 7 mini squat  -CC      Cueing 7 Verbal; Demo  -CC      Sets 7 3  -CC      Reps 7 10  -CC            User Key  (r) = Recorded By, (t) = Taken By, (c) = Cosigned By    Initials Name Provider Type    Marisa Hector, PT Physical Therapist                              PT OP Goals     Row Name 01/04/22 1600          PT Short Term Goals    STG Date to Achieve 01/10/22  -CC     STG 1 Pt will be independent with initial HEP for symptom management.  -CC     STG 1 Progress Met  -            Long Term Goals    LTG Date to Achieve 01/24/22  -CC     LTG 1 Pt will be independent and compliant with advanced HEP for long term management of symptoms and prevention of future occurrence.  -CC     LTG 1 Progress Ongoing  -CC     LTG 2 Pt will reduce level of perceived disability as measured by the Modified oswestry  to 6% or less in order to improve QOL.  -CC     LTG 2 Progress Ongoing  -CC     LTG 3 Pt will demonstrate appropriate squatting form to reduce strain on back during lifting activities.  -CC     LTG 3 Progress Ongoing  -CC           User Key  (r) = Recorded By, (t) = Taken By, (c) = Cosigned By    Initials Name Provider Type    Marisa Hector PT Physical Therapist                Therapy Education  Education Details: Access Code [email or web address]; issued RTB  Given: HEP  Program: Reinforced, Progressed  How Provided: Verbal, Demonstration, Written  Provided to: Patient  Level of Understanding: Verbalized, Demonstrated              Time Calculation:   Start Time: 1615  Stop Time: 1655  Time Calculation (min): 40 min  Total Timed Code Minutes- PT: 40 minute(s)  Timed Charges  47961 - PT Therapeutic Exercise Minutes: 40  Total Minutes  Timed Charges Total Minutes: 40   Total Minutes: 40  Therapy Charges for Today     Code Description Service Date Service  Provider Modifiers Qty    [id number]  PT THER PROC EA 15 MIN 1/4/2022 Marisa Toribio, PT GP 3                    Marisa Toribio, PT  1/4/2022

## 2022-01-11 ENCOUNTER — APPOINTMENT (OUTPATIENT)
Dept: PHYSICAL THERAPY | Facility: HOSPITAL | Age: 62
End: 2022-01-11

## 2022-01-13 ENCOUNTER — APPOINTMENT (OUTPATIENT)
Dept: PHYSICAL THERAPY | Facility: HOSPITAL | Age: 62
End: 2022-01-13

## 2022-01-18 ENCOUNTER — APPOINTMENT (OUTPATIENT)
Dept: PHYSICAL THERAPY | Facility: HOSPITAL | Age: 62
End: 2022-01-18

## 2022-01-18 ENCOUNTER — TELEPHONE (OUTPATIENT)
Dept: PHYSICAL THERAPY | Facility: HOSPITAL | Age: 62
End: 2022-01-18

## 2022-01-18 ENCOUNTER — DOCUMENTATION (OUTPATIENT)
Dept: PHYSICAL THERAPY | Facility: HOSPITAL | Age: 62
End: 2022-01-18

## 2022-01-18 DIAGNOSIS — G89.29 CHRONIC BILATERAL LOW BACK PAIN WITH RIGHT-SIDED SCIATICA: Primary | ICD-10-CM

## 2022-01-18 DIAGNOSIS — Z98.1 STATUS POST LUMBAR SPINAL FUSION: ICD-10-CM

## 2022-01-18 DIAGNOSIS — M54.41 CHRONIC BILATERAL LOW BACK PAIN WITH RIGHT-SIDED SCIATICA: Primary | ICD-10-CM

## 2022-01-18 NOTE — TELEPHONE ENCOUNTER
Spoke with pt. Re: missed visit in outpatient PT today. He reported he was doing well and wanted to cancel all remaining appointments. I encouraged him to call with any questions/concerns in the future. All appointments were canceled.

## 2022-01-18 NOTE — THERAPY DISCHARGE NOTE
Outpatient Physical Therapy Discharge Summary         Patient Name: Pablo Mendoza  : 1960  MRN: [id number]    Today's Date: 2022    Visit Dx:    ICD-10-CM ICD-9-CM   1. Chronic bilateral low back pain with right-sided sciatica  M54.41 724.2    G89.29 724.3     338.29   2. Status post lumbar spinal fusion  Z98.1 V45.4        PT OP Goals     Row Name 22 0800          PT Short Term Goals    STG Date to Achieve 01/10/22  -GJ     STG 1 Pt will be independent with initial HEP for symptom management.  -GJ     STG 1 Progress Met  -GJ            Long Term Goals    LTG Date to Achieve 22  -GJ     LTG 1 Pt will be independent and compliant with advanced HEP for long term management of symptoms and prevention of future occurrence.  -GJ     LTG 1 Progress Partially Met  -GJ     LTG 2 Pt will reduce level of perceived disability as measured by the Modified oswestry  to 6% or less in order to improve QOL.  -GJ     LTG 2 Progress Not Met  -GJ     LTG 3 Pt will demonstrate appropriate squatting form to reduce strain on back during lifting activities.  -GJ     LTG 3 Progress Not Met  -GJ           User Key  (r) = Recorded By, (t) = Taken By, (c) = Cosigned By    Initials Name Provider Type    Junito Clemens, PT Physical Therapist                OP PT Discharge Summary  Date of Discharge: 22  Reason for Discharge: Patient/Caregiver request  Outcomes Achieved: Patient able to partially acheive established goals  Discharge Destination: Home with home program  Discharge Instructions/Additional Comments:  attended 2 sessions of physical therapy 2021-2022 following his L spine surgery. I spoke with him by phone today and he reported doing well and wishing to cancel all remaing appointments.  Encouraged him to keep with with his HEP and to call with any questions. Mr. Mendoza is discharged from outpatient physical therapy to an independent program per his wishes.      Time  Calculation:                    Junito Bashir, PT  1/18/2022

## 2022-01-20 ENCOUNTER — APPOINTMENT (OUTPATIENT)
Dept: PHYSICAL THERAPY | Facility: HOSPITAL | Age: 62
End: 2022-01-20

## 2022-01-25 ENCOUNTER — APPOINTMENT (OUTPATIENT)
Dept: PHYSICAL THERAPY | Facility: HOSPITAL | Age: 62
End: 2022-01-25

## 2022-01-27 ENCOUNTER — APPOINTMENT (OUTPATIENT)
Dept: PHYSICAL THERAPY | Facility: HOSPITAL | Age: 62
End: 2022-01-27

## 2022-01-30 DIAGNOSIS — I10 ESSENTIAL HYPERTENSION: ICD-10-CM

## 2022-01-31 RX ORDER — AMLODIPINE BESYLATE 10 MG/1
TABLET ORAL
Qty: 30 TABLET | Refills: 0 | Status: SHIPPED | OUTPATIENT
Start: 2022-01-31 | End: 2022-03-03

## 2022-02-01 ENCOUNTER — APPOINTMENT (OUTPATIENT)
Dept: PHYSICAL THERAPY | Facility: HOSPITAL | Age: 62
End: 2022-02-01

## 2022-02-02 ENCOUNTER — OFFICE VISIT (OUTPATIENT)
Dept: NEUROSURGERY | Facility: CLINIC | Age: 62
End: 2022-02-02

## 2022-02-02 ENCOUNTER — HOSPITAL ENCOUNTER (OUTPATIENT)
Dept: GENERAL RADIOLOGY | Facility: HOSPITAL | Age: 62
Discharge: HOME | End: 2022-02-02
Admitting: NEUROLOGICAL SURGERY

## 2022-02-02 DIAGNOSIS — M48.062 SPINAL STENOSIS OF LUMBAR REGION WITH NEUROGENIC CLAUDICATION: ICD-10-CM

## 2022-02-02 DIAGNOSIS — M48.062 SPINAL STENOSIS OF LUMBAR REGION WITH NEUROGENIC CLAUDICATION: Primary | ICD-10-CM

## 2022-02-02 PROCEDURE — 99441 PR PHYS/QHP TELEPHONE EVALUATION 5-10 MIN: CPT | Performed by: NEUROLOGICAL SURGERY

## 2022-02-02 PROCEDURE — 72114 X-RAY EXAM L-S SPINE BENDING: CPT

## 2022-02-02 NOTE — PROGRESS NOTES
Subjective   Patient ID: Pablo Mendoza is a 61 y.o. male is here today via telephone for 6 week follow-up.    You have chosen to receive care through a telephone visit. Do you consent to use a telephone visit for your medical care today? Yes    Unable to complete visit using a video connection to the patient. A phone visit was used to complete this visits. Total time of discussion was 5 minutes.    History of Present Illness     This patient is doing quite well.  He has almost no pain at all.    The following portions of the patient's history were reviewed and updated as appropriate: allergies, current medications, past family history, past medical history, past social history, past surgical history and problem list.    Review of Systems    I have reviewed the review of systems as documented by my MA.      Objective       Physical Exam  Neurological:      Mental Status: He is alert and oriented to person, place, and time.       Neurologic Exam     Mental Status   Oriented to person, place, and time.           Assessment/Plan   Independent Review of Radiographic Studies:      I personally reviewed the images from the following studies.    He did not get his x-rays done yet.    Medical Decision Making:      I told the patient to go ahead and get his x-rays done.  I think he can return to normal activities at this point.  He should avoid strenuously heavy lifting.  We will see him again in about 6 months with another x-ray.  He can return to work next Monday without restriction.    Diagnoses and all orders for this visit:    1. Spinal stenosis of lumbar region with neurogenic claudication (Primary)  -     XR Spine Lumbar Complete With Flex & Ext; Future      Return in about 6 months (around 8/2/2022).

## 2022-02-03 ENCOUNTER — APPOINTMENT (OUTPATIENT)
Dept: PHYSICAL THERAPY | Facility: HOSPITAL | Age: 62
End: 2022-02-03

## 2022-02-07 RX ORDER — SITAGLIPTIN AND METFORMIN HYDROCHLORIDE 1000; 50 MG/1; MG/1
TABLET, FILM COATED, EXTENDED RELEASE ORAL
Qty: 60 TABLET | Refills: 3 | Status: SHIPPED | OUTPATIENT
Start: 2022-02-07 | End: 2022-06-06

## 2022-02-25 DIAGNOSIS — E78.2 MIXED HYPERLIPIDEMIA: ICD-10-CM

## 2022-02-25 RX ORDER — ATORVASTATIN CALCIUM 20 MG/1
TABLET, FILM COATED ORAL
Qty: 30 TABLET | Refills: 0 | Status: SHIPPED | OUTPATIENT
Start: 2022-02-25 | End: 2022-04-26

## 2022-03-03 DIAGNOSIS — I10 ESSENTIAL HYPERTENSION: ICD-10-CM

## 2022-03-03 RX ORDER — HYDROCHLOROTHIAZIDE 25 MG/1
TABLET ORAL
Qty: 30 TABLET | Refills: 2 | Status: SHIPPED | OUTPATIENT
Start: 2022-03-03 | End: 2022-05-31

## 2022-03-03 RX ORDER — AMLODIPINE BESYLATE 10 MG/1
TABLET ORAL
Qty: 30 TABLET | Refills: 0 | Status: SHIPPED | OUTPATIENT
Start: 2022-03-03 | End: 2022-03-30

## 2022-03-03 RX ORDER — METOPROLOL TARTRATE 100 MG/1
TABLET ORAL
Qty: 60 TABLET | Refills: 1 | Status: SHIPPED | OUTPATIENT
Start: 2022-03-03 | End: 2022-04-29

## 2022-03-14 NOTE — TELEPHONE ENCOUNTER
Caller: Pablo Mendoza    Relationship: Self    Best call back number:[phone]    Requested Prescriptions:   Requested Prescriptions     Pending Prescriptions Disp Refills   • rizatriptan (MAXALT) 10 MG tablet       Sig: Take 1 tablet by mouth 1 (One) Time As Needed for Migraine. May repeat in 2 hours if needed        Pharmacy where request should be sent:  [facility] [facility] [address] [facility] - [phone]  - [phone] FX        Additional details provided by patient: PATIENT IS OUT OF THIS MEDICATION    Does the patient have less than a 3 day supply:  [x] Yes  [] No    King Hoover Rep   03/14/22 15:33 EDT

## 2022-03-14 NOTE — TELEPHONE ENCOUNTER
Rx Refill Note  Requested Prescriptions     Pending Prescriptions Disp Refills    rizatriptan (MAXALT) 10 MG tablet       Sig: Take 1 tablet by mouth 1 (One) Time As Needed for Migraine. May repeat in 2 hours if needed      Last office visit with prescribing clinician: 1/3/2022      Next office visit with prescribing clinician: 5/10/2022            Yesica Leach MA  03/14/22, 16:06 EDT

## 2022-03-15 RX ORDER — RIZATRIPTAN BENZOATE 10 MG/1
10 TABLET ORAL ONCE AS NEEDED
Qty: 9 TABLET | Refills: 2 | Status: SHIPPED | OUTPATIENT
Start: 2022-03-15 | End: 2022-05-10

## 2022-03-22 ENCOUNTER — TELEPHONE (OUTPATIENT)
Dept: NEUROSURGERY | Facility: CLINIC | Age: 62
End: 2022-03-22

## 2022-03-22 NOTE — PROGRESS NOTES
"Subjective   Patient ID: Pablo Mendoza is a 61 y.o. male is here today for follow-up for leg weakness/pain    Today patient is having low  Back pain, as well as severe L leg/groin pain. Patient is also having L leg N/T.     Patient, Provider, and MA are all wearing a mask in our office today.     History of Present Illness     This patient was doing quite well until about a week and a half ago when he developed severe pain in his left hip region.  He has some numbness radiating down his left leg as well but most of the pain is just in his hip in his anterior groin.    The following portions of the patient's history were reviewed and updated as appropriate: allergies, current medications, past family history, past medical history, past social history, past surgical history and problem list.    Review of Systems   Constitutional: Negative for chills and fever.   HENT: Negative for congestion.    Musculoskeletal: Positive for back pain, gait problem and myalgias.        L leg pain   Neurological: Positive for weakness and numbness.       I have reviewed the review of systems as documented by my MA.      Objective     Vitals:    03/23/22 1208   BP: 131/80   Cuff Size: Adult   Pulse: 92   Temp: 98 °F (36.7 °C)   Weight: 90.1 kg (198 lb 9.6 oz)   Height: 170.2 cm (67.01\")     Body mass index is 31.1 kg/m².      Physical Exam  Eyes:      Extraocular Movements: EOM normal.      Pupils: Pupils are equal, round, and reactive to light.   Neurological:      Mental Status: He is alert and oriented to person, place, and time.      Coordination: Finger-Nose-Finger Test and Heel to Shin Test normal.      Gait: Gait is intact.      Deep Tendon Reflexes:      Reflex Scores:       Tricep reflexes are 2+ on the right side and 2+ on the left side.       Bicep reflexes are 2+ on the right side and 2+ on the left side.       Brachioradialis reflexes are 2+ on the right side and 2+ on the left side.       Patellar reflexes are 2+ on " the right side and 2+ on the left side.       Achilles reflexes are 2+ on the right side and 2+ on the left side.  Psychiatric:         Speech: Speech normal.       Neurologic Exam     Mental Status   Oriented to person, place, and time.   Registration of memory: Good recent and remote memory.   Attention: normal. Concentration: normal.   Speech: speech is normal   Level of consciousness: alert  Knowledge: consistent with education.     Cranial Nerves     CN II   Visual fields full to confrontation.   Visual acuity: normal    CN III, IV, VI   Pupils are equal, round, and reactive to light.  Extraocular motions are normal.     CN V   Facial sensation intact.   Right corneal reflex: normal  Left corneal reflex: normal    CN VII   Facial expression full, symmetric.   Right facial weakness: none  Left facial weakness: none    CN VIII   Hearing: intact    CN IX, X   Palate: symmetric    CN XI   Right sternocleidomastoid strength: normal  Left sternocleidomastoid strength: normal    CN XII   Tongue: not atrophic  Tongue deviation: none    Motor Exam   Muscle bulk: normal  Right arm tone: normal  Left arm tone: normal  Right leg tone: normal  Left leg tone: normal    Strength   Strength 5/5 except as noted.     Sensory Exam   Light touch normal.     Gait, Coordination, and Reflexes     Gait  Gait: normal    Coordination   Finger to nose coordination: normal  Heel to shin coordination: normal    Reflexes   Right brachioradialis: 2+  Left brachioradialis: 2+  Right biceps: 2+  Left biceps: 2+  Right triceps: 2+  Left triceps: 2+  Right patellar: 2+  Left patellar: 2+  Right achilles: 2+  Left achilles: 2+  Right : 2+  Left : 2+    He has no pain with internal and external rotation of the hip on either side.      Assessment/Plan   Independent Review of Radiographic Studies:      I personally reviewed the images from the following studies.    I reviewed his x-rays of the lumbar spine dated done in early February.  This  shows good alignment of the construct at L3-4 and L4-5.    Medical Decision Making:      I told the patient about the imaging.  I told him that we need to check some new images since this just started a week and a half ago.  I recommended some plain films of his hip and his lower back as well as an MRI of his lumbar spine.  The fact that he has no pain with internal and external rotation of his hip does indicate that it is more likely coming from his back but there is no way to know for sure.  We will start with an assessment of his spine and if that does not yield anything that we might get him into see an orthopedic surgeon regarding his hip.    Diagnoses and all orders for this visit:    1. Spinal stenosis of lumbar region with neurogenic claudication (Primary)  -     MRI Lumbar Spine With & Without Contrast; Future  -     XR Spine Lumbar Complete With Flex & Ext; Future  -     XR hip w or wo pelvis 2-3 view left; Future      Return for After radiology test.

## 2022-03-22 NOTE — TELEPHONE ENCOUNTER
Pt c/o left hip pain and leg pain, he feels like it's locking up and is very painful. Pt is having trouble getting up from a sitting position. Pt is scheduled to see Dr Webster in 8/4/22. Plz call Pt [phone].  
S/w patient and informed him that we can see him tomorrow at 12 pm  
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 She previously underwent left parietal craniotomy for tumor resection and adjuvant radiation approximately 3 years ago In November 2017 at another institution. She also had GKRS on 1/8/18 to a right parietal brain metastasis with Dr. Rico. MRI brain on admission demonstrated a recurrent 3 cm left parietal enhancing brain mass in the area of prior tumor resection. She underwent surgical resection of left parietal mass on 5/13/2020 with Dr. Rico. Pathology indicated radiation necrosis. \par \par Postoperatively, her right hand weakness improved. She does have right visual field cut. She was referred to Dr. Andrea Gutierrez for neuro opthalmology consult.\par \par In July 2020, Ms. Mendoza was hospitalized at [facility] due to sudden onset difficulty speaking as well as RIGHT hand weakness. She was admitted and given steroids and seizure medication. She was weaned off the dexamethasone over the next several weeks. She reported improvement in right hand weakness.\par \par She remains on keppra 750 mg bid.\par \par MRI brain with and without contrast, done on 2/3/21, was stable. \par \par Returns today to review MRI brain with and without contrast done on 4/26/21.\par \par \par She reported RIGHT hand weakness.  While in the office and during visit, she developed acute expressive aphasia and total RIGHT hemiparesis including RIGHT leg. Prior to this, she stated she had not taken keppra X 2 days. \par \par \par \par \par \par \par \par \par \par

## 2022-03-23 ENCOUNTER — HOSPITAL ENCOUNTER (OUTPATIENT)
Dept: GENERAL RADIOLOGY | Facility: HOSPITAL | Age: 62
Discharge: HOME | End: 2022-03-23

## 2022-03-23 ENCOUNTER — OFFICE VISIT (OUTPATIENT)
Dept: NEUROSURGERY | Facility: CLINIC | Age: 62
End: 2022-03-23

## 2022-03-23 VITALS
HEART RATE: 92 BPM | WEIGHT: 198.6 LBS | TEMPERATURE: 98 F | BODY MASS INDEX: 31.17 KG/M2 | SYSTOLIC BLOOD PRESSURE: 131 MMHG | DIASTOLIC BLOOD PRESSURE: 80 MMHG | HEIGHT: 67 IN

## 2022-03-23 DIAGNOSIS — M48.062 SPINAL STENOSIS OF LUMBAR REGION WITH NEUROGENIC CLAUDICATION: ICD-10-CM

## 2022-03-23 DIAGNOSIS — M48.062 SPINAL STENOSIS OF LUMBAR REGION WITH NEUROGENIC CLAUDICATION: Primary | ICD-10-CM

## 2022-03-23 PROCEDURE — 99214 OFFICE O/P EST MOD 30 MIN: CPT | Performed by: NEUROLOGICAL SURGERY

## 2022-03-23 PROCEDURE — 72114 X-RAY EXAM L-S SPINE BENDING: CPT

## 2022-03-23 PROCEDURE — 73502 X-RAY EXAM HIP UNI 2-3 VIEWS: CPT

## 2022-03-23 RX ORDER — METHYLPREDNISOLONE 4 MG/1
TABLET ORAL
Qty: 1 EACH | Refills: 0 | Status: SHIPPED | OUTPATIENT
Start: 2022-03-23 | End: 2022-05-10

## 2022-03-25 ENCOUNTER — HOSPITAL ENCOUNTER (OUTPATIENT)
Dept: MRI IMAGING | Facility: HOSPITAL | Age: 62
Discharge: HOME | End: 2022-03-25
Admitting: NEUROLOGICAL SURGERY

## 2022-03-25 DIAGNOSIS — M48.062 SPINAL STENOSIS OF LUMBAR REGION WITH NEUROGENIC CLAUDICATION: ICD-10-CM

## 2022-03-25 PROCEDURE — 72158 MRI LUMBAR SPINE W/O & W/DYE: CPT

## 2022-03-25 PROCEDURE — A9577 INJ MULTIHANCE: HCPCS | Performed by: NEUROLOGICAL SURGERY

## 2022-03-25 PROCEDURE — 0 GADOBENATE DIMEGLUMINE 529 MG/ML SOLUTION: Performed by: NEUROLOGICAL SURGERY

## 2022-03-25 PROCEDURE — 82565 ASSAY OF CREATININE: CPT

## 2022-03-25 RX ADMIN — GADOBENATE DIMEGLUMINE 19 ML: 529 INJECTION, SOLUTION INTRAVENOUS at 11:45

## 2022-03-26 LAB — CREAT BLDA-MCNC: 1 MG/DL (ref 0.6–1.3)

## 2022-03-29 DIAGNOSIS — I10 ESSENTIAL HYPERTENSION: ICD-10-CM

## 2022-03-29 DIAGNOSIS — E11.29 TYPE 2 DIABETES MELLITUS WITH PROTEINURIA: ICD-10-CM

## 2022-03-29 DIAGNOSIS — E11.9 TYPE 2 DIABETES MELLITUS WITHOUT COMPLICATION, WITHOUT LONG-TERM CURRENT USE OF INSULIN: ICD-10-CM

## 2022-03-29 DIAGNOSIS — R80.9 TYPE 2 DIABETES MELLITUS WITH PROTEINURIA: ICD-10-CM

## 2022-03-30 RX ORDER — EMPAGLIFLOZIN 25 MG/1
TABLET, FILM COATED ORAL
Qty: 30 TABLET | Refills: 1 | Status: SHIPPED | OUTPATIENT
Start: 2022-03-30 | End: 2022-05-26

## 2022-03-30 RX ORDER — AMLODIPINE BESYLATE 10 MG/1
TABLET ORAL
Qty: 30 TABLET | Refills: 1 | Status: SHIPPED | OUTPATIENT
Start: 2022-03-30 | End: 2022-05-26

## 2022-03-30 NOTE — TELEPHONE ENCOUNTER
Rx Refill Note  Requested Prescriptions     Pending Prescriptions Disp Refills   • amLODIPine (NORVASC) 10 MG tablet [Pharmacy Med Name: amLODIPine BESYLATE 10 MG TAB] 30 tablet 0     Sig: TAKE ONE TABLET BY MOUTH DAILY   • Jardiance 25 MG tablet tablet [Pharmacy Med Name: JARDIANCE 25 MG TABLET] 30 tablet 4     Sig: TAKE ONE TABLET BY MOUTH DAILY      Last office visit with prescribing clinician: 1/3/2022      Next office visit with prescribing clinician: 5/10/2022            Michael Bates  03/30/22, 08:47 EDT

## 2022-03-31 ENCOUNTER — OFFICE VISIT (OUTPATIENT)
Dept: NEUROSURGERY | Facility: CLINIC | Age: 62
End: 2022-03-31

## 2022-03-31 VITALS
WEIGHT: 198.6 LBS | TEMPERATURE: 97.8 F | SYSTOLIC BLOOD PRESSURE: 130 MMHG | BODY MASS INDEX: 31.17 KG/M2 | HEIGHT: 67 IN | DIASTOLIC BLOOD PRESSURE: 80 MMHG | HEART RATE: 90 BPM

## 2022-03-31 DIAGNOSIS — M54.16 LUMBAR RADICULOPATHY: Primary | ICD-10-CM

## 2022-03-31 PROCEDURE — 99213 OFFICE O/P EST LOW 20 MIN: CPT | Performed by: NEUROLOGICAL SURGERY

## 2022-03-31 RX ORDER — DEXAMETHASONE 1.5 MG/1
TABLET ORAL
Qty: 51 TABLET | Refills: 0 | Status: SHIPPED | OUTPATIENT
Start: 2022-03-31 | End: 2022-05-10

## 2022-03-31 NOTE — PROGRESS NOTES
"Subjective   Patient ID: Pablo Mendoza is a 61 y.o. male is here today for follow-up with a new MRI Lumbar on 03.25.2022 at [facility], and a XR Lumbar/Hip done on 03.23.2022 at [facility].    Today patient is having B/L leg weakness, and severe R leg pain. Patient's legs are giving out intermittently once he goes from sitting to standing and trying to walk.     Patient, Provider, and MA are all wearing a mask in our office today.     History of Present Illness    This patient continues with pain in his back with radiation into his right leg.  He says that his legs tend to give out on him when he is walking.  All began about 3 weeks ago without any particular injury.  He did twist on a stairway toward the middle part of February but did not have any trouble for several weeks after that.    The following portions of the patient's history were reviewed and updated as appropriate: allergies, current medications, past family history, past medical history, past social history, past surgical history and problem list.    Review of Systems   Constitutional: Negative for chills and fever.   HENT: Negative for congestion.    Musculoskeletal: Positive for back pain, gait problem and myalgias.        R leg pain   Neurological: Positive for weakness. Negative for numbness.        B/L leg weakness       I have reviewed the review of systems as documented by my MA.      Objective     Vitals:    03/31/22 1352   BP: 130/80   Cuff Size: Adult   Pulse: 90   Temp: 97.8 °F (36.6 °C)   Weight: 90.1 kg (198 lb 9.6 oz)   Height: 170.2 cm (67.01\")     Body mass index is 31.1 kg/m².      Physical Exam  Neurological:      Mental Status: He is alert and oriented to person, place, and time.       Neurologic Exam     Mental Status   Oriented to person, place, and time.           Assessment/Plan   Independent Review of Radiographic Studies:      I personally reviewed the images from the following studies.    I reviewed his plain films of his " lumbar spine and his hips as well as an MRI of his lumbar spine.  The plain films of the hip show some narrowing of the hip joint and some degenerative change but not severe.  The plain films of the lumbar spine show the instrumentation from L3-L5.  There appears to be good alignment at each of those levels.  On the lumbar MRI there is a widely patent canal and neuroforamina at T12-L1.  L1-2 is also open.  L2-3 is open.  L3-4 is a little difficult to read but does not appear to be showing any nerve pressure.  Same is true for L4-5.  L5-S1 looks okay as well.    Medical Decision Making:      I told the patient about the imaging.  I told her that from my point of view I do not think there is any neural compression here.  Consequently I do not see any reason to talk about any further surgery.  I do think that we should try some lumbar epidural blocks in combination with some physical therapy.  We will also try him a 13-day DexPak.    Diagnoses and all orders for this visit:    1. Lumbar radiculopathy (Primary)  -     Epidural Block  -     Ambulatory Referral to Physical Therapy    Other orders  -     dexamethasone (DECADRON) 1.5 MG tablet; 3 bid x 4d, 3 q AM and 2 q PM x 1d, 2 bid x 3d, 2 q AM and 1 q PM x 1d, 1 bid x 3d, 1 q AM until gone  Dispense: 51 tablet; Refill: 0      Return for Recheck and call after treatment or consultation.

## 2022-04-01 ENCOUNTER — HOSPITAL ENCOUNTER (OUTPATIENT)
Dept: PAIN MEDICINE | Facility: HOSPITAL | Age: 62
Discharge: HOME | End: 2022-04-01

## 2022-04-01 ENCOUNTER — ANESTHESIA (OUTPATIENT)
Dept: PAIN MEDICINE | Facility: HOSPITAL | Age: 62
End: 2022-04-01

## 2022-04-01 ENCOUNTER — ANESTHESIA EVENT (OUTPATIENT)
Dept: PAIN MEDICINE | Facility: HOSPITAL | Age: 62
End: 2022-04-01

## 2022-04-01 ENCOUNTER — HOSPITAL ENCOUNTER (OUTPATIENT)
Dept: GENERAL RADIOLOGY | Facility: HOSPITAL | Age: 62
Discharge: HOME | End: 2022-04-01

## 2022-04-01 VITALS
SYSTOLIC BLOOD PRESSURE: 135 MMHG | WEIGHT: 190 LBS | DIASTOLIC BLOOD PRESSURE: 84 MMHG | TEMPERATURE: 97.1 F | BODY MASS INDEX: 29.82 KG/M2 | RESPIRATION RATE: 16 BRPM | OXYGEN SATURATION: 94 % | HEART RATE: 80 BPM | HEIGHT: 67 IN

## 2022-04-01 DIAGNOSIS — M54.16 LUMBAR RADICULOPATHY: Primary | ICD-10-CM

## 2022-04-01 DIAGNOSIS — R52 PAIN: ICD-10-CM

## 2022-04-01 PROCEDURE — 25010000002 MIDAZOLAM PER 1 MG: Performed by: ANESTHESIOLOGY

## 2022-04-01 PROCEDURE — 77003 FLUOROGUIDE FOR SPINE INJECT: CPT

## 2022-04-01 PROCEDURE — 25010000002 METHYLPREDNISOLONE PER 80 MG: Performed by: ANESTHESIOLOGY

## 2022-04-01 PROCEDURE — 0 IOPAMIDOL 41 % SOLUTION: Performed by: ANESTHESIOLOGY

## 2022-04-01 RX ORDER — LIDOCAINE HYDROCHLORIDE 10 MG/ML
1 INJECTION, SOLUTION INFILTRATION; PERINEURAL ONCE
Status: DISCONTINUED | OUTPATIENT
Start: 2022-04-01 | End: 2022-04-02 | Stop reason: HOSPADM

## 2022-04-01 RX ORDER — METHYLPREDNISOLONE ACETATE 80 MG/ML
80 INJECTION, SUSPENSION INTRA-ARTICULAR; INTRALESIONAL; INTRAMUSCULAR; SOFT TISSUE ONCE
Status: COMPLETED | OUTPATIENT
Start: 2022-04-01 | End: 2022-04-01

## 2022-04-01 RX ORDER — MIDAZOLAM HYDROCHLORIDE 1 MG/ML
1 INJECTION INTRAMUSCULAR; INTRAVENOUS
Status: DISCONTINUED | OUTPATIENT
Start: 2022-04-01 | End: 2022-04-02 | Stop reason: HOSPADM

## 2022-04-01 RX ORDER — SODIUM CHLORIDE 0.9 % (FLUSH) 0.9 %
10 SYRINGE (ML) INJECTION AS NEEDED
Status: DISCONTINUED | OUTPATIENT
Start: 2022-04-01 | End: 2022-04-02 | Stop reason: HOSPADM

## 2022-04-01 RX ORDER — FENTANYL CITRATE 50 UG/ML
50 INJECTION, SOLUTION INTRAMUSCULAR; INTRAVENOUS AS NEEDED
Status: DISCONTINUED | OUTPATIENT
Start: 2022-04-01 | End: 2022-04-02 | Stop reason: HOSPADM

## 2022-04-01 RX ORDER — SODIUM CHLORIDE 0.9 % (FLUSH) 0.9 %
10 SYRINGE (ML) INJECTION EVERY 12 HOURS SCHEDULED
Status: DISCONTINUED | OUTPATIENT
Start: 2022-04-01 | End: 2022-04-02 | Stop reason: HOSPADM

## 2022-04-01 RX ADMIN — METHYLPREDNISOLONE ACETATE 80 MG: 80 INJECTION, SUSPENSION INTRA-ARTICULAR; INTRALESIONAL; INTRAMUSCULAR; SOFT TISSUE at 09:54

## 2022-04-01 RX ADMIN — MIDAZOLAM 2 MG: 1 INJECTION INTRAMUSCULAR; INTRAVENOUS at 09:52

## 2022-04-01 RX ADMIN — IOPAMIDOL 2 ML: 408 INJECTION, SOLUTION INTRATHECAL at 09:54

## 2022-04-01 NOTE — H&P
[facility]    History and Physical    Patient Name: Pablo Mendoza  :  1960  MRN:  [id number]  Date of Admission: 2022    Subjective     Patient is a 61 y.o. male presents with chief complaint of chronic, moderate low back and leg: bilateral pain.  Onset of symptoms was gradual starting several months ago.  Symptoms are associated/aggravated by activity. Symptoms improve with rest    The following portions of the patients history were reviewed and updated as appropriate: current medications, allergies, past medical history, past surgical history, past family history, past social history and problem list                Objective     Past Medical History:   Past Medical History:   Diagnosis Date   • Anesthesia complication     STATES HARD TO WAKE UP W/ALL SURGERIES   • COVID-19     DEC 2020   • Diabetes mellitus (HCC)    • Elevated cholesterol    • Gout    • Hypertension    • Low back pain    • Migraines    • Mixed hyperlipidemia 2018   • Neuropathy    • Nocturia 2018   • Numbness and tingling of both lower extremities     LEGS AND FEET    • Seizures (HCC)     AS A CHILD   • Staph infection     UNSURE OF SITE   • Stroke ([facility])     COGNITIVE DELAY   • TIA (transient ischemic attack)    • Type 2 diabetes mellitus, without long-term current use of insulin ([facility]) 2018   • Weakness of both legs     LEFT WORSE      Past Surgical History:   Past Surgical History:   Procedure Laterality Date   • CHOLECYSTECTOMY     • COLONOSCOPY     • HERNIA REPAIR     • LUMBAR FUSION N/A 11/3/2021    Procedure: Lumbar 3 to Lumbar 4 and Lumbar 4 to Lumbar 5 laminectomy with a fusion;  Surgeon: Jose Webster MD;  Location: [facility];  Service: Robotics - Neuro;  Laterality: N/A;   • SINUS SURGERY     • TYMPANOPLASTY Bilateral      Family History:   Family History   Problem Relation Age of Onset   • Heart disease Mother    • Hypertension Father    • Throat cancer Maternal Grandmother    • Diabetes  Paternal Grandmother    • Diabetes Sister    • Stroke Brother 59   • Diabetes Sister    • Hypertension Brother    • Hyperlipidemia Brother    • Malig Hyperthermia Neg Hx      Social History:   Social History     Socioeconomic History   • Marital status: Single   Tobacco Use   • Smoking status: Never Smoker   • Smokeless tobacco: Never Used   Vaping Use   • Vaping Use: Never used   Substance and Sexual Activity   • Alcohol use: Yes     Comment: 3 DRINKS WEEKLY   • Drug use: Not Currently   • Sexual activity: Yes     Partners: Female       Vital Signs Range for the last 24 hours  Temperature:     Temp Source:     BP:     Pulse:     Respirations:     SPO2:     O2 Amount (l/min):     O2 Devices     Weight:           --------------------------------------------------------------------------------    Current Outpatient Medications   Medication Sig Dispense Refill   • acetaminophen (TYLENOL) 500 MG tablet Take 500 mg by mouth Every 6 (Six) Hours As Needed for Mild Pain .     • allopurinol (ZYLOPRIM) 300 MG tablet TAKE ONE TABLET BY MOUTH TWICE A DAY (Patient taking differently: Take 300 mg by mouth 2 (Two) Times a Day.) 60 tablet 2   • amLODIPine (NORVASC) 10 MG tablet TAKE ONE TABLET BY MOUTH DAILY 30 tablet 1   • aspirin 81 MG chewable tablet Chew 81 mg Daily. FOLLOW MD GUIDELINES ON HOLDING FOR OR     • atorvastatin (LIPITOR) 20 MG tablet TAKE ONE TABLET BY MOUTH ONCE NIGHTLY 30 tablet 0   • Cholecalciferol (VITAMIN D3) 5000 units capsule capsule Take 5,000 Units by mouth Daily. HOLDING FOR 7 DAYS PRIOR  TO OR     • cyanocobalamin (VITAMIN B-12) 2500 MCG tablet tablet Take 500 mcg by mouth Daily. HOLDING FOR 7 DAYS PRIOR TO OR     • cyclobenzaprine (FLEXERIL) 10 MG tablet Take 1 tablet by mouth 2 (Two) Times a Day As Needed for Muscle Spasms. 60 tablet 0   • dexamethasone (DECADRON) 1.5 MG tablet 3 bid x 4d, 3 q AM and 2 q PM x 1d, 2 bid x 3d, 2 q AM and 1 q PM x 1d, 1 bid x 3d, 1 q AM until gone 51 tablet 0   •  fluticasone (Flonase) 50 MCG/ACT nasal spray 2 sprays into the nostril(s) as directed by provider Daily. (Patient taking differently: 2 sprays into the nostril(s) as directed by provider As Needed.) 1 bottle 0   • glipizide (GLUCOTROL) 5 MG tablet TAKE ONE TABLET BY MOUTH TWICE A DAY BEFORE MEALS 180 tablet 1   • hydroCHLOROthiazide (HYDRODIURIL) 25 MG tablet TAKE ONE TABLET BY MOUTH DAILY 30 tablet 2   • HYDROcodone-acetaminophen (NORCO) 5-325 MG per tablet Take 1 tablet by mouth Every 8 (Eight) Hours As Needed for Severe Pain . 25 tablet 0   • ibuprofen (ADVIL,MOTRIN) 800 MG tablet TAKE ONE TABLET BY MOUTH EVERY 8 HOURS AS NEEDED FOR MILD PAIN 30 tablet 0   • Janumet XR  MG tablet TAKE ONE TABLET BY MOUTH TWICE A DAY 60 tablet 3   • Jardiance 25 MG tablet tablet TAKE ONE TABLET BY MOUTH DAILY 30 tablet 1   • loratadine (CLARITIN) 10 MG tablet TAKE ONE TABLET BY MOUTH DAILY (Patient taking differently: Take 10 mg by mouth As Needed.) 30 tablet 0   • methylPREDNISolone (MEDROL) 4 MG dose pack Take as directed on package instructions. 1 each 0   • metoprolol tartrate (LOPRESSOR) 100 MG tablet TAKE ONE TABLET BY MOUTH TWICE A DAY 60 tablet 1   • multivitamin with minerals tablet tablet Take 1 tablet by mouth Daily. HOLDING FOR 7 DAYS PRIOR TO OR     • rizatriptan (MAXALT) 10 MG tablet Take 1 tablet by mouth 1 (One) Time for 1 dose. AT ONSET OF HEADACHE MAY REPEAT ONE TABLET IN 2 HOURS IF NEEDED 15 tablet 0   • rizatriptan (MAXALT) 10 MG tablet Take 1 tablet by mouth 1 (One) Time As Needed for Migraine. May repeat in 2 hours if needed 9 tablet 2   • vitamin C (ASCORBIC ACID) 500 MG tablet Take 500 mg by mouth Daily. HOLDING FOR 7 DAYS PRIOR TO OR       No current facility-administered medications for this encounter.       --------------------------------------------------------------------------------  Assessment/Plan      Anesthesia Evaluation     Patient summary reviewed and Nursing notes reviewed   NPO Solid  Status: > 8 hours  NPO Liquid Status: > 2 hours    Pain impairs ability to perform ADLs: Sleeping  Modalities previously tried to control pain with limited effectiveness within the last 4-6 weeks: Rest     Airway   Mallampati: II  TM distance: >3 FB  Neck ROM: full  Dental - normal exam     Pulmonary - negative pulmonary ROS and normal exam   Cardiovascular - normal exam    (+) hypertension, hyperlipidemia,       Neuro/Psych- neuro exam normal  (+) seizures, TIA, CVA, headaches, numbness,    GI/Hepatic/Renal/Endo    (+)   diabetes mellitus type 2,     Musculoskeletal (-) normal exam    (+) back pain, radiculopathy  Abdominal  - normal exam   Substance History - negative use     OB/GYN negative ob/gyn ROS         Other   arthritis,                 Diagnosis and Plan    Treatment Plan  ASA 3      Procedures: Lumbar Epidural Steroid Injection(LESI), With fluoroscopy,       Anesthetic plan and risks discussed with patient.          Diagnosis     * Lumbar radiculopathy [M54.16]     * Lumbar degenerative disc disease [M51.36]

## 2022-04-01 NOTE — ANESTHESIA PROCEDURE NOTES
PAIN Epidural block    Pre-sedation assessment completed: 4/1/2022 9:42 AM    Patient reassessed immediately prior to procedure    Patient location during procedure: pain clinic  Start Time: 4/1/2022 9:42 AM  Stop Time: 4/1/2022 9:57 AM  Indication:procedure for pain  Performed By  Anesthesiologist: Rudy Carballo MD  Preanesthetic Checklist  Completed: patient identified, site marked, risks and benefits discussed, surgical consent, monitors and equipment checked, pre-op evaluation and timeout performed  Additional Notes  Depomedrol - 80mg    Needle position confirmed by fluoroscopy and epidurogram using 2cc of [id number].    Diagnosis  Post-Op Diagnosis Codes:     * Lumbar radiculopathy (M54.16)     * Lumbar degenerative disc disease (M51.36)    Prep:  Pt Position:prone  Sterile Tech:cap, gloves, mask and sterile barrier  Prep:chlorhexidine gluconate and isopropyl alcohol  Monitoring:blood pressure monitoring, continuous pulse oximetry and EKG  Procedure:Sedation: yes     Approach:midline  Guidance: fluoroscopy  Location:lumbar  Level:L5-S1  Needle Type:Tuohy  Needle Gauge:20  Aspiration:negative  Medications:  Preservative Free Saline:2mL  Isovue:2mL  Depomedrol:80 mg  Post Assessment:  Post-procedure: bandaide.  Pt Tolerance:patient tolerated the procedure well with no apparent complications  Complications:no

## 2022-04-15 DIAGNOSIS — M1A.09X0 CHRONIC GOUT OF MULTIPLE SITES, UNSPECIFIED CAUSE: ICD-10-CM

## 2022-04-15 RX ORDER — ALLOPURINOL 300 MG/1
TABLET ORAL
Qty: 60 TABLET | Refills: 2 | Status: SHIPPED | OUTPATIENT
Start: 2022-04-15 | End: 2022-07-19

## 2022-04-26 ENCOUNTER — TELEPHONE (OUTPATIENT)
Dept: INTERNAL MEDICINE | Facility: CLINIC | Age: 62
End: 2022-04-26

## 2022-04-26 DIAGNOSIS — E78.2 MIXED HYPERLIPIDEMIA: ICD-10-CM

## 2022-04-26 RX ORDER — ATORVASTATIN CALCIUM 20 MG/1
TABLET, FILM COATED ORAL
Qty: 30 TABLET | Refills: 0 | Status: SHIPPED | OUTPATIENT
Start: 2022-04-26 | End: 2022-05-26

## 2022-04-28 DIAGNOSIS — I10 ESSENTIAL HYPERTENSION: ICD-10-CM

## 2022-04-29 DIAGNOSIS — E11.29 TYPE 2 DIABETES MELLITUS WITH PROTEINURIA: Primary | ICD-10-CM

## 2022-04-29 DIAGNOSIS — R80.9 TYPE 2 DIABETES MELLITUS WITH PROTEINURIA: Primary | ICD-10-CM

## 2022-04-29 RX ORDER — METOPROLOL TARTRATE 100 MG/1
TABLET ORAL
Qty: 60 TABLET | Refills: 1 | Status: SHIPPED | OUTPATIENT
Start: 2022-04-29 | End: 2022-06-10

## 2022-05-04 LAB
ALBUMIN/CREAT UR: 609 MG/G CREAT (ref 0–29)
CREAT UR-MCNC: 38.6 MG/DL
MICROALBUMIN UR-MCNC: 235.1 UG/ML

## 2022-05-06 ENCOUNTER — TELEPHONE (OUTPATIENT)
Dept: INTERNAL MEDICINE | Facility: CLINIC | Age: 62
End: 2022-05-06

## 2022-05-06 NOTE — TELEPHONE ENCOUNTER
----- Message from Liana Hood MD sent at 5/6/2022  1:05 PM EDT -----  A1c up to 8.7 Will discuss at upcoming appt.

## 2022-05-06 NOTE — TELEPHONE ENCOUNTER
Ok for HUB to read    Called and informed Pt of results. Left results on VM.    A1c up to 8.7 Will discuss at upcoming appt.    Told them if they had any questions to call back.

## 2022-05-10 ENCOUNTER — OFFICE VISIT (OUTPATIENT)
Dept: INTERNAL MEDICINE | Facility: CLINIC | Age: 62
End: 2022-05-10

## 2022-05-10 VITALS
HEART RATE: 70 BPM | HEIGHT: 67 IN | TEMPERATURE: 96.9 F | OXYGEN SATURATION: 98 % | DIASTOLIC BLOOD PRESSURE: 82 MMHG | SYSTOLIC BLOOD PRESSURE: 126 MMHG | RESPIRATION RATE: 18 BRPM | BODY MASS INDEX: 29.76 KG/M2 | WEIGHT: 189.6 LBS

## 2022-05-10 DIAGNOSIS — R80.9 TYPE 2 DIABETES MELLITUS WITH PROTEINURIA: Primary | ICD-10-CM

## 2022-05-10 DIAGNOSIS — E11.29 TYPE 2 DIABETES MELLITUS WITH PROTEINURIA: Primary | ICD-10-CM

## 2022-05-10 DIAGNOSIS — I10 ESSENTIAL HYPERTENSION: ICD-10-CM

## 2022-05-10 DIAGNOSIS — G89.29 CHRONIC BILATERAL LOW BACK PAIN WITH RIGHT-SIDED SCIATICA: ICD-10-CM

## 2022-05-10 DIAGNOSIS — Z98.1 S/P LUMBAR SPINAL FUSION: ICD-10-CM

## 2022-05-10 DIAGNOSIS — M54.41 CHRONIC BILATERAL LOW BACK PAIN WITH RIGHT-SIDED SCIATICA: ICD-10-CM

## 2022-05-10 DIAGNOSIS — E78.2 MIXED HYPERLIPIDEMIA: ICD-10-CM

## 2022-05-10 PROCEDURE — 99214 OFFICE O/P EST MOD 30 MIN: CPT | Performed by: INTERNAL MEDICINE

## 2022-05-10 RX ORDER — CYCLOBENZAPRINE HCL 10 MG
10 TABLET ORAL 2 TIMES DAILY PRN
Qty: 60 TABLET | Refills: 0 | Status: SHIPPED | OUTPATIENT
Start: 2022-05-10

## 2022-05-10 NOTE — PROGRESS NOTES
"Chief Complaint  Hypertension and Diabetes (Follow up visit)    Subjective          Pablo Mendoza presents to [facility]  History of Present Illness     61 year old male with HTN, HLD, DM2 who presents for follow-up.    Pt states that he has been under a lot of stress recently. He has had financial stressors and now has other family members living with him. He doesnt feel like he needs meds to help with the stress.     HTN - on Amlodipine and Metoprolol.  No ha/dizziness.    DM2 - on Jardiance and Janumet and Glipizide. A1c is increased from prior visits. 6.7->7.3->8.7   He has been off of his normal diet.  Also prescribed a steroid taper by BRITNEY recently which increased his BG.    HLD - on Atorvastatin. No cramps or myalgias.    The 10-year ASCVD risk score (Lj CELIS Jr., et al., 2013) is: 16.8%    Values used to calculate the score:      Age: 61 years      Sex: Male      Is Non- : No      Diabetic: Yes      Tobacco smoker: No      Systolic Blood Pressure: 126 mmHg      Is BP treated: Yes      HDL Cholesterol: 34 mg/dL      Total Cholesterol: 149 mg/dL    Chronic back pain - Had recent spinal fusion with BRITNEY in November 2021. Has difficulty recovering from the surgery. He has upcoming lumbar epidural planned for 5/12. He takes his Ibuprofen and Tylenol for his pain. He is out of Flexeril and is requesting a refill.     Objective   Vital Signs:  /82 (BP Location: Left arm, Patient Position: Sitting, Cuff Size: Adult)   Pulse 70   Temp 96.9 °F (36.1 °C) (Tympanic)   Resp 18   Ht 170.2 cm (67\")   Wt 86 kg (189 lb 9.6 oz)   SpO2 98%   BMI 29.70 kg/m²         Physical Exam  Vitals reviewed.   Constitutional:       General: He is not in acute distress.     Appearance: Normal appearance. He is normal weight.   HENT:      Head: Normocephalic and atraumatic.      Right Ear: Tympanic membrane, ear canal and external ear normal.      Left Ear: Tympanic " membrane, ear canal and external ear normal.      Nose: Nose normal.      Mouth/Throat:      Mouth: Mucous membranes are moist.      Pharynx: Oropharynx is clear.   Eyes:      Extraocular Movements: Extraocular movements intact.      Conjunctiva/sclera: Conjunctivae normal.      Pupils: Pupils are equal, round, and reactive to light.   Cardiovascular:      Rate and Rhythm: Normal rate and regular rhythm.      Pulses: Normal pulses.      Heart sounds: Normal heart sounds. No murmur heard.    No friction rub. No gallop.   Pulmonary:      Effort: Pulmonary effort is normal. No respiratory distress.      Breath sounds: Normal breath sounds. No wheezing or rhonchi.   Abdominal:      General: Abdomen is flat. Bowel sounds are normal. There is no distension.      Palpations: Abdomen is soft. There is no mass.      Tenderness: There is no abdominal tenderness.   Musculoskeletal:         General: Normal range of motion.      Cervical back: Normal range of motion and neck supple.   Lymphadenopathy:      Cervical: No cervical adenopathy.   Skin:     General: Skin is warm.      Capillary Refill: Capillary refill takes less than 2 seconds.   Neurological:      General: No focal deficit present.      Mental Status: He is alert and oriented to person, place, and time.   Psychiatric:         Mood and Affect: Mood normal.         Behavior: Behavior normal.         Thought Content: Thought content normal.         Judgment: Judgment normal.        Result Review :   The following data was reviewed by: Liana Hood MD on 05/10/2022:  CMP    CMP 2/3/22 3/25/22 5/3/22   Glucose 170 (A)  124 (A)   BUN 16  22   Creatinine 0.95 1.00 0.82   eGFR Non  Am 87     eGFR  Am 101     Sodium 141  142   Potassium 3.8  4.0   Chloride 99  100   Calcium 9.6  9.8   Total Protein 7.4  6.9   Albumin 4.6  4.7   Globulin 2.8  2.2   Total Bilirubin 0.6  0.7   Alkaline Phosphatase 143 (A)  124 (A)   AST (SGOT) 35  42 (A)   ALT (SGPT) 31  25    (A) Abnormal value       Comments are available for some flowsheets but are not being displayed.           CBC    CBC 11/6/21 2/3/22 5/3/22   WBC 12.21 (A) 9.4 8.0   RBC 5.29 5.96 (A) 6.29 (A)   Hemoglobin 15.6 17.2 17.6   Hematocrit 45.1 51.7 (A) 54.4 (A)   MCV 85.3 87 87   MCH 29.5 28.9 28.0   MCHC 34.6 33.3 32.4   RDW 14.1 14.5 15.2   Platelets 182 229 295   (A) Abnormal value            Lipid Panel    Lipid Panel 2/3/22 5/3/22   Total Cholesterol 136 149   Triglycerides 356 (A) 352 (A)   HDL Cholesterol 33 (A) 34 (A)   VLDL Cholesterol 54 (A) 55 (A)   LDL Cholesterol  49 60   LDL/HDL Ratio 1.5 1.8   (A) Abnormal value       Comments are available for some flowsheets but are not being displayed.           TSH    TSH 2/3/22   TSH 3.900           Most Recent A1C    HGBA1C Most Recent 5/3/22   Hemoglobin A1C 8.7 (A)   (A) Abnormal value       Comments are available for some flowsheets but are not being displayed.                     Assessment and Plan    Diagnoses and all orders for this visit:    1. Type 2 diabetes mellitus with proteinuria (HCC) (Primary)    2. Chronic bilateral low back pain with right-sided sciatica  -     cyclobenzaprine (FLEXERIL) 10 MG tablet; Take 1 tablet by mouth 2 (Two) Times a Day As Needed for Muscle Spasms.  Dispense: 60 tablet; Refill: 0    3. Essential hypertension    4. Mixed hyperlipidemia    5. S/P lumbar spinal fusion             Follow Up   Return in about 3 months (around 8/10/2022) for Recheck.  Patient was given instructions and counseling regarding his condition or for health maintenance advice. Please see specific information pulled into the AVS if appropriate.     Desmond Flowers MD  Internal Medicine/Pediatrics, PGY-1      I have seen and examined the patient independently.  Agree with above.     Liana Hood M.D.  Attending physician  Internal Medicine and Pediatrics

## 2022-05-13 ENCOUNTER — HOSPITAL ENCOUNTER (OUTPATIENT)
Dept: GENERAL RADIOLOGY | Facility: HOSPITAL | Age: 62
Discharge: HOME | End: 2022-05-13

## 2022-05-13 ENCOUNTER — HOSPITAL ENCOUNTER (OUTPATIENT)
Dept: PAIN MEDICINE | Facility: HOSPITAL | Age: 62
Discharge: HOME | End: 2022-05-13

## 2022-05-13 ENCOUNTER — ANESTHESIA (OUTPATIENT)
Dept: PAIN MEDICINE | Facility: HOSPITAL | Age: 62
End: 2022-05-13

## 2022-05-13 ENCOUNTER — ANESTHESIA EVENT (OUTPATIENT)
Dept: PAIN MEDICINE | Facility: HOSPITAL | Age: 62
End: 2022-05-13

## 2022-05-13 VITALS
HEART RATE: 81 BPM | OXYGEN SATURATION: 95 % | DIASTOLIC BLOOD PRESSURE: 78 MMHG | TEMPERATURE: 97.3 F | RESPIRATION RATE: 16 BRPM | SYSTOLIC BLOOD PRESSURE: 121 MMHG

## 2022-05-13 DIAGNOSIS — R52 PAIN: ICD-10-CM

## 2022-05-13 DIAGNOSIS — M54.16 LUMBAR RADICULOPATHY: ICD-10-CM

## 2022-05-13 LAB — GLUCOSE BLDC GLUCOMTR-MCNC: 258 MG/DL (ref 70–130)

## 2022-05-13 PROCEDURE — 25010000002 METHYLPREDNISOLONE PER 80 MG: Performed by: ANESTHESIOLOGY

## 2022-05-13 PROCEDURE — 82962 GLUCOSE BLOOD TEST: CPT

## 2022-05-13 PROCEDURE — 77003 FLUOROGUIDE FOR SPINE INJECT: CPT

## 2022-05-13 RX ORDER — FENTANYL CITRATE 50 UG/ML
50 INJECTION, SOLUTION INTRAMUSCULAR; INTRAVENOUS AS NEEDED
Status: DISCONTINUED | OUTPATIENT
Start: 2022-05-13 | End: 2022-05-14 | Stop reason: HOSPADM

## 2022-05-13 RX ORDER — SODIUM CHLORIDE 0.9 % (FLUSH) 0.9 %
1-10 SYRINGE (ML) INJECTION AS NEEDED
Status: DISCONTINUED | OUTPATIENT
Start: 2022-05-13 | End: 2022-05-14 | Stop reason: HOSPADM

## 2022-05-13 RX ORDER — METHYLPREDNISOLONE ACETATE 80 MG/ML
80 INJECTION, SUSPENSION INTRA-ARTICULAR; INTRALESIONAL; INTRAMUSCULAR; SOFT TISSUE ONCE
Status: COMPLETED | OUTPATIENT
Start: 2022-05-13 | End: 2022-05-13

## 2022-05-13 RX ORDER — LIDOCAINE HYDROCHLORIDE 10 MG/ML
1 INJECTION, SOLUTION INFILTRATION; PERINEURAL ONCE AS NEEDED
Status: DISCONTINUED | OUTPATIENT
Start: 2022-05-13 | End: 2022-05-14 | Stop reason: HOSPADM

## 2022-05-13 RX ORDER — MIDAZOLAM HYDROCHLORIDE 1 MG/ML
1 INJECTION INTRAMUSCULAR; INTRAVENOUS AS NEEDED
Status: DISCONTINUED | OUTPATIENT
Start: 2022-05-13 | End: 2022-05-14 | Stop reason: HOSPADM

## 2022-05-13 RX ADMIN — METHYLPREDNISOLONE ACETATE 80 MG: 80 INJECTION, SUSPENSION INTRA-ARTICULAR; INTRALESIONAL; INTRAMUSCULAR; SOFT TISSUE at 10:58

## 2022-05-13 NOTE — DISCHARGE INSTRUCTIONS
What can I expect after the procedure?  Follow these instructions at home:  Injection site care  You may remove the bandage (dressing) after 24 hours.  Check your injection site every day for signs of infection. Check for:  Redness, swelling, or pain.  Fluid or blood.  Warmth.  Pus or a bad smell.  Managing pain, stiffness, and swelling  For 24 hours after the procedure:  Avoid using heat on the injection site.  Do not take baths, swim, or use a hot tub. You may take a shower.   If directed, put ice on the injection site. To do this:        Put ice in a plastic bag.  Place a towel between your skin and the bag.  Leave the ice on for 20 minutes, 2-3 times a day.     Activity  NO DRIVING FOR THE REST OF THE DAY IF receiving a sedative during your procedure.  Return to your normal activities the next day as tolerated.  General instructions  The injection site may feel sore.  Take over-the-counter medications as directed on packaging and prescription medicines only as told by your health care provider who prescribes these.  Keep all follow-up visits as told by your health care provider.   Contact a health care provider if:  You have any of these signs of infection:  Redness, swelling, or pain around your injection site.  Fluid or blood coming from your injection site.  Warmth coming from your injection site.  Pus or a bad smell coming from your injection site.  A fever.  You continue to have pain and soreness around the injection site, even after taking over-the-counter pain medicine.  You have severe, sudden, or lasting nausea or vomiting.  Get help right away if:  You have severe pain at the injection site that is not relieved by medicines.  You develop a severe headache or a stiff neck.  You become sensitive to light.  You have any new numbness or weakness in your legs or arms.  You lose control of your bladder or bowel movements.  You have trouble breathing.  Summary  An epidural steroid block is an injection of  steroid medication and numbing medicine that is given into the epidural space.  This injection helps relieve pain caused by an irritated or swollen nerve root. Following your procedure would you be comfortable saying that at best you are 50% improved or more?  Yes or No    What would you rate your pain on a 1-10 scale?  Prior to your last procedure:    For the first 2-3 weeks following your last procedure:    Currently:    Has your function improved?  Tell us how.    (Can sit/stand longer or walk further?  Complete daily activities more easily?  Have been able to go back to work?)    Have you tried/continued to try any other treatments? (Chiropractor, PT, massage, yoga, back exercises, heat/ice)      What medications are you currently taking to help with your pain and how often?      Is there anything else you'd like to tell us that has improved since your last procedure?   Lumbar Epidural Steroid Injection Instructions  Plan includes:  1.  Lumbar epidural steroid injections, up to 3, spaced 4 weeks apart.  If pain control is acceptable after 1 or 2 injections, it was discussed with the patient that they may return for the subsequent injections if and when their pain returns.  The risks were discussed with the patient including failure of relief, worsening pain, Headache (post dural puncture headache), bleeding (epidural hematoma) and infection (epidural abscess or skin infection).  2.  Physical therapy exercises at home as prescribed by physical therapy or from the pain clinic handout (given to the patient).  Continuation of these exercises every day, or multiple times per week, even when the patient has good pain relief, was stressed to the patient as a preventative measure to decrease the frequency and severity of future pain episodes.  3.  Continue pain medicines as already prescribed.  If patient not currently taking any, it is recommended to begin Acetaminophen 1000 mg po q 8 hours.  If other medicines  containing Acetaminophen are currently prescribed, maintain daily dose at 3000 mg.    4.  If they can tolerate NSAIDS, it is recommended to take Ibuprofen 600 mg po q 6 hours for 7 days during pain exacerbations.  Alternatively, they may substitute an NSAID of their choice (e.g. Aleve).  This may be taken at the same time as Acetaminophen.  5.  Heat and ice to the affected area as tolerated for pain control.  It was discussed that heating pads can cause burns.  6.  Daily low impact exercise such as walking or water exercise was recommended to maintain overall health and aid in weight control.   7.  Follow up as needed for subsequent injections.  8.  Patient was counseled to abstain from tobacco products.

## 2022-05-13 NOTE — H&P
INTERVAL HISTORY:    The patient returns for another Lumbar epidural steroid injection today.  They have received 10% improvement since their last injection with a pain level of /10 at its worst recently.    Conservative measures tried in the interim     Current Outpatient Medications on File Prior to Encounter   Medication Sig Dispense Refill   • acetaminophen (TYLENOL) 500 MG tablet Take 500 mg by mouth Every 6 (Six) Hours As Needed for Mild Pain .     • allopurinol (ZYLOPRIM) 300 MG tablet TAKE ONE TABLET BY MOUTH TWICE A DAY 60 tablet 2   • amLODIPine (NORVASC) 10 MG tablet TAKE ONE TABLET BY MOUTH DAILY 30 tablet 1   • aspirin 81 MG chewable tablet Chew 81 mg Daily. FOLLOW MD GUIDELINES ON HOLDING FOR OR     • atorvastatin (LIPITOR) 20 MG tablet TAKE ONE TABLET BY MOUTH ONCE NIGHTLY 30 tablet 0   • Cholecalciferol (VITAMIN D3) 5000 units capsule capsule Take 5,000 Units by mouth Daily. HOLDING FOR 7 DAYS PRIOR  TO OR     • cyanocobalamin (VITAMIN B-12) 2500 MCG tablet tablet Take 500 mcg by mouth Daily. HOLDING FOR 7 DAYS PRIOR TO OR     • cyclobenzaprine (FLEXERIL) 10 MG tablet Take 1 tablet by mouth 2 (Two) Times a Day As Needed for Muscle Spasms. 60 tablet 0   • fluticasone (Flonase) 50 MCG/ACT nasal spray 2 sprays into the nostril(s) as directed by provider Daily. (Patient taking differently: 2 sprays into the nostril(s) as directed by provider As Needed.) 1 bottle 0   • glipizide (GLUCOTROL) 5 MG tablet TAKE ONE TABLET BY MOUTH TWICE A DAY BEFORE MEALS 180 tablet 1   • hydroCHLOROthiazide (HYDRODIURIL) 25 MG tablet TAKE ONE TABLET BY MOUTH DAILY 30 tablet 2   • ibuprofen (ADVIL,MOTRIN) 800 MG tablet TAKE ONE TABLET BY MOUTH EVERY 8 HOURS AS NEEDED FOR MILD PAIN 30 tablet 0   • Janumet XR  MG tablet TAKE ONE TABLET BY MOUTH TWICE A DAY 60 tablet 3   • Jardiance 25 MG tablet tablet TAKE ONE TABLET BY MOUTH DAILY 30 tablet 1   • loratadine (CLARITIN) 10 MG tablet TAKE ONE TABLET BY MOUTH DAILY (Patient  taking differently: Take 10 mg by mouth As Needed.) 30 tablet 0   • metoprolol tartrate (LOPRESSOR) 100 MG tablet TAKE ONE TABLET BY MOUTH TWICE A DAY 60 tablet 1   • multivitamin with minerals tablet tablet Take 1 tablet by mouth Daily. HOLDING FOR 7 DAYS PRIOR TO OR     • rizatriptan (MAXALT) 10 MG tablet Take 1 tablet by mouth 1 (One) Time for 1 dose. AT ONSET OF HEADACHE MAY REPEAT ONE TABLET IN 2 HOURS IF NEEDED 15 tablet 0   • vitamin C (ASCORBIC ACID) 500 MG tablet Take 500 mg by mouth Daily. HOLDING FOR 7 DAYS PRIOR TO OR     • HYDROcodone-acetaminophen (NORCO) 5-325 MG per tablet Take 1 tablet by mouth Every 8 (Eight) Hours As Needed for Severe Pain . 25 tablet 0     No current facility-administered medications on file prior to encounter.       Past Medical History:   Diagnosis Date   • Anesthesia complication     STATES HARD TO WAKE UP W/ALL SURGERIES   • COVID-19     DEC 2020   • Diabetes mellitus (HCC)    • Elevated cholesterol    • Gout    • Hypertension    • Low back pain    • Migraines    • Mixed hyperlipidemia 1/19/2018   • Neuropathy    • Nocturia 1/19/2018   • Numbness and tingling of both lower extremities     LEGS AND FEET    • Seizures (HCC)     AS A CHILD   • Staph infection 2010    UNSURE OF SITE   • Stroke (HCC)     COGNITIVE DELAY   • TIA (transient ischemic attack)    • Type 2 diabetes mellitus, without long-term current use of insulin (HCC) 1/19/2018   • Weakness of both legs     LEFT WORSE        No hematologic infectious or constitutional symptoms  Negative screen for TORSTEN      Exam:  /80 (BP Location: Left arm, Patient Position: Sitting)   Pulse 88   Temp 36.3 °C (97.3 °F) (Infrared)   Resp 16   SpO2 97%   Airway Mallampatti 2  Alert and oriented      Diagnosis:  Post-Op Diagnosis Codes:     * Lumbar radiculopathy [M54.16]    Plan:  Lumbar epidural steroid injection under fluoroscopic guidance    I have encouraged them to continue:  1.  Physical therapy exercises at home as  prescribed by physical therapy or from the pain clinic handout.  Continuation of these exercises every day, or multiple times per week, even when the patient has good pain relief, was stressed to the patient as a preventative measure to decrease the frequency and severity of future pain episodes.  2.  Continue pain medicines as already prescribed.  If patient not currently taking any, it is recommended to begin Acetaminophen 1000 mg po q 8 hours.  If other medicines containing Acetaminophen are currently prescribed, maintain daily dose at 3000mg.    3.  If they can tolerate NSAIDS, it is recommended to take Ibuprofen 600 mg po q 6 hours for 7 days during pain exacerbations.   Alternatively, they may substitute an NSAID of their choice (e.g. Aleve)  4.  Heat and ice to the affected area as tolerated for pain control.   5.  Low impact exercise such as walking or water exercise was recommended to maintain overall health and aid in weight control.   6.  Follow up as needed for subsequent injections.

## 2022-05-13 NOTE — ANESTHESIA PROCEDURE NOTES
PAIN Epidural block      Patient reassessed immediately prior to procedure    Patient location during procedure: pain clinic  Start Time: 5/13/2022 10:53 AM  Stop Time: 5/13/2022 10:59 AM  Indication:procedure for pain  Performed By  Anesthesiologist: Case Alvares MD  Preanesthetic Checklist  Completed: patient identified, IV checked, risks and benefits discussed, surgical consent, monitors and equipment checked, pre-op evaluation and timeout performed  Additional Notes  Diagnosis:  Post-Op Diagnosis Codes:     * Lumbar radiculopathy (M54.16)      Prep:  Pt Position:prone  Sterile Tech:gloves, mask and sterile barrier  Prep:chlorhexidine gluconate and isopropyl alcohol  Monitoring:blood pressure monitoring, continuous pulse oximetry and EKG  Procedure:Sedation: no     Approach:left paramedian  Guidance: fluoroscopy  Location:lumbar  Level:L5-S1  Needle Type:Tuohy  Needle Gauge:20  Aspiration:negative  Test Dose:negative  Medications:  Preservative Free Saline:3mL  Depomedrol:80 mg  Post Assessment:  Dressing:occlusive dressing applied  Pt Tolerance:patient tolerated the procedure well with no apparent complications  Complications:no

## 2022-05-26 DIAGNOSIS — R80.9 TYPE 2 DIABETES MELLITUS WITH PROTEINURIA: ICD-10-CM

## 2022-05-26 DIAGNOSIS — E11.9 TYPE 2 DIABETES MELLITUS WITHOUT COMPLICATION, WITHOUT LONG-TERM CURRENT USE OF INSULIN: ICD-10-CM

## 2022-05-26 DIAGNOSIS — E78.2 MIXED HYPERLIPIDEMIA: ICD-10-CM

## 2022-05-26 DIAGNOSIS — E11.29 TYPE 2 DIABETES MELLITUS WITH PROTEINURIA: ICD-10-CM

## 2022-05-26 DIAGNOSIS — I10 ESSENTIAL HYPERTENSION: ICD-10-CM

## 2022-05-26 RX ORDER — EMPAGLIFLOZIN 25 MG/1
TABLET, FILM COATED ORAL
Qty: 30 TABLET | Refills: 1 | Status: SHIPPED | OUTPATIENT
Start: 2022-05-26 | End: 2022-07-22

## 2022-05-26 RX ORDER — AMLODIPINE BESYLATE 10 MG/1
TABLET ORAL
Qty: 30 TABLET | Refills: 1 | Status: SHIPPED | OUTPATIENT
Start: 2022-05-26 | End: 2022-07-22

## 2022-05-26 RX ORDER — ATORVASTATIN CALCIUM 20 MG/1
TABLET, FILM COATED ORAL
Qty: 30 TABLET | Refills: 0 | Status: SHIPPED | OUTPATIENT
Start: 2022-05-26 | End: 2022-06-22

## 2022-05-28 DIAGNOSIS — I10 ESSENTIAL HYPERTENSION: ICD-10-CM

## 2022-05-31 RX ORDER — HYDROCHLOROTHIAZIDE 25 MG/1
TABLET ORAL
Qty: 30 TABLET | Refills: 2 | Status: SHIPPED | OUTPATIENT
Start: 2022-05-31 | End: 2022-09-27

## 2022-06-06 RX ORDER — SITAGLIPTIN AND METFORMIN HYDROCHLORIDE 1000; 50 MG/1; MG/1
TABLET, FILM COATED, EXTENDED RELEASE ORAL
Qty: 60 TABLET | Refills: 3 | Status: SHIPPED | OUTPATIENT
Start: 2022-06-06 | End: 2022-10-19

## 2022-06-10 DIAGNOSIS — I10 ESSENTIAL HYPERTENSION: ICD-10-CM

## 2022-06-10 RX ORDER — METOPROLOL TARTRATE 100 MG/1
TABLET ORAL
Qty: 60 TABLET | Refills: 1 | Status: SHIPPED | OUTPATIENT
Start: 2022-06-10 | End: 2022-09-27

## 2022-06-10 NOTE — TELEPHONE ENCOUNTER
Rx Refill Note  Requested Prescriptions     Pending Prescriptions Disp Refills   • metoprolol tartrate (LOPRESSOR) 100 MG tablet [Pharmacy Med Name: METOPROLOL TARTRATE 100 MG TAB] 60 tablet 1     Sig: TAKE ONE TABLET BY MOUTH TWICE A DAY      Last office visit with prescribing clinician: 5/10/2022      Next office visit with prescribing clinician: 8/19/2022            Liliana Bullard MA  06/10/22, 12:09 EDT

## 2022-06-16 DIAGNOSIS — E11.29 TYPE 2 DIABETES MELLITUS WITH PROTEINURIA: ICD-10-CM

## 2022-06-16 DIAGNOSIS — R80.9 TYPE 2 DIABETES MELLITUS WITH PROTEINURIA: ICD-10-CM

## 2022-06-16 RX ORDER — GLIPIZIDE 5 MG/1
TABLET ORAL
Qty: 60 TABLET | Refills: 0 | Status: SHIPPED | OUTPATIENT
Start: 2022-06-16 | End: 2022-07-19

## 2022-06-22 DIAGNOSIS — E78.2 MIXED HYPERLIPIDEMIA: ICD-10-CM

## 2022-06-22 RX ORDER — ATORVASTATIN CALCIUM 20 MG/1
TABLET, FILM COATED ORAL
Qty: 30 TABLET | Refills: 0 | Status: SHIPPED | OUTPATIENT
Start: 2022-06-22 | End: 2022-07-19

## 2022-06-22 NOTE — TELEPHONE ENCOUNTER
Rx Refill Note  Requested Prescriptions     Pending Prescriptions Disp Refills   • atorvastatin (LIPITOR) 20 MG tablet [Pharmacy Med Name: ATORVASTATIN 20 MG TABLET] 30 tablet 0     Sig: TAKE ONE TABLET BY MOUTH ONCE NIGHTLY      Last office visit with prescribing clinician: 5/10/2022      Next office visit with prescribing clinician: 8/19/2022            Liliana Bullard MA  06/22/22, 08:15 EDT

## 2022-07-08 ENCOUNTER — HOSPITAL ENCOUNTER (OUTPATIENT)
Dept: PAIN MEDICINE | Facility: HOSPITAL | Age: 62
Discharge: HOME | End: 2022-07-08

## 2022-07-08 ENCOUNTER — HOSPITAL ENCOUNTER (OUTPATIENT)
Dept: GENERAL RADIOLOGY | Facility: HOSPITAL | Age: 62
Discharge: HOME | End: 2022-07-08

## 2022-07-08 ENCOUNTER — ANESTHESIA EVENT (OUTPATIENT)
Dept: PAIN MEDICINE | Facility: HOSPITAL | Age: 62
End: 2022-07-08

## 2022-07-08 ENCOUNTER — ANESTHESIA (OUTPATIENT)
Dept: PAIN MEDICINE | Facility: HOSPITAL | Age: 62
End: 2022-07-08

## 2022-07-08 VITALS
DIASTOLIC BLOOD PRESSURE: 78 MMHG | RESPIRATION RATE: 16 BRPM | HEIGHT: 67 IN | BODY MASS INDEX: 29.66 KG/M2 | TEMPERATURE: 98 F | WEIGHT: 189 LBS | HEART RATE: 66 BPM | OXYGEN SATURATION: 95 % | SYSTOLIC BLOOD PRESSURE: 133 MMHG

## 2022-07-08 DIAGNOSIS — R52 PAIN: ICD-10-CM

## 2022-07-08 DIAGNOSIS — M54.16 LUMBAR RADICULOPATHY: ICD-10-CM

## 2022-07-08 LAB — GLUCOSE BLDC GLUCOMTR-MCNC: 175 MG/DL (ref 70–130)

## 2022-07-08 PROCEDURE — 25010000002 METHYLPREDNISOLONE PER 80 MG: Performed by: ANESTHESIOLOGY

## 2022-07-08 PROCEDURE — 82962 GLUCOSE BLOOD TEST: CPT

## 2022-07-08 PROCEDURE — 77003 FLUOROGUIDE FOR SPINE INJECT: CPT

## 2022-07-08 PROCEDURE — 0 IOPAMIDOL 41 % SOLUTION: Performed by: ANESTHESIOLOGY

## 2022-07-08 RX ORDER — MIDAZOLAM HYDROCHLORIDE 1 MG/ML
1 INJECTION INTRAMUSCULAR; INTRAVENOUS AS NEEDED
Status: DISCONTINUED | OUTPATIENT
Start: 2022-07-08 | End: 2022-07-09 | Stop reason: HOSPADM

## 2022-07-08 RX ORDER — LIDOCAINE HYDROCHLORIDE 10 MG/ML
1 INJECTION, SOLUTION INFILTRATION; PERINEURAL ONCE AS NEEDED
Status: DISCONTINUED | OUTPATIENT
Start: 2022-07-08 | End: 2022-07-09 | Stop reason: HOSPADM

## 2022-07-08 RX ORDER — SODIUM CHLORIDE 0.9 % (FLUSH) 0.9 %
1-10 SYRINGE (ML) INJECTION AS NEEDED
Status: DISCONTINUED | OUTPATIENT
Start: 2022-07-08 | End: 2022-07-09 | Stop reason: HOSPADM

## 2022-07-08 RX ORDER — METHYLPREDNISOLONE ACETATE 80 MG/ML
80 INJECTION, SUSPENSION INTRA-ARTICULAR; INTRALESIONAL; INTRAMUSCULAR; SOFT TISSUE ONCE
Status: COMPLETED | OUTPATIENT
Start: 2022-07-08 | End: 2022-07-08

## 2022-07-08 RX ORDER — FENTANYL CITRATE 50 UG/ML
50 INJECTION, SOLUTION INTRAMUSCULAR; INTRAVENOUS AS NEEDED
Status: DISCONTINUED | OUTPATIENT
Start: 2022-07-08 | End: 2022-07-09 | Stop reason: HOSPADM

## 2022-07-08 RX ADMIN — METHYLPREDNISOLONE ACETATE 80 MG: 80 INJECTION, SUSPENSION INTRA-ARTICULAR; INTRALESIONAL; INTRAMUSCULAR; SOFT TISSUE at 10:45

## 2022-07-08 RX ADMIN — IOPAMIDOL 10 ML: 408 INJECTION, SOLUTION INTRATHECAL at 10:45

## 2022-07-08 NOTE — ANESTHESIA PROCEDURE NOTES
PAIN Epidural block    Pre-sedation assessment completed: 7/8/2022 10:33 AM    Patient reassessed immediately prior to procedure    Patient location during procedure: pain clinic  Start Time: 7/8/2022 10:33 AM  Stop Time: 7/8/2022 10:46 AM  Indication:procedure for pain  Performed By  Anesthesiologist: Kristin Philippe MD  Preanesthetic Checklist  Completed: patient identified, IV checked, site marked, risks and benefits discussed, surgical consent, monitors and equipment checked, pre-op evaluation and timeout performed  Additional Notes  Fluoro used.    Dx:  Lumbar radiculopathy.    Prep:  Pt Position:prone  Sterile Tech:cap, gloves, mask and sterile barrier  Prep:chlorhexidine gluconate and isopropyl alcohol  Monitoring:blood pressure monitoring, continuous pulse oximetry and EKG  Procedure:Sedation: no     Approach:midline  Guidance: fluoroscopy  Location:lumbar  Level:L5-S1  Needle Type:Tuohy  Needle Gauge:20  Aspiration:negative  Medications:  Preservative Free Saline:3mL  Isovue:1mL  Comments:B/l spreadDepomedrol:80  Post Assessment:  Dressing:occlusive dressing applied  Pt Tolerance:patient tolerated the procedure well with no apparent complications  Complications:no

## 2022-07-08 NOTE — H&P
[facility]    History and Physical    Patient Name: Pablo Mendoza  :  1960  MRN:  [id number]  Date of Admission: 2022    Subjective     Patient is a 61 y.o. male presents with chief complaint of chronic, moderate low back, hips: left, buttock and leg: left pain.  Onset of symptoms was gradual starting several years ago.  Symptoms are associated/aggravated by activity. Symptoms improve with injection and rest - more than 50% improved following last lesi.  Presents for lesi today.      The following portions of the patients history were reviewed and updated as appropriate: current medications, allergies, past medical history, past surgical history, past family history, past social history and problem list                Objective     Past Medical History:   Past Medical History:   Diagnosis Date   • Anesthesia complication     STATES HARD TO WAKE UP W/ALL SURGERIES   • COVID-19     DEC 2020   • Diabetes mellitus (HCC)    • Elevated cholesterol    • Gout    • Hypertension    • Low back pain    • Migraines    • Mixed hyperlipidemia 2018   • Neuropathy    • Nocturia 2018   • Numbness and tingling of both lower extremities     LEGS AND FEET    • Seizures ([facility])     AS A CHILD   • Staph infection     UNSURE OF SITE   • Stroke (HCC)     COGNITIVE DELAY   • TIA (transient ischemic attack)    • Type 2 diabetes mellitus, without long-term current use of insulin (HCC) 2018   • Weakness of both legs     LEFT WORSE      Past Surgical History:   Past Surgical History:   Procedure Laterality Date   • CHOLECYSTECTOMY     • COLONOSCOPY     • HERNIA REPAIR     • LUMBAR FUSION N/A 11/3/2021    Procedure: Lumbar 3 to Lumbar 4 and Lumbar 4 to Lumbar 5 laminectomy with a fusion;  Surgeon: Jose Webster MD;  Location: [facility];  Service: Robotics - Neuro;  Laterality: N/A;   • SINUS SURGERY     • TYMPANOPLASTY Bilateral      Family History:   Family History   Problem Relation Age of Onset  "  • Heart disease Mother    • Hypertension Father    • Throat cancer Maternal Grandmother    • Diabetes Paternal Grandmother    • Diabetes Sister    • Stroke Brother 59   • Diabetes Sister    • Hypertension Brother    • Hyperlipidemia Brother    • Malig Hyperthermia Neg Hx      Social History:   Social History     Socioeconomic History   • Marital status: Single   Tobacco Use   • Smoking status: Never Smoker   • Smokeless tobacco: Never Used   Vaping Use   • Vaping Use: Never used   Substance and Sexual Activity   • Alcohol use: Yes     Comment: 3 DRINKS WEEKLY   • Drug use: Not Currently   • Sexual activity: Yes     Partners: Female       Vital Signs Range for the last 24 hours  Temperature: Temp:  [36.7 °C (98 °F)] 36.7 °C (98 °F)   Temp Source: Temp src: Infrared   BP: BP: (140)/(81) 140/81   Pulse: Heart Rate:  [67] 67   Respirations: Resp:  [16] 16   SPO2: SpO2:  [97 %] 97 %   O2 Amount (l/min):     O2 Devices Device (Oxygen Therapy): room air   Weight: Weight:  [85.7 kg (189 lb)] 85.7 kg (189 lb)     Flowsheet Rows    Flowsheet Row First Filed Value   Admission Height 170.2 cm (67\") Documented at 07/08/2022 1004   Admission Weight 85.7 kg (189 lb) Documented at 07/08/2022 1004          --------------------------------------------------------------------------------    Current Outpatient Medications   Medication Sig Dispense Refill   • acetaminophen (TYLENOL) 500 MG tablet Take 500 mg by mouth Every 6 (Six) Hours As Needed for Mild Pain .     • allopurinol (ZYLOPRIM) 300 MG tablet TAKE ONE TABLET BY MOUTH TWICE A DAY 60 tablet 2   • amLODIPine (NORVASC) 10 MG tablet TAKE ONE TABLET BY MOUTH DAILY 30 tablet 1   • aspirin 81 MG chewable tablet Chew 81 mg Daily. FOLLOW MD GUIDELINES ON HOLDING FOR OR     • atorvastatin (LIPITOR) 20 MG tablet TAKE ONE TABLET BY MOUTH ONCE NIGHTLY 30 tablet 0   • Cholecalciferol (VITAMIN D3) 5000 units capsule capsule Take 5,000 Units by mouth Daily. HOLDING FOR 7 DAYS PRIOR  TO OR  "    • cyanocobalamin (VITAMIN B-12) 2500 MCG tablet tablet Take 500 mcg by mouth Daily. HOLDING FOR 7 DAYS PRIOR TO OR     • cyclobenzaprine (FLEXERIL) 10 MG tablet Take 1 tablet by mouth 2 (Two) Times a Day As Needed for Muscle Spasms. 60 tablet 0   • fluticasone (Flonase) 50 MCG/ACT nasal spray 2 sprays into the nostril(s) as directed by provider Daily. (Patient taking differently: 2 sprays into the nostril(s) as directed by provider As Needed.) 1 bottle 0   • glipizide (GLUCOTROL) 5 MG tablet TAKE ONE TABLET BY MOUTH TWICE A DAY BEFORE MEALS 60 tablet 0   • hydroCHLOROthiazide (HYDRODIURIL) 25 MG tablet TAKE ONE TABLET BY MOUTH DAILY 30 tablet 2   • ibuprofen (ADVIL,MOTRIN) 800 MG tablet TAKE ONE TABLET BY MOUTH EVERY 8 HOURS AS NEEDED FOR MILD PAIN 30 tablet 0   • Janumet XR  MG tablet TAKE ONE TABLET BY MOUTH TWICE A DAY 60 tablet 3   • Jardiance 25 MG tablet tablet TAKE ONE TABLET BY MOUTH DAILY 30 tablet 1   • loratadine (CLARITIN) 10 MG tablet TAKE ONE TABLET BY MOUTH DAILY (Patient taking differently: Take 10 mg by mouth As Needed.) 30 tablet 0   • metoprolol tartrate (LOPRESSOR) 100 MG tablet TAKE ONE TABLET BY MOUTH TWICE A DAY 60 tablet 1   • rizatriptan (MAXALT) 10 MG tablet Take 1 tablet by mouth 1 (One) Time for 1 dose. AT ONSET OF HEADACHE MAY REPEAT ONE TABLET IN 2 HOURS IF NEEDED 15 tablet 0   • multivitamin with minerals tablet tablet Take 1 tablet by mouth Daily. HOLDING FOR 7 DAYS PRIOR TO OR     • vitamin C (ASCORBIC ACID) 500 MG tablet Take 500 mg by mouth Daily. HOLDING FOR 7 DAYS PRIOR TO OR       No current facility-administered medications for this encounter.       --------------------------------------------------------------------------------  Assessment & Plan      Anesthesia Evaluation     Patient summary reviewed and Nursing notes reviewed   no history of anesthetic complications:               Airway   Mallampati: II  Dental      Pulmonary - negative pulmonary ROS    Cardiovascular   Exercise tolerance: good (4-7 METS)    (+) hypertension, hyperlipidemia,       Neuro/Psych  (+) seizures, TIA, CVA, headaches, numbness,    GI/Hepatic/Renal/Endo    (+)   diabetes mellitus,     Musculoskeletal     Abdominal    Substance History - negative use     OB/GYN negative ob/gyn ROS         Other   arthritis,                 Diagnosis and Plan    Treatment Plan  ASA 3   Patient has had previous injection/procedure with 50-75% improvement.   Procedures: Lumbar Epidural Steroid Injection(LESI), With fluoroscopy,       Anesthetic plan and risks discussed with patient.          Diagnosis     * Lumbar radiculopathy [M54.16]

## 2022-07-18 DIAGNOSIS — R80.9 TYPE 2 DIABETES MELLITUS WITH PROTEINURIA: ICD-10-CM

## 2022-07-18 DIAGNOSIS — E78.2 MIXED HYPERLIPIDEMIA: ICD-10-CM

## 2022-07-18 DIAGNOSIS — M1A.09X0 CHRONIC GOUT OF MULTIPLE SITES, UNSPECIFIED CAUSE: ICD-10-CM

## 2022-07-18 DIAGNOSIS — E11.29 TYPE 2 DIABETES MELLITUS WITH PROTEINURIA: ICD-10-CM

## 2022-07-18 NOTE — TELEPHONE ENCOUNTER
Rx Refill Note  Requested Prescriptions     Pending Prescriptions Disp Refills    glipizide (GLUCOTROL) 5 MG tablet [Pharmacy Med Name: glipiZIDE 5 MG TABLET] 60 tablet 0     Sig: TAKE ONE TABLET BY MOUTH TWICE A DAY BEFORE MEALS    allopurinol (ZYLOPRIM) 300 MG tablet [Pharmacy Med Name: ALLOPURINOL 300 MG TABLET] 60 tablet 2     Sig: TAKE ONE TABLET BY MOUTH TWICE A DAY    atorvastatin (LIPITOR) 20 MG tablet [Pharmacy Med Name: ATORVASTATIN 20 MG TABLET] 30 tablet 0     Sig: TAKE ONE TABLET BY MOUTH ONCE NIGHTLY      Last office visit with prescribing clinician: 5/10/2022      Next office visit with prescribing clinician: 8/19/2022            DOMO BECKER MA  07/18/22, 09:07 EDT

## 2022-07-19 RX ORDER — ATORVASTATIN CALCIUM 20 MG/1
TABLET, FILM COATED ORAL
Qty: 90 TABLET | Refills: 1 | Status: SHIPPED | OUTPATIENT
Start: 2022-07-19 | End: 2022-10-05 | Stop reason: HOSPADM

## 2022-07-19 RX ORDER — GLIPIZIDE 5 MG/1
TABLET ORAL
Qty: 180 TABLET | Refills: 1 | Status: SHIPPED | OUTPATIENT
Start: 2022-07-19 | End: 2023-01-16

## 2022-07-19 RX ORDER — ALLOPURINOL 300 MG/1
TABLET ORAL
Qty: 180 TABLET | Refills: 1 | Status: SHIPPED | OUTPATIENT
Start: 2022-07-19 | End: 2023-01-22

## 2022-07-22 DIAGNOSIS — R80.9 TYPE 2 DIABETES MELLITUS WITH PROTEINURIA: ICD-10-CM

## 2022-07-22 DIAGNOSIS — E11.9 TYPE 2 DIABETES MELLITUS WITHOUT COMPLICATION, WITHOUT LONG-TERM CURRENT USE OF INSULIN: ICD-10-CM

## 2022-07-22 DIAGNOSIS — I10 ESSENTIAL HYPERTENSION: ICD-10-CM

## 2022-07-22 DIAGNOSIS — E11.29 TYPE 2 DIABETES MELLITUS WITH PROTEINURIA: ICD-10-CM

## 2022-07-22 RX ORDER — AMLODIPINE BESYLATE 10 MG/1
TABLET ORAL
Qty: 30 TABLET | Refills: 1 | Status: SHIPPED | OUTPATIENT
Start: 2022-07-22 | End: 2022-09-20

## 2022-07-22 RX ORDER — EMPAGLIFLOZIN 25 MG/1
TABLET, FILM COATED ORAL
Qty: 30 TABLET | Refills: 1 | Status: SHIPPED | OUTPATIENT
Start: 2022-07-22 | End: 2022-09-20

## 2022-07-22 NOTE — TELEPHONE ENCOUNTER
Rx Refill Note  Requested Prescriptions     Pending Prescriptions Disp Refills    amLODIPine (NORVASC) 10 MG tablet [Pharmacy Med Name: amLODIPine BESYLATE 10 MG TAB] 30 tablet 1     Sig: TAKE ONE TABLET BY MOUTH DAILY    Jardiance 25 MG tablet tablet [Pharmacy Med Name: JARDIANCE 25 MG TABLET] 30 tablet 1     Sig: TAKE ONE TABLET BY MOUTH DAILY      Last office visit with prescribing clinician: 5/10/2022      Next office visit with prescribing clinician: 8/19/2022            DOMO BECKER MA  07/22/22, 12:47 EDT

## 2022-07-29 ENCOUNTER — TELEPHONE (OUTPATIENT)
Dept: INTERNAL MEDICINE | Facility: CLINIC | Age: 62
End: 2022-07-29

## 2022-07-29 DIAGNOSIS — R80.9 PROTEINURIA, UNSPECIFIED TYPE: ICD-10-CM

## 2022-07-29 DIAGNOSIS — E11.9 TYPE 2 DIABETES MELLITUS WITHOUT COMPLICATION, WITHOUT LONG-TERM CURRENT USE OF INSULIN: Primary | ICD-10-CM

## 2022-07-29 DIAGNOSIS — I10 ESSENTIAL HYPERTENSION: ICD-10-CM

## 2022-07-29 DIAGNOSIS — E78.2 MIXED HYPERLIPIDEMIA: ICD-10-CM

## 2022-08-01 ENCOUNTER — OFFICE VISIT (OUTPATIENT)
Dept: INTERNAL MEDICINE | Facility: CLINIC | Age: 62
End: 2022-08-01

## 2022-08-01 VITALS
TEMPERATURE: 97.7 F | OXYGEN SATURATION: 98 % | WEIGHT: 194 LBS | HEART RATE: 89 BPM | SYSTOLIC BLOOD PRESSURE: 120 MMHG | HEIGHT: 67 IN | BODY MASS INDEX: 30.45 KG/M2 | DIASTOLIC BLOOD PRESSURE: 82 MMHG

## 2022-08-01 DIAGNOSIS — G43.801 OTHER MIGRAINE WITH STATUS MIGRAINOSUS, NOT INTRACTABLE: ICD-10-CM

## 2022-08-01 DIAGNOSIS — F41.9 ANXIETY: Primary | ICD-10-CM

## 2022-08-01 PROCEDURE — 99214 OFFICE O/P EST MOD 30 MIN: CPT | Performed by: INTERNAL MEDICINE

## 2022-08-01 RX ORDER — HYDROXYZINE HYDROCHLORIDE 25 MG/1
25 TABLET, FILM COATED ORAL DAILY PRN
Qty: 30 TABLET | Refills: 0 | Status: SHIPPED | OUTPATIENT
Start: 2022-08-01
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
## 2022-08-08 NOTE — PROGRESS NOTES
Subjective   Patient ID: Pablo Mendoza is a 61 y.o. male is here today via telephone for 6 month follow-up.    You have chosen to receive care through a telehealth visit.  Do you consent to use a video/audio connection for your medical care today? Yes    We had a telephone visit today.  The patient was at home and I was in the office.  We talked for 5 minutes.    History of Present Illness    This patient is doing a lot better than when he was here in March.  He is at work while I am talking to him.  He says he is doing a lot of lifting and a lot of walking.  He still has some stiffness in his back but that is his only complaint.    The following portions of the patient's history were reviewed and updated as appropriate: allergies, current medications, past family history, past medical history, past social history, past surgical history and problem list.    Review of Systems    I have reviewed the review of systems as documented by my MA.      Objective       Physical Exam  Neurological:      Mental Status: He is alert and oriented to person, place, and time.       Neurologic Exam     Mental Status   Oriented to person, place, and time.           Assessment & Plan   Independent Review of Radiographic Studies:      I personally reviewed the images from the following studies.    There is no new imaging to review but I did look at his films from March.  This does show good alignment of the construct at both levels.    Medical Decision Making:      I told the patient to be careful with strenuously heavy lifting.  Otherwise he can return to normal activities.  I will see him in the office in early November with an x-ray.    Diagnoses and all orders for this visit:    1. Lumbar radiculopathy (Primary)  -     XR Spine Lumbar Complete With Flex & Ext; Future      Return in about 13 weeks (around 11/8/2022).

## 2022-08-09 ENCOUNTER — OFFICE VISIT (OUTPATIENT)
Dept: NEUROSURGERY | Facility: CLINIC | Age: 62
End: 2022-08-09

## 2022-08-09 DIAGNOSIS — M54.16 LUMBAR RADICULOPATHY: Primary | ICD-10-CM

## 2022-08-09 PROCEDURE — 99441 PR PHYS/QHP TELEPHONE EVALUATION 5-10 MIN: CPT | Performed by: NEUROLOGICAL SURGERY

## 2022-08-12 ENCOUNTER — TELEPHONE (OUTPATIENT)
Dept: INTERNAL MEDICINE | Facility: CLINIC | Age: 62
End: 2022-08-12

## 2022-08-12 NOTE — TELEPHONE ENCOUNTER
Pt dropped off [place] paperwork to be completed by provider and then faxed to [employer]. Pt to be call when completed and is needing it asap.     Fax [phone]

## 2022-08-19 ENCOUNTER — OFFICE VISIT (OUTPATIENT)
Dept: INTERNAL MEDICINE | Facility: CLINIC | Age: 62
End: 2022-08-19

## 2022-08-19 VITALS
WEIGHT: 188.6 LBS | HEIGHT: 67 IN | SYSTOLIC BLOOD PRESSURE: 130 MMHG | TEMPERATURE: 96.6 F | RESPIRATION RATE: 20 BRPM | OXYGEN SATURATION: 96 % | BODY MASS INDEX: 29.6 KG/M2 | DIASTOLIC BLOOD PRESSURE: 80 MMHG | HEART RATE: 71 BPM

## 2022-08-19 DIAGNOSIS — E78.2 MIXED HYPERLIPIDEMIA: ICD-10-CM

## 2022-08-19 DIAGNOSIS — I10 ESSENTIAL HYPERTENSION: ICD-10-CM

## 2022-08-19 DIAGNOSIS — R80.9 TYPE 2 DIABETES MELLITUS WITH PROTEINURIA: Primary | ICD-10-CM

## 2022-08-19 DIAGNOSIS — E11.29 TYPE 2 DIABETES MELLITUS WITH PROTEINURIA: Primary | ICD-10-CM

## 2022-08-19 DIAGNOSIS — F41.9 ANXIETY: ICD-10-CM

## 2022-08-19 PROCEDURE — 99214 OFFICE O/P EST MOD 30 MIN: CPT | Performed by: INTERNAL MEDICINE

## 2022-08-19 NOTE — PROGRESS NOTES
Pablo Mendoza is a 61 y.o. male, who presents with a chief complaint of   Chief Complaint   Patient presents with   • Anxiety           HPI   Pt here for f/u.  Pt recently started on zoloft and has hydroxyzine to use PRN.  He can tell a difference.  He feels more focused.  He has been more tired but he is taking the hydroxyzine nightly to help sleep.  He is sleeping much better.  He is handling stress a little better.      He hasnt had a headache in the past month.      t2dm - a1c improved 8.7-> 7.4  He has been eating lots of processed foods.  He likes to cook but hasnt done much cooking bc of stress at home.      HLD - on statin. No cramps or myalgias.     He feels like he does much better when his pain and anxiety are under control.     The following portions of the patient's history were reviewed and updated as appropriate: allergies, current medications, past family history, past medical history, past social history, past surgical history and problem list.    Allergies: Aspirin and Lisinopril    Review of Systems   Constitutional: Negative.    HENT: Negative.    Eyes: Negative.    Respiratory: Negative.    Cardiovascular: Negative.    Gastrointestinal: Negative.    Endocrine: Negative.    Genitourinary: Negative.    Musculoskeletal: Negative.    Skin: Negative.    Allergic/Immunologic: Negative.    Neurological: Negative.    Hematological: Negative.    Psychiatric/Behavioral: Negative.    All other systems reviewed and are negative.            Wt Readings from Last 3 Encounters:   08/19/22 85.5 kg (188 lb 9.6 oz)   08/01/22 88 kg (194 lb)   07/08/22 85.7 kg (189 lb)     Temp Readings from Last 3 Encounters:   08/19/22 96.6 °F (35.9 °C) (Temporal)   08/01/22 97.7 °F (36.5 °C)   07/08/22 98 °F (36.7 °C) (Infrared)     BP Readings from Last 3 Encounters:   08/19/22 130/80   08/01/22 120/82   07/08/22 133/78     Pulse Readings from Last 3 Encounters:   08/19/22 71   08/01/22 89   07/08/22 66     Body mass  index is 29.53 kg/m².  SpO2 Readings from Last 3 Encounters:   08/19/22 96%   08/01/22 98%   07/08/22 95%          Physical Exam  Vitals and nursing note reviewed.   Constitutional:       General: He is not in acute distress.     Appearance: He is well-developed.   HENT:      Head: Normocephalic and atraumatic.      Right Ear: External ear normal.      Left Ear: External ear normal.      Nose: Nose normal.   Eyes:      Conjunctiva/sclera: Conjunctivae normal.      Pupils: Pupils are equal, round, and reactive to light.   Cardiovascular:      Rate and Rhythm: Normal rate and regular rhythm.      Heart sounds: Normal heart sounds.   Pulmonary:      Effort: Pulmonary effort is normal. No respiratory distress.      Breath sounds: Normal breath sounds. No wheezing.   Musculoskeletal:         General: Normal range of motion.      Cervical back: Normal range of motion and neck supple.      Comments: Normal gait   Skin:     General: Skin is warm and dry.   Neurological:      Mental Status: He is alert and oriented to person, place, and time.   Psychiatric:         Behavior: Behavior normal.         Thought Content: Thought content normal.         Judgment: Judgment normal.         Results for orders placed or performed in visit on 08/03/22   Comprehensive metabolic panel    Specimen: Blood   Result Value Ref Range    Glucose 143 (H) 65 - 99 mg/dL    BUN 18 6 - 24 mg/dL    Creatinine 0.95 0.76 - 1.27 mg/dL    EGFR Result 92 >59 mL/min/1.73    BUN/Creatinine Ratio 19 9 - 20    Sodium 140 134 - 144 mmol/L    Potassium 3.8 3.5 - 5.2 mmol/L    Chloride 97 96 - 106 mmol/L    Total CO2 25 20 - 29 mmol/L    Calcium 10.0 8.7 - 10.2 mg/dL    Total Protein 7.1 6.0 - 8.5 g/dL    Albumin 4.7 3.8 - 4.9 g/dL    Globulin 2.4 1.5 - 4.5 g/dL    A/G Ratio 2.0 1.2 - 2.2    Total Bilirubin 0.6 0.0 - 1.2 mg/dL    Alkaline Phosphatase 139 (H) 44 - 121 IU/L    AST (SGOT) 26 0 - 40 IU/L    ALT (SGPT) 20 0 - 44 IU/L   T4    Specimen: Blood   Result  Value Ref Range    T4, Total 7.6 4.5 - 12.0 ug/dL   TSH    Specimen: Blood   Result Value Ref Range    TSH 2.370 0.450 - 4.500 uIU/mL   Lipid Panel With LDL/HDL Ratio    Specimen: Blood   Result Value Ref Range    Total Cholesterol 128 100 - 199 mg/dL    Triglycerides 279 (H) 0 - 149 mg/dL    HDL Cholesterol 30 (L) >39 mg/dL    VLDL Cholesterol Tav[place] 44 (H) 5 - 40 mg/dL    LDL Chol Calc (NIH) 54 0 - 99 mg/dL    LDL/HDL RATIO 1.8 0.0 - 3.6 ratio   Hemoglobin A1c    Specimen: Blood   Result Value Ref Range    Hemoglobin A1C 7.4 (H) 4.8 - 5.6 %   CBC & Differential    Specimen: Blood   Result Value Ref Range    WBC 9.5 3.4 - 10.8 x10E3/uL    RBC 6.26 (H) 4.14 - 5.80 x10E6/uL    Hemoglobin 17.9 (H) 13.0 - 17.7 g/dL    Hematocrit 53.0 (H) 37.5 - 51.0 %    MCV 85 79 - 97 fL    MCH 28.6 26.6 - 33.0 pg    MCHC 33.8 31.5 - 35.7 g/dL    RDW 14.4 11.6 - 15.4 %    Platelets 248 150 - 450 x10E3/uL    Neutrophil Rel % 72 Not Estab. %    Lymphocyte Rel % 19 Not Estab. %    Monocyte Rel % 6 Not Estab. %    Eosinophil Rel % 2 Not Estab. %    Basophil Rel % 1 Not Estab. %    Neutrophils Absolute 6.9 1.4 - 7.0 x10E3/uL    Lymphocytes Absolute 1.8 0.7 - 3.1 x10E3/uL    Monocytes Absolute 0.5 0.1 - 0.9 x10E3/uL    Eosinophils Absolute 0.2 0.0 - 0.4 x10E3/uL    Basophils Absolute 0.1 0.0 - 0.2 x10E3/uL    Immature Granulocyte Rel % 0 Not Estab. %    Immature Grans Absolute 0.0 0.0 - 0.1 x10E3/uL     Result Review :                  Assessment and Plan    Diagnoses and all orders for this visit:    1. Type 2 diabetes mellitus with proteinuria (HCC) (Primary) - cont janumet xr and jardiance  -     CBC & Differential; Future  -     Comprehensive Metabolic Panel; Future  -     Hemoglobin A1c; Future  -     Lipid Panel With LDL / HDL Ratio; Future  -     Microalbumin / Creatinine Urine Ratio - Urine, Clean Catch; Future    2. Anxiety - cont sertraline.  Cut back on hydroxyzine and only use prn not nightly  -     sertraline (Zoloft) 50 MG  tablet; Take 1 tablet by mouth Daily.  Dispense: 90 tablet; Refill: 1    3. Essential hypertension - cont current meds    4. Mixed hyperlipidemia - cont lipitor  -     Comprehensive Metabolic Panel; Future  -     Lipid Panel With LDL / HDL Ratio; Future                Outpatient Medications Prior to Visit   Medication Sig Dispense Refill   • acetaminophen (TYLENOL) 500 MG tablet Take 500 mg by mouth Every 6 (Six) Hours As Needed for Mild Pain .     • allopurinol (ZYLOPRIM) 300 MG tablet TAKE ONE TABLET BY MOUTH TWICE A  tablet 1   • amLODIPine (NORVASC) 10 MG tablet TAKE ONE TABLET BY MOUTH DAILY 30 tablet 1   • aspirin 81 MG chewable tablet Chew 81 mg Daily. FOLLOW MD GUIDELINES ON HOLDING FOR OR     • atorvastatin (LIPITOR) 20 MG tablet TAKE ONE TABLET BY MOUTH ONCE NIGHTLY 90 tablet 1   • Cholecalciferol (VITAMIN D3) 5000 units capsule capsule Take 5,000 Units by mouth Daily. HOLDING FOR 7 DAYS PRIOR  TO OR     • cyanocobalamin (VITAMIN B-12) 2500 MCG tablet tablet Take 500 mcg by mouth Daily. HOLDING FOR 7 DAYS PRIOR TO OR     • cyclobenzaprine (FLEXERIL) 10 MG tablet Take 1 tablet by mouth 2 (Two) Times a Day As Needed for Muscle Spasms. 60 tablet 0   • fluticasone (Flonase) 50 MCG/ACT nasal spray 2 sprays into the nostril(s) as directed by provider Daily. (Patient taking differently: 2 sprays into the nostril(s) as directed by provider As Needed.) 1 bottle 0   • glipizide (GLUCOTROL) 5 MG tablet TAKE ONE TABLET BY MOUTH TWICE A DAY BEFORE MEALS 180 tablet 1   • hydroCHLOROthiazide (HYDRODIURIL) 25 MG tablet TAKE ONE TABLET BY MOUTH DAILY 30 tablet 2   • hydrOXYzine (ATARAX) 25 MG tablet Take 1 tablet by mouth Daily As Needed for Anxiety. 30 tablet 0   • ibuprofen (ADVIL,MOTRIN) 800 MG tablet TAKE ONE TABLET BY MOUTH EVERY 8 HOURS AS NEEDED FOR MILD PAIN 30 tablet 0   • Janumet XR  MG tablet TAKE ONE TABLET BY MOUTH TWICE A DAY 60 tablet 3   • Jardiance 25 MG tablet tablet TAKE ONE TABLET BY MOUTH  DAILY 30 tablet 1   • loratadine (CLARITIN) 10 MG tablet TAKE ONE TABLET BY MOUTH DAILY (Patient taking differently: Take 10 mg by mouth As Needed.) 30 tablet 0   • metoprolol tartrate (LOPRESSOR) 100 MG tablet TAKE ONE TABLET BY MOUTH TWICE A DAY 60 tablet 1   • multivitamin with minerals tablet tablet Take 1 tablet by mouth Daily. HOLDING FOR 7 DAYS PRIOR TO OR     • rizatriptan (MAXALT) 10 MG tablet Take 1 tablet by mouth 1 (One) Time for 1 dose. AT ONSET OF HEADACHE MAY REPEAT ONE TABLET IN 2 HOURS IF NEEDED 15 tablet 0   • vitamin C (ASCORBIC ACID) 500 MG tablet Take 500 mg by mouth Daily. HOLDING FOR 7 DAYS PRIOR TO OR     • sertraline (Zoloft) 50 MG tablet Take 1 tablet by mouth Daily. 30 tablet 0     No facility-administered medications prior to visit.     New Medications Ordered This Visit   Medications   • sertraline (Zoloft) 50 MG tablet     Sig: Take 1 tablet by mouth Daily.     Dispense:  90 tablet     Refill:  1     [unfilled]  Medications Discontinued During This Encounter   Medication Reason   • sertraline (Zoloft) 50 MG tablet Reorder         Return in about 3 months (around 11/19/2022) for Recheck, labs.    Patient was given instructions and counseling regarding her condition or for health maintenance advice. Please see specific information pulled into the AVS if appropriate.

## 2022-09-19 DIAGNOSIS — I10 ESSENTIAL HYPERTENSION: ICD-10-CM

## 2022-09-20 DIAGNOSIS — R80.9 TYPE 2 DIABETES MELLITUS WITH PROTEINURIA: ICD-10-CM

## 2022-09-20 DIAGNOSIS — E11.29 TYPE 2 DIABETES MELLITUS WITH PROTEINURIA: ICD-10-CM

## 2022-09-20 DIAGNOSIS — E11.9 TYPE 2 DIABETES MELLITUS WITHOUT COMPLICATION, WITHOUT LONG-TERM CURRENT USE OF INSULIN: ICD-10-CM

## 2022-09-20 RX ORDER — EMPAGLIFLOZIN 25 MG/1
TABLET, FILM COATED ORAL
Qty: 30 TABLET | Refills: 1 | Status: SHIPPED | OUTPATIENT
Start: 2022-09-20 | End: 2022-11-17

## 2022-09-20 RX ORDER — AMLODIPINE BESYLATE 10 MG/1
TABLET ORAL
Qty: 30 TABLET | Refills: 1 | Status: SHIPPED | OUTPATIENT
Start: 2022-09-20 | End: 2022-11-17

## 2022-09-20 NOTE — TELEPHONE ENCOUNTER
Rx Refill Note  Requested Prescriptions     Pending Prescriptions Disp Refills    amLODIPine (NORVASC) 10 MG tablet [Pharmacy Med Name: amLODIPine BESYLATE 10 MG TAB] 30 tablet 1     Sig: TAKE ONE TABLET BY MOUTH DAILY      Last office visit with prescribing clinician: 8/19/2022      Next office visit with prescribing clinician: 9/20/2022            DOMO BECKER MA  09/20/22, 17:41 EDT

## 2022-09-20 NOTE — TELEPHONE ENCOUNTER
Rx Refill Note  Requested Prescriptions     Pending Prescriptions Disp Refills    Jardiance 25 MG tablet tablet [Pharmacy Med Name: JARDIANCE 25 MG TABLET] 30 tablet 1     Sig: TAKE ONE TABLET BY MOUTH DAILY      Last office visit with prescribing clinician: 8/19/2022      Next office visit with prescribing clinician: 11/18/2022            DOMO BECKER MA  09/20/22, 17:40 EDT

## 2022-09-24 DIAGNOSIS — I10 ESSENTIAL HYPERTENSION: ICD-10-CM

## 2022-09-25 DIAGNOSIS — I10 ESSENTIAL HYPERTENSION: ICD-10-CM

## 2022-09-27 RX ORDER — METOPROLOL TARTRATE 100 MG/1
TABLET ORAL
Qty: 60 TABLET | Refills: 1 | Status: SHIPPED | OUTPATIENT
Start: 2022-09-27 | End: 2022-11-25

## 2022-09-27 RX ORDER — HYDROCHLOROTHIAZIDE 25 MG/1
TABLET ORAL
Qty: 30 TABLET | Refills: 2 | Status: SHIPPED | OUTPATIENT
Start: 2022-09-27 | End: 2022-12-27

## 2022-09-27 NOTE — TELEPHONE ENCOUNTER
Rx Refill Note  Requested Prescriptions     Pending Prescriptions Disp Refills   • metoprolol tartrate (LOPRESSOR) 100 MG tablet [Pharmacy Med Name: METOPROLOL TARTRATE 100 MG TAB] 60 tablet 1     Sig: TAKE ONE TABLET BY MOUTH TWICE A DAY      Last office visit with prescribing clinician: 8/19/2022      Next office visit with prescribing clinician: 11/18/2022            Liliana Bullard MA  09/27/22, 11:05 EDT

## 2022-09-27 NOTE — TELEPHONE ENCOUNTER
Rx Refill Note  Requested Prescriptions     Pending Prescriptions Disp Refills   • hydroCHLOROthiazide (HYDRODIURIL) 25 MG tablet [Pharmacy Med Name: hydroCHLOROthiazide 25 MG TABLET] 30 tablet 2     Sig: TAKE ONE TABLET BY MOUTH DAILY      Last office visit with prescribing clinician: 8/19/2022      Next office visit with prescribing clinician: 9/25/2022            Liliana Bullard MA  09/27/22, 09:20 EDT

## 2022-10-04 ENCOUNTER — APPOINTMENT (OUTPATIENT)
Dept: CT IMAGING | Facility: HOSPITAL | Age: 62
End: 2022-10-04

## 2022-10-04 ENCOUNTER — APPOINTMENT (OUTPATIENT)
Dept: MRI IMAGING | Facility: HOSPITAL | Age: 62
End: 2022-10-04

## 2022-10-04 ENCOUNTER — HOSPITAL ENCOUNTER (OUTPATIENT)
Facility: HOSPITAL | Age: 62
Setting detail: OBSERVATION
Discharge: HOME | End: 2022-10-05
Attending: EMERGENCY MEDICINE | Admitting: EMERGENCY MEDICINE

## 2022-10-04 DIAGNOSIS — G43.019 INTRACTABLE MIGRAINE WITHOUT AURA AND WITHOUT STATUS MIGRAINOSUS: Primary | ICD-10-CM

## 2022-10-04 DIAGNOSIS — D58.2 ELEVATED HEMOGLOBIN: ICD-10-CM

## 2022-10-04 DIAGNOSIS — D75.1 POLYCYTHEMIA: ICD-10-CM

## 2022-10-04 DIAGNOSIS — G47.34 NOCTURNAL HYPOXIA: ICD-10-CM

## 2022-10-04 DIAGNOSIS — R42 DIZZINESS: ICD-10-CM

## 2022-10-04 DIAGNOSIS — Z86.69 HISTORY OF SLEEP APNEA: ICD-10-CM

## 2022-10-04 PROBLEM — R29.90 STROKE-LIKE SYMPTOMS: Status: ACTIVE | Noted: 2022-10-04

## 2022-10-04 LAB
ALBUMIN SERPL-MCNC: 5.2 G/DL (ref 3.5–5.2)
ALBUMIN/GLOB SERPL: 1.8 G/DL
ALP SERPL-CCNC: 131 U/L (ref 39–117)
ALT SERPL W P-5'-P-CCNC: 23 U/L (ref 1–41)
ANION GAP SERPL CALCULATED.3IONS-SCNC: 16.4 MMOL/L (ref 5–15)
AST SERPL-CCNC: 22 U/L (ref 1–40)
BASOPHILS # BLD AUTO: 0.05 10*3/MM3 (ref 0–0.2)
BASOPHILS NFR BLD AUTO: 0.4 % (ref 0–1.5)
BILIRUB SERPL-MCNC: 0.7 MG/DL (ref 0–1.2)
BUN SERPL-MCNC: 20 MG/DL (ref 6–20)
BUN/CREAT SERPL: 16.1 (ref 7–25)
CALCIUM SPEC-SCNC: 10.4 MG/DL (ref 8.6–10.5)
CHLORIDE SERPL-SCNC: 96 MMOL/L (ref 98–107)
CHOLEST SERPL-MCNC: 141 MG/DL (ref 0–200)
CO2 SERPL-SCNC: 23.6 MMOL/L (ref 22–29)
CREAT SERPL-MCNC: 1.24 MG/DL (ref 0.76–1.27)
DEPRECATED RDW RBC AUTO: 43.6 FL (ref 37–54)
EGFRCR SERPLBLD CKD-EPI 2021: 67 ML/MIN/1.73
EOSINOPHIL # BLD AUTO: 0.06 10*3/MM3 (ref 0–0.4)
EOSINOPHIL NFR BLD AUTO: 0.5 % (ref 0.3–6.2)
ERYTHROCYTE [DISTWIDTH] IN BLOOD BY AUTOMATED COUNT: 16.7 % (ref 12.3–15.4)
GLOBULIN UR ELPH-MCNC: 2.9 GM/DL
GLUCOSE SERPL-MCNC: 192 MG/DL (ref 65–99)
HBA1C MFR BLD: 7.2 % (ref 4.8–5.6)
HCT VFR BLD AUTO: 53.6 % (ref 37.5–51)
HDLC SERPL-MCNC: 36 MG/DL (ref 40–60)
HGB BLD-MCNC: 19.1 G/DL (ref 13–17.7)
HOLD SPECIMEN: NORMAL
IMM GRANULOCYTES # BLD AUTO: 0.05 10*3/MM3 (ref 0–0.05)
IMM GRANULOCYTES NFR BLD AUTO: 0.4 % (ref 0–0.5)
LDLC SERPL CALC-MCNC: 57 MG/DL (ref 0–100)
LDLC/HDLC SERPL: 1.21 {RATIO}
LYMPHOCYTES # BLD AUTO: 1.91 10*3/MM3 (ref 0.7–3.1)
LYMPHOCYTES NFR BLD AUTO: 16.9 % (ref 19.6–45.3)
MCH RBC QN AUTO: 29.3 PG (ref 26.6–33)
MCHC RBC AUTO-ENTMCNC: 35.6 G/DL (ref 31.5–35.7)
MCV RBC AUTO: 82.3 FL (ref 79–97)
MONOCYTES # BLD AUTO: 0.45 10*3/MM3 (ref 0.1–0.9)
MONOCYTES NFR BLD AUTO: 4 % (ref 5–12)
NEUTROPHILS NFR BLD AUTO: 77.8 % (ref 42.7–76)
NEUTROPHILS NFR BLD AUTO: 8.8 10*3/MM3 (ref 1.7–7)
NRBC BLD AUTO-RTO: 0 /100 WBC (ref 0–0.2)
PLATELET # BLD AUTO: 253 10*3/MM3 (ref 140–450)
PMV BLD AUTO: 10.1 FL (ref 6–12)
POTASSIUM SERPL-SCNC: 3.6 MMOL/L (ref 3.5–5.2)
PROT SERPL-MCNC: 8.1 G/DL (ref 6–8.5)
QT INTERVAL: 403 MS
RBC # BLD AUTO: 6.51 10*6/MM3 (ref 4.14–5.8)
SODIUM SERPL-SCNC: 136 MMOL/L (ref 136–145)
TRIGL SERPL-MCNC: 307 MG/DL (ref 0–150)
TROPONIN T SERPL-MCNC: <0.01 NG/ML (ref 0–0.03)
VLDLC SERPL-MCNC: 48 MG/DL (ref 5–40)
WBC NRBC COR # BLD: 11.32 10*3/MM3 (ref 3.4–10.8)
WHOLE BLOOD HOLD COAG: NORMAL
WHOLE BLOOD HOLD SPECIMEN: NORMAL

## 2022-10-04 PROCEDURE — 85025 COMPLETE CBC W/AUTO DIFF WBC: CPT | Performed by: EMERGENCY MEDICINE

## 2022-10-04 PROCEDURE — 83036 HEMOGLOBIN GLYCOSYLATED A1C: CPT | Performed by: STUDENT IN AN ORGANIZED HEALTH CARE EDUCATION/TRAINING PROGRAM

## 2022-10-04 PROCEDURE — G0378 HOSPITAL OBSERVATION PER HR: HCPCS

## 2022-10-04 PROCEDURE — 25010000002 KETOROLAC TROMETHAMINE PER 15 MG: Performed by: EMERGENCY MEDICINE

## 2022-10-04 PROCEDURE — 93005 ELECTROCARDIOGRAM TRACING: CPT | Performed by: EMERGENCY MEDICINE

## 2022-10-04 PROCEDURE — 93010 ELECTROCARDIOGRAM REPORT: CPT | Performed by: INTERNAL MEDICINE

## 2022-10-04 PROCEDURE — 25010000002 DIPHENHYDRAMINE PER 50 MG: Performed by: EMERGENCY MEDICINE

## 2022-10-04 PROCEDURE — 80061 LIPID PANEL: CPT | Performed by: STUDENT IN AN ORGANIZED HEALTH CARE EDUCATION/TRAINING PROGRAM

## 2022-10-04 PROCEDURE — 84484 ASSAY OF TROPONIN QUANT: CPT | Performed by: EMERGENCY MEDICINE

## 2022-10-04 PROCEDURE — 99284 EMERGENCY DEPT VISIT MOD MDM: CPT

## 2022-10-04 PROCEDURE — 80053 COMPREHEN METABOLIC PANEL: CPT | Performed by: EMERGENCY MEDICINE

## 2022-10-04 PROCEDURE — 70450 CT HEAD/BRAIN W/O DYE: CPT

## 2022-10-04 PROCEDURE — 96374 THER/PROPH/DIAG INJ IV PUSH: CPT

## 2022-10-04 PROCEDURE — 25010000002 PROCHLORPERAZINE 10 MG/2ML SOLUTION: Performed by: EMERGENCY MEDICINE

## 2022-10-04 PROCEDURE — 96375 TX/PRO/DX INJ NEW DRUG ADDON: CPT

## 2022-10-04 RX ORDER — INSULIN LISPRO 100 [IU]/ML
0-7 INJECTION, SOLUTION INTRAVENOUS; SUBCUTANEOUS
Status: DISCONTINUED | OUTPATIENT
Start: 2022-10-05 | End: 2022-10-05 | Stop reason: HOSPADM

## 2022-10-04 RX ORDER — FLUTICASONE PROPIONATE 50 MCG
2 SPRAY, SUSPENSION (ML) NASAL DAILY
Status: DISCONTINUED | OUTPATIENT
Start: 2022-10-05 | End: 2022-10-05 | Stop reason: HOSPADM

## 2022-10-04 RX ORDER — DIPHENHYDRAMINE HYDROCHLORIDE 50 MG/ML
25 INJECTION INTRAMUSCULAR; INTRAVENOUS ONCE
Status: COMPLETED | OUTPATIENT
Start: 2022-10-04 | End: 2022-10-04

## 2022-10-04 RX ORDER — GLIPIZIDE 5 MG/1
5 TABLET ORAL
Status: DISCONTINUED | OUTPATIENT
Start: 2022-10-05 | End: 2022-10-05 | Stop reason: HOSPADM

## 2022-10-04 RX ORDER — CYCLOBENZAPRINE HCL 10 MG
10 TABLET ORAL 2 TIMES DAILY PRN
Status: DISCONTINUED | OUTPATIENT
Start: 2022-10-04 | End: 2022-10-05 | Stop reason: HOSPADM

## 2022-10-04 RX ORDER — PROCHLORPERAZINE EDISYLATE 5 MG/ML
10 INJECTION INTRAMUSCULAR; INTRAVENOUS ONCE
Status: COMPLETED | OUTPATIENT
Start: 2022-10-04 | End: 2022-10-04

## 2022-10-04 RX ORDER — SODIUM CHLORIDE 0.9 % (FLUSH) 0.9 %
10 SYRINGE (ML) INJECTION AS NEEDED
Status: DISCONTINUED | OUTPATIENT
Start: 2022-10-04 | End: 2022-10-05 | Stop reason: HOSPADM

## 2022-10-04 RX ORDER — KETOROLAC TROMETHAMINE 15 MG/ML
15 INJECTION, SOLUTION INTRAMUSCULAR; INTRAVENOUS ONCE
Status: COMPLETED | OUTPATIENT
Start: 2022-10-04 | End: 2022-10-04

## 2022-10-04 RX ORDER — ACETAMINOPHEN 325 MG/1
650 TABLET ORAL EVERY 4 HOURS PRN
Status: DISCONTINUED | OUTPATIENT
Start: 2022-10-04 | End: 2022-10-05 | Stop reason: HOSPADM

## 2022-10-04 RX ORDER — ASPIRIN 81 MG/1
81 TABLET, CHEWABLE ORAL DAILY
Status: DISCONTINUED | OUTPATIENT
Start: 2022-10-05 | End: 2022-10-05

## 2022-10-04 RX ORDER — CETIRIZINE HYDROCHLORIDE 10 MG/1
10 TABLET ORAL DAILY
Refills: 0 | Status: DISCONTINUED | OUTPATIENT
Start: 2022-10-05 | End: 2022-10-05 | Stop reason: HOSPADM

## 2022-10-04 RX ORDER — HYDROXYZINE HYDROCHLORIDE 25 MG/1
25 TABLET, FILM COATED ORAL DAILY PRN
Status: DISCONTINUED | OUTPATIENT
Start: 2022-10-04 | End: 2022-10-05 | Stop reason: HOSPADM

## 2022-10-04 RX ORDER — ONDANSETRON 2 MG/ML
4 INJECTION INTRAMUSCULAR; INTRAVENOUS EVERY 6 HOURS PRN
Status: DISCONTINUED | OUTPATIENT
Start: 2022-10-04 | End: 2022-10-05 | Stop reason: HOSPADM

## 2022-10-04 RX ORDER — CHOLECALCIFEROL (VITAMIN D3) 125 MCG
5 CAPSULE ORAL NIGHTLY PRN
Status: DISCONTINUED | OUTPATIENT
Start: 2022-10-04 | End: 2022-10-05 | Stop reason: HOSPADM

## 2022-10-04 RX ORDER — ALLOPURINOL 300 MG/1
300 TABLET ORAL 2 TIMES DAILY
Status: DISCONTINUED | OUTPATIENT
Start: 2022-10-04 | End: 2022-10-05 | Stop reason: HOSPADM

## 2022-10-04 RX ORDER — NICOTINE POLACRILEX 4 MG
15 LOZENGE BUCCAL
Status: DISCONTINUED | OUTPATIENT
Start: 2022-10-04 | End: 2022-10-05 | Stop reason: HOSPADM

## 2022-10-04 RX ORDER — ATORVASTATIN CALCIUM 20 MG/1
20 TABLET, FILM COATED ORAL NIGHTLY
Status: DISCONTINUED | OUTPATIENT
Start: 2022-10-04 | End: 2022-10-05 | Stop reason: HOSPADM

## 2022-10-04 RX ORDER — DEXTROSE MONOHYDRATE 25 G/50ML
25 INJECTION, SOLUTION INTRAVENOUS
Status: DISCONTINUED | OUTPATIENT
Start: 2022-10-04 | End: 2022-10-05 | Stop reason: HOSPADM

## 2022-10-04 RX ORDER — NITROGLYCERIN 0.4 MG/1
0.4 TABLET SUBLINGUAL
Status: DISCONTINUED | OUTPATIENT
Start: 2022-10-04 | End: 2022-10-05 | Stop reason: HOSPADM

## 2022-10-04 RX ORDER — AMLODIPINE BESYLATE 10 MG/1
10 TABLET ORAL DAILY
Status: DISCONTINUED | OUTPATIENT
Start: 2022-10-05 | End: 2022-10-05 | Stop reason: HOSPADM

## 2022-10-04 RX ORDER — ONDANSETRON 4 MG/1
4 TABLET, FILM COATED ORAL EVERY 6 HOURS PRN
Status: DISCONTINUED | OUTPATIENT
Start: 2022-10-04 | End: 2022-10-05 | Stop reason: HOSPADM

## 2022-10-04 RX ORDER — SODIUM CHLORIDE 0.9 % (FLUSH) 0.9 %
10 SYRINGE (ML) INJECTION EVERY 12 HOURS SCHEDULED
Status: DISCONTINUED | OUTPATIENT
Start: 2022-10-04 | End: 2022-10-05 | Stop reason: HOSPADM

## 2022-10-04 RX ORDER — HYDROCHLOROTHIAZIDE 25 MG/1
25 TABLET ORAL DAILY
Status: DISCONTINUED | OUTPATIENT
Start: 2022-10-05 | End: 2022-10-05 | Stop reason: HOSPADM

## 2022-10-04 RX ADMIN — PROCHLORPERAZINE EDISYLATE 10 MG: 5 INJECTION INTRAMUSCULAR; INTRAVENOUS at 17:20

## 2022-10-04 RX ADMIN — KETOROLAC TROMETHAMINE 15 MG: 15 INJECTION, SOLUTION INTRAMUSCULAR; INTRAVENOUS at 17:19

## 2022-10-04 RX ADMIN — SODIUM CHLORIDE 1000 ML: 9 INJECTION, SOLUTION INTRAVENOUS at 17:16

## 2022-10-04 RX ADMIN — DIPHENHYDRAMINE HYDROCHLORIDE 25 MG: 50 INJECTION, SOLUTION INTRAMUSCULAR; INTRAVENOUS at 17:20

## 2022-10-04 NOTE — ED NOTES
Pt states that Sunday afternoon he started with generalized headache. No relief with migraine meds. Pt states that last night pain became worse with tingling in head that spread to rest of body. Pt states that he had difficulty walking due to tingling. Tingling was bilateral. Pt reports stuttering which is baseline for his migraines.    This RN wore mask and goggles during time of contact

## 2022-10-04 NOTE — ED TRIAGE NOTES
Pt states that since yesterday morning he has had a headache, tingling throughout his whole body, weakness, and difficulty speaking. Complains of a migraine. Pt has a hx of migraines.     Patient masked at arrival and triage staff wore all appropriate PPE during entire encounter with patient.

## 2022-10-04 NOTE — ED PROVIDER NOTES
EMERGENCY DEPARTMENT ENCOUNTER    Room Number:  33/33  Date of encounter:  10/4/2022  PCP: Liana Hood MD  Historian: Patient, ex-wife    I used full protective equipment while examining this patient.  This includes face mask, gloves and protective eyewear.  I washed my hands before entering the room and immediately upon leaving the room.  Patient was wearing a surgical mask.      HPI:  Chief Complaint: Migraine headache  A complete HPI/ROS/PMH/PSH/SH/FH are unobtainable due to: None    Context: Pablo Mendoza is a 61 y.o. male, with a history of migraine headaches and TIA, who presents to the ED by private vehicle from home c/o of a migraine headache that began 2 nights ago.  Headache is generalized and throbbing.  Headache is currently severe.  Headache is worse with bright lights and loud noises.  This headache is similar to patient's previous migraines.  He reports getting severe migraines several times a year.  He has taken Maxalt and ibuprofen without relief.  He complains of nausea, dizziness, and body wide tingling.  He also complains of trouble walking due to both legs feeling weak.  He also reports intermittent stuttering.  He has had the symptoms with previous migraines as well.  Denies recent head injury, vision changes, fever, neck pain, cough, shortness of breath, chest pain, abdominal pain, or dysuria.  He is not on anticoagulants.      PAST MEDICAL HISTORY  Active Ambulatory Problems     Diagnosis Date Noted   • Type 2 diabetes mellitus, without long-term current use of insulin ([facility]) 01/19/2018   • Mixed hyperlipidemia 01/19/2018   • Essential hypertension 01/19/2018   • Gout of multiple sites 01/19/2018   • Nocturia 01/19/2018   • Migraine headache 04/02/2018   • Chronic maxillary sinusitis 04/10/2018   • Other fatigue 06/06/2018   • Arthritis 06/06/2018   • Routine health maintenance 08/10/2018   • Proteinuria 03/08/2019   • Cellulitis of left foot 05/02/2019   • Lumbar  radiculopathy 08/02/2021     Resolved Ambulatory Problems     Diagnosis Date Noted   • No Resolved Ambulatory Problems     Past Medical History:   Diagnosis Date   • Anesthesia complication    • COVID-19    • Diabetes mellitus (HCC)    • Elevated cholesterol    • Gout    • Hypertension    • Low back pain    • Migraines    • Neuropathy    • Numbness and tingling of both lower extremities    • Seizures (HCC)    • Staph infection 2010   • Stroke (HCC)    • TIA (transient ischemic attack)    • Weakness of both legs          PAST SURGICAL HISTORY  Past Surgical History:   Procedure Laterality Date   • CHOLECYSTECTOMY     • COLONOSCOPY     • HERNIA REPAIR     • LUMBAR FUSION N/A 11/3/2021    Procedure: Lumbar 3 to Lumbar 4 and Lumbar 4 to Lumbar 5 laminectomy with a fusion;  Surgeon: Jose Webster MD;  Location: [facility];  Service: Robotics - Neuro;  Laterality: N/A;   • SINUS SURGERY     • TYMPANOPLASTY Bilateral          FAMILY HISTORY  Family History   Problem Relation Age of Onset   • Heart disease Mother    • Hypertension Father    • Throat cancer Maternal Grandmother    • Diabetes Paternal Grandmother    • Diabetes Sister    • Stroke Brother 59   • Diabetes Sister    • Hypertension Brother    • Hyperlipidemia Brother    • Malig Hyperthermia Neg Hx          SOCIAL HISTORY  Social History     Socioeconomic History   • Marital status: Single   Tobacco Use   • Smoking status: Never Smoker   • Smokeless tobacco: Never Used   Vaping Use   • Vaping Use: Never used   Substance and Sexual Activity   • Alcohol use: Yes     Comment: 3 DRINKS WEEKLY   • Drug use: Not Currently   • Sexual activity: Yes     Partners: Female         ALLERGIES  Aspirin and Lisinopril       REVIEW OF SYSTEMS  Review of Systems      All systems have been reviewed and are negative except as as discussed in the HPI    PHYSICAL EXAM    I have reviewed the triage vital signs and nursing notes.    ED Triage Vitals [10/04/22 1550]   Temp Heart  Rate Resp BP SpO2   97.5 °F (36.4 °C) 78 16 144/83 93 %      Temp src Heart Rate Source Patient Position BP Location FiO2 (%)   Tympanic Monitor Sitting -- --       Physical Exam  GENERAL: Awake, alert, oriented x3.  Well-developed, well-nourished male.  Resting comfortably in no acute distress  HENT: NCAT, nares patent, moist mucous membranes, no sinus tenderness, no temporal artery tenderness  NECK: supple, no meningismus  EYES: Extraocular muscles intact, no nystagmus, PERRLA  CV: regular rhythm, regular rate  RESPIRATORY: normal effort, clear to auscultation bilaterally  ABDOMEN: soft, nontender  MUSCULOSKELETAL: Extremities are nontender and without obvious deformity.  There is no calf tenderness or pedal edema  NEURO: Speech is somewhat slow with occasional stuttering.  No dysarthria.  No expressive or receptive aphasia.  No facial droop.  Normal strength and light touch sensation in all extremities.  Normal finger-nose and heel-to-shin testing bilaterally.  Extinction testing is inconsistent.  Visual fields are normal upon confrontation.  NIHSS is 1 (see below for details) sitting  SKIN: warm, dry, no rash  PSYCH: Normal mood.  Affect is somewhat flat    Interval: baseline  1a. Level of Consciousness: 0-->Alert, keenly responsive  1b. LOC Questions: 0-->Answers both questions correctly  1c. LOC Commands: 0-->Performs both tasks correctly  2. Best Gaze: 0-->Normal  3. Visual: 0-->No visual loss  4. Facial Palsy: 0-->Normal symmetrical movements  5a. Motor Arm, Left: 0-->No drift, limb holds 90 (or 45) degrees for full 10 secs  5b. Motor Arm, Right: 0-->No drift, limb holds 90 (or 45) degrees for full 10 secs  6a. Motor Leg, Left: 0-->No drift, leg holds 30 degree position for full 5 secs  6b. Motor Leg, Right: 0-->No drift, leg holds 30 degree position for full 5 secs  7. Limb Ataxia: 0-->Absent  8. Sensory: 0-->Normal, no sensory loss  9. Best Language: 0-->No aphasia, normal  10. Dysarthria: 0-->Normal  11.  Extinction and Inattention (formerly Neglect): 1-->Visual, tactile, auditory, spatial, or personal inattention or extinction to bilateral simultaneous stimulation in one of the sensory modalities (Exam was inconsistent in the arms and legs)    Total (NIH Stroke Scale): 1        LAB RESULTS  Recent Results (from the past 24 hour(s))   Green Top (Gel)    Collection Time: 10/04/22  4:22 PM   Result Value Ref Range    Extra Tube Hold for add-ons.    Lavender Top    Collection Time: 10/04/22  4:22 PM   Result Value Ref Range    Extra Tube hold for add-on    Light Blue Top    Collection Time: 10/04/22  4:22 PM   Result Value Ref Range    Extra Tube Hold for add-ons.    Comprehensive Metabolic Panel    Collection Time: 10/04/22  4:22 PM    Specimen: Blood   Result Value Ref Range    Glucose 192 (H) 65 - 99 mg/dL    BUN 20 6 - 20 mg/dL    Creatinine 1.24 0.76 - 1.27 mg/dL    Sodium 136 136 - 145 mmol/L    Potassium 3.6 3.5 - 5.2 mmol/L    Chloride 96 (L) 98 - 107 mmol/L    CO2 23.6 22.0 - 29.0 mmol/L    Calcium 10.4 8.6 - 10.5 mg/dL    Total Protein 8.1 6.0 - 8.5 g/dL    Albumin 5.20 3.50 - 5.20 g/dL    ALT (SGPT) 23 1 - 41 U/L    AST (SGOT) 22 1 - 40 U/L    Alkaline Phosphatase 131 (H) 39 - 117 U/L    Total Bilirubin 0.7 0.0 - 1.2 mg/dL    Globulin 2.9 gm/dL    A/G Ratio 1.8 g/dL    BUN/Creatinine Ratio 16.1 7.0 - 25.0    Anion Gap 16.4 (H) 5.0 - 15.0 mmol/L    eGFR 67.0 >60.0 mL/min/1.73   Troponin    Collection Time: 10/04/22  4:22 PM    Specimen: Blood   Result Value Ref Range    Troponin T <0.010 0.000 - 0.030 ng/mL   CBC Auto Differential    Collection Time: 10/04/22  4:22 PM    Specimen: Blood   Result Value Ref Range    WBC 11.32 (H) 3.40 - 10.80 10*3/mm3    RBC 6.51 (H) 4.14 - 5.80 10*6/mm3    Hemoglobin 19.1 (H) 13.0 - 17.7 g/dL    Hematocrit 53.6 (H) 37.5 - 51.0 %    MCV 82.3 79.0 - 97.0 fL    MCH 29.3 26.6 - 33.0 pg    MCHC 35.6 31.5 - 35.7 g/dL    RDW 16.7 (H) 12.3 - 15.4 %    RDW-SD 43.6 37.0 - 54.0 fl    MPV  10.1 6.0 - 12.0 fL    Platelets 253 140 - 450 10*3/mm3    Neutrophil % 77.8 (H) 42.7 - 76.0 %    Lymphocyte % 16.9 (L) 19.6 - 45.3 %    Monocyte % 4.0 (L) 5.0 - 12.0 %    Eosinophil % 0.5 0.3 - 6.2 %    Basophil % 0.4 0.0 - 1.5 %    Immature Grans % 0.4 0.0 - 0.5 %    Neutrophils, Absolute 8.80 (H) 1.70 - 7.00 10*3/mm3    Lymphocytes, Absolute 1.91 0.70 - 3.10 10*3/mm3    Monocytes, Absolute 0.45 0.10 - 0.90 10*3/mm3    Eosinophils, Absolute 0.06 0.00 - 0.40 10*3/mm3    Basophils, Absolute 0.05 0.00 - 0.20 10*3/mm3    Immature Grans, Absolute 0.05 0.00 - 0.05 10*3/mm3    nRBC 0.0 0.0 - 0.2 /100 WBC   ECG 12 Lead    Collection Time: 10/04/22  5:18 PM   Result Value Ref Range    QT Interval 403 ms       Ordered the above labs and independently reviewed the results.      RADIOLOGY  CT Head Without Contrast    Result Date: 10/4/2022  EXAM:  CT HEAD WO CONTRAST-  HISTORY:  Headache, speech problem, bilateral leg tingling.  COMPARISON:  MR brain 11/10/2015  TECHNIQUE: Noncontrast images of the brain were obtained. Reformatted images were reviewed. Radiation dose reduction techniques were utilized, including automated exposure control and exposure modulation based on body size.  FINDINGS:   The ventricles and sulci are within normal limits.  No acute intracranial hemorrhage or pathologic extra-axial collection is identified.  There is no midline shift or mass effect.  The basal cisterns are patent.  The grey-white matter differentiation is maintained. There is diffuse high attenuation of the vasculature.       No acute intracranial hemorrhage. Diffuse high attenuation of the vasculature, which can be seen with underlying polycythemia, severe dehydration/hemoconcentration, or an elevated hematocrit. Consider further evaluation with MRI/MRA.  Discussed with Dr. Tran at 6:00 PM.  This report was finalized on 10/4/2022 6:09 PM by Dr. Shalonda SHARIF I ordered the above noted radiological studies. Reviewed by me  and discussed with radiologist.  See dictation for official radiology interpretation.      PROCEDURES  Procedures      MEDICATIONS GIVEN IN ER    Medications   sodium chloride 0.9 % flush 10 mL (has no administration in time range)   nitroglycerin (NITROSTAT) SL tablet 0.4 mg (has no administration in time range)   sodium chloride 0.9 % flush 10 mL (has no administration in time range)   sodium chloride 0.9 % flush 10 mL (has no administration in time range)   ondansetron (ZOFRAN) tablet 4 mg (has no administration in time range)     Or   ondansetron (ZOFRAN) injection 4 mg (has no administration in time range)   acetaminophen (TYLENOL) tablet 650 mg (has no administration in time range)   melatonin tablet 5 mg (has no administration in time range)   sodium chloride 0.9 % bolus 1,000 mL (0 mL Intravenous Stopped 10/4/22 1834)   diphenhydrAMINE (BENADRYL) injection 25 mg (25 mg Intravenous Given 10/4/22 1720)   prochlorperazine (COMPAZINE) injection 10 mg (10 mg Intravenous Given 10/4/22 1720)   ketorolac (TORADOL) injection 15 mg (15 mg Intravenous Given 10/4/22 1719)         PROGRESS, DATA ANALYSIS, CONSULTS, AND MEDICAL DECISION MAKING    All labs have been independently reviewed by me.  All radiology studies have been reviewed by me and discussed with radiologist dictating the report.   EKG's independently viewed and interpreted by me.  I have reviewed the nurse's notes, vital signs, past medical history, and medication list.  Discussion below represents my analysis of pertinent findings related to patient's condition, differential diagnosis, treatment plan and final disposition.    Differential diagnosis for headache includes but is not limited to:  - subarachnoid hemorrhage  - intracranial mass  - stroke  - RCVS  - meningitis  - glaucoma  - giant cell arteritis  - CO poisoning  - cerebral venous sinus thrombosis      ED Course as of 10/04/22 1853   Tue Oct 04, 2022   1657 Old records reviewed.  Patient was last  admitted here in November 2021 for L3-L5 laminectomy and fusion.  He does not have any prior ED visits here. [WH]   1708 Patient presents to the ED complaining of a migraine headache for the past day and a half.  He reports body wide tingling and dizziness.  NIHSS was 1.  He is not a thrombolytic or interventional candidate as his symptoms have been present for over 36 hours.  We will obtain labs and head CT for further evaluation.  Patient will be given IV medications for symptomatic treatment. [WH]   1741 EKG          EKG time: 1718  Rhythm/Rate: Sinus rhythm, rate 74  P waves and AR: Normal  QRS, axis: LAD, anterior and inferior Q waves  ST and T waves: Normal    Interpreted Contemporaneously by me at 1721, independently viewed  EKG is not significantly changed compared to prior EKG done on 10/25/2021   [WH]   1754 WBC(!): 11.32 [WH]   1755 Hemoglobin(!): 19.1  17.9 one month ago [WH]   1755 Troponin T: <0.010 [WH]   1819 Results of the head CT discussed with Dr. Hurd.  Images have been reviewed by me.  There is no acute hemorrhage.  There is diffuse high attenuation of the vasculature which can be seen with underlying polycythemia, severe dehydration, hemoconcentration, or an elevated hematocrit.  Consider MRI/MRI for further evaluation. [WH]   1832 Test results discussed with the patient.  Headache has improved and is currently 4/10.  He has never been told that his hemoglobin is elevated.  Denies history of polycythemia.  Care decision making was discussed and the patient is agreeable to being admitted for further work-up including MRI and MRA of the brain. [WH]   1843 Case discussed with NESSA Silver, and she agrees to admit the patient to Dr. Lloyd.  Pertinent history, exam findings, test results, ED course, and diagnoses were discussed with her [WH]   1853 Patient presented to the ED complaining of constant migraine headache for the past 36 hours or so.  Neuro exam was nonfocal except for  abnormal extinction testing.  Head CT was negative for hemorrhage but there was hyperattenuation of the vasculature.  Patient's hemoglobin was 19.1.  Headache improved, but did not completely resolve, with IV medications and IV fluids.  Patient will be admitted for further evaluation. [WH]      ED Course User Index  [WH] Woody Tran MD       AS OF 18:53 EDT VITALS:    BP - 129/88  HR - 75  TEMP - 97.5 °F (36.4 °C) (Tympanic)  O2 SATS - 92%      DIAGNOSIS  Final diagnoses:   Intractable migraine without aura and without status migrainosus   Elevated hemoglobin (HCC)   Dizziness         DISPOSITION  ADMISSION    Discussed treatment plan and reason for admission with pt/family and admitting physician.  Pt/family voiced understanding of the plan for admission for further testing/treatment as needed.         Dictated utilizing Dragon dictation     Woody Tran MD  10/04/22 8692

## 2022-10-04 NOTE — CASE MANAGEMENT/SOCIAL WORK
caseDischarge Planning Assessment  [facility]     Patient Name: Pablo Mendoza  MRN: [id number]  Today's Date: 10/4/2022    Admit Date: 10/4/2022    Plan: Discharge home   Discharge Needs Assessment     Row Name 10/04/22 1905       Living Environment    People in Home alone    Current Living Arrangements home    Primary Care Provided by self    Provides Primary Care For no one    Family Caregiver if Needed other (see comments)  Patients ex-wife Le Baker assist the patient if needed.    Able to Return to Prior Arrangements yes       Resource/Environmental Concerns    Resource/Environmental Concerns none    Transportation Concerns none       Transition Planning    Patient/Family Anticipates Transition to home    Patient/Family Anticipated Services at Transition none    Transportation Anticipated family or friend will provide       Discharge Needs Assessment    Readmission Within the Last 30 Days no previous admission in last 30 days    Equipment Currently Used at Home none    Concerns to be Addressed no discharge needs identified;denies needs/concerns at this time    Anticipated Changes Related to Illness none    Equipment Needed After Discharge none    Patient's Choice of Community Agency(s) Denies any needs at discharge               Discharge Plan     Row Name 10/04/22 3811       Plan    Plan Discharge home    Patient/Family in Agreement with Plan yes    Plan Comments I entered the patients room in proper PPE, introduced myself and my role.  The patient was accompanied by his ex-wife Le Baker and permission was given to speak with her in the room.  The patient states that he uses [facility] at [phone].  He denies ever having used Home Health or Rehab.  Le will transport him home when he is discharged.  At this time the patient denies any needs at discharge.  I did encourage him to request Case Management should his needs change.  He verbalized understanding and had no further  questions/concerns at this time.              Continued Care and Services - Admitted Since 10/4/2022    Coordination has not been started for this encounter.          Demographic Summary    No documentation.                Functional Status    No documentation.                Psychosocial    No documentation.                Abuse/Neglect    No documentation.                Legal    No documentation.                Substance Abuse    No documentation.                Patient Forms    No documentation.                   Sandie Gray RN

## 2022-10-04 NOTE — ED NOTES
Nursing report ED to floor  Pablo Mendoza  61 y.o.  male    HPI :   Chief Complaint   Patient presents with   • Speech Problem   • Headache       Admitting doctor:   Bradley Lloyd MD    Admitting diagnosis:   The primary encounter diagnosis was Intractable migraine without aura and without status migrainosus. Diagnoses of Elevated hemoglobin (HCC) and Dizziness were also pertinent to this visit.    Code status:   Current Code Status     Date Active Code Status Order ID Comments User Context       10/4/2022 1849 CPR (Attempt to Resuscitate) [id number]  Brigitte Bell PA-C ED     Advance Care Planning Activity      Questions for Current Code Status     Question Answer    Code Status (Patient has no pulse and is not breathing) CPR (Attempt to Resuscitate)    Medical Interventions (Patient has pulse or is breathing) Full Support          Allergies:   Aspirin and Lisinopril    Isolation:   No active isolations    Intake and Output    Intake/Output Summary (Last 24 hours) at 10/4/2022 1918  Last data filed at 10/4/2022 1834  Gross per 24 hour   Intake 1000 ml   Output --   Net 1000 ml       Weight:       10/04/22  1609   Weight: 92.9 kg (204 lb 12.9 oz)       Most recent vitals:   Vitals:    10/04/22 1701 10/04/22 1801 10/04/22 1831 10/04/22 1901   BP: 158/90 129/79 129/88 117/75   Patient Position:       Pulse: 75 72 75 71   Resp:       Temp:       TempSrc:       SpO2: 94% 92% 92% 93%   Weight:           Active LDAs/IV Access:   Lines, Drains & Airways     Active LDAs     Name Placement date Placement time Site Days    Peripheral IV 10/04/22 1610 Right Antecubital 10/04/22  1610  Antecubital  less than 1                Labs (abnormal labs have a star):   Labs Reviewed   COMPREHENSIVE METABOLIC PANEL - Abnormal; Notable for the following components:       Result Value    Glucose 192 (*)     Chloride 96 (*)     Alkaline Phosphatase 131 (*)     Anion Gap 16.4 (*)     All other components within normal limits     Narrative:     GFR Normal >60  Chronic Kidney Disease <60  Kidney Failure <15     CBC WITH AUTO DIFFERENTIAL - Abnormal; Notable for the following components:    WBC 11.32 (*)     RBC 6.51 (*)     Hemoglobin 19.1 (*)     Hematocrit 53.6 (*)     RDW 16.7 (*)     Neutrophil % 77.8 (*)     Lymphocyte % 16.9 (*)     Monocyte % 4.0 (*)     Neutrophils, Absolute 8.80 (*)     All other components within normal limits   LIPID PANEL - Abnormal; Notable for the following components:    Triglycerides 307 (*)     HDL Cholesterol 36 (*)     VLDL Cholesterol 48 (*)     All other components within normal limits    Narrative:     Cholesterol Reference Ranges  (U.S. Department of Health and Human Services ATP III Classifications)    Desirable          <200 mg/dL  Borderline High    200-239 mg/dL  High Risk          >240 mg/dL      Triglyceride Reference Ranges  (U.S. Department of Health and Human Services ATP III Classifications)    Normal           <150 mg/dL  Borderline High  150-199 mg/dL  High             200-499 mg/dL  Very High        >500 mg/dL    HDL Reference Ranges  (U.S. Department of Health and Human Services ATP III Classifications)    Low     <40 mg/dl (major risk factor for CHD)  High    >60 mg/dl ('negative' risk factor for CHD)        LDL Reference Ranges  (U.S. Department of Health and Human Services ATP III Classifications)    Optimal          <100 mg/dL  Near Optimal     100-129 mg/dL  Borderline High  130-159 mg/dL  High             160-189 mg/dL  Very High        >189 mg/dL   TROPONIN (IN-HOUSE) - Normal    Narrative:     Troponin T Reference Range:  <= 0.03 ng/mL-   Negative for AMI  >0.03 ng/mL-     Abnormal for myocardial necrosis.  Clinicians would have to utilize clinical acumen, EKG, Troponin and serial changes to determine if it is an Acute Myocardial Infarction or myocardial injury due to an underlying chronic condition.       Results may be falsely decreased if patient taking Biotin.     RAINBOW DRAW     Narrative:     The following orders were created for panel order [place] Draw.  Procedure                               Abnormality         Status                     ---------                               -----------         ------                     Green Top (Gel)[[id number]]                                  Final result               Lavender Top[[id number]]                                     Final result               Light Blue Top[[id number]]                                   Final result                 Please view results for these tests on the individual orders.   HEMOGLOBIN A1C   GREEN TOP   LAVENDER TOP   LIGHT BLUE TOP   CBC AND DIFFERENTIAL    Narrative:     The following orders were created for panel order CBC & Differential.  Procedure                               Abnormality         Status                     ---------                               -----------         ------                     CBC Auto Differential[[id number]]        Abnormal            Final result                 Please view results for these tests on the individual orders.       EKG:   ECG 12 Lead   Final Result   HEART RATE= 74  bpm   RR Interval= 811  ms   TX Interval= 178  ms   P Horizontal Axis=   deg   P Front Axis= 20  deg   QRSD Interval= 103  ms   QT Interval= 403  ms   QRS Axis= -57  deg   T Wave Axis= 21  deg   - ABNORMAL ECG -   Sinus rhythm   Inferior infarct, old   POOR R WAVE PROGRESSION   Since prior tracing  poor r wave progression is new   Electronically Signed By: Valdez Fowler ([facility]) 04-Oct-2022 18:29:09   Date and Time of Study: 2022-10-04 17:18:57          Meds given in ED:   Medications   sodium chloride 0.9 % flush 10 mL (has no administration in time range)   nitroglycerin (NITROSTAT) SL tablet 0.4 mg (has no administration in time range)   sodium chloride 0.9 % flush 10 mL (has no administration in time range)   sodium chloride 0.9 % flush 10 mL (has no administration in time range)   ondansetron  (ZOFRAN) tablet 4 mg (has no administration in time range)     Or   ondansetron (ZOFRAN) injection 4 mg (has no administration in time range)   acetaminophen (TYLENOL) tablet 650 mg (has no administration in time range)   melatonin tablet 5 mg (has no administration in time range)   sodium chloride 0.9 % bolus 1,000 mL (0 mL Intravenous Stopped 10/4/22 1834)   diphenhydrAMINE (BENADRYL) injection 25 mg (25 mg Intravenous Given 10/4/22 1720)   prochlorperazine (COMPAZINE) injection 10 mg (10 mg Intravenous Given 10/4/22 1720)   ketorolac (TORADOL) injection 15 mg (15 mg Intravenous Given 10/4/22 1719)       Imaging results:  CT Head Without Contrast    Result Date: 10/4/2022  No acute intracranial hemorrhage. Diffuse high attenuation of the vasculature, which can be seen with underlying polycythemia, severe dehydration/hemoconcentration, or an elevated hematocrit. Consider further evaluation with MRI/MRA.  Discussed with Dr. Tran at 6:00 PM.  This report was finalized on 10/4/2022 6:09 PM by Dr. Shalonda Hurd M.D.        Ambulatory status:   - adlib    Social issues:   Social History     Socioeconomic History   • Marital status: Single   Tobacco Use   • Smoking status: Never Smoker   • Smokeless tobacco: Never Used   Vaping Use   • Vaping Use: Never used   Substance and Sexual Activity   • Alcohol use: Yes     Comment: 3 DRINKS WEEKLY   • Drug use: Not Currently   • Sexual activity: Yes     Partners: Female       NIH Stroke Scale:  Interval: baseline      Liana Gamez RN  10/04/22 19:18 EDT

## 2022-10-05 ENCOUNTER — APPOINTMENT (OUTPATIENT)
Dept: MRI IMAGING | Facility: HOSPITAL | Age: 62
End: 2022-10-05

## 2022-10-05 ENCOUNTER — READMISSION MANAGEMENT (OUTPATIENT)
Dept: CALL CENTER | Facility: HOSPITAL | Age: 62
End: 2022-10-05

## 2022-10-05 VITALS
SYSTOLIC BLOOD PRESSURE: 114 MMHG | HEART RATE: 92 BPM | TEMPERATURE: 97 F | WEIGHT: 200 LBS | RESPIRATION RATE: 18 BRPM | DIASTOLIC BLOOD PRESSURE: 72 MMHG | OXYGEN SATURATION: 97 % | HEIGHT: 67 IN | BODY MASS INDEX: 31.39 KG/M2

## 2022-10-05 LAB
ANION GAP SERPL CALCULATED.3IONS-SCNC: 13 MMOL/L (ref 5–15)
BUN SERPL-MCNC: 23 MG/DL (ref 6–20)
BUN/CREAT SERPL: 19.8 (ref 7–25)
CALCIUM SPEC-SCNC: 9.2 MG/DL (ref 8.6–10.5)
CHLORIDE SERPL-SCNC: 102 MMOL/L (ref 98–107)
CO2 SERPL-SCNC: 22 MMOL/L (ref 22–29)
CREAT SERPL-MCNC: 1.16 MG/DL (ref 0.76–1.27)
DEPRECATED RDW RBC AUTO: 47.7 FL (ref 37–54)
EGFRCR SERPLBLD CKD-EPI 2021: 72.6 ML/MIN/1.73
ERYTHROCYTE [DISTWIDTH] IN BLOOD BY AUTOMATED COUNT: 15.5 % (ref 12.3–15.4)
GLUCOSE BLDC GLUCOMTR-MCNC: 142 MG/DL (ref 70–130)
GLUCOSE BLDC GLUCOMTR-MCNC: 213 MG/DL (ref 70–130)
GLUCOSE SERPL-MCNC: 165 MG/DL (ref 65–99)
HCT VFR BLD AUTO: 50.8 % (ref 37.5–51)
HGB BLD-MCNC: 16.5 G/DL (ref 13–17.7)
MAGNESIUM SERPL-MCNC: 2.1 MG/DL (ref 1.6–2.6)
MCH RBC QN AUTO: 28.2 PG (ref 26.6–33)
MCHC RBC AUTO-ENTMCNC: 32.5 G/DL (ref 31.5–35.7)
MCV RBC AUTO: 86.8 FL (ref 79–97)
PLATELET # BLD AUTO: 209 10*3/MM3 (ref 140–450)
PMV BLD AUTO: 10.4 FL (ref 6–12)
POTASSIUM SERPL-SCNC: 3.5 MMOL/L (ref 3.5–5.2)
RBC # BLD AUTO: 5.85 10*6/MM3 (ref 4.14–5.8)
SODIUM SERPL-SCNC: 137 MMOL/L (ref 136–145)
WBC NRBC COR # BLD: 11.35 10*3/MM3 (ref 3.4–10.8)

## 2022-10-05 PROCEDURE — 83735 ASSAY OF MAGNESIUM: CPT | Performed by: STUDENT IN AN ORGANIZED HEALTH CARE EDUCATION/TRAINING PROGRAM

## 2022-10-05 PROCEDURE — 82962 GLUCOSE BLOOD TEST: CPT

## 2022-10-05 PROCEDURE — G0378 HOSPITAL OBSERVATION PER HR: HCPCS

## 2022-10-05 PROCEDURE — 99214 OFFICE O/P EST MOD 30 MIN: CPT | Performed by: NURSE PRACTITIONER

## 2022-10-05 PROCEDURE — 25010000002 DIPHENHYDRAMINE PER 50 MG: Performed by: STUDENT IN AN ORGANIZED HEALTH CARE EDUCATION/TRAINING PROGRAM

## 2022-10-05 PROCEDURE — 80048 BASIC METABOLIC PNL TOTAL CA: CPT | Performed by: STUDENT IN AN ORGANIZED HEALTH CARE EDUCATION/TRAINING PROGRAM

## 2022-10-05 PROCEDURE — 96365 THER/PROPH/DIAG IV INF INIT: CPT

## 2022-10-05 PROCEDURE — 85027 COMPLETE CBC AUTOMATED: CPT | Performed by: STUDENT IN AN ORGANIZED HEALTH CARE EDUCATION/TRAINING PROGRAM

## 2022-10-05 PROCEDURE — 96376 TX/PRO/DX INJ SAME DRUG ADON: CPT

## 2022-10-05 PROCEDURE — 25010000002 MAGNESIUM SULFATE IN D5W 1G/100ML (PREMIX) 1-5 GM/100ML-% SOLUTION: Performed by: STUDENT IN AN ORGANIZED HEALTH CARE EDUCATION/TRAINING PROGRAM

## 2022-10-05 PROCEDURE — 25010000002 PROCHLORPERAZINE 10 MG/2ML SOLUTION: Performed by: STUDENT IN AN ORGANIZED HEALTH CARE EDUCATION/TRAINING PROGRAM

## 2022-10-05 PROCEDURE — 70544 MR ANGIOGRAPHY HEAD W/O DYE: CPT

## 2022-10-05 PROCEDURE — 25010000002 KETOROLAC TROMETHAMINE PER 15 MG: Performed by: STUDENT IN AN ORGANIZED HEALTH CARE EDUCATION/TRAINING PROGRAM

## 2022-10-05 PROCEDURE — 70551 MRI BRAIN STEM W/O DYE: CPT

## 2022-10-05 RX ORDER — MAGNESIUM SULFATE 1 G/100ML
1 INJECTION INTRAVENOUS
Status: COMPLETED | OUTPATIENT
Start: 2022-10-05 | End: 2022-10-05

## 2022-10-05 RX ORDER — DIPHENHYDRAMINE HYDROCHLORIDE 50 MG/ML
25 INJECTION INTRAMUSCULAR; INTRAVENOUS ONCE
Status: COMPLETED | OUTPATIENT
Start: 2022-10-05 | End: 2022-10-05

## 2022-10-05 RX ORDER — KETOROLAC TROMETHAMINE 30 MG/ML
30 INJECTION, SOLUTION INTRAMUSCULAR; INTRAVENOUS ONCE
Status: COMPLETED | OUTPATIENT
Start: 2022-10-05 | End: 2022-10-05

## 2022-10-05 RX ORDER — PROCHLORPERAZINE EDISYLATE 5 MG/ML
10 INJECTION INTRAMUSCULAR; INTRAVENOUS ONCE
Status: COMPLETED | OUTPATIENT
Start: 2022-10-05 | End: 2022-10-05

## 2022-10-05 RX ORDER — ATORVASTATIN CALCIUM 40 MG/1
40 TABLET, FILM COATED ORAL DAILY
Qty: 90 TABLET | Refills: 1 | Status: SHIPPED | OUTPATIENT
Start: 2022-10-05

## 2022-10-05 RX ADMIN — HYDROCHLOROTHIAZIDE 25 MG: 25 TABLET ORAL at 08:27

## 2022-10-05 RX ADMIN — FLUTICASONE PROPIONATE 2 SPRAY: 50 SPRAY, METERED NASAL at 08:26

## 2022-10-05 RX ADMIN — GLIPIZIDE 5 MG: 5 TABLET ORAL at 11:38

## 2022-10-05 RX ADMIN — ALLOPURINOL 300 MG: 300 TABLET ORAL at 00:19

## 2022-10-05 RX ADMIN — MAGNESIUM SULFATE HEPTAHYDRATE 1 G: 1 INJECTION, SOLUTION INTRAVENOUS at 09:57

## 2022-10-05 RX ADMIN — ALLOPURINOL 300 MG: 300 TABLET ORAL at 08:28

## 2022-10-05 RX ADMIN — SERTRALINE HYDROCHLORIDE 50 MG: 50 TABLET, FILM COATED ORAL at 08:28

## 2022-10-05 RX ADMIN — ATORVASTATIN CALCIUM 20 MG: 20 TABLET, FILM COATED ORAL at 00:19

## 2022-10-05 RX ADMIN — KETOROLAC TROMETHAMINE 30 MG: 30 INJECTION, SOLUTION INTRAMUSCULAR at 08:29

## 2022-10-05 RX ADMIN — EMPAGLIFLOZIN 25 MG: 25 TABLET, FILM COATED ORAL at 09:37

## 2022-10-05 RX ADMIN — PROCHLORPERAZINE EDISYLATE 10 MG: 5 INJECTION INTRAMUSCULAR; INTRAVENOUS at 08:29

## 2022-10-05 RX ADMIN — AMLODIPINE BESYLATE 10 MG: 10 TABLET ORAL at 08:27

## 2022-10-05 RX ADMIN — CETIRIZINE HYDROCHLORIDE 10 MG: 10 TABLET ORAL at 08:27

## 2022-10-05 RX ADMIN — DIPHENHYDRAMINE HYDROCHLORIDE 25 MG: 50 INJECTION INTRAMUSCULAR; INTRAVENOUS at 08:28

## 2022-10-05 RX ADMIN — METOPROLOL TARTRATE 100 MG: 25 TABLET ORAL at 08:27

## 2022-10-05 RX ADMIN — Medication 10 ML: at 00:19

## 2022-10-05 RX ADMIN — Medication 10 ML: at 08:35

## 2022-10-05 NOTE — PROGRESS NOTES
ED OBSERVATION PROGRESS/DISCHARGE SUMMARY    Date of Admission: 10/4/2022   LOS: 0 days   PCP: Liana Hood MD    Subjective    No acute events overnight.   States his headache has significantly improved.  He states he does feel a bit tired but reports he is feeling much better than when he came in.  He denies any visual changes.  Denies any numbness and tingling or focal weakness.  Denies any chest pain or shortness of breath.  Denies any abdominal pain or nausea.  Denies fevers and chills.    Hospital Outcome:   61-year-old male admitted to the observation unit for further evaluation of headache and dizziness.  CT of the head without shows no evidence of acute intracranial hemorrhage with note of diffuse high attenuation of the vasculature that can be seen with underlying polycythemia.  MRA of the head and MRI of the brain without shows no evidence of acute infarction with no flow-limiting stenosis or occlusion.  Patient's A1c noted at 7.2 which is improved from 7.4 in August.  Lipid panel reveals triglycerides of 307, VLDL 48 and HDL of 36.  Will increase patient's atorvastatin to 40 mg nightly.  And have him follow-up with his primary care provider.  Patient was noted on initial lab work with a hemoglobin of 19.1 that improved to 16.5 overnight with IV fluid hydration.  Chart review notes patient in August noted with a hemoglobin of 17.9.  Will place a referral for outpatient follow-up with hematology for further evaluation for polycythemia.  Patient received migraine cocktail with toradol, benadryl, compazine and IV mag sulfate. Symptoms resolved at this time. Neurology saw and evaluated the patient, discussed with the patient about starting Ubrelvy and will have samples available for him in the office.  Patient can follow-up with them as needed.  It was noted with nocturnal hypoxia dropping into the 80s while sleeping, nocturnal O2 as needed.  Patient does report history of TORSTEN but was told he  could discontinue his CPAP.  A referral for outpatient sleep medicine follow-up has been placed.  Patient was also noted with hypertriglyceridemia and I have increased his atorvastatin to 40 mg nightly.  I discussed all of the findings and plan for discharge with the patient.  He endorses understanding is in agreement.    ROS:  General: no fevers, chills  Respiratory: no cough, dyspnea  Cardiovascular: no chest pain, palpitations  Abdomen: No abdominal pain, nausea, vomiting, or diarrhea  Neurologic: No focal weakness    Objective   Physical Exam:  I have reviewed the vital signs.  Temp:  [97.5 °F (36.4 °C)-98.5 °F (36.9 °C)] 98.5 °F (36.9 °C)  Heart Rate:  [69-81] 77  Resp:  [16-18] 16  BP: (117-158)/(72-90) 117/77  General Appearance:  61-year-old male, well-nourished, no acute distress on room air  Head:    Normocephalic, atraumatic  Eyes:    Sclerae anicteric  Neck:   Supple, no mass  Lungs: Clear to auscultation bilaterally, respirations unlabored  Heart: Regular rate and rhythm, S1 and S2 normal, no murmur, rub or gallop  Abdomen:  Soft, non-tender, bowel sounds active, nondistended  Extremities: No clubbing, cyanosis, or edema to lower extremities  Pulses:  2+ and symmetric in distal lower extremities  Skin: No rashes   Neurologic: Oriented x3, Normal strength to extremities    Results Review:    I have reviewed the labs, radiology results and diagnostic studies.    Results from last 7 days   Lab Units 10/05/22  0413   WBC 10*3/mm3 11.35*   HEMOGLOBIN g/dL 16.5   HEMATOCRIT % 50.8   PLATELETS 10*3/mm3 209     Results from last 7 days   Lab Units 10/05/22  0413 10/04/22  1622   SODIUM mmol/L 137 136   POTASSIUM mmol/L 3.5 3.6   CHLORIDE mmol/L 102 96*   CO2 mmol/L 22.0 23.6   BUN mg/dL 23* 20   CREATININE mg/dL 1.16 1.24   CALCIUM mg/dL 9.2 10.4   BILIRUBIN mg/dL  --  0.7   ALK PHOS U/L  --  131*   ALT (SGPT) U/L  --  23   AST (SGOT) U/L  --  22   GLUCOSE mg/dL 165* 192*     Imaging Results (Last 24 Hours)      Procedure Component Value Units Date/Time    MRI Angiogram Head Without Contrast [[id number]] Collected: 10/05/22 0728     Updated: 10/05/22 0744    Narrative:      PROCEDURE:  MRI ANGIOGRAM HEAD WO CONTRAST-, MRI BRAIN WO CONTRAST-     CLINICAL HISTORY:  Transient ischemic attack, migraines.     TECHNIQUE: Multiplanar T1 and T2-weighted images of the brain were  obtained without intravenous contrast. Time-of-flight MRA of the head  was performed.     COMPARISON:  CT head without contrast 10/4/2022. MR brain and MRA head  11/10/2015.     FINDINGS:       MR BRAIN: The ventricles and sulci are within normal limits. No acute  intracranial hemorrhage or pathologic extra-axial fluid collection is  identified.  There is no abnormal parenchymal signal intensity.  The  gray-white differentiation is maintained.  There is no restricted  diffusion to suggest acute infarct.   There is no midline shift or mass  effect.  The basal cisterns are intact.  No abnormal parenchymal  gradient susceptibility is seen.   The sagittal T1 image demonstrates normal marrow signal intensity in the  calvarium, skull base, and upper cervical spine.  The expected major  intracranial flow voids are present. There is trace mucosal thickening  of the left maxillary sinus and ethmoid air cells.     MRA: The visible internal carotid and anterior and middle cerebral  arteries demonstrate normal flow-related signal. There is fetal  continuation of the left posterior communicating artery with a  hypoplastic left P1, a developmental variant. The basilar artery is  normal in caliber. The left vertebral artery is dominant. No flow  limiting stenosis/occlusion or aneurysm is identified.          Impression:         No evidence of acute infarct. No flow-limiting stenosis or occlusion is  identified in the intracranial vasculature.     This report was finalized on 10/5/2022 7:40 AM by Dr. Shalonda Hurd M.D.       MRI Brain Without Contrast [[id number]] Collected:  10/05/22 0728     Updated: 10/05/22 0744    Narrative:      PROCEDURE:  MRI ANGIOGRAM HEAD WO CONTRAST-, MRI BRAIN WO CONTRAST-     CLINICAL HISTORY:  Transient ischemic attack, migraines.     TECHNIQUE: Multiplanar T1 and T2-weighted images of the brain were  obtained without intravenous contrast. Time-of-flight MRA of the head  was performed.     COMPARISON:  CT head without contrast 10/4/2022. MR brain and MRA head  11/10/2015.     FINDINGS:       MR BRAIN: The ventricles and sulci are within normal limits. No acute  intracranial hemorrhage or pathologic extra-axial fluid collection is  identified.  There is no abnormal parenchymal signal intensity.  The  gray-white differentiation is maintained.  There is no restricted  diffusion to suggest acute infarct.   There is no midline shift or mass  effect.  The basal cisterns are intact.  No abnormal parenchymal  gradient susceptibility is seen.   The sagittal T1 image demonstrates normal marrow signal intensity in the  calvarium, skull base, and upper cervical spine.  The expected major  intracranial flow voids are present. There is trace mucosal thickening  of the left maxillary sinus and ethmoid air cells.     MRA: The visible internal carotid and anterior and middle cerebral  arteries demonstrate normal flow-related signal. There is fetal  continuation of the left posterior communicating artery with a  hypoplastic left P1, a developmental variant. The basilar artery is  normal in caliber. The left vertebral artery is dominant. No flow  limiting stenosis/occlusion or aneurysm is identified.          Impression:         No evidence of acute infarct. No flow-limiting stenosis or occlusion is  identified in the intracranial vasculature.     This report was finalized on 10/5/2022 7:40 AM by Dr. Shalonda Hurd M.D.       CT Head Without Contrast [[id number]] Collected: 10/04/22 1802     Updated: 10/04/22 1812    Narrative:      EXAM:  CT HEAD WO CONTRAST-     HISTORY:   Headache, speech problem, bilateral leg tingling.     COMPARISON:  MR brain 11/10/2015     TECHNIQUE: Noncontrast images of the brain were obtained. Reformatted  images were reviewed. Radiation dose reduction techniques were utilized,  including automated exposure control and exposure modulation based on  body size.     FINDINGS:       The ventricles and sulci are within normal limits.  No acute  intracranial hemorrhage or pathologic extra-axial collection is  identified.  There is no midline shift or mass effect.  The basal  cisterns are patent.  The grey-white matter differentiation is  maintained. There is diffuse high attenuation of the vasculature.          Impression:      No acute intracranial hemorrhage. Diffuse high attenuation of the  vasculature, which can be seen with underlying polycythemia, severe  dehydration/hemoconcentration, or an elevated hematocrit. Consider  further evaluation with MRI/MRA.     Discussed with Dr. Tran at 6:00 PM.     This report was finalized on 10/4/2022 6:09 PM by Dr. Shalonda Hurd M.D.             I have reviewed the medications.  ---------------------------------------------------------------------------------------------  Assessment & Plan   Assessment/Problem List    Stroke-like symptoms      Plan:    Intractable migraine headache with aura  -CT head showing no acute intracranial hemorrhage, with negative diffuse high attenuation vasculature  -MRA MRI shows no evidence of acute infarction with no flow limiting stenosis or occlusion.  -Symptoms improved with migraine cocktail including IV Toradol, Benadryl, Compazine, and 1 g magnesium sulfate  -Neurology consulted, recommended repeating sleep study, will provide him with samples for Ubrelvy 50mg, 1 tablet at acute onset of headache and can repeat in 2 hours.  Patient to follow-up with neurology as needed    Nocturnal hypoxia  -Patient with a history of sleep apnea, was told several years ago he could stop using his  CPAP  -Discussed with the patient that he was dropping his oxygen saturation throughout the night and that his elevated hemoglobin and his migraines could be caused from untreated sleep apnea  -Ambulatory referral for outpatient sleep medicine has been placed.    Polycythemia  -Hemoglobin 19.1, improved to 16.5  -Headache and dizziness resolved  -Referral for outpatient hematology follow-up placed for further evaluation.    Diabetes  -A1c 7.2, improved from 7.4 in August 2022  -Continue home medications as prescribed  -Continue follow-up with primary care provider     Hypertension  -Continue home medications as prescribed    Hypertriglyceridemia  -Increase patient's atorvastatin to 40 mg nightly  -Follow-up with primary care provider    Disposition: Home    Follow-up after Discharge: PCP, neurology, hematology    This note will serve as a discharge summary.    I wore an face mask, eye protection, and gloves during this patient encounter. Patient also wearing a surgical mask. Hand hygeine performed before and after seeing the patient.    Brigitte Bell PA-C 10/05/22 12:42 EDT

## 2022-10-05 NOTE — H&P
[facility]   HISTORY AND PHYSICAL    Patient Name: Pablo Mendoza  : 1960  MRN: [id number]  Primary Care Physician:  Liana Hood MD  Date of admission: 10/4/2022    Subjective   Subjective     Chief Complaint: Headache/ dizziness    History of Present Illness  Pablo Mendoza is a 61-year-old male that is admitted to the observation unit for further evaluation of his headache and dizziness.  He states that he does have a history of migraine headaches.  His migraine has been accompanied by dizziness and intermittent stuttering. He states the headache has not subsided but denies any dizziness at this time.  Speech is clear.  He has a past medical history including, but not limited to, diabetes, hyperlipidemia, hypertension, and a CVA.  Review of Systems   Constitutional: Negative.    Eyes: Negative.    Respiratory: Negative.  Negative for shortness of breath.    Cardiovascular: Negative.  Negative for chest pain and palpitations.   Gastrointestinal: Positive for nausea. Negative for vomiting.   Endocrine: Negative.    Genitourinary: Negative.    Musculoskeletal: Negative.  Negative for neck pain and neck stiffness.   Skin: Negative.    Allergic/Immunologic: Negative.    Neurological: Positive for dizziness and headaches. Negative for numbness.   Hematological: Negative.    Psychiatric/Behavioral: Negative.         Personal History     Past Medical History:   Diagnosis Date   • Anesthesia complication     STATES HARD TO WAKE UP W/ALL SURGERIES   • COVID-19     DEC 2020   • Diabetes mellitus (HCC)    • Elevated cholesterol    • Gout    • Hypertension    • Low back pain    • Migraines    • Mixed hyperlipidemia 2018   • Neuropathy    • Nocturia 2018   • Numbness and tingling of both lower extremities     LEGS AND FEET    • Seizures (HCC)     AS A CHILD   • Staph infection     UNSURE OF SITE   • Stroke (HCC)     COGNITIVE DELAY   • TIA (transient ischemic attack)    • Type 2  diabetes mellitus, without long-term current use of insulin ([facility]) 1/19/2018   • Weakness of both legs     LEFT WORSE        Past Surgical History:   Procedure Laterality Date   • CHOLECYSTECTOMY     • COLONOSCOPY     • HERNIA REPAIR     • LUMBAR FUSION N/A 11/3/2021    Procedure: Lumbar 3 to Lumbar 4 and Lumbar 4 to Lumbar 5 laminectomy with a fusion;  Surgeon: Jose Webster MD;  Location: [facility];  Service: [employer] - Neuro;  Laterality: N/A;   • SINUS SURGERY     • TYMPANOPLASTY Bilateral        Family History: family history includes Diabetes in his paternal grandmother, sister, and sister; Heart disease in his mother; Hyperlipidemia in his brother; Hypertension in his brother and father; Stroke (age of onset: 59) in his brother; Throat cancer in his maternal grandmother. Otherwise pertinent FHx was reviewed and not pertinent to current issue.    Social History:  reports that he has never smoked. He has never used smokeless tobacco. He reports current alcohol use. He reports previous drug use.    Home Medications:  SITagliptin-metFORMIN HCl ER, acetaminophen, allopurinol, amLODIPine, aspirin, atorvastatin, cyanocobalamin, cyclobenzaprine, empagliflozin, fluticasone, glipizide, hydrOXYzine, hydroCHLOROthiazide, ibuprofen, loratadine, metoprolol tartrate, multivitamin with minerals, rizatriptan, sertraline, vitamin C, and vitamin D3    Allergies:  Allergies   Allergen Reactions   • Aspirin Nausea Only   • Lisinopril Other (See Comments)     Elevated creatinine           Objective    Objective     Vitals:   Temp:  [97.5 °F (36.4 °C)-98.1 °F (36.7 °C)] 98.1 °F (36.7 °C)  Heart Rate:  [69-78] 69  Resp:  [16-18] 16  BP: (117-158)/(72-90) 135/72    Physical Exam  Vitals and nursing note reviewed.   Constitutional:       General: He is not in acute distress.     Appearance: Normal appearance.   Cardiovascular:      Rate and Rhythm: Normal rate and regular rhythm.      Pulses: Normal pulses.      Heart sounds:  Normal heart sounds.   Pulmonary:      Effort: Pulmonary effort is normal.      Breath sounds: Normal breath sounds.   Abdominal:      General: Bowel sounds are normal.      Palpations: Abdomen is soft.   Musculoskeletal:         General: Normal range of motion.   Skin:     General: Skin is warm and dry.      Capillary Refill: Capillary refill takes less than 2 seconds.   Neurological:      General: No focal deficit present.      Mental Status: He is alert and oriented to person, place, and time.   Psychiatric:         Mood and Affect: Mood normal.         Behavior: Behavior normal.         Thought Content: Thought content normal.         Judgment: Judgment normal.         Result Review    Result Review:  I have personally reviewed the results from the time of this admission to 10/4/2022 20:05 EDT and agree with these findings:  [x]  Laboratory list / accordion  []  Microbiology  [x]  Radiology  []  EKG/Telemetry   []  Cardiology/Vascular   []  Pathology  [x]  Old records  []  Other:      Assessment & Plan   Assessment / Plan     Brief Patient Summary:  Pablo Mendoza is a 61 y.o. male who was admitted to the observation unit for further evaluation of his headache and dizziness.  Active Hospital Problems:  Active Hospital Problems    Diagnosis    • Stroke-like symptoms      Plan:     Headache/dizziness  -Neuro checks every 4 hours  -Vital signs every 4 hours  -neurology consult  -MRI/MRA brain  -As needed pain medication.      Diabetes  -Accu-Cheks AC  -Continue home diabetic medication  -Hypoglycemia protocol  -Hyperglycemia low-dose insulin sliding scale coverage    Hypertension  -Vital signs every 4 hours  -Continue home blood pressure medication    Polycythemia  -Hematology/oncology consult  -CBC in a.m.    DVT prophylaxis:  Mechanical DVT prophylaxis orders are present.    CODE STATUS:    Code Status (Patient has no pulse and is not breathing): CPR (Attempt to Resuscitate)  Medical Interventions (Patient has  pulse or is breathing): Full Support    Admission Status:  I believe this patient meets observation status.    Lulú Geller, APRN

## 2022-10-05 NOTE — DISCHARGE INSTRUCTIONS
Start taking atorvastatin 40 mg nightly.  Neurology has discussed with you that they will provide you samples of Ubrelvy 50 mg, take 1 tablet at onset of acute headache and can repeat dose in 2 hours.  Continue all other home medications as prescribed.  An order for outpatient follow-up with sleep medicine for sleep study has been placed, you should receive a call regarding this appointment.  In order for outpatient follow-up with hematology for further evaluation of an elevated hemoglobin has been placed, you should receive a call regarding this appointment.  Follow-up with your primary care doctor in 1 to 2 weeks.    Return to the emergency department with worsening symptoms, uncontrolled pain, inability to tolerate oral liquids, fever greater than 101°F not controlled by Tylenol or as needed with emergent concerns.

## 2022-10-05 NOTE — PLAN OF CARE
Goal Outcome Evaluation:      Pt admitted for further evaluation of stroke like symptoms and a migraine. Migraine improved after meds given in the ER. Pt complained of dizziness this morning after ambulating to the bathroom with assistance. Pt is A&OX4, VSS. Plans for MRI/MRA, and Neurology consult.

## 2022-10-05 NOTE — NURSING NOTE
I educated pt about his low 02 levels (85-89%) while asleep. I asked pt to allow nc w 2/L oxygen. Pt declined.

## 2022-10-05 NOTE — OUTREACH NOTE
Prep Survey    Flowsheet Row Responses   [facility] facility patient discharged from? [place]   Is LACE score < 7 ? Yes   Emergency Room discharge w/ pulse ox? No   Eligibility [facility]   Date of Admission 10/04/22   Date of Discharge 10/05/22   Discharge Disposition Home or Self Care   Discharge diagnosis stroke-like symptoms   Does the patient have one of the following disease processes/diagnoses(primary or secondary)? Other   Does the patient have Home health ordered? No   Is there a DME ordered? No   Prep survey completed? Yes          TUNG AKBAR - Registered Nurse

## 2022-10-05 NOTE — CONSULTS
"Neurology Consult Note  Consult Date: 10/5/2022  Referring MD: Bradely Lloyd MD  Reason for Consult I have been asked to see the patient in neurological consultation to render advice and opinion regarding headache.     Pablo Mendoza is a 61 y.o. male with past medical history of self-reported stroke in 2007 on ASA 81 mg and Lipitor 20 mg PTA, migraine headaches, type 2 diabetes, hypertension, neuropathy, hyperlipidemia who presented to the hospital 10/4/2022 with complaints of an intractable headache, generalized weakness, and difficulty speaking.  He reports since his stroke in 2007 he has been dealing with memory loss and migraine headaches.  Typically his migraine headaches occur 1-2 times a month.  He is prescribed Maxalt and is followed by his neurologist Dr. Benz in the outpatient setting.  He states typically he is able to take a Maxalt tablet and ibuprofen and the migraine headache will get better however this time it did not.  He states it is usually a throbbing pain on the right side of his head.  Typically his headaches are accompanied with photophobia, phonophobia, and nausea.  He states on Sunday night he started to develop a headache.  The headache was throbbing in nature on the right side of his head.  He felt like he was seeing \"stars\" in his vision.  He then began to feel tingling all over his body and felt weak all over causing him to feel like his gait was unstable.  He states he also felt like he was having trouble with his speech describing it as stuttering.  He did not note any unilateral weakness, numbness, facial drooping, dysarthria, or vision loss.  He denies any tobacco or recreational drug use.  Drinks socially.  He has not had any recent illnesses.  Denies starting any new medications recently.  Usually notices migraine headaches worsen with weather changes.  Was previously diagnosed with sleep apnea but states he wore a week long home study a few years ago and was told that he did " not have to wear his CPAP any longer.    BP on arrival 144/83, HR 78.  EKG with NSR.  Temp 97.5.  .  His initial noncontrast CT head did not reveal any acute intracranial abnormalities.  His MRI brain and MRA head appear to show no evidence of restricted diffusion to suggest an acute infarction, LVO, or high-grade stenosis.  He received Benadryl, Toradol, Compazine, and mag sulfate for his headache and does report significant relief.  Currently rating his pain a 1/10.    Past Medical/Surgical Hx:  Past Medical History:   Diagnosis Date   • Anesthesia complication     STATES HARD TO WAKE UP W/ALL SURGERIES   • COVID-19     DEC 2020   • Diabetes mellitus (HCC)    • Elevated cholesterol    • Gout    • Hypertension    • Low back pain    • Migraines    • Mixed hyperlipidemia 1/19/2018   • Neuropathy    • Nocturia 1/19/2018   • Numbness and tingling of both lower extremities     LEGS AND FEET    • Seizures (HCC)     AS A CHILD   • Staph infection 2010    UNSURE OF SITE   • Stroke (HCC)     COGNITIVE DELAY   • TIA (transient ischemic attack)    • Type 2 diabetes mellitus, without long-term current use of insulin (HCC) 1/19/2018   • Weakness of both legs     LEFT WORSE      Past Surgical History:   Procedure Laterality Date   • CHOLECYSTECTOMY     • COLONOSCOPY     • HERNIA REPAIR     • LUMBAR FUSION N/A 11/3/2021    Procedure: Lumbar 3 to Lumbar 4 and Lumbar 4 to Lumbar 5 laminectomy with a fusion;  Surgeon: Jose Webster MD;  Location: [facility];  Service: Robotics - Neuro;  Laterality: N/A;   • SINUS SURGERY     • TYMPANOPLASTY Bilateral      Medications On Admission  Medications Prior to Admission   Medication Sig Dispense Refill Last Dose   • allopurinol (ZYLOPRIM) 300 MG tablet TAKE ONE TABLET BY MOUTH TWICE A  tablet 1 10/4/2022 at Unknown time   • amLODIPine (NORVASC) 10 MG tablet TAKE ONE TABLET BY MOUTH DAILY 30 tablet 1 10/4/2022 at Unknown time   • aspirin 81 MG chewable tablet Chew 81 mg  Daily. FOLLOW MD GUIDELINES ON HOLDING FOR OR   10/4/2022 at Unknown time   • atorvastatin (LIPITOR) 20 MG tablet TAKE ONE TABLET BY MOUTH ONCE NIGHTLY 90 tablet 1 10/3/2022 at Unknown time   • Cholecalciferol (VITAMIN D3) 5000 units capsule capsule Take 5,000 Units by mouth Daily. HOLDING FOR 7 DAYS PRIOR  TO OR   10/4/2022 at Unknown time   • cyanocobalamin (VITAMIN B-12) 2500 MCG tablet tablet Take 500 mcg by mouth Daily. HOLDING FOR 7 DAYS PRIOR TO OR   10/4/2022 at Unknown time   • cyclobenzaprine (FLEXERIL) 10 MG tablet Take 1 tablet by mouth 2 (Two) Times a Day As Needed for Muscle Spasms. 60 tablet 0 Past Week at Unknown time   • fluticasone (Flonase) 50 MCG/ACT nasal spray 2 sprays into the nostril(s) as directed by provider Daily. (Patient taking differently: 2 sprays into the nostril(s) as directed by provider As Needed.) 1 bottle 0 Past Week at Unknown time   • glipizide (GLUCOTROL) 5 MG tablet TAKE ONE TABLET BY MOUTH TWICE A DAY BEFORE MEALS 180 tablet 1 10/4/2022 at Unknown time   • hydroCHLOROthiazide (HYDRODIURIL) 25 MG tablet TAKE ONE TABLET BY MOUTH DAILY 30 tablet 2 10/4/2022 at Unknown time   • hydrOXYzine (ATARAX) 25 MG tablet Take 1 tablet by mouth Daily As Needed for Anxiety. 30 tablet 0 Past Month at Unknown time   • ibuprofen (ADVIL,MOTRIN) 800 MG tablet TAKE ONE TABLET BY MOUTH EVERY 8 HOURS AS NEEDED FOR MILD PAIN 30 tablet 0 Past Week at Unknown time   • Janumet XR  MG tablet TAKE ONE TABLET BY MOUTH TWICE A DAY 60 tablet 3 10/4/2022 at Unknown time   • Jardiance 25 MG tablet tablet TAKE ONE TABLET BY MOUTH DAILY 30 tablet 1 10/4/2022 at Unknown time   • loratadine (CLARITIN) 10 MG tablet TAKE ONE TABLET BY MOUTH DAILY (Patient taking differently: Take 10 mg by mouth As Needed.) 30 tablet 0 Past Month at Unknown time   • metoprolol tartrate (LOPRESSOR) 100 MG tablet TAKE ONE TABLET BY MOUTH TWICE A DAY 60 tablet 1 10/4/2022 at Unknown time   • multivitamin with minerals tablet  tablet Take 1 tablet by mouth Daily. HOLDING FOR 7 DAYS PRIOR TO OR   10/4/2022 at Unknown time   • rizatriptan (MAXALT) 10 MG tablet Take 1 tablet by mouth 1 (One) Time for 1 dose. AT ONSET OF HEADACHE MAY REPEAT ONE TABLET IN 2 HOURS IF NEEDED 15 tablet 0 10/4/2022 at Unknown time   • sertraline (Zoloft) 50 MG tablet Take 1 tablet by mouth Daily. (Patient taking differently: Take 50 mg by mouth Every Night.) 90 tablet 1 10/3/2022 at Unknown time   • vitamin C (ASCORBIC ACID) 500 MG tablet Take 500 mg by mouth Daily. HOLDING FOR 7 DAYS PRIOR TO OR   10/4/2022 at Unknown time   • acetaminophen (TYLENOL) 500 MG tablet Take 500 mg by mouth Every 6 (Six) Hours As Needed for Mild Pain .        Allergies:  Allergies   Allergen Reactions   • Aspirin Nausea Only   • Lisinopril Other (See Comments)     Elevated creatinine         Social Hx:  Social History     Socioeconomic History   • Marital status: Single   Tobacco Use   • Smoking status: Never Smoker   • Smokeless tobacco: Never Used   Vaping Use   • Vaping Use: Never used   Substance and Sexual Activity   • Alcohol use: Yes     Comment: 3 DRINKS WEEKLY   • Drug use: Not Currently   • Sexual activity: Yes     Partners: Female     Family Hx:  Family History   Problem Relation Age of Onset   • Heart disease Mother    • Hypertension Father    • Throat cancer Maternal Grandmother    • Diabetes Paternal Grandmother    • Diabetes Sister    • Stroke Brother 59   • Diabetes Sister    • Hypertension Brother    • Hyperlipidemia Brother    • Malig Hyperthermia Neg Hx      Review of Systems   Eyes: Positive for photophobia.   Respiratory: Negative.    Cardiovascular: Negative.    Gastrointestinal: Positive for nausea.   Endocrine: Negative.    Genitourinary: Negative.    Musculoskeletal: Positive for gait problem.        Unsteadiness   Skin: Negative.    Allergic/Immunologic: Negative.    Neurological: Positive for speech difficulty, weakness and headaches.        Stuttering  "  Hematological: Negative.    Psychiatric/Behavioral: Negative.    All other systems reviewed and are negative.    Exam  /92 (BP Location: Left arm, Patient Position: Lying)   Pulse 93   Temp 97.7 °F (36.5 °C) (Oral)   Resp 18   Ht 170.2 cm (67\")   Wt 90.7 kg (200 lb)   SpO2 (!) 70%   BMI 31.32 kg/m²     Current Facility-Administered Medications:   •  acetaminophen (TYLENOL) tablet 650 mg, 650 mg, Oral, Q4H PRN, Brigitte Bell PA-C  •  allopurinol (ZYLOPRIM) tablet 300 mg, 300 mg, Oral, BID, Lulú Geller APRN, 300 mg at 10/05/22 0828  •  amLODIPine (NORVASC) tablet 10 mg, 10 mg, Oral, Daily, Lulú Geller, APRN, 10 mg at 10/05/22 0827  •  atorvastatin (LIPITOR) tablet 20 mg, 20 mg, Oral, Nightly, Lulú Geller APRN, 20 mg at 10/05/22 0019  •  cetirizine (zyrTEC) tablet 10 mg, 10 mg, Oral, Daily, Lulú Geller APRN, 10 mg at 10/05/22 0827  •  cyclobenzaprine (FLEXERIL) tablet 10 mg, 10 mg, Oral, BID PRN, Lulú Geller APRN  •  dextrose (D50W) (25 g/50 mL) IV injection 25 g, 25 g, Intravenous, Q15 Min PRN, Lulú Geller APRN  •  dextrose (GLUTOSE) oral gel 15 g, 15 g, Oral, Q15 Min PRN, Lulú Geller APRJAIR  •  empagliflozin (JARDIANCE) tablet 25 mg, 25 mg, Oral, Daily, Lulú Geller APRN  •  fluticasone (FLONASE) 50 MCG/ACT nasal spray 2 spray, 2 spray, Nasal, Daily, Lulú Geller APRN, 2 spray at 10/05/22 0826  •  glipizide (GLUCOTROL) tablet 5 mg, 5 mg, Oral, BID AC, Lulú Geller APRN  •  glucagon (human recombinant) (GLUCAGEN DIAGNOSTIC) injection 1 mg, 1 mg, Intramuscular, Q15 Min PRN, Lulú Geller APRN  •  hydroCHLOROthiazide (HYDRODIURIL) tablet 25 mg, 25 mg, Oral, Daily, Lulú Geller APRN, 25 mg at 10/05/22 0827  •  hydrOXYzine (ATARAX) tablet 25 mg, 25 mg, Oral, Daily PRN, Lulú Geller APRN  •  insulin lispro (ADMELOG) injection 0-7 Units, 0-7 Units, Subcutaneous, TID AC, Lulú Geller APRN  •  magnesium sulfate in D5W 1g/100mL (PREMIX), 1 g, Intravenous, Q1H, " Brigitte Bell PA-C  •  melatonin tablet 5 mg, 5 mg, Oral, Nightly PRN, Brigitte Bell PA-C  •  metoprolol tartrate (LOPRESSOR) tablet 100 mg, 100 mg, Oral, BID, Lulú Geller S, APRN, 100 mg at 10/05/22 0827  •  nitroglycerin (NITROSTAT) SL tablet 0.4 mg, 0.4 mg, Sublingual, Q5 Min PRN, Brigitte Bell PA-C  •  ondansetron (ZOFRAN) tablet 4 mg, 4 mg, Oral, Q6H PRN **OR** ondansetron (ZOFRAN) injection 4 mg, 4 mg, Intravenous, Q6H PRN, Brigitte Bell PA-C  •  sertraline (ZOLOFT) tablet 50 mg, 50 mg, Oral, Daily, Lulú Geller S, APRN, 50 mg at 10/05/22 0828  •  [COMPLETED] Insert peripheral IV, , , Once **AND** sodium chloride 0.9 % flush 10 mL, 10 mL, Intravenous, PRN, Woody Tran MD  •  sodium chloride 0.9 % flush 10 mL, 10 mL, Intravenous, Q12H, Brigitte Bell PA-C, 10 mL at 10/05/22 0835  •  sodium chloride 0.9 % flush 10 mL, 10 mL, Intravenous, PRN, Brigitte Bell PA-C    PRN meds  •  acetaminophen  •  cyclobenzaprine  •  dextrose  •  dextrose  •  glucagon (human recombinant)  •  hydrOXYzine  •  melatonin  •  nitroglycerin  •  ondansetron **OR** ondansetron  •  [COMPLETED] Insert peripheral IV **AND** sodium chloride  •  sodium chloride    No current facility-administered medications on file prior to encounter.     Current Outpatient Medications on File Prior to Encounter   Medication Sig   • allopurinol (ZYLOPRIM) 300 MG tablet TAKE ONE TABLET BY MOUTH TWICE A DAY   • amLODIPine (NORVASC) 10 MG tablet TAKE ONE TABLET BY MOUTH DAILY   • aspirin 81 MG chewable tablet Chew 81 mg Daily. FOLLOW MD GUIDELINES ON HOLDING FOR OR   • atorvastatin (LIPITOR) 20 MG tablet TAKE ONE TABLET BY MOUTH ONCE NIGHTLY   • Cholecalciferol (VITAMIN D3) 5000 units capsule capsule Take 5,000 Units by mouth Daily. HOLDING FOR 7 DAYS PRIOR  TO OR   • cyanocobalamin (VITAMIN B-12) 2500 MCG tablet tablet Take 500 mcg by mouth Daily. HOLDING FOR 7 DAYS PRIOR TO OR   • cyclobenzaprine  (FLEXERIL) 10 MG tablet Take 1 tablet by mouth 2 (Two) Times a Day As Needed for Muscle Spasms.   • fluticasone (Flonase) 50 MCG/ACT nasal spray 2 sprays into the nostril(s) as directed by provider Daily. (Patient taking differently: 2 sprays into the nostril(s) as directed by provider As Needed.)   • glipizide (GLUCOTROL) 5 MG tablet TAKE ONE TABLET BY MOUTH TWICE A DAY BEFORE MEALS   • hydroCHLOROthiazide (HYDRODIURIL) 25 MG tablet TAKE ONE TABLET BY MOUTH DAILY   • hydrOXYzine (ATARAX) 25 MG tablet Take 1 tablet by mouth Daily As Needed for Anxiety.   • ibuprofen (ADVIL,MOTRIN) 800 MG tablet TAKE ONE TABLET BY MOUTH EVERY 8 HOURS AS NEEDED FOR MILD PAIN   • Janumet XR  MG tablet TAKE ONE TABLET BY MOUTH TWICE A DAY   • Jardiance 25 MG tablet tablet TAKE ONE TABLET BY MOUTH DAILY   • loratadine (CLARITIN) 10 MG tablet TAKE ONE TABLET BY MOUTH DAILY (Patient taking differently: Take 10 mg by mouth As Needed.)   • metoprolol tartrate (LOPRESSOR) 100 MG tablet TAKE ONE TABLET BY MOUTH TWICE A DAY   • multivitamin with minerals tablet tablet Take 1 tablet by mouth Daily. HOLDING FOR 7 DAYS PRIOR TO OR   • rizatriptan (MAXALT) 10 MG tablet Take 1 tablet by mouth 1 (One) Time for 1 dose. AT ONSET OF HEADACHE MAY REPEAT ONE TABLET IN 2 HOURS IF NEEDED   • sertraline (Zoloft) 50 MG tablet Take 1 tablet by mouth Daily. (Patient taking differently: Take 50 mg by mouth Every Night.)   • vitamin C (ASCORBIC ACID) 500 MG tablet Take 500 mg by mouth Daily. HOLDING FOR 7 DAYS PRIOR TO OR   • acetaminophen (TYLENOL) 500 MG tablet Take 500 mg by mouth Every 6 (Six) Hours As Needed for Mild Pain .       General appearance: Obese male, drowsy, NAD, alert and cooperative, well groomed  HEENT: Normocephalic, atraumatic, PERRL, no masses or tenderness  Resp: Even and unlabored  Extremities: No obvious edema.  Skin: warm, dry    Neurological:   MS: oriented x3- had some difficulty with year but was able to name it, recent/remote  memory impaired, normal attention/concentration, mild stuttering, no neglect, normal fund of knowledge  CN: visual acuity grossly normal, visual fields full, EOMI, facial sensation equal, no facial droop, tongue midline  Motor: 5/5 in all 4 ext., normal tone  Sensory: decreased cold temperature sensation of BLE, normal vibratory in all 4 ext. Normal light touch sensation intact in all 4 ext.  Coordination: Normal finger to nose test  Rapid alternating movements: normal finger to thumb tap    Laboratory results:  Lab Results   Component Value Date    GLUCOSE 165 (H) 10/05/2022    CALCIUM 9.2 10/05/2022     10/05/2022    K 3.5 10/05/2022    CO2 22.0 10/05/2022     10/05/2022    BUN 23 (H) 10/05/2022    CREATININE 1.16 10/05/2022    EGFRIFAFRI 101 02/03/2022    EGFRIFNONA 87 02/03/2022    BCR 19.8 10/05/2022    ANIONGAP 13.0 10/05/2022     Lab Results   Component Value Date    WBC 11.35 (H) 10/05/2022    HGB 16.5 10/05/2022    HCT 50.8 10/05/2022    MCV 86.8 10/05/2022     10/05/2022     Lab Results   Component Value Date    CHOL 141 10/04/2022     Lab Results   Component Value Date    HDL 36 (L) 10/04/2022    HDL 30 (L) 08/12/2022    HDL 34 (L) 05/03/2022     Lab Results   Component Value Date    LDL 57 10/04/2022    LDL 54 08/12/2022    LDL 60 05/03/2022     Lab Results   Component Value Date    TRIG 307 (H) 10/04/2022    TRIG 279 (H) 08/12/2022    TRIG 352 (H) 05/03/2022     Lab Results   Component Value Date    HGBA1C 7.20 (H) 10/04/2022     Lab Results   Component Value Date    INR 1.04 03/11/2020    INR 1.0 11/11/2015    INR 1.1 11/10/2015    PROTIME 13.3 03/11/2020    PROTIME 13.3 11/11/2015    PROTIME 13.6 11/10/2015     Lab Results   Component Value Date    CMEDENNK75 468 06/07/2019     Lab Results   Component Value Date    TSH 2.370 08/12/2022     Pain Management Panel     Pain Management Panel Latest Ref Rng & Units 5/3/2022 1/22/2021    CREATININE UR Not Estab. mg/dL 38.6 28.0     BARBITURATES SCREEN Negative - -    BENZODIAZEPINE SCREEN, URINE Negative - -    COCAINE SCREEN, URINE Negative - -    METHADONE SCREEN, URINE Negative - -         Brief Urine Lab Results  (Last result in the past 365 days)      Color   Clarity   Blood   Leuk Est   Nitrite   Protein   CREAT   Urine HCG        05/03/22 0950             38.6               Lab review: I personally reviewed all labs as documented above.    Imaging review:   CT Head Without Contrast    Result Date: 10/4/2022  No acute intracranial hemorrhage. Diffuse high attenuation of the vasculature, which can be seen with underlying polycythemia, severe dehydration/hemoconcentration, or an elevated hematocrit. Consider further evaluation with MRI/MRA.  Discussed with Dr. Tran at 6:00 PM.  This report was finalized on 10/4/2022 6:09 PM by Dr. Shalonda Hurd M.D.      MRI Angiogram Head Without Contrast    Result Date: 10/5/2022   No evidence of acute infarct. No flow-limiting stenosis or occlusion is identified in the intracranial vasculature.  This report was finalized on 10/5/2022 7:40 AM by Dr. Shalonda Hurd M.D.      MRI Brain Without Contrast    Result Date: 10/5/2022   No evidence of acute infarct. No flow-limiting stenosis or occlusion is identified in the intracranial vasculature.  This report was finalized on 10/5/2022 7:40 AM by Dr. Shalonda Hurd M.D.        I personally reviewed images with Dr. Childers, and he agrees with radiology report.    Diagnosis:  Intractable migraine headache with aura  Obstructive sleep apnea  Hypertension    Comment: 61-year-old male who presented with complaints of an intractable migraine headache associated with whole body paresthesias, visual aura, and speech changes.  MRA head and MRI brain look okay.  BP was a little elevated on arrival.  He also describes a history of TORSTEN but reports he was told that he could discontinue use of his CPAP.  He was having drops with his O2 levels in the mid 80s while  sleeping in his room today.  Recommend he obtain a repeat sleep study, discussed with him that untreated sleep apnea can cause more frequent and worsening migraine headaches.  Typically he takes Maxalt and ibuprofen which did not give any relief.  Discussed with him trying Ubrelvy or Nurtec for acute relief.  He is interested in trying the Ubrelvy.  We can have him come over to the office and  some samples after he is discharged today.  He can follow-up with me as needed in the outpatient setting for further migraine treatment.  He is asking to continue follow-up with his PCP for the time being.    PLAN:   Recommend repeating sleep study evaluation for TORSTEN.  Can try Ubrelvy 50 mg, take 1 tablet at acute onset of headache and can repeat in 2 hours.  Can potentially increase him to 100 mg tablets if needed.  He is not to exceed more than 200 mg in 24 hours.  Migraine preventative treatments discussed.  Normalize BP  We will sign off, will see again per request    Case discussed with patient, NESSA Bell, Dr. Fairchild, and Dr. Childers and he agrees with plan above.    ANUSHA Olson

## 2022-10-05 NOTE — PROGRESS NOTES
MD ATTESTATION NOTE    The TARAS and I have discussed this patient's history, physical exam, and treatment plan.  I have reviewed the documentation and personally had a face to face interaction with the patient. I affirm the documentation and agree with the treatment and plan.  The attached note describes my personal findings.       I personally performed the physical exam in its entirety, and below are my findings.  For this patient encounter, the patient wore surgical mask, I wore full protective PPE including N95 and eye protection    Brief HPI: This is a 61-year-old male with history of anxiety, migraines, type 2 diabetes, hypertension and hyperlipidemia.  He presented to the emergency department for a 2-day history of headache.  The headache started on Sunday, October 2 and was fairly constant until he was presented to the emergency department.  It was in the diffuse portion of his head.  It was very typical of his previous migraines except this lasted longer.  Normally his headache last maybe 1 day and he will take some medicines and better.  He was treated with a migraine cocktail in the emergency department and his headache is resolved.  He did report some symptoms of some stuttering of the speech and some dizziness.  He has not had any dizziness since being in the emergency department.  His speech when I talk with him now states is back to normal.  Is been back to the normal he reports since he has been in the emergency department.  Denied any visual change, any focal weakness to arms or legs.  When I see him this morning he denies any complaint.  Denies any headache.  He did have recurrence of the headache and received a migraine cocktail here in the observation unit and his symptoms resolved.  He was easily awoken bowl when I walked into the room as he was resting.  He denies any recent fever, chills, chest pain, vomiting, diarrhea, trauma to his head, or any other complaint.    General : Pleasant male  patient is awake alert and oriented.  Initially when I woke in the room he was sleeping but easily arousable and appropriate.  HEENT: NCAT  CV: Heart is regular with no murmurs  Respiratory: CTA bilaterally  Abd: Soft and nontender  Ext: No acute abnormalities  Skin: No rash  Neuro: Cranial nerves II through XII grossly intact as tested.  No acute lateralizing deficits.  Able to move all extremities.  Psych: Normal mood and affect    I have reviewed the patient's vital signs, lab work, EKG and imaging.    Plan:  1.  Headache with stuttering of speech and some dizziness: Headache resolved with migraine cocktail and has been without any neuro deficit here in the observation unit and in the emergency department.  CT scan of the head was unremarkable and had an MRI and MRA of the brain which report was reviewed and also was unremarkable for any acute emergent condition.  Neurology has been consulted and their consult is pending.  Currently patient is feeling well and describes his headache is pretty typical for his past migraines is just lasting longer.  Anticipate the patient will be discharged later today.  2.  Polycythemia.  Patient had a hemoglobin of 19.1 yesterday.  He is received some gentle hydration and his hemoglobin this morning on repeat is 16.5.  If you trend and look at his previous hemoglobins they have been upper limits to normal to mildly elevated.  He does not need any emergent hematologist oncologist and will have him follow-up with hematology as an outpatient.  3.  Diabetes.  Glucose has been fairly unremarkable continue his home medicines.  And follow-up with his primary care physician for further management.        Note Disclaimer: At [facility], we believe that sharing information builds trust and better relationships. You are receiving this note because you recently visited [facility]. It is possible you will see health information before a provider has talked with you about it. This kind  of information can be easy to misunderstand. To help you fully understand what it means for your health, we urge you to discuss this note with your provider.

## 2022-10-06 ENCOUNTER — TRANSITIONAL CARE MANAGEMENT TELEPHONE ENCOUNTER (OUTPATIENT)
Dept: CALL CENTER | Facility: HOSPITAL | Age: 62
End: 2022-10-06

## 2022-10-06 NOTE — OUTREACH NOTE
Call Center TCM Note    Flowsheet Row Responses   [facility] patient discharged from? [facility]   Does the patient have one of the following disease processes/diagnoses(primary or secondary)? Other   TCM attempt successful? Yes  [No current verbal release]   Call start time 1155   Call end time 1200   Discharge diagnosis stroke-like symptoms   Meds reviewed with patient/caregiver? Yes   Is the patient having any side effects they believe may be caused by any medication additions or changes? No   Does the patient have all medications ordered at discharge? Yes   Is the patient taking all medications as directed (includes completed medication regime)? Yes   Comments HOSP DC FU appt 10/14/22 @ 2:45 pm.    Has home health visited the patient within 72 hours of discharge? N/A   Psychosocial issues? No   Did the patient receive a copy of their discharge instructions? Yes   Nursing interventions Reviewed instructions with patient   What is the patient's perception of their health status since discharge? Improving   Is the patient/caregiver able to teach back signs and symptoms related to disease process for when to call PCP? Yes   Is the patient/caregiver able to teach back signs and symptoms related to disease process for when to call 911? Yes   Is the patient/caregiver able to teach back the hierarchy of who to call/visit for symptoms/problems? PCP, Specialist, Home health nurse, Urgent Care, ED, 911 Yes   TCM call completed? Yes   Wrap up additional comments Pt reports that his pain is much better, however is still having some issues with speech that is improving.           Nellie You RN    10/6/2022, 12:01 EDT

## 2022-10-14 ENCOUNTER — OFFICE VISIT (OUTPATIENT)
Dept: INTERNAL MEDICINE | Facility: CLINIC | Age: 62
End: 2022-10-14

## 2022-10-14 VITALS
HEIGHT: 67 IN | BODY MASS INDEX: 29.07 KG/M2 | OXYGEN SATURATION: 98 % | SYSTOLIC BLOOD PRESSURE: 126 MMHG | WEIGHT: 185.2 LBS | DIASTOLIC BLOOD PRESSURE: 68 MMHG | TEMPERATURE: 97.5 F | HEART RATE: 72 BPM

## 2022-10-14 DIAGNOSIS — Z09 HOSPITAL DISCHARGE FOLLOW-UP: Primary | ICD-10-CM

## 2022-10-14 DIAGNOSIS — I10 ESSENTIAL HYPERTENSION: ICD-10-CM

## 2022-10-14 DIAGNOSIS — E78.2 MIXED HYPERLIPIDEMIA: ICD-10-CM

## 2022-10-14 DIAGNOSIS — D75.1 POLYCYTHEMIA: ICD-10-CM

## 2022-10-14 DIAGNOSIS — E11.9 TYPE 2 DIABETES MELLITUS WITHOUT COMPLICATION, WITHOUT LONG-TERM CURRENT USE OF INSULIN: ICD-10-CM

## 2022-10-14 DIAGNOSIS — G43.801 OTHER MIGRAINE WITH STATUS MIGRAINOSUS, NOT INTRACTABLE: ICD-10-CM

## 2022-10-14 PROCEDURE — 99495 TRANSJ CARE MGMT MOD F2F 14D: CPT | Performed by: INTERNAL MEDICINE

## 2022-10-14 NOTE — PROGRESS NOTES
Transitional Care Follow Up Visit  Subjective     Pabloteresa Mendoza is a 61 y.o. male who presents for a transitional care management visit.    Within 48 business hours after discharge our office contacted him via telephone to coordinate his care and needs.      I reviewed and discussed the details of that call along with the discharge summary, hospital problems, inpatient lab results, inpatient diagnostic studies, and consultation reports with Pablo.     Current outpatient and discharge medications have been reconciled for the patient.  Reviewed by: Liana Hood MD      Date of TCM Phone Call 10/5/2022   [facility]   Date of Admission 10/4/2022   Date of Discharge 10/5/2022   Discharge Disposition Home or Self Care     Risk for Readmission (LACE) Score: 2 (10/5/2022  6:00 AM)      History of Present Illness   Course During Hospital Stay:  Pt admitted after having a headache and dizziness.  He was evaluated by neurology.  He had a noncontrast head ct that was ok and MRI brain and MRA were ok. hgb was 17.9 and pt was sent for eval with hematology and has appt for next Friday.  Statin increased during his hospitalization.    He would like a work note and meds for erectile dysfunction today.        The following portions of the patient's history were reviewed and updated as appropriate: allergies, current medications, past family history, past medical history, past social history, past surgical history and problem list.    Review of Systems   Constitutional: Negative.    HENT: Negative.    Eyes: Negative.    Respiratory: Negative.    Cardiovascular: Negative.    Gastrointestinal: Negative.    Endocrine: Negative.    Genitourinary: Negative.    Musculoskeletal: Negative.    Skin: Negative.    Allergic/Immunologic: Negative.    Neurological: Negative.    Hematological: Negative.    Psychiatric/Behavioral: Negative.    All other systems reviewed and are negative.      Objective      Wt Readings  from Last 3 Encounters:   10/14/22 84 kg (185 lb 3.2 oz)   10/04/22 90.7 kg (200 lb)   08/19/22 85.5 kg (188 lb 9.6 oz)     Temp Readings from Last 3 Encounters:   10/14/22 97.5 °F (36.4 °C)   10/05/22 97 °F (36.1 °C) (Oral)   08/19/22 96.6 °F (35.9 °C) (Temporal)     BP Readings from Last 3 Encounters:   10/14/22 126/68   10/05/22 114/72   08/19/22 130/80     Pulse Readings from Last 3 Encounters:   10/14/22 72   10/05/22 92   08/19/22 71     Physical Exam  Vitals and nursing note reviewed.   Constitutional:       General: He is not in acute distress.     Appearance: He is well-developed.   HENT:      Head: Normocephalic and atraumatic.      Right Ear: External ear normal.      Left Ear: External ear normal.      Nose: Nose normal.   Eyes:      Conjunctiva/sclera: Conjunctivae normal.      Pupils: Pupils are equal, round, and reactive to light.   Cardiovascular:      Rate and Rhythm: Normal rate and regular rhythm.      Heart sounds: Normal heart sounds.   Pulmonary:      Effort: Pulmonary effort is normal. No respiratory distress.      Breath sounds: Normal breath sounds. No wheezing.   Musculoskeletal:         General: Normal range of motion.      Cervical back: Normal range of motion and neck supple.      Comments: Normal gait   Skin:     General: Skin is warm and dry.   Neurological:      Mental Status: He is alert and oriented to person, place, and time.   Psychiatric:         Behavior: Behavior normal.         Thought Content: Thought content normal.         Judgment: Judgment normal.         Assessment & Plan   Diagnoses and all orders for this visit:    1. Hospital discharge follow-up (Primary)    2. Type 2 diabetes mellitus without complication, without long-term current use of insulin (HCC) - last a1c 7.4    3. Essential hypertension - currently well controlled    4. Mixed hyperlipidemia - cont higher statin dose.    5. Other migraine with status migrainosus, not intractable - follows with dr. Benz as an  outpatient.  Ok for ubrelvy.    6. Polycythemia - has heme appt next week    Return in about 3 months (around 1/14/2023) for Recheck. lab orders for f/u appt put in epic today

## 2022-10-19 RX ORDER — SITAGLIPTIN AND METFORMIN HYDROCHLORIDE 1000; 50 MG/1; MG/1
TABLET, FILM COATED, EXTENDED RELEASE ORAL
Qty: 60 TABLET | Refills: 3 | Status: SHIPPED | OUTPATIENT
Start: 2022-10-19 | End: 2023-02-15

## 2022-10-28 ENCOUNTER — CONSULT (OUTPATIENT)
Dept: ONCOLOGY | Facility: CLINIC | Age: 62
End: 2022-10-28

## 2022-10-28 ENCOUNTER — HOSPITAL ENCOUNTER (OUTPATIENT)
Dept: GENERAL RADIOLOGY | Facility: HOSPITAL | Age: 62
Discharge: HOME | End: 2022-10-28
Admitting: NEUROLOGICAL SURGERY

## 2022-10-28 ENCOUNTER — APPOINTMENT (OUTPATIENT)
Dept: LAB | Facility: HOSPITAL | Age: 62
End: 2022-10-28

## 2022-10-28 VITALS
OXYGEN SATURATION: 96 % | BODY MASS INDEX: 29.15 KG/M2 | SYSTOLIC BLOOD PRESSURE: 125 MMHG | RESPIRATION RATE: 16 BRPM | TEMPERATURE: 97.5 F | HEIGHT: 67 IN | HEART RATE: 69 BPM | WEIGHT: 185.7 LBS | DIASTOLIC BLOOD PRESSURE: 77 MMHG

## 2022-10-28 DIAGNOSIS — D72.9 NEUTROPHILIA: ICD-10-CM

## 2022-10-28 DIAGNOSIS — D75.1 POLYCYTHEMIA: Primary | ICD-10-CM

## 2022-10-28 DIAGNOSIS — M54.16 LUMBAR RADICULOPATHY: ICD-10-CM

## 2022-10-28 LAB
BASOPHILS # BLD AUTO: 0.08 10*3/MM3 (ref 0–0.2)
BASOPHILS NFR BLD AUTO: 0.7 % (ref 0–1.5)
DEPRECATED RDW RBC AUTO: 45.4 FL (ref 37–54)
EOSINOPHIL # BLD AUTO: 0.22 10*3/MM3 (ref 0–0.4)
EOSINOPHIL NFR BLD AUTO: 2 % (ref 0.3–6.2)
ERYTHROCYTE [DISTWIDTH] IN BLOOD BY AUTOMATED COUNT: 14.8 % (ref 12.3–15.4)
HCT VFR BLD AUTO: 52.4 % (ref 37.5–51)
HGB BLD-MCNC: 17.2 G/DL (ref 13–17.7)
IMM GRANULOCYTES # BLD AUTO: 0.03 10*3/MM3 (ref 0–0.05)
IMM GRANULOCYTES NFR BLD AUTO: 0.3 % (ref 0–0.5)
LDH SERPL-CCNC: 179 U/L (ref 99–259)
LYMPHOCYTES # BLD AUTO: 1.38 10*3/MM3 (ref 0.7–3.1)
LYMPHOCYTES NFR BLD AUTO: 12.6 % (ref 19.6–45.3)
MCH RBC QN AUTO: 28.3 PG (ref 26.6–33)
MCHC RBC AUTO-ENTMCNC: 32.8 G/DL (ref 31.5–35.7)
MCV RBC AUTO: 86.2 FL (ref 79–97)
MONOCYTES # BLD AUTO: 0.47 10*3/MM3 (ref 0.1–0.9)
MONOCYTES NFR BLD AUTO: 4.3 % (ref 5–12)
NEUTROPHILS NFR BLD AUTO: 8.79 10*3/MM3 (ref 1.7–7)
NEUTROPHILS NFR BLD AUTO: 80.1 % (ref 42.7–76)
NRBC BLD AUTO-RTO: 0 /100 WBC (ref 0–0.2)
PLATELET # BLD AUTO: 209 10*3/MM3 (ref 140–450)
PMV BLD AUTO: 10 FL (ref 6–12)
RBC # BLD AUTO: 6.08 10*6/MM3 (ref 4.14–5.8)
URATE SERPL-MCNC: 2.5 MG/DL (ref 2.8–7.4)
WBC NRBC COR # BLD: 10.97 10*3/MM3 (ref 3.4–10.8)

## 2022-10-28 PROCEDURE — 36415 COLL VENOUS BLD VENIPUNCTURE: CPT | Performed by: INTERNAL MEDICINE

## 2022-10-28 PROCEDURE — 85025 COMPLETE CBC W/AUTO DIFF WBC: CPT | Performed by: INTERNAL MEDICINE

## 2022-10-28 PROCEDURE — 99204 OFFICE O/P NEW MOD 45 MIN: CPT | Performed by: INTERNAL MEDICINE

## 2022-10-28 PROCEDURE — 72114 X-RAY EXAM L-S SPINE BENDING: CPT

## 2022-10-28 PROCEDURE — 83615 LACTATE (LD) (LDH) ENZYME: CPT | Performed by: INTERNAL MEDICINE

## 2022-10-28 PROCEDURE — 84550 ASSAY OF BLOOD/URIC ACID: CPT | Performed by: INTERNAL MEDICINE

## 2022-10-28 RX ORDER — SODIUM CHLORIDE 9 MG/ML
250 INJECTION, SOLUTION INTRAVENOUS ONCE
Status: CANCELLED | OUTPATIENT
Start: 2022-10-31

## 2022-10-28 NOTE — PROGRESS NOTES
Subjective     REASON FOR CONSULTATION:  Provide an opinion on any further workup or treatment on:    Polycythemia                       REQUESTING PHYSICIAN: Brigitte Bell P*    RECORDS OBTAINED: Records of the patients history including those obtained from the referring provider were reviewed and summarized in detail.    HISTORY OF PRESENT ILLNESS:    Pablo Mendoza is a 61 y.o. patient who was referred for evaluation of polycythemia.     Patient presented to the ER on 10/4/2022 with headaches and dizziness.  He has history of migraine headaches but the headaches on that day were very severe. He became confused and felt dizzy.  He was noted to have an elevated hemoglobin of 19.1 and hematocrit of 53.6% on 10/4/2022. He had CT and MRI exams that showed no evidence of a stroke. He started feeling better with hydration and with the medicines prescribed by Neurology.    Patient reports having intermittent epistaxis. He has been having more frequent episodes recently.     On review of previous labs, he had a hemoglobin of 17.6 and a hematocrit of 54.4% on 5/3/2022. He had a hemoglobin of 17.9 with a hematocrit of 53.0 on 8/12/2022.    Patient does not smoke. No underlying lung disease. He is exposed to smoke/fumes at work (he is a ).     Patient reports that he was found to be anemic a few years ago. He started taking a vitamin regularly. He is not sure if his supplement has any iron.      REVIEW OF SYSTEMS:  Review of Systems   Constitutional: Negative for fatigue, fever and unexpected weight change.   HENT: Negative for nosebleeds and voice change.    Eyes: Positive for visual disturbance.   Respiratory: Negative for cough and shortness of breath.    Cardiovascular: Negative for chest pain and leg swelling.   Gastrointestinal: Negative for abdominal pain, blood in stool, constipation, diarrhea, nausea and vomiting.   Genitourinary: Negative for frequency and hematuria.   Musculoskeletal: Positive for  arthralgias. Negative for back pain and joint swelling.   Skin: Negative for rash.   Neurological: Positive for dizziness and headaches.   Hematological: Negative for adenopathy. Does not bruise/bleed easily.   Psychiatric/Behavioral: Negative for dysphoric mood. The patient is not nervous/anxious.       Past Medical History:   Diagnosis Date   • Anesthesia complication    • COVID-19    • Diabetes mellitus (HCC)    • Diverticulitis    • Elevated cholesterol    • Gout    • Hypertension    • Low back pain    • Migraines    • Mixed hyperlipidemia 01/19/2018   • Neuropathy    • Nocturia 01/19/2018   • Numbness and tingling of both lower extremities    • Seizures (HCC)    • Staph infection 2010   • Stroke (HCC)    • TIA (transient ischemic attack)    • Type 2 diabetes mellitus, without long-term current use of insulin (HCC) 01/19/2018   • Weakness of both legs       Past Surgical History:   Procedure Laterality Date   • CHOLECYSTECTOMY  2011   • COLONOSCOPY      2015 or 2016   • HERNIA REPAIR     • LUMBAR FUSION N/A 11/03/2021    Procedure: Lumbar 3 to Lumbar 4 and Lumbar 4 to Lumbar 5 laminectomy with a fusion;  Surgeon: Jose Webster MD;  Location: [facility];  Service: [employer] - Neuro;  Laterality: N/A;   • SINUS SURGERY     • TYMPANOPLASTY Bilateral      Social History     Socioeconomic History   • Marital status:    Tobacco Use   • Smoking status: Never   • Smokeless tobacco: Never   Vaping Use   • Vaping Use: Never used   Substance and Sexual Activity   • Alcohol use: Yes     Comment: 3 DRINKS WEEKLY   • Drug use: Not Currently   • Sexual activity: Yes     Partners: Female     FAMILY HISTORY:  Negative for hematologic malignancies.     MEDICATIONS:    Current Outpatient Medications:   •  acetaminophen (TYLENOL) 500 MG tablet, Take 500 mg by mouth Every 6 (Six) Hours As Needed for Mild Pain ., Disp: , Rfl:   •  allopurinol (ZYLOPRIM) 300 MG tablet, TAKE ONE TABLET BY MOUTH TWICE A DAY, Disp: 180  tablet, Rfl: 1  •  amLODIPine (NORVASC) 10 MG tablet, TAKE ONE TABLET BY MOUTH DAILY, Disp: 30 tablet, Rfl: 1  •  aspirin 81 MG chewable tablet, Chew 81 mg Daily. FOLLOW MD GUIDELINES ON HOLDING FOR OR, Disp: , Rfl:   •  atorvastatin (Lipitor) 40 MG tablet, Take 1 tablet by mouth Daily., Disp: 90 tablet, Rfl: 1  •  Cholecalciferol (VITAMIN D3) 5000 units capsule capsule, Take 5,000 Units by mouth Daily. HOLDING FOR 7 DAYS PRIOR  TO OR, Disp: , Rfl:   •  cyanocobalamin (VITAMIN B-12) 2500 MCG tablet tablet, Take 500 mcg by mouth Daily. HOLDING FOR 7 DAYS PRIOR TO OR, Disp: , Rfl:   •  cyclobenzaprine (FLEXERIL) 10 MG tablet, Take 1 tablet by mouth 2 (Two) Times a Day As Needed for Muscle Spasms., Disp: 60 tablet, Rfl: 0  •  fluticasone (Flonase) 50 MCG/ACT nasal spray, 2 sprays into the nostril(s) as directed by provider Daily. (Patient taking differently: 2 sprays into the nostril(s) as directed by provider As Needed.), Disp: 1 bottle, Rfl: 0  •  glipizide (GLUCOTROL) 5 MG tablet, TAKE ONE TABLET BY MOUTH TWICE A DAY BEFORE MEALS, Disp: 180 tablet, Rfl: 1  •  hydroCHLOROthiazide (HYDRODIURIL) 25 MG tablet, TAKE ONE TABLET BY MOUTH DAILY, Disp: 30 tablet, Rfl: 2  •  hydrOXYzine (ATARAX) 25 MG tablet, Take 1 tablet by mouth Daily As Needed for Anxiety., Disp: 30 tablet, Rfl: 0  •  ibuprofen (ADVIL,MOTRIN) 800 MG tablet, TAKE ONE TABLET BY MOUTH EVERY 8 HOURS AS NEEDED FOR MILD PAIN, Disp: 30 tablet, Rfl: 0  •  Janumet XR  MG tablet, TAKE ONE TABLET BY MOUTH TWICE A DAY, Disp: 60 tablet, Rfl: 3  •  Jardiance 25 MG tablet tablet, TAKE ONE TABLET BY MOUTH DAILY, Disp: 30 tablet, Rfl: 1  •  loratadine (CLARITIN) 10 MG tablet, TAKE ONE TABLET BY MOUTH DAILY (Patient taking differently: Take 1 tablet by mouth As Needed.), Disp: 30 tablet, Rfl: 0  •  metoprolol tartrate (LOPRESSOR) 100 MG tablet, TAKE ONE TABLET BY MOUTH TWICE A DAY, Disp: 60 tablet, Rfl: 1  •  multivitamin with minerals tablet tablet, Take 1 tablet  "by mouth Daily. HOLDING FOR 7 DAYS PRIOR TO OR, Disp: , Rfl:   •  rizatriptan (MAXALT) 10 MG tablet, Take 1 tablet by mouth 1 (One) Time for 1 dose. AT ONSET OF HEADACHE MAY REPEAT ONE TABLET IN 2 HOURS IF NEEDED, Disp: 15 tablet, Rfl: 0  •  sertraline (Zoloft) 50 MG tablet, Take 1 tablet by mouth Daily. (Patient taking differently: Take 1 tablet by mouth Every Night.), Disp: 90 tablet, Rfl: 1  •  vitamin C (ASCORBIC ACID) 500 MG tablet, Take 500 mg by mouth Daily. HOLDING FOR 7 DAYS PRIOR TO OR, Disp: , Rfl:      ALLERGIES:  Allergies   Allergen Reactions   • Aspirin Nausea Only   • Lisinopril Unknown - Low Severity     Elevated creatinine          Objective   VITAL SIGNS:  Vitals:    10/28/22 0823   BP: 125/77   Pulse: 69   Resp: 16   Temp: 97.5 °F (36.4 °C)   TempSrc: Temporal   SpO2: 96%   Weight: 84.2 kg (185 lb 11.2 oz)   Height: 170 cm (66.93\")  Comment: new ht   PainSc: 0-No pain     Wt Readings from Last 3 Encounters:   10/28/22 84.2 kg (185 lb 11.2 oz)   10/14/22 84 kg (185 lb 3.2 oz)   10/04/22 90.7 kg (200 lb)     PHYSICAL EXAMINATION  GENERAL:  The patient appears in good general condition, not in acute distress.  SKIN: No skin rashes. No ecchymosis.  HEAD:  Normocephalic.  EYES:  No Jaundice. No Pallor. Pupils equal. EOMI.  NECK:  Supple with Good ROM. No Masses.  LYMPHATICS:  No cervical, supraclavicular or axillary lymphadenopathy.  CHEST: Normal respiratory effort. Lungs clear to auscultation.   CARDIAC:  Normal S1 & S2. No murmur.   ABDOMEN:  Soft. No tenderness. No Hepatomegaly. No Splenomegaly. No masses.  EXTREMITIES:  No clubbing. No noted deformity.   NEUROLOGICAL:  No Focal neurological deficits.     RESULT REVIEW:   Results from last 7 days   Lab Units 10/28/22  0809   WBC 10*3/mm3 10.97*   NEUTROS ABS 10*3/mm3 8.79*   HEMOGLOBIN g/dL 17.2   HEMATOCRIT % 52.4*   PLATELETS 10*3/mm3 209     Component      Latest Ref Rng & Units 10/28/2022   LDH      99 - 259 U/L 179   Uric Acid      2.8 - 7.4 " mg/dL 2.5 (L)     CT scan head without contrast on 10/4/2022:  No acute intracranial hemorrhage. Diffuse high attenuation of the  vasculature, which can be seen with underlying polycythemia, severe  dehydration/hemoconcentration, or an elevated hematocrit. Consider  further evaluation with MRI/MRA.    MRI brain on 10/5/2022:  No evidence of acute infarct. No flow-limiting stenosis or occlusion is  identified in the intracranial vasculature.    Assessment & Plan   *Polycythemia.   · Patient had polycythemia 4/16/2021 when HCT was 49.3%.   · Hemoglobin increased to 19.1 and HCT increased to 53.6% on 10/4/2022.  · Patient was having severe headaches, dizziness and confusion and was hospitalized.   · CT and MRI showed no evidence of CVA.  · CT scan revealed diffuse high attenuation of the vasculature concerning for polycythemia.   · Patient does not smoke but he is exposed to smoke at work.   · Patient does not have underlying lung disease.   · The findings are concerning for polycythemia vera.     *Leukocytosis with Neutrophilia.   · No recent infection.   · He has no lymphadenopathy or splenomegaly.   · This is concerning for the polycythemia representing bone marrow disorder (polycythemia vera) rather than secondary polycythemia.     PLAN:    1.  I recommended a therapeutic phlebotomy.  2.  I recommended maintaining adequate hydration. 2  3.  I asked him to make sure his multivitamin and supplements do not contain iron. I also recommended reducing intake of iron rich food.   4.  I will obtain Erythropoietin level and obtain YURY-2 mutation tests.  5.  Continue ASA 81 mg daily.  6.  Follow up in 1 month with a CBC. If HCT remains above 50%, I would recommend repeat phlebotomy.         Drew Neal MD  10/28/22

## 2022-10-31 LAB — EPO SERPL-ACNC: 22.5 MIU/ML (ref 2.6–18.5)

## 2022-11-01 ENCOUNTER — APPOINTMENT (OUTPATIENT)
Dept: SLEEP MEDICINE | Facility: HOSPITAL | Age: 62
End: 2022-11-01

## 2022-11-01 LAB — REF LAB TEST METHOD: NORMAL

## 2022-11-04 ENCOUNTER — INFUSION (OUTPATIENT)
Dept: ONCOLOGY | Facility: HOSPITAL | Age: 62
End: 2022-11-04

## 2022-11-04 VITALS
WEIGHT: 188 LBS | HEART RATE: 79 BPM | TEMPERATURE: 98.4 F | BODY MASS INDEX: 29.51 KG/M2 | OXYGEN SATURATION: 96 % | RESPIRATION RATE: 16 BRPM | DIASTOLIC BLOOD PRESSURE: 85 MMHG | SYSTOLIC BLOOD PRESSURE: 145 MMHG

## 2022-11-04 DIAGNOSIS — D75.1 POLYCYTHEMIA: ICD-10-CM

## 2022-11-04 PROCEDURE — 99195 PHLEBOTOMY: CPT

## 2022-11-08 ENCOUNTER — OFFICE VISIT (OUTPATIENT)
Dept: NEUROSURGERY | Facility: CLINIC | Age: 62
End: 2022-11-08

## 2022-11-08 VITALS
DIASTOLIC BLOOD PRESSURE: 80 MMHG | HEART RATE: 90 BPM | OXYGEN SATURATION: 98 % | WEIGHT: 188 LBS | HEIGHT: 67 IN | SYSTOLIC BLOOD PRESSURE: 131 MMHG | BODY MASS INDEX: 29.51 KG/M2 | TEMPERATURE: 97.6 F

## 2022-11-08 DIAGNOSIS — M54.16 LUMBAR RADICULOPATHY: Primary | ICD-10-CM

## 2022-11-08 LAB — REF LAB TEST METHOD: NORMAL

## 2022-11-08 PROCEDURE — 99213 OFFICE O/P EST LOW 20 MIN: CPT | Performed by: NEUROLOGICAL SURGERY

## 2022-11-08 NOTE — PROGRESS NOTES
"Subjective   Patient ID: Pablo Mendoza is a 61 y.o. male is here today for 3 month follow-up with a new XR  Lumbar done on 10/28/2022.    Today patient states that he has low back stiffness, along with intermittent L leg pain.    Patient, Provider, and MA are all wearing a mask in  Our office today    History of Present Illness    This patient returns today.  He is doing well overall.  He has a little stiffness in his back particularly at the end of a long day of work but is otherwise feeling okay.    The following portions of the patient's history were reviewed and updated as appropriate: allergies, current medications, past family history, past medical history, past social history, past surgical history and problem list.    Review of Systems   Constitutional: Negative for chills and fever.   HENT: Negative for congestion.    Genitourinary: Negative for difficulty urinating and dysuria.   Musculoskeletal: Positive for back pain and myalgias. Negative for gait problem.   Neurological: Negative for weakness and numbness.       I have reviewed the review of systems as documented by my MA.      Objective     Vitals:    11/08/22 0852   BP: 131/80   Cuff Size: Adult   Pulse: 90   Temp: 97.6 °F (36.4 °C)   SpO2: 98%   Weight: 85.3 kg (188 lb)   Height: 170 cm (66.93\")     Body mass index is 29.51 kg/m².    Tobacco Use: Low Risk    • Smoking Tobacco Use: Never   • Smokeless Tobacco Use: Never   • Passive Exposure: Not on file          Physical Exam  Neurological:      Mental Status: He is alert and oriented to person, place, and time.       Neurologic Exam     Mental Status   Oriented to person, place, and time.           Assessment & Plan   Independent Review of Radiographic Studies:      I personally reviewed the images from the following studies.    Reviewed his x-rays done at the end of October.  This shows good alignment of the construct.  He is now a year out from surgery.    Medical Decision Making:      I told " the patient that from my point of view he can totally resume normal activities.  I told him to be careful about strenuously heavy lifting.  He is to call the office any problems develop.    Diagnoses and all orders for this visit:    1. Lumbar radiculopathy (Primary)      Return if symptoms worsen or fail to improve.

## 2022-11-11 ENCOUNTER — TELEPHONE (OUTPATIENT)
Dept: INTERNAL MEDICINE | Facility: CLINIC | Age: 62
End: 2022-11-11

## 2022-11-11 DIAGNOSIS — E78.2 MIXED HYPERLIPIDEMIA: ICD-10-CM

## 2022-11-11 DIAGNOSIS — I10 ESSENTIAL HYPERTENSION: ICD-10-CM

## 2022-11-11 DIAGNOSIS — E11.9 TYPE 2 DIABETES MELLITUS WITHOUT COMPLICATION, WITHOUT LONG-TERM CURRENT USE OF INSULIN: ICD-10-CM

## 2022-11-11 NOTE — TELEPHONE ENCOUNTER
pt came into the office today and was requesting samples of viagra or sadafil. we do not have any. pt would like a prescreiption for this if it is okay.

## 2022-11-12 LAB
ALBUMIN SERPL-MCNC: 4.8 G/DL (ref 3.5–5.2)
ALBUMIN/GLOB SERPL: 2.1 G/DL
ALP SERPL-CCNC: 150 U/L (ref 39–117)
ALT SERPL-CCNC: 21 U/L (ref 1–41)
AST SERPL-CCNC: 21 U/L (ref 1–40)
BASOPHILS # BLD AUTO: 0.06 10*3/MM3 (ref 0–0.2)
BASOPHILS NFR BLD AUTO: 0.6 % (ref 0–1.5)
BILIRUB SERPL-MCNC: 0.7 MG/DL (ref 0–1.2)
BUN SERPL-MCNC: 16 MG/DL (ref 6–20)
BUN/CREAT SERPL: 17 (ref 7–25)
CALCIUM SERPL-MCNC: 9.8 MG/DL (ref 8.6–10.5)
CHLORIDE SERPL-SCNC: 102 MMOL/L (ref 98–107)
CHOLEST SERPL-MCNC: 102 MG/DL (ref 0–200)
CO2 SERPL-SCNC: 30 MMOL/L (ref 22–29)
CREAT SERPL-MCNC: 0.94 MG/DL (ref 0.76–1.27)
EGFRCR SERPLBLD CKD-EPI 2021: 93.4 ML/MIN/1.73
EOSINOPHIL # BLD AUTO: 0.21 10*3/MM3 (ref 0–0.4)
EOSINOPHIL NFR BLD AUTO: 2.2 % (ref 0.3–6.2)
ERYTHROCYTE [DISTWIDTH] IN BLOOD BY AUTOMATED COUNT: 15.9 % (ref 12.3–15.4)
GLOBULIN SER CALC-MCNC: 2.3 GM/DL
GLUCOSE SERPL-MCNC: 118 MG/DL (ref 65–99)
HBA1C MFR BLD: 6.6 % (ref 4.8–5.6)
HCT VFR BLD AUTO: 48.5 % (ref 37.5–51)
HDLC SERPL-MCNC: 32 MG/DL (ref 40–60)
HGB BLD-MCNC: 16.4 G/DL (ref 13–17.7)
IMM GRANULOCYTES # BLD AUTO: 0.04 10*3/MM3 (ref 0–0.05)
IMM GRANULOCYTES NFR BLD AUTO: 0.4 % (ref 0–0.5)
LDLC SERPL CALC-MCNC: 45 MG/DL (ref 0–100)
LDLC/HDLC SERPL: 1.31 {RATIO}
LYMPHOCYTES # BLD AUTO: 1.41 10*3/MM3 (ref 0.7–3.1)
LYMPHOCYTES NFR BLD AUTO: 14.8 % (ref 19.6–45.3)
MCH RBC QN AUTO: 28.6 PG (ref 26.6–33)
MCHC RBC AUTO-ENTMCNC: 33.8 G/DL (ref 31.5–35.7)
MCV RBC AUTO: 84.6 FL (ref 79–97)
MONOCYTES # BLD AUTO: 0.36 10*3/MM3 (ref 0.1–0.9)
MONOCYTES NFR BLD AUTO: 3.8 % (ref 5–12)
NEUTROPHILS # BLD AUTO: 7.44 10*3/MM3 (ref 1.7–7)
NEUTROPHILS NFR BLD AUTO: 78.2 % (ref 42.7–76)
NRBC BLD AUTO-RTO: 0 /100 WBC (ref 0–0.2)
PLATELET # BLD AUTO: 247 10*3/MM3 (ref 140–450)
POTASSIUM SERPL-SCNC: 4 MMOL/L (ref 3.5–5.2)
PROT SERPL-MCNC: 7.1 G/DL (ref 6–8.5)
RBC # BLD AUTO: 5.73 10*6/MM3 (ref 4.14–5.8)
SODIUM SERPL-SCNC: 144 MMOL/L (ref 136–145)
T4 FREE SERPL-MCNC: 1 NG/DL (ref 0.93–1.7)
TRIGL SERPL-MCNC: 141 MG/DL (ref 0–150)
TSH SERPL DL<=0.005 MIU/L-ACNC: 2.01 UIU/ML (ref 0.27–4.2)
VLDLC SERPL CALC-MCNC: 25 MG/DL (ref 5–40)
WBC # BLD AUTO: 9.52 10*3/MM3 (ref 3.4–10.8)

## 2022-11-17 DIAGNOSIS — E11.9 TYPE 2 DIABETES MELLITUS WITHOUT COMPLICATION, WITHOUT LONG-TERM CURRENT USE OF INSULIN: ICD-10-CM

## 2022-11-17 DIAGNOSIS — I10 ESSENTIAL HYPERTENSION: ICD-10-CM

## 2022-11-17 DIAGNOSIS — E11.29 TYPE 2 DIABETES MELLITUS WITH PROTEINURIA: ICD-10-CM

## 2022-11-17 DIAGNOSIS — R80.9 TYPE 2 DIABETES MELLITUS WITH PROTEINURIA: ICD-10-CM

## 2022-11-17 RX ORDER — AMLODIPINE BESYLATE 10 MG/1
TABLET ORAL
Qty: 30 TABLET | Refills: 1 | Status: SHIPPED | OUTPATIENT
Start: 2022-11-17 | End: 2023-01-14

## 2022-11-17 RX ORDER — EMPAGLIFLOZIN 25 MG/1
TABLET, FILM COATED ORAL
Qty: 30 TABLET | Refills: 1 | Status: SHIPPED | OUTPATIENT
Start: 2022-11-17 | End: 2023-01-14

## 2022-11-18 ENCOUNTER — OFFICE VISIT (OUTPATIENT)
Dept: INTERNAL MEDICINE | Facility: CLINIC | Age: 62
End: 2022-11-18

## 2022-11-18 VITALS
BODY MASS INDEX: 29.13 KG/M2 | WEIGHT: 185.6 LBS | OXYGEN SATURATION: 95 % | HEIGHT: 67 IN | TEMPERATURE: 97.5 F | SYSTOLIC BLOOD PRESSURE: 128 MMHG | HEART RATE: 80 BPM | DIASTOLIC BLOOD PRESSURE: 70 MMHG

## 2022-11-18 DIAGNOSIS — D75.1 POLYCYTHEMIA: ICD-10-CM

## 2022-11-18 DIAGNOSIS — E78.2 MIXED HYPERLIPIDEMIA: ICD-10-CM

## 2022-11-18 DIAGNOSIS — I10 ESSENTIAL HYPERTENSION: ICD-10-CM

## 2022-11-18 DIAGNOSIS — G43.801 OTHER MIGRAINE WITH STATUS MIGRAINOSUS, NOT INTRACTABLE: ICD-10-CM

## 2022-11-18 DIAGNOSIS — E11.9 TYPE 2 DIABETES MELLITUS WITHOUT COMPLICATION, WITHOUT LONG-TERM CURRENT USE OF INSULIN: Primary | ICD-10-CM

## 2022-11-18 PROCEDURE — 99214 OFFICE O/P EST MOD 30 MIN: CPT | Performed by: INTERNAL MEDICINE

## 2022-11-18 NOTE — PROGRESS NOTES
Pablo Mendoza is a 61 y.o. male, who presents with a chief complaint of   Chief Complaint   Patient presents with   • Follow-up     3 mo follow/up on labs  Would like ibuprofen refill           HPI   Pt here for f/u.      Polycythemia -He was admitted to the hospital and was evaluated by hematology.  He had a therapeutic phlebotomy.  hgb He has felt better since.  His headaches have been much better.   His sleep improved after the phlebotomy too so he didn't get the sleep study. hct 53.6->50.8->52.4->48.5.  F/u with hematology coming up soon on 12/2.    T2DM - a1c 7.2 -> 6.6  He hasnt been checking sugars for the past 2 weeks.  He says he feels well.  He is on jardiance, glipizide,     Dyslipidemia - HDL low.  On statin.     htn- on amlodipine, hctz, metoprolol    Hx gout - on allopurinol    He would like to have a rx for viagra but has a hx of narrowing of the arteries and cva.     The following portions of the patient's history were reviewed and updated as appropriate: allergies, current medications, past family history, past medical history, past social history, past surgical history and problem list.    Allergies: Aspirin and Lisinopril    Review of Systems   Constitutional: Negative.    HENT: Negative.    Eyes: Negative.    Respiratory: Negative.    Cardiovascular: Negative.    Gastrointestinal: Negative.    Endocrine: Negative.    Genitourinary: Negative.    Musculoskeletal: Negative.    Skin: Negative.    Allergic/Immunologic: Negative.    Neurological: Negative.    Hematological: Negative.    Psychiatric/Behavioral: Negative.    All other systems reviewed and are negative.            Wt Readings from Last 3 Encounters:   11/18/22 84.2 kg (185 lb 9.6 oz)   11/08/22 85.3 kg (188 lb)   11/04/22 85.3 kg (188 lb)     Temp Readings from Last 3 Encounters:   11/18/22 97.5 °F (36.4 °C)   11/08/22 97.6 °F (36.4 °C)   11/04/22 98.4 °F (36.9 °C)     BP Readings from Last 3 Encounters:   11/18/22 128/70    11/08/22 131/80   11/04/22 145/85     Pulse Readings from Last 3 Encounters:   11/18/22 80   11/08/22 90   11/04/22 79     Body mass index is 29.13 kg/m².  SpO2 Readings from Last 3 Encounters:   11/18/22 95%   11/08/22 98%   11/04/22 96%          Physical Exam  Vitals and nursing note reviewed.   Constitutional:       General: He is not in acute distress.     Appearance: He is well-developed.   HENT:      Head: Normocephalic and atraumatic.      Right Ear: External ear normal.      Left Ear: External ear normal.      Nose: Nose normal.   Eyes:      Conjunctiva/sclera: Conjunctivae normal.      Pupils: Pupils are equal, round, and reactive to light.   Cardiovascular:      Rate and Rhythm: Normal rate and regular rhythm.      Heart sounds: Normal heart sounds.   Pulmonary:      Effort: Pulmonary effort is normal. No respiratory distress.      Breath sounds: Normal breath sounds. No wheezing.   Musculoskeletal:         General: Normal range of motion.      Cervical back: Normal range of motion and neck supple.      Comments: Normal gait   Skin:     General: Skin is warm and dry.   Neurological:      Mental Status: He is alert and oriented to person, place, and time.   Psychiatric:         Behavior: Behavior normal.         Thought Content: Thought content normal.         Judgment: Judgment normal.         Results for orders placed or performed in visit on 11/11/22   TSH    Specimen: Blood   Result Value Ref Range    TSH 2.010 0.270 - 4.200 uIU/mL   T4, Free    Specimen: Blood   Result Value Ref Range    Free T4 1.00 0.93 - 1.70 ng/dL   Lipid Panel With LDL / HDL Ratio    Specimen: Blood   Result Value Ref Range    Total Cholesterol 102 0 - 200 mg/dL    Triglycerides 141 0 - 150 mg/dL    HDL Cholesterol 32 (L) 40 - 60 mg/dL    VLDL Cholesterol Tavon 25 5 - 40 mg/dL    LDL Chol Calc (NIH) 45 0 - 100 mg/dL    LDL/HDL RATIO 1.31    Hemoglobin A1c    Specimen: Blood   Result Value Ref Range    Hemoglobin A1C 6.60 (H) 4.80  - 5.60 %   Comprehensive Metabolic Panel    Specimen: Blood   Result Value Ref Range    Glucose 118 (H) 65 - 99 mg/dL    BUN 16 6 - 20 mg/dL    Creatinine 0.94 0.76 - 1.27 mg/dL    EGFR Result 93.4 >60.0 mL/min/1.73    BUN/Creatinine Ratio 17.0 7.0 - 25.0    Sodium 144 136 - 145 mmol/L    Potassium 4.0 3.5 - 5.2 mmol/L    Chloride 102 98 - 107 mmol/L    Total CO2 30.0 (H) 22.0 - 29.0 mmol/L    Calcium 9.8 8.6 - 10.5 mg/dL    Total Protein 7.1 6.0 - 8.5 g/dL    Albumin 4.80 3.50 - 5.20 g/dL    Globulin 2.3 gm/dL    A/G Ratio 2.1 g/dL    Total Bilirubin 0.7 0.0 - 1.2 mg/dL    Alkaline Phosphatase 150 (H) 39 - 117 U/L    AST (SGOT) 21 1 - 40 U/L    ALT (SGPT) 21 1 - 41 U/L   CBC & Differential    Specimen: Blood   Result Value Ref Range    WBC 9.52 3.40 - 10.80 10*3/mm3    RBC 5.73 4.14 - 5.80 10*6/mm3    Hemoglobin 16.4 13.0 - 17.7 g/dL    Hematocrit 48.5 37.5 - 51.0 %    MCV 84.6 79.0 - 97.0 fL    MCH 28.6 26.6 - 33.0 pg    MCHC 33.8 31.5 - 35.7 g/dL    RDW 15.9 (H) 12.3 - 15.4 %    Platelets 247 140 - 450 10*3/mm3    Neutrophil Rel % 78.2 (H) 42.7 - 76.0 %    Lymphocyte Rel % 14.8 (L) 19.6 - 45.3 %    Monocyte Rel % 3.8 (L) 5.0 - 12.0 %    Eosinophil Rel % 2.2 0.3 - 6.2 %    Basophil Rel % 0.6 0.0 - 1.5 %    Neutrophils Absolute 7.44 (H) 1.70 - 7.00 10*3/mm3    Lymphocytes Absolute 1.41 0.70 - 3.10 10*3/mm3    Monocytes Absolute 0.36 0.10 - 0.90 10*3/mm3    Eosinophils Absolute 0.21 0.00 - 0.40 10*3/mm3    Basophils Absolute 0.06 0.00 - 0.20 10*3/mm3    Immature Granulocyte Rel % 0.4 0.0 - 0.5 %    Immature Grans Absolute 0.04 0.00 - 0.05 10*3/mm3    nRBC 0.0 0.0 - 0.2 /100 WBC     Result Review :                  Assessment and Plan    Diagnoses and all orders for this visit:    1. Type 2 diabetes mellitus without complication, without long-term current use of insulin (HCC) (Primary)  -     CBC & Differential; Future  -     Comprehensive Metabolic Panel; Future  -     Hemoglobin A1c; Future  -     Lipid Panel With  LDL / HDL Ratio; Future    2. Essential hypertension  -     CBC & Differential; Future  -     Comprehensive Metabolic Panel; Future  -     Hemoglobin A1c; Future  -     Lipid Panel With LDL / HDL Ratio; Future    3. Mixed hyperlipidemia  -     CBC & Differential; Future  -     Comprehensive Metabolic Panel; Future  -     Hemoglobin A1c; Future  -     Lipid Panel With LDL / HDL Ratio; Future    4. Other migraine with status migrainosus, not intractable    5. Polycythemia       Pt declines flu and pneumonia vaccinations    Reviewed current medication plan with patient today.  Continue current medications.  Encouraged healthy diet/exercise.  OV labs in 3   months.  Pt to call sooner if any other issues arise.      Encouraged covid vaccination if not already done.  Covid vaccination can be scheduled at [email or web address]          Outpatient Medications Prior to Visit   Medication Sig Dispense Refill   • acetaminophen (TYLENOL) 500 MG tablet Take 500 mg by mouth Every 6 (Six) Hours As Needed for Mild Pain .     • allopurinol (ZYLOPRIM) 300 MG tablet TAKE ONE TABLET BY MOUTH TWICE A  tablet 1   • amLODIPine (NORVASC) 10 MG tablet TAKE ONE TABLET BY MOUTH DAILY 30 tablet 1   • atorvastatin (Lipitor) 40 MG tablet Take 1 tablet by mouth Daily. 90 tablet 1   • cyclobenzaprine (FLEXERIL) 10 MG tablet Take 1 tablet by mouth 2 (Two) Times a Day As Needed for Muscle Spasms. 60 tablet 0   • fluticasone (Flonase) 50 MCG/ACT nasal spray 2 sprays into the nostril(s) as directed by provider Daily. (Patient taking differently: 2 sprays into the nostril(s) as directed by provider As Needed.) 1 bottle 0   • glipizide (GLUCOTROL) 5 MG tablet TAKE ONE TABLET BY MOUTH TWICE A DAY BEFORE MEALS 180 tablet 1   • hydroCHLOROthiazide (HYDRODIURIL) 25 MG tablet TAKE ONE TABLET BY MOUTH DAILY 30 tablet 2   • hydrOXYzine (ATARAX) 25 MG tablet Take 1 tablet by mouth Daily As Needed for Anxiety. 30 tablet 0   •  ibuprofen (ADVIL,MOTRIN) 800 MG tablet TAKE ONE TABLET BY MOUTH EVERY 8 HOURS AS NEEDED FOR MILD PAIN 30 tablet 0   • Janumet XR  MG tablet TAKE ONE TABLET BY MOUTH TWICE A DAY 60 tablet 3   • Jardiance 25 MG tablet tablet TAKE ONE TABLET BY MOUTH DAILY 30 tablet 1   • loratadine (CLARITIN) 10 MG tablet TAKE ONE TABLET BY MOUTH DAILY (Patient taking differently: Take 10 mg by mouth As Needed.) 30 tablet 0   • metoprolol tartrate (LOPRESSOR) 100 MG tablet TAKE ONE TABLET BY MOUTH TWICE A DAY 60 tablet 1   • rizatriptan (MAXALT) 10 MG tablet Take 1 tablet by mouth 1 (One) Time for 1 dose. AT ONSET OF HEADACHE MAY REPEAT ONE TABLET IN 2 HOURS IF NEEDED 15 tablet 0   • sertraline (Zoloft) 50 MG tablet Take 1 tablet by mouth Daily. (Patient taking differently: Take 50 mg by mouth Every Night.) 90 tablet 1   • aspirin 81 MG chewable tablet Chew 81 mg Daily. FOLLOW MD GUIDELINES ON HOLDING FOR OR     • Cholecalciferol (VITAMIN D3) 5000 units capsule capsule Take 5,000 Units by mouth Daily. HOLDING FOR 7 DAYS PRIOR  TO OR     • cyanocobalamin (VITAMIN B-12) 2500 MCG tablet tablet Take 500 mcg by mouth Daily. HOLDING FOR 7 DAYS PRIOR TO OR     • multivitamin with minerals tablet tablet Take 1 tablet by mouth Daily. HOLDING FOR 7 DAYS PRIOR TO OR     • vitamin C (ASCORBIC ACID) 500 MG tablet Take 500 mg by mouth Daily. HOLDING FOR 7 DAYS PRIOR TO OR       No facility-administered medications prior to visit.     No orders of the defined types were placed in this encounter.    [unfilled]  There are no discontinued medications.      No follow-ups on file.    Patient was given instructions and counseling regarding her condition or for health maintenance advice. Please see specific information pulled into the AVS if appropriate.

## 2022-11-25 DIAGNOSIS — I10 ESSENTIAL HYPERTENSION: ICD-10-CM

## 2022-11-25 RX ORDER — METOPROLOL TARTRATE 100 MG/1
TABLET ORAL
Qty: 60 TABLET | Refills: 1 | Status: SHIPPED | OUTPATIENT
Start: 2022-11-25 | End: 2023-01-25

## 2022-11-25 NOTE — TELEPHONE ENCOUNTER
Rx Refill Note  Requested Prescriptions     Pending Prescriptions Disp Refills    metoprolol tartrate (LOPRESSOR) 100 MG tablet [Pharmacy Med Name: METOPROLOL TARTRATE 100 MG TAB] 60 tablet 1     Sig: TAKE ONE TABLET BY MOUTH TWICE A DAY      Last office visit with prescribing clinician: 11/18/2022      Next office visit with prescribing clinician: 3/3/2023            Yesica Leach MA  11/25/22, 13:59 EST

## 2022-11-29 ENCOUNTER — TELEPHONE (OUTPATIENT)
Dept: INTERNAL MEDICINE | Facility: CLINIC | Age: 62
End: 2022-11-29

## 2022-11-29 NOTE — TELEPHONE ENCOUNTER
Caller: Pablo Mendoza    Relationship: Self    Best call back number: [phone]    What medication are you requesting: SOMETHING FOR FLU    What are your current symptoms: FEVER, CHILLS, HEAD CONGESTION, BODY ACHES.     How long have you been experiencing symptoms: 11.27.22    Have you had these symptoms before:    [x] Yes  [] No    Have you been treated for these symptoms before:   [x] Yes  [] No    If a prescription is needed, what is your preferred pharmacy and phone number:      [facility] [id number] - [place] [id number] [facility] AT [facility] - [phone]  - [phone]   [phone]    Additional notes:    NEGATIVE ON COVID TEST.

## 2022-12-02 ENCOUNTER — APPOINTMENT (OUTPATIENT)
Dept: ONCOLOGY | Facility: HOSPITAL | Age: 62
End: 2022-12-02
Payer: COMMERCIAL

## 2022-12-02 ENCOUNTER — OFFICE VISIT (OUTPATIENT)
Dept: ONCOLOGY | Facility: CLINIC | Age: 62
End: 2022-12-02

## 2022-12-02 ENCOUNTER — LAB (OUTPATIENT)
Dept: LAB | Facility: HOSPITAL | Age: 62
End: 2022-12-02
Payer: COMMERCIAL

## 2022-12-02 VITALS
DIASTOLIC BLOOD PRESSURE: 75 MMHG | HEIGHT: 67 IN | HEART RATE: 67 BPM | TEMPERATURE: 97.1 F | WEIGHT: 185 LBS | BODY MASS INDEX: 29.03 KG/M2 | SYSTOLIC BLOOD PRESSURE: 120 MMHG | OXYGEN SATURATION: 98 % | RESPIRATION RATE: 16 BRPM

## 2022-12-02 DIAGNOSIS — D72.9 NEUTROPHILIA: ICD-10-CM

## 2022-12-02 DIAGNOSIS — D75.1 POLYCYTHEMIA: ICD-10-CM

## 2022-12-02 DIAGNOSIS — D75.1 POLYCYTHEMIA: Primary | ICD-10-CM

## 2022-12-02 LAB
BASOPHILS # BLD AUTO: 0.04 10*3/MM3 (ref 0–0.2)
BASOPHILS NFR BLD AUTO: 0.5 % (ref 0–1.5)
DEPRECATED RDW RBC AUTO: 43.7 FL (ref 37–54)
EOSINOPHIL # BLD AUTO: 0.15 10*3/MM3 (ref 0–0.4)
EOSINOPHIL NFR BLD AUTO: 2 % (ref 0.3–6.2)
ERYTHROCYTE [DISTWIDTH] IN BLOOD BY AUTOMATED COUNT: 14.5 % (ref 12.3–15.4)
HCT VFR BLD AUTO: 48.7 % (ref 37.5–51)
HGB BLD-MCNC: 16.5 G/DL (ref 13–17.7)
IMM GRANULOCYTES # BLD AUTO: 0.07 10*3/MM3 (ref 0–0.05)
IMM GRANULOCYTES NFR BLD AUTO: 0.9 % (ref 0–0.5)
LYMPHOCYTES # BLD AUTO: 2.06 10*3/MM3 (ref 0.7–3.1)
LYMPHOCYTES NFR BLD AUTO: 27 % (ref 19.6–45.3)
MCH RBC QN AUTO: 28.6 PG (ref 26.6–33)
MCHC RBC AUTO-ENTMCNC: 33.9 G/DL (ref 31.5–35.7)
MCV RBC AUTO: 84.5 FL (ref 79–97)
MONOCYTES # BLD AUTO: 0.57 10*3/MM3 (ref 0.1–0.9)
MONOCYTES NFR BLD AUTO: 7.5 % (ref 5–12)
NEUTROPHILS NFR BLD AUTO: 4.73 10*3/MM3 (ref 1.7–7)
NEUTROPHILS NFR BLD AUTO: 62.1 % (ref 42.7–76)
NRBC BLD AUTO-RTO: 0 /100 WBC (ref 0–0.2)
PLATELET # BLD AUTO: 167 10*3/MM3 (ref 140–450)
PMV BLD AUTO: 9.9 FL (ref 6–12)
RBC # BLD AUTO: 5.76 10*6/MM3 (ref 4.14–5.8)
WBC NRBC COR # BLD: 7.62 10*3/MM3 (ref 3.4–10.8)

## 2022-12-02 PROCEDURE — 85025 COMPLETE CBC W/AUTO DIFF WBC: CPT

## 2022-12-02 PROCEDURE — 99214 OFFICE O/P EST MOD 30 MIN: CPT | Performed by: INTERNAL MEDICINE

## 2022-12-02 PROCEDURE — 36415 COLL VENOUS BLD VENIPUNCTURE: CPT

## 2022-12-02 NOTE — PROGRESS NOTES
Subjective     CHIEF COMPLAINT:      Chief Complaint   Patient presents with   • Follow-up     Discuss labs       HISTORY OF PRESENT ILLNESS:     Pablo Mendoza is a 62 y.o. male patient who returns today for follow up on his polycythemia.  He returns today for follow-up reporting significant improvement in his symptoms after he had a therapeutic phlebotomy done.  He tolerated the phlebotomy well.  He is no longer having headaches.    Patient is not having chest pain or shortness of breath.  He does not smoke but he is exposed to smoke at work.  He works as a .    ROS:  Pertinent ROS is in the HPI.     Past medical, surgical, social and family history were reviewed.     MEDICATIONS:    Current Outpatient Medications:   •  acetaminophen (TYLENOL) 500 MG tablet, Take 500 mg by mouth Every 6 (Six) Hours As Needed for Mild Pain ., Disp: , Rfl:   •  allopurinol (ZYLOPRIM) 300 MG tablet, TAKE ONE TABLET BY MOUTH TWICE A DAY, Disp: 180 tablet, Rfl: 1  •  amLODIPine (NORVASC) 10 MG tablet, TAKE ONE TABLET BY MOUTH DAILY, Disp: 30 tablet, Rfl: 1  •  aspirin 81 MG chewable tablet, Chew 81 mg Daily. FOLLOW MD GUIDELINES ON HOLDING FOR OR, Disp: , Rfl:   •  atorvastatin (Lipitor) 40 MG tablet, Take 1 tablet by mouth Daily., Disp: 90 tablet, Rfl: 1  •  cyclobenzaprine (FLEXERIL) 10 MG tablet, Take 1 tablet by mouth 2 (Two) Times a Day As Needed for Muscle Spasms., Disp: 60 tablet, Rfl: 0  •  fluticasone (Flonase) 50 MCG/ACT nasal spray, 2 sprays into the nostril(s) as directed by provider Daily. (Patient taking differently: 2 sprays into the nostril(s) as directed by provider As Needed.), Disp: 1 bottle, Rfl: 0  •  glipizide (GLUCOTROL) 5 MG tablet, TAKE ONE TABLET BY MOUTH TWICE A DAY BEFORE MEALS, Disp: 180 tablet, Rfl: 1  •  hydroCHLOROthiazide (HYDRODIURIL) 25 MG tablet, TAKE ONE TABLET BY MOUTH DAILY, Disp: 30 tablet, Rfl: 2  •  hydrOXYzine (ATARAX) 25 MG tablet, Take 1 tablet by mouth Daily As Needed for Anxiety.,  "Disp: 30 tablet, Rfl: 0  •  ibuprofen (ADVIL,MOTRIN) 800 MG tablet, TAKE ONE TABLET BY MOUTH EVERY 8 HOURS AS NEEDED FOR MILD PAIN, Disp: 30 tablet, Rfl: 0  •  Janumet XR  MG tablet, TAKE ONE TABLET BY MOUTH TWICE A DAY, Disp: 60 tablet, Rfl: 3  •  Jardiance 25 MG tablet tablet, TAKE ONE TABLET BY MOUTH DAILY, Disp: 30 tablet, Rfl: 1  •  loratadine (CLARITIN) 10 MG tablet, TAKE ONE TABLET BY MOUTH DAILY (Patient taking differently: Take 10 mg by mouth As Needed.), Disp: 30 tablet, Rfl: 0  •  metoprolol tartrate (LOPRESSOR) 100 MG tablet, TAKE ONE TABLET BY MOUTH TWICE A DAY, Disp: 60 tablet, Rfl: 1  •  multivitamin with minerals tablet tablet, Take 1 tablet by mouth Daily. HOLDING FOR 7 DAYS PRIOR TO OR, Disp: , Rfl:   •  rizatriptan (MAXALT) 10 MG tablet, Take 1 tablet by mouth 1 (One) Time for 1 dose. AT ONSET OF HEADACHE MAY REPEAT ONE TABLET IN 2 HOURS IF NEEDED, Disp: 15 tablet, Rfl: 0  •  sertraline (Zoloft) 50 MG tablet, Take 1 tablet by mouth Daily. (Patient taking differently: Take 50 mg by mouth Every Night.), Disp: 90 tablet, Rfl: 1     Objective   Flexed at  VITAL SIGNS:     Vitals:    12/02/22 0945   BP: 120/75   Pulse: 67   Resp: 16   Temp: 97.1 °F (36.2 °C)   TempSrc: Temporal   SpO2: 98%   Weight: 83.9 kg (185 lb)   Height: 170 cm (66.93\")   PainSc: 0-No pain     Body mass index is 29.04 kg/m².     Wt Readings from Last 5 Encounters:   12/02/22 83.9 kg (185 lb)   11/18/22 84.2 kg (185 lb 9.6 oz)   11/08/22 85.3 kg (188 lb)   11/04/22 85.3 kg (188 lb)   10/28/22 84.2 kg (185 lb 11.2 oz)     PHYSICAL EXAMINATION:   GENERAL: The patient appears in good general condition, not in acute distress.   SKIN: No ecchymosis.  EYES: No jaundice. No pallor.  LYMPHATICS: No cervical lymphadenopathy.  CHEST: Normal respiratory effort.  Lungs clear bilaterally.  No added sounds.  CVS: Normal S1-S2.  No murmurs.  ABDOMEN: Soft. No tenderness. No Hepatomegaly. No Splenomegaly. No masses.  EXTREMITIES: No " edema.    DIAGNOSTIC DATA:     Results from last 7 days   Lab Units 12/02/22  0855   WBC 10*3/mm3 7.62   NEUTROS ABS 10*3/mm3 4.73   HEMOGLOBIN g/dL 16.5   HEMATOCRIT % 48.7   PLATELETS 10*3/mm3 167     Component      Latest Ref Rng & Units 10/28/2022   Erythropoietin      2.6 - 18.5 mIU/mL 22.5 (H)     JAK2 exon 12 mutation test on 10/28/2022-not detected  JAK2 V617F mutation test on 10/28/2022-not detected    Assessment & Plan    *Polycythemia.   · Patient had polycythemia 4/16/2021 when HCT was 49.3%.   · Hemoglobin increased to 19.1 and HCT increased to 53.6% on 10/4/2022.  · Patient was having severe headaches, dizziness and confusion and was hospitalized.   · CT and MRI showed no evidence of CVA.  · CT scan revealed diffuse high attenuation of the vasculature concerning for polycythemia.   · Patient does not smoke but he is exposed to smoke at work.   · JAK2 mutation testing on 10/28/2022 was negative for exon 12 mutation and negative for V617F mutation.  · Erythropoietin level was elevated at 22.5.  · Patient had a therapeutic phlebotomy on 11/4/2022.  · He felt better after the phlebotomy.  · He stopped taking his multivitamin which contains iron.  · Hematocrit is 48.7% today.  · Due to the elevated erythropoietin level with no history of smoking and no underlying lung disease, I recommended obtaining CT scan of the chest abdomen pelvis to evaluate for underlying malignancy.    *Leukocytosis with Neutrophilia.   · No recent infection.   · He has no lymphadenopathy or splenomegaly.   · This is concerning for the polycythemia representing bone marrow disorder (polycythemia vera) rather than secondary polycythemia.   · WBC and platelets are normal today.    PLAN:    1.  Patient does not need therapeutic phlebotomy today.    2.  Obtain CT scan of the chest abdomen pelvis in 1 week.    3.  If the CT scan does not show evidence of underlying malignancy, he will return in 2 months for CBC and possible phlebotomy if  hematocrit is >50%.  4.  Follow-up in 6 months with CBC and possible phlebotomy.  5.  I explained he can take supplements that do not contain iron.      Drew Neal MD  12/02/22

## 2022-12-13 ENCOUNTER — TELEPHONE (OUTPATIENT)
Dept: INTERNAL MEDICINE | Facility: CLINIC | Age: 62
End: 2022-12-13

## 2022-12-13 ENCOUNTER — TELEPHONE (OUTPATIENT)
Dept: ONCOLOGY | Facility: CLINIC | Age: 62
End: 2022-12-13

## 2022-12-13 NOTE — TELEPHONE ENCOUNTER
Spoke with patient and informed him that Dr. Neal approved the ibuprofen, but since he is taking aspirin he recommends the lowest amount of ibuprofen to avoid stomach irritation/inflammation and to avoid excessive bruising. Informed patient that from a hematologic standpoint he is okay to take ED medication. Patient to refer to his PCP on which medication they would like to prescribe for him. He v/u.

## 2022-12-13 NOTE — TELEPHONE ENCOUNTER
Caller: Pablo Mendoza    Relationship to patient: Self    Best call back number: [phone]    Patient is needing: HE WENT TO SEE THE BLOOD DOCTOR, DR. SMITH AND HE ADVISED THAT YOU W0ULD BE THE ONE TO PRESCRIBE HIS E.D. MEDICATION BUT IT WAS OK TO TAKE IT.  SHOULD HE TAKE THE IBUPROFEN TOO?

## 2022-12-13 NOTE — TELEPHONE ENCOUNTER
Caller: Pablo Mendoza    Relationship: Self    Best call back number: [phone]    What medication are you requestin MG IBUPROFEN AND MEDICATION FOR ERECTILE DYSFUNCTION     What are your current symptoms: MIGRAINES AND PAIN RELIEF AND ED    How long have you been experiencing symptoms: SEVERAL YEARS    Have you had these symptoms before:    [x] Yes  [] No    Have you been treated for these symptoms before:   [x] Yes  [] No    If a prescription is needed, what is your preferred pharmacy and phone number:    Pharmacy    [facility] [id number] - [place], [place] - [address] [facility] AT [facility] - [phone]  - [phone]    [address], [facility] [address]   Phone:  [phone]  Fax:  [phone]

## 2022-12-13 NOTE — TELEPHONE ENCOUNTER
Returned call to patient who is requesting Ibuprofen 800 mg refill for migraines.  He is also requesting a medication for ED. I explained that his PCP who manages his Diabetes, should be the prescriber for these medications.  He stated that he spoke with PCP regarding this and he was told to contact Dr. Neal, due to his blood disorder and that he would need to decide what would be best to prescribe for the patient.  Please advise.

## 2022-12-15 NOTE — TELEPHONE ENCOUNTER
PATIENT CALLED BACK FOR STATUS OF MEDICATION REFILL OF  ibuprofen (ADVIL,MOTRIN) 800 MG tablet    DR. SMITH, HEMATOLOGIST, WANTED TO MAKE SURE OF  MG DOSAGE, WANTS DR. QUARLES OPINION    HE STATES DR. HOFFMAN GAVE SAMPLES OF CIALIS AND DR. SMITH IS OKAY WITH HIM TAKING A ERECTILE DYSFUNCTION MEDICATION    [facility] [id number] [address] AT [facility] - [phone]  - [phone]   [phone]    CALL BACK NUMBER [phone]

## 2022-12-23 ENCOUNTER — HOSPITAL ENCOUNTER (OUTPATIENT)
Dept: CT IMAGING | Facility: HOSPITAL | Age: 62
Discharge: HOME | End: 2022-12-23

## 2022-12-26 DIAGNOSIS — I10 ESSENTIAL HYPERTENSION: ICD-10-CM

## 2022-12-27 RX ORDER — HYDROCHLOROTHIAZIDE 25 MG/1
TABLET ORAL
Qty: 30 TABLET | Refills: 2 | Status: SHIPPED | OUTPATIENT
Start: 2022-12-27 | End: 2023-03-30

## 2023-01-13 ENCOUNTER — HOSPITAL ENCOUNTER (OUTPATIENT)
Dept: CT IMAGING | Facility: HOSPITAL | Age: 63
Discharge: HOME | End: 2023-01-13
Admitting: INTERNAL MEDICINE
Payer: COMMERCIAL

## 2023-01-13 DIAGNOSIS — D75.1 POLYCYTHEMIA: ICD-10-CM

## 2023-01-13 PROCEDURE — 74177 CT ABD & PELVIS W/CONTRAST: CPT

## 2023-01-13 PROCEDURE — 71260 CT THORAX DX C+: CPT

## 2023-01-13 PROCEDURE — 25010000002 IOPAMIDOL 61 % SOLUTION: Performed by: INTERNAL MEDICINE

## 2023-01-13 PROCEDURE — 0 DIATRIZOATE MEGLUMINE & SODIUM PER 1 ML: Performed by: INTERNAL MEDICINE

## 2023-01-13 PROCEDURE — 82565 ASSAY OF CREATININE: CPT

## 2023-01-13 RX ADMIN — DIATRIZOATE MEGLUMINE AND DIATRIZOATE SODIUM 30 ML: 660; 100 LIQUID ORAL; RECTAL at 10:30

## 2023-01-13 RX ADMIN — IOPAMIDOL 90 ML: 612 INJECTION, SOLUTION INTRAVENOUS at 11:16

## 2023-01-14 DIAGNOSIS — E11.29 TYPE 2 DIABETES MELLITUS WITH PROTEINURIA: ICD-10-CM

## 2023-01-14 DIAGNOSIS — E11.9 TYPE 2 DIABETES MELLITUS WITHOUT COMPLICATION, WITHOUT LONG-TERM CURRENT USE OF INSULIN: ICD-10-CM

## 2023-01-14 DIAGNOSIS — I10 ESSENTIAL HYPERTENSION: ICD-10-CM

## 2023-01-14 DIAGNOSIS — R80.9 TYPE 2 DIABETES MELLITUS WITH PROTEINURIA: ICD-10-CM

## 2023-01-14 LAB — CREAT BLDA-MCNC: 1 MG/DL (ref 0.6–1.3)

## 2023-01-14 RX ORDER — AMLODIPINE BESYLATE 10 MG/1
TABLET ORAL
Qty: 30 TABLET | Refills: 1 | Status: SHIPPED | OUTPATIENT
Start: 2023-01-14 | End: 2023-03-22

## 2023-01-14 RX ORDER — EMPAGLIFLOZIN 25 MG/1
TABLET, FILM COATED ORAL
Qty: 30 TABLET | Refills: 1 | Status: SHIPPED | OUTPATIENT
Start: 2023-01-14 | End: 2023-03-22

## 2023-01-14 NOTE — TELEPHONE ENCOUNTER
Rx Refill Note  Requested Prescriptions     Pending Prescriptions Disp Refills    amLODIPine (NORVASC) 10 MG tablet [Pharmacy Med Name: amLODIPine BESYLATE 10 MG TAB] 30 tablet 1     Sig: TAKE ONE TABLET BY MOUTH DAILY    Jardiance 25 MG tablet tablet [Pharmacy Med Name: JARDIANCE 25 MG TABLET] 30 tablet 1     Sig: TAKE ONE TABLET BY MOUTH DAILY      Last office visit with prescribing clinician: 11/18/2022   Last telemedicine visit with prescribing clinician: 2/24/2023   Next office visit with prescribing clinician: 3/3/2023                         Would you like a call back once the refill request has been completed: [] Yes [] No    If the office needs to give you a call back, can they leave a voicemail: [] Yes [] No    Yesica Leach MA  01/14/23, 10:51 EST

## 2023-01-16 DIAGNOSIS — E11.29 TYPE 2 DIABETES MELLITUS WITH PROTEINURIA: ICD-10-CM

## 2023-01-16 DIAGNOSIS — R80.9 TYPE 2 DIABETES MELLITUS WITH PROTEINURIA: ICD-10-CM

## 2023-01-16 RX ORDER — GLIPIZIDE 5 MG/1
TABLET ORAL
Qty: 60 TABLET | Refills: 0 | Status: SHIPPED | OUTPATIENT
Start: 2023-01-16 | End: 2023-02-15

## 2023-01-17 ENCOUNTER — TELEPHONE (OUTPATIENT)
Dept: ONCOLOGY | Facility: CLINIC | Age: 63
End: 2023-01-17
Payer: COMMERCIAL

## 2023-01-17 NOTE — TELEPHONE ENCOUNTER
----- Message from Drew Neal MD sent at 1/16/2023 10:12 PM EST -----  Please inform the patient that the CT scan showed no evidence of underlying cancer to explain the increase in the red blood cells. There were changes of diverticulosis but no evidence of infection (no diverticulitis).     Thank you

## 2023-01-22 DIAGNOSIS — M1A.09X0 CHRONIC GOUT OF MULTIPLE SITES, UNSPECIFIED CAUSE: ICD-10-CM

## 2023-01-22 RX ORDER — ALLOPURINOL 300 MG/1
TABLET ORAL
Qty: 180 TABLET | Refills: 1 | Status: SHIPPED | OUTPATIENT
Start: 2023-01-22

## 2023-01-25 DIAGNOSIS — I10 ESSENTIAL HYPERTENSION: ICD-10-CM

## 2023-01-25 RX ORDER — METOPROLOL TARTRATE 100 MG/1
TABLET ORAL
Qty: 60 TABLET | Refills: 1 | Status: SHIPPED | OUTPATIENT
Start: 2023-01-25 | End: 2023-03-31

## 2023-02-03 ENCOUNTER — INFUSION (OUTPATIENT)
Dept: ONCOLOGY | Facility: HOSPITAL | Age: 63
End: 2023-02-03
Payer: COMMERCIAL

## 2023-02-03 ENCOUNTER — LAB (OUTPATIENT)
Dept: LAB | Facility: HOSPITAL | Age: 63
End: 2023-02-03
Payer: COMMERCIAL

## 2023-02-03 VITALS
RESPIRATION RATE: 16 BRPM | DIASTOLIC BLOOD PRESSURE: 78 MMHG | BODY MASS INDEX: 30.36 KG/M2 | HEART RATE: 87 BPM | OXYGEN SATURATION: 92 % | SYSTOLIC BLOOD PRESSURE: 138 MMHG | TEMPERATURE: 98.4 F | WEIGHT: 193.4 LBS

## 2023-02-03 DIAGNOSIS — D72.9 NEUTROPHILIA: ICD-10-CM

## 2023-02-03 DIAGNOSIS — D75.1 POLYCYTHEMIA: Primary | ICD-10-CM

## 2023-02-03 DIAGNOSIS — D75.1 POLYCYTHEMIA: ICD-10-CM

## 2023-02-03 LAB
BASOPHILS # BLD AUTO: 0.08 10*3/MM3 (ref 0–0.2)
BASOPHILS NFR BLD AUTO: 0.7 % (ref 0–1.5)
DEPRECATED RDW RBC AUTO: 43.8 FL (ref 37–54)
EOSINOPHIL # BLD AUTO: 0.16 10*3/MM3 (ref 0–0.4)
EOSINOPHIL NFR BLD AUTO: 1.5 % (ref 0.3–6.2)
ERYTHROCYTE [DISTWIDTH] IN BLOOD BY AUTOMATED COUNT: 15.6 % (ref 12.3–15.4)
HCT VFR BLD AUTO: 51.7 % (ref 37.5–51)
HGB BLD-MCNC: 17.5 G/DL (ref 13–17.7)
IMM GRANULOCYTES # BLD AUTO: 0.08 10*3/MM3 (ref 0–0.05)
IMM GRANULOCYTES NFR BLD AUTO: 0.7 % (ref 0–0.5)
LYMPHOCYTES # BLD AUTO: 2.15 10*3/MM3 (ref 0.7–3.1)
LYMPHOCYTES NFR BLD AUTO: 19.7 % (ref 19.6–45.3)
MCH RBC QN AUTO: 28.2 PG (ref 26.6–33)
MCHC RBC AUTO-ENTMCNC: 33.8 G/DL (ref 31.5–35.7)
MCV RBC AUTO: 83.3 FL (ref 79–97)
MONOCYTES # BLD AUTO: 0.65 10*3/MM3 (ref 0.1–0.9)
MONOCYTES NFR BLD AUTO: 6 % (ref 5–12)
NEUTROPHILS NFR BLD AUTO: 7.78 10*3/MM3 (ref 1.7–7)
NEUTROPHILS NFR BLD AUTO: 71.4 % (ref 42.7–76)
NRBC BLD AUTO-RTO: 0 /100 WBC (ref 0–0.2)
PLATELET # BLD AUTO: 233 10*3/MM3 (ref 140–450)
PMV BLD AUTO: 10.2 FL (ref 6–12)
RBC # BLD AUTO: 6.21 10*6/MM3 (ref 4.14–5.8)
WBC NRBC COR # BLD: 10.9 10*3/MM3 (ref 3.4–10.8)

## 2023-02-03 PROCEDURE — 85025 COMPLETE CBC W/AUTO DIFF WBC: CPT

## 2023-02-03 PROCEDURE — 99195 PHLEBOTOMY: CPT

## 2023-02-03 PROCEDURE — 36415 COLL VENOUS BLD VENIPUNCTURE: CPT

## 2023-02-03 RX ORDER — SODIUM CHLORIDE 9 MG/ML
250 INJECTION, SOLUTION INTRAVENOUS ONCE
Status: COMPLETED | OUTPATIENT
Start: 2023-02-03 | End: 2023-02-03

## 2023-02-03 RX ORDER — SODIUM CHLORIDE 9 MG/ML
250 INJECTION, SOLUTION INTRAVENOUS ONCE
Status: CANCELLED | OUTPATIENT
Start: 2023-02-10

## 2023-02-03 RX ADMIN — SODIUM CHLORIDE 250 ML: 900 INJECTION, SOLUTION INTRAVENOUS at 16:08

## 2023-02-15 DIAGNOSIS — R80.9 TYPE 2 DIABETES MELLITUS WITH PROTEINURIA: ICD-10-CM

## 2023-02-15 DIAGNOSIS — E11.29 TYPE 2 DIABETES MELLITUS WITH PROTEINURIA: ICD-10-CM

## 2023-02-15 RX ORDER — GLIPIZIDE 5 MG/1
TABLET ORAL
Qty: 60 TABLET | Refills: 0 | Status: SHIPPED | OUTPATIENT
Start: 2023-02-15 | End: 2023-03-15

## 2023-02-15 RX ORDER — SITAGLIPTIN AND METFORMIN HYDROCHLORIDE 1000; 50 MG/1; MG/1
TABLET, FILM COATED, EXTENDED RELEASE ORAL
Qty: 60 TABLET | Refills: 3 | Status: SHIPPED | OUTPATIENT
Start: 2023-02-15

## 2023-03-01 DIAGNOSIS — F41.9 ANXIETY: ICD-10-CM

## 2023-03-03 ENCOUNTER — OFFICE VISIT (OUTPATIENT)
Dept: INTERNAL MEDICINE | Facility: CLINIC | Age: 63
End: 2023-03-03
Payer: COMMERCIAL

## 2023-03-03 VITALS
DIASTOLIC BLOOD PRESSURE: 70 MMHG | TEMPERATURE: 97.7 F | WEIGHT: 197 LBS | BODY MASS INDEX: 30.92 KG/M2 | OXYGEN SATURATION: 98 % | HEART RATE: 70 BPM | HEIGHT: 67 IN | SYSTOLIC BLOOD PRESSURE: 130 MMHG

## 2023-03-03 DIAGNOSIS — E11.9 TYPE 2 DIABETES MELLITUS WITHOUT COMPLICATION, WITHOUT LONG-TERM CURRENT USE OF INSULIN: ICD-10-CM

## 2023-03-03 DIAGNOSIS — F41.9 ANXIETY: ICD-10-CM

## 2023-03-03 DIAGNOSIS — G43.801 OTHER MIGRAINE WITH STATUS MIGRAINOSUS, NOT INTRACTABLE: ICD-10-CM

## 2023-03-03 DIAGNOSIS — N52.1 ERECTILE DYSFUNCTION DUE TO DISEASES CLASSIFIED ELSEWHERE: Primary | ICD-10-CM

## 2023-03-03 DIAGNOSIS — M1A.09X0 CHRONIC GOUT OF MULTIPLE SITES, UNSPECIFIED CAUSE: ICD-10-CM

## 2023-03-03 DIAGNOSIS — D75.1 POLYCYTHEMIA: ICD-10-CM

## 2023-03-03 DIAGNOSIS — I10 ESSENTIAL HYPERTENSION: ICD-10-CM

## 2023-03-03 DIAGNOSIS — E78.2 MIXED HYPERLIPIDEMIA: ICD-10-CM

## 2023-03-03 DIAGNOSIS — R80.9 TYPE 2 DIABETES MELLITUS WITH PROTEINURIA: ICD-10-CM

## 2023-03-03 DIAGNOSIS — E11.29 TYPE 2 DIABETES MELLITUS WITH PROTEINURIA: ICD-10-CM

## 2023-03-03 PROCEDURE — 99214 OFFICE O/P EST MOD 30 MIN: CPT | Performed by: INTERNAL MEDICINE

## 2023-03-03 RX ORDER — IBUPROFEN 800 MG/1
800 TABLET ORAL EVERY 8 HOURS PRN
Qty: 30 TABLET | Refills: 0 | Status: SHIPPED | OUTPATIENT
Start: 2023-03-03

## 2023-03-03 RX ORDER — SILDENAFIL 50 MG/1
50 TABLET, FILM COATED ORAL DAILY PRN
Qty: 30 TABLET | Refills: 0 | Status: SHIPPED | OUTPATIENT
Start: 2023-03-03

## 2023-03-03 NOTE — PROGRESS NOTES
Pablo Mendoza is a 62 y.o. male, who presents with a chief complaint of   Chief Complaint   Patient presents with   • Diabetes   • Ear Fullness     Both ears           HPI   Patient here for follow-up.  He was recently laid off from his job.  His back feels better now that he has been laid by.  He has not been exercising nor eating as well.  His sugars are up.  He complains of fullness in both ears.  He is also worried about male erectile dysfunction.    He has seen hematology and has had phlebotomy because of polycythemia.  He reports that his headaches have improved and he feels better overall after the phlebotomy  The following portions of the patient's history were reviewed and updated as appropriate: allergies, current medications, past family history, past medical history, past social history, past surgical history and problem list.    Allergies: Aspirin and Lisinopril    Review of Systems   Constitutional: Negative.    HENT: Negative.    Eyes: Negative.    Respiratory: Negative.    Cardiovascular: Negative.    Gastrointestinal: Negative.    Endocrine: Negative.    Genitourinary: Negative.    Musculoskeletal: Negative.    Skin: Negative.    Allergic/Immunologic: Negative.    Neurological: Negative.    Hematological: Negative.    Psychiatric/Behavioral: Negative.    All other systems reviewed and are negative.            Wt Readings from Last 3 Encounters:   03/03/23 89.4 kg (197 lb)   02/03/23 87.7 kg (193 lb 6.4 oz)   12/02/22 83.9 kg (185 lb)     Temp Readings from Last 3 Encounters:   03/03/23 97.7 °F (36.5 °C)   02/03/23 98.4 °F (36.9 °C)   12/02/22 97.1 °F (36.2 °C) (Temporal)     BP Readings from Last 3 Encounters:   03/03/23 130/70   02/03/23 138/78   12/02/22 120/75     Pulse Readings from Last 3 Encounters:   03/03/23 70   02/03/23 87   12/02/22 67     Body mass index is 30.92 kg/m².  SpO2 Readings from Last 3 Encounters:   03/03/23 98%   02/03/23 92%   12/02/22 98%          Physical  Exam  Vitals and nursing note reviewed.   Constitutional:       General: He is not in acute distress.     Appearance: He is well-developed.   HENT:      Head: Normocephalic and atraumatic.      Right Ear: External ear normal.      Left Ear: External ear normal.      Nose: Nose normal.   Eyes:      Conjunctiva/sclera: Conjunctivae normal.      Pupils: Pupils are equal, round, and reactive to light.   Cardiovascular:      Rate and Rhythm: Normal rate and regular rhythm.      Heart sounds: Normal heart sounds.   Pulmonary:      Effort: Pulmonary effort is normal. No respiratory distress.      Breath sounds: Normal breath sounds. No wheezing.   Musculoskeletal:         General: Normal range of motion.      Cervical back: Normal range of motion and neck supple.      Comments: Normal gait   Skin:     General: Skin is warm and dry.   Neurological:      Mental Status: He is alert and oriented to person, place, and time.   Psychiatric:         Behavior: Behavior normal.         Thought Content: Thought content normal.         Judgment: Judgment normal.         Results for orders placed or performed in visit on 02/16/23   Comprehensive Metabolic Panel    Specimen: Blood   Result Value Ref Range    Glucose 213 (H) 65 - 99 mg/dL    BUN 22 8 - 23 mg/dL    Creatinine 1.20 0.76 - 1.27 mg/dL    EGFR Result 69.2 >60.0 mL/min/1.73    BUN/Creatinine Ratio 18.3 7.0 - 25.0    Sodium 139 136 - 145 mmol/L    Potassium 4.1 3.5 - 5.2 mmol/L    Chloride 95 (L) 98 - 107 mmol/L    Total CO2 28.5 22.0 - 29.0 mmol/L    Calcium 10.2 8.6 - 10.5 mg/dL    Total Protein 7.6 6.0 - 8.5 g/dL    Albumin 5.0 3.5 - 5.2 g/dL    Globulin 2.6 gm/dL    A/G Ratio 1.9 g/dL    Total Bilirubin 0.7 0.0 - 1.2 mg/dL    Alkaline Phosphatase 146 (H) 39 - 117 U/L    AST (SGOT) 19 1 - 40 U/L    ALT (SGPT) 20 1 - 41 U/L   Hemoglobin A1c    Specimen: Blood   Result Value Ref Range    Hemoglobin A1C 7.90 (H) 4.80 - 5.60 %   Lipid Panel With LDL / HDL Ratio    Specimen:  Blood   Result Value Ref Range    Total Cholesterol 119 0 - 200 mg/dL    Triglycerides 285 (H) 0 - 150 mg/dL    HDL Cholesterol 30 (L) 40 - 60 mg/dL    VLDL Cholesterol T[place] 44 (H) 5 - 40 mg/dL    LDL Chol Calc (NIH) 45 0 - 100 mg/dL    LDL/HDL RATIO 1.07    CBC & Differential    Specimen: Blood   Result Value Ref Range    WBC 11.07 (H) 3.40 - 10.80 10*3/mm3    RBC 6.00 (H) 4.14 - 5.80 10*6/mm3    Hemoglobin 17.0 13.0 - 17.7 g/dL    Hematocrit 50.6 37.5 - 51.0 %    MCV 84.3 79.0 - 97.0 fL    MCH 28.3 26.6 - 33.0 pg    MCHC 33.6 31.5 - 35.7 g/dL    RDW 14.6 12.3 - 15.4 %    Platelets 262 140 - 450 10*3/mm3    Neutrophil Rel % 75.9 42.7 - 76.0 %    Lymphocyte Rel % 15.9 (L) 19.6 - 45.3 %    Monocyte Rel % 5.6 5.0 - 12.0 %    Eosinophil Rel % 1.5 0.3 - 6.2 %    Basophil Rel % 0.7 0.0 - 1.5 %    Neutrophils Absolute 8.40 (H) 1.70 - 7.00 10*3/mm3    Lymphocytes Absolute 1.76 0.70 - 3.10 10*3/mm3    Monocytes Absolute 0.62 0.10 - 0.90 10*3/mm3    Eosinophils Absolute 0.17 0.00 - 0.40 10*3/mm3    Basophils Absolute 0.08 0.00 - 0.20 10*3/mm3    Immature Granulocyte Rel % 0.4 0.0 - 0.5 %    Immature Grans Absolute 0.04 0.00 - 0.05 10*3/mm3    nRBC 0.0 0.0 - 0.2 /100 WBC     Result Review :                  Assessment and Plan    Diagnoses and all orders for this visit:    1. Erectile dysfunction due to diseases classified elsewhere (Primary)  -     sildenafil (Viagra) 50 MG tablet; Take 1 tablet by mouth Daily As Needed for Erectile Dysfunction.  Dispense: 30 tablet; Refill: 0    2. Anxiety    3. Type 2 diabetes mellitus without complication, without long-term current use of insulin (HCC)  -     Comprehensive Metabolic Panel; Future  -     CBC & Differential; Future  -     Lipid Panel With LDL / HDL Ratio; Future  -     Hemoglobin A1c; Future    4. Essential hypertension    5. Mixed hyperlipidemia    6. Polycythemia    7. Other migraine with status migrainosus, not intractable    8. Chronic gout of multiple sites, unspecified  cause    9. Type 2 diabetes mellitus with proteinuria (HCC)    Other orders  -     ibuprofen (ADVIL,MOTRIN) 800 MG tablet; Take 1 tablet by mouth Every 8 (Eight) Hours As Needed for Mild Pain.  Dispense: 30 tablet; Refill: 0                   Outpatient Medications Prior to Visit   Medication Sig Dispense Refill   • acetaminophen (TYLENOL) 500 MG tablet Take 1 tablet by mouth Every 6 (Six) Hours As Needed for Mild Pain.     • allopurinol (ZYLOPRIM) 300 MG tablet TAKE ONE TABLET BY MOUTH TWICE A  tablet 1   • amLODIPine (NORVASC) 10 MG tablet TAKE ONE TABLET BY MOUTH DAILY 30 tablet 1   • aspirin 81 MG chewable tablet Chew 1 tablet Daily. FOLLOW MD GUIDELINES ON HOLDING FOR OR     • atorvastatin (Lipitor) 40 MG tablet Take 1 tablet by mouth Daily. 90 tablet 1   • cyclobenzaprine (FLEXERIL) 10 MG tablet Take 1 tablet by mouth 2 (Two) Times a Day As Needed for Muscle Spasms. 60 tablet 0   • fluticasone (Flonase) 50 MCG/ACT nasal spray 2 sprays into the nostril(s) as directed by provider Daily. (Patient taking differently: 2 sprays into the nostril(s) as directed by provider As Needed.) 1 bottle 0   • glipizide (GLUCOTROL) 5 MG tablet TAKE ONE TABLET BY MOUTH TWICE A DAY BEFORE A MEAL 60 tablet 0   • hydroCHLOROthiazide (HYDRODIURIL) 25 MG tablet TAKE ONE TABLET BY MOUTH DAILY 30 tablet 2   • hydrOXYzine (ATARAX) 25 MG tablet Take 1 tablet by mouth Daily As Needed for Anxiety. 30 tablet 0   • Janumet XR  MG tablet TAKE ONE TABLET BY MOUTH TWICE A DAY 60 tablet 3   • Jardiance 25 MG tablet tablet TAKE ONE TABLET BY MOUTH DAILY 30 tablet 1   • loratadine (CLARITIN) 10 MG tablet TAKE ONE TABLET BY MOUTH DAILY (Patient taking differently: Take 1 tablet by mouth As Needed.) 30 tablet 0   • metoprolol tartrate (LOPRESSOR) 100 MG tablet TAKE ONE TABLET BY MOUTH TWICE A DAY 60 tablet 1   • multivitamin with minerals tablet tablet Take 1 tablet by mouth Daily. HOLDING FOR 7 DAYS PRIOR TO OR     • rizatriptan  (MAXALT) 10 MG tablet Take 1 tablet by mouth 1 (One) Time for 1 dose. AT ONSET OF HEADACHE MAY REPEAT ONE TABLET IN 2 HOURS IF NEEDED 15 tablet 0   • sertraline (ZOLOFT) 50 MG tablet TAKE ONE TABLET BY MOUTH DAILY 30 tablet 0   • ibuprofen (ADVIL,MOTRIN) 800 MG tablet TAKE ONE TABLET BY MOUTH EVERY 8 HOURS AS NEEDED FOR MILD PAIN 30 tablet 0     No facility-administered medications prior to visit.     New Medications Ordered This Visit   Medications   • sildenafil (Viagra) 50 MG tablet     Sig: Take 1 tablet by mouth Daily As Needed for Erectile Dysfunction.     Dispense:  30 tablet     Refill:  0   • ibuprofen (ADVIL,MOTRIN) 800 MG tablet     Sig: Take 1 tablet by mouth Every 8 (Eight) Hours As Needed for Mild Pain.     Dispense:  30 tablet     Refill:  0     [unfilled]  Medications Discontinued During This Encounter   Medication Reason   • ibuprofen (ADVIL,MOTRIN) 800 MG tablet Reorder         Return in about 3 months (around 6/3/2023) for Recheck, labs.    Patient was given instructions and counseling regarding her condition or for health maintenance advice. Please see specific information pulled into the AVS if appropriate.

## 2023-03-15 DIAGNOSIS — E11.29 TYPE 2 DIABETES MELLITUS WITH PROTEINURIA: ICD-10-CM

## 2023-03-15 DIAGNOSIS — R80.9 TYPE 2 DIABETES MELLITUS WITH PROTEINURIA: ICD-10-CM

## 2023-03-15 RX ORDER — GLIPIZIDE 5 MG/1
TABLET ORAL
Qty: 60 TABLET | Refills: 0 | Status: SHIPPED | OUTPATIENT
Start: 2023-03-15

## 2023-03-22 DIAGNOSIS — E11.9 TYPE 2 DIABETES MELLITUS WITHOUT COMPLICATION, WITHOUT LONG-TERM CURRENT USE OF INSULIN: ICD-10-CM

## 2023-03-22 DIAGNOSIS — I10 ESSENTIAL HYPERTENSION: ICD-10-CM

## 2023-03-22 DIAGNOSIS — R80.9 TYPE 2 DIABETES MELLITUS WITH PROTEINURIA: ICD-10-CM

## 2023-03-22 DIAGNOSIS — E11.29 TYPE 2 DIABETES MELLITUS WITH PROTEINURIA: ICD-10-CM

## 2023-03-22 RX ORDER — AMLODIPINE BESYLATE 10 MG/1
TABLET ORAL
Qty: 30 TABLET | Refills: 1 | Status: SHIPPED | OUTPATIENT
Start: 2023-03-22

## 2023-03-22 RX ORDER — EMPAGLIFLOZIN 25 MG/1
TABLET, FILM COATED ORAL
Qty: 30 TABLET | Refills: 1 | Status: SHIPPED | OUTPATIENT
Start: 2023-03-22

## 2023-03-29 DIAGNOSIS — I10 ESSENTIAL HYPERTENSION: ICD-10-CM

## 2023-03-30 RX ORDER — HYDROCHLOROTHIAZIDE 25 MG/1
TABLET ORAL
Qty: 30 TABLET | Refills: 2 | Status: SHIPPED | OUTPATIENT
Start: 2023-03-30

## 2023-03-31 ENCOUNTER — LAB (OUTPATIENT)
Dept: LAB | Facility: HOSPITAL | Age: 63
End: 2023-03-31
Payer: COMMERCIAL

## 2023-03-31 ENCOUNTER — INFUSION (OUTPATIENT)
Dept: ONCOLOGY | Facility: HOSPITAL | Age: 63
End: 2023-03-31
Payer: COMMERCIAL

## 2023-03-31 VITALS
TEMPERATURE: 97.7 F | WEIGHT: 199.8 LBS | SYSTOLIC BLOOD PRESSURE: 143 MMHG | BODY MASS INDEX: 31.36 KG/M2 | DIASTOLIC BLOOD PRESSURE: 83 MMHG | RESPIRATION RATE: 16 BRPM | HEART RATE: 85 BPM | OXYGEN SATURATION: 94 %

## 2023-03-31 DIAGNOSIS — I10 ESSENTIAL HYPERTENSION: ICD-10-CM

## 2023-03-31 DIAGNOSIS — D75.1 POLYCYTHEMIA: Primary | ICD-10-CM

## 2023-03-31 DIAGNOSIS — D75.1 POLYCYTHEMIA: ICD-10-CM

## 2023-03-31 DIAGNOSIS — F41.9 ANXIETY: ICD-10-CM

## 2023-03-31 DIAGNOSIS — D72.9 NEUTROPHILIA: ICD-10-CM

## 2023-03-31 LAB
BASOPHILS # BLD AUTO: 0.06 10*3/MM3 (ref 0–0.2)
BASOPHILS NFR BLD AUTO: 0.6 % (ref 0–1.5)
DEPRECATED RDW RBC AUTO: 43.1 FL (ref 37–54)
EOSINOPHIL # BLD AUTO: 0.12 10*3/MM3 (ref 0–0.4)
EOSINOPHIL NFR BLD AUTO: 1.2 % (ref 0.3–6.2)
ERYTHROCYTE [DISTWIDTH] IN BLOOD BY AUTOMATED COUNT: 16.2 % (ref 12.3–15.4)
HCT VFR BLD AUTO: 51.1 % (ref 37.5–51)
HGB BLD-MCNC: 17.2 G/DL (ref 13–17.7)
IMM GRANULOCYTES # BLD AUTO: 0.05 10*3/MM3 (ref 0–0.05)
IMM GRANULOCYTES NFR BLD AUTO: 0.5 % (ref 0–0.5)
LYMPHOCYTES # BLD AUTO: 1.95 10*3/MM3 (ref 0.7–3.1)
LYMPHOCYTES NFR BLD AUTO: 19.2 % (ref 19.6–45.3)
MCH RBC QN AUTO: 27.6 PG (ref 26.6–33)
MCHC RBC AUTO-ENTMCNC: 33.7 G/DL (ref 31.5–35.7)
MCV RBC AUTO: 82 FL (ref 79–97)
MONOCYTES # BLD AUTO: 0.48 10*3/MM3 (ref 0.1–0.9)
MONOCYTES NFR BLD AUTO: 4.7 % (ref 5–12)
NEUTROPHILS NFR BLD AUTO: 7.5 10*3/MM3 (ref 1.7–7)
NEUTROPHILS NFR BLD AUTO: 73.8 % (ref 42.7–76)
NRBC BLD AUTO-RTO: 0.2 /100 WBC (ref 0–0.2)
PLATELET # BLD AUTO: 222 10*3/MM3 (ref 140–450)
PMV BLD AUTO: 11.1 FL (ref 6–12)
RBC # BLD AUTO: 6.23 10*6/MM3 (ref 4.14–5.8)
WBC NRBC COR # BLD: 10.16 10*3/MM3 (ref 3.4–10.8)

## 2023-03-31 PROCEDURE — 99195 PHLEBOTOMY: CPT

## 2023-03-31 PROCEDURE — 85025 COMPLETE CBC W/AUTO DIFF WBC: CPT

## 2023-03-31 PROCEDURE — 36415 COLL VENOUS BLD VENIPUNCTURE: CPT

## 2023-03-31 RX ORDER — SODIUM CHLORIDE 9 MG/ML
250 INJECTION, SOLUTION INTRAVENOUS ONCE
OUTPATIENT
Start: 2023-04-07

## 2023-03-31 RX ORDER — METOPROLOL TARTRATE 100 MG/1
TABLET ORAL
Qty: 60 TABLET | Refills: 1 | Status: SHIPPED | OUTPATIENT
Start: 2023-03-31

## 2023-04-14 DIAGNOSIS — E11.29 TYPE 2 DIABETES MELLITUS WITH PROTEINURIA: ICD-10-CM

## 2023-04-14 DIAGNOSIS — R80.9 TYPE 2 DIABETES MELLITUS WITH PROTEINURIA: ICD-10-CM

## 2023-04-14 RX ORDER — GLIPIZIDE 5 MG/1
TABLET ORAL
Qty: 60 TABLET | Refills: 0 | Status: SHIPPED | OUTPATIENT
Start: 2023-04-14

## 2023-04-28 DIAGNOSIS — F41.9 ANXIETY: ICD-10-CM

## 2023-05-16 DIAGNOSIS — E11.29 TYPE 2 DIABETES MELLITUS WITH PROTEINURIA: ICD-10-CM

## 2023-05-16 DIAGNOSIS — R80.9 TYPE 2 DIABETES MELLITUS WITH PROTEINURIA: ICD-10-CM

## 2023-05-16 RX ORDER — GLIPIZIDE 5 MG/1
TABLET ORAL
Qty: 60 TABLET | Refills: 0 | Status: SHIPPED | OUTPATIENT
Start: 2023-05-16

## 2023-05-17 DIAGNOSIS — E11.9 TYPE 2 DIABETES MELLITUS WITHOUT COMPLICATION, WITHOUT LONG-TERM CURRENT USE OF INSULIN: ICD-10-CM

## 2023-05-17 DIAGNOSIS — E11.29 TYPE 2 DIABETES MELLITUS WITH PROTEINURIA: ICD-10-CM

## 2023-05-17 DIAGNOSIS — R80.9 TYPE 2 DIABETES MELLITUS WITH PROTEINURIA: ICD-10-CM

## 2023-05-20 DIAGNOSIS — I10 ESSENTIAL HYPERTENSION: ICD-10-CM

## 2023-05-22 RX ORDER — AMLODIPINE BESYLATE 10 MG/1
TABLET ORAL
Qty: 30 TABLET | Refills: 1 | Status: SHIPPED | OUTPATIENT
Start: 2023-05-22

## 2023-05-23 ENCOUNTER — TELEPHONE (OUTPATIENT)
Dept: INTERNAL MEDICINE | Facility: CLINIC | Age: 63
End: 2023-05-23

## 2023-05-23 NOTE — TELEPHONE ENCOUNTER
Caller: Pablo Mendoza    Relationship: Self    Best call back number: [phone]    What was the call regarding: PATIENT STATES HE IS NEEDING HIS FMLA PAPERWORK UPDATED AND SENT BACK OVER TO [facility]     PLEASE CALL AND  ADVISE

## 2023-06-01 DIAGNOSIS — D75.1 POLYCYTHEMIA: Primary | ICD-10-CM

## 2023-06-02 ENCOUNTER — INFUSION (OUTPATIENT)
Dept: ONCOLOGY | Facility: HOSPITAL | Age: 63
End: 2023-06-02
Payer: COMMERCIAL

## 2023-06-02 ENCOUNTER — LAB (OUTPATIENT)
Dept: LAB | Facility: HOSPITAL | Age: 63
End: 2023-06-02
Payer: COMMERCIAL

## 2023-06-02 ENCOUNTER — OFFICE VISIT (OUTPATIENT)
Dept: ONCOLOGY | Facility: CLINIC | Age: 63
End: 2023-06-02

## 2023-06-02 VITALS
OXYGEN SATURATION: 97 % | RESPIRATION RATE: 18 BRPM | TEMPERATURE: 97.1 F | DIASTOLIC BLOOD PRESSURE: 75 MMHG | BODY MASS INDEX: 30.39 KG/M2 | HEIGHT: 67 IN | SYSTOLIC BLOOD PRESSURE: 138 MMHG | HEART RATE: 69 BPM | WEIGHT: 193.6 LBS

## 2023-06-02 DIAGNOSIS — D72.9 NEUTROPHILIA: ICD-10-CM

## 2023-06-02 DIAGNOSIS — D75.1 POLYCYTHEMIA: Primary | ICD-10-CM

## 2023-06-02 DIAGNOSIS — D75.1 POLYCYTHEMIA: ICD-10-CM

## 2023-06-02 LAB
BASOPHILS # BLD AUTO: 0.06 10*3/MM3 (ref 0–0.2)
BASOPHILS NFR BLD AUTO: 0.6 % (ref 0–1.5)
DEPRECATED RDW RBC AUTO: 43 FL (ref 37–54)
EOSINOPHIL # BLD AUTO: 0.2 10*3/MM3 (ref 0–0.4)
EOSINOPHIL NFR BLD AUTO: 2.1 % (ref 0.3–6.2)
ERYTHROCYTE [DISTWIDTH] IN BLOOD BY AUTOMATED COUNT: 16.6 % (ref 12.3–15.4)
HCT VFR BLD AUTO: 50.4 % (ref 37.5–51)
HGB BLD-MCNC: 16.6 G/DL (ref 13–17.7)
IMM GRANULOCYTES # BLD AUTO: 0.04 10*3/MM3 (ref 0–0.05)
IMM GRANULOCYTES NFR BLD AUTO: 0.4 % (ref 0–0.5)
LYMPHOCYTES # BLD AUTO: 1.51 10*3/MM3 (ref 0.7–3.1)
LYMPHOCYTES NFR BLD AUTO: 15.9 % (ref 19.6–45.3)
MCH RBC QN AUTO: 26.2 PG (ref 26.6–33)
MCHC RBC AUTO-ENTMCNC: 32.9 G/DL (ref 31.5–35.7)
MCV RBC AUTO: 79.6 FL (ref 79–97)
MONOCYTES # BLD AUTO: 0.5 10*3/MM3 (ref 0.1–0.9)
MONOCYTES NFR BLD AUTO: 5.3 % (ref 5–12)
NEUTROPHILS NFR BLD AUTO: 7.17 10*3/MM3 (ref 1.7–7)
NEUTROPHILS NFR BLD AUTO: 75.7 % (ref 42.7–76)
NRBC BLD AUTO-RTO: 0 /100 WBC (ref 0–0.2)
PLATELET # BLD AUTO: 224 10*3/MM3 (ref 140–450)
PMV BLD AUTO: 10.1 FL (ref 6–12)
RBC # BLD AUTO: 6.33 10*6/MM3 (ref 4.14–5.8)
WBC NRBC COR # BLD: 9.48 10*3/MM3 (ref 3.4–10.8)

## 2023-06-02 PROCEDURE — 85025 COMPLETE CBC W/AUTO DIFF WBC: CPT

## 2023-06-02 PROCEDURE — 99195 PHLEBOTOMY: CPT

## 2023-06-02 PROCEDURE — 99213 OFFICE O/P EST LOW 20 MIN: CPT | Performed by: INTERNAL MEDICINE

## 2023-06-02 PROCEDURE — 36415 COLL VENOUS BLD VENIPUNCTURE: CPT

## 2023-06-02 RX ORDER — SODIUM CHLORIDE 9 MG/ML
250 INJECTION, SOLUTION INTRAVENOUS ONCE
OUTPATIENT
Start: 2023-06-09

## 2023-06-02 RX ORDER — SODIUM CHLORIDE 9 MG/ML
250 INJECTION, SOLUTION INTRAVENOUS ONCE
Status: COMPLETED | OUTPATIENT
Start: 2023-06-02 | End: 2023-06-02

## 2023-06-02 RX ADMIN — SODIUM CHLORIDE 250 ML: 9 INJECTION, SOLUTION INTRAVENOUS at 11:36

## 2023-06-02 NOTE — PROGRESS NOTES
Subjective     CHIEF COMPLAINT:      Chief Complaint   Patient presents with   • Follow-up     HISTORY OF PRESENT ILLNESS:     Pablo Mendoza is a 62 y.o. male patient who returns today for follow up on his polycythemia.  He returns today for follow-up reporting improvement in his headaches.  They are less severe and less frequent than before.  He usually feels better after the phlebotomy.  Symptoms usually worsen about a week before he is due for the next phlebotomy.      Patient does not smoke.  He is a  and is exposed to smoke when he works.  He wears a mask.    ROS:  Pertinent ROS is in the HPI.     Past medical, surgical, social and family history were reviewed.     MEDICATIONS:    Current Outpatient Medications:   •  acetaminophen (TYLENOL) 500 MG tablet, Take 1 tablet by mouth Every 6 (Six) Hours As Needed for Mild Pain., Disp: , Rfl:   •  allopurinol (ZYLOPRIM) 300 MG tablet, TAKE ONE TABLET BY MOUTH TWICE A DAY, Disp: 180 tablet, Rfl: 1  •  amLODIPine (NORVASC) 10 MG tablet, TAKE ONE TABLET BY MOUTH DAILY, Disp: 30 tablet, Rfl: 1  •  aspirin 81 MG chewable tablet, Chew 1 tablet Daily. FOLLOW MD GUIDELINES ON HOLDING FOR OR, Disp: , Rfl:   •  atorvastatin (Lipitor) 40 MG tablet, Take 1 tablet by mouth Daily., Disp: 90 tablet, Rfl: 1  •  cyclobenzaprine (FLEXERIL) 10 MG tablet, Take 1 tablet by mouth 2 (Two) Times a Day As Needed for Muscle Spasms., Disp: 60 tablet, Rfl: 0  •  fluticasone (Flonase) 50 MCG/ACT nasal spray, 2 sprays into the nostril(s) as directed by provider Daily. (Patient taking differently: 2 sprays into the nostril(s) as directed by provider As Needed.), Disp: 1 bottle, Rfl: 0  •  glipizide (GLUCOTROL) 5 MG tablet, TAKE ONE TABLET BY MOUTH TWICE A DAY BEFORE A MEAL, Disp: 60 tablet, Rfl: 0  •  hydroCHLOROthiazide (HYDRODIURIL) 25 MG tablet, TAKE ONE TABLET BY MOUTH DAILY, Disp: 30 tablet, Rfl: 2  •  hydrOXYzine (ATARAX) 25 MG tablet, Take 1 tablet by mouth Daily As Needed for  "Anxiety., Disp: 30 tablet, Rfl: 0  •  ibuprofen (ADVIL,MOTRIN) 800 MG tablet, Take 1 tablet by mouth Every 8 (Eight) Hours As Needed for Mild Pain., Disp: 30 tablet, Rfl: 0  •  Janumet XR  MG tablet, TAKE ONE TABLET BY MOUTH TWICE A DAY, Disp: 60 tablet, Rfl: 3  •  Jardiance 25 MG tablet tablet, TAKE ONE TABLET BY MOUTH DAILY, Disp: 30 tablet, Rfl: 1  •  loratadine (CLARITIN) 10 MG tablet, TAKE ONE TABLET BY MOUTH DAILY (Patient taking differently: Take 1 tablet by mouth As Needed.), Disp: 30 tablet, Rfl: 0  •  metoprolol tartrate (LOPRESSOR) 100 MG tablet, TAKE ONE TABLET BY MOUTH TWICE A DAY, Disp: 60 tablet, Rfl: 1  •  multivitamin with minerals tablet tablet, Take 1 tablet by mouth Daily. HOLDING FOR 7 DAYS PRIOR TO OR, Disp: , Rfl:   •  sertraline (ZOLOFT) 50 MG tablet, TAKE ONE TABLET BY MOUTH DAILY, Disp: 30 tablet, Rfl: 0  •  sildenafil (Viagra) 50 MG tablet, Take 1 tablet by mouth Daily As Needed for Erectile Dysfunction., Disp: 30 tablet, Rfl: 0  •  rizatriptan (MAXALT) 10 MG tablet, Take 1 tablet by mouth 1 (One) Time for 1 dose. AT ONSET OF HEADACHE MAY REPEAT ONE TABLET IN 2 HOURS IF NEEDED, Disp: 15 tablet, Rfl: 0  Objective     VITAL SIGNS:     Vitals:    06/02/23 1033   BP: 138/75   Pulse: 69   Resp: 18   Temp: 97.1 °F (36.2 °C)   TempSrc: Temporal   SpO2: 97%   Weight: 87.8 kg (193 lb 9.6 oz)   Height: 170 cm (66.93\")   PainSc:   4   PainLoc: Back     Body mass index is 30.39 kg/m².     Wt Readings from Last 5 Encounters:   06/02/23 87.8 kg (193 lb 9.6 oz)   03/31/23 90.6 kg (199 lb 12.8 oz)   03/03/23 89.4 kg (197 lb)   02/03/23 87.7 kg (193 lb 6.4 oz)   12/02/22 83.9 kg (185 lb)     PHYSICAL EXAMINATION:   GENERAL: The patient appears in good general condition, not in acute distress.   SKIN: No ecchymosis.  EYES: No jaundice. No pallor.  LYMPHATICS: No cervical lymphadenopathy.  CHEST: Normal respiratory effort.   CVS: No edema.  ABDOMEN: Soft. No tenderness. No Hepatomegaly. No " Splenomegaly. No masses.  EXTREMITIES: No noted deformity.     DIAGNOSTIC DATA:     Results from last 7 days   Lab Units 06/02/23  1015   WBC 10*3/mm3 9.48   NEUTROS ABS 10*3/mm3 7.17*   HEMOGLOBIN g/dL 16.6   HEMATOCRIT % 50.4   PLATELETS 10*3/mm3 224     Component      Latest Ref Rng 2/24/2023 3/31/2023 6/2/2023   Neutrophils Absolute      1.70 - 7.00 10*3/mm3 8.40 (H)  7.50 (H)  7.17 (H)    Lymphocytes Absolute      0.70 - 3.10 10*3/mm3 1.76  1.95  1.51    Monocytes Absolute      0.10 - 0.90 10*3/mm3 0.62  0.48  0.50    Eosinophils Absolute      0.00 - 0.40 10*3/mm3 0.17  0.12  0.20    Basophils Absolute      0.00 - 0.20 10*3/mm3 0.08  0.06  0.06    Immature Grans, Absolute      0.00 - 0.05 10*3/mm3 0.04  0.05  0.04       CT chest abdomen pelvis on 1/13/2023:  The CT scans of the chest and abdomen and pelvis were performed with  oral and intravenous contrast. The following findings are present:  1. The lungs are well-expanded and clear. There is a calcified right  hilar lymph node. There is no mediastinal or hilar or axillary  adenopathy. There is no pericardial effusion.  2. The liver, spleen, pancreas, both adrenal glands, and both kidneys  are unremarkable except for 2 small left renal cysts. The gallbladder  has been removed.  3. There is no aortic aneurysm or retroperitoneal lymphadenopathy. The  large and small bowel loops are normal in caliber and show no  inflammatory change. The appendix appears normal.  4. In the pelvis there is mild sigmoid diverticulosis without CT  evidence of diverticulitis. The urinary bladder has a smooth contour.  5. At bone windows, no suspicious bone lesions are seen.    Assessment & Plan    *Polycythemia.   · Patient had polycythemia 4/16/2021 when HCT was 49.3%.   · Hemoglobin increased to 19.1 and HCT increased to 53.6% on 10/4/2022.  · Patient was having severe headaches, dizziness and confusion and was hospitalized.   · CT and MRI showed no evidence of CVA.  · CT scan  revealed diffuse high attenuation of the vasculature concerning for polycythemia.   · Patient does not smoke but he is exposed to smoke at work.   · JAK2 mutation testing on 10/28/2022 was negative for exon 12 mutation and negative for [id number] mutation.  · Erythropoietin level was elevated at 22.5.  · Patient had a therapeutic phlebotomy on 11/4/2022.  · He felt better after the phlebotomy.  · He stopped taking his multivitamin which contains iron.  · CT scan on 1/13/2023 showed no evidence of underlying malignancy.  · Hematocrit was 51.1% on 3/31/2023.  He underwent a therapeutic phlebotomy.  · Hematocrit is 50.4% today.    *Leukocytosis with Neutrophilia.   · No recent infection.   · He has no lymphadenopathy or splenomegaly.   · This is concerning for the polycythemia representing bone marrow disorder (polycythemia vera) rather than secondary polycythemia.   · Neutrophil count improved to 7170 today.    PLAN:    1.  Proceed with therapeutic phlebotomy today.  2.  Continue to avoid iron in multivitamins and supplements.  3.  Return in 2 months and 4 months for CBC.  He will have a therapeutic phlebotomy if hematocrit is >50%.  4.  Follow-up in 6 months with CBC and possible phlebotomy.      Drew Neal MD  06/02/23

## 2023-06-03 DIAGNOSIS — I10 ESSENTIAL HYPERTENSION: ICD-10-CM

## 2023-06-03 DIAGNOSIS — F41.9 ANXIETY: ICD-10-CM

## 2023-06-05 ENCOUNTER — TELEPHONE (OUTPATIENT)
Dept: INTERNAL MEDICINE | Facility: CLINIC | Age: 63
End: 2023-06-05
Payer: COMMERCIAL

## 2023-06-05 RX ORDER — METOPROLOL TARTRATE 100 MG/1
TABLET ORAL
Qty: 60 TABLET | Refills: 1 | Status: SHIPPED | OUTPATIENT
Start: 2023-06-05

## 2023-06-11 DIAGNOSIS — E11.29 TYPE 2 DIABETES MELLITUS WITH PROTEINURIA: ICD-10-CM

## 2023-06-11 DIAGNOSIS — R80.9 TYPE 2 DIABETES MELLITUS WITH PROTEINURIA: ICD-10-CM

## 2023-06-12 RX ORDER — GLIPIZIDE 5 MG/1
TABLET ORAL
Qty: 60 TABLET | Refills: 0 | Status: SHIPPED | OUTPATIENT
Start: 2023-06-12

## 2023-08-04 ENCOUNTER — LAB (OUTPATIENT)
Dept: LAB | Facility: HOSPITAL | Age: 63
End: 2023-08-04
Payer: COMMERCIAL

## 2023-08-04 ENCOUNTER — INFUSION (OUTPATIENT)
Dept: ONCOLOGY | Facility: HOSPITAL | Age: 63
End: 2023-08-04
Payer: COMMERCIAL

## 2023-08-04 VITALS
OXYGEN SATURATION: 95 % | SYSTOLIC BLOOD PRESSURE: 136 MMHG | RESPIRATION RATE: 16 BRPM | BODY MASS INDEX: 30.51 KG/M2 | WEIGHT: 194.4 LBS | DIASTOLIC BLOOD PRESSURE: 77 MMHG | TEMPERATURE: 98 F | HEART RATE: 86 BPM

## 2023-08-04 DIAGNOSIS — D75.1 POLYCYTHEMIA: Primary | ICD-10-CM

## 2023-08-04 DIAGNOSIS — F41.9 ANXIETY: ICD-10-CM

## 2023-08-04 DIAGNOSIS — I10 ESSENTIAL HYPERTENSION: ICD-10-CM

## 2023-08-04 DIAGNOSIS — D75.1 POLYCYTHEMIA: ICD-10-CM

## 2023-08-04 DIAGNOSIS — M1A.09X0 CHRONIC GOUT OF MULTIPLE SITES, UNSPECIFIED CAUSE: ICD-10-CM

## 2023-08-04 LAB
BASOPHILS # BLD AUTO: 0.06 10*3/MM3 (ref 0–0.2)
BASOPHILS NFR BLD AUTO: 0.6 % (ref 0–1.5)
DEPRECATED RDW RBC AUTO: 46.4 FL (ref 37–54)
EOSINOPHIL # BLD AUTO: 0.13 10*3/MM3 (ref 0–0.4)
EOSINOPHIL NFR BLD AUTO: 1.4 % (ref 0.3–6.2)
ERYTHROCYTE [DISTWIDTH] IN BLOOD BY AUTOMATED COUNT: 18.4 % (ref 12.3–15.4)
HCT VFR BLD AUTO: 51.2 % (ref 37.5–51)
HGB BLD-MCNC: 16.6 G/DL (ref 13–17.7)
IMM GRANULOCYTES # BLD AUTO: 0.04 10*3/MM3 (ref 0–0.05)
IMM GRANULOCYTES NFR BLD AUTO: 0.4 % (ref 0–0.5)
LYMPHOCYTES # BLD AUTO: 1.6 10*3/MM3 (ref 0.7–3.1)
LYMPHOCYTES NFR BLD AUTO: 16.9 % (ref 19.6–45.3)
MCH RBC QN AUTO: 25.4 PG (ref 26.6–33)
MCHC RBC AUTO-ENTMCNC: 32.4 G/DL (ref 31.5–35.7)
MCV RBC AUTO: 78.3 FL (ref 79–97)
MONOCYTES # BLD AUTO: 0.6 10*3/MM3 (ref 0.1–0.9)
MONOCYTES NFR BLD AUTO: 6.4 % (ref 5–12)
NEUTROPHILS NFR BLD AUTO: 7.01 10*3/MM3 (ref 1.7–7)
NEUTROPHILS NFR BLD AUTO: 74.3 % (ref 42.7–76)
NRBC BLD AUTO-RTO: 0 /100 WBC (ref 0–0.2)
PLATELET # BLD AUTO: 232 10*3/MM3 (ref 140–450)
PMV BLD AUTO: 10.3 FL (ref 6–12)
RBC # BLD AUTO: 6.54 10*6/MM3 (ref 4.14–5.8)
WBC NRBC COR # BLD: 9.44 10*3/MM3 (ref 3.4–10.8)

## 2023-08-04 PROCEDURE — 85025 COMPLETE CBC W/AUTO DIFF WBC: CPT

## 2023-08-04 PROCEDURE — 99195 PHLEBOTOMY: CPT

## 2023-08-04 PROCEDURE — 36415 COLL VENOUS BLD VENIPUNCTURE: CPT

## 2023-08-04 RX ORDER — SODIUM CHLORIDE 9 MG/ML
250 INJECTION, SOLUTION INTRAVENOUS ONCE
Status: COMPLETED | OUTPATIENT
Start: 2023-08-04 | End: 2023-08-04

## 2023-08-04 RX ORDER — METOPROLOL TARTRATE 100 MG/1
TABLET ORAL
Qty: 60 TABLET | Refills: 1 | Status: SHIPPED | OUTPATIENT
Start: 2023-08-04

## 2023-08-04 RX ORDER — ALLOPURINOL 300 MG/1
TABLET ORAL
Qty: 60 TABLET | Refills: 1 | Status: SHIPPED | OUTPATIENT
Start: 2023-08-04

## 2023-08-04 RX ORDER — SODIUM CHLORIDE 9 MG/ML
250 INJECTION, SOLUTION INTRAVENOUS ONCE
OUTPATIENT
Start: 2023-08-11

## 2023-08-04 RX ORDER — AMLODIPINE BESYLATE 10 MG/1
TABLET ORAL
Qty: 30 TABLET | Refills: 1 | Status: SHIPPED | OUTPATIENT
Start: 2023-08-04

## 2023-08-04 RX ADMIN — SODIUM CHLORIDE 250 ML: 0.9 INJECTION, SOLUTION INTRAVENOUS at 15:42

## 2023-08-12 LAB
ALBUMIN SERPL-MCNC: 4.7 G/DL (ref 3.5–5.2)
ALBUMIN/GLOB SERPL: 2 G/DL
ALP SERPL-CCNC: 125 U/L (ref 39–117)
ALT SERPL-CCNC: 26 U/L (ref 1–41)
AST SERPL-CCNC: 22 U/L (ref 1–40)
BASOPHILS # BLD AUTO: 0.06 10*3/MM3 (ref 0–0.2)
BASOPHILS NFR BLD AUTO: 0.6 % (ref 0–1.5)
BILIRUB SERPL-MCNC: 0.4 MG/DL (ref 0–1.2)
BUN SERPL-MCNC: 17 MG/DL (ref 8–23)
BUN/CREAT SERPL: 15.2 (ref 7–25)
CALCIUM SERPL-MCNC: 9.9 MG/DL (ref 8.6–10.5)
CHLORIDE SERPL-SCNC: 100 MMOL/L (ref 98–107)
CHOLEST SERPL-MCNC: 168 MG/DL (ref 0–200)
CO2 SERPL-SCNC: 28.5 MMOL/L (ref 22–29)
CREAT SERPL-MCNC: 1.12 MG/DL (ref 0.76–1.27)
EGFRCR SERPLBLD CKD-EPI 2021: 75.2 ML/MIN/1.73
EOSINOPHIL # BLD AUTO: 0.2 10*3/MM3 (ref 0–0.4)
EOSINOPHIL NFR BLD AUTO: 2.1 % (ref 0.3–6.2)
ERYTHROCYTE [DISTWIDTH] IN BLOOD BY AUTOMATED COUNT: 18.6 % (ref 12.3–15.4)
GLOBULIN SER CALC-MCNC: 2.3 GM/DL
GLUCOSE SERPL-MCNC: 157 MG/DL (ref 65–99)
HBA1C MFR BLD: 7.6 % (ref 4.8–5.6)
HCT VFR BLD AUTO: 47.9 % (ref 37.5–51)
HDLC SERPL-MCNC: 29 MG/DL (ref 40–60)
HGB BLD-MCNC: 15.6 G/DL (ref 13–17.7)
IMM GRANULOCYTES # BLD AUTO: 0.07 10*3/MM3 (ref 0–0.05)
IMM GRANULOCYTES NFR BLD AUTO: 0.7 % (ref 0–0.5)
LDLC SERPL CALC-MCNC: 78 MG/DL (ref 0–100)
LDLC/HDLC SERPL: 2.19 {RATIO}
LYMPHOCYTES # BLD AUTO: 1.74 10*3/MM3 (ref 0.7–3.1)
LYMPHOCYTES NFR BLD AUTO: 18 % (ref 19.6–45.3)
MCH RBC QN AUTO: 24.9 PG (ref 26.6–33)
MCHC RBC AUTO-ENTMCNC: 32.6 G/DL (ref 31.5–35.7)
MCV RBC AUTO: 76.4 FL (ref 79–97)
MONOCYTES # BLD AUTO: 0.41 10*3/MM3 (ref 0.1–0.9)
MONOCYTES NFR BLD AUTO: 4.2 % (ref 5–12)
NEUTROPHILS # BLD AUTO: 7.21 10*3/MM3 (ref 1.7–7)
NEUTROPHILS NFR BLD AUTO: 74.4 % (ref 42.7–76)
NRBC BLD AUTO-RTO: 0 /100 WBC (ref 0–0.2)
PLATELET # BLD AUTO: 288 10*3/MM3 (ref 140–450)
POTASSIUM SERPL-SCNC: 4.1 MMOL/L (ref 3.5–5.2)
PROT SERPL-MCNC: 7 G/DL (ref 6–8.5)
RBC # BLD AUTO: 6.27 10*6/MM3 (ref 4.14–5.8)
SODIUM SERPL-SCNC: 141 MMOL/L (ref 136–145)
TRIGL SERPL-MCNC: 377 MG/DL (ref 0–150)
VLDLC SERPL CALC-MCNC: 61 MG/DL (ref 5–40)
WBC # BLD AUTO: 9.69 10*3/MM3 (ref 3.4–10.8)

## 2023-08-14 ENCOUNTER — APPOINTMENT (OUTPATIENT)
Dept: CT IMAGING | Facility: HOSPITAL | Age: 63
End: 2023-08-14
Payer: COMMERCIAL

## 2023-08-14 ENCOUNTER — APPOINTMENT (OUTPATIENT)
Dept: GENERAL RADIOLOGY | Facility: HOSPITAL | Age: 63
End: 2023-08-14
Payer: COMMERCIAL

## 2023-08-14 ENCOUNTER — APPOINTMENT (OUTPATIENT)
Dept: MRI IMAGING | Facility: HOSPITAL | Age: 63
End: 2023-08-14
Payer: COMMERCIAL

## 2023-08-14 ENCOUNTER — HOSPITAL ENCOUNTER (OUTPATIENT)
Facility: HOSPITAL | Age: 63
Setting detail: OBSERVATION
Discharge: HOME | End: 2023-08-16
Attending: EMERGENCY MEDICINE | Admitting: EMERGENCY MEDICINE
Payer: COMMERCIAL

## 2023-08-14 DIAGNOSIS — R42 DIZZINESS: Primary | ICD-10-CM

## 2023-08-14 DIAGNOSIS — R51.9 NONINTRACTABLE HEADACHE, UNSPECIFIED CHRONICITY PATTERN, UNSPECIFIED HEADACHE TYPE: ICD-10-CM

## 2023-08-14 DIAGNOSIS — J40 BRONCHITIS: ICD-10-CM

## 2023-08-14 DIAGNOSIS — R47.89 EPISODE OF CHANGE IN SPEECH: ICD-10-CM

## 2023-08-14 PROBLEM — M47.812 CERVICAL SPONDYLOSIS WITHOUT MYELOPATHY: Status: ACTIVE | Noted: 2023-08-14

## 2023-08-14 PROBLEM — M48.02 CERVICAL STENOSIS OF SPINE: Status: ACTIVE | Noted: 2023-08-14

## 2023-08-14 LAB
ABO GROUP BLD: NORMAL
ALBUMIN SERPL-MCNC: 4.7 G/DL (ref 3.5–5.2)
ALBUMIN/GLOB SERPL: 1.9 G/DL
ALP SERPL-CCNC: 127 U/L (ref 39–117)
ALT SERPL W P-5'-P-CCNC: 22 U/L (ref 1–41)
ANION GAP SERPL CALCULATED.3IONS-SCNC: 13 MMOL/L (ref 5–15)
ANION GAP SERPL CALCULATED.3IONS-SCNC: 15.6 MMOL/L (ref 5–15)
APTT PPP: 27.4 SECONDS (ref 22.7–35.4)
AST SERPL-CCNC: 23 U/L (ref 1–40)
BASOPHILS # BLD AUTO: 0.09 10*3/MM3 (ref 0–0.2)
BASOPHILS NFR BLD AUTO: 0.7 % (ref 0–1.5)
BILIRUB SERPL-MCNC: 0.3 MG/DL (ref 0–1.2)
BLD GP AB SCN SERPL QL: NEGATIVE
BUN SERPL-MCNC: 15 MG/DL (ref 8–23)
BUN SERPL-MCNC: 16 MG/DL (ref 8–23)
BUN/CREAT SERPL: 12.3 (ref 7–25)
BUN/CREAT SERPL: 16.7 (ref 7–25)
CALCIUM SPEC-SCNC: 8.6 MG/DL (ref 8.6–10.5)
CALCIUM SPEC-SCNC: 9.3 MG/DL (ref 8.6–10.5)
CHLORIDE SERPL-SCNC: 98 MMOL/L (ref 98–107)
CHLORIDE SERPL-SCNC: 99 MMOL/L (ref 98–107)
CHOLEST SERPL-MCNC: 170 MG/DL (ref 0–200)
CO2 SERPL-SCNC: 22.4 MMOL/L (ref 22–29)
CO2 SERPL-SCNC: 25 MMOL/L (ref 22–29)
CREAT SERPL-MCNC: 0.9 MG/DL (ref 0.76–1.27)
CREAT SERPL-MCNC: 1.3 MG/DL (ref 0.76–1.27)
DEPRECATED RDW RBC AUTO: 50 FL (ref 37–54)
EGFRCR SERPLBLD CKD-EPI 2021: 62.9 ML/MIN/1.73
EGFRCR SERPLBLD CKD-EPI 2021: 97.8 ML/MIN/1.73
EOSINOPHIL # BLD AUTO: 0.22 10*3/MM3 (ref 0–0.4)
EOSINOPHIL NFR BLD AUTO: 1.8 % (ref 0.3–6.2)
ERYTHROCYTE [DISTWIDTH] IN BLOOD BY AUTOMATED COUNT: 18.6 % (ref 12.3–15.4)
GLOBULIN UR ELPH-MCNC: 2.5 GM/DL
GLUCOSE SERPL-MCNC: 107 MG/DL (ref 65–99)
GLUCOSE SERPL-MCNC: 186 MG/DL (ref 65–99)
HBA1C MFR BLD: 7.7 % (ref 4.8–5.6)
HCT VFR BLD AUTO: 47.1 % (ref 37.5–51)
HDLC SERPL-MCNC: 29 MG/DL (ref 40–60)
HGB BLD-MCNC: 15.4 G/DL (ref 13–17.7)
HOLD SPECIMEN: NORMAL
HOLD SPECIMEN: NORMAL
IMM GRANULOCYTES # BLD AUTO: 0.08 10*3/MM3 (ref 0–0.05)
IMM GRANULOCYTES NFR BLD AUTO: 0.6 % (ref 0–0.5)
INR PPP: 1.01 (ref 0.9–1.1)
LDLC SERPL CALC-MCNC: 63 MG/DL (ref 0–100)
LDLC/HDLC SERPL: 1.34 {RATIO}
LYMPHOCYTES # BLD AUTO: 1.98 10*3/MM3 (ref 0.7–3.1)
LYMPHOCYTES NFR BLD AUTO: 15.8 % (ref 19.6–45.3)
MCH RBC QN AUTO: 25.8 PG (ref 26.6–33)
MCHC RBC AUTO-ENTMCNC: 32.7 G/DL (ref 31.5–35.7)
MCV RBC AUTO: 78.8 FL (ref 79–97)
MONOCYTES # BLD AUTO: 0.7 10*3/MM3 (ref 0.1–0.9)
MONOCYTES NFR BLD AUTO: 5.6 % (ref 5–12)
NEUTROPHILS NFR BLD AUTO: 75.5 % (ref 42.7–76)
NEUTROPHILS NFR BLD AUTO: 9.48 10*3/MM3 (ref 1.7–7)
NRBC BLD AUTO-RTO: 0.1 /100 WBC (ref 0–0.2)
PLATELET # BLD AUTO: 292 10*3/MM3 (ref 140–450)
PMV BLD AUTO: 10.1 FL (ref 6–12)
POTASSIUM SERPL-SCNC: 3.8 MMOL/L (ref 3.5–5.2)
POTASSIUM SERPL-SCNC: 4.1 MMOL/L (ref 3.5–5.2)
PROT SERPL-MCNC: 7.2 G/DL (ref 6–8.5)
PROTHROMBIN TIME: 13.4 SECONDS (ref 11.7–14.2)
RBC # BLD AUTO: 5.98 10*6/MM3 (ref 4.14–5.8)
RH BLD: POSITIVE
SODIUM SERPL-SCNC: 136 MMOL/L (ref 136–145)
SODIUM SERPL-SCNC: 137 MMOL/L (ref 136–145)
T&S EXPIRATION DATE: NORMAL
TRIGL SERPL-MCNC: 511 MG/DL (ref 0–150)
TROPONIN T SERPL HS-MCNC: 13 NG/L
VLDLC SERPL-MCNC: 78 MG/DL (ref 5–40)
WBC NRBC COR # BLD: 12.55 10*3/MM3 (ref 3.4–10.8)
WHOLE BLOOD HOLD COAG: NORMAL
WHOLE BLOOD HOLD SPECIMEN: NORMAL

## 2023-08-14 PROCEDURE — 99285 EMERGENCY DEPT VISIT HI MDM: CPT

## 2023-08-14 PROCEDURE — 85730 THROMBOPLASTIN TIME PARTIAL: CPT | Performed by: EMERGENCY MEDICINE

## 2023-08-14 PROCEDURE — 99214 OFFICE O/P EST MOD 30 MIN: CPT | Performed by: PSYCHIATRY & NEUROLOGY

## 2023-08-14 PROCEDURE — 80061 LIPID PANEL: CPT | Performed by: PHYSICIAN ASSISTANT

## 2023-08-14 PROCEDURE — 70553 MRI BRAIN STEM W/O & W/DYE: CPT

## 2023-08-14 PROCEDURE — 86901 BLOOD TYPING SEROLOGIC RH(D): CPT | Performed by: EMERGENCY MEDICINE

## 2023-08-14 PROCEDURE — 0042T HC CT CEREBRAL PERFUSION W/WO CONTRAST: CPT

## 2023-08-14 PROCEDURE — 85025 COMPLETE CBC W/AUTO DIFF WBC: CPT | Performed by: EMERGENCY MEDICINE

## 2023-08-14 PROCEDURE — G0378 HOSPITAL OBSERVATION PER HR: HCPCS

## 2023-08-14 PROCEDURE — 70496 CT ANGIOGRAPHY HEAD: CPT

## 2023-08-14 PROCEDURE — 25510000001 IOPAMIDOL PER 1 ML: Performed by: EMERGENCY MEDICINE

## 2023-08-14 PROCEDURE — 86900 BLOOD TYPING SEROLOGIC ABO: CPT | Performed by: EMERGENCY MEDICINE

## 2023-08-14 PROCEDURE — 0 GADOBENATE DIMEGLUMINE 529 MG/ML SOLUTION: Performed by: EMERGENCY MEDICINE

## 2023-08-14 PROCEDURE — 71045 X-RAY EXAM CHEST 1 VIEW: CPT

## 2023-08-14 PROCEDURE — 70498 CT ANGIOGRAPHY NECK: CPT

## 2023-08-14 PROCEDURE — 84484 ASSAY OF TROPONIN QUANT: CPT | Performed by: EMERGENCY MEDICINE

## 2023-08-14 PROCEDURE — 83036 HEMOGLOBIN GLYCOSYLATED A1C: CPT | Performed by: PHYSICIAN ASSISTANT

## 2023-08-14 PROCEDURE — 86850 RBC ANTIBODY SCREEN: CPT | Performed by: EMERGENCY MEDICINE

## 2023-08-14 PROCEDURE — 93005 ELECTROCARDIOGRAM TRACING: CPT | Performed by: EMERGENCY MEDICINE

## 2023-08-14 PROCEDURE — A9577 INJ MULTIHANCE: HCPCS | Performed by: EMERGENCY MEDICINE

## 2023-08-14 PROCEDURE — 93010 ELECTROCARDIOGRAM REPORT: CPT | Performed by: INTERNAL MEDICINE

## 2023-08-14 PROCEDURE — 99214 OFFICE O/P EST MOD 30 MIN: CPT | Performed by: NURSE PRACTITIONER

## 2023-08-14 PROCEDURE — 85610 PROTHROMBIN TIME: CPT | Performed by: EMERGENCY MEDICINE

## 2023-08-14 PROCEDURE — 82565 ASSAY OF CREATININE: CPT

## 2023-08-14 PROCEDURE — 80053 COMPREHEN METABOLIC PANEL: CPT | Performed by: EMERGENCY MEDICINE

## 2023-08-14 PROCEDURE — 72141 MRI NECK SPINE W/O DYE: CPT

## 2023-08-14 RX ORDER — BISACODYL 5 MG/1
5 TABLET, DELAYED RELEASE ORAL DAILY PRN
Status: DISCONTINUED | OUTPATIENT
Start: 2023-08-14 | End: 2023-08-16 | Stop reason: HOSPADM

## 2023-08-14 RX ORDER — MULTIPLE VITAMINS W/ MINERALS TAB 9MG-400MCG
1 TAB ORAL DAILY
Status: DISCONTINUED | OUTPATIENT
Start: 2023-08-14 | End: 2023-08-16 | Stop reason: HOSPADM

## 2023-08-14 RX ORDER — ALLOPURINOL 100 MG/1
300 TABLET ORAL 2 TIMES DAILY
Status: DISCONTINUED | OUTPATIENT
Start: 2023-08-14 | End: 2023-08-16 | Stop reason: HOSPADM

## 2023-08-14 RX ORDER — SODIUM CHLORIDE 9 MG/ML
40 INJECTION, SOLUTION INTRAVENOUS AS NEEDED
Status: DISCONTINUED | OUTPATIENT
Start: 2023-08-14 | End: 2023-08-16 | Stop reason: HOSPADM

## 2023-08-14 RX ORDER — SODIUM CHLORIDE 0.9 % (FLUSH) 0.9 %
10 SYRINGE (ML) INJECTION AS NEEDED
Status: DISCONTINUED | OUTPATIENT
Start: 2023-08-14 | End: 2023-08-16 | Stop reason: HOSPADM

## 2023-08-14 RX ORDER — METOPROLOL TARTRATE 50 MG/1
100 TABLET, FILM COATED ORAL 2 TIMES DAILY
Status: DISCONTINUED | OUTPATIENT
Start: 2023-08-14 | End: 2023-08-16 | Stop reason: HOSPADM

## 2023-08-14 RX ORDER — ASPIRIN 81 MG/1
81 TABLET, CHEWABLE ORAL DAILY
Status: DISCONTINUED | OUTPATIENT
Start: 2023-08-14 | End: 2023-08-16 | Stop reason: HOSPADM

## 2023-08-14 RX ORDER — SODIUM CHLORIDE 0.9 % (FLUSH) 0.9 %
10 SYRINGE (ML) INJECTION EVERY 12 HOURS SCHEDULED
Status: DISCONTINUED | OUTPATIENT
Start: 2023-08-14 | End: 2023-08-16 | Stop reason: HOSPADM

## 2023-08-14 RX ORDER — AMOXICILLIN 250 MG
2 CAPSULE ORAL 2 TIMES DAILY
Status: DISCONTINUED | OUTPATIENT
Start: 2023-08-14 | End: 2023-08-16 | Stop reason: HOSPADM

## 2023-08-14 RX ORDER — BISACODYL 10 MG
10 SUPPOSITORY, RECTAL RECTAL DAILY PRN
Status: DISCONTINUED | OUTPATIENT
Start: 2023-08-14 | End: 2023-08-16 | Stop reason: HOSPADM

## 2023-08-14 RX ORDER — AMLODIPINE BESYLATE 10 MG/1
10 TABLET ORAL DAILY
Status: DISCONTINUED | OUTPATIENT
Start: 2023-08-14 | End: 2023-08-16 | Stop reason: HOSPADM

## 2023-08-14 RX ORDER — SUMATRIPTAN 50 MG/1
50 TABLET, FILM COATED ORAL ONCE AS NEEDED
Status: COMPLETED | OUTPATIENT
Start: 2023-08-14 | End: 2023-08-14

## 2023-08-14 RX ORDER — POLYETHYLENE GLYCOL 3350 17 G/17G
17 POWDER, FOR SOLUTION ORAL DAILY PRN
Status: DISCONTINUED | OUTPATIENT
Start: 2023-08-14 | End: 2023-08-16 | Stop reason: HOSPADM

## 2023-08-14 RX ORDER — ATORVASTATIN CALCIUM 20 MG/1
40 TABLET, FILM COATED ORAL DAILY
Status: DISCONTINUED | OUTPATIENT
Start: 2023-08-14 | End: 2023-08-16 | Stop reason: HOSPADM

## 2023-08-14 RX ORDER — HYDROCHLOROTHIAZIDE 25 MG/1
25 TABLET ORAL DAILY
Status: DISCONTINUED | OUTPATIENT
Start: 2023-08-14 | End: 2023-08-16 | Stop reason: HOSPADM

## 2023-08-14 RX ORDER — GLIPIZIDE 5 MG/1
5 TABLET ORAL
Status: DISCONTINUED | OUTPATIENT
Start: 2023-08-14 | End: 2023-08-16 | Stop reason: HOSPADM

## 2023-08-14 RX ORDER — ACETAMINOPHEN 500 MG
500 TABLET ORAL EVERY 6 HOURS PRN
Status: DISCONTINUED | OUTPATIENT
Start: 2023-08-14 | End: 2023-08-16 | Stop reason: HOSPADM

## 2023-08-14 RX ADMIN — Medication 10 ML: at 20:24

## 2023-08-14 RX ADMIN — ACETAMINOPHEN 500 MG: 500 TABLET, FILM COATED ORAL at 20:22

## 2023-08-14 RX ADMIN — GADOBENATE DIMEGLUMINE 19 ML: 529 INJECTION, SOLUTION INTRAVENOUS at 18:04

## 2023-08-14 RX ADMIN — METOPROLOL TARTRATE 100 MG: 50 TABLET, FILM COATED ORAL at 20:23

## 2023-08-14 RX ADMIN — GLIPIZIDE 5 MG: 5 TABLET ORAL at 19:00

## 2023-08-14 RX ADMIN — SUMATRIPTAN SUCCINATE 50 MG: 50 TABLET ORAL at 22:53

## 2023-08-14 RX ADMIN — ALLOPURINOL 300 MG: 100 TABLET ORAL at 20:23

## 2023-08-14 RX ADMIN — IOPAMIDOL 100 ML: 755 INJECTION, SOLUTION INTRAVENOUS at 11:35

## 2023-08-14 NOTE — ED NOTES
"Nursing report ED to floor  Pablo Mendoza  62 y.o.  male    HPI :   Chief Complaint   Patient presents with    Neuro Deficit(s)       Admitting doctor:   Kenji Gan II, MD    Admitting diagnosis:   The primary encounter diagnosis was Dizziness. Diagnoses of Episode of change in speech and Nonintractable headache, unspecified chronicity pattern, unspecified headache type were also pertinent to this visit.    Code status:   Current Code Status       Date Active Code Status Order ID Comments User Context       8/14/2023 1252 CPR (Attempt to Resuscitate) [id number]  Kenzie Rodriguez PA-C ED        Question Answer    Code Status (Patient has no pulse and is not breathing) CPR (Attempt to Resuscitate)    Medical Interventions (Patient has pulse or is breathing) Full Support    Level Of Support Discussed With Patient                    Allergies:   Aspirin and Lisinopril    Isolation:   No active isolations    Intake and Output  No intake or output data in the 24 hours ending 08/14/23 1309    Weight:       08/14/23  1101   Weight: 90.6 kg (199 lb 11.8 oz)       Most recent vitals:   Vitals:    08/14/23 1100 08/14/23 1101 08/14/23 1136   BP: 137/82     Pulse: 79     Resp: 16     Temp: 98.1 øF (36.7 øC)     SpO2: 92%     Weight:  90.6 kg (199 lb 11.8 oz)    Height:   170.2 cm (67\")       Active LDAs/IV Access:   Lines, Drains & Airways       Active LDAs       Name Placement date Placement time Site Days    Peripheral IV 06/02/23 Right Antecubital 06/02/23  --  Antecubital  73    Peripheral IV 08/14/23 1101 Left Antecubital 08/14/23  1101  Antecubital  less than 1                    Labs (abnormal labs have a star):   Labs Reviewed   COMPREHENSIVE METABOLIC PANEL - Abnormal; Notable for the following components:       Result Value    Glucose 186 (*)     Creatinine 1.30 (*)     Alkaline Phosphatase 127 (*)     All other components within normal limits    Narrative:     GFR Normal >60  Chronic Kidney Disease " <60  Kidney Failure <15     CBC WITH AUTO DIFFERENTIAL - Abnormal; Notable for the following components:    WBC 12.55 (*)     RBC 5.98 (*)     MCV 78.8 (*)     MCH 25.8 (*)     RDW 18.6 (*)     Lymphocyte % 15.8 (*)     Immature Grans % 0.6 (*)     Neutrophils, Absolute 9.48 (*)     Immature Grans, Absolute 0.08 (*)     All other components within normal limits   PROTIME-INR - Normal   APTT - Normal   SINGLE HSTROPONIN T - Normal    Narrative:     High Sensitive Troponin T Reference Range:  <10.0 ng/L- Negative Female for AMI  <15.0 ng/L- Negative Male for AMI  >=10 - Abnormal Female indicating possible myocardial injury.  >=15 - Abnormal Male indicating possible myocardial injury.   Clinicians would have to utilize clinical acumen, EKG, Troponin, and serial changes to determine if it is an Acute Myocardial Infarction or myocardial injury due to an underlying chronic condition.        RAINBOW DRAW    Narrative:     The following orders were created for panel order [facility] Draw.  Procedure                               Abnormality         Status                     ---------                               -----------         ------                     Green Top (Gel)[[id number]]                                  Final result               Lavender Top[[id number]]                                     Final result               Gold Top - SST[[id number]]                                   Final result               Light Blue Top[[id number]]                                   Final result                 Please view results for these tests on the individual orders.   BASIC METABOLIC PANEL   LIPID PANEL   HEMOGLOBIN A1C   POCT GLUCOSE FINGERSTICK   TYPE AND SCREEN   CBC AND DIFFERENTIAL    Narrative:     The following orders were created for panel order CBC & Differential.  Procedure                               Abnormality         Status                     ---------                               -----------         ------                      CBC Auto Differential[[id number]]        Abnormal            Final result                 Please view results for these tests on the individual orders.   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, old   Minimal ST elevation, inferior leads   Electronically Signed By:    Date and Time of Study: 2023-08-14 12:54:08          Meds given in ED:   Medications   sodium chloride 0.9 % flush 10 mL (has no administration in time range)   sodium chloride 0.9 % flush 10 mL (has no administration in time range)   sodium chloride 0.9 % flush 10 mL (has no administration in time range)   sodium chloride 0.9 % infusion 40 mL (has no administration in time range)   sennosides-docusate (PERICOLACE) 8.6-50 MG per tablet 2 tablet (has no administration in time range)     And   polyethylene glycol (MIRALAX) packet 17 g (has no administration in time range)     And   bisacodyl (DULCOLAX) EC tablet 5 mg (has no administration in time range)     And   bisacodyl (DULCOLAX) suppository 10 mg (has no administration in time range)   iopamidol (ISOVUE-370) 76 % injection 100 mL (100 mL Intravenous Given by Other 8/14/23 6608)       Imaging results:  CT Angiogram Neck    Result Date: 8/14/2023    There is no evidence to suggest an area of acute completed infarction on either the noncontrast head CT or the CT perfusion study. More sensitive and specific assessment of an area of acute completed infarction could be performed with MR imaging, as clinically indicated. There is no evidence for a large vessel  occlusion.  Mild intracranial and extracranial atherosclerotic changes are as discussed in detail above.  Incidental note is made of disc osteophyte complexes at C4-5, C5-6, and C6-7. Additionally, at the C6-7 level, there is a prominent size central/right central disc protrusion. These findings result in severe canal stenosis at C6-7 along with prominent cord compression along the right side of the cervical spinal cord. The degenerative phenomena within the cervical spine could be further assessed on an MRI of the cervical spine, as clinically indicated.  The findings of noncontrast head CT were discussed with Dr. Camarillo on 08/14/2023 at approximately 11:22 AM. The findings of the CT angiogram and CT perfusion study were discussed with Dr. Camarillo at approximately 11:40 AM.    Radiation dose reduction techniques were utilized, including automated exposure control and exposure modulation based on body size.  This report was finalized on 8/14/2023 12:50 PM by Dr. Donald Leonard M.D.      XR Chest 1 View    Result Date: 8/14/2023  No evidence for acute pulmonary process. Follow-up as clinically indicated.  This report was finalized on 8/14/2023 12:13 PM by Dr. Junito Regalado M.D.      CT Angiogram Head w AI Analysis of LVO    Result Date: 8/14/2023    There is no evidence to suggest an area of acute completed infarction on either the noncontrast head CT or the CT perfusion study. More sensitive and specific assessment of an area of acute completed infarction could be performed with MR imaging, as clinically indicated. There is no evidence for a large vessel occlusion.  Mild intracranial and extracranial atherosclerotic changes are as discussed in detail above.  Incidental note is made of disc osteophyte complexes at C4-5, C5-6, and C6-7. Additionally, at the C6-7 level, there is a prominent size central/right central disc protrusion. These findings result in severe canal stenosis at C6-7 along with prominent cord  compression along the right side of the cervical spinal cord. The degenerative phenomena within the cervical spine could be further assessed on an MRI of the cervical spine, as clinically indicated.  The findings of noncontrast head CT were discussed with Dr. Camarillo on 08/14/2023 at approximately 11:22 AM. The findings of the CT angiogram and CT perfusion study were discussed with Dr. Camarillo at approximately 11:40 AM.    Radiation dose reduction techniques were utilized, including automated exposure control and exposure modulation based on body size.  This report was finalized on 8/14/2023 12:50 PM by Dr. Donald Leonard M.D.      CT CEREBRAL PERFUSION WITH & WITHOUT CONTRAST    Result Date: 8/14/2023    There is no evidence to suggest an area of acute completed infarction on either the noncontrast head CT or the CT perfusion study. More sensitive and specific assessment of an area of acute completed infarction could be performed with MR imaging, as clinically indicated. There is no evidence for a large vessel occlusion.  Mild intracranial and extracranial atherosclerotic changes are as discussed in detail above.  Incidental note is made of disc osteophyte complexes at C4-5, C5-6, and C6-7. Additionally, at the C6-7 level, there is a prominent size central/right central disc protrusion. These findings result in severe canal stenosis at C6-7 along with prominent cord compression along the right side of the cervical spinal cord. The degenerative phenomena within the cervical spine could be further assessed on an MRI of the cervical spine, as clinically indicated.  The findings of noncontrast head CT were discussed with Dr. Camarillo on 08/14/2023 at approximately 11:22 AM. The findings of the CT angiogram and CT perfusion study were discussed with Dr. Camarillo at approximately 11:40 AM.    Radiation dose reduction techniques were utilized, including automated exposure control and exposure modulation based on body size.  This  report was finalized on 8/14/2023 12:50 PM by Dr. Donald Leonard M.D.       Ambulatory status:   - assist    Social issues:   Social History     Socioeconomic History    Marital status:    Tobacco Use    Smoking status: Never    Smokeless tobacco: Never   Vaping Use    Vaping Use: Never used   Substance and Sexual Activity    Alcohol use: Yes     Comment: 3 DRINKS WEEKLY    Drug use: Not Currently    Sexual activity: Yes     Partners: Female       NIH Stroke Scale:  Interval: baseline    Kelsy Sage RN  08/14/23 13:09 EDT

## 2023-08-14 NOTE — ED NOTES
Patient from work  via ems; call was for neuro deficits. Patient states around 9:30 started having dizziness, headache and aphasia. Ems states en route symptoms started improving.

## 2023-08-14 NOTE — PLAN OF CARE
Goal Outcome Evaluation:           Progress: improving          VSS. Patient seems to be forgetful at times. He understands plan of care and oriented to the unit.

## 2023-08-14 NOTE — ED PROVIDER NOTES
EMERGENCY DEPARTMENT ENCOUNTER    Room Number:  11/11  Date seen:  8/14/2023  PCP: Liana Hood MD  Historian(s): Patient, paramedics      HPI:  Chief Complaint: Weakness, dizziness, speech change  A complete HPI/ROS/PMH/PSH/SH/FH are unobtainable / limited due to: None  Context: Pablo Mendoza is a 62 y.o. male who presents to the ED via EMS from work for evaluation of possible stroke symptoms.  Patient has apparently had TIA problems in the past.  He was feeling well to start today, however around 9:30 AM while at work, he began to feel dizzy and have a headache and was stumbling to the left.  Additionally he was having difficulty speaking.  Paramedics were called to the scene.  They found the patient to have some aphasia and generalized weakness.  However they say he was able to stand up and ambulate to the cot with assistance.  They say that in route he began to show signs of improvement with his speech.  Patient says that he has had at least 3 similar episodes within the past week but they are usually in the evening hours and so this is unusual for him to have an episode in the morning.      PAST MEDICAL HISTORY  Active Ambulatory Problems     Diagnosis Date Noted    Type 2 diabetes mellitus, without long-term current use of insulin 01/19/2018    Mixed hyperlipidemia 01/19/2018    Essential hypertension 01/19/2018    Gout of multiple sites 01/19/2018    Nocturia 01/19/2018    Migraine headache 04/02/2018    Chronic maxillary sinusitis 04/10/2018    Other fatigue 06/06/2018    Arthritis 06/06/2018    Routine health maintenance 08/10/2018    Proteinuria 03/08/2019    Cellulitis of left foot 05/02/2019    Lumbar radiculopathy 08/02/2021    Stroke-like symptoms 10/04/2022    Polycythemia 10/28/2022     Resolved Ambulatory Problems     Diagnosis Date Noted    No Resolved Ambulatory Problems     Past Medical History:   Diagnosis Date    Anesthesia complication     COVID-19     Diabetes mellitus      Diverticulitis     Elevated cholesterol     Gout     Hypertension     Low back pain     Migraines     Neuropathy     Numbness and tingling of both lower extremities     Seizures     Staph infection 2010    Stroke     TIA (transient ischemic attack)     Weakness of both legs          PAST SURGICAL HISTORY  Past Surgical History:   Procedure Laterality Date    CHOLECYSTECTOMY  2011    COLONOSCOPY      2015 or 2016    HERNIA REPAIR      LUMBAR FUSION N/A 11/03/2021    Procedure: Lumbar 3 to Lumbar 4 and Lumbar 4 to Lumbar 5 laminectomy with a fusion;  Surgeon: Jose Webster MD;  Location: [facility] OR;  Service: Robotics - Neuro;  Laterality: N/A;    SINUS SURGERY      TYMPANOPLASTY Bilateral          FAMILY HISTORY  Family History   Problem Relation Age of Onset    Arthritis Mother     Hypertension Mother     Heart disease Mother     Stroke Mother     Alcohol abuse Father     Arthritis Father     Hypertension Father     Nephrolithiasis Father     Diabetes Sister     Diabetes Sister     Stroke Brother 59    Hypertension Brother     Hyperlipidemia Brother     Throat cancer Maternal Grandmother     Diabetes Paternal Grandmother     Malig Hyperthermia Neg Hx          SOCIAL HISTORY  Social History     Socioeconomic History    Marital status:    Tobacco Use    Smoking status: Never    Smokeless tobacco: Never   Vaping Use    Vaping Use: Never used   Substance and Sexual Activity    Alcohol use: Yes     Comment: 3 DRINKS WEEKLY    Drug use: Not Currently    Sexual activity: Yes     Partners: Female         ALLERGIES  Aspirin and Lisinopril        REVIEW OF SYSTEMS  Review of Systems   Constitutional:  Negative for activity change and fever.   Eyes:  Negative for pain and visual disturbance.   Respiratory:  Negative for cough and shortness of breath.    Cardiovascular:  Negative for chest pain.   Gastrointestinal:  Negative for abdominal pain.   Genitourinary:  Negative for dysuria.   Skin:  Negative for color  change.   Neurological:  Positive for dizziness, speech difficulty, weakness and headaches. Negative for syncope.   All other systems reviewed and are negative.         PHYSICAL EXAM  ED Triage Vitals [08/14/23 1100]   Temp Heart Rate Resp BP SpO2   98.1 øF (36.7 øC) 79 16 137/82 92 %      Temp src Heart Rate Source Patient Position BP Location FiO2 (%)   -- -- -- -- --       Physical Exam      GENERAL: Calm, no diaphoresis, no acute distress  HENT: nares patent, normocephalic and atraumatic  EYES: no scleral icterus, EOMI, normal conjunctivae  CV: regular rhythm, normal rate, normal distal pulses  RESPIRATORY: normal effort, no stridor  ABDOMEN: soft, nontender throughout  MUSCULOSKELETAL: no deformity, no asymmetry  NEURO: alert, moves all extremities, follows commands, no pronator drift, no facial droop, normal finger-to-nose to finger bilaterally.  Patient has an occasional brief stuttering/word finding moment with his speech but these are very brief and intermittent and self resolved within a matter of 2 to 3 seconds.  Otherwise speech articulation is normal.  PSYCH:  calm, cooperative  SKIN: warm, dry    Vital signs and nursing notes reviewed.        LAB RESULTS  Recent Results (from the past 24 hour(s))   Comprehensive Metabolic Panel    Collection Time: 08/14/23 11:10 AM    Specimen: Blood   Result Value Ref Range    Glucose 186 (H) 65 - 99 mg/dL    BUN 16 8 - 23 mg/dL    Creatinine 1.30 (H) 0.76 - 1.27 mg/dL    Sodium 136 136 - 145 mmol/L    Potassium 3.8 3.5 - 5.2 mmol/L    Chloride 98 98 - 107 mmol/L    CO2 25.0 22.0 - 29.0 mmol/L    Calcium 9.3 8.6 - 10.5 mg/dL    Total Protein 7.2 6.0 - 8.5 g/dL    Albumin 4.7 3.5 - 5.2 g/dL    ALT (SGPT) 22 1 - 41 U/L    AST (SGOT) 23 1 - 40 U/L    Alkaline Phosphatase 127 (H) 39 - 117 U/L    Total Bilirubin 0.3 0.0 - 1.2 mg/dL    Globulin 2.5 gm/dL    A/G Ratio 1.9 g/dL    BUN/Creatinine Ratio 12.3 7.0 - 25.0    Anion Gap 13.0 5.0 - 15.0 mmol/L    eGFR 62.9 >60.0  mL/min/1.73   Protime-INR    Collection Time: 08/14/23 11:10 AM    Specimen: Blood   Result Value Ref Range    Protime 13.4 11.7 - 14.2 Seconds    INR 1.01 0.90 - 1.10   aPTT    Collection Time: 08/14/23 11:10 AM    Specimen: Blood   Result Value Ref Range    PTT 27.4 22.7 - 35.4 seconds   Single High Sensitivity Troponin T    Collection Time: 08/14/23 11:10 AM    Specimen: Blood   Result Value Ref Range    HS Troponin T 13 <15 ng/L   Type & Screen    Collection Time: 08/14/23 11:10 AM    Specimen: Blood   Result Value Ref Range    ABO Type O     RH type Positive     Antibody Screen Negative     T&S Expiration Date 8/17/2023 11:59:59 PM    Green Top (Gel)    Collection Time: 08/14/23 11:10 AM   Result Value Ref Range    Extra Tube Hold for add-ons.    Lavender Top    Collection Time: 08/14/23 11:10 AM   Result Value Ref Range    Extra Tube hold for add-on    Gold Top - SST    Collection Time: 08/14/23 11:10 AM   Result Value Ref Range    Extra Tube Hold for add-ons.    Light Blue Top    Collection Time: 08/14/23 11:10 AM   Result Value Ref Range    Extra Tube Hold for add-ons.    CBC Auto Differential    Collection Time: 08/14/23 11:10 AM    Specimen: Blood   Result Value Ref Range    WBC 12.55 (H) 3.40 - 10.80 10*3/mm3    RBC 5.98 (H) 4.14 - 5.80 10*6/mm3    Hemoglobin 15.4 13.0 - 17.7 g/dL    Hematocrit 47.1 37.5 - 51.0 %    MCV 78.8 (L) 79.0 - 97.0 fL    MCH 25.8 (L) 26.6 - 33.0 pg    MCHC 32.7 31.5 - 35.7 g/dL    RDW 18.6 (H) 12.3 - 15.4 %    RDW-SD 50.0 37.0 - 54.0 fl    MPV 10.1 6.0 - 12.0 fL    Platelets 292 140 - 450 10*3/mm3    Neutrophil % 75.5 42.7 - 76.0 %    Lymphocyte % 15.8 (L) 19.6 - 45.3 %    Monocyte % 5.6 5.0 - 12.0 %    Eosinophil % 1.8 0.3 - 6.2 %    Basophil % 0.7 0.0 - 1.5 %    Immature Grans % 0.6 (H) 0.0 - 0.5 %    Neutrophils, Absolute 9.48 (H) 1.70 - 7.00 10*3/mm3    Lymphocytes, Absolute 1.98 0.70 - 3.10 10*3/mm3    Monocytes, Absolute 0.70 0.10 - 0.90 10*3/mm3    Eosinophils, Absolute  0.22 0.00 - 0.40 10*3/mm3    Basophils, Absolute 0.09 0.00 - 0.20 10*3/mm3    Immature Grans, Absolute 0.08 (H) 0.00 - 0.05 10*3/mm3    nRBC 0.1 0.0 - 0.2 /100 WBC   ECG 12 Lead Stroke Evaluation    Collection Time: 08/14/23 12:54 PM   Result Value Ref Range    QT Interval 417 ms       Ordered the above labs and reviewed the results.        RADIOLOGY  XR Chest 1 View    Result Date: 8/14/2023  XR CHEST 1 VW-  HISTORY: Male who is 62 years-old,  stroke  TECHNIQUE: Frontal views of the chest  COMPARISON: None available  FINDINGS: Heart, mediastinum and pulmonary vasculature are unremarkable. No focal pulmonary consolidation, pleural effusion, or pneumothorax. No acute osseous process.      No evidence for acute pulmonary process. Follow-up as clinically indicated.  This report was finalized on 8/14/2023 12:13 PM by Dr. Junito Regalado M.D.      CT Angiogram Head w AI Analysis of LVO, CT Angiogram Neck, CT CEREBRAL PERFUSION WITH & WITHOUT CONTRAST    Result Date: 8/14/2023  CT ANGIOGRAM OF THE HEAD AND NECK AND CT PERFUSION STUDY  CLINICAL HISTORY:  Right-sided weakness. Aphasia.  TECHNIQUE: A noncontrast head CT was performed with 3 mm axial images. Thereafter, a CT perfusion study was performed after the dynamic bolus of IV contrast. Standard perfusion maps were constructed with RAPID software. A CT angiogram of the head and neck was then performed with 1 mm axial images. Sagittal, coronal, and 3-dimensional reconstructed images were obtained. Finally, a delayed postcontrast head CT was performed with 3 mm axial images. AI analysis of LVO was utilized.  FINDINGS:  NONCONTRAST HEAD CT:  The gray-white matter differentiation is within normal limits. The ventricles, sulci, and cisterns are age-appropriate. The basal ganglia and thalami are unremarkable in appearance. The posterior fossa structures are within normal limits.  CT PERFUSION STUDY:  There are no significant asymmetries appreciated on the CT perfusion  study on either the time to maximum enhancement greater than 6 seconds or CBF less than 30% maps.  CTA NECK:  There is a bovine configuration of the aortic arch. There is no significant great vessel origin stenosis. Mild atherosclerotic changes are noted within the proximal internal carotids. There is no significant NASCET stenosis within either internal carotid artery. The vertebral arteries are unremarkable in appearance.  Disc osteophyte complexes are noted at the C4-5, C5-6, and C6-7 levels. Additionally, there is a prominent size right central disc protrusion at C6-7. These findings result in severe canal stenosis at the C6-7 level with prominent cord compression along the right side of the cervical spinal cord. Further evaluation could be performed with MR imaging of the cervical spine. Also, of note, prominent foraminal stenotic changes are identified from C4-5 down to C6-7.  CTA HEAD: Atherosclerotic calcifications are noted within the carotid siphons resulting in only relatively mild degrees of luminal compromise. The middle and anterior cerebral arteries are unremarkable in appearance. The vertebral arteries, basilar artery, and posterior cerebral arteries are unremarkable.  DELAYED POSTCONTRAST HEAD CT:  There are no abnormal foci of contrast enhancement within the brain parenchyma.        There is no evidence to suggest an area of acute completed infarction on either the noncontrast head CT or the CT perfusion study. More sensitive and specific assessment of an area of acute completed infarction could be performed with MR imaging, as clinically indicated. There is no evidence for a large vessel occlusion.  Mild intracranial and extracranial atherosclerotic changes are as discussed in detail above.  Incidental note is made of disc osteophyte complexes at C4-5, C5-6, and C6-7. Additionally, at the C6-7 level, there is a prominent size central/right central disc protrusion. These findings result in severe  canal stenosis at C6-7 along with prominent cord compression along the right side of the cervical spinal cord. The degenerative phenomena within the cervical spine could be further assessed on an MRI of the cervical spine, as clinically indicated.  The findings of noncontrast head CT were discussed with Dr. Camarillo on 08/14/2023 at approximately 11:22 AM. The findings of the CT angiogram and CT perfusion study were discussed with Dr. Camarillo at approximately 11:40 AM.    Radiation dose reduction techniques were utilized, including automated exposure control and exposure modulation based on body size.  This report was finalized on 8/14/2023 12:50 PM by Dr. Donald Leonard M.D.       Ordered the above noted radiological studies. Reviewed by me in PACS.          PROCEDURES  Procedures        MEDICATIONS GIVEN IN ER  Medications   sodium chloride 0.9 % flush 10 mL (has no administration in time range)   sodium chloride 0.9 % flush 10 mL (has no administration in time range)   sodium chloride 0.9 % flush 10 mL (has no administration in time range)   sodium chloride 0.9 % infusion 40 mL (has no administration in time range)   sennosides-docusate (PERICOLACE) 8.6-50 MG per tablet 2 tablet (has no administration in time range)     And   polyethylene glycol (MIRALAX) packet 17 g (has no administration in time range)     And   bisacodyl (DULCOLAX) EC tablet 5 mg (has no administration in time range)     And   bisacodyl (DULCOLAX) suppository 10 mg (has no administration in time range)   iopamidol (ISOVUE-370) 76 % injection 100 mL (100 mL Intravenous Given by Other 8/14/23 1136)       MEDICAL DECISION MAKING, PROGRESS, and CONSULTS    All labs have been independently reviewed by me.  All radiology studies have been reviewed by me and I have also reviewed the radiology report.   EKG's independently viewed and interpreted by me.  Discussion below represents my analysis of pertinent findings related to patient's condition,  differential diagnosis, treatment plan and final disposition.    EKG           EKG time/Interp time: 1254/1259  Rhythm/Rate: Sinus rhythm, 72 bpm  P waves and AK: Present, 180 ms  QRS, axis: 106 ms, left axis deviation  ST and T waves: No ST segment elevations are notable.  Nonspecific T wave changes noted    Independently interpreted by me contemporaneously with treatment    Additional sources:  - Discussed/ obtained information from independent historians:  I discussed with paramedics to receive report on patient's condition on arrival and treatments initiated in route to the hospital    - External (non-ED) record review: I reviewed the most recent progress note from PCP on March 3, 2023.  Problem list from that visit include erectile dysfunction, anxiety, type 2 diabetes, hypertension, hyperlipidemia, polycythemia, migraines, and gout.  I also reviewed the progress note from hematology on June 2, 2023 when he had evaluation for polycythemia.  Plan was for therapeutic phlebotomy and avoid iron      Orders placed during this visit:  Orders Placed This Encounter   Procedures    CT Angiogram Head w AI Analysis of LVO    CT Angiogram Neck    CT CEREBRAL PERFUSION WITH & WITHOUT CONTRAST    XR Chest 1 View    MRI Brain Without Contrast    [facility] Draw    Comprehensive Metabolic Panel    Protime-INR    aPTT    Single High Sensitivity Troponin T    CBC Auto Differential    Basic Metabolic Panel    Lipid Panel    Hemoglobin A1c    NPO Diet NPO Type: Sips with Meds    Initiate Department's Acute Stroke Process (Team D, Code 19, etc.)    Perform NIH Stroke Scale    Measure Actual Weight    Notify MD for SBP < 80 or > 200    Notify Provider for SBP greater than 140 if hemorrhagic stroke    Head of Bed 30 Degrees or Less    Undress and Gown    Continuous Pulse Oximetry    Vital Signs    Neuro Checks    No Hypotonic Fluids    Nursing Dysphagia Screening (Complete Prior to Giving anything PO)    RN to Place Order SLP Consult  (IF swallow screen failed) - Eval & Treat Choosing Reason of RN Dysphagia Screen Failed    Vital Signs    Intake & Output    Weigh Patient    Oral Care    Place Sequential Compression Device    Maintain Sequential Compression Device    Telemetry - Maintain IV Access    Telemetry - Place Orders & Notify Provider of Results When Patient Experiences Acute Chest Pain, Dysrhythmia or Respiratory Distress    May Be Off Telemetry for Tests    Code Status and Medical Interventions:    Inpatient Neurology Consult Stroke    Inpatient Neurology Consult Stroke    Inpatient Neurology Consult Stroke    Oxygen Therapy- Nasal Cannula; Titrate 1-6 LPM Per SpO2; 90 - 95%    POC Glucose Once    ECG 12 Lead Stroke Evaluation    Type & Screen    Insert Large-Bore Peripheral IV - RIGHT AC Preferred    Insert Peripheral IV    Initiate ED Observation Status    CBC & Differential    Green Top (Gel)    Lavender Top    Gold Top - SST    Light Blue Top    CBC & Differential           Differential diagnosis includes but is not limited to:    Acute stroke, hypoglycemia, seizure, brain tumor      Independent interpretation of labs, radiology studies, and discussions with consultants:  ED Course as of 08/14/23 1302   Mon Aug 14, 2023   1108 Patient just arrived from work via EMS.  I assessed him immediately upon arrival.  Paramedics tell me that he had clinical improvement in route.  At this time, I do not see any focal motor deficit.  His speech has some occasional stuttering phenomenon but no clear aphasia.   [EVELINE]   1109 I discussed with Dr. Camarillo from stroke neurology about the patient.  She recommends that we obtain team D protocol for work-up at this time.  Orders now placed. [EVELINE]   1121 WBC(!): 12.55 [EVELINE]   1232 I independently interpreted the Chest X-ray and my findings are: No Pneumothorax, No Effusion, No Infiltrate   [EVELINE]   1248 I discussed once again with Dr. Camarillo and she has reviewed all the studies from the team D work-up.  She  recommends placing patient in observation unit for further management and work-up today. [EVELINE]   1244 I discussed with Kenzie Rodriguez in the observation unit.  She agrees to accept the patient for further management today. [EVELINE]      ED Course User Index  [EVELINE] Anirudh Abel MD         DIAGNOSIS  Final diagnoses:   Dizziness   Episode of change in speech   Nonintractable headache, unspecified chronicity pattern, unspecified headache type         DISPOSITION  To observation unit  Stroke neurology consult        Latest Documented Vital Signs:  As of 13:02 EDT  BP- 137/82 HR- 79 Temp- 98.1 øF (36.7 øC) O2 sat- 92%              --    Please note that portions of this were completed with a voice recognition program.       Note Disclaimer: At [facility], we believe that sharing information builds trust and better relationships. You are receiving this note because you are receiving care at [facility] or recently visited. It is possible you will see health information before a provider has talked with you about it. This kind of information can be easy to misunderstand. To help you fully understand what it means for your health, we urge you to discuss this note with your provider.             Anirudh Abel MD  08/14/23 1250       Anirudh Abel MD  08/14/23 1302

## 2023-08-14 NOTE — H&P
[place] [place]    Patient Name: Pablo Mendoza  : 1960  MRN: [id number]  Primary Care Physician:  Liana Hood MD  Date of admission: 2023    Subjective   Subjective     Chief Complaint:   Chief Complaint   Patient presents with    Neuro Deficit(s)         HPI:    Pablo Mendoza is a 62 y.o. male with a history of diabetes, hyperlipidemia, hypertension, migraines, neuropathy, and TIA who presents to the emergency department with complaints of dizziness and speech difficulty.  Patient reports his symptoms started earlier today while he was at work.  EMS reportedly noted some aphasia that had completely resolved by the time patient got to the emergency room.  Patient reports he has a history of migraine headaches and TIAs.  He states he has been told that he has an abnormal artery in his brain.  Patient tells me that his migraines \"trigger\" his TIAs and that they always happen together.  Patient is not anticoagulated.  He takes baby aspirin daily.  Patient denies current headache, vision loss, facial droop, unilateral numbness/weakness, or any other systemic complaint.      Labs today notable for WBC 12.55, nonfasting glucose 186, serum creatinine 1.30, troponin 13.  Noncontrast CT of the head without acute findings.  CT perfusion scan without significant asymmetry.  CT angiogram of the neck notable for mild atherosclerotic changes in bilateral proximal internal carotid arteries.  Incidental right disc protrusion noted at C6-7 causing severe canal stenosis and cord compression.  CTA of the head with mild atherosclerotic calcifications and carotid siphons.  Neurology consulted in the ED and it was deemed patient not a candidate for TNK.  Patient will be admitted to the observation unit for further evaluation of possible TIA and work-up of C6-7 disc protrusion.      Review of Systems   All systems were reviewed and negative except for: Dizziness, speech " difficulty    Personal History     Past Medical History:   Diagnosis Date    Anesthesia complication     STATES HARD TO WAKE UP W/ALL SURGERIES    COVID-19     DEC 2020    Diabetes mellitus     Diverticulitis     Elevated cholesterol     Gout     Hypertension     Low back pain     Migraines     Mixed hyperlipidemia 01/19/2018    Neuropathy     Nocturia 01/19/2018    Numbness and tingling of both lower extremities     LEGS AND FEET     Seizures     AS A CHILD    Staph infection 2010    UNSURE OF SITE    Stroke     COGNITIVE DELAY    TIA (transient ischemic attack)     Type 2 diabetes mellitus, without long-term current use of insulin 01/19/2018    Weakness of both legs     LEFT WORSE        Past Surgical History:   Procedure Laterality Date    CHOLECYSTECTOMY  2011    COLONOSCOPY      2015 or 2016    HERNIA REPAIR      LUMBAR FUSION N/A 11/03/2021    Procedure: Lumbar 3 to Lumbar 4 and Lumbar 4 to Lumbar 5 laminectomy with a fusion;  Surgeon: Jose Webster MD;  Location: [facility];  Service: [employer] - Neuro;  Laterality: N/A;    SINUS SURGERY      TYMPANOPLASTY Bilateral        Family History: family history includes Alcohol abuse in his father; Arthritis in his father and mother; Diabetes in his paternal grandmother, sister, and sister; Heart disease in his mother; Hyperlipidemia in his brother; Hypertension in his brother, father, and mother; Nephrolithiasis in his father; Stroke in his mother; Stroke (age of onset: 59) in his brother; Throat cancer in his maternal grandmother. Otherwise pertinent FHx was reviewed and not pertinent to current issue.    Social History:  reports that he has never smoked. He has never used smokeless tobacco. He reports current alcohol use. He reports that he does not currently use drugs.    Home Medications:  SITagliptin-metFORMIN HCl ER, acetaminophen, allopurinol, amLODIPine, aspirin, atorvastatin, cyclobenzaprine, empagliflozin, fluticasone,  glipizide, hydrOXYzine, hydroCHLOROthiazide, ibuprofen, loratadine, metoprolol tartrate, multivitamin with minerals, rizatriptan, sertraline, and sildenafil    Allergies:  Allergies   Allergen Reactions    Aspirin Nausea Only    Lisinopril Unknown - Low Severity     Elevated creatinine           Objective   Objective     Vitals:   Temp:  [98.1 øF (36.7 øC)] 98.1 øF (36.7 øC)  Heart Rate:  [79] 79  Resp:  [16] 16  BP: (137)/(82) 137/82  Physical Exam    Constitutional: Awake, alert   Eyes: PERRLA, sclerae anicteric, no conjunctival injection   HENT: NCAT, mucous membranes moist   Neck: Supple, no thyromegaly, no lymphadenopathy, trachea midline   Respiratory: Clear to auscultation bilaterally, nonlabored respirations    Cardiovascular: RRR, no murmurs, rubs, or gallops, palpable pedal pulses bilaterally   Gastrointestinal: Positive bowel sounds, soft, nontender, nondistended   Musculoskeletal: No bilateral ankle edema, no clubbing or cyanosis to extremities   Psychiatric: Appropriate affect, cooperative   Neurologic: Oriented x 3, strength symmetric in all extremities, Cranial Nerves grossly intact to confrontation, speech clear   Skin: No rashes     Result Review    Result Review:  I have personally reviewed the results from the time of this admission to 8/14/2023 12:56 EDT and agree with these findings:  [x]  Laboratory list / accordion  []  Microbiology  [x]  Radiology  [x]  EKG/Telemetry   []  Cardiology/Vascular   []  Pathology  []  Old records  []  Other:  Most notable findings include: WBC 12.55, creatinine 1.30      Assessment & Plan   Assessment / Plan     Brief Patient Summary:  Pablo Mendoza is a 62 y.o. male who will be admitted to the observation unit for work-up of possible TIA and cervical disc protrusion.  We will plan for neurology and neurosurgery consultation.    Active Hospital Problems:  Active Hospital Problems    Diagnosis     **Dizziness      Plan:     Dizziness/Speech  Difficulty  -Neuro checks q4h  -Consult neurology  -Obtain MRI brain  -Cardiac monitor    Migraine Headache  -Rizatriptan PRN    C6/C7 Disc Protrusion  -Consult neurosurgery  -Consider MRI cervical spine    Leukocytosis  -Check daily CBC    Hypertension  -Continue amlodipine, HCTZ, metoprolol    Hyperlipidemia  -Continue atorvastatin  -Triglycerides 511 today    Diabetes  -Continue glipizide, janumet, jardiance  -Hemoglobin A1c 7.70 today  -Consult diabetes educator        DVT prophylaxis:  Mechanical DVT prophylaxis orders are present.    CODE STATUS:    Level Of Support Discussed With: Patient  Code Status (Patient has no pulse and is not breathing): CPR (Attempt to Resuscitate)  Medical Interventions (Patient has pulse or is breathing): Full Support    Admission Status:  I believe this patient meets observation status.    Electronically signed by Kenzie Rodriguez PA-C, 08/14/23, 12:56 PM EDT.        75 minutes has been spent by [facility] providers in the care of this patient while under observation status      I have worn appropriate PPE during this patient encounter, sanitized my hands both with entering and exiting patient's room.

## 2023-08-14 NOTE — LETTER
August 16, 2023     Patient: Pablo Mendoza   YOB: 1960   Date of Visit: 8/14/2023       To Whom It May Concern:    It is my medical opinion that Pablo Mendoza may return to work Monday August 21, 2023.           Sincerely,      NESSA Abad

## 2023-08-14 NOTE — PROGRESS NOTES
ANESTHESIA INTUBATION  Date/Time: 2/27/2019 10:50 AM  Urgency: elective    Airway not difficult    General Information and Staff    Patient location during procedure: OR  Anesthesiologist: Brigitte Leach MD  Resident/CRNA: Anastacio Wilkerson CRNA  Performed: Clinical Pharmacy Services: Medication History    Pablo Mendoza is a 62 y.o. male presenting to [facility] for   Chief Complaint   Patient presents with    Neuro Deficit(s)       He  has a past medical history of Anesthesia complication, COVID-19, Diabetes mellitus, Diverticulitis, Elevated cholesterol, Gout, Hypertension, Low back pain, Migraines, Mixed hyperlipidemia (01/19/2018), Neuropathy, Nocturia (01/19/2018), Numbness and tingling of both lower extremities, Seizures, Staph infection (2010), Stroke, TIA (transient ischemic attack), Type 2 diabetes mellitus, without long-term current use of insulin (01/19/2018), and Weakness of both legs.    Allergies as of 08/14/2023 - Reviewed 08/14/2023   Allergen Reaction Noted    Aspirin Nausea Only 07/17/2020    Lisinopril Unknown - Low Severity 10/04/2019       Medication information was obtained from: Patient   Pharmacy and Phone Number:     Prior to Admission Medications       Prescriptions Last Dose Informant Patient Reported? Taking?    acetaminophen (TYLENOL) 500 MG tablet  Self Yes Yes    Take 1 tablet by mouth Every 6 (Six) Hours As Needed for Mild Pain.    allopurinol (ZYLOPRIM) 300 MG tablet  Self No Yes    TAKE ONE TABLET BY MOUTH TWICE A DAY    Patient taking differently:  Take 1 tablet by mouth 2 (Two) Times a Day.    amLODIPine (NORVASC) 10 MG tablet  Self No Yes    TAKE 1 TABLET BY MOUTH DAILY    Patient taking differently:  Take 1 tablet by mouth Daily.    atorvastatin (Lipitor) 40 MG tablet  Self No Yes    Take 1 tablet by mouth Daily.    cyclobenzaprine (FLEXERIL) 10 MG tablet  Self No Yes    Take 1 tablet by mouth 2 (Two) Times a Day As Needed for Muscle Spasms.    fluticasone (Flonase) 50 MCG/ACT nasal spray  Self No Yes    2 sprays into the nostril(s) as directed by provider Daily.    Patient taking differently:  2 sprays into the nostril(s) as directed by provider As Needed.    glipizide (GLUCOTROL) 5 MG tablet  Self No Yes     TAKE 1 TABLET BY MOUTH TWICE A DAY BEFORE A MEAL    Patient taking differently:  Take 1 tablet by mouth 2 (Two) Times a Day Before Meals.    hydroCHLOROthiazide (HYDRODIURIL) 25 MG tablet  Self No Yes    TAKE ONE TABLET BY MOUTH DAILY    Patient taking differently:  Take 1 tablet by mouth Daily.    hydrOXYzine (ATARAX) 25 MG tablet  Self No Yes    Take 1 tablet by mouth Daily As Needed for Anxiety.    Janumet XR  MG tablet  Self No Yes    TAKE ONE TABLET BY MOUTH TWICE A DAY    Patient taking differently:  Take 1 tablet by mouth Daily.    Jardiance 25 MG tablet tablet  Self No Yes    TAKE ONE TABLET BY MOUTH DAILY    Patient taking differently:  Take 1 tablet by mouth Daily.    loratadine (CLARITIN) 10 MG tablet  Self No Yes    TAKE ONE TABLET BY MOUTH DAILY    Patient taking differently:  Take 1 tablet by mouth As Needed.    metoprolol tartrate (LOPRESSOR) 100 MG tablet  Self No Yes    TAKE 1 TABLET BY MOUTH TWICE A DAY    Patient taking differently:  Take 1 tablet by mouth 2 (Two) Times a Day.    rizatriptan (MAXALT) 10 MG tablet  Self No Yes    Take 1 tablet by mouth 1 (One) Time for 1 dose. AT ONSET OF HEADACHE MAY REPEAT ONE TABLET IN 2 HOURS IF NEEDED    Patient taking differently:  Take 1 tablet by mouth 1 (One) Time.    sertraline (ZOLOFT) 50 MG tablet  Self No Yes    TAKE 1 TABLET BY MOUTH DAILY    Patient taking differently:  Take 1 tablet by mouth Daily.    sildenafil (Viagra) 50 MG tablet  Self No Yes    Take 1 tablet by mouth Daily As Needed for Erectile Dysfunction.    aspirin 81 MG chewable tablet   Yes No    Chew 1 tablet Daily. FOLLOW MD GUIDELINES ON HOLDING FOR OR    multivitamin with minerals tablet tablet   Yes No    Take 1 tablet by mouth Daily. HOLDING FOR 7 DAYS PRIOR TO OR              Medication notes:     This medication list is complete to the best of my knowledge as of 8/14/2023    Please call if questions.    Jam Frederick  Medication History Technician  [phone]    8/14/2023  13:36 EDT

## 2023-08-14 NOTE — CONSULTS
NEUROLOGY CONSULT     DOS: 2023  NAME: Pablo Mendoza   : 1960  PCP: Liana Hood MD  CC: Stroke  Referring MD: Kenji Gan II*      Neurological Problem and Interval History: 62 y.o. who presents with Sx of transient aphasia difficulty getting words out some stuttering as well as some right-sided symptoms.  Patient notes that these episodes have been going on weekly for the past couple of months.  Prior to this he says that he would have episodes monthly.  He associates these with stress.  He did say that he was following previously with a neurologist says that it was here at [facility] however I see nothing in chart review nothing in care everywhere and do not recognize the name of the physician and the neurologist.  Unclear what his previous evaluation has been or what he has been diagnosed with or what has been told.  Patient does state that he has been having recurrent TIAs.  However it would be highly unusual for this to represent a TIA to be happening this often.  Other possibilities including complex migraine or seizures would be more likely.      Past Medical/Surgical Hx:  Past Medical History:   Diagnosis Date    Anesthesia complication     STATES HARD TO WAKE UP W/ALL SURGERIES    COVID-19     DEC 2020    Diabetes mellitus     Diverticulitis     Elevated cholesterol     Gout     Hypertension     Low back pain     Migraines     Mixed hyperlipidemia 2018    Neuropathy     Nocturia 2018    Numbness and tingling of both lower extremities     LEGS AND FEET     Seizures     AS A CHILD    Staph infection 2010    UNSURE OF SITE    Stroke     COGNITIVE DELAY    TIA (transient ischemic attack)     Type 2 diabetes mellitus, without long-term current use of insulin 2018    Weakness of both legs     LEFT WORSE      Past Surgical History:   Procedure Laterality Date    CHOLECYSTECTOMY      COLONOSCOPY      2015 or 2016    HERNIA REPAIR      LUMBAR FUSION N/A  11/03/2021    Procedure: Lumbar 3 to Lumbar 4 and Lumbar 4 to Lumbar 5 laminectomy with a fusion;  Surgeon: Jose Webster MD;  [facility] OR;  Service: Robotics - Neuro;  Laterality: N/A;    SINUS SURGERY      TYMPANOPLASTY Bilateral          Medications On Admission  Medications Prior to Admission   Medication Sig Dispense Refill Last Dose    acetaminophen (TYLENOL) 500 MG tablet Take 1 tablet by mouth Every 6 (Six) Hours As Needed for Mild Pain.       allopurinol (ZYLOPRIM) 300 MG tablet TAKE ONE TABLET BY MOUTH TWICE A DAY (Patient taking differently: Take 1 tablet by mouth 2 (Two) Times a Day.) 60 tablet 1     amLODIPine (NORVASC) 10 MG tablet TAKE 1 TABLET BY MOUTH DAILY (Patient taking differently: Take 1 tablet by mouth Daily.) 30 tablet 1     atorvastatin (Lipitor) 40 MG tablet Take 1 tablet by mouth Daily. 90 tablet 1     cyclobenzaprine (FLEXERIL) 10 MG tablet Take 1 tablet by mouth 2 (Two) Times a Day As Needed for Muscle Spasms. 60 tablet 0     fluticasone (Flonase) 50 MCG/ACT nasal spray 2 sprays into the nostril(s) as directed by provider Daily. (Patient taking differently: 2 sprays into the nostril(s) as directed by provider As Needed.) 1 bottle 0     glipizide (GLUCOTROL) 5 MG tablet TAKE 1 TABLET BY MOUTH TWICE A DAY BEFORE A MEAL (Patient taking differently: Take 1 tablet by mouth 2 (Two) Times a Day Before Meals.) 60 tablet 0     hydroCHLOROthiazide (HYDRODIURIL) 25 MG tablet TAKE ONE TABLET BY MOUTH DAILY (Patient taking differently: Take 1 tablet by mouth Daily.) 30 tablet 2     hydrOXYzine (ATARAX) 25 MG tablet Take 1 tablet by mouth Daily As Needed for Anxiety. 30 tablet 0     Janumet XR  MG tablet TAKE ONE TABLET BY MOUTH TWICE A DAY (Patient taking differently: Take 1 tablet by mouth Daily.) 60 tablet 3     Jardiance 25 MG tablet tablet TAKE ONE TABLET BY MOUTH DAILY (Patient taking differently: Take 1 tablet by mouth Daily.) 30 tablet 1     loratadine (CLARITIN) 10 MG  tablet TAKE ONE TABLET BY MOUTH DAILY (Patient taking differently: Take 1 tablet by mouth As Needed.) 30 tablet 0     metoprolol tartrate (LOPRESSOR) 100 MG tablet TAKE 1 TABLET BY MOUTH TWICE A DAY (Patient taking differently: Take 1 tablet by mouth 2 (Two) Times a Day.) 60 tablet 1     rizatriptan (MAXALT) 10 MG tablet Take 1 tablet by mouth 1 (One) Time for 1 dose. AT ONSET OF HEADACHE MAY REPEAT ONE TABLET IN 2 HOURS IF NEEDED (Patient taking differently: Take 1 tablet by mouth 1 (One) Time.) 15 tablet 0     sertraline (ZOLOFT) 50 MG tablet TAKE 1 TABLET BY MOUTH DAILY (Patient taking differently: Take 1 tablet by mouth Daily.) 30 tablet 0     sildenafil (Viagra) 50 MG tablet Take 1 tablet by mouth Daily As Needed for Erectile Dysfunction. 30 tablet 0     aspirin 81 MG chewable tablet Chew 1 tablet Daily. FOLLOW MD GUIDELINES ON HOLDING FOR OR       multivitamin with minerals tablet tablet Take 1 tablet by mouth Daily. HOLDING FOR 7 DAYS PRIOR TO OR          Allergies:  Allergies   Allergen Reactions    Aspirin Nausea Only    Lisinopril Unknown - Low Severity     Elevated creatinine           Social Hx:  Social History     Socioeconomic History    Marital status:    Tobacco Use    Smoking status: Never    Smokeless tobacco: Never   Vaping Use    Vaping Use: Never used   Substance and Sexual Activity    Alcohol use: Yes     Comment: 3 DRINKS WEEKLY    Drug use: Not Currently    Sexual activity: Yes     Partners: Female       Family Hx:  Family History   Problem Relation Age of Onset    Arthritis Mother     Hypertension Mother     Heart disease Mother     Stroke Mother     Alcohol abuse Father     Arthritis Father     Hypertension Father     Nephrolithiasis Father     Diabetes Sister     Diabetes Sister     Stroke Brother 59    Hypertension Brother     Hyperlipidemia Brother     Throat cancer Maternal Grandmother     Diabetes Paternal Grandmother     Malig Hyperthermia Neg Hx        Review of Imaging  (Interpretation of images not reports):  -HCT/CTP/CTAs:  There is no evidence to suggest an area of acute completed infarction  on either the noncontrast head CT or the CT perfusion study. More  sensitive and specific assessment of an area of acute completed  infarction could be performed with MR imaging, as clinically indicated.  There is no evidence for a large vessel occlusion.     Mild intracranial and extracranial atherosclerotic changes are as  discussed in detail above.     Incidental note is made of disc osteophyte complexes at C4-5, C5-6, and  C6-7. Additionally, at the C6-7 level, there is a prominent size  central/right central disc protrusion. These findings result in severe  canal stenosis at C6-7 along with prominent cord compression along the  right side of the cervical spinal cord. The degenerative phenomena  within the cervical spine could be further assessed on an MRI of the  cervical spine, as clinically indicated.    Laboratory Results:   Lab Results   Component Value Date    GLUCOSE 186 (H) 08/14/2023    CALCIUM 9.3 08/14/2023     08/14/2023    K 3.8 08/14/2023    CO2 25.0 08/14/2023    CL 98 08/14/2023    BUN 16 08/14/2023    CREATININE 1.30 (H) 08/14/2023    EGFRIFAFRI 101 02/03/2022    EGFRIFNONA 87 02/03/2022    BCR 12.3 08/14/2023    ANIONGAP 13.0 08/14/2023     Lab Results   Component Value Date    WBC 12.55 (H) 08/14/2023    HGB 15.4 08/14/2023    HCT 47.1 08/14/2023    MCV 78.8 (L) 08/14/2023     08/14/2023     Lab Results   Component Value Date    CHOL 170 08/14/2023    CHOL 141 10/04/2022     Lab Results   Component Value Date    HDL 29 (L) 08/14/2023    HDL 29 (L) 08/11/2023    HDL 30 (L) 02/24/2023     Lab Results   Component Value Date    LDL 63 08/14/2023    LDL 78 08/11/2023    LDL 45 02/24/2023     Lab Results   Component Value Date    TRIG 511 (H) 08/14/2023    TRIG 377 (H) 08/11/2023    TRIG 285 (H) 02/24/2023     Lab Results   Component Value Date    HGBA1C 7.70  "(H) 08/14/2023     Lab Results   Component Value Date    INR 1.01 08/14/2023    INR 1.04 03/11/2020    INR 1.0 11/11/2015    PROTIME 13.4 08/14/2023    PROTIME 13.3 03/11/2020    PROTIME 13.3 11/11/2015         Physical Examination:   /80 (BP Location: Right arm, Patient Position: Lying)   Pulse 70   Temp 97.7 øF (36.5 øC) (Oral)   Resp 16   Ht 170.2 cm (67\")   Wt 90.6 kg (199 lb 11.8 oz)   SpO2 95%   BMI 31.28 kg/mý       NIHSS:     0-->Alert: keenly responsive  0-->Answers both questions correctly  0-->Performs both tasks correctly  0=normal  0=No visual loss  0=Normal symmetric movement  0-->No drift: limb holds 90 (or 45) degrees for full 10 secs  0-->No drift: limb holds 90 (or 45) degrees for full 10 secs  0-->No drift: limb holds 90 (or 45) degrees for full 10 secs  0-->No drift: limb holds 90 (or 45) degrees for full 10 secs  0=Absent  0=Normal; no sensory loss  1-->Mild-to-moderate aphasia: some obvious loss of fluency or facility of comprehension, without significant limitation on ideas expressed or form of expression. Reduction of speech and/or comprehension, however, makes conversation.  0=Normal  0=No abnormality    Total score: 1        Diagnoses / Discussion:  62 y.o. who presents with Sx of transient aphasia difficulty getting words out some stuttering as well as some right-sided symptoms.     Plan:  MRI brain with and without  EEG  TTE  A1c  Lipids  Head CT/CTP/CTA is unremarkable  MROI c-spine, NSG consult  Depending on results and course may consider a trial of an AED     I have discussed the above with the patient and family.  Time spent with patient: 60min    MDM  Reviewed: previous chart, nursing note and vitals  Reviewed previous: labs and CT scan  Interpretation: labs and CT scan  Total time providing critical care: 30-74 minutes. This excludes time spent performing separately reportable procedures and services.  Consults: neurology       Lori Camarillo, " MD  Neurology

## 2023-08-14 NOTE — CONSULTS
"[facility] NEUROSURGERY CONSULT NOTE    Patient name: Pablo Mendoza  Referring Provider: Kenzie Rodriguez PA-C  Reason for Consultation:     C6-7 disc protrusion with cord compression       Patient Care Team:  Liana Hood MD as PCP - General (Internal Medicine & Pediatrics)  Drew Neal MD as Consulting Physician (Hematology and Oncology)  Brigitte Bell PA-C as Referring Physician (Physician Assistant)    Chief complaint: Possible TIA    Subjective .     History of present illness:    Patient is a 62 y.o. right handed male known to Dr. Webster with history of lumbar surgery in November 2021.  He presented to hospital today with onset of acute imbalance issues with associated speech changes and headache while at work.  He states he has history of stroke and TIAs.  Over the last 3 weeks he has missed work due to recurrent episodes.  He states that he has chronic migraines for the last 10 or so years that occur 1-2 times a month but have recently become more frequent.  He has associated light sensitivity sound sensitivity and blurred vision.  It is a holocephalic pressure headache that is on the left more than right and has an area of absence of discomfort on the right side which she states is where he has a blocked blood vessel.  He occasionally has some spike of severe pain across the front of his right forehead.  He feels that the increased frequency of symptoms was over likely related to financial and work stress.  He denies any significant neck pain.  He has some chronic intermittent numbness of the left arm.  He states occasionally he will get some twitching of his left thumb.  Denies any dexterity issues.  Sees a chiropractor routinely for \"all over\".  He has been followed by Dr. Benz, neurologist for his migraines.  Symptoms improved over transport to hospital by EMS, but he continues to have some stuttering of his speech. No prior cervical spine surgery.  No cancer history.  " Non-smoker.  On aspirin 81 mg at baseline.    Review of Systems  Review of Systems   HENT:          Phonophobia   Eyes:  Positive for photophobia.   Gastrointestinal:  Negative for nausea and vomiting.   Genitourinary:  Negative for enuresis.   Musculoskeletal:  Positive for back pain and gait problem. Negative for neck pain.   Neurological:  Positive for speech difficulty and numbness.   Psychiatric/Behavioral:  Negative for confusion.      History  PAST MEDICAL HISTORY  Past Medical History:   Diagnosis Date    Anesthesia complication     STATES HARD TO WAKE UP W/ALL SURGERIES    COVID-19     DEC 2020    Diabetes mellitus     Diverticulitis     Elevated cholesterol     Gout     Hypertension     Low back pain     Migraines     Mixed hyperlipidemia 01/19/2018    Neuropathy     Nocturia 01/19/2018    Numbness and tingling of both lower extremities     LEGS AND FEET     Seizures     AS A CHILD    Staph infection 2010    UNSURE OF SITE    Stroke     COGNITIVE DELAY    TIA (transient ischemic attack)     Type 2 diabetes mellitus, without long-term current use of insulin 01/19/2018    Weakness of both legs     LEFT WORSE        PAST SURGICAL HISTORY  Past Surgical History:   Procedure Laterality Date    CHOLECYSTECTOMY  2011    COLONOSCOPY      2015 or 2016    HERNIA REPAIR      LUMBAR FUSION N/A 11/03/2021    Procedure: Lumbar 3 to Lumbar 4 and Lumbar 4 to Lumbar 5 laminectomy with a fusion;  Surgeon: Jose Webster MD;  Location: [facility];  Service: [employer] - Neuro;  Laterality: N/A;    SINUS SURGERY      TYMPANOPLASTY Bilateral        FAMILY HISTORY  Family History   Problem Relation Age of Onset    Arthritis Mother     Hypertension Mother     Heart disease Mother     Stroke Mother     Alcohol abuse Father     Arthritis Father     Hypertension Father     Nephrolithiasis Father     Diabetes Sister     Diabetes Sister     Stroke Brother 59    Hypertension Brother     Hyperlipidemia Brother     Throat cancer  Maternal Grandmother     Diabetes Paternal Grandmother     Malig Hyperthermia Neg Hx        SOCIAL HISTORY  Social History     Tobacco Use    Smoking status: Never    Smokeless tobacco: Never   Vaping Use    Vaping Use: Never used   Substance Use Topics    Alcohol use: Yes     Comment: 3 DRINKS WEEKLY    Drug use: Not Currently       full time job doing production work  Lives alone    Allergies:  Aspirin and Lisinopril    MEDICATIONS:  Medications Prior to Admission   Medication Sig Dispense Refill Last Dose    acetaminophen (TYLENOL) 500 MG tablet Take 1 tablet by mouth Every 6 (Six) Hours As Needed for Mild Pain.       allopurinol (ZYLOPRIM) 300 MG tablet TAKE ONE TABLET BY MOUTH TWICE A DAY (Patient taking differently: Take 1 tablet by mouth 2 (Two) Times a Day.) 60 tablet 1     amLODIPine (NORVASC) 10 MG tablet TAKE 1 TABLET BY MOUTH DAILY (Patient taking differently: Take 1 tablet by mouth Daily.) 30 tablet 1     atorvastatin (Lipitor) 40 MG tablet Take 1 tablet by mouth Daily. 90 tablet 1     cyclobenzaprine (FLEXERIL) 10 MG tablet Take 1 tablet by mouth 2 (Two) Times a Day As Needed for Muscle Spasms. 60 tablet 0     fluticasone (Flonase) 50 MCG/ACT nasal spray 2 sprays into the nostril(s) as directed by provider Daily. (Patient taking differently: 2 sprays into the nostril(s) as directed by provider As Needed.) 1 bottle 0     glipizide (GLUCOTROL) 5 MG tablet TAKE 1 TABLET BY MOUTH TWICE A DAY BEFORE A MEAL (Patient taking differently: Take 1 tablet by mouth 2 (Two) Times a Day Before Meals.) 60 tablet 0     hydroCHLOROthiazide (HYDRODIURIL) 25 MG tablet TAKE ONE TABLET BY MOUTH DAILY (Patient taking differently: Take 1 tablet by mouth Daily.) 30 tablet 2     hydrOXYzine (ATARAX) 25 MG tablet Take 1 tablet by mouth Daily As Needed for Anxiety. 30 tablet 0     Janumet XR  MG tablet TAKE ONE TABLET BY MOUTH TWICE A DAY (Patient taking differently: Take 1 tablet by mouth Daily.) 60 tablet 3      Jardiance 25 MG tablet tablet TAKE ONE TABLET BY MOUTH DAILY (Patient taking differently: Take 1 tablet by mouth Daily.) 30 tablet 1     loratadine (CLARITIN) 10 MG tablet TAKE ONE TABLET BY MOUTH DAILY (Patient taking differently: Take 1 tablet by mouth As Needed.) 30 tablet 0     metoprolol tartrate (LOPRESSOR) 100 MG tablet TAKE 1 TABLET BY MOUTH TWICE A DAY (Patient taking differently: Take 1 tablet by mouth 2 (Two) Times a Day.) 60 tablet 1     rizatriptan (MAXALT) 10 MG tablet Take 1 tablet by mouth 1 (One) Time for 1 dose. AT ONSET OF HEADACHE MAY REPEAT ONE TABLET IN 2 HOURS IF NEEDED (Patient taking differently: Take 1 tablet by mouth 1 (One) Time.) 15 tablet 0     sertraline (ZOLOFT) 50 MG tablet TAKE 1 TABLET BY MOUTH DAILY (Patient taking differently: Take 1 tablet by mouth Daily.) 30 tablet 0     sildenafil (Viagra) 50 MG tablet Take 1 tablet by mouth Daily As Needed for Erectile Dysfunction. 30 tablet 0     aspirin 81 MG chewable tablet Chew 1 tablet Daily. FOLLOW MD GUIDELINES ON HOLDING FOR OR       multivitamin with minerals tablet tablet Take 1 tablet by mouth Daily. HOLDING FOR 7 DAYS PRIOR TO OR          Current Facility-Administered Medications:     acetaminophen (TYLENOL) tablet 500 mg, 500 mg, Oral, Q6H PRN, Kenzie Rodriguez PA-C    allopurinol (ZYLOPRIM) tablet 300 mg, 300 mg, Oral, BID, Kenzie Rodriguez PA-C    amLODIPine (NORVASC) tablet 10 mg, 10 mg, Oral, Daily, Kenzie Rodriguez PA-C    aspirin chewable tablet 81 mg, 81 mg, Oral, Daily, Kenzie Rodriguez PA-C    atorvastatin (LIPITOR) tablet 40 mg, 40 mg, Oral, Daily, Kenzie Rodriguez PA-C    sennosides-docusate (PERICOLACE) 8.6-50 MG per tablet 2 tablet, 2 tablet, Oral, BID **AND** polyethylene glycol (MIRALAX) packet 17 g, 17 g, Oral, Daily PRN **AND** bisacodyl (DULCOLAX) EC tablet 5 mg, 5 mg, Oral, Daily PRN **AND** bisacodyl (DULCOLAX) suppository 10 mg, 10 mg, Rectal, Daily PRN, Kenzie Rodriguez, KESHAV    empagliflozin  (JARDIANCE) tablet 25 mg, 25 mg, Oral, Daily, Kenzie Rodriguez PA-C    glipizide (GLUCOTROL) tablet 5 mg, 5 mg, Oral, BID AC, Kenzie Rodriguez PA-C    hydroCHLOROthiazide (HYDRODIURIL) tablet 25 mg, 25 mg, Oral, Daily, Kenzie Rodriguez PA-C    metoprolol tartrate (LOPRESSOR) tablet 100 mg, 100 mg, Oral, BID, Kenzie Rodriguez PA-C    multivitamin with minerals 1 tablet, 1 tablet, Oral, Daily, Kenzie Rodriguez PA-C    sertraline (ZOLOFT) tablet 50 mg, 50 mg, Oral, Daily, Kenzie Rodriguez PA-C    sodium chloride 0.9 % flush 10 mL, 10 mL, Intravenous, PRN, Anirudh Abel MD    sodium chloride 0.9 % flush 10 mL, 10 mL, Intravenous, Q12H, Kenzie Rodriguez PA-C    sodium chloride 0.9 % flush 10 mL, 10 mL, Intravenous, PRN, Kenzie Rodriguez PA-C    sodium chloride 0.9 % infusion 40 mL, 40 mL, Intravenous, PRN, Kenzie Rodriguez PA-C    SUMAtriptan (IMITREX) tablet 50 mg, 50 mg, Oral, Once PRN, Kenzie Rodriguez PA-C    Objective     Results Review:  LABS:  Results from last 7 days   Lab Units 08/14/23  1110 08/11/23  1026   WBC 10*3/mm3 12.55* 9.69   HEMOGLOBIN g/dL 15.4 15.6   HEMATOCRIT % 47.1 47.9   PLATELETS 10*3/mm3 292 288     Results from last 7 days   Lab Units 08/14/23  1446 08/14/23  1110 08/11/23  1026   SODIUM mmol/L 137 136 141   POTASSIUM mmol/L 4.1 3.8 4.1   CHLORIDE mmol/L 99 98 100   CO2 mmol/L 22.4 25.0 28.5   BUN mg/dL 15 16 17   CREATININE mg/dL 0.90 1.30* 1.12   CALCIUM mg/dL 8.6 9.3 9.9   BILIRUBIN mg/dL  --  0.3 0.4   ALK PHOS U/L  --  127* 125*   ALT (SGPT) U/L  --  22 26   AST (SGOT) U/L  --  23 22   GLUCOSE mg/dL 107* 186* 157*     HS troponin-13  INR 1.01    DIAGNOSTICS:  CT Angiogram Neck    Result Date: 8/14/2023    There is no evidence to suggest an area of acute completed infarction on either the noncontrast head CT or the CT perfusion study. More sensitive and specific assessment of an area of acute completed infarction could be performed with MR imaging, as clinically  indicated. There is no evidence for a large vessel occlusion.  Mild intracranial and extracranial atherosclerotic changes are as discussed in detail above.  Incidental note is made of disc osteophyte complexes at C4-5, C5-6, and C6-7. Additionally, at the C6-7 level, there is a prominent size central/right central disc protrusion. These findings result in severe canal stenosis at C6-7 along with prominent cord compression along the right side of the cervical spinal cord. The degenerative phenomena within the cervical spine could be further assessed on an MRI of the cervical spine, as clinically indicated.  The findings of noncontrast head CT were discussed with Dr. Camarillo on 08/14/2023 at approximately 11:22 AM. The findings of the CT angiogram and CT perfusion study were discussed with Dr. Camarillo at approximately 11:40 AM.    Radiation dose reduction techniques were utilized, including automated exposure control and exposure modulation based on body size.  This report was finalized on 8/14/2023 12:50 PM by Dr. Donald Leonard M.D.      XR Chest 1 View    Result Date: 8/14/2023  No evidence for acute pulmonary process. Follow-up as clinically indicated.  This report was finalized on 8/14/2023 12:13 PM by Dr. Junito Regalado M.D.      CT Angiogram Head w AI Analysis of LVO    Result Date: 8/14/2023    There is no evidence to suggest an area of acute completed infarction on either the noncontrast head CT or the CT perfusion study. More sensitive and specific assessment of an area of acute completed infarction could be performed with MR imaging, as clinically indicated. There is no evidence for a large vessel occlusion.  Mild intracranial and extracranial atherosclerotic changes are as discussed in detail above.  Incidental note is made of disc osteophyte complexes at C4-5, C5-6, and C6-7. Additionally, at the C6-7 level, there is a prominent size central/right central disc protrusion. These findings result in severe  canal stenosis at C6-7 along with prominent cord compression along the right side of the cervical spinal cord. The degenerative phenomena within the cervical spine could be further assessed on an MRI of the cervical spine, as clinically indicated.  The findings of noncontrast head CT were discussed with Dr. Camarillo on 08/14/2023 at approximately 11:22 AM. The findings of the CT angiogram and CT perfusion study were discussed with Dr. Camarillo at approximately 11:40 AM.    Radiation dose reduction techniques were utilized, including automated exposure control and exposure modulation based on body size.  This report was finalized on 8/14/2023 12:50 PM by Dr. Donald Leonard M.D.      CT CEREBRAL PERFUSION WITH & WITHOUT CONTRAST    Result Date: 8/14/2023    There is no evidence to suggest an area of acute completed infarction on either the noncontrast head CT or the CT perfusion study. More sensitive and specific assessment of an area of acute completed infarction could be performed with MR imaging, as clinically indicated. There is no evidence for a large vessel occlusion.  Mild intracranial and extracranial atherosclerotic changes are as discussed in detail above.  Incidental note is made of disc osteophyte complexes at C4-5, C5-6, and C6-7. Additionally, at the C6-7 level, there is a prominent size central/right central disc protrusion. These findings result in severe canal stenosis at C6-7 along with prominent cord compression along the right side of the cervical spinal cord. The degenerative phenomena within the cervical spine could be further assessed on an MRI of the cervical spine, as clinically indicated.  The findings of noncontrast head CT were discussed with Dr. Camarillo on 08/14/2023 at approximately 11:22 AM. The findings of the CT angiogram and CT perfusion study were discussed with Dr. Camarillo at approximately 11:40 AM.    Radiation dose reduction techniques were utilized, including automated exposure control and  exposure modulation based on body size.  This report was finalized on 8/14/2023 12:50 PM by Dr. Donald Leonard M.D.         Results Review:   I reviewed the patient's new clinical results.  I personally viewed and interpreted the patient's CTA neck for spine findings. Agree with report of C6/7 severe R side canal stenosis w/ cord compression and Reversal of normal cervical lordosis apex at C6.  There is also right paracentral disc osteophyte complex resulting in moderate to severe canal stenosis on the right at C5/6 and mild to moderate canal stenosis at C4/5 due to disc osteophyte complex    Vital Signs   Temp:  [97.7 øF (36.5 øC)-98.1 øF (36.7 øC)] 97.7 øF (36.5 øC)  Heart Rate:  [68-79] 70  Resp:  [16] 16  BP: (137-149)/(80-93) 149/80    Physical Exam:  Physical Exam  Vitals reviewed.   Constitutional:       Appearance: Normal appearance.   Pulmonary:      Effort: Pulmonary effort is normal.   Musculoskeletal:      Cervical back: Normal range of motion and neck supple. Tenderness (Mild paraspinous tenderness) present. No bony tenderness. Normal range of motion.      Comments:   Negative Spurling, negative Lhermitte's   Neurological:      General: No focal deficit present.      Mental Status: He is alert.      Motor: Motor strength is normal.      Deep Tendon Reflexes:      Reflex Scores:       Tricep reflexes are 1+ on the right side and 1+ on the left side.       Bicep reflexes are 2+ on the right side and 2+ on the left side.       Brachioradialis reflexes are 1+ on the right side and 1+ on the left side.       Patellar reflexes are 1+ on the right side and 1+ on the left side.       Achilles reflexes are 2+ on the right side and 2+ on the left side.  Psychiatric:         Mood and Affect: Mood normal.         Thought Content: Thought content normal.     Neurologic Exam     Mental Status   Attention: normal. Concentration: normal.   Speech: (Stuttered speech)  Level of consciousness: alert  Knowledge: good.    Normal comprehension.     Motor Exam   Muscle bulk: normal  Overall muscle tone: normal    Strength   Strength 5/5 throughout.     Sensory Exam   Light touch normal.     Gait, Coordination, and Reflexes     Gait  Gait: (Not tested)    Reflexes   Right brachioradialis: 1+  Left brachioradialis: 1+  Right biceps: 2+  Left biceps: 2+  Right triceps: 1+  Left triceps: 1+  Right patellar: 1+  Left patellar: 1+  Right achilles: 2+  Left achilles: 2+  Right plantar: normal  Left plantar: normal  Right Law: absent  Left Law: absent  Right ankle clonus: absent  Left ankle clonus: absent    Assessment & Plan       Dizziness    Cervical stenosis of spine    Cervical spondylosis without myelopathy    Patient presents to hospital after having neurologic complaints while at work including imbalance, headache, speech difficulties.  Most symptoms resolved upon presentation although he continues to have some stuttered speech, but not true aphasia.  He reports chronic migraine headaches that typically occur along with these imbalance and speech changes.  He also reports history of stroke and TIA.  Imaging completed 14-day protocol reveals no evidence of acute infarct, but findings of cervical canal stenosis particularly at C6/7 and C5/6.  On exam he has full strength, normal sensation, normal dexterity and no Carson, clonus.  He does not appear myelopathic.  Stenosis is likely longstanding.  He does describe a Lhermitte's phenomena and intermittent twitching of his left thumb that he has had in the past but not recently.  I do not think that the findings of the cervical spine are at all related to his presenting symptoms as it appears completely incidental; however we will have PT evaluate for a possible myelopathic gait and we will await MRI cervical spine results for further discussion.    PLAN:   MRI cervical spine-ordered by primary service, we will follow-up after complete  PT eval for gait assessment    I discussed  "the patient's findings and my recommendations with patient and primary care team    During patient visit, I utilized appropriate personal protective equipment including gloves. Appropriate PPE was worn during the entire visit.  Hand hygiene was completed before and after.     Terra Eugene, APRN  08/14/23  15:59 EDT    \"Dictated utilizing Dragon dictation\".      "

## 2023-08-15 ENCOUNTER — APPOINTMENT (OUTPATIENT)
Dept: NEUROLOGY | Facility: HOSPITAL | Age: 63
End: 2023-08-15
Payer: COMMERCIAL

## 2023-08-15 ENCOUNTER — APPOINTMENT (OUTPATIENT)
Dept: CARDIOLOGY | Facility: HOSPITAL | Age: 63
End: 2023-08-15
Payer: COMMERCIAL

## 2023-08-15 LAB
ANION GAP SERPL CALCULATED.3IONS-SCNC: 15.3 MMOL/L (ref 5–15)
AORTIC DIMENSIONLESS INDEX: 0.7 (DI)
ASCENDING AORTA: 2.8 CM
BASOPHILS # BLD AUTO: 0.06 10*3/MM3 (ref 0–0.2)
BASOPHILS NFR BLD AUTO: 0.6 % (ref 0–1.5)
BH CV ECHO MEAS - ACS: 2.26 CM
BH CV ECHO MEAS - AO MAX PG: 4.7 MMHG
BH CV ECHO MEAS - AO MEAN PG: 2.32 MMHG
BH CV ECHO MEAS - AO ROOT DIAM: 3 CM
BH CV ECHO MEAS - AO V2 MAX: 108.6 CM/SEC
BH CV ECHO MEAS - AO V2 VTI: 20.5 CM
BH CV ECHO MEAS - AVA(I,D): 2.6 CM2
BH CV ECHO MEAS - EDV(CUBED): 74.1 ML
BH CV ECHO MEAS - EDV(MOD-SP2): 55 ML
BH CV ECHO MEAS - EDV(MOD-SP4): 53 ML
BH CV ECHO MEAS - EF(MOD-BP): 54.8 %
BH CV ECHO MEAS - EF(MOD-SP2): 56.4 %
BH CV ECHO MEAS - EF(MOD-SP4): 52.8 %
BH CV ECHO MEAS - ESV(CUBED): 23.9 ML
BH CV ECHO MEAS - ESV(MOD-SP2): 24 ML
BH CV ECHO MEAS - ESV(MOD-SP4): 25 ML
BH CV ECHO MEAS - FS: 31.4 %
BH CV ECHO MEAS - IVS/LVPW: 1.29 CM
BH CV ECHO MEAS - IVSD: 0.9 CM
BH CV ECHO MEAS - LAT PEAK E' VEL: 7 CM/SEC
BH CV ECHO MEAS - LV DIASTOLIC VOL/BSA (35-75): 26.7 CM2
BH CV ECHO MEAS - LV MASS(C)D: 101.3 GRAMS
BH CV ECHO MEAS - LV MAX PG: 1.95 MMHG
BH CV ECHO MEAS - LV MEAN PG: 0.94 MMHG
BH CV ECHO MEAS - LV SYSTOLIC VOL/BSA (12-30): 12.6 CM2
BH CV ECHO MEAS - LV V1 MAX: 69.8 CM/SEC
BH CV ECHO MEAS - LV V1 VTI: 14 CM
BH CV ECHO MEAS - LVIDD: 4.2 CM
BH CV ECHO MEAS - LVIDS: 2.9 CM
BH CV ECHO MEAS - LVOT AREA: 3.8 CM2
BH CV ECHO MEAS - LVOT DIAM: 2.21 CM
BH CV ECHO MEAS - LVPWD: 0.7 CM
BH CV ECHO MEAS - MED PEAK E' VEL: 4.9 CM/SEC
BH CV ECHO MEAS - MV A DUR: 0.11 SEC
BH CV ECHO MEAS - MV A MAX VEL: 83.1 CM/SEC
BH CV ECHO MEAS - MV DEC SLOPE: 400 CM/SEC2
BH CV ECHO MEAS - MV DEC TIME: 185 MSEC
BH CV ECHO MEAS - MV E MAX VEL: 78 CM/SEC
BH CV ECHO MEAS - MV E/A: 0.94
BH CV ECHO MEAS - MV MAX PG: 4.4 MMHG
BH CV ECHO MEAS - MV MEAN PG: 1.69 MMHG
BH CV ECHO MEAS - MV P1/2T: 72.7 MSEC
BH CV ECHO MEAS - MV V2 VTI: 25.5 CM
BH CV ECHO MEAS - MVA(P1/2T): 3 CM2
BH CV ECHO MEAS - MVA(VTI): 2.09 CM2
BH CV ECHO MEAS - PA V2 MAX: 91.5 CM/SEC
BH CV ECHO MEAS - PULM A REVS DUR: 0.15 SEC
BH CV ECHO MEAS - PULM A REVS VEL: 42.8 CM/SEC
BH CV ECHO MEAS - PULM DIAS VEL: 40.1 CM/SEC
BH CV ECHO MEAS - PULM S/D: 1.39
BH CV ECHO MEAS - PULM SYS VEL: 55.9 CM/SEC
BH CV ECHO MEAS - QP/QS: 1.93
BH CV ECHO MEAS - RV MAX PG: 2.41 MMHG
BH CV ECHO MEAS - RV V1 MAX: 77.6 CM/SEC
BH CV ECHO MEAS - RV V1 VTI: 17.5 CM
BH CV ECHO MEAS - RVOT DIAM: 2.7 CM
BH CV ECHO MEAS - SI(MOD-SP2): 15.6 ML/M2
BH CV ECHO MEAS - SI(MOD-SP4): 14.1 ML/M2
BH CV ECHO MEAS - SV(LVOT): 53.5 ML
BH CV ECHO MEAS - SV(MOD-SP2): 31 ML
BH CV ECHO MEAS - SV(MOD-SP4): 28 ML
BH CV ECHO MEAS - SV(RVOT): 103.3 ML
BH CV ECHO MEAS - TAPSE (>1.6): 2.6 CM
BH CV ECHO MEASUREMENTS AVERAGE E/E' RATIO: 13.11
BH CV ECHO SHUNT ASSESSMENT PERFORMED (HIDDEN SCRIPTING): 1
BH CV XLRA - RV BASE: 3.8 CM
BH CV XLRA - RV LENGTH: 8.6 CM
BH CV XLRA - RV MID: 3.2 CM
BH CV XLRA - TDI S': 13.1 CM/SEC
BUN SERPL-MCNC: 19 MG/DL (ref 8–23)
BUN/CREAT SERPL: 17.6 (ref 7–25)
CALCIUM SPEC-SCNC: 9.3 MG/DL (ref 8.6–10.5)
CHLORIDE SERPL-SCNC: 98 MMOL/L (ref 98–107)
CO2 SERPL-SCNC: 26.7 MMOL/L (ref 22–29)
CREAT SERPL-MCNC: 1.08 MG/DL (ref 0.76–1.27)
DEPRECATED RDW RBC AUTO: 49.4 FL (ref 37–54)
EGFRCR SERPLBLD CKD-EPI 2021: 78.6 ML/MIN/1.73
EOSINOPHIL # BLD AUTO: 0.25 10*3/MM3 (ref 0–0.4)
EOSINOPHIL NFR BLD AUTO: 2.7 % (ref 0.3–6.2)
ERYTHROCYTE [DISTWIDTH] IN BLOOD BY AUTOMATED COUNT: 19 % (ref 12.3–15.4)
GLUCOSE BLDC GLUCOMTR-MCNC: 231 MG/DL (ref 70–130)
GLUCOSE SERPL-MCNC: 173 MG/DL (ref 65–99)
HCT VFR BLD AUTO: 48.1 % (ref 37.5–51)
HGB BLD-MCNC: 15.6 G/DL (ref 13–17.7)
IMM GRANULOCYTES # BLD AUTO: 0.05 10*3/MM3 (ref 0–0.05)
IMM GRANULOCYTES NFR BLD AUTO: 0.5 % (ref 0–0.5)
LEFT ATRIUM VOLUME INDEX: 24.3 ML/M2
LYMPHOCYTES # BLD AUTO: 1.98 10*3/MM3 (ref 0.7–3.1)
LYMPHOCYTES NFR BLD AUTO: 21.1 % (ref 19.6–45.3)
MCH RBC QN AUTO: 25.3 PG (ref 26.6–33)
MCHC RBC AUTO-ENTMCNC: 32.4 G/DL (ref 31.5–35.7)
MCV RBC AUTO: 78.1 FL (ref 79–97)
MONOCYTES # BLD AUTO: 0.53 10*3/MM3 (ref 0.1–0.9)
MONOCYTES NFR BLD AUTO: 5.7 % (ref 5–12)
NEUTROPHILS NFR BLD AUTO: 6.51 10*3/MM3 (ref 1.7–7)
NEUTROPHILS NFR BLD AUTO: 69.4 % (ref 42.7–76)
NRBC BLD AUTO-RTO: 0 /100 WBC (ref 0–0.2)
PLATELET # BLD AUTO: 260 10*3/MM3 (ref 140–450)
PMV BLD AUTO: 10 FL (ref 6–12)
POTASSIUM SERPL-SCNC: 3.6 MMOL/L (ref 3.5–5.2)
QT INTERVAL: 417 MS
RBC # BLD AUTO: 6.16 10*6/MM3 (ref 4.14–5.8)
SINUS: 3.3 CM
SODIUM SERPL-SCNC: 140 MMOL/L (ref 136–145)
STJ: 2.9 CM
WBC NRBC COR # BLD: 9.38 10*3/MM3 (ref 3.4–10.8)

## 2023-08-15 PROCEDURE — 96375 TX/PRO/DX INJ NEW DRUG ADDON: CPT

## 2023-08-15 PROCEDURE — G0378 HOSPITAL OBSERVATION PER HR: HCPCS

## 2023-08-15 PROCEDURE — 80048 BASIC METABOLIC PNL TOTAL CA: CPT | Performed by: PHYSICIAN ASSISTANT

## 2023-08-15 PROCEDURE — 25010000002 DIPHENHYDRAMINE PER 50 MG

## 2023-08-15 PROCEDURE — 85025 COMPLETE CBC W/AUTO DIFF WBC: CPT | Performed by: PHYSICIAN ASSISTANT

## 2023-08-15 PROCEDURE — 82948 REAGENT STRIP/BLOOD GLUCOSE: CPT

## 2023-08-15 PROCEDURE — 25010000002 PROCHLORPERAZINE 10 MG/2ML SOLUTION

## 2023-08-15 PROCEDURE — 99213 OFFICE O/P EST LOW 20 MIN: CPT | Performed by: NURSE PRACTITIONER

## 2023-08-15 PROCEDURE — 97162 PT EVAL MOD COMPLEX 30 MIN: CPT

## 2023-08-15 PROCEDURE — 95819 EEG AWAKE AND ASLEEP: CPT | Performed by: PSYCHIATRY & NEUROLOGY

## 2023-08-15 PROCEDURE — 95816 EEG AWAKE AND DROWSY: CPT

## 2023-08-15 PROCEDURE — 93306 TTE W/DOPPLER COMPLETE: CPT

## 2023-08-15 PROCEDURE — 93306 TTE W/DOPPLER COMPLETE: CPT | Performed by: INTERNAL MEDICINE

## 2023-08-15 PROCEDURE — 97530 THERAPEUTIC ACTIVITIES: CPT

## 2023-08-15 PROCEDURE — 25010000002 LEVETRIRACETAM PER 10 MG: Performed by: NURSE PRACTITIONER

## 2023-08-15 PROCEDURE — 99214 OFFICE O/P EST MOD 30 MIN: CPT | Performed by: PSYCHIATRY & NEUROLOGY

## 2023-08-15 PROCEDURE — 96374 THER/PROPH/DIAG INJ IV PUSH: CPT

## 2023-08-15 RX ORDER — LEVETIRACETAM 500 MG/5ML
1000 INJECTION, SOLUTION, CONCENTRATE INTRAVENOUS ONCE
Status: COMPLETED | OUTPATIENT
Start: 2023-08-15 | End: 2023-08-15

## 2023-08-15 RX ORDER — DIPHENHYDRAMINE HYDROCHLORIDE 50 MG/ML
25 INJECTION INTRAMUSCULAR; INTRAVENOUS ONCE
Status: COMPLETED | OUTPATIENT
Start: 2023-08-15 | End: 2023-08-15

## 2023-08-15 RX ORDER — PROCHLORPERAZINE EDISYLATE 5 MG/ML
5 INJECTION INTRAMUSCULAR; INTRAVENOUS ONCE
Status: COMPLETED | OUTPATIENT
Start: 2023-08-15 | End: 2023-08-15

## 2023-08-15 RX ADMIN — GLIPIZIDE 5 MG: 5 TABLET ORAL at 16:54

## 2023-08-15 RX ADMIN — METOPROLOL TARTRATE 100 MG: 50 TABLET, FILM COATED ORAL at 21:47

## 2023-08-15 RX ADMIN — HYDROCHLOROTHIAZIDE 25 MG: 25 TABLET ORAL at 08:50

## 2023-08-15 RX ADMIN — Medication 10 ML: at 21:48

## 2023-08-15 RX ADMIN — EMPAGLIFLOZIN 25 MG: 25 TABLET, FILM COATED ORAL at 08:50

## 2023-08-15 RX ADMIN — ACETAMINOPHEN 500 MG: 500 TABLET, FILM COATED ORAL at 17:10

## 2023-08-15 RX ADMIN — ASPIRIN 81 MG: 81 TABLET, CHEWABLE ORAL at 08:50

## 2023-08-15 RX ADMIN — AMLODIPINE BESYLATE 10 MG: 10 TABLET ORAL at 08:50

## 2023-08-15 RX ADMIN — DIPHENHYDRAMINE HYDROCHLORIDE 25 MG: 50 INJECTION, SOLUTION INTRAMUSCULAR; INTRAVENOUS at 22:03

## 2023-08-15 RX ADMIN — GLIPIZIDE 5 MG: 5 TABLET ORAL at 08:50

## 2023-08-15 RX ADMIN — METOPROLOL TARTRATE 100 MG: 50 TABLET, FILM COATED ORAL at 08:50

## 2023-08-15 RX ADMIN — MULTIPLE VITAMINS W/ MINERALS TAB 1 TABLET: TAB at 08:50

## 2023-08-15 RX ADMIN — PROCHLORPERAZINE EDISYLATE 5 MG: 5 INJECTION INTRAMUSCULAR; INTRAVENOUS at 22:03

## 2023-08-15 RX ADMIN — LEVETIRACETAM 1000 MG: 100 INJECTION INTRAVENOUS at 15:37

## 2023-08-15 RX ADMIN — SERTRALINE 50 MG: 50 TABLET, FILM COATED ORAL at 08:50

## 2023-08-15 RX ADMIN — Medication 10 ML: at 08:51

## 2023-08-15 RX ADMIN — ALLOPURINOL 300 MG: 100 TABLET ORAL at 08:50

## 2023-08-15 RX ADMIN — ALLOPURINOL 300 MG: 100 TABLET ORAL at 21:47

## 2023-08-15 NOTE — PLAN OF CARE
Goal Outcome Evaluation:  Plan of Care Reviewed With: patient           Outcome Evaluation: Pt is a 61 yo M who was admitted with dizziness, headache, aphasia, and difficulty walking. Pt found to have cervical stenosis and has a history of migraines. Pt presents to PT with some impaired gait secondary to some mild B hip weakness and mild LOB secondary to episodes of dizziness. Pt reports his gait is much better today compared to yesterday but not quite his baseline. Pt states he has had similar episodes and full recovery can take anywhere from a day to a week. Pt may benefit from skilled PT to address strength, balance, and gait.      Anticipated Discharge Disposition (PT): home with outpatient therapy services

## 2023-08-15 NOTE — THERAPY EVALUATION
Patient Name: Pablo Mendoza  : 1960    MRN: [id number]                              Today's Date: 8/15/2023       Admit Date: 2023    Visit Dx:     ICD-10-CM ICD-9-CM   1. Dizziness  R42 780.4   2. Episode of change in speech  R47.89 784.59   3. Nonintractable headache, unspecified chronicity pattern, unspecified headache type  R51.9 784.0     Patient Active Problem List   Diagnosis    Type 2 diabetes mellitus, without long-term current use of insulin    Mixed hyperlipidemia    Essential hypertension    Gout of multiple sites    Nocturia    Migraine headache    Chronic maxillary sinusitis    Other fatigue    Arthritis    Routine health maintenance    Proteinuria    Cellulitis of left foot    Lumbar radiculopathy    Stroke-like symptoms    Polycythemia    Dizziness    Cervical stenosis of spine    Cervical spondylosis without myelopathy     Past Medical History:   Diagnosis Date    Anesthesia complication     STATES HARD TO WAKE UP W/ALL SURGERIES    COVID-19     DEC 2020    Diabetes mellitus     Diverticulitis     Elevated cholesterol     Gout     Hypertension     Low back pain     Migraines     Mixed hyperlipidemia 2018    Neuropathy     Nocturia 2018    Numbness and tingling of both lower extremities     LEGS AND FEET     Seizures     AS A CHILD    Staph infection     UNSURE OF SITE    Stroke     COGNITIVE DELAY    TIA (transient ischemic attack)     Type 2 diabetes mellitus, without long-term current use of insulin 2018    Weakness of both legs     LEFT WORSE      Past Surgical History:   Procedure Laterality Date    CHOLECYSTECTOMY      COLONOSCOPY       or 2016    HERNIA REPAIR      LUMBAR FUSION N/A 2021    Procedure: Lumbar 3 to Lumbar 4 and Lumbar 4 to Lumbar 5 laminectomy with a fusion;  Surgeon: Jose Webster MD;  Location: [facility];  Service: Rob[employer] - Neuro;  Laterality: N/A;    SINUS SURGERY      TYMPANOPLASTY Bilateral       General  "Information       Row Name 08/15/23 1009          Physical Therapy Time and Intention    Document Type evaluation  -     Mode of Treatment individual therapy;physical therapy  -       Row Name 08/15/23 1009          General Information    Prior Level of Function independent:;gait;transfer;bed mobility  -     Existing Precautions/Restrictions fall  -     Barriers to Rehab medically complex  -       Row Name 08/15/23 1009          Living Environment    People in Home alone  -       Row Name 08/15/23 1009          Cognition    Orientation Status (Cognition) oriented x 3  -       Row Name 08/15/23 1009          Safety Issues, Functional Mobility    Impairments Affecting Function (Mobility) balance  -               User Key  (r) = Recorded By, (t) = Taken By, (c) = Cosigned By      Initials Name Provider Type     Cristina Anthony, PT Physical Therapist                   Mobility       Row Name 08/15/23 1011          Bed Mobility    Bed Mobility supine-sit;sit-supine  -     Supine-Sit [place] (Bed Mobility) standby assist  -     Sit-Supine [place] (Bed Mobility) standby assist  -     Assistive Device (Bed Mobility) bed rails;head of bed elevated  -       Row Name 08/15/23 1011          Sit-Stand Transfer    Sit-Stand [place] (Transfers) standby assist  -[facility] Name 08/15/23 1011          Gait/Stairs (Locomotion)    [place] Level (Gait) verbal cues;nonverbal cues (demo/gesture);contact guard  -     Distance in Feet (Gait) 200  -CH     Deviations/Abnormal Patterns (Gait) demetri decreased;gait speed decreased  -     Comment, (Gait/Stairs) pt with 2 minor LOB during ambulation requiring CGA to correct, 1 LOB secondary to mild dizziness when \"cracking\" his neck, pt reports gaitis improved from yesterday but still not quite his baseline  -               User Key  (r) = Recorded By, (t) = Taken By, (c) = Cosigned By      Initials Name Provider Type    JEMAL Anthony" Cristina GONZALEZ, PT Physical Therapist                   Obj/Interventions       [facility] Name 08/15/23 1013          Range of Motion Comprehensive    General Range of Motion no range of motion deficits identified  -[facility] Name 08/15/23 1013          Strength Comprehensive (MMT)    Comment, General Manual Muscle Testing (MMT) Assessment B hip flexor weakness noted (4/5),  B knee ext and B ankle DF grossly 5/5  -[facility] Name 08/15/23 1013          Balance    Balance Assessment standing static balance;standing dynamic balance  -     Static Standing Balance standby assist  -     Dynamic Standing Balance contact guard  -               User Key  (r) = Recorded By, (t) = Taken By, (c) = Cosigned By      Initials Name Provider Type     Cristina Anthony, PT Physical Therapist                   Goals/Plan       [facility] Name 08/15/23 1018          Bed Mobility Goal 1 (PT)    Activity/Assistive Device (Bed Mobility Goal 1, PT) bed mobility activities, all  -CH     [place] Level/Cues Needed (Bed Mobility Goal 1, PT) independent  -CH     Time Frame (Bed Mobility Goal 1, PT) 1 week  -CH       Row Name 08/15/23 1018          Transfer Goal 1 (PT)    Activity/Assistive Device (Transfer Goal 1, PT) transfers, all  -CH     [place] Level/Cues Needed (Transfer Goal 1, PT) independent  -CH     Time Frame (Transfer Goal 1, PT) 1 week  -CH       Row Name 08/15/23 1018          Gait Training Goal 1 (PT)    Activity/Assistive Device (Gait Training Goal 1, PT) gait (walking locomotion)  -CH     [place] Level (Gait Training Goal 1, PT) supervision required  -     Distance (Gait Training Goal 1, PT) 200  -CH     Time Frame (Gait Training Goal 1, PT) 1 week  -CH       Row Name 08/15/23 1018          Therapy Assessment/Plan (PT)    Planned Therapy Interventions (PT) balance training;bed mobility training;gait training;home exercise program;patient/family education;strengthening;transfer training  -               User Key   (r) = Recorded By, (t) = Taken By, (c) = Cosigned By      Initials Name Provider Type     Cristina Anthony, PT Physical Therapist                   Clinical Impression       Row Name 08/15/23 1014          Pain    Pretreatment Pain Rating 0/10 - no pain  -     Posttreatment Pain Rating 0/10 - no pain  -       Row Name 08/15/23 1014          Plan of Care Review    Plan of Care Reviewed With patient  -     Outcome Evaluation Pt is a 61 yo M who was admitted with dizziness, headache, aphasia, and difficulty walking. Pt found to have cervical stenosis and has a history of migraines. Pt presents to PT with some impaired gait secondary to some mild B hip weakness and mild LOB secondary to episodes of dizziness. Pt reports his gait is much better today compared to yesterday but not quite his baseline. Pt states he has had similar episodes and full recovery can take anywhere from a day to a week. Pt may benefit from skilled PT to address strength, balance, and gait.  -       Row Name 08/15/23 1014          Therapy Assessment/Plan (PT)    Patient/Family Therapy Goals Statement (PT) to return to [facility]  -     Rehab Potential (PT) good, to achieve stated therapy goals  -     Criteria for Skilled Interventions Met (PT) skilled treatment is necessary  -     Therapy Frequency (PT) 6 times/wk  -       Row Name 08/15/23 1014          Positioning and Restraints    Pre-Treatment Position in bed  -     Post Treatment Position bed  -     In Bed supine;call light within reach;encouraged to call for assist;exit alarm on  -               User Key  (r) = Recorded By, (t) = Taken By, (c) = Cosigned By      Initials Name Provider Type    Cristina Joseph, PT Physical Therapist                   Outcome Measures       Row Name 08/15/23 1019 08/15/23 0911       How much help from another person do you currently need...    Turning from your back to your side while in flat bed without using bedrails? 4  - 4  -RM     Moving from lying on back to sitting on the side of a flat bed without bedrails? 4  -CH 4  -RM    Moving to and from a bed to a chair (including a wheelchair)? 3  -CH 4  -RM    Standing up from a chair using your arms (e.g., wheelchair, bedside chair)? 3  -CH 4  -RM    Climbing 3-5 steps with a railing? 3  -CH 4  -RM    To walk in hospital room? 3  -CH 4  -RM    AM-PAC 6 Clicks Score (PT) 20  - 24  -RM    Highest level of mobility 6 --> Walked 10 steps or more  -CH 8 --> Walked 250 feet or more  -RM      Row Name 08/15/23 1019          Functional Assessment    Outcome Measure Options AM-PAC 6 Clicks Basic Mobility (PT)  -               User Key  (r) = Recorded By, (t) = Taken By, (c) = Cosigned By      Initials Name Provider Type     Cristina Anthony, PT Physical Therapist    Evangelina Mejia, RN Registered Nurse                                 Physical Therapy Education       Title: PT OT SLP Therapies (In Progress)       Topic: Physical Therapy (In Progress)       Point: Mobility training (Done)       Learning Progress Summary             Patient Acceptance, E,TB,D, VU,NR by  at 8/15/2023 1020                         Point: Home exercise program (Not Started)       Learner Progress:  Not documented in this visit.              Point: Body mechanics (Done)       Learning Progress Summary             Patient Acceptance, E,TB,D, VU,NR by  at 8/15/2023 1020                         Point: Precautions (Done)       Learning Progress Summary             Patient Acceptance, E,TB,D, VU,NR by  at 8/15/2023 1020                                         User Key       Initials Effective Dates Name Provider Type [facility] 06/16/21 -  Cristina Anthony, PT Physical Therapist PT                  PT Recommendation and Plan  Planned Therapy Interventions (PT): balance training, bed mobility training, gait training, home exercise program, patient/family education, strengthening, transfer training  Plan of Care  Reviewed With: patient  Outcome Evaluation: Pt is a 63 yo M who was admitted with dizziness, headache, aphasia, and difficulty walking. Pt found to have cervical stenosis and has a history of migraines. Pt presents to PT with some impaired gait secondary to some mild B hip weakness and mild LOB secondary to episodes of dizziness. Pt reports his gait is much better today compared to yesterday but not quite his baseline. Pt states he has had similar episodes and full recovery can take anywhere from a day to a week. Pt may benefit from skilled PT to address strength, balance, and gait.     Time Calculation:         PT Charges       Row Name 08/15/23 1020             Time Calculation    Start Time 0850  -      Stop Time 0908  -      Time Calculation (min) 18 min  -CH      PT Received On 08/15/23  -      PT - Next Appointment 08/16/23  -      PT Goal Re-Cert Due Date 08/22/23  -         Time Calculation- PT    Total Timed Code Minutes- PT 12 minute(s)  -CH         Timed Charges    56327 - PT Therapeutic Activity Minutes 12  -CH         Total Minutes    Timed Charges Total Minutes 12  -CH       Total Minutes 12  -CH                User Key  (r) = Recorded By, (t) = Taken By, (c) = Cosigned By      Initials Name Provider Type     Cristina Anthony, PT Physical Therapist                  Therapy Charges for Today       Code Description Service Date Service Provider Modifiers Qty    23693803339  PT THERAPEUTIC ACT EA 15 MIN 8/15/2023 Cristina Anthony, PT GP 1    [id number]  PT EVAL MOD COMPLEXITY 2 8/15/2023 Cristina Anthony PT GP 1            PT G-Codes  Outcome Measure Options: AM-PAC 6 Clicks Basic Mobility (PT)  AM-PAC 6 Clicks Score (PT): 20  PT Discharge Summary  Anticipated Discharge Disposition (PT): home with outpatient therapy services    Cristina Anthony PT  8/15/2023

## 2023-08-15 NOTE — PROGRESS NOTES
NEUROLOGY FOLLOW-UP     DOS: 8/15/2023  NAME: Palbo Mendoza   : 1960  PCP: Liana Hood MD  CC: Stroke  Referring MD: Kenji Gan II*  Neurological Problem and Interval History: Patient has resolved to his neurologic baseline.  Patient endorses that he had a history of childhood seizures.    Medications On Admission  Medications Prior to Admission   Medication Sig Dispense Refill Last Dose    acetaminophen (TYLENOL) 500 MG tablet Take 1 tablet by mouth Every 6 (Six) Hours As Needed for Mild Pain.       allopurinol (ZYLOPRIM) 300 MG tablet TAKE ONE TABLET BY MOUTH TWICE A DAY (Patient taking differently: Take 1 tablet by mouth 2 (Two) Times a Day.) 60 tablet 1     amLODIPine (NORVASC) 10 MG tablet TAKE 1 TABLET BY MOUTH DAILY (Patient taking differently: Take 1 tablet by mouth Daily.) 30 tablet 1     atorvastatin (Lipitor) 40 MG tablet Take 1 tablet by mouth Daily. 90 tablet 1     cyclobenzaprine (FLEXERIL) 10 MG tablet Take 1 tablet by mouth 2 (Two) Times a Day As Needed for Muscle Spasms. 60 tablet 0     fluticasone (Flonase) 50 MCG/ACT nasal spray 2 sprays into the nostril(s) as directed by provider Daily. (Patient taking differently: 2 sprays into the nostril(s) as directed by provider As Needed.) 1 bottle 0     glipizide (GLUCOTROL) 5 MG tablet TAKE 1 TABLET BY MOUTH TWICE A DAY BEFORE A MEAL (Patient taking differently: Take 1 tablet by mouth 2 (Two) Times a Day Before Meals.) 60 tablet 0     hydroCHLOROthiazide (HYDRODIURIL) 25 MG tablet TAKE ONE TABLET BY MOUTH DAILY (Patient taking differently: Take 1 tablet by mouth Daily.) 30 tablet 2     hydrOXYzine (ATARAX) 25 MG tablet Take 1 tablet by mouth Daily As Needed for Anxiety. 30 tablet 0     Janumet XR  MG tablet TAKE ONE TABLET BY MOUTH TWICE A DAY (Patient taking differently: Take 1 tablet by mouth Daily.) 60 tablet 3     Jardiance 25 MG tablet tablet TAKE ONE TABLET BY MOUTH DAILY (Patient taking differently: Take  1 tablet by mouth Daily.) 30 tablet 1     loratadine (CLARITIN) 10 MG tablet TAKE ONE TABLET BY MOUTH DAILY (Patient taking differently: Take 1 tablet by mouth As Needed.) 30 tablet 0     metoprolol tartrate (LOPRESSOR) 100 MG tablet TAKE 1 TABLET BY MOUTH TWICE A DAY (Patient taking differently: Take 1 tablet by mouth 2 (Two) Times a Day.) 60 tablet 1     rizatriptan (MAXALT) 10 MG tablet Take 1 tablet by mouth 1 (One) Time for 1 dose. AT ONSET OF HEADACHE MAY REPEAT ONE TABLET IN 2 HOURS IF NEEDED (Patient taking differently: Take 1 tablet by mouth 1 (One) Time.) 15 tablet 0     sertraline (ZOLOFT) 50 MG tablet TAKE 1 TABLET BY MOUTH DAILY (Patient taking differently: Take 1 tablet by mouth Daily.) 30 tablet 0     sildenafil (Viagra) 50 MG tablet Take 1 tablet by mouth Daily As Needed for Erectile Dysfunction. 30 tablet 0     aspirin 81 MG chewable tablet Chew 1 tablet Daily. FOLLOW MD GUIDELINES ON HOLDING FOR OR       multivitamin with minerals tablet tablet Take 1 tablet by mouth Daily. HOLDING FOR 7 DAYS PRIOR TO OR            Laboratory Results:   Lab Results   Component Value Date    GLUCOSE 173 (H) 08/15/2023    CALCIUM 9.3 08/15/2023     08/15/2023    K 3.6 08/15/2023    CO2 26.7 08/15/2023    CL 98 08/15/2023    BUN 19 08/15/2023    CREATININE 1.08 08/15/2023    EGFRIFAFRI 101 02/03/2022    EGFRIFNONA 87 02/03/2022    BCR 17.6 08/15/2023    ANIONGAP 15.3 (H) 08/15/2023     Lab Results   Component Value Date    WBC 9.38 08/15/2023    HGB 15.6 08/15/2023    HCT 48.1 08/15/2023    MCV 78.1 (L) 08/15/2023     08/15/2023     Lab Results   Component Value Date    CHOL 170 08/14/2023    CHOL 141 10/04/2022     Lab Results   Component Value Date    HDL 29 (L) 08/14/2023    HDL 29 (L) 08/11/2023    HDL 30 (L) 02/24/2023     Lab Results   Component Value Date    LDL 63 08/14/2023    LDL 78 08/11/2023    LDL 45 02/24/2023     Lab Results   Component Value Date    TRIG 511 (H) 08/14/2023    TRIG 377 (H)  "08/11/2023    TRIG 285 (H) 02/24/2023     Lab Results   Component Value Date    HGBA1C 7.70 (H) 08/14/2023     Lab Results   Component Value Date    INR 1.01 08/14/2023    INR 1.04 03/11/2020    INR 1.0 11/11/2015    PROTIME 13.4 08/14/2023    PROTIME 13.3 03/11/2020    PROTIME 13.3 11/11/2015         Physical Examination:   /81 (BP Location: Right arm, Patient Position: Lying)   Pulse 85   Temp 98.1 øF (36.7 øC) (Oral)   Resp 16   Ht 170.2 cm (67\")   Wt 90.3 kg (199 lb)   SpO2 97%   BMI 31.17 kg/mý       NIHSS:      0-->Alert: keenly responsive  0-->Answers both questions correctly  0-->Performs both tasks correctly  0=normal  0=No visual loss  0=Normal symmetric movement  0-->No drift: limb holds 90 (or 45) degrees for full 10 secs  0-->No drift: limb holds 90 (or 45) degrees for full 10 secs  0-->No drift: limb holds 90 (or 45) degrees for full 10 secs  0-->No drift: limb holds 90 (or 45) degrees for full 10 secs  0=Absent  0=Normal; no sensory loss  1-->Mild-to-moderate aphasia: some obvious loss of fluency or facility of comprehension, without significant limitation on ideas expressed or form of expression. Reduction of speech and/or comprehension, however, makes conversation.  0=Normal  0=No abnormality     Total score: 1           Diagnoses / Discussion:  62 y.o. who presents with Sx of transient aphasia difficulty getting words out some stuttering as well as some right-sided symptoms.     Plan:  -MRI brain unremarkable  -MRI cervical spine, neurosurgery consulting  -Head CT/CTP/CTA is unremarkable  -EEG  -LDL 65, statin  -A1c 7.7  -TTE unremarkable  -Aspirin  -Keppra 1 g followed by 500 mg twice daily thereafter, AED trial  -Seizure Precautions: Patient is not to drive for at least 3 months until cleared by a physician, operate heavy machinery, take tub baths, swim unattended, climb heights, or perform any other activities that can bring harm to himself/herself or others during an episode of " altered awareness.  -Follow-up in neurology clinic in 3 to 4 weeks      I have discussed the above with the patient   Time spent with patient: 30min    MDM  Reviewed: previous chart, nursing note and vitals  Reviewed previous: labs, CT scan and MRI  Interpretation: labs, CT scan and MRI  Total time providing critical care: 30-74 minutes. This excludes time spent performing separately reportable procedures and services.  Consults: neurology       Lori Camarillo MD  Neurology

## 2023-08-15 NOTE — PLAN OF CARE
Goal Outcome Evaluation:  Plan of Care Reviewed With: patient           Outcome Evaluation: c/o headaches and some gait instability w/ consults to neuro, neuro surg, and PT.

## 2023-08-15 NOTE — PROGRESS NOTES
ED OBSERVATION PROGRESS/DISCHARGE SUMMARY    Date of Admission: 8/14/2023   LOS: 0 days   PCP: Liana Hood MD    Final Diagnosis Dizziness      Subjective   No acute events overnight    Hospital Outcome:   62-year-old male with a complaint of dizziness and difficulty with speech.  Chest x-ray shows no evidence for acute pulmonary process.  CTA head and neck shows no evidence to suggest area of acute completed infarction on either the noncontrast head CT or the CT perfusion study.  MRI brain shows no acute process.  MRI cervical spine shows multilevel cervical degenerative disease.  He was seen and evaluated by APRN with neurosurgery who has cleared her for discharge home from their standpoint.     Seen and evaluated by Dr. Camarillo with the neurology service and started on keppra 1g Iv and keppra 500mg BID PO. He underwent EEG and the results of this are still pending.     ROS:  General: no fevers, chills  Respiratory: no cough, dyspnea  Cardiovascular: no chest pain, palpitations  Abdomen: No abdominal pain, nausea, vomiting, or diarrhea  Neurologic: No focal weakness    Objective   Physical Exam:  I have reviewed the vital signs.  Temp:  [97.7 øF (36.5 øC)-98.8 øF (37.1 øC)] 97.7 øF (36.5 øC)  Heart Rate:  [68-79] 75  Resp:  [16] 16  BP: (135-149)/(75-93) 135/77  General Appearance:    Alert, cooperative, no distress  Head:    Normocephalic, atraumatic  Eyes:    Sclerae anicteric  Neck:   Supple, no mass  Lungs: Clear to auscultation bilaterally, respirations unlabored  Heart: Regular rate and rhythm, S1 and S2 normal, no murmur, rub or gallop  Abdomen:  Soft, non-tender, bowel sounds active, nondistended  Extremities: No clubbing, cyanosis, or edema to lower extremities  Pulses:  2+ and symmetric in distal lower extremities  Skin: No rashes   Neurologic: Oriented x3, Normal strength to extremities    Results Review:    I have reviewed the labs, radiology results and diagnostic studies.    Results from  last 7 days   Lab Units 08/14/23  1110   WBC 10*3/mm3 12.55*   HEMOGLOBIN g/dL 15.4   HEMATOCRIT % 47.1   PLATELETS 10*3/mm3 292     Results from last 7 days   Lab Units 08/14/23  1446 08/14/23  1110 08/11/23  1026   SODIUM mmol/L 137 136 141   POTASSIUM mmol/L 4.1 3.8 4.1   CHLORIDE mmol/L 99 98 100   CO2 mmol/L 22.4 25.0 28.5   BUN mg/dL 15 16 17   CREATININE mg/dL 0.90 1.30* 1.12   CALCIUM mg/dL 8.6 9.3 9.9   BILIRUBIN mg/dL  --  0.3 0.4   ALK PHOS U/L  --  127* 125*   ALT (SGPT) U/L  --  22 26   AST (SGOT) U/L  --  23 22   GLUCOSE mg/dL 107* 186* 157*     Imaging Results (Last 24 Hours)       Procedure Component Value Units Date/Time    MRI Brain With & Without Contrast [[id number]] Collected: 08/14/23 1931     Updated: 08/14/23 1932    Narrative:      MRI BRAIN W WO CONTRAST-  08/14/2023     HISTORY: Altered mental status.     Multiple pre and postcontrast sagittal axial and coronal images were  obtained through the brain.     The diffusion-weighted images show no evidence of acute infarction.  FLAIR images show no significant white matter disease. Normal-appearing  vascular flow voids are seen. The craniocervical junction and sella  appear normal. No intracranial hemorrhage is seen. There is mild  asymmetric enlargement of the rightward aspect of the pituitary gland  this appears similar to the 10/05/2022 study may be of no clinical  significance.     No abnormal enhancement is seen following gadolinium.       Impression:      1. No acute process.     Comment review report]  ]       MRI Cervical Spine Without Contrast [[id number]] Collected: 08/14/23 1843     Updated: 08/14/23 1843    Narrative:      MRI CERVICAL SPINE WO CONTRAST-  08/14/2023     HISTORY: Gait issues. Neck pain.     Multiple noncontrast sagittal, axial and oblique sagittal images were  obtained through the cervical spine.     The C2-3 level appears relatively normal.     At C3-4 there is a mild mixed spondylotic protrusion in the  posterior  central position with no cord flattening or significant foraminal  narrowing.     At C4-5 there is a moderate-sized mixed spondylotic protrusion abutting  the anterior spinal cord and there may be very nominal mass effect on  the anterior spinal cord. There is mild bilateral foraminal narrowing.     At C5-6 there is a moderate-sized posterior central and right  paracentral mixed spondylotic protrusion with some narrowing of the AP  dimension of the spinal canal and mild cord flattening. Mild right  foraminal narrowing is seen.     At C6-7 there is a moderate-sized posterior central and slightly left  paracentral mixed spondylotic protrusion with some narrowing of the AP  dimension of the spinal canal and mild cord flattening. Minimal  foraminal narrowing is seen bilaterally.     C7-T1 level appears within normal limits.     Signal intensity of the spinal cord appears within normal limits.     No fractures or subluxation is seen.       Impression:      There is multilevel cervical degenerative disease as  described. Please see full discussion above.                CT Angiogram Head w AI Analysis of LVO [[id number]] Collected: 08/14/23 1246     Updated: 08/14/23 1253    Narrative:      CT ANGIOGRAM OF THE HEAD AND NECK AND CT PERFUSION STUDY     CLINICAL HISTORY:  Right-sided weakness. Aphasia.     TECHNIQUE: A noncontrast head CT was performed with 3 mm axial images.  Thereafter, a CT perfusion study was performed after the dynamic bolus  of IV contrast. Standard perfusion maps were constructed with RAPID  software. A CT angiogram of the head and neck was then performed with 1  mm axial images. Sagittal, coronal, and 3-dimensional reconstructed  images were obtained. Finally, a delayed postcontrast head CT was  performed with 3 mm axial images. AI analysis of LVO was utilized.     FINDINGS:     NONCONTRAST HEAD CT:  The gray-white matter differentiation is within  normal limits. The ventricles, sulci, and  cisterns are age-appropriate.  The basal ganglia and thalami are unremarkable in appearance. The  posterior fossa structures are within normal limits.     CT PERFUSION STUDY:  There are no significant asymmetries appreciated on  the CT perfusion study on either the time to maximum enhancement greater  than 6 seconds or CBF less than 30% maps.     CTA NECK:  There is a bovine configuration of the aortic arch. There is  no significant great vessel origin stenosis. Mild atherosclerotic  changes are noted within the proximal internal carotids. There is no  significant NASCET stenosis within either internal carotid artery. The  vertebral arteries are unremarkable in appearance.     Disc osteophyte complexes are noted at the C4-5, C5-6, and C6-7 levels.  Additionally, there is a prominent size right central disc protrusion at  C6-7. These findings result in severe canal stenosis at the C6-7 level  with prominent cord compression along the right side of the cervical  spinal cord. Further evaluation could be performed with MR imaging of  the cervical spine. Also, of note, prominent foraminal stenotic changes  are identified from C4-5 down to C6-7.     CTA HEAD: Atherosclerotic calcifications are noted within the carotid  siphons resulting in only relatively mild degrees of luminal compromise.  The middle and anterior cerebral arteries are unremarkable in  appearance. The vertebral arteries, basilar artery, and posterior  cerebral arteries are unremarkable.     DELAYED POSTCONTRAST HEAD CT:  There are no abnormal foci of contrast  enhancement within the brain parenchyma.       Impression:          There is no evidence to suggest an area of acute completed infarction  on either the noncontrast head CT or the CT perfusion study. More  sensitive and specific assessment of an area of acute completed  infarction could be performed with MR imaging, as clinically indicated.  There is no evidence for a large vessel occlusion.      Mild intracranial and extracranial atherosclerotic changes are as  discussed in detail above.     Incidental note is made of disc osteophyte complexes at C4-5, C5-6, and  C6-7. Additionally, at the C6-7 level, there is a prominent size  central/right central disc protrusion. These findings result in severe  canal stenosis at C6-7 along with prominent cord compression along the  right side of the cervical spinal cord. The degenerative phenomena  within the cervical spine could be further assessed on an MRI of the  cervical spine, as clinically indicated.     The findings of noncontrast head CT were discussed with Dr. Camarillo on  08/14/2023 at approximately 11:22 AM. The findings of the CT angiogram  and CT perfusion study were discussed with Dr. Camarillo at approximately  11:40 AM.           Radiation dose reduction techniques were utilized, including automated  exposure control and exposure modulation based on body size.     This report was finalized on 8/14/2023 12:50 PM by Dr. Donald Leonard M.D.       CT Angiogram Neck [[id number]] Collected: 08/14/23 1246     Updated: 08/14/23 1253    Narrative:      CT ANGIOGRAM OF THE HEAD AND NECK AND CT PERFUSION STUDY     CLINICAL HISTORY:  Right-sided weakness. Aphasia.     TECHNIQUE: A noncontrast head CT was performed with 3 mm axial images.  Thereafter, a CT perfusion study was performed after the dynamic bolus  of IV contrast. Standard perfusion maps were constructed with RAPID  software. A CT angiogram of the head and neck was then performed with 1  mm axial images. Sagittal, coronal, and 3-dimensional reconstructed  images were obtained. Finally, a delayed postcontrast head CT was  performed with 3 mm axial images. AI analysis of LVO was utilized.     FINDINGS:     NONCONTRAST HEAD CT:  The gray-white matter differentiation is within  normal limits. The ventricles, sulci, and cisterns are age-appropriate.  The basal ganglia and thalami are unremarkable in appearance.  The  posterior fossa structures are within normal limits.     CT PERFUSION STUDY:  There are no significant asymmetries appreciated on  the CT perfusion study on either the time to maximum enhancement greater  than 6 seconds or CBF less than 30% maps.     CTA NECK:  There is a bovine configuration of the aortic arch. There is  no significant great vessel origin stenosis. Mild atherosclerotic  changes are noted within the proximal internal carotids. There is no  significant NASCET stenosis within either internal carotid artery. The  vertebral arteries are unremarkable in appearance.     Disc osteophyte complexes are noted at the C4-5, C5-6, and C6-7 levels.  Additionally, there is a prominent size right central disc protrusion at  C6-7. These findings result in severe canal stenosis at the C6-7 level  with prominent cord compression along the right side of the cervical  spinal cord. Further evaluation could be performed with MR imaging of  the cervical spine. Also, of note, prominent foraminal stenotic changes  are identified from C4-5 down to C6-7.     CTA HEAD: Atherosclerotic calcifications are noted within the carotid  siphons resulting in only relatively mild degrees of luminal compromise.  The middle and anterior cerebral arteries are unremarkable in  appearance. The vertebral arteries, basilar artery, and posterior  cerebral arteries are unremarkable.     DELAYED POSTCONTRAST HEAD CT:  There are no abnormal foci of contrast  enhancement within the brain parenchyma.       Impression:          There is no evidence to suggest an area of acute completed infarction  on either the noncontrast head CT or the CT perfusion study. More  sensitive and specific assessment of an area of acute completed  infarction could be performed with MR imaging, as clinically indicated.  There is no evidence for a large vessel occlusion.     Mild intracranial and extracranial atherosclerotic changes are as  discussed in detail above.      Incidental note is made of disc osteophyte complexes at C4-5, C5-6, and  C6-7. Additionally, at the C6-7 level, there is a prominent size  central/right central disc protrusion. These findings result in severe  canal stenosis at C6-7 along with prominent cord compression along the  right side of the cervical spinal cord. The degenerative phenomena  within the cervical spine could be further assessed on an MRI of the  cervical spine, as clinically indicated.     The findings of noncontrast head CT were discussed with Dr. Camarillo on  08/14/2023 at approximately 11:22 AM. The findings of the CT angiogram  and CT perfusion study were discussed with Dr. Camarillo at approximately  11:40 AM.           Radiation dose reduction techniques were utilized, including automated  exposure control and exposure modulation based on body size.     This report was finalized on 8/14/2023 12:50 PM by Dr. Donald Leonard M.D.       CT CEREBRAL PERFUSION WITH & WITHOUT CONTRAST [[id number]] Collected: 08/14/23 1246     Updated: 08/14/23 1253    Narrative:      CT ANGIOGRAM OF THE HEAD AND NECK AND CT PERFUSION STUDY     CLINICAL HISTORY:  Right-sided weakness. Aphasia.     TECHNIQUE: A noncontrast head CT was performed with 3 mm axial images.  Thereafter, a CT perfusion study was performed after the dynamic bolus  of IV contrast. Standard perfusion maps were constructed with RAPID  software. A CT angiogram of the head and neck was then performed with 1  mm axial images. Sagittal, coronal, and 3-dimensional reconstructed  images were obtained. Finally, a delayed postcontrast head CT was  performed with 3 mm axial images. AI analysis of LVO was utilized.     FINDINGS:     NONCONTRAST HEAD CT:  The gray-white matter differentiation is within  normal limits. The ventricles, sulci, and cisterns are age-appropriate.  The basal ganglia and thalami are unremarkable in appearance. The  posterior fossa structures are within normal limits.     CT  PERFUSION STUDY:  There are no significant asymmetries appreciated on  the CT perfusion study on either the time to maximum enhancement greater  than 6 seconds or CBF less than 30% maps.     CTA NECK:  There is a bovine configuration of the aortic arch. There is  no significant great vessel origin stenosis. Mild atherosclerotic  changes are noted within the proximal internal carotids. There is no  significant NASCET stenosis within either internal carotid artery. The  vertebral arteries are unremarkable in appearance.     Disc osteophyte complexes are noted at the C4-5, C5-6, and C6-7 levels.  Additionally, there is a prominent size right central disc protrusion at  C6-7. These findings result in severe canal stenosis at the C6-7 level  with prominent cord compression along the right side of the cervical  spinal cord. Further evaluation could be performed with MR imaging of  the cervical spine. Also, of note, prominent foraminal stenotic changes  are identified from C4-5 down to C6-7.     CTA HEAD: Atherosclerotic calcifications are noted within the carotid  siphons resulting in only relatively mild degrees of luminal compromise.  The middle and anterior cerebral arteries are unremarkable in  appearance. The vertebral arteries, basilar artery, and posterior  cerebral arteries are unremarkable.     DELAYED POSTCONTRAST HEAD CT:  There are no abnormal foci of contrast  enhancement within the brain parenchyma.       Impression:          There is no evidence to suggest an area of acute completed infarction  on either the noncontrast head CT or the CT perfusion study. More  sensitive and specific assessment of an area of acute completed  infarction could be performed with MR imaging, as clinically indicated.  There is no evidence for a large vessel occlusion.     Mild intracranial and extracranial atherosclerotic changes are as  discussed in detail above.     Incidental note is made of disc osteophyte complexes at C4-5,  C5-6, and  C6-7. Additionally, at the C6-7 level, there is a prominent size  central/right central disc protrusion. These findings result in severe  canal stenosis at C6-7 along with prominent cord compression along the  right side of the cervical spinal cord. The degenerative phenomena  within the cervical spine could be further assessed on an MRI of the  cervical spine, as clinically indicated.     The findings of noncontrast head CT were discussed with Dr. Camarillo on  08/14/2023 at approximately 11:22 AM. The findings of the CT angiogram  and CT perfusion study were discussed with Dr. Camarillo at approximately  11:40 AM.           Radiation dose reduction techniques were utilized, including automated  exposure control and exposure modulation based on body size.     This report was finalized on 8/14/2023 12:50 PM by Dr. Donald Leonard M.D.       XR Chest 1 View [[id number]] Collected: 08/14/23 1211     Updated: 08/14/23 1216    Narrative:      XR CHEST 1 VW-     HISTORY: Male who is 62 years-old,  stroke     TECHNIQUE: Frontal views of the chest     COMPARISON: None available     FINDINGS: Heart, mediastinum and pulmonary vasculature are unremarkable.  No focal pulmonary consolidation, pleural effusion, or pneumothorax. No  acute osseous process.       Impression:      No evidence for acute pulmonary process. Follow-up as  clinically indicated.     This report was finalized on 8/14/2023 12:13 PM by Dr. Junito Regalado M.D.               I have reviewed the medications.  ---------------------------------------------------------------------------------------------  Assessment & Plan   Assessment/Problem List    Dizziness    Cervical stenosis of spine    Cervical spondylosis without myelopathy      Plan:  Dizziness/Speech Difficulty  -Neuro checks q4h  -Consult neurology  -Mri brain negative for acute findings  -Cardiac monitor  -EEG pending     Migraine Headache  -Rizatriptan PRN     C6/C7 Disc Protrusion  -Consult  neurosurgery     Leukocytosis  -Check daily CBC     Hypertension  -Continue amlodipine, HCTZ, metoprolol     Hyperlipidemia  -Continue atorvastatin  -Triglycerides 511 today     Diabetes  -Continue glipizide, janumet, jardiance  -Hemoglobin A1c 7.70 today  -Consult diabetes educator           Lulú Geller, APRN 08/15/23 05:10 EDT    I have worn appropriate PPE during this patient encounter, sanitized my hands both with entering and exiting patient's room.      50  minutes has been spent by [facility] providers in the care of this patient while under observation status

## 2023-08-15 NOTE — PROGRESS NOTES
[facility] NEUROSURGERY CERVICAL PROGRESS NOTE      CC:imbalance      Subjective     Interval History:  had MRI brain and cervical. Seen by neurology.  Worked with PT and feels that his balance is improving.  No issues overnight.    ROS:  Neck: no neck pain  Neuro: Improving balance difficulties  : no difficulty voiding, no incontinence    Objective     Vital signs in last 24 hours:  Temp:  [97.7 øF (36.5 øC)-98.8 øF (37.1 øC)] 97.7 øF (36.5 øC)  Heart Rate:  [68-79] 74  Resp:  [16] 16  BP: (135-149)/(75-93) 146/84    Intake/Output this shift:  No intake/output data recorded.    LABS:  Results from last 7 days   Lab Units 08/15/23  0403 08/14/23  1110 08/11/23  1026   WBC 10*3/mm3 9.38 12.55* 9.69   HEMOGLOBIN g/dL 15.6 15.4 15.6   HEMATOCRIT % 48.1 47.1 47.9   PLATELETS 10*3/mm3 260 292 288     Results from last 7 days   Lab Units 08/15/23  0403 08/14/23  1446 08/14/23  1110 08/11/23  1026   SODIUM mmol/L 140 137 136 141   POTASSIUM mmol/L 3.6 4.1 3.8 4.1   CHLORIDE mmol/L 98 99 98 100   CO2 mmol/L 26.7 22.4 25.0 28.5   BUN mg/dL 19 15 16 17   CREATININE mg/dL 1.08 0.90 1.30* 1.12   CALCIUM mg/dL 9.3 8.6 9.3 9.9   BILIRUBIN mg/dL  --   --  0.3 0.4   ALK PHOS U/L  --   --  127* 125*   ALT (SGPT) U/L  --   --  22 26   AST (SGOT) U/L  --   --  23 22   GLUCOSE mg/dL 173* 107* 186* 157*         IMAGING STUDIES:  MRI brain- no acute abnormality    MRI cervical-multilevel degenerative disc disease.  There is disc osteophyte complex most predominant at C5/6 and C6/7.  There is mild to moderate cord compression at C6/7 mild cord compression at C5/6.  No significant foraminal narrowing.  No cord signal changes.    I personally viewed and interpreted the patient's MRI cervical spine reviewed by and discussed with Dr. Webster.    Meds reviewed/changed: Yes    Current Facility-Administered Medications:     acetaminophen (TYLENOL) tablet 500 mg, 500 mg, Oral, Q6H PRN, Kenzie Rodriguez PA-C, 500 mg at 08/14/23 2022    allopurinol  (ZYLOPRIM) tablet 300 mg, 300 mg, Oral, BID, Kenzie Rodriguez PA-C, 300 mg at 08/15/23 0850    amLODIPine (NORVASC) tablet 10 mg, 10 mg, Oral, Daily, Kenzie Rodriguez PA-C, 10 mg at 08/15/23 0850    aspirin chewable tablet 81 mg, 81 mg, Oral, Daily, Kenzie Rodriguez PA-C, 81 mg at 08/15/23 0850    atorvastatin (LIPITOR) tablet 40 mg, 40 mg, Oral, Daily, Kenzie Rodriguez PA-C    sennosides-docusate (PERICOLACE) 8.6-50 MG per tablet 2 tablet, 2 tablet, Oral, BID **AND** polyethylene glycol (MIRALAX) packet 17 g, 17 g, Oral, Daily PRN **AND** bisacodyl (DULCOLAX) EC tablet 5 mg, 5 mg, Oral, Daily PRN **AND** bisacodyl (DULCOLAX) suppository 10 mg, 10 mg, Rectal, Daily PRN, Kenzie Rodriguez PA-C    empagliflozin (JARDIANCE) tablet 25 mg, 25 mg, Oral, Daily, Kenzie Rodriguez PA-C, 25 mg at 08/15/23 0850    glipizide (GLUCOTROL) tablet 5 mg, 5 mg, Oral, BID AC, Kenzie Rodriguez PA-C, 5 mg at 08/15/23 0850    hydroCHLOROthiazide (HYDRODIURIL) tablet 25 mg, 25 mg, Oral, Daily, Kenzie Rodriguez PA-C, 25 mg at 08/15/23 0850    metoprolol tartrate (LOPRESSOR) tablet 100 mg, 100 mg, Oral, BID, Kenzie Rodriguez PA-C, 100 mg at 08/15/23 0850    multivitamin with minerals 1 tablet, 1 tablet, Oral, Daily, Kenzie Rodriguez PA-C, 1 tablet at 08/15/23 0850    sertraline (ZOLOFT) tablet 50 mg, 50 mg, Oral, Daily, Kenzie Rodriguez PA-C, 50 mg at 08/15/23 0850    sodium chloride 0.9 % flush 10 mL, 10 mL, Intravenous, PRN, Anirudh Abel MD    sodium chloride 0.9 % flush 10 mL, 10 mL, Intravenous, Q12H, Kenzie Rodriguez PA-C, 10 mL at 08/15/23 0851    sodium chloride 0.9 % flush 10 mL, 10 mL, Intravenous, PRN, Kenzie Rodriguez PA-C    sodium chloride 0.9 % infusion 40 mL, 40 mL, Intravenous, PRN, Kenzie Rodriguez PA-C      Physical Exam:    General:   Awake, alert.  Resting in bed.  Neck:    Deferred range of motion  Motor:    Normal muscle strength, bulk and tone in upper and lower extremities.  No drift  "  reflexes:   No Law, no clonus  Coordination:  Finger-to-nose intact  Station and Gait:             Per discussion with PT, slight imbalance issue but no significant concerns    Assessment & Plan     ASSESSMENT:      Dizziness    Cervical stenosis of spine    Cervical spondylosis without myelopathy    Patient presented with headache, imbalance, speech issues which have improved since admission.  Being followed by neurology service.  No acute stroke on MRI and no significant vascular disease on CTA; however there was incidental finding of cervical canal stenosis.  MRI reveals this to be moderate in nature at C6/7 and mild to moderate at C5/6.  There are no cord signal changes.  Patient does not have myelopathic appearance on exam.  No acute neurosurgical issues but we will follow him as an outpatient.    PLAN:   Okay for discharge neurosurgery standpoint  Follow-up 1 month with Dr. Webster    I discussed the patient's findings and my recommendations with patient, primary care team, and Dr. Webster    During patient visit, I utilized appropriate personal protective equipment including gloves. Appropriate PPE was worn during the entire visit.  Hand hygiene was completed before and after.      LOS: 0 days       Terra Eugene, APRN  8/15/2023  08:59 EDT    \"Dictated utilizing Dragon dictation\".      "

## 2023-08-15 NOTE — PROGRESS NOTES
MD ATTESTATION NOTE    The TARAS and I have discussed this patient's history, physical exam, and treatment plan.  I have reviewed the documentation and personally had a face to face interaction with the patient. I affirm the documentation and agree with the treatment and plan.  The attached note describes my personal findings.      I provided a substantive portion of the care of the patient.  I personally performed the physical exam in its entirety, and below are my findings.      Brief HPI: Patient is feeling better this morning.  He currently denies dizziness or headache.    PHYSICAL EXAM  ED Triage Vitals   Temp Heart Rate Resp BP SpO2   08/14/23 1100 08/14/23 1100 08/14/23 1100 08/14/23 1100 08/14/23 1100   98.1 øF (36.7 øC) 79 16 137/82 92 %      Temp src Heart Rate Source Patient Position BP Location FiO2 (%)   08/14/23 1405 08/14/23 1405 08/14/23 1405 08/14/23 1405 --   Oral Monitor Lying Right arm          GENERAL: Awake, alert, oriented x3.  Well-developed, male.  Resting comfortably in no acute distress  HENT: nares patent  EYES: no scleral icterus  CV: regular rhythm, normal rate  RESPIRATORY: normal effort, clear to auscultation bilaterally  ABDOMEN: soft, nontender  MUSCULOSKELETAL: no deformity  NEURO: Speech is clear and fluent.  No facial droop.  Moves all extremities.  PSYCH:  calm, cooperative  SKIN: warm, dry    Vital signs and nursing notes reviewed.        Plan: MRI brain is negative acute.  MRI of the C-spine showed multilevel cervical degenerative disease.  Signal intensity of the spinal cord appears normal. Echocardiogram showed an EF of 55%.  Saline test results were negative.  Patient has been seen by neurosurgery and neurology.  He has been cleared for discharge by neurosurgery and will follow-up with them in 1 month.  EEG is pending.

## 2023-08-15 NOTE — PLAN OF CARE
"Goal Outcome Evaluation:   Pt admitted for dizziness. Pt is still having \"absent\" episodes as the wife describes them. Call placed to neuro.                      "

## 2023-08-16 ENCOUNTER — READMISSION MANAGEMENT (OUTPATIENT)
Dept: CALL CENTER | Facility: HOSPITAL | Age: 63
End: 2023-08-16
Payer: COMMERCIAL

## 2023-08-16 VITALS
WEIGHT: 199 LBS | OXYGEN SATURATION: 95 % | SYSTOLIC BLOOD PRESSURE: 152 MMHG | BODY MASS INDEX: 31.23 KG/M2 | TEMPERATURE: 97.7 F | RESPIRATION RATE: 18 BRPM | HEIGHT: 67 IN | DIASTOLIC BLOOD PRESSURE: 90 MMHG | HEART RATE: 79 BPM

## 2023-08-16 LAB
ANION GAP SERPL CALCULATED.3IONS-SCNC: 10.5 MMOL/L (ref 5–15)
BASOPHILS # BLD AUTO: 0.06 10*3/MM3 (ref 0–0.2)
BASOPHILS NFR BLD AUTO: 0.6 % (ref 0–1.5)
BUN SERPL-MCNC: 20 MG/DL (ref 8–23)
BUN/CREAT SERPL: 17.4 (ref 7–25)
CALCIUM SPEC-SCNC: 9.7 MG/DL (ref 8.6–10.5)
CHLORIDE SERPL-SCNC: 101 MMOL/L (ref 98–107)
CO2 SERPL-SCNC: 26.5 MMOL/L (ref 22–29)
CREAT SERPL-MCNC: 1.15 MG/DL (ref 0.76–1.27)
DEPRECATED RDW RBC AUTO: 48.1 FL (ref 37–54)
EGFRCR SERPLBLD CKD-EPI 2021: 72.9 ML/MIN/1.73
EOSINOPHIL # BLD AUTO: 0.27 10*3/MM3 (ref 0–0.4)
EOSINOPHIL NFR BLD AUTO: 2.6 % (ref 0.3–6.2)
ERYTHROCYTE [DISTWIDTH] IN BLOOD BY AUTOMATED COUNT: 18.5 % (ref 12.3–15.4)
GLUCOSE SERPL-MCNC: 187 MG/DL (ref 65–99)
HCT VFR BLD AUTO: 47.1 % (ref 37.5–51)
HGB BLD-MCNC: 15.3 G/DL (ref 13–17.7)
IMM GRANULOCYTES # BLD AUTO: 0.06 10*3/MM3 (ref 0–0.05)
IMM GRANULOCYTES NFR BLD AUTO: 0.6 % (ref 0–0.5)
LYMPHOCYTES # BLD AUTO: 1.92 10*3/MM3 (ref 0.7–3.1)
LYMPHOCYTES NFR BLD AUTO: 18.3 % (ref 19.6–45.3)
MCH RBC QN AUTO: 25.3 PG (ref 26.6–33)
MCHC RBC AUTO-ENTMCNC: 32.5 G/DL (ref 31.5–35.7)
MCV RBC AUTO: 77.9 FL (ref 79–97)
MONOCYTES # BLD AUTO: 0.58 10*3/MM3 (ref 0.1–0.9)
MONOCYTES NFR BLD AUTO: 5.5 % (ref 5–12)
NEUTROPHILS NFR BLD AUTO: 7.6 10*3/MM3 (ref 1.7–7)
NEUTROPHILS NFR BLD AUTO: 72.4 % (ref 42.7–76)
NRBC BLD AUTO-RTO: 0.1 /100 WBC (ref 0–0.2)
PLATELET # BLD AUTO: 256 10*3/MM3 (ref 140–450)
PMV BLD AUTO: 9.7 FL (ref 6–12)
POTASSIUM SERPL-SCNC: 3.4 MMOL/L (ref 3.5–5.2)
RBC # BLD AUTO: 6.05 10*6/MM3 (ref 4.14–5.8)
SODIUM SERPL-SCNC: 138 MMOL/L (ref 136–145)
WBC NRBC COR # BLD: 10.49 10*3/MM3 (ref 3.4–10.8)

## 2023-08-16 PROCEDURE — 85025 COMPLETE CBC W/AUTO DIFF WBC: CPT | Performed by: PHYSICIAN ASSISTANT

## 2023-08-16 PROCEDURE — 80048 BASIC METABOLIC PNL TOTAL CA: CPT | Performed by: PHYSICIAN ASSISTANT

## 2023-08-16 PROCEDURE — G0378 HOSPITAL OBSERVATION PER HR: HCPCS

## 2023-08-16 PROCEDURE — 97530 THERAPEUTIC ACTIVITIES: CPT

## 2023-08-16 PROCEDURE — 99214 OFFICE O/P EST MOD 30 MIN: CPT | Performed by: PSYCHIATRY & NEUROLOGY

## 2023-08-16 RX ORDER — POTASSIUM CHLORIDE 750 MG/1
40 TABLET, FILM COATED, EXTENDED RELEASE ORAL ONCE
Status: COMPLETED | OUTPATIENT
Start: 2023-08-16 | End: 2023-08-16

## 2023-08-16 RX ORDER — LEVETIRACETAM 500 MG/1
500 TABLET ORAL EVERY 12 HOURS SCHEDULED
Status: DISCONTINUED | OUTPATIENT
Start: 2023-08-16 | End: 2023-08-16 | Stop reason: HOSPADM

## 2023-08-16 RX ORDER — LEVETIRACETAM 500 MG/1
500 TABLET ORAL EVERY 12 HOURS SCHEDULED
Qty: 60 TABLET | Refills: 1 | Status: SHIPPED | OUTPATIENT
Start: 2023-08-16 | End: 2023-08-20 | Stop reason: HOSPADM

## 2023-08-16 RX ORDER — FLUTICASONE PROPIONATE 50 MCG
2 SPRAY, SUSPENSION (ML) NASAL AS NEEDED
Start: 2023-08-16

## 2023-08-16 RX ADMIN — MULTIPLE VITAMINS W/ MINERALS TAB 1 TABLET: TAB at 09:15

## 2023-08-16 RX ADMIN — AMLODIPINE BESYLATE 10 MG: 10 TABLET ORAL at 09:15

## 2023-08-16 RX ADMIN — SERTRALINE 50 MG: 50 TABLET, FILM COATED ORAL at 09:15

## 2023-08-16 RX ADMIN — POTASSIUM CHLORIDE 40 MEQ: 750 TABLET, EXTENDED RELEASE ORAL at 05:43

## 2023-08-16 RX ADMIN — LEVETIRACETAM 500 MG: 500 TABLET, FILM COATED ORAL at 09:15

## 2023-08-16 RX ADMIN — GLIPIZIDE 5 MG: 5 TABLET ORAL at 09:15

## 2023-08-16 RX ADMIN — Medication 10 ML: at 09:16

## 2023-08-16 RX ADMIN — ALLOPURINOL 300 MG: 100 TABLET ORAL at 09:15

## 2023-08-16 RX ADMIN — HYDROCHLOROTHIAZIDE 25 MG: 25 TABLET ORAL at 09:15

## 2023-08-16 RX ADMIN — ASPIRIN 81 MG: 81 TABLET, CHEWABLE ORAL at 09:15

## 2023-08-16 RX ADMIN — METOPROLOL TARTRATE 100 MG: 50 TABLET, FILM COATED ORAL at 09:15

## 2023-08-16 NOTE — PLAN OF CARE
Goal Outcome Evaluation:   Pt d/c via private vehicle. IV removed. Belongings returned. Follow up instructions given. No further questions/complaints at this this.

## 2023-08-16 NOTE — OUTREACH NOTE
Prep Survey      Flowsheet Row Responses   [facility] facility patient discharged from? [place]   Is LACE score < 7 ? Yes   Eligibility [facility]   Date of Admission 08/14/23   Date of Discharge 08/16/23   Discharge Disposition Home or Self Care   Discharge diagnosis Dizziness/Speech Difficulty, migraine H/A   Does the patient have one of the following disease processes/diagnoses(primary or secondary)? Other   Does the patient have Home health ordered? No   Is there a DME ordered? No   Prep survey completed? Yes            Darshana GONZALEZ - Registered Nurse

## 2023-08-16 NOTE — PROGRESS NOTES
"NEUROLOGY FOLLOW-UP -    DOS: 2023  NAME: Pablo Mendoza   : 1960  PCP: Liana Hood MD  CC: Stroke  Referring MD: Kenji Gan II*  Neurological Problem and Interval History: No further events.    Medications On Admission  No medications prior to admission.         Laboratory Results:   Lab Results   Component Value Date    GLUCOSE 187 (H) 2023    CALCIUM 9.7 2023     2023    K 3.4 (L) 2023    CO2 26.5 2023     2023    BUN 20 2023    CREATININE 1.15 2023    EGFRIFAFRI 101 2022    EGFRIFNONA 87 2022    BCR 17.4 2023    ANIONGAP 10.5 2023     Lab Results   Component Value Date    WBC 10.49 2023    HGB 15.3 2023    HCT 47.1 2023    MCV 77.9 (L) 2023     2023     Lab Results   Component Value Date    CHOL 170 2023    CHOL 141 10/04/2022     Lab Results   Component Value Date    HDL 29 (L) 2023    HDL 29 (L) 2023    HDL 30 (L) 2023     Lab Results   Component Value Date    LDL 63 2023    LDL 78 2023    LDL 45 2023     Lab Results   Component Value Date    TRIG 511 (H) 2023    TRIG 377 (H) 2023    TRIG 285 (H) 2023     Lab Results   Component Value Date    HGBA1C 7.70 (H) 2023     Lab Results   Component Value Date    INR 1.01 2023    INR 1.04 2020    INR 1.0 2015    PROTIME 13.4 2023    PROTIME 13.3 2020    PROTIME 13.3 2015         Physical Examination:   /90 (BP Location: Right arm, Patient Position: Lying)   Pulse 79   Temp 97.7 øF (36.5 øC) (Oral)   Resp 18   Ht 170.2 cm (67\")   Wt 90.3 kg (199 lb)   SpO2 95%   BMI 31.17 kg/mý     NIHSS:      0-->Alert: keenly responsive  0-->Answers both questions correctly  0-->Performs both tasks correctly  0=normal  0=No visual loss  0=Normal symmetric movement  0-->No drift: limb holds 90 (or 45) degrees for " full 10 secs  0-->No drift: limb holds 90 (or 45) degrees for full 10 secs  0-->No drift: limb holds 90 (or 45) degrees for full 10 secs  0-->No drift: limb holds 90 (or 45) degrees for full 10 secs  0=Absent  0=Normal; no sensory loss  0=  0=Normal  0=No abnormality     Total score: 0           Diagnoses / Discussion:  62 y.o. who presents with Sx of transient aphasia difficulty getting words out some stuttering as well as some right-sided symptoms.      Plan:  -MRI brain unremarkable  -MRI cervical spine, neurosurgery consulting  -Head CT/CTP/CTA is unremarkable  -EEG unremarkable  -LDL 65, statin  -A1c 7.7  -TTE unremarkable  -Aspirin  -Keppra 1 g followed by 500 mg twice daily thereafter, AED trial  -Seizure Precautions: Patient is not to drive for at least 3 months until cleared by a physician, operate heavy machinery, take tub baths, swim unattended, climb heights, or perform any other activities that can bring harm to himself/herself or others during an episode of altered awareness.  -Follow-up in neurology clinic in 3 to 4 weeks     I have discussed the above with the patient   Time spent with patient: 30min    MDM  Reviewed: previous chart, nursing note and vitals  Reviewed previous: labs and CT scan  Interpretation: labs and CT scan  Total time providing critical care: 30-74 minutes. This excludes time spent performing separately reportable procedures and services.  Consults: neurology       Lori Camarillo MD  Neurology

## 2023-08-16 NOTE — PROGRESS NOTES
ED OBSERVATION PROGRESS/DISCHARGE SUMMARY    Date of Admission: 8/14/2023   LOS: 0 days   PCP: Liana Hood MD        Subjective   No acute events overnight.  Hospital Outcome:   62-year-old male with a complaint of dizziness and difficulty with speech.  Chest x-ray shows no evidence for acute pulmonary process.  CTA head and neck shows no evidence to suggest area of acute completed infarction on either the noncontrast head CT or the CT perfusion study.  MRI brain shows no acute process.  MRI cervical spine shows multilevel cervical degenerative disease.  He was seen and evaluated by APRN with neurosurgery who has cleared her for discharge home from their standpoint.      Seen and evaluated by Dr. Camarillo with the neurology service and started on keppra 1g Iv and keppra 500mg BID PO. He underwent EEG which shows normal EEG during wakefulness and stage II sleep.  No focal or epileptiform activities were seen.    ROS:  General: no fevers, chills  Respiratory: no cough, dyspnea  Cardiovascular: no chest pain, palpitations  Abdomen: No abdominal pain, nausea, vomiting, or diarrhea  Neurologic: No focal weakness    Objective   Physical Exam:  I have reviewed the vital signs.  Temp:  [97.7 øF (36.5 øC)-98.2 øF (36.8 øC)] 97.9 øF (36.6 øC)  Heart Rate:  [68-87] 68  Resp:  [16] 16  BP: (125-146)/(69-85) 125/72  General Appearance:    Alert, cooperative, no distress  Head:    Normocephalic, atraumatic  Eyes:    Sclerae anicteric  Neck:   Supple, no mass  Lungs: Clear to auscultation bilaterally, respirations unlabored  Heart: Regular rate and rhythm, S1 and S2 normal, no murmur, rub or gallop  Abdomen:  Soft, non-tender, bowel sounds active, nondistended  Extremities: No clubbing, cyanosis, or edema to lower extremities  Pulses:  2+ and symmetric in distal lower extremities  Skin: No rashes   Neurologic: Oriented x3, Normal strength to extremities    Results Review:    I have reviewed the labs, radiology results  and diagnostic studies.    Results from last 7 days   Lab Units 08/15/23  0403   WBC 10*3/mm3 9.38   HEMOGLOBIN g/dL 15.6   HEMATOCRIT % 48.1   PLATELETS 10*3/mm3 260     Results from last 7 days   Lab Units 08/15/23  0403 08/14/23  1446 08/14/23  1110 08/11/23  1026   SODIUM mmol/L 140 137 136 141   POTASSIUM mmol/L 3.6 4.1 3.8 4.1   CHLORIDE mmol/L 98 99 98 100   CO2 mmol/L 26.7 22.4 25.0 28.5   BUN mg/dL 19 15 16 17   CREATININE mg/dL 1.08 0.90 1.30* 1.12   CALCIUM mg/dL 9.3 8.6 9.3 9.9   BILIRUBIN mg/dL  --   --  0.3 0.4   ALK PHOS U/L  --   --  127* 125*   ALT (SGPT) U/L  --   --  22 26   AST (SGOT) U/L  --   --  23 22   GLUCOSE mg/dL 173* 107* 186* 157*     Imaging Results (Last 24 Hours)       ** No results found for the last 24 hours. **            I have reviewed the medications.  ---------------------------------------------------------------------------------------------  Assessment & Plan   Assessment/Problem List    Dizziness    Cervical stenosis of spine    Cervical spondylosis without myelopathy         Plan:  Dizziness/Speech Difficulty  -Neuro checks q4h  -Consult neurology  -Mri brain negative for acute findings  -Cardiac monitor  -EEG -pending     Migraine Headache  -Rizatriptan PRN     C6/C7 Disc Protrusion  -Consult neurosurgery     Leukocytosis  -Check daily CBC     Hypertension  -Continue amlodipine, HCTZ, metoprolol     Hyperlipidemia  -Continue atorvastatin  -Triglycerides 511 today     Diabetes  -Continue glipizide, janumet, jardiance  -Hemoglobin A1c 7.70 today  -Consult diabetes educator      Lulú Geller, APRN 08/16/23 05:47 EDT    I have worn appropriate PPE during this patient encounter, sanitized my hands both with entering and exiting patient's room.      50  minutes has been spent by [facility] providers in the care of this patient while under observation status

## 2023-08-16 NOTE — PLAN OF CARE
Goal Outcome Evaluation:  Plan of Care Reviewed With: patient           Outcome Evaluation: Patient seen for PT session this AM. Patient supine in bed upon arrival. Patient completed all be mobility with SV. Patient ambulated to BR and completed all needs with SV. Patient ambulated 220ft in hallway with no AD and SBA. Gait very slow but mostly steady with no overt LOB noted. Patient reports feeling very drowsy but no reports of dizziness during ambulation. Encouraged patient to use rwx as needed upon return home to maximize safety and balance. Patient agreeable. No further acute PT needs noted at this time. Recommend patient continue ambulating in hallway several times per day. Acute PT will sign off.

## 2023-08-16 NOTE — DISCHARGE SUMMARY
ED OBSERVATION PROGRESS/DISCHARGE SUMMARY    Date of Admission: 8/14/2023   LOS: 0 days   PCP: Liana Hood MD    Subjective patient reports feeling well this morning, voices no complaints.    Hospital Outcome:     62-year-old male with a complaint of dizziness and difficulty with speech.  Chest x-ray shows no evidence for acute pulmonary process.  CTA head and neck shows no evidence to suggest area of acute completed infarction on either the noncontrast head CT or the CT perfusion study.  MRI brain shows no acute process.  MRI cervical spine shows multilevel cervical degenerative disease.  He was seen and evaluated by APRN with neurosurgery who has cleared her for discharge home from their standpoint.      Seen and evaluated by Dr. Camarillo with the neurology service and started on keppra 1g Iv and keppra 500mg BID PO.  Follow up with neurology in 3-4 weeks.     ROS:  General: no fevers, chills  Respiratory: no cough, dyspnea  Cardiovascular: no chest pain, palpitations  Abdomen: No abdominal pain, nausea, vomiting, or diarrhea  Neurologic: No focal weakness    Objective   Physical Exam:  I have reviewed the vital signs.  Temp:  [97.9 øF (36.6 øC)-98.2 øF (36.8 øC)] 97.9 øF (36.6 øC)  Heart Rate:  [68-87] 68  Resp:  [16] 16  BP: (125-144)/(69-85) 125/72  General Appearance:    Alert, cooperative, no distress  Head:    Normocephalic, atraumatic  Eyes:    Sclerae anicteric  Neck:   Supple, no mass  Lungs: Clear to auscultation bilaterally, respirations unlabored  Heart: Regular rate and rhythm, S1 and S2 normal, no murmur, rub or gallop  Abdomen:  Soft, nontender, bowel sounds active, nondistended  Extremities: No clubbing, cyanosis, or edema to lower extremities  Pulses:  2+ and symmetric in distal lower extremities  Skin: No rashes   Neurologic: Oriented x3, Normal strength to extremities    Results Review:    I have reviewed the labs, radiology results and diagnostic studies.    Results from last 7 days    Lab Units 08/16/23  0418   WBC 10*3/mm3 10.49   HEMOGLOBIN g/dL 15.3   HEMATOCRIT % 47.1   PLATELETS 10*3/mm3 256     Results from last 7 days   Lab Units 08/16/23  0418 08/15/23  0403 08/14/23  1446 08/14/23  1110 08/14/23  1110 08/11/23  1026   SODIUM mmol/L 138 140 137   < > 136 141   POTASSIUM mmol/L 3.4* 3.6 4.1   < > 3.8 4.1   CHLORIDE mmol/L 101 98 99   < > 98 100   CO2 mmol/L 26.5 26.7 22.4   < > 25.0 28.5   BUN mg/dL 20 19 15   < > 16 17   CREATININE mg/dL 1.15 1.08 0.90   < > 1.30* 1.12   CALCIUM mg/dL 9.7 9.3 8.6   < > 9.3 9.9   BILIRUBIN mg/dL  --   --   --   --  0.3 0.4   ALK PHOS U/L  --   --   --   --  127* 125*   ALT (SGPT) U/L  --   --   --   --  22 26   AST (SGOT) U/L  --   --   --   --  23 22   GLUCOSE mg/dL 187* 173* 107*   < > 186* 157*    < > = values in this interval not displayed.     Imaging Results (Last 24 Hours)       ** No results found for the last 24 hours. **            I have reviewed the medications.  ---------------------------------------------------------------------------------------------  Assessment & Plan   Assessment/Problem List    Dizziness    Cervical stenosis of spine    Cervical spondylosis without myelopathy      Plan:    Dizziness/Speech Difficulty  -Neuro checks q4h  -Consult neurology, Dr. Camarillo  -Mri brain negative for acute findings  -Cardiac monitor  -EEG normal, but neurology recommends starting Keppra 500mg BID, follow up with neurology in 3-4 weeks     Migraine Headache  -Rizatriptan PRN     C6/C7 Disc Protrusion  -Consult neurosurgery, ANUSHA Vásquez  -Follow up in 1 month with Dr. Webster     Leukocytosis  -Check daily CBC     Hypertension  -Continue amlodipine, HCTZ, metoprolol     Hyperlipidemia  -Continue atorvastatin  -Triglycerides 511 today     Diabetes  -Continue glipizide, janumet, jardiance  -Hemoglobin A1c 7.70 today  -Consult diabetes educator    Disposition: Home    Follow-up after Discharge: Neurology, neurosurgery    35 minutes has been  spent by [facility] providers in the care of this patient while under observation status     This note will serve as discharge summary    NESSA Hendrix 08/16/23 08:13 EDT    I have worn appropriate PPE during this patient encounter and sanitized my hands with entering and exiting patient room.

## 2023-08-16 NOTE — PLAN OF CARE
Goal Outcome Evaluation:   Patient admitted for dizziness.   Outcome summary: Patient stable overnight. AO x 4, up ad eula with stand-by assistance. O2 via nasal cannula was added during the night at 1L to maintain appropriate O2 sat. Patient ambulated to the bathroom several times only needing help with getting out of bed. Regular diet. PT to see patient today.

## 2023-08-16 NOTE — THERAPY TREATMENT NOTE
Patient Name: Pablo Mendoza  : 1960    MRN: [id number]                              Today's Date: 2023       Admit Date: 2023    Visit Dx:     ICD-10-CM ICD-9-CM   1. Dizziness  R42 780.4   2. Episode of change in speech  R47.89 784.59   3. Nonintractable headache, unspecified chronicity pattern, unspecified headache type  R51.9 784.0     Patient Active Problem List   Diagnosis    Type 2 diabetes mellitus, without long-term current use of insulin    Mixed hyperlipidemia    Essential hypertension    Gout of multiple sites    Nocturia    Migraine headache    Chronic maxillary sinusitis    Other fatigue    Arthritis    Routine health maintenance    Proteinuria    Cellulitis of left foot    Lumbar radiculopathy    Stroke-like symptoms    Polycythemia    Dizziness    Cervical stenosis of spine    Cervical spondylosis without myelopathy     Past Medical History:   Diagnosis Date    Anesthesia complication     STATES HARD TO WAKE UP W/ALL SURGERIES    COVID-19     DEC 2020    Diabetes mellitus     Diverticulitis     Elevated cholesterol     Gout     Hypertension     Low back pain     Migraines     Mixed hyperlipidemia 2018    Neuropathy     Nocturia 2018    Numbness and tingling of both lower extremities     LEGS AND FEET     Seizures     AS A CHILD    Staph infection     UNSURE OF SITE    Stroke     COGNITIVE DELAY    TIA (transient ischemic attack)     Type 2 diabetes mellitus, without long-term current use of insulin 2018    Weakness of both legs     LEFT WORSE      Past Surgical History:   Procedure Laterality Date    CHOLECYSTECTOMY      COLONOSCOPY       or 2016    HERNIA REPAIR      LUMBAR FUSION N/A 2021    Procedure: Lumbar 3 to Lumbar 4 and Lumbar 4 to Lumbar 5 laminectomy with a fusion;  Surgeon: Jose Webster MD;  Location: [facility];  Service: Robotics - Neuro;  Laterality: N/A;    SINUS SURGERY      TYMPANOPLASTY Bilateral       General  Information       Row Name 08/16/23 0918          Physical Therapy Time and Intention    Document Type therapy note (daily note);discharge treatment  -     Mode of Treatment individual therapy;physical therapy  -       Row Name 08/16/23 0918          General Information    Patient Profile Reviewed yes  -       Row Name 08/16/23 0918          Cognition    Orientation Status (Cognition) oriented x 3  -               User Key  (r) = Recorded By, (t) = Taken By, (c) = Cosigned By      Initials Name Provider Type     Dorota Vega PT Physical Therapist                   Mobility       Row Name 08/16/23 0918          Bed Mobility    Bed Mobility supine-sit;sit-supine  -     Supine-Sit Saunders (Bed Mobility) supervision  -     Sit-Supine Saunders (Bed Mobility) supervision  -     Assistive Device (Bed Mobility) bed rails;head of bed elevated  -       Row Name 08/16/23 0918          Sit-Stand Transfer    Sit-Stand Saunders (Transfers) standby assist  -       Row Name 08/16/23 0918          Gait/Stairs (Locomotion)    Saunders Level (Gait) standby assist;contact guard  -     Assistive Device (Gait) other (see comments)  no AD  -SM     Distance in Feet (Gait) 220ft  -     Deviations/Abnormal Patterns (Gait) demetri decreased;gait speed decreased  -     Comment, (Gait/Stairs) Gait very slow but mostly steady with no overt LOB noted.  -               User Key  (r) = Recorded By, (t) = Taken By, (c) = Cosigned By      Initials Name Provider Type     Dorota Vega PT Physical Therapist                   Obj/Interventions       Row Name 08/16/23 0919          Balance    Balance Assessment sitting static balance;sitting dynamic balance;sit to stand dynamic balance;standing static balance;standing dynamic balance  -     Static Sitting Balance independent  -     Dynamic Sitting Balance modified independence  -     Position, Sitting Balance sitting edge of bed  -     Sit  to Stand Dynamic Balance standby assist  -     Static Standing Balance standby assist  -     Dynamic Standing Balance standby assist;contact guard  -     Position/Device Used, Standing Balance unsupported  -     Balance Interventions sitting;standing;sit to stand;static;dynamic  -               User Key  (r) = Recorded By, (t) = Taken By, (c) = Cosigned By      Initials Name Provider Type     Dorota Vega, PT Physical Therapist                   Goals/Plan    No documentation.                  Clinical Impression       [facility] Name 08/16/23 0919          Pain    Pretreatment Pain Rating 0/10 - no pain  -     Posttreatment Pain Rating 0/10 - no pain  -SM       [facility] Name 08/16/23 0919          Plan of Care Review    Plan of Care Reviewed With patient  -     Outcome Evaluation Patient seen for PT session this AM. Patient supine in bed upon arrival. Patient completed all be mobility with SV. Patient ambulated to BR and completed all needs with SV. Patient ambulated 220ft in hallway with no AD and SBA. Gait very slow but mostly steady with no overt LOB noted. Patient reports feeling very drowsy but no reports of dizziness during ambulation. Encouraged patient to use rwx as needed upon return home to maximize safety and balance. Patient agreeable. No further acute PT needs noted at this time. Recommend patient continue ambulating in hallway several times per day. Acute PT will sign off.  -[facility] Name 08/16/23 0919          Therapy Assessment/Plan (PT)    Criteria for Skilled Interventions Met (PT) no problems identified which require skilled intervention  -       Row Name 08/16/23 0919          Vital Signs    Pre Patient Position Supine  -     Intra Patient Position Standing  -     Post Patient Position Supine  -[facility] Name 08/16/23 0919          Positioning and Restraints    Pre-Treatment Position in bed  -SM     Post Treatment Position bed  -SM     In Bed notified nsg;encouraged to call  for assist;exit alarm on;call light within reach;with nsg  with nsg aide  -               User Key  (r) = Recorded By, (t) = Taken By, (c) = Cosigned By      Initials Name Provider Type    Dorota Burgos PT Physical Therapist                   Outcome Measures       Row Name 08/16/23 0921 08/16/23 0900       How much help from another person do you currently need...    Turning from your back to your side while in flat bed without using bedrails? 4  -SM 4  -RM    Moving from lying on back to sitting on the side of a flat bed without bedrails? 4  -SM 4  -RM    Moving to and from a bed to a chair (including a wheelchair)? 4  -SM 4  -RM    Standing up from a chair using your arms (e.g., wheelchair, bedside chair)? 4  -SM 4  -RM    Climbing 3-5 steps with a railing? 3  -SM 4  -RM    To walk in hospital room? 4  -SM 4  -RM    AM-PAC 6 Clicks Score (PT) 23  -SM 24  -RM    Highest level of mobility 7 --> Walked 25 feet or more  -SM 8 --> Walked 250 feet or more  -RM              User Key  (r) = Recorded By, (t) = Taken By, (c) = Cosigned By      Initials Name Provider Type    Evangelina Mejia, RN Registered Nurse    Dorota Burgos, CAMILLE Physical Therapist                                 Physical Therapy Education       Title: PT OT SLP Therapies (Done)       Topic: Physical Therapy (Done)       Point: Mobility training (Done)       Learning Progress Summary             Patient Acceptance, E, VU by  at 8/16/2023 0922    Acceptance, E,TB, VU by  at 8/16/2023 0605    Acceptance, E,TB,D, VU,NR by  at 8/15/2023 1020                         Point: Home exercise program (Done)       Learning Progress Summary             Patient Acceptance, E, VU by  at 8/16/2023 0922    Acceptance, E,TB, VU by  at 8/16/2023 0605                         Point: Body mechanics (Done)       Learning Progress Summary             Patient Acceptance, E, VU by  at 8/16/2023 0922    Acceptance, E,TB, VU by  at 8/16/2023 0605     Acceptance, E,TB,D, VU,NR by  at 8/15/2023 1020                         Point: Precautions (Done)       Learning Progress Summary             Patient Acceptance, E, VU by  at 8/16/2023 0922    Acceptance, E,TB, VU by  at 8/16/2023 0605    Acceptance, E,TB,D, VU,NR by  at 8/15/2023 1020                                         User Key       Initials Effective Dates Name Provider Type Discipline     06/16/21 -  Cristina Anthony PT Physical Therapist PT     05/02/22 -  Dorota Vega PT Physical Therapist PT     03/27/23 -  Mya Sarah, RN Registered Nurse Nurse                  PT Recommendation and Plan     Plan of Care Reviewed With: patient  Outcome Evaluation: Patient seen for PT session this AM. Patient supine in bed upon arrival. Patient completed all be mobility with SV. Patient ambulated to BR and completed all needs with SV. Patient ambulated 220ft in hallway with no AD and SBA. Gait very slow but mostly steady with no overt LOB noted. Patient reports feeling very drowsy but no reports of dizziness during ambulation. Encouraged patient to use rwx as needed upon return home to maximize safety and balance. Patient agreeable. No further acute PT needs noted at this time. Recommend patient continue ambulating in hallway several times per day. Acute PT will sign off.     Time Calculation:         PT Charges       Row Name 08/16/23 0922             Time Calculation    Start Time 0843  -      Stop Time 0851  -      Time Calculation (min) 8 min  -      PT Received On 08/16/23  -         Time Calculation- PT    Total Timed Code Minutes- PT 8 minute(s)  -SM         Timed Charges    00655 - PT Therapeutic Activity Minutes 8  -SM         Total Minutes    Timed Charges Total Minutes 8  -SM       Total Minutes 8  -SM                User Key  (r) = Recorded By, (t) = Taken By, (c) = Cosigned By      Initials Name Provider Type     Dorota Vega PT Physical Therapist                   Therapy Charges for Today       Code Description Service Date Service Provider Modifiers Qty    [id number]  PT THERAPEUTIC ACT EA 15 MIN 8/16/2023 Dorota Vega, PT GP 1            PT G-Codes  Outcome Measure Options: AM-PAC 6 Clicks Basic Mobility (PT)  AM-PAC 6 Clicks Score (PT): 23       Dorota Vega PT  8/16/2023

## 2023-08-17 ENCOUNTER — TRANSITIONAL CARE MANAGEMENT TELEPHONE ENCOUNTER (OUTPATIENT)
Dept: CALL CENTER | Facility: HOSPITAL | Age: 63
End: 2023-08-17
Payer: COMMERCIAL

## 2023-08-17 DIAGNOSIS — E11.29 TYPE 2 DIABETES MELLITUS WITH PROTEINURIA: ICD-10-CM

## 2023-08-17 DIAGNOSIS — R80.9 TYPE 2 DIABETES MELLITUS WITH PROTEINURIA: ICD-10-CM

## 2023-08-17 RX ORDER — GLIPIZIDE 5 MG/1
TABLET ORAL
Qty: 60 TABLET | Refills: 0 | Status: SHIPPED | OUTPATIENT
Start: 2023-08-17

## 2023-08-17 RX ORDER — SITAGLIPTIN AND METFORMIN HYDROCHLORIDE 1000; 50 MG/1; MG/1
TABLET, FILM COATED, EXTENDED RELEASE ORAL
Qty: 60 TABLET | Refills: 3 | Status: SHIPPED | OUTPATIENT
Start: 2023-08-17

## 2023-08-17 NOTE — OUTREACH NOTE
Call Center TCM Note      Flowsheet Row Responses   [facility] patient discharged from? [place]   Does the patient have one of the following disease processes/diagnoses(primary or secondary)? Other   TCM attempt successful? Yes   Call start time 1217   Call end time 1221   Discharge diagnosis Dizziness/Speech Difficulty, migraine H/A   Is patient permission given to speak with other caregiver? Yes   List who call center can speak with Lulú sister   Person spoke with today (if not patient) and relationship Lulú sister   Meds reviewed with patient/caregiver? Yes   Medication comments Sister unsure of patient's current med list-states she will encourage him to speak with PCP if any questions or concerns   Does the patient have an appointment with their PCP within 7-14 days of discharge? No appointments available   Nursing Interventions Routed TCM call to PCP office, PCP office requested to make appointment - message sent   Has home health visited the patient within 72 hours of discharge? N/A   Psychosocial issues? No   Did the patient receive a copy of their discharge instructions? Yes   Nursing interventions Reviewed instructions with patient   What is the patient's perception of their health status since discharge? Improving   Is the patient/caregiver able to teach back signs and symptoms related to disease process for when to call PCP? Yes   Is the patient/caregiver able to teach back signs and symptoms related to disease process for when to call 911? Yes   Is the patient/caregiver able to teach back the hierarchy of who to call/visit for symptoms/problems? PCP, Specialist, Home health nurse, Urgent Care, ED, 911 Yes   If the patient is a current smoker, are they able to teach back resources for cessation? Not a smoker   TCM call completed? Yes   Call end time 1221            Simona Gresham RN    8/17/2023, 12:23 EDT

## 2023-08-18 ENCOUNTER — OFFICE VISIT (OUTPATIENT)
Dept: INTERNAL MEDICINE | Facility: CLINIC | Age: 63
End: 2023-08-18
Payer: COMMERCIAL

## 2023-08-18 VITALS
WEIGHT: 189.4 LBS | HEIGHT: 67 IN | BODY MASS INDEX: 29.73 KG/M2 | TEMPERATURE: 97.5 F | SYSTOLIC BLOOD PRESSURE: 114 MMHG | OXYGEN SATURATION: 96 % | HEART RATE: 75 BPM | DIASTOLIC BLOOD PRESSURE: 68 MMHG

## 2023-08-18 DIAGNOSIS — M54.41 CHRONIC BILATERAL LOW BACK PAIN WITH RIGHT-SIDED SCIATICA: ICD-10-CM

## 2023-08-18 DIAGNOSIS — R80.9 TYPE 2 DIABETES MELLITUS WITH PROTEINURIA: ICD-10-CM

## 2023-08-18 DIAGNOSIS — R56.9 SEIZURE: Primary | ICD-10-CM

## 2023-08-18 DIAGNOSIS — E78.2 MIXED HYPERLIPIDEMIA: ICD-10-CM

## 2023-08-18 DIAGNOSIS — G89.29 CHRONIC BILATERAL LOW BACK PAIN WITH RIGHT-SIDED SCIATICA: ICD-10-CM

## 2023-08-18 DIAGNOSIS — G43.801 OTHER MIGRAINE WITH STATUS MIGRAINOSUS, NOT INTRACTABLE: ICD-10-CM

## 2023-08-18 DIAGNOSIS — E11.29 TYPE 2 DIABETES MELLITUS WITH PROTEINURIA: ICD-10-CM

## 2023-08-18 DIAGNOSIS — M1A.09X0 CHRONIC GOUT OF MULTIPLE SITES, UNSPECIFIED CAUSE: ICD-10-CM

## 2023-08-18 DIAGNOSIS — I10 ESSENTIAL HYPERTENSION: ICD-10-CM

## 2023-08-18 RX ORDER — IBUPROFEN 800 MG/1
800 TABLET ORAL EVERY 8 HOURS PRN
Qty: 30 TABLET | Refills: 0 | Status: SHIPPED | OUTPATIENT
Start: 2023-08-18

## 2023-08-18 NOTE — PROGRESS NOTES
"Transitional Care Follow Up Visit  Subjective     Pablo Mendoza is a 62 y.o. male who presents for a transitional care management visit.    Within 48 business hours after discharge our office contacted him via telephone to coordinate his care and needs.      I reviewed and discussed the details of that call along with the discharge summary, hospital problems, inpatient lab results, inpatient diagnostic studies, and consultation reports with Pablo.     Current outpatient and discharge medications have been reconciled for the patient.  Reviewed by: Liana Hood MD          8/16/2023     5:25 PM   Date of TCM Phone Call   [facility]   Date of Admission [facility]/14/[facility]23   Date of Discharge 8/16/2023   Discharge Disposition Home or Self Care     Risk for Readmission (LACE) Score: 3 (8/16/2023  6:00 AM)      History of Present Illness   Course During Hospital Stay:  hosp d/c summary reviewed \"62-year-old male with a complaint of dizziness and difficulty with speech.  Chest x-ray shows no evidence for acute pulmonary process.  CTA head and neck shows no evidence to suggest area of acute completed infarction on either the noncontrast head CT or the CT perfusion study.  MRI brain shows no acute process.  MRI cervical spine shows multilevel cervical degenerative disease.  He was seen and evaluated by APRN with neurosurgery who has cleared her for discharge home from their standpoint.      Seen and evaluated by Dr. Camarillo with the neurology service and started on keppra 1g Iv and keppra 500mg BID PO.  Follow up with neurology in 3-4 weeks. \"    Pt c/o tunnel vision and speech changes.  He went to the ED and was admitted with course as above.  He was evaaluated by neurology and BR[facility]NEY.  Pt had been following with Dr. Demar monique/ neurology in the past and had a diagnosis of complex migraines.  CTA head/neck and MRI brain with no acute issues.  EEG normal but neurology rec pt start keppra.  Pt would like to go back to " "work.  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  He is aware that Neurology has recommended that he not drive for 3 months until cleared by neurology.      Neurology note specifies  \"Seizure Precautions: Patient is not to drive for at least 3 months until cleared by a physician, operate heavy machinery, take tub baths, swim unattended, climb heights, or perform any other activities that can bring harm to himself/herself or others during an episode of altered awareness. \"     The following portions of the patient's history were reviewed and updated as appropriate: allergies, current medications, past family history, past medical history, past social history, past surgical history, and problem list.    Review of Systems   Constitutional: Negative.    HENT: Negative.     Eyes: Negative.    Respiratory: Negative.     Cardiovascular: Negative.    Gastrointestinal: Negative.    Endocrine: Negative.    Genitourinary: Negative.    Musculoskeletal: Negative.    Skin: Negative.    Allergic/Immunologic: Negative.    Neurological: Negative.    Hematological: Negative.    Psychiatric/Behavioral: Negative.     All other systems reviewed and are negative.    Objective   Wt Readings from Last 3 Encounters:   08/18/23 85.9 kg (189 lb 6.4 oz)   08/15/23 90.3 kg (199 lb)   08/04/23 88.2 kg (194 lb 6.4 oz)     Temp Readings from Last 3 Encounters:   08/18/23 97.5 øF (36.4 øC) (Infrared)   08/16/23 97.7 øF (36.5 øC) (Oral)   08/04/23 98 øF (36.7 øC)     BP Readings from Last 3 Encounters:   08/18/23 114/68   08/16/23 152/90   08/04/23 136/77     Pulse Readings from Last 3 Encounters:   08/18/23 75   08/16/23 79   08/04/23 86       Physical Exam  Vitals and nursing note reviewed.   Constitutional:       General: He is not in acute distress.     Appearance: He is well-developed.      Comments: Slightly sluggish   HENT:      Head: Normocephalic and " atraumatic.      Right Ear: External ear normal.      Left Ear: External ear normal.      Nose: Nose normal.   Eyes:      Conjunctiva/sclera: Conjunctivae normal.      Pupils: Pupils are equal, round, and reactive to light.   Cardiovascular:      Rate and Rhythm: Normal rate and regular rhythm.      Heart sounds: Normal heart sounds.   Pulmonary:      Effort: Pulmonary effort is normal. No respiratory distress.      Breath sounds: Normal breath sounds. No wheezing.   Musculoskeletal:         General: Normal range of motion.      Cervical back: Normal range of motion and neck supple.      Comments: Normal gait   Skin:     General: Skin is warm and dry.   Neurological:      Mental Status: He is alert and oriented to person, place, and time.   Psychiatric:         Behavior: Behavior normal.         Thought Content: Thought content normal.         Judgment: Judgment normal.      Comments: Mild slowing of speech, no stuttering.         Assessment & Plan   Diagnoses and all orders for this visit:    1. Seizure (Primary) - new diagnosis.  Reviewed imaging including cta and MRI.  Reviewed EEG  Discussed seizure precautions and work restrictions.  Work note written by me during OV.  Pt on keppra.  He still has some slowing.  No seizure activity since discharge.  Pt est with BRITNEY.  Will arrange for f/u with neurology.   -     Ambulatory Referral to Neurology    2. Essential hypertension - bp has been high but actually on the low side today.     3. Chronic gout of multiple sites, unspecified cause    4. Type 2 diabetes mellitus with proteinuria    5. Mixed hyperlipidemia    6. Other migraine with status migrainosus, not intractable  -     ibuprofen (ADVIL,MOTRIN) 800 MG tablet; Take 1 tablet by mouth Every 8 (Eight) Hours As Needed for Mild Pain.  Dispense: 30 tablet; Refill: 0    7. Chronic bilateral low back pain with right-sided sciatica  -     ibuprofen (ADVIL,MOTRIN) 800 MG tablet; Take 1 tablet by mouth Every 8 (Eight)  Hours As Needed for Mild Pain.  Dispense: 30 tablet; Refill: 0    I spent 40 minutes caring for Pablo on this date of service. This time includes time spent by me in the following activities:preparing for the visit, reviewing tests, performing a medically appropriate examination and/or evaluation , counseling and educating the patient/family/caregiver, ordering medications, tests, or procedures, referring and communicating with other health care professionals , documenting information in the medical record, independently interpreting results and communicating that information with the patient/family/caregiver, and care coordination

## 2023-08-18 NOTE — LETTER
August 18, 2023     Patient: Pablo Mendoza   YOB: 1960   Date of Visit: 8/18/2023       To Whom It May Concern:    Pablo Mendoza was admitted at [facility] from 8/14/23-8/16/23.   He should remain out of work until cleared by neurology  .  Patient is not to drive, operate heavy machinery, take tub baths, swim unattended, climb heights, or perform any other activities that can bring harm to himself/herself or others during an episode of altered awareness for at least 3 months or until cleared by a physician.  He should not handle sharp or heavy objects.   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             Sincerely,        Liana Hood MD    CC:   No Recipients

## 2023-08-19 ENCOUNTER — APPOINTMENT (OUTPATIENT)
Dept: GENERAL RADIOLOGY | Facility: HOSPITAL | Age: 63
End: 2023-08-19
Payer: COMMERCIAL

## 2023-08-19 ENCOUNTER — HOSPITAL ENCOUNTER (OUTPATIENT)
Facility: HOSPITAL | Age: 63
Setting detail: OBSERVATION
Discharge: HOME | End: 2023-08-20
Attending: EMERGENCY MEDICINE | Admitting: EMERGENCY MEDICINE
Payer: COMMERCIAL

## 2023-08-19 ENCOUNTER — APPOINTMENT (OUTPATIENT)
Dept: CT IMAGING | Facility: HOSPITAL | Age: 63
End: 2023-08-19
Payer: COMMERCIAL

## 2023-08-19 DIAGNOSIS — R56.9 SEIZURES: Primary | ICD-10-CM

## 2023-08-19 DIAGNOSIS — R73.9 HYPERGLYCEMIA: ICD-10-CM

## 2023-08-19 DIAGNOSIS — N39.0 ACUTE UTI: ICD-10-CM

## 2023-08-19 LAB
ALBUMIN SERPL-MCNC: 5.1 G/DL (ref 3.5–5.2)
ALBUMIN/GLOB SERPL: 1.8 G/DL
ALP SERPL-CCNC: 150 U/L (ref 39–117)
ALT SERPL W P-5'-P-CCNC: 31 U/L (ref 1–41)
AMPHET+METHAMPHET UR QL: NEGATIVE
ANION GAP SERPL CALCULATED.3IONS-SCNC: 16 MMOL/L (ref 5–15)
AST SERPL-CCNC: 27 U/L (ref 1–40)
BACTERIA UR QL AUTO: ABNORMAL /HPF
BARBITURATES UR QL SCN: NEGATIVE
BASOPHILS # BLD AUTO: 0.06 10*3/MM3 (ref 0–0.2)
BASOPHILS NFR BLD AUTO: 0.5 % (ref 0–1.5)
BENZODIAZ UR QL SCN: NEGATIVE
BILIRUB SERPL-MCNC: 0.4 MG/DL (ref 0–1.2)
BILIRUB UR QL STRIP: NEGATIVE
BUN SERPL-MCNC: 24 MG/DL (ref 8–23)
BUN/CREAT SERPL: 20.7 (ref 7–25)
CALCIUM SPEC-SCNC: 10.2 MG/DL (ref 8.6–10.5)
CANNABINOIDS SERPL QL: NEGATIVE
CHLORIDE SERPL-SCNC: 96 MMOL/L (ref 98–107)
CLARITY UR: CLEAR
CO2 SERPL-SCNC: 25 MMOL/L (ref 22–29)
COCAINE UR QL: NEGATIVE
COLOR UR: YELLOW
CREAT SERPL-MCNC: 1.16 MG/DL (ref 0.76–1.27)
DEPRECATED RDW RBC AUTO: 49.1 FL (ref 37–54)
EGFRCR SERPLBLD CKD-EPI 2021: 72.1 ML/MIN/1.73
EOSINOPHIL # BLD AUTO: 0.21 10*3/MM3 (ref 0–0.4)
EOSINOPHIL NFR BLD AUTO: 1.9 % (ref 0.3–6.2)
ERYTHROCYTE [DISTWIDTH] IN BLOOD BY AUTOMATED COUNT: 19.4 % (ref 12.3–15.4)
ETHANOL BLD-MCNC: <10 MG/DL (ref 0–10)
ETHANOL UR QL: <0.01 %
FENTANYL UR-MCNC: NEGATIVE NG/ML
GLOBULIN UR ELPH-MCNC: 2.9 GM/DL
GLUCOSE BLDC GLUCOMTR-MCNC: 138 MG/DL (ref 70–130)
GLUCOSE BLDC GLUCOMTR-MCNC: 178 MG/DL (ref 70–130)
GLUCOSE BLDC GLUCOMTR-MCNC: 184 MG/DL (ref 70–130)
GLUCOSE SERPL-MCNC: 193 MG/DL (ref 65–99)
GLUCOSE UR STRIP-MCNC: ABNORMAL MG/DL
HCT VFR BLD AUTO: 51.3 % (ref 37.5–51)
HGB BLD-MCNC: 16.7 G/DL (ref 13–17.7)
HGB UR QL STRIP.AUTO: NEGATIVE
HYALINE CASTS UR QL AUTO: ABNORMAL /LPF
IMM GRANULOCYTES # BLD AUTO: 0.07 10*3/MM3 (ref 0–0.05)
IMM GRANULOCYTES NFR BLD AUTO: 0.6 % (ref 0–0.5)
KETONES UR QL STRIP: NEGATIVE
LEUKOCYTE ESTERASE UR QL STRIP.AUTO: ABNORMAL
LYMPHOCYTES # BLD AUTO: 1.84 10*3/MM3 (ref 0.7–3.1)
LYMPHOCYTES NFR BLD AUTO: 16.5 % (ref 19.6–45.3)
MAGNESIUM SERPL-MCNC: 2.3 MG/DL (ref 1.6–2.4)
MCH RBC QN AUTO: 25.3 PG (ref 26.6–33)
MCHC RBC AUTO-ENTMCNC: 32.6 G/DL (ref 31.5–35.7)
MCV RBC AUTO: 77.8 FL (ref 79–97)
METHADONE UR QL SCN: NEGATIVE
MONOCYTES # BLD AUTO: 0.54 10*3/MM3 (ref 0.1–0.9)
MONOCYTES NFR BLD AUTO: 4.8 % (ref 5–12)
NEUTROPHILS NFR BLD AUTO: 75.7 % (ref 42.7–76)
NEUTROPHILS NFR BLD AUTO: 8.46 10*3/MM3 (ref 1.7–7)
NITRITE UR QL STRIP: POSITIVE
NRBC BLD AUTO-RTO: 0 /100 WBC (ref 0–0.2)
OPIATES UR QL: NEGATIVE
OXYCODONE UR QL SCN: NEGATIVE
PH UR STRIP.AUTO: 6 [PH] (ref 5–8)
PHOSPHATE SERPL-MCNC: 2.9 MG/DL (ref 2.5–4.5)
PLATELET # BLD AUTO: 312 10*3/MM3 (ref 140–450)
PMV BLD AUTO: 9.8 FL (ref 6–12)
POTASSIUM SERPL-SCNC: 3.9 MMOL/L (ref 3.5–5.2)
PROT SERPL-MCNC: 8 G/DL (ref 6–8.5)
PROT UR QL STRIP: ABNORMAL
RBC # BLD AUTO: 6.59 10*6/MM3 (ref 4.14–5.8)
RBC # UR STRIP: ABNORMAL /HPF
REF LAB TEST METHOD: ABNORMAL
SODIUM SERPL-SCNC: 137 MMOL/L (ref 136–145)
SP GR UR STRIP: 1.02 (ref 1–1.03)
SQUAMOUS #/AREA URNS HPF: ABNORMAL /HPF
UROBILINOGEN UR QL STRIP: ABNORMAL
WBC # UR STRIP: ABNORMAL /HPF
WBC NRBC COR # BLD: 11.18 10*3/MM3 (ref 3.4–10.8)

## 2023-08-19 PROCEDURE — 70450 CT HEAD/BRAIN W/O DYE: CPT

## 2023-08-19 PROCEDURE — 80307 DRUG TEST PRSMV CHEM ANLYZR: CPT | Performed by: EMERGENCY MEDICINE

## 2023-08-19 PROCEDURE — 87147 CULTURE TYPE IMMUNOLOGIC: CPT | Performed by: EMERGENCY MEDICINE

## 2023-08-19 PROCEDURE — 25010000002 DIPHENHYDRAMINE PER 50 MG: Performed by: NURSE PRACTITIONER

## 2023-08-19 PROCEDURE — G0378 HOSPITAL OBSERVATION PER HR: HCPCS

## 2023-08-19 PROCEDURE — 80053 COMPREHEN METABOLIC PANEL: CPT | Performed by: EMERGENCY MEDICINE

## 2023-08-19 PROCEDURE — 99284 EMERGENCY DEPT VISIT MOD MDM: CPT

## 2023-08-19 PROCEDURE — 81001 URINALYSIS AUTO W/SCOPE: CPT | Performed by: EMERGENCY MEDICINE

## 2023-08-19 PROCEDURE — 96375 TX/PRO/DX INJ NEW DRUG ADDON: CPT

## 2023-08-19 PROCEDURE — 85025 COMPLETE CBC W/AUTO DIFF WBC: CPT | Performed by: EMERGENCY MEDICINE

## 2023-08-19 PROCEDURE — 82077 ASSAY SPEC XCP UR&BREATH IA: CPT | Performed by: EMERGENCY MEDICINE

## 2023-08-19 PROCEDURE — 93010 ELECTROCARDIOGRAM REPORT: CPT | Performed by: INTERNAL MEDICINE

## 2023-08-19 PROCEDURE — 82948 REAGENT STRIP/BLOOD GLUCOSE: CPT

## 2023-08-19 PROCEDURE — 36415 COLL VENOUS BLD VENIPUNCTURE: CPT

## 2023-08-19 PROCEDURE — 25010000002 CEFTRIAXONE PER 250 MG: Performed by: EMERGENCY MEDICINE

## 2023-08-19 PROCEDURE — 83735 ASSAY OF MAGNESIUM: CPT | Performed by: EMERGENCY MEDICINE

## 2023-08-19 PROCEDURE — 96365 THER/PROPH/DIAG IV INF INIT: CPT

## 2023-08-19 PROCEDURE — 71045 X-RAY EXAM CHEST 1 VIEW: CPT

## 2023-08-19 PROCEDURE — 93005 ELECTROCARDIOGRAM TRACING: CPT | Performed by: EMERGENCY MEDICINE

## 2023-08-19 PROCEDURE — 25010000002 LORAZEPAM PER 2 MG: Performed by: EMERGENCY MEDICINE

## 2023-08-19 PROCEDURE — 84100 ASSAY OF PHOSPHORUS: CPT | Performed by: EMERGENCY MEDICINE

## 2023-08-19 PROCEDURE — 25010000002 LEVETRIRACETAM PER 10 MG: Performed by: EMERGENCY MEDICINE

## 2023-08-19 PROCEDURE — 87086 URINE CULTURE/COLONY COUNT: CPT | Performed by: EMERGENCY MEDICINE

## 2023-08-19 PROCEDURE — 25010000002 PROCHLORPERAZINE 10 MG/2ML SOLUTION: Performed by: NURSE PRACTITIONER

## 2023-08-19 RX ORDER — DEXTROSE MONOHYDRATE 25 G/50ML
25 INJECTION, SOLUTION INTRAVENOUS
Status: DISCONTINUED | OUTPATIENT
Start: 2023-08-19 | End: 2023-08-20 | Stop reason: HOSPADM

## 2023-08-19 RX ORDER — LEVETIRACETAM 500 MG/1
500 TABLET ORAL EVERY 12 HOURS SCHEDULED
Status: DISCONTINUED | OUTPATIENT
Start: 2023-08-20 | End: 2023-08-20

## 2023-08-19 RX ORDER — METOPROLOL TARTRATE 50 MG/1
100 TABLET, FILM COATED ORAL 2 TIMES DAILY
Status: DISCONTINUED | OUTPATIENT
Start: 2023-08-19 | End: 2023-08-20 | Stop reason: HOSPADM

## 2023-08-19 RX ORDER — LEVETIRACETAM 500 MG/5ML
1000 INJECTION, SOLUTION, CONCENTRATE INTRAVENOUS ONCE
Status: COMPLETED | OUTPATIENT
Start: 2023-08-19 | End: 2023-08-19

## 2023-08-19 RX ORDER — LORAZEPAM 2 MG/ML
2 INJECTION INTRAMUSCULAR ONCE AS NEEDED
Status: DISCONTINUED | OUTPATIENT
Start: 2023-08-19 | End: 2023-08-20 | Stop reason: HOSPADM

## 2023-08-19 RX ORDER — SODIUM CHLORIDE 0.9 % (FLUSH) 0.9 %
10 SYRINGE (ML) INJECTION EVERY 12 HOURS SCHEDULED
Status: DISCONTINUED | OUTPATIENT
Start: 2023-08-19 | End: 2023-08-20 | Stop reason: HOSPADM

## 2023-08-19 RX ORDER — DIPHENHYDRAMINE HYDROCHLORIDE 50 MG/ML
25 INJECTION INTRAMUSCULAR; INTRAVENOUS ONCE
Status: COMPLETED | OUTPATIENT
Start: 2023-08-19 | End: 2023-08-19

## 2023-08-19 RX ORDER — SODIUM CHLORIDE 9 MG/ML
40 INJECTION, SOLUTION INTRAVENOUS AS NEEDED
Status: DISCONTINUED | OUTPATIENT
Start: 2023-08-19 | End: 2023-08-20 | Stop reason: HOSPADM

## 2023-08-19 RX ORDER — BISACODYL 5 MG/1
5 TABLET, DELAYED RELEASE ORAL DAILY PRN
Status: DISCONTINUED | OUTPATIENT
Start: 2023-08-19 | End: 2023-08-20 | Stop reason: HOSPADM

## 2023-08-19 RX ORDER — ALLOPURINOL 100 MG/1
300 TABLET ORAL 2 TIMES DAILY
Status: DISCONTINUED | OUTPATIENT
Start: 2023-08-19 | End: 2023-08-20 | Stop reason: HOSPADM

## 2023-08-19 RX ORDER — AMLODIPINE BESYLATE 10 MG/1
10 TABLET ORAL DAILY
Status: DISCONTINUED | OUTPATIENT
Start: 2023-08-19 | End: 2023-08-20 | Stop reason: HOSPADM

## 2023-08-19 RX ORDER — BISACODYL 10 MG
10 SUPPOSITORY, RECTAL RECTAL DAILY PRN
Status: DISCONTINUED | OUTPATIENT
Start: 2023-08-19 | End: 2023-08-20 | Stop reason: HOSPADM

## 2023-08-19 RX ORDER — ONDANSETRON 2 MG/ML
4 INJECTION INTRAMUSCULAR; INTRAVENOUS EVERY 6 HOURS PRN
Status: DISCONTINUED | OUTPATIENT
Start: 2023-08-19 | End: 2023-08-20 | Stop reason: HOSPADM

## 2023-08-19 RX ORDER — ONDANSETRON 4 MG/1
4 TABLET, FILM COATED ORAL EVERY 6 HOURS PRN
Status: DISCONTINUED | OUTPATIENT
Start: 2023-08-19 | End: 2023-08-20 | Stop reason: HOSPADM

## 2023-08-19 RX ORDER — POLYETHYLENE GLYCOL 3350 17 G/17G
17 POWDER, FOR SOLUTION ORAL DAILY PRN
Status: DISCONTINUED | OUTPATIENT
Start: 2023-08-19 | End: 2023-08-20 | Stop reason: HOSPADM

## 2023-08-19 RX ORDER — IBUPROFEN 600 MG/1
1 TABLET ORAL
Status: DISCONTINUED | OUTPATIENT
Start: 2023-08-19 | End: 2023-08-20 | Stop reason: HOSPADM

## 2023-08-19 RX ORDER — AMOXICILLIN 250 MG
2 CAPSULE ORAL 2 TIMES DAILY
Status: DISCONTINUED | OUTPATIENT
Start: 2023-08-19 | End: 2023-08-20 | Stop reason: HOSPADM

## 2023-08-19 RX ORDER — SODIUM CHLORIDE 0.9 % (FLUSH) 0.9 %
10 SYRINGE (ML) INJECTION AS NEEDED
Status: DISCONTINUED | OUTPATIENT
Start: 2023-08-19 | End: 2023-08-20 | Stop reason: HOSPADM

## 2023-08-19 RX ORDER — PROCHLORPERAZINE EDISYLATE 5 MG/ML
5 INJECTION INTRAMUSCULAR; INTRAVENOUS ONCE
Status: COMPLETED | OUTPATIENT
Start: 2023-08-19 | End: 2023-08-19

## 2023-08-19 RX ORDER — NICOTINE POLACRILEX 4 MG
15 LOZENGE BUCCAL
Status: DISCONTINUED | OUTPATIENT
Start: 2023-08-19 | End: 2023-08-20 | Stop reason: HOSPADM

## 2023-08-19 RX ORDER — IBUPROFEN 400 MG/1
800 TABLET ORAL EVERY 8 HOURS PRN
Status: DISCONTINUED | OUTPATIENT
Start: 2023-08-19 | End: 2023-08-20 | Stop reason: HOSPADM

## 2023-08-19 RX ORDER — INSULIN LISPRO 100 [IU]/ML
2-9 INJECTION, SOLUTION INTRAVENOUS; SUBCUTANEOUS
Status: DISCONTINUED | OUTPATIENT
Start: 2023-08-19 | End: 2023-08-20 | Stop reason: HOSPADM

## 2023-08-19 RX ORDER — ASPIRIN 81 MG/1
81 TABLET, CHEWABLE ORAL DAILY
Status: DISCONTINUED | OUTPATIENT
Start: 2023-08-19 | End: 2023-08-20 | Stop reason: HOSPADM

## 2023-08-19 RX ORDER — HYDROXYZINE HYDROCHLORIDE 25 MG/1
25 TABLET, FILM COATED ORAL DAILY PRN
Status: DISCONTINUED | OUTPATIENT
Start: 2023-08-19 | End: 2023-08-20 | Stop reason: HOSPADM

## 2023-08-19 RX ORDER — HYDROCHLOROTHIAZIDE 25 MG/1
25 TABLET ORAL DAILY
Status: DISCONTINUED | OUTPATIENT
Start: 2023-08-19 | End: 2023-08-20 | Stop reason: HOSPADM

## 2023-08-19 RX ORDER — LORAZEPAM 2 MG/ML
1 INJECTION INTRAMUSCULAR ONCE
Status: COMPLETED | OUTPATIENT
Start: 2023-08-19 | End: 2023-08-19

## 2023-08-19 RX ORDER — ATORVASTATIN CALCIUM 20 MG/1
40 TABLET, FILM COATED ORAL NIGHTLY
Status: DISCONTINUED | OUTPATIENT
Start: 2023-08-19 | End: 2023-08-20 | Stop reason: HOSPADM

## 2023-08-19 RX ADMIN — ALLOPURINOL 300 MG: 100 TABLET ORAL at 22:57

## 2023-08-19 RX ADMIN — METOPROLOL TARTRATE 100 MG: 50 TABLET, FILM COATED ORAL at 22:56

## 2023-08-19 RX ADMIN — LEVETIRACETAM 1000 MG: 100 INJECTION INTRAVENOUS at 12:42

## 2023-08-19 RX ADMIN — DIPHENHYDRAMINE HYDROCHLORIDE 25 MG: 50 INJECTION, SOLUTION INTRAMUSCULAR; INTRAVENOUS at 19:40

## 2023-08-19 RX ADMIN — PROCHLORPERAZINE EDISYLATE 5 MG: 5 INJECTION INTRAMUSCULAR; INTRAVENOUS at 19:41

## 2023-08-19 RX ADMIN — LORAZEPAM 1 MG: 2 INJECTION INTRAMUSCULAR; INTRAVENOUS at 12:40

## 2023-08-19 RX ADMIN — AMLODIPINE BESYLATE 10 MG: 10 TABLET ORAL at 22:57

## 2023-08-19 RX ADMIN — CEFTRIAXONE 2000 MG: 2 INJECTION, POWDER, FOR SOLUTION INTRAMUSCULAR; INTRAVENOUS at 15:26

## 2023-08-19 RX ADMIN — Medication 10 ML: at 19:41

## 2023-08-19 RX ADMIN — EMPAGLIFLOZIN 25 MG: 25 TABLET, FILM COATED ORAL at 22:57

## 2023-08-19 RX ADMIN — SENNOSIDES AND DOCUSATE SODIUM 2 TABLET: 50; 8.6 TABLET ORAL at 22:57

## 2023-08-19 RX ADMIN — SERTRALINE 50 MG: 50 TABLET, FILM COATED ORAL at 22:56

## 2023-08-19 RX ADMIN — HYDROCHLOROTHIAZIDE 25 MG: 25 TABLET ORAL at 22:56

## 2023-08-19 RX ADMIN — ATORVASTATIN CALCIUM 40 MG: 20 TABLET, FILM COATED ORAL at 22:57

## 2023-08-19 NOTE — PLAN OF CARE
Goal Outcome Evaluation:  Plan of Care Reviewed With: patient        Progress: no change  Outcome Evaluation: Patient is alert and oriented times 3-4. Patient admitted for seizures. Patient has been slow to respond. Vital signs are stable. Patient is on room air. Family at bedside. Bed alarm on. Seizure precautions in place.

## 2023-08-19 NOTE — ED TRIAGE NOTES
Patients family states patient has had 2 seizures. Was dx with seizures last week and put on medications

## 2023-08-19 NOTE — ED PROVIDER NOTES
EMERGENCY DEPARTMENT ENCOUNTER  I wore full protective equipment throughout this patient encounter including a N95 mask, eye shield, gown and gloves. Hand hygiene was performed before donning protective equipment and after removal when leaving the room.    Room Number:  21/21  Date of encounter:  8/19/2023  PCP: Liana Hood MD  Patient Care Team:  Liana Hood MD as PCP - General (Internal Medicine & Pediatrics)  Drew Neal MD as Consulting Physician (Hematology and Oncology)  Brigitte Bell PA-C as Referring Physician (Physician Assistant)     HPI:  Context: Pablo Mendoza is a 62 y.o. male who presents to the ED c/o chief complaint of seizure.  Patient unable give history, history supplied by patient's ex-wife.  She reports that she spoke with patient, he woke up feeling confused as though he had had a seizure during his sleep.  She reports that at approximately 1130 when she arrived patient had returned to baseline status but then had a witnessed seizure.  Patient was staring off, blinking, stuttering, no generalized shaking, episode was brief.  Patient was confused and had difficulty with speech afterwards, no focal deficits, patient was returning to baseline status when had another episode at 1230.  Patient currently confused, able to say 1 or 2 words but unable to answer questions, intermittently following commands.  Patient's ex-wife reports recent diagnosis of seizures, is on Keppra.    MEDICAL HISTORY REVIEW  Reviewed in EPIC    PAST MEDICAL HISTORY  Active Ambulatory Problems     Diagnosis Date Noted    Type 2 diabetes mellitus, without long-term current use of insulin 01/19/2018    Mixed hyperlipidemia 01/19/2018    Essential hypertension 01/19/2018    Gout of multiple sites 01/19/2018    Nocturia 01/19/2018    Migraine headache 04/02/2018    Chronic maxillary sinusitis 04/10/2018    Other fatigue 06/06/2018    Arthritis 06/06/2018    Routine health  maintenance 08/10/2018    Proteinuria 03/08/2019    Cellulitis of left foot 05/02/2019    Lumbar radiculopathy 08/02/2021    Stroke-like symptoms 10/04/2022    Polycythemia 10/28/2022    Dizziness 08/14/2023    Cervical stenosis of spine 08/14/2023    Cervical spondylosis without myelopathy 08/14/2023     Resolved Ambulatory Problems     Diagnosis Date Noted    No Resolved Ambulatory Problems     Past Medical History:   Diagnosis Date    Anesthesia complication     COVID-19     Diabetes mellitus     Diverticulitis     Elevated cholesterol     Gout     Hypertension     Low back pain     Migraines     Neuropathy     Numbness and tingling of both lower extremities     Seizures     Staph infection 2010    Stroke     TIA (transient ischemic attack)     Weakness of both legs        PAST SURGICAL HISTORY  Past Surgical History:   Procedure Laterality Date    CHOLECYSTECTOMY  2011    COLONOSCOPY      2015 or 2016    HERNIA REPAIR      LUMBAR FUSION N/A 11/03/2021    Procedure: Lumbar 3 to Lumbar 4 and Lumbar 4 to Lumbar 5 laminectomy with a fusion;  Surgeon: Jose Webster MD;  Location: [facility];  Service: Robotics - Neuro;  Laterality: N/A;    SINUS SURGERY      TYMPANOPLASTY Bilateral        FAMILY HISTORY  Family History   Problem Relation Age of Onset    Arthritis Mother     Hypertension Mother     Heart disease Mother     Stroke Mother     Alcohol abuse Father     Arthritis Father     Hypertension Father     Nephrolithiasis Father     Diabetes Sister     Diabetes Sister     Stroke Brother 59    Hypertension Brother     Hyperlipidemia Brother     Throat cancer Maternal Grandmother     Diabetes Paternal Grandmother     Malig Hyperthermia Neg Hx        SOCIAL HISTORY  Social History     Socioeconomic History    Marital status:    Tobacco Use    Smoking status: Never     Passive exposure: Never    Smokeless tobacco: Never   Vaping Use    Vaping Use: Never used   Substance and Sexual Activity    Alcohol  use: Yes     Comment: 3 DRINKS WEEKLY    Drug use: Not Currently    Sexual activity: Yes     Partners: Female       ALLERGIES  Aspirin and Lisinopril    The patient's allergies have been reviewed    REVIEW OF SYSTEMS  Unable to obtain review of systems secondary to altered mental status    PHYSICAL EXAM  I have reviewed the triage vital signs and nursing notes.  ED Triage Vitals [08/19/23 1232]   Temp Heart Rate Resp BP SpO2   -- 80 17 (!) 158/102 95 %      Temp src Heart Rate Source Patient Position BP Location FiO2 (%)   -- -- -- -- --       General: No acute distress.  HENT: NCAT, PERRL, Nares patent.  Eyes: no scleral icterus.  Neck: trachea midline, no ROM limitations.  CV: regular rhythm, regular rate.  Respiratory: normal effort, CTAB.  Abdomen: soft, nondistended, NTTP, no rebound tenderness, no guarding or rigidity.  Musculoskeletal: no deformity.  Neuro: alert, no facial droop, able to speak 1 word at a time, no obvious dysarthria or aphasia, patient unable answer questions, moving all extremities well, sensation intact to light touch all extremities, intermittently following commands, neuro exam limited secondary to altered mental status, no focal deficits.  Skin: warm, dry.    LAB RESULTS  Recent Results (from the past 24 hour(s))   POC Glucose Once    Collection Time: 08/19/23 12:33 PM    Specimen: Blood   Result Value Ref Range    Glucose 184 (H) 70 - 130 mg/dL   ECG 12 Lead Syncope    Collection Time: 08/19/23 12:33 PM   Result Value Ref Range    QT Interval 393 ms   Comprehensive Metabolic Panel    Collection Time: 08/19/23 12:43 PM    Specimen: Blood   Result Value Ref Range    Glucose 193 (H) 65 - 99 mg/dL    BUN 24 (H) 8 - 23 mg/dL    Creatinine 1.16 0.76 - 1.27 mg/dL    Sodium 137 136 - 145 mmol/L    Potassium 3.9 3.5 - 5.2 mmol/L    Chloride 96 (L) 98 - 107 mmol/L    CO2 25.0 22.0 - 29.0 mmol/L    Calcium 10.2 8.6 - 10.5 mg/dL    Total Protein 8.0 6.0 - 8.5 g/dL    Albumin 5.1 3.5 - 5.2 g/dL     ALT (SGPT) 31 1 - 41 U/L    AST (SGOT) 27 1 - 40 U/L    Alkaline Phosphatase 150 (H) 39 - 117 U/L    Total Bilirubin 0.4 0.0 - 1.2 mg/dL    Globulin 2.9 gm/dL    A/G Ratio 1.8 g/dL    BUN/Creatinine Ratio 20.7 7.0 - 25.0    Anion Gap 16.0 (H) 5.0 - 15.0 mmol/L    eGFR 72.1 >60.0 mL/min/1.73   Ethanol    Collection Time: 08/19/23 12:43 PM    Specimen: Blood   Result Value Ref Range    Ethanol <10 0 - 10 mg/dL    Ethanol % <0.010 %   Magnesium    Collection Time: 08/19/23 12:43 PM    Specimen: Blood   Result Value Ref Range    Magnesium 2.3 1.6 - 2.4 mg/dL   CBC Auto Differential    Collection Time: 08/19/23 12:43 PM    Specimen: Blood   Result Value Ref Range    WBC 11.18 (H) 3.40 - 10.80 10*3/mm3    RBC 6.59 (H) 4.14 - 5.80 10*6/mm3    Hemoglobin 16.7 13.0 - 17.7 g/dL    Hematocrit 51.3 (H) 37.5 - 51.0 %    MCV 77.8 (L) 79.0 - 97.0 fL    MCH 25.3 (L) 26.6 - 33.0 pg    MCHC 32.6 31.5 - 35.7 g/dL    RDW 19.4 (H) 12.3 - 15.4 %    RDW-SD 49.1 37.0 - 54.0 fl    MPV 9.8 6.0 - 12.0 fL    Platelets 312 140 - 450 10*3/mm3    Neutrophil % 75.7 42.7 - 76.0 %    Lymphocyte % 16.5 (L) 19.6 - 45.3 %    Monocyte % 4.8 (L) 5.0 - 12.0 %    Eosinophil % 1.9 0.3 - 6.2 %    Basophil % 0.5 0.0 - 1.5 %    Immature Grans % 0.6 (H) 0.0 - 0.5 %    Neutrophils, Absolute 8.46 (H) 1.70 - 7.00 10*3/mm3    Lymphocytes, Absolute 1.84 0.70 - 3.10 10*3/mm3    Monocytes, Absolute 0.54 0.10 - 0.90 10*3/mm3    Eosinophils, Absolute 0.21 0.00 - 0.40 10*3/mm3    Basophils, Absolute 0.06 0.00 - 0.20 10*3/mm3    Immature Grans, Absolute 0.07 (H) 0.00 - 0.05 10*3/mm3    nRBC 0.0 0.0 - 0.2 /100 WBC   Phosphorus    Collection Time: 08/19/23 12:43 PM    Specimen: Blood   Result Value Ref Range    Phosphorus 2.9 2.5 - 4.5 mg/dL   Urinalysis With Microscopic If Indicated (No Culture) - Urine, Clean Catch    Collection Time: 08/19/23  2:29 PM    Specimen: Urine, Clean Catch   Result Value Ref Range    Color, UA Yellow Yellow, Straw    Appearance, UA Clear Clear     pH, UA 6.0 5.0 - 8.0    Specific Gravity, UA 1.020 1.005 - 1.030    Glucose, UA >=1000 mg/dL (3+) (A) Negative    Ketones, UA Negative Negative    Bilirubin, UA Negative Negative    Blood, UA Negative Negative    Protein,  mg/dL (2+) (A) Negative    Leuk Esterase, UA Trace (A) Negative    Nitrite, UA Positive (A) Negative    Urobilinogen, UA 0.2 E.U./dL 0.2 - 1.0 E.U./dL   Urinalysis, Microscopic Only - Urine, Clean Catch    Collection Time: 08/19/23  2:29 PM    Specimen: Urine, Clean Catch   Result Value Ref Range    RBC, UA 0-2 None Seen, 0-2 /HPF    WBC, UA 13-20 (A) None Seen, 0-2 /HPF    Bacteria, UA 4+ (A) None Seen /HPF    Squamous Epithelial Cells, UA 0-2 None Seen, 0-2 /HPF    Hyaline Casts, UA None Seen None Seen /LPF    Methodology Automated Microscopy        I ordered the above labs and reviewed the results.    RADIOLOGY  CT Head Without Contrast    Result Date: 8/19/2023  CT HEAD WITHOUT CONTRAST  CLINICAL HISTORY: Multiple seizures.  TECHNIQUE: CT scan of the head was obtained with 3 mm axial soft tissue algorithm images. No intravenous contrast was administered. Sagittal and coronal reconstructions were obtained.  COMPARISON: Brain MRI dated 08/14/2023.   FINDINGS:   The ventricles, sulci, and cisterns are age-appropriate. The gray-white matter differentiation is within normal limits. The basal ganglia and thalami are unremarkable in appearance. The posterior fossa structures are within normal limits.       No CT evidence for acute intracranial pathology. The etiology of the patient's seizures is not further elucidated on this examination.   Radiation dose reduction techniques were utilized, including automated exposure control and exposure modulation based on body size.  This report was finalized on 8/19/2023 2:46 PM by Dr. Donald Leonard M.D.      XR Chest 1 View    Result Date: 8/19/2023  ONE-VIEW PORTABLE CHEST  HISTORY: Seizure.  FINDINGS: The lungs are well expanded and clear except for  a calcified granuloma in the right upper lobe. The heart is borderline enlarged and there is no acute disease or change from 5 days ago.  This report was finalized on 8/19/2023 2:03 PM by Dr. Dorian Baker M.D.       I ordered the above noted radiological studies. I reviewed the images and results. I agree with the radiologist interpretation.    PROCEDURES  Procedures    MEDICATIONS GIVEN IN ER  Medications   LORazepam (ATIVAN) injection 2 mg (has no administration in time range)   cefTRIAXone (ROCEPHIN) 2,000 mg in sodium chloride 0.9 % 100 mL IVPB-VTB (has no administration in time range)   levETIRAcetam (KEPPRA) injection 1,000 mg (1,000 mg Intravenous Given 8/19/23 1242)     Followed by   levETIRAcetam (KEPPRA) injection 1,000 mg (1,000 mg Intravenous Given 8/19/23 1242)   LORazepam (ATIVAN) injection 1 mg (1 mg Intravenous Given 8/19/23 1240)       PROGRESS, DATA ANALYSIS, CONSULTS, AND MEDICAL DECISION MAKING  A complete history and physical exam have been performed.  All available laboratory and imaging results have been reviewed by myself prior to disposition.    MDM    After the initial H&P, I discussed pertinent information from history and physical exam with patient/family.  Discussed differential diagnosis.  Discussed plan for ED evaluation/workup/treatment.  All questions answered.  Patient/family is agreeable with plan.  ED Course as of 08/19/23 1502   Sat Aug 19, 2023   1236 Patient brought back as a possible stroke, history supplied by patient's ex-wife, patient with history of recent diagnosis seizures presents with multiple seizures, I do not hear any focal deficits concerning for stroke, patient has no focal deficits at present. [JG]   1236 I am concerned for the possibility of status epilepticus although at present it is unclear if patient had returned to baseline in between seizures.  Given Keppra load, will give small dose of Ativan, monitoring closely, informed of concern with nursing staff  and as needed Ativan entered for if patient has additional seizure-like activity.  Obtaining screening lab work, will be repeat CT head imaging with recent new diagnosis of seizures and multiple seizures today. [JG]   1243 EKG independently viewed and contemporaneously interpreted by ED physician. Time: 12:33 PM.  Rate 80.  Interpretation: Normal sinus rhythm, left axis deviation, incomplete right bundle branch block, left anterior fascicular block, and except delayed R wave progression, no acute ST changes [JG]   1300 I reviewed chest x-ray in PACS, no pulmonary infiltrates per my read. [JG]   1342 Patient was just discharged from the hospital on the 16th.  Patient complained of dizziness and difficulty with speech.  Patient was evaluated by neurology, underwent CTA head and neck with no evidence of infarction, MRI brain negative for acute process, patient was evaluated by neurosurgery, MRI cervical spine showed degenerative disease with disc protrusion at C6/7.  Patient underwent EEG, started on Keppra for seizures.  Patient to follow-up with neurology and neurosurgery as outpatient. [JG]   1345 Patient reassessed.  Patient now awake and speaking, making sense, moving all extremities well, no focal deficits.  Patient denies any complaints at present other than feeling fatigued. [JG]   1435 I reviewed CT head imaging in PACS, no intracranial hemorrhage per my read. [JG]   1438 Phone call with Dr. Scales, neurology.  Discussed the patient, relevant history, exam, diagnostics, ED findings/progress, and concerns.  He requested us to check phosphorus level on patient as well, request admission to the observation center for further evaluation and he agrees to consult.  [JG]   1502 Phone call with TARAS Domingo for [facility].  Discussed the patient, relevant history, exam, diagnostics, ED findings/progress, and concerns. They agree to admit the patient to telemetry observation under Dr. Mcqueen. Care assumed by the admitting  physician at this time.     [JG]      ED Course User Index  [JG] Vladimir Jefferson MD       AS OF 15:02 EDT VITALS:    BP - 135/89  HR - 75  TEMP - 98.2 øF (36.8 øC) (Tympanic)  O2 SATS - 94%    DIAGNOSIS  Final diagnoses:   Seizures   Hyperglycemia   Acute UTI     Critical care:  Total critical care time of 40 minutes is exclusive of any other billable procedures and includes time spent with direct patient care and observation, retrospective chart review, management of acute condition, and consultation with other physicians.      DISPOSITION  ADMISSION    Discussed treatment plan and reason for admission with pt/family and admitting physician.  Pt/family voiced understanding of the plan for admission for further testing/treatment as needed.        Vladimir Jefferson MD  08/19/23 [id number]

## 2023-08-19 NOTE — H&P
[facility]   HISTORY AND PHYSICAL    Patient Name: Pablo Mendoza  : 1960  MRN: [id number]  Primary Care Physician:  Liana Hood MD  Date of admission: 2023    Subjective   Subjective     Chief Complaint:   Chief Complaint   Patient presents with    Neurologic Problem         HPI:    Pablo Mendoza is an afebrile ambulatory 62 y.o.  male with a past medical history of migraine, hypertension, hyperlipidemia, type 2 diabetes, seizures and CVA.    He presents to the emergency department at [facility] today with complaint of recurrent seizures.  He has been admitted to the ED observation unit for further testing and evaluation.    He states he woke up this morning feeling achy all over consistent with when he has had tonic-clonic seizures previously.  He reports he has had 4 episodes today of constellation of tonic-clonic movements, right-sided facial discomfort, confusion and speech issues.    His urinalysis in the emergency department was grossly positive for urinary tract infection.  He denies any fevers or chills.  He does endorse fatigue.  States he has not been sleeping well recently.  He denies any dysuria or urinary frequency.  He was started on Rocephin in the ED.    Patient was admitted to the ED observation unit about a week ago and once seen by neurology was started on Keppra 500 twice daily.  He reports he took his a.m. dose today.    Review of Systems   All systems were reviewed and negative except for: Seizures, UTI    Personal History     Past Medical History:   Diagnosis Date    Anesthesia complication     STATES HARD TO WAKE UP W/ALL SURGERIES    COVID-19     DEC 2020    Diabetes mellitus     Diverticulitis     Elevated cholesterol     Gout     Hypertension     Low back pain     Migraines     Mixed hyperlipidemia 2018    Neuropathy     Nocturia 2018    Numbness and tingling of both lower extremities     LEGS AND FEET      Seizures     AS A CHILD    Staph infection 2010    UNSURE OF SITE    Stroke     COGNITIVE DELAY    TIA (transient ischemic attack)     Type 2 diabetes mellitus, without long-term current use of insulin 01/19/2018    Weakness of both legs     LEFT WORSE        Past Surgical History:   Procedure Laterality Date    CHOLECYSTECTOMY  2011    COLONOSCOPY      2015 or 2016    HERNIA REPAIR      LUMBAR FUSION N/A 11/03/2021    Procedure: Lumbar 3 to Lumbar 4 and Lumbar 4 to Lumbar 5 laminectomy with a fusion;  Surgeon: Jose Webster MD;  Location: [facility];  Service: Robotics - Neuro;  Laterality: N/A;    SINUS SURGERY      TYMPANOPLASTY Bilateral        Family History: family history includes Alcohol abuse in his father; Arthritis in his father and mother; Diabetes in his paternal grandmother, sister, and sister; Heart disease in his mother; Hyperlipidemia in his brother; Hypertension in his brother, father, and mother; Nephrolithiasis in his father; Stroke in his mother; Stroke (age of onset: 59) in his brother; Throat cancer in his maternal grandmother. Otherwise pertinent FHx was reviewed and not pertinent to current issue.    Social History:  reports that he has never smoked. He has never been exposed to tobacco smoke. He has never used smokeless tobacco. He reports current alcohol use. He reports that he does not currently use drugs.    Home Medications:  SITagliptin-metFORMIN HCl ER, acetaminophen, allopurinol, amLODIPine, aspirin, atorvastatin, cyclobenzaprine, empagliflozin, fluticasone, glipizide, hydrOXYzine, hydroCHLOROthiazide, ibuprofen, levETIRAcetam, loratadine, metoprolol tartrate, multivitamin with minerals, rizatriptan, sertraline, and sildenafil    Allergies:  Allergies   Allergen Reactions    Aspirin Nausea Only    Lisinopril Unknown - Low Severity     Elevated creatinine           Objective   Objective     Vitals:   Temp:  [98.2 øF (36.8 øC)] 98.2 øF (36.8 øC)  Heart Rate:  [73-82] 76  Resp:   [16-20] 16  BP: (132-158)/() 135/89  Physical Exam    Constitutional: Awake, alert   Eyes: PERRLA, sclerae anicteric, no conjunctival injection   HENT: NCAT, mucous membranes moist   Neck: Supple, no thyromegaly, no lymphadenopathy, trachea midline   Respiratory: Clear to auscultation bilaterally, nonlabored respirations    Cardiovascular: RRR, no murmurs, rubs, or gallops, palpable pedal pulses bilaterally   Gastrointestinal: Positive bowel sounds, soft, nontender, nondistended   Musculoskeletal: No bilateral ankle edema, no clubbing or cyanosis to extremities   Psychiatric: Flat affect, cooperative   Neurologic: Oriented x 3, strength symmetric in all extremities, Cranial Nerves grossly intact to confrontation, speech clear   Skin: No rashes     Result Review    Result Review:  I have personally reviewed the results from the time of this admission to 8/19/2023 15:13 EDT and agree with these findings:  [x]  Laboratory list / accordion  []  Microbiology  [x]  Radiology  [x]  EKG/Telemetry   []  Cardiology/Vascular   []  Pathology  []  Old records  []  Other:  Most notable findings include: Serum WBC 11, blood glucose 193, urinalysis shows 4+ bacteria with nitrite positive and trace leukocytes, UDS and EtOH negative, CT head without contrast negative for acute intracranial pathology      Assessment & Plan   Assessment / Plan     Brief Patient Summary:  Pablo Mendoza is a 62 y.o. male who is being evaluated for seizures.    Active Hospital Problems:  Active Hospital Problems    Diagnosis     **Seizure      Plan:     Recurrent seizures  Consult to neurology  Seizure precautions  Continue home Keppra 500 twice daily  Consult to CCP for assistance with home health    Urinary tract infection  Rocephin 1 g daily  Urine culture pending    Hypertension  Vitals every 4 hours  Continue home medications      DVT prophylaxis:  Mechanical DVT prophylaxis orders are present.    CODE STATUS:    Level Of Support Discussed  With: Patient  Code Status (Patient has no pulse and is not breathing): CPR (Attempt to Resuscitate)  Medical Interventions (Patient has pulse or is breathing): Full Support    Admission Status:  I believe this patient meets observation status.    Electronically signed by ANUSHA Key, 08/19/23, 3:13 PM EDT.    75 minutes has been spent by [facility] providers in the care of this patient while under observation status      I have worn appropriate PPE during this patient encounter, sanitized my hands both with entering and exiting patient's room.

## 2023-08-19 NOTE — CASE MANAGEMENT/SOCIAL WORK
Discharge Planning Assessment  [facility]     Patient Name: Pablo Mendoza  MRN: [id number]  Today's Date: 8/19/2023    Admit Date: 8/19/2023        Discharge Needs Assessment    No documentation.                  Discharge Plan       Row Name 08/19/23 1344       Plan    Plan Comments Per request from ERT, spoke w/ ex-wife as she is requesting info for family on obtaining POA or health care surrogate status. Explained to her that POA has to be done through an . Re health care surrogate info-spoke w/  who is coming to speak w/ alexandra-DANIELA Estes RN                  Continued Care and Services - Admitted Since 8/19/2023    Coordination has not been started for this encounter.          Demographic Summary    No documentation.                  Functional Status    No documentation.                  Psychosocial    No documentation.                  Abuse/Neglect    No documentation.                  Legal    No documentation.                  Substance Abuse    No documentation.                  Patient Forms    No documentation.                     Ashley Estes RN

## 2023-08-19 NOTE — DISCHARGE PLACEMENT REQUEST
"Pablo Virgen (62 y.o. Male)       Date of Birth   1960    Social Security Number       Address   [address] [address]    Home Phone   [phone]    MRN   [id number]       Caodaism   Mu-ism    Marital Status                               Admission Date   8/19/23    Admission Type   Emergency    Admitting Provider   Anup Mcqueen MD    Attending Provider   Anup Mcqueen MD    Department, Room/Bed   [facility] OBSERVATION, 116/1       Discharge Date       Discharge Disposition       Discharge Destination                                 Attending Provider: Anup Mcqueen MD    Allergies: Aspirin, Lisinopril    Isolation: None   Infection: None   Code Status: CPR    Ht: 170.2 cm (67\")   Wt: 86.6 kg (191 lb)    Admission Cmt: None   Principal Problem: Seizure [R56.9]                   Active Insurance as of 8/19/2023       Primary Coverage       Payor Plan Insurance Group Employer/Plan Group    [employer] BLUE [employer]OSS [employer] BLUE [employer] BLUE [employer] PPO [id number]       Payor Plan Address Payor Plan Phone Number Payor Plan Fax Number Effective Dates     [address] [phone]  3/1/2011 - None Entered    [place] [address]         Subscriber Name Subscriber Birth Date Member ID       PABLO VIRGEN 1960 [id number]                     Emergency Contacts        (Rel.) Home Phone Work Phone Mobile Phone    Lulú Sarmiento (Sister) [phone] -- [phone]    Sloane Obregon (Sister) [phone] -- [phone]                "

## 2023-08-19 NOTE — ED NOTES
Nursing report ED to floor  Pablo Mendoza  62 y.o.  male    HPI :   Chief Complaint   Patient presents with    Neurologic Problem       Admitting doctor:   Anup Mcqueen MD    Admitting diagnosis:   The primary encounter diagnosis was Seizures. Diagnoses of Hyperglycemia and Acute UTI were also pertinent to this visit.    Code status:   Current Code Status       Date Active Code Status Order ID Comments User Context       8/19/2023 1503 CPR (Attempt to Resuscitate) [id number]  Chayo Ott APRN ED        Question Answer    Code Status (Patient has no pulse and is not breathing) CPR (Attempt to Resuscitate)    Medical Interventions (Patient has pulse or is breathing) Full Support    Level Of Support Discussed With Patient                    Allergies:   Aspirin and Lisinopril    Isolation:   No active isolations    Intake and Output  No intake or output data in the 24 hours ending 08/19/23 1513    Weight:       08/19/23  1236   Weight: 87 kg (191 lb 11.4 oz)       Most recent vitals:   Vitals:    08/19/23 1401 08/19/23 1409 08/19/23 1446 08/19/23 1512   BP: 132/87  135/89    BP Location:   Right arm    Patient Position:   Lying    Pulse: 73 78 75 76   Resp:   16    Temp:       TempSrc:       SpO2: 92% 95% 94% 91%   Weight:           Active LDAs/IV Access:   Lines, Drains & Airways       Active LDAs       Name Placement date Placement time Site Days    Peripheral IV 08/19/23 1232 Right Antecubital 08/19/23  1232  Antecubital  less than 1                    Labs (abnormal labs have a star):   Labs Reviewed   COMPREHENSIVE METABOLIC PANEL - Abnormal; Notable for the following components:       Result Value    Glucose 193 (*)     BUN 24 (*)     Chloride 96 (*)     Alkaline Phosphatase 150 (*)     Anion Gap 16.0 (*)     All other components within normal limits    Narrative:     GFR Normal >60  Chronic Kidney Disease <60  Kidney Failure <15     URINALYSIS W/ MICROSCOPIC IF INDICATED (NO CULTURE) - Abnormal; Notable  for the following components:    Glucose, UA >=1000 mg/dL (3+) (*)     Protein,  mg/dL (2+) (*)     Leuk Esterase, UA Trace (*)     Nitrite, UA Positive (*)     All other components within normal limits   CBC WITH AUTO DIFFERENTIAL - Abnormal; Notable for the following components:    WBC 11.18 (*)     RBC 6.59 (*)     Hematocrit 51.3 (*)     MCV 77.8 (*)     MCH 25.3 (*)     RDW 19.4 (*)     Lymphocyte % 16.5 (*)     Monocyte % 4.8 (*)     Immature Grans % 0.6 (*)     Neutrophils, Absolute 8.46 (*)     Immature Grans, Absolute 0.07 (*)     All other components within normal limits   URINALYSIS, MICROSCOPIC ONLY - Abnormal; Notable for the following components:    WBC, UA 13-20 (*)     Bacteria, UA 4+ (*)     All other components within normal limits   POCT GLUCOSE FINGERSTICK - Abnormal; Notable for the following components:    Glucose 184 (*)     All other components within normal limits   URINE DRUG SCREEN - Normal    Narrative:     Negative Thresholds Per Drugs Screened:    Amphetamines                 500 ng/ml  Barbiturates                 200 ng/ml  Benzodiazepines              100 ng/ml  Cocaine                      300 ng/ml  Methadone                    300 ng/ml  Opiates                      300 ng/ml  Oxycodone                    100 ng/ml  THC                           50 ng/ml  Fentanyl                       5 ng/ml      The Normal Value for all drugs tested is negative. This report includes final unconfirmed screening results to be used for medical treatment purposes only. Unconfirmed results must not be used for non-medical purposes such as employment or legal testing. Clinical consideration should be applied to any drug of abuse test, particularly when unconfirmed results are used.           MAGNESIUM - Normal   PHOSPHORUS - Normal   URINE CULTURE   ETHANOL   CBC AND DIFFERENTIAL    Narrative:     The following orders were created for panel order CBC & Differential.  Procedure                                Abnormality         Status                     ---------                               -----------         ------                     CBC Auto Differential[[id number]]        Abnormal            Final result                 Please view results for these tests on the individual orders.       EKG:   ECG 12 Lead Syncope   Preliminary Result   HEART RATE= 80  bpm   RR Interval= 750  ms   CT Interval= 173  ms   P Horizontal Axis= -20  deg   P Front Axis= 14  deg   QRSD Interval= 105  ms   QT Interval= 393  ms   QRS Axis= -62  deg   T Wave Axis= 42  deg   - ABNORMAL ECG -   Sinus rhythm   Incomplete RBBB and LAFB   Low voltage, precordial leads   Abnormal R-wave progression, late transition   Baseline wander in lead(s) I,II,III,aVR,aVL   Electronically Signed By:    Date and Time of Study: 2023-08-19 12:33:37          Meds given in ED:   Medications   LORazepam (ATIVAN) injection 2 mg (has no administration in time range)   cefTRIAXone (ROCEPHIN) 2,000 mg in sodium chloride 0.9 % 100 mL IVPB-VTB (has no administration in time range)   sodium chloride 0.9 % flush 10 mL (has no administration in time range)   sodium chloride 0.9 % flush 10 mL (has no administration in time range)   sodium chloride 0.9 % infusion 40 mL (has no administration in time range)   sennosides-docusate (PERICOLACE) 8.6-50 MG per tablet 2 tablet (has no administration in time range)     And   polyethylene glycol (MIRALAX) packet 17 g (has no administration in time range)     And   bisacodyl (DULCOLAX) EC tablet 5 mg (has no administration in time range)     And   bisacodyl (DULCOLAX) suppository 10 mg (has no administration in time range)   ondansetron (ZOFRAN) tablet 4 mg (has no administration in time range)     Or   ondansetron (ZOFRAN) injection 4 mg (has no administration in time range)   levETIRAcetam (KEPPRA) injection 1,000 mg (1,000 mg Intravenous Given 8/19/23 2622)     Followed by   levETIRAcetam (KEPPRA) injection 1,000 mg  (1,000 mg Intravenous Given 8/19/23 1242)   LORazepam (ATIVAN) injection 1 mg (1 mg Intravenous Given 8/19/23 1240)       Imaging results:  CT Head Without Contrast    Result Date: 8/19/2023   No CT evidence for acute intracranial pathology. The etiology of the patient's seizures is not further elucidated on this examination.   Radiation dose reduction techniques were utilized, including automated exposure control and exposure modulation based on body size.  This report was finalized on 8/19/2023 2:46 PM by Dr. Donald Leonard M.D.       Ambulatory status:   - bedrest    Social issues:   Social History     Socioeconomic History    Marital status:    Tobacco Use    Smoking status: Never     Passive exposure: Never    Smokeless tobacco: Never   Vaping Use    Vaping Use: Never used   Substance and Sexual Activity    Alcohol use: Yes     Comment: 3 DRINKS WEEKLY    Drug use: Not Currently    Sexual activity: Yes     Partners: Female       NIH Stroke Scale:       Nai Cervantes RN  08/19/23 15:13 EDT

## 2023-08-19 NOTE — CASE MANAGEMENT/SOCIAL WORK
Discharge Planning Assessment  Psychiatric     Patient Name: Pablo Mendoza  MRN: [id number]  Today's Date: 8/19/2023    Admit Date: 8/19/2023    Plan: Plans to go to ex-wife's home at d/c -see details below-DANIELA Estes RN   Discharge Needs Assessment       Row Name 08/19/23 1924       Living Environment    People in Home alone    Current Living Arrangements home    Potentially Unsafe Housing Conditions none    Primary Care Provided by self    Provides Primary Care For no one    Family Caregiver if Needed other (see comments)  ex-wife Le Baker, [phone]    Quality of Family Relationships supportive    Able to Return to Prior Arrangements yes       Resource/Environmental Concerns    Resource/Environmental Concerns none       Transition Planning    Patient/Family Anticipates Transition to other (see comments)  ex-wife's home. [place], [phone]. [address], [place] [address]    Patient/Family Anticipated Services at Transition home health care    Transportation Anticipated family or friend will provide       Discharge Needs Assessment    Equipment Currently Used at Home grab bar;other (see comments)  has FWW at home, but not using it    Concerns to be Addressed discharge planning    Anticipated Changes Related to Illness none    Equipment Needed After Discharge none                   Discharge Plan       Row Name 08/19/23 1928       Plan    Plan Plans to go to ex-wife's home at d/c -see details below-DANIELA Estes RN    Patient/Family in Agreement with Plan yes    Plan Comments Spoke w/ patient and ex-wife Le Baker at bedside w/ patient's permission. Introduced self and explained role. All info on facesheet, including PCP as LASHA Hood, verified. Lives alone in single story home w/ one step to enter, and is typically able to navigate step and throughout home w/o difficulty. Has one grab bar in BR at home; has FWW but is not using it. Denies need for any DME at d/c. They had  questions about a HH aide-explained the role and that it has to be arranged through HH RN and it is temporary. Explained and gave info on Personal Care agencies, [facility] and [facility]. Advised them to call their insurance compnay as well and to ask friends to help in shifts, as he is planning to go to ex-wife's home temporarily and she works on [employer], and both of his daughters work. They want someone to be w/ him in case he has a seizure. Ex-wife is Le Vivar, [phone] and she lives at [address] [place] [address]. Referral sent to [facility] w/ patient's permission. CM will follow-DANIELA Estes RN                  Continued Care and Services - Admitted Since 8/19/2023    Coordination has not been started for this encounter.          Demographic Summary       Row Name 08/19/23 1923       General Information    Admission Type observation    Arrived From emergency department    Referral Source admission list;nursing    Reason for Consult discharge planning    Preferred Language English       Contact Information    Permission Granted to Share Info With ;family/designee                   Functional Status       Row Name 08/19/23 1923       Functional Status    Usual Activity Tolerance moderate    Current Activity Tolerance moderate       Functional Status, IADL    Medications independent    Meal Preparation independent    Housekeeping independent    Laundry independent    Shopping independent       Mental Status    General Appearance WDL WDL       Mental Status Summary    Recent Changes in Mental Status/Cognitive Functioning no changes                   Psychosocial       Row Name 08/19/23 1924       Behavior WDL    Behavior WDL WDL       Emotion Mood WDL    Emotion/Mood/Affect WDL WDL       Speech WDL    Speech WDL WDL       Perceptual State WDL    Perceptual State WDL WDL       Thought Process WDL    Thought Process WDL WDL       Intellectual Performance WDL     Intellectual Performance WDL WDL    Level of Consciousness Alert                   Abuse/Neglect    No documentation.                  Legal    No documentation.                  Substance Abuse    No documentation.                  Patient Forms    No documentation.                     Ashley Estes RN

## 2023-08-19 NOTE — ED TRIAGE NOTES
Patient from home via PV. Patient's family reports that this morning when patient woke up he was disoriented. At 1130 when patient's family arrived he was able to speak. Patient then began having speech difficulty.

## 2023-08-19 NOTE — CONSULTS
" called in to speak with patient about his living will.  waited for 15 minutes to speak with pt's ex-wife as well but she was not available. Patient expressed his desire that his sister Lulú be his health care surrogate and that if he is not-decisional and in a condition he will not recover from he wants them to \"let me go.\" I confirmed that he did not want a breathing machine or a feeding tube, and he agreed. I filled out the living will form to the best of my ability at this time and will follow up with a notary on Monday to complete it and file it in his chart.   "

## 2023-08-20 ENCOUNTER — HOME HEALTH ADMISSION (OUTPATIENT)
Dept: HOME HEALTH SERVICES | Facility: HOME HEALTHCARE | Age: 63
End: 2023-08-20
Payer: COMMERCIAL

## 2023-08-20 ENCOUNTER — TRANSCRIBE ORDERS (OUTPATIENT)
Dept: HOME HEALTH SERVICES | Facility: HOME HEALTHCARE | Age: 63
End: 2023-08-20
Payer: COMMERCIAL

## 2023-08-20 ENCOUNTER — DOCUMENTATION (OUTPATIENT)
Dept: HOME HEALTH SERVICES | Facility: HOME HEALTHCARE | Age: 63
End: 2023-08-20
Payer: COMMERCIAL

## 2023-08-20 ENCOUNTER — READMISSION MANAGEMENT (OUTPATIENT)
Dept: CALL CENTER | Facility: HOSPITAL | Age: 63
End: 2023-08-20
Payer: COMMERCIAL

## 2023-08-20 VITALS
BODY MASS INDEX: 29.98 KG/M2 | SYSTOLIC BLOOD PRESSURE: 138 MMHG | DIASTOLIC BLOOD PRESSURE: 81 MMHG | TEMPERATURE: 97.6 F | HEIGHT: 67 IN | HEART RATE: 81 BPM | OXYGEN SATURATION: 93 % | WEIGHT: 191 LBS | RESPIRATION RATE: 16 BRPM

## 2023-08-20 DIAGNOSIS — R56.9 NEW ONSET SEIZURE: Primary | ICD-10-CM

## 2023-08-20 LAB
ALBUMIN SERPL-MCNC: 4.3 G/DL (ref 3.5–5.2)
ALBUMIN/GLOB SERPL: 1.5 G/DL
ALP SERPL-CCNC: 124 U/L (ref 39–117)
ALT SERPL W P-5'-P-CCNC: 26 U/L (ref 1–41)
ANION GAP SERPL CALCULATED.3IONS-SCNC: 14.4 MMOL/L (ref 5–15)
AST SERPL-CCNC: 22 U/L (ref 1–40)
BACTERIA SPEC AEROBE CULT: ABNORMAL
BASOPHILS # BLD AUTO: 0.06 10*3/MM3 (ref 0–0.2)
BASOPHILS NFR BLD AUTO: 0.5 % (ref 0–1.5)
BILIRUB SERPL-MCNC: 0.4 MG/DL (ref 0–1.2)
BUN SERPL-MCNC: 22 MG/DL (ref 8–23)
BUN/CREAT SERPL: 18.6 (ref 7–25)
CALCIUM SPEC-SCNC: 9.9 MG/DL (ref 8.6–10.5)
CHLORIDE SERPL-SCNC: 101 MMOL/L (ref 98–107)
CO2 SERPL-SCNC: 24.6 MMOL/L (ref 22–29)
CREAT SERPL-MCNC: 1.18 MG/DL (ref 0.76–1.27)
DEPRECATED RDW RBC AUTO: 46.6 FL (ref 37–54)
EGFRCR SERPLBLD CKD-EPI 2021: 70.6 ML/MIN/1.73
EOSINOPHIL # BLD AUTO: 0.19 10*3/MM3 (ref 0–0.4)
EOSINOPHIL NFR BLD AUTO: 1.7 % (ref 0.3–6.2)
ERYTHROCYTE [DISTWIDTH] IN BLOOD BY AUTOMATED COUNT: 18.3 % (ref 12.3–15.4)
GLOBULIN UR ELPH-MCNC: 2.9 GM/DL
GLUCOSE BLDC GLUCOMTR-MCNC: 178 MG/DL (ref 70–130)
GLUCOSE BLDC GLUCOMTR-MCNC: 190 MG/DL (ref 70–130)
GLUCOSE SERPL-MCNC: 162 MG/DL (ref 65–99)
HCT VFR BLD AUTO: 46.9 % (ref 37.5–51)
HGB BLD-MCNC: 15.5 G/DL (ref 13–17.7)
IMM GRANULOCYTES # BLD AUTO: 0.05 10*3/MM3 (ref 0–0.05)
IMM GRANULOCYTES NFR BLD AUTO: 0.5 % (ref 0–0.5)
LYMPHOCYTES # BLD AUTO: 2.24 10*3/MM3 (ref 0.7–3.1)
LYMPHOCYTES NFR BLD AUTO: 20.3 % (ref 19.6–45.3)
MCH RBC QN AUTO: 25.3 PG (ref 26.6–33)
MCHC RBC AUTO-ENTMCNC: 33 G/DL (ref 31.5–35.7)
MCV RBC AUTO: 76.5 FL (ref 79–97)
MONOCYTES # BLD AUTO: 0.63 10*3/MM3 (ref 0.1–0.9)
MONOCYTES NFR BLD AUTO: 5.7 % (ref 5–12)
NEUTROPHILS NFR BLD AUTO: 7.87 10*3/MM3 (ref 1.7–7)
NEUTROPHILS NFR BLD AUTO: 71.3 % (ref 42.7–76)
NRBC BLD AUTO-RTO: 0 /100 WBC (ref 0–0.2)
PLATELET # BLD AUTO: 266 10*3/MM3 (ref 140–450)
PMV BLD AUTO: 9.7 FL (ref 6–12)
POTASSIUM SERPL-SCNC: 3.6 MMOL/L (ref 3.5–5.2)
PROT SERPL-MCNC: 7.2 G/DL (ref 6–8.5)
QT INTERVAL: 393 MS
RBC # BLD AUTO: 6.13 10*6/MM3 (ref 4.14–5.8)
SODIUM SERPL-SCNC: 140 MMOL/L (ref 136–145)
WBC NRBC COR # BLD: 11.04 10*3/MM3 (ref 3.4–10.8)

## 2023-08-20 PROCEDURE — 82948 REAGENT STRIP/BLOOD GLUCOSE: CPT

## 2023-08-20 PROCEDURE — 99214 OFFICE O/P EST MOD 30 MIN: CPT | Performed by: PSYCHIATRY & NEUROLOGY

## 2023-08-20 PROCEDURE — 85025 COMPLETE CBC W/AUTO DIFF WBC: CPT | Performed by: NURSE PRACTITIONER

## 2023-08-20 PROCEDURE — G0378 HOSPITAL OBSERVATION PER HR: HCPCS

## 2023-08-20 PROCEDURE — 80053 COMPREHEN METABOLIC PANEL: CPT | Performed by: NURSE PRACTITIONER

## 2023-08-20 PROCEDURE — 63710000001 INSULIN LISPRO (HUMAN) PER 5 UNITS: Performed by: NURSE PRACTITIONER

## 2023-08-20 RX ORDER — LEVETIRACETAM 750 MG/1
750 TABLET ORAL EVERY 12 HOURS SCHEDULED
Qty: 60 TABLET | Refills: 0 | Status: SHIPPED | OUTPATIENT
Start: 2023-08-20

## 2023-08-20 RX ORDER — CEFUROXIME AXETIL 500 MG/1
500 TABLET ORAL 2 TIMES DAILY
Qty: 20 TABLET | Refills: 0 | Status: SHIPPED | OUTPATIENT
Start: 2023-08-20 | End: 2023-08-31

## 2023-08-20 RX ORDER — LEVETIRACETAM 500 MG/1
750 TABLET ORAL EVERY 12 HOURS SCHEDULED
Status: DISCONTINUED | OUTPATIENT
Start: 2023-08-20 | End: 2023-08-20 | Stop reason: HOSPADM

## 2023-08-20 RX ADMIN — Medication 10 ML: at 08:22

## 2023-08-20 RX ADMIN — EMPAGLIFLOZIN 25 MG: 25 TABLET, FILM COATED ORAL at 08:21

## 2023-08-20 RX ADMIN — METOPROLOL TARTRATE 100 MG: 50 TABLET, FILM COATED ORAL at 08:21

## 2023-08-20 RX ADMIN — INSULIN LISPRO 2 UNITS: 100 INJECTION, SOLUTION INTRAVENOUS; SUBCUTANEOUS at 12:34

## 2023-08-20 RX ADMIN — LEVETIRACETAM 500 MG: 500 TABLET, FILM COATED ORAL at 08:22

## 2023-08-20 RX ADMIN — SENNOSIDES AND DOCUSATE SODIUM 2 TABLET: 50; 8.6 TABLET ORAL at 08:22

## 2023-08-20 RX ADMIN — ALLOPURINOL 300 MG: 100 TABLET ORAL at 08:20

## 2023-08-20 NOTE — OUTREACH NOTE
Prep Survey      Flowsheet Row Responses   [facility] patient discharged from? [facility]   Is LACE score < 7 ? Yes   Eligibility [facility]   Date of Admission 08/19/23   Date of Discharge 08/20/23   Discharge Disposition Home or Self Care   Discharge diagnosis seizure   Does the patient have one of the following disease processes/diagnoses(primary or secondary)? Other   Does the patient have Home health ordered? Yes   What is the Home health agency?  [facility]   Is there a DME ordered? No   Prep survey completed? Yes            Darshana GONZALEZ - Registered Nurse

## 2023-08-20 NOTE — PROGRESS NOTES
Patient is discharging today . Patient has no current home health and per ex wife they are agreeable to home health.Please note will be at alternate address- [place], [phone] and she lives at [address] [place] [address] in [place] [facility]. She is the primary contact . Orders for home health PT in [facility]. Thank you !

## 2023-08-20 NOTE — CONSULTS
NEUROLOGY CONSULT    DOS: 2023  NAME: Pablo Mendoza   : 1960  PCP: Liana Hood MD  CC: Stroke  Referring MD: Anup Mcqueen MD      Neurological Problem and Interval History:  62 y.o. who presents with reported seizure activity in the setting of UTI.  Patient was recently admitted and evaluated for crescendo TIAs versus seizures.  Given his multiple stereotypical spells patient was started on a trial of Keppra.  Patient then presents after endorsing having 6 seizure activity and witnessed by others.  Patient was found to have a urinary tract infection.  Overall unclear whether or not this represents anxiety versus seizure disorder.  Patient is on Keppra trial.  This likely represents breakthrough seizures in the setting of acute infection.      Past Medical/Surgical Hx:  Past Medical History:   Diagnosis Date    Anesthesia complication     STATES HARD TO WAKE UP W/ALL SURGERIES    COVID-19     DEC 2020    Diabetes mellitus     Diverticulitis     Elevated cholesterol     Gout     Hypertension     Low back pain     Migraines     Mixed hyperlipidemia 2018    Neuropathy     Nocturia 2018    Numbness and tingling of both lower extremities     LEGS AND FEET     Seizures     AS A CHILD    Staph infection     UNSURE OF SITE    Stroke     COGNITIVE DELAY    TIA (transient ischemic attack)     Type 2 diabetes mellitus, without long-term current use of insulin 2018    Weakness of both legs     LEFT WORSE      Past Surgical History:   Procedure Laterality Date    CHOLECYSTECTOMY      COLONOSCOPY       or 2016    HERNIA REPAIR      LUMBAR FUSION N/A 2021    Procedure: Lumbar 3 to Lumbar 4 and Lumbar 4 to Lumbar 5 laminectomy with a fusion;  Surgeon: Jose Webster MD;  Location: [facility];  Service: Robotics - Neuro;  Laterality: N/A;    SINUS SURGERY      TYMPANOPLASTY Bilateral          Medications On Admission  Medications Prior to Admission    Medication Sig Dispense Refill Last Dose    acetaminophen (TYLENOL) 500 MG tablet Take 1 tablet by mouth Every 6 (Six) Hours As Needed for Mild Pain.   8/18/2023    allopurinol (ZYLOPRIM) 300 MG tablet TAKE ONE TABLET BY MOUTH TWICE A DAY (Patient taking differently: Take 1 tablet by mouth 2 (Two) Times a Day.) 60 tablet 1 8/19/2023 at 1000    amLODIPine (NORVASC) 10 MG tablet TAKE 1 TABLET BY MOUTH DAILY (Patient taking differently: Take 1 tablet by mouth Daily.) 30 tablet 1 8/18/2023    aspirin 81 MG chewable tablet Chew 1 tablet Daily. FOLLOW MD GUIDELINES ON HOLDING FOR OR   8/18/2023    atorvastatin (Lipitor) 40 MG tablet Take 1 tablet by mouth Daily. (Patient taking differently: Take 1 tablet by mouth Every Night.) 90 tablet 1 8/18/2023    cyclobenzaprine (FLEXERIL) 10 MG tablet Take 1 tablet by mouth 2 (Two) Times a Day As Needed for Muscle Spasms. 60 tablet 0 Past Month    fluticasone (Flonase) 50 MCG/ACT nasal spray 2 sprays into the nostril(s) as directed by provider As Needed for Rhinitis.   Past Week    glipizide (GLUCOTROL) 5 MG tablet TAKE 1 TABLET BY MOUTH TWICE A DAY BEFORE A MEAL 60 tablet 0 8/19/2023 at 1000    hydroCHLOROthiazide (HYDRODIURIL) 25 MG tablet TAKE ONE TABLET BY MOUTH DAILY (Patient taking differently: Take 1 tablet by mouth Daily.) 30 tablet 2 8/18/2023    hydrOXYzine (ATARAX) 25 MG tablet Take 1 tablet by mouth Daily As Needed for Anxiety. 30 tablet 0 Past Month    Janumet XR  MG tablet TAKE ONE TABLET BY MOUTH TWICE A DAY 60 tablet 3 8/19/2023 at 1000    Jardiance 25 MG tablet tablet TAKE ONE TABLET BY MOUTH DAILY (Patient taking differently: Take 1 tablet by mouth Daily.) 30 tablet 1 8/19/2023 at 1000    levETIRAcetam (KEPPRA) 500 MG tablet Take 1 tablet by mouth Every 12 (Twelve) Hours 60 tablet 1 8/19/2023 at 1000    loratadine (CLARITIN) 10 MG tablet TAKE ONE TABLET BY MOUTH DAILY (Patient taking differently: Take 1 tablet by mouth As Needed.) 30 tablet 0 Past Week     metoprolol tartrate (LOPRESSOR) 100 MG tablet TAKE 1 TABLET BY MOUTH TWICE A DAY (Patient taking differently: Take 1 tablet by mouth 2 (Two) Times a Day.) 60 tablet 1 8/19/2023 at 1000    multivitamin with minerals tablet tablet Take 1 tablet by mouth Daily. HOLDING FOR 7 DAYS PRIOR TO OR   8/18/2023    rizatriptan (MAXALT) 10 MG tablet Take 1 tablet by mouth 1 (One) Time for 1 dose. AT ONSET OF HEADACHE MAY REPEAT ONE TABLET IN 2 HOURS IF NEEDED (Patient taking differently: Take 1 tablet by mouth 1 (One) Time.) 15 tablet 0     sertraline (ZOLOFT) 50 MG tablet TAKE 1 TABLET BY MOUTH DAILY (Patient taking differently: Take 1 tablet by mouth Every Night.) 30 tablet 0 8/18/2023    sildenafil (Viagra) 50 MG tablet Take 1 tablet by mouth Daily As Needed for Erectile Dysfunction. 30 tablet 0     ibuprofen (ADVIL,MOTRIN) 800 MG tablet Take 1 tablet by mouth Every 8 (Eight) Hours As Needed for Mild Pain. 30 tablet 0 More than a month       Allergies:  Allergies   Allergen Reactions    Aspirin Nausea Only    Lisinopril Unknown - Low Severity     Elevated creatinine           Social Hx:  Social History     Socioeconomic History    Marital status:    Tobacco Use    Smoking status: Never     Passive exposure: Never    Smokeless tobacco: Never   Vaping Use    Vaping Use: Never used   Substance and Sexual Activity    Alcohol use: Yes     Comment: 3 DRINKS WEEKLY    Drug use: Not Currently    Sexual activity: Yes     Partners: Female       Family Hx:  Family History   Problem Relation Age of Onset    Arthritis Mother     Hypertension Mother     Heart disease Mother     Stroke Mother     Alcohol abuse Father     Arthritis Father     Hypertension Father     Nephrolithiasis Father     Diabetes Sister     Diabetes Sister     Stroke Brother 59    Hypertension Brother     Hyperlipidemia Brother     Throat cancer Maternal Grandmother     Diabetes Paternal Grandmother     Malig Hyperthermia Neg Hx        Review of Imaging  "(Interpretation of images not reports):  -Head CT: Unremarkable    Laboratory Results:   Lab Results   Component Value Date    GLUCOSE 162 (H) 08/20/2023    CALCIUM 9.9 08/20/2023     08/20/2023    K 3.6 08/20/2023    CO2 24.6 08/20/2023     08/20/2023    BUN 22 08/20/2023    CREATININE 1.18 08/20/2023    EGFRIFAFRI 101 02/03/2022    EGFRIFNONA 87 02/03/2022    BCR 18.6 08/20/2023    ANIONGAP 14.4 08/20/2023     Lab Results   Component Value Date    WBC 11.04 (H) 08/20/2023    HGB 15.5 08/20/2023    HCT 46.9 08/20/2023    MCV 76.5 (L) 08/20/2023     08/20/2023     Lab Results   Component Value Date    CHOL 170 08/14/2023    CHOL 141 10/04/2022     Lab Results   Component Value Date    HDL 29 (L) 08/14/2023    HDL 29 (L) 08/11/2023    HDL 30 (L) 02/24/2023     Lab Results   Component Value Date    LDL 63 08/14/2023    LDL 78 08/11/2023    LDL 45 02/24/2023     Lab Results   Component Value Date    TRIG 511 (H) 08/14/2023    TRIG 377 (H) 08/11/2023    TRIG 285 (H) 02/24/2023     Lab Results   Component Value Date    HGBA1C 7.70 (H) 08/14/2023     Lab Results   Component Value Date    INR 1.01 08/14/2023    INR 1.04 03/11/2020    INR 1.0 11/11/2015    PROTIME 13.4 08/14/2023    PROTIME 13.3 03/11/2020    PROTIME 13.3 11/11/2015         Physical Examination:   /81 (BP Location: Right arm, Patient Position: Lying)   Pulse 77   Temp 97.6 øF (36.4 øC) (Oral)   Resp 16   Ht 170.2 cm (67\")   Wt 86.6 kg (191 lb)   SpO2 93%   BMI 29.91 kg/mý   NIHSS:      0-->Alert: keenly responsive  0-->Answers both questions correctly  0-->Performs both tasks correctly  0=normal  0=No visual loss  0=Normal symmetric movement  0-->No drift: limb holds 90 (or 45) degrees for full 10 secs  0-->No drift: limb holds 90 (or 45) degrees for full 10 secs  0-->No drift: limb holds 90 (or 45) degrees for full 10 secs  0-->No drift: limb holds 90 (or 45) degrees for full 10 secs  0=Absent  0=Normal; no sensory " loss  0=  0=Normal  0=No abnormality     Total score: 0           Diagnoses / Discussion:  62 y.o. who presents with reported seizure activity in the setting of UTI.  Patient was recently admitted and evaluated for crescendo TIAs versus seizures.  Given his multiple stereotypical spells patient was started on a trial of Keppra.  Patient then presents after endorsing having 6 seizure activity and witnessed by others.  Patient was found to have a urinary tract infection.  Overall unclear whether or not this represents anxiety versus seizure disorder.  Patient is on Keppra trial.  This likely represents breakthrough seizures in the setting of acute infection.  Plan:  -Head CT unremarkable  -UTI per primary team  -Keppra  -Follow-up in neurology clinic in 3 to 4 weeks  -Seizure Precautions: Patient is not to drive for at least 3 months until cleared by a physician, operate heavy machinery, take tub baths, swim unattended, climb heights, or perform any other activities that can bring harm to himself/herself or others during an episode of altered awareness.  At this time, neurology will sign-off. Please feel free to call us back if we can be of any further assistance.       I have discussed the above with the patient and family.  Time spent with patient: 60min    MDM  Reviewed: previous chart, nursing note and vitals  Reviewed previous: labs and CT scan  Interpretation: labs and CT scan  Total time providing critical care: 30-74 minutes. This excludes time spent performing separately reportable procedures and services.  Consults: neurology       Lori Camarillo MD  Neurology

## 2023-08-20 NOTE — PROGRESS NOTES
MD ATTESTATION NOTE    The TARAS and I have discussed this patient's history, physical exam, and treatment plan.  I have reviewed the documentation and personally had a face to face interaction with the patient. I affirm the documentation and agree with the treatment and plan.  The attached note describes my personal findings.      I provided a substantive portion of the care of the patient.  I personally performed the physical exam in its entirety, and below are my findings.      Brief HPI: Patient is resting comfortably.  Did not have any seizures overnight.  Denies headache, abdominal pain, flank pain, or dysuria.    PHYSICAL EXAM  ED Triage Vitals   Temp Heart Rate Resp BP SpO2   08/19/23 1238 08/19/23 1232 08/19/23 1232 08/19/23 1232 08/19/23 1232   98.2 øF (36.8 øC) 80 17 (!) 158/102 95 %      Temp src Heart Rate Source Patient Position BP Location FiO2 (%)   08/19/23 1238 08/19/23 1238 08/19/23 1446 08/19/23 1446 --   Tympanic Monitor Lying Right arm          GENERAL: Awake, alert, oriented x3.  Well-developed, well-nourished male.  Resting comfortably in no acute distress  HENT: nares patent  EYES: no scleral icterus  CV: regular rhythm, normal rate  RESPIRATORY: normal effort, clear to auscultation bilaterally  ABDOMEN: soft, nontender, no CVA tenderness  MUSCULOSKELETAL: Extremities are nontender with full range of motion  NEURO: Speech is clear and fluent.  No facial droop.  Normal strength in all extremities.  PSYCH:  calm, cooperative  SKIN: warm, dry    Vital signs and nursing notes reviewed.        Plan: Patient was given a dose of IV Rocephin in the ED yesterday.  Urine culture is pending.  We will continue IV Keppra.  Neurology consult is pending.

## 2023-08-20 NOTE — PLAN OF CARE
Goal Outcome Evaluation:  Plan of Care Reviewed With: patient  Patient is alert and oriented times 4. On room air. Vital signs are stable. Patient being discharged home. IV removed. Up ad eula. Questions and concerns addressed with patient. Patient is agreeable with plan. Mitali Domingo spoke with patient and friend. Home health setup for patient. AVS and discharge paperwork given to patient. Prescriptions sent to [facility] per patient's request. Patient to follow up with primary care provider and neurology. Patient left with friend via private vehicle with all personal belongings.

## 2023-08-20 NOTE — PLAN OF CARE
Goal Outcome Evaluation:      Patient admitted to the observation unit for treatment of seizures. Precautions adjusted. Vss. Neurology consulting. No seizures or pain overnight. Case . Will continue to monitor for any changes

## 2023-08-20 NOTE — PROGRESS NOTES
The patient will now be going to his home , face sheet is correct and phone numbers as well. Will transcribe home health orders for SN and PT . Thank you !

## 2023-08-20 NOTE — PROGRESS NOTES
.ED OBSERVATION PROGRESS/DISCHARGE SUMMARY    Date of Admission: 8/19/2023   LOS: 0 days   PCP: Liana Hood MD      Subjective     Resting comfortably and in no acute distress.    Hospital Outcome:   62-year-old male was seen and examined at bedside following admission to the observation unit due to recurrent seizures.  Patient was found to have UTI in the ED therefore was started on Rocephin.  CT head without negative acute.      Per chart review, patient was seen in observation unit a week ago, MRI brain and MRI cervical spine were negative for acute findings.  EEG unremarkable and Dr. Camarillo with neurology service patient was started on Keppra 500 twice daily.    He was seen and evaluated by Dr. Camarillo with the neurology service today who recommends management of his urinary tract infection and continuation of his home antiepileptic medication with increased dose to 750 mg twice daily.  Patient has not had any additional episodes while he has been in the ED obs unit.  He has not had any fevers or chills overnight.  His white count is stable at 11 today.  We will discharge him home on oral antibiotics and have him follow-up with neurology as an outpatient.    Patient was seen and evaluated by physical therapy service, CCP was notified and an ambulatory referral for home health PT was placed.    ROS:  General: no fevers, chills  Respiratory: no cough, dyspnea  Cardiovascular: no chest pain, palpitations  Abdomen: No abdominal pain, nausea, vomiting, or diarrhea  Neurologic: No focal weakness    Objective   Physical Exam:  I have reviewed the vital signs.  Temp:  [97.6 øF (36.4 øC)-98.2 øF (36.8 øC)] 97.9 øF (36.6 øC)  Heart Rate:  [67-85] 80  Resp:  [16-20] 16  BP: (118-158)/() 118/66  General Appearance:    Alert, cooperative, no distress  Head:    Normocephalic, atraumatic  Eyes:    Sclerae anicteric  Neck:   Supple, no mass  Lungs: Clear to auscultation bilaterally, respirations  unlabored  Heart: Regular rate and rhythm, S1 and S2 normal, no murmur, rub or gallop  Abdomen:  Soft, non-tender, bowel sounds active, nondistended  Extremities: No clubbing, cyanosis, or edema to lower extremities  Pulses:  2+ and symmetric in distal lower extremities  Skin: No rashes   Neurologic: Oriented x3, Normal strength to extremities    Results Review:    I have reviewed the labs, radiology results and diagnostic studies.    Results from last 7 days   Lab Units 08/19/23  1243   WBC 10*3/mm3 11.18*   HEMOGLOBIN g/dL 16.7   HEMATOCRIT % 51.3*   PLATELETS 10*3/mm3 312     Results from last 7 days   Lab Units 08/19/23  1243 08/16/23  0418 08/15/23  0403 08/14/23  1446 08/14/23  1110   SODIUM mmol/L 137 138 140   < > 136   POTASSIUM mmol/L 3.9 3.4* 3.6   < > 3.8   CHLORIDE mmol/L 96* 101 98   < > 98   CO2 mmol/L 25.0 26.5 26.7   < > 25.0   BUN mg/dL 24* 20 19   < > 16   CREATININE mg/dL 1.16 1.15 1.08   < > 1.30*   CALCIUM mg/dL 10.2 9.7 9.3   < > 9.3   BILIRUBIN mg/dL 0.4  --   --   --  0.3   ALK PHOS U/L 150*  --   --   --  127*   ALT (SGPT) U/L 31  --   --   --  22   AST (SGOT) U/L 27  --   --   --  23   GLUCOSE mg/dL 193* 187* 173*   < > 186*    < > = values in this interval not displayed.     Imaging Results (Last 24 Hours)       Procedure Component Value Units Date/Time    CT Head Without Contrast [[id number]] Collected: 08/19/23 1445     Updated: 08/19/23 1449    Narrative:      CT HEAD WITHOUT CONTRAST     CLINICAL HISTORY: Multiple seizures.     TECHNIQUE: CT scan of the head was obtained with 3 mm axial soft tissue  algorithm images. No intravenous contrast was administered. Sagittal and  coronal reconstructions were obtained.     COMPARISON: Brain MRI dated 08/14/2023.        FINDINGS:       The ventricles, sulci, and cisterns are age-appropriate. The gray-white  matter differentiation is within normal limits. The basal ganglia and  thalami are unremarkable in appearance. The posterior fossa  structures  are within normal limits.       Impression:         No CT evidence for acute intracranial pathology. The etiology of the  patient's seizures is not further elucidated on this examination.         Radiation dose reduction techniques were utilized, including automated  exposure control and exposure modulation based on body size.     This report was finalized on 8/19/2023 2:46 PM by Dr. Donald Leonard M.D.       XR Chest 1 View [[id number]] Collected: 08/19/23 1400     Updated: 08/19/23 1406    Narrative:      ONE-VIEW PORTABLE CHEST     HISTORY: Seizure.     FINDINGS: The lungs are well expanded and clear except for a calcified  granuloma in the right upper lobe. The heart is borderline enlarged and  there is no acute disease or change from 5 days ago.     This report was finalized on 8/19/2023 2:03 PM by Dr. Dorian Baker M.D.               I have reviewed the medications.  ---------------------------------------------------------------------------------------------  Assessment & Plan   Assessment/Problem List    Seizure      Plan:  Recurrent seizures  Consult to neurology, cleared for discharge home  Seizure precautions  Continue home Keppra 750 twice daily  Consult to [facility] for assistance with home health     Urinary tract infection  Rocephin 1 g given, home on Ceftin 500 twice daily for 10 days  Urine culture pending     Hypertension  Continue home medications       Disposition: Home    Follow-up after Discharge: PCP    This note will serve as a discharge summary      ANUSHA Read 08/20/23 00:42 EDT    I have worn appropriate PPE during this patient encounter, sanitized my hands both with entering and exiting patient's room.      40 minutes has been spent by [facility] providers in the care of this patient while under observation status

## 2023-08-21 ENCOUNTER — HOME CARE VISIT (OUTPATIENT)
Dept: HOME HEALTH SERVICES | Facility: HOME HEALTHCARE | Age: 63
End: 2023-08-21
Payer: COMMERCIAL

## 2023-08-21 ENCOUNTER — TRANSITIONAL CARE MANAGEMENT TELEPHONE ENCOUNTER (OUTPATIENT)
Dept: CALL CENTER | Facility: HOSPITAL | Age: 63
End: 2023-08-21
Payer: COMMERCIAL

## 2023-08-21 ENCOUNTER — TELEPHONE (OUTPATIENT)
Dept: INTERNAL MEDICINE | Facility: CLINIC | Age: 63
End: 2023-08-21
Payer: COMMERCIAL

## 2023-08-21 VITALS
SYSTOLIC BLOOD PRESSURE: 124 MMHG | OXYGEN SATURATION: 94 % | HEART RATE: 76 BPM | DIASTOLIC BLOOD PRESSURE: 80 MMHG | TEMPERATURE: 97.9 F | RESPIRATION RATE: 18 BRPM

## 2023-08-21 PROCEDURE — G0299 HHS/HOSPICE OF RN EA 15 MIN: HCPCS

## 2023-08-21 NOTE — OUTREACH NOTE
Call Center TCM Note      Flowsheet Row Responses   [facility] patient discharged from? [facility]   Does the patient have one of the following disease processes/diagnoses(primary or secondary)? Other   TCM attempt successful? Yes   Call start time 1307   Call end time 1310   Discharge diagnosis seizure   Meds reviewed with patient/caregiver? Yes   Is the patient having any side effects they believe may be caused by any medication additions or changes? No   Does the patient have all medications ordered at discharge? Yes   Is the patient taking all medications as directed (includes completed medication regime)? Yes   Comments will be following up with neurology   Does the patient have an appointment with their PCP within 7-14 days of discharge? No   Nursing Interventions Patient declined scheduling/rescheduling appointment at this time, Patient desires to follow up with specialty only   What is the Home health agency?  [facility]   Has home health visited the patient within 72 hours of discharge? Call prior to 72 hours   Home health comments  care nurse should be there soon   Psychosocial issues? No   Did the patient receive a copy of their discharge instructions? Yes   Nursing interventions Reviewed instructions with patient   What is the patient's perception of their health status since discharge? Improving   Is the patient/caregiver able to teach back signs and symptoms related to disease process for when to call PCP? Yes   Is the patient/caregiver able to teach back signs and symptoms related to disease process for when to call 911? Yes   Is the patient/caregiver able to teach back the hierarchy of who to call/visit for symptoms/problems? PCP, Specialist, Home health nurse, Urgent Care, ED, 911 Yes   TCM call completed? Yes   Wrap up additional comments Doing well, no questions, states he prefers to follow up with neurology.   Call end time 1310   Would this patient benefit from a Referral to [facility] Social Work? No    Is the patient interested in additional calls from an ambulatory ? No            Maribell Gomez RN    8/21/2023, 13:10 EDT

## 2023-08-21 NOTE — TELEPHONE ENCOUNTER
Caller: ERICK    Relationship:     Best call back number: [phone]    What is the best time to reach you: ANYTIME    Who are you requesting to speak with (clinical staff, provider,  specific staff member): CLINICAL STAFF    Do you know the name of the person who called:      What was the call regarding:  ERICK IS NEEDED A DIAGNOSIS CODE FOR DOS 08/18/23.    Is it okay if the provider responds through MyChart:           THANKS

## 2023-08-22 LAB — CREAT BLDA-MCNC: 1.3 MG/DL (ref 0.6–1.3)

## 2023-08-22 NOTE — HOME HEALTH
62M with a history of diabetes, hyperlipidemia, hypertension, migraines, neuropathy, and TIA.  Recent hospitalization due to complaints of dizziness and speech difficulty. New diagnosis of seizure activity.  Started on keppra.  Has glucometer in home, but does not check BS.  Lives with niece who assists.  Ex wife also provides occassinoal short term assistance.  Ambulates without assistive device.  Medications reconciled.  Missing atorvastatin.  SN FOC seizures.  SN to teach and instruct seizures and medication regime.

## 2023-08-22 NOTE — TELEPHONE ENCOUNTER
Mar called back and I was able to supply her with the diagnosis code and his appointment with Dr. Webster.

## 2023-08-22 NOTE — TELEPHONE ENCOUNTER
Tried to call nasir with [facility] but was unable to reach and left a voicemail for her to call back

## 2023-08-23 ENCOUNTER — HOME CARE VISIT (OUTPATIENT)
Dept: HOME HEALTH SERVICES | Facility: HOME HEALTHCARE | Age: 63
End: 2023-08-23
Payer: COMMERCIAL

## 2023-08-23 VITALS
OXYGEN SATURATION: 97 % | RESPIRATION RATE: 18 BRPM | TEMPERATURE: 97.6 F | DIASTOLIC BLOOD PRESSURE: 92 MMHG | SYSTOLIC BLOOD PRESSURE: 130 MMHG | HEART RATE: 82 BPM

## 2023-08-23 PROCEDURE — G0151 HHCP-SERV OF PT,EA 15 MIN: HCPCS

## 2023-08-23 NOTE — HOME HEALTH
Patient is a 61yo male with PMH diabetes, hyperlipidemia, hypertension, migraines, neuropathy, and TIA recently seen at [facility] for dizziness, seizure activity, and speech difficulty.Patient lives alone in 1 story home, 1 step to enter. Physical Therapy FOC is seizures. Physical Therapy to provide skilled care of balance training, therapeutic exercises, gait training      Plan for next visit.  --Continue therapeutic exercises and balance training  --Continue gait training

## 2023-08-24 ENCOUNTER — HOME CARE VISIT (OUTPATIENT)
Dept: HOME HEALTH SERVICES | Facility: HOME HEALTHCARE | Age: 63
End: 2023-08-24
Payer: COMMERCIAL

## 2023-08-24 VITALS
OXYGEN SATURATION: 94 % | HEART RATE: 78 BPM | SYSTOLIC BLOOD PRESSURE: 130 MMHG | TEMPERATURE: 97.5 F | RESPIRATION RATE: 18 BRPM | DIASTOLIC BLOOD PRESSURE: 70 MMHG

## 2023-08-24 PROCEDURE — G0300 HHS/HOSPICE OF LPN EA 15 MIN: HCPCS

## 2023-08-25 ENCOUNTER — HOME CARE VISIT (OUTPATIENT)
Dept: HOME HEALTH SERVICES | Facility: HOME HEALTHCARE | Age: 63
End: 2023-08-25
Payer: COMMERCIAL

## 2023-08-25 VITALS
SYSTOLIC BLOOD PRESSURE: 116 MMHG | HEART RATE: 68 BPM | TEMPERATURE: 97.2 F | DIASTOLIC BLOOD PRESSURE: 68 MMHG | OXYGEN SATURATION: 94 %

## 2023-08-25 PROCEDURE — G0299 HHS/HOSPICE OF RN EA 15 MIN: HCPCS

## 2023-08-25 PROCEDURE — G0157 HHC PT ASSISTANT EA 15: HCPCS

## 2023-08-25 NOTE — HOME HEALTH
"Routine Visit Note: Pt a/o x4. Intermittent pauses in speech. He states, \"When I get worked up I have a hard times getting my thoughts out.\" He was recently started on Kepra for epilepsy and reports being tired and irritable.     Skill/education provided: Med assessment/education,  neuro assessment    Patient/caregiver response: Tolerated well and verbalized understanding    Plan for next visit: Neuro assessment, med assessment/educat"

## 2023-08-25 NOTE — HOME HEALTH
"SNV for cp asessment and teach on disease management r/t seizure  Discussed no driving for 3 months, no baths, operating heavy machinery, climbing heights  Reminded patient to f/u Dr. Hood in one week. Patient voiced, \" he will call and make appointment this week.\"  Patient still do not have medication Atorvastitn. Patient will  from pharmacy  Patient had seizure activity yesterday, lasting few seconds per patient  Discuss seizure precautions. Patient do not want medical alert sysytem  Nurse explained reasoning to purchase a medical alert syetem"

## 2023-08-25 NOTE — HOME HEALTH
Subjective: I had to go  my niece so she can use my car    Falls- none  Medication Changes- none    Assessment: Patient pulled in driveway upon my arrival and states he went to  his neice so she can use his car. I explained homebound status and not supposed to be driving for safety. He has some stuttering going on today during session and has to take his time to process his words. He was able to safely manuever on uneven and even surfaces outside and in the home however with history of left leg weakness and falls he would benefit with at least  a cane and he is planning on getting a walking stick. I gave him a handout and he performed standing HEP at the counter. He will require continued education for progression.     Plan for next visit:  Gait training  Review and progress HEP  Balance/transfers/safety

## 2023-08-28 ENCOUNTER — HOME CARE VISIT (OUTPATIENT)
Dept: HOME HEALTH SERVICES | Facility: HOME HEALTHCARE | Age: 63
End: 2023-08-28
Payer: COMMERCIAL

## 2023-08-28 VITALS
RESPIRATION RATE: 18 BRPM | OXYGEN SATURATION: 97 % | SYSTOLIC BLOOD PRESSURE: 132 MMHG | DIASTOLIC BLOOD PRESSURE: 72 MMHG | HEART RATE: 89 BPM

## 2023-08-28 VITALS
SYSTOLIC BLOOD PRESSURE: 122 MMHG | OXYGEN SATURATION: 95 % | RESPIRATION RATE: 18 BRPM | DIASTOLIC BLOOD PRESSURE: 70 MMHG | HEART RATE: 60 BPM | TEMPERATURE: 97 F

## 2023-08-28 VITALS
OXYGEN SATURATION: 96 % | HEART RATE: 87 BPM | DIASTOLIC BLOOD PRESSURE: 70 MMHG | TEMPERATURE: 97.8 F | SYSTOLIC BLOOD PRESSURE: 120 MMHG

## 2023-08-28 PROCEDURE — G0299 HHS/HOSPICE OF RN EA 15 MIN: HCPCS

## 2023-08-28 PROCEDURE — G0153 HHCP-SVS OF S/L PATH,EA 15MN: HCPCS

## 2023-08-28 NOTE — Clinical Note
Speech Therapy evaluation completed 8/28/23.  Speech Therapy 1w2  2w2  Cognitive linguistic therapy and education

## 2023-08-28 NOTE — HOME HEALTH
Routine Visit Note: Pt reports a headache that feels like pressure and only occurs with activity. I explained it is possibly due to kepra and that if it gets worse and doesn't subside,  go to the ED.     Skill/education provided: Med assessment/education, CP assess, emergency plan instruction    Patient/caregiver response: Tolerated well and verbalized understanding    Plan for next visit: Med assessment/education, pain assessment, Psychosocial assessment

## 2023-08-29 ENCOUNTER — HOME CARE VISIT (OUTPATIENT)
Dept: HOME HEALTH SERVICES | Facility: HOME HEALTHCARE | Age: 63
End: 2023-08-29
Payer: COMMERCIAL

## 2023-08-29 VITALS
RESPIRATION RATE: 18 BRPM | SYSTOLIC BLOOD PRESSURE: 138 MMHG | OXYGEN SATURATION: 97 % | HEART RATE: 80 BPM | TEMPERATURE: 97.5 F | DIASTOLIC BLOOD PRESSURE: 86 MMHG

## 2023-08-29 DIAGNOSIS — R80.9 TYPE 2 DIABETES MELLITUS WITH PROTEINURIA: ICD-10-CM

## 2023-08-29 DIAGNOSIS — E11.29 TYPE 2 DIABETES MELLITUS WITH PROTEINURIA: ICD-10-CM

## 2023-08-29 DIAGNOSIS — E11.9 TYPE 2 DIABETES MELLITUS WITHOUT COMPLICATION, WITHOUT LONG-TERM CURRENT USE OF INSULIN: ICD-10-CM

## 2023-08-29 PROCEDURE — G0151 HHCP-SERV OF PT,EA 15 MIN: HCPCS

## 2023-08-29 RX ORDER — EMPAGLIFLOZIN 25 MG/1
TABLET, FILM COATED ORAL
Qty: 30 TABLET | Refills: 1 | Status: SHIPPED | OUTPATIENT
Start: 2023-08-29

## 2023-08-29 NOTE — HOME HEALTH
REASON FOR REFERRAL: Speech Therapy referral post hospitalization for seizure. Speech Therapy to evaluate and establish a plan of care.  PRIMARY DIAGNOSIS Cognitive impairment  SECONDARY DIAGNOSIS  Seizure  History of TIA  PERTINENT HISTORY: Type 2 diabetes mellitus, without long-term current use of insulin, Mixed hyperlipidemia, Essential hypertension, Gout of multiple sites, Nocturia, Migraine headache, Chronic maxillary sinusitis, Other fatigue, Arthritis, Routine health maintenance, Proteinuria, Cellulitis of left foot, Lumbar radiculopathy, Stroke-like symptoms, Polycythemia, Dizziness, Cervical stenosis of spine, Cervical spondylosis without myelopathy, Seizure.     PRIOR VFSS or FEES  NA  PRIOR LEVEL OF FUNCTION  Patient living in a single family home, independently, managing home his home. Now he requires assistance with meals, finances and managing household and has difficulty working.      FOCUS OF CARE: Cognitive linguistic therapy and education to increase cognition and expressive communication due to seizures and history of TIA's. Patient will focus on increasing cognitive skills for increased safety, communication and function with less dependence on caregivers.   Patient Goal: Patient wants to get his speed in thinking and communication back.    PLAN FOR NEXT VISIT  MEDICAL NECESSITY FOR ONGOING SKILLED THERAPY: Cognitive linguistic therapy to restore cognition to increase safety, communication and function in home. Skilled Speech Therapy interventions are necessary due to a decline in cognitive linguistic skills and increased dependence on caregivers to function safely in his home.   SPECIFIC INTERVENTIONS AND GOALS TO ADDRESS ON NEXT VISIT:  Expressive communication tasks (word finding)  Higher level thinking tasks  Memory tasks  FREQUENCY AND DURATION:  1w2  2w2  ANY OTHER FOLLOW UP NEEDED: None  REASSESSMENT DUE DATE:  9/23/23

## 2023-08-30 ENCOUNTER — OFFICE VISIT (OUTPATIENT)
Dept: INTERNAL MEDICINE | Facility: CLINIC | Age: 63
End: 2023-08-30
Payer: COMMERCIAL

## 2023-08-30 VITALS
HEART RATE: 76 BPM | SYSTOLIC BLOOD PRESSURE: 122 MMHG | WEIGHT: 199.8 LBS | BODY MASS INDEX: 31.36 KG/M2 | OXYGEN SATURATION: 95 % | TEMPERATURE: 98.4 F | DIASTOLIC BLOOD PRESSURE: 70 MMHG | HEIGHT: 67 IN

## 2023-08-30 DIAGNOSIS — N39.0 URINARY TRACT INFECTION WITHOUT HEMATURIA, SITE UNSPECIFIED: ICD-10-CM

## 2023-08-30 DIAGNOSIS — R56.9 SEIZURE: ICD-10-CM

## 2023-08-30 DIAGNOSIS — Z09 HOSPITAL DISCHARGE FOLLOW-UP: Primary | ICD-10-CM

## 2023-08-30 NOTE — PROGRESS NOTES
Transitional Care Follow Up Visit  Subjective     Pablo Mendoza is a 62 y.o. male who presents for a transitional care management visit.    Within 48 business hours after discharge our office contacted him via telephone to coordinate his care and needs.      I reviewed and discussed the details of that call along with the discharge summary, hospital problems, inpatient lab results, inpatient diagnostic studies, and consultation reports with Pablo.     Current outpatient and discharge medications have been reconciled for the patient.  Reviewed by: Liana Hood MD          8/20/2023     5:14 PM   Date of TCM Phone Call   [facility]   Date of Admission 8/19/2023   Date of Discharge 8/20/2023   Discharge Disposition Home or Self Care     Risk for Readmission (LACE) Score: 3 (8/20/2023  6:00 AM)      History of Present Illness   Course During Hospital Stay:  pt here with his sister for hosp f/u  Pt was readmitted to [facility] after 6 more epileptic episodes.  Sometimes his speech goes in and out and he stutters.  He is struggling with memory and shuffling of feet.  He has been off work bc of the episodes.   He says speech therapy gave him a cognitive test and he scored 27/30 and he had slow reaction time.  Neurology was re-consulted during his admission and Keppra dose was increased.  He was treated for a coag neg UTI as well.  The pt has had more stress bc he isnt working and is worried about finance.     Pt reports he is taking allopurinol bid.      The following portions of the patient's history were reviewed and updated as appropriate: allergies, current medications, past family history, past medical history, past social history, past surgical history, and problem list.    Review of Systems   Constitutional: Negative.    HENT: Negative.     Eyes: Negative.    Respiratory: Negative.     Cardiovascular: Negative.    Gastrointestinal: Negative.    Endocrine: Negative.    Genitourinary: Negative.     Musculoskeletal: Negative.    Skin: Negative.    Allergic/Immunologic: Negative.    Neurological: Negative.    Hematological: Negative.    Psychiatric/Behavioral: Negative.     All other systems reviewed and are negative.    Objective     Wt Readings from Last 3 Encounters:   08/30/23 90.6 kg (199 lb 12.8 oz)   08/19/23 86.6 kg (191 lb)   08/18/23 85.9 kg (189 lb 6.4 oz)     Temp Readings from Last 3 Encounters:   08/30/23 98.4 øF (36.9 øC) (Infrared)   08/29/23 97.5 øF (36.4 øC) (Temporal)   08/28/23 97.8 øF (36.6 øC)     BP Readings from Last 3 Encounters:   08/30/23 122/70   08/29/23 138/86   08/28/23 120/70     Pulse Readings from Last 3 Encounters:   08/30/23 76   08/29/23 80   08/28/23 87       Physical Exam  Vitals and nursing note reviewed.   Constitutional:       Appearance: He is well-developed.      Comments: Slowed speech, occ stuttering, mild generalized slowing   HENT:      Head: Normocephalic and atraumatic.      Right Ear: External ear normal.      Left Ear: External ear normal.      Nose: Nose normal.   Eyes:      Conjunctiva/sclera: Conjunctivae normal.      Pupils: Pupils are equal, round, and reactive to light.   Cardiovascular:      Rate and Rhythm: Normal rate and regular rhythm.      Heart sounds: Normal heart sounds.   Pulmonary:      Effort: Pulmonary effort is normal. No respiratory distress.      Breath sounds: Normal breath sounds. No wheezing.   Musculoskeletal:         General: Normal range of motion.      Cervical back: Normal range of motion and neck supple.      Comments: Normal gait   Skin:     General: Skin is warm and dry.   Neurological:      Mental Status: He is alert and oriented to person, place, and time.   Psychiatric:         Behavior: Behavior normal.         Thought Content: Thought content normal.         Judgment: Judgment normal.     Common labs          8/16/2023    04:18 8/19/2023    12:43 8/20/2023    04:55   Common Labs   Glucose 187  193  162    BUN 20  24  22     Creatinine 1.15  1.16  1.18    Sodium 138  137  140    Potassium 3.4  3.9  3.6    Chloride 101  96  101    Calcium 9.7  10.2  9.9    Albumin  5.1  4.3    Total Bilirubin  0.4  0.4    Alkaline Phosphatase  150  124    AST (SGOT)  27  22    ALT (SGPT)  31  26    WBC 10.49  11.18  11.04    Hemoglobin 15.3  16.7  15.5    Hematocrit 47.1  51.3  46.9    Platelets 256  312  266        Assessment & Plan   Diagnoses and all orders for this visit:    1. Hospital discharge follow-up (Primary)    2. Seizure - having some mental slowing.  Unclear if this is related to keppra or recent seizures.  Discussed importance of f/u with neurology.  He has an active neurology referral but has not scheduled an appt for f/u.  He needs paper for disability filled out.  I can fill out initial papers that he cannot drive or do his work with heavy machinery for 90 days.  He will need to discuss with neurology if longer term disability is appropriate.      3. Urinary tract infection without hematuria, site unspecified - course of cefepime completed.  No further abx at this time    4. Gout - pt is taking allopurinol bid but only needs 300mg daily.  Reviewed correct dose with pt

## 2023-08-30 NOTE — HOME HEALTH
Subjective: I was getting ready to go to the bank     Falls- none   Medication changes-none- Verbalized understanding   Reason for PT- Epilepsy, unspecified, not intractable, without status epilepticus        Assessment:Patient had multiple near loss of balance with balance exercises. Need to continue working to improve to decrease fall risk       Plan for next visit/Communication:   Gait training   Therapeutic Exercises   Balance Exercises

## 2023-08-31 ENCOUNTER — HOME CARE VISIT (OUTPATIENT)
Dept: HOME HEALTH SERVICES | Facility: HOME HEALTHCARE | Age: 63
End: 2023-08-31
Payer: COMMERCIAL

## 2023-08-31 ENCOUNTER — TELEPHONE (OUTPATIENT)
Dept: INTERNAL MEDICINE | Facility: CLINIC | Age: 63
End: 2023-08-31
Payer: COMMERCIAL

## 2023-08-31 PROCEDURE — G0299 HHS/HOSPICE OF RN EA 15 MIN: HCPCS

## 2023-08-31 RX ORDER — ATORVASTATIN CALCIUM 40 MG/1
40 TABLET, FILM COATED ORAL DAILY
Qty: 90 TABLET | Refills: 1 | Status: SHIPPED | OUTPATIENT
Start: 2023-08-31

## 2023-08-31 NOTE — TELEPHONE ENCOUNTER
Rx Refill Note  Requested Prescriptions     Pending Prescriptions Disp Refills    atorvastatin (Lipitor) 40 MG tablet 90 tablet 1     Sig: Take 1 tablet by mouth Daily. Indications: High Amount of Fats in the Blood      Last office visit with prescribing clinician: 8/30/2023   Last telemedicine visit with prescribing clinician: Visit date not found   Next office visit with prescribing clinician: Visit date not found                         Would you like a call back once the refill request has been completed: [] Yes [] No    If the office needs to give you a call back, can they leave a voicemail: [] Yes [] No    Lluvia Griffin, PCT  08/31/23, 10:28 EDT  
ambulatory

## 2023-09-01 NOTE — HOME HEALTH
Needs to see neurologist asap. Feels like he is out of body, periods of blackout and confusion, agitation. Says he accepted $2000 that he is liable for but doesn't remember it. Needs to schedule neuro appointment with Dr. Lori Camarillo MD. [phone] (). No appointments until december/july of next year. PT refused ED. Will follow up. Contacted provider

## 2023-09-05 ENCOUNTER — HOME CARE VISIT (OUTPATIENT)
Dept: HOME HEALTH SERVICES | Facility: HOME HEALTHCARE | Age: 63
End: 2023-09-05
Payer: COMMERCIAL

## 2023-09-05 ENCOUNTER — TELEPHONE (OUTPATIENT)
Dept: NEUROLOGY | Facility: CLINIC | Age: 63
End: 2023-09-05

## 2023-09-05 VITALS
OXYGEN SATURATION: 98 % | DIASTOLIC BLOOD PRESSURE: 82 MMHG | OXYGEN SATURATION: 97 % | SYSTOLIC BLOOD PRESSURE: 134 MMHG | HEART RATE: 89 BPM | RESPIRATION RATE: 18 BRPM | HEART RATE: 89 BPM | SYSTOLIC BLOOD PRESSURE: 114 MMHG | DIASTOLIC BLOOD PRESSURE: 70 MMHG | TEMPERATURE: 97.1 F | RESPIRATION RATE: 18 BRPM | TEMPERATURE: 97.1 F

## 2023-09-05 DIAGNOSIS — F41.9 ANXIETY: ICD-10-CM

## 2023-09-05 PROCEDURE — G0299 HHS/HOSPICE OF RN EA 15 MIN: HCPCS

## 2023-09-05 NOTE — TELEPHONE ENCOUNTER
Caller: WES   Relationship to Patient: PATIENT     Phone Number: [phone]  Reason for Call: PATIENT WAS SEEN IN [facility] FOR SEIZURES. PATIENT WAS SUPPOSED TO GET A RETURN PHONE CALL FOR A HOSPITAL F/U     PLEASE CALL PATIENT WITH SCHEDULED VISIT

## 2023-09-06 ENCOUNTER — HOME CARE VISIT (OUTPATIENT)
Dept: HOME HEALTH SERVICES | Facility: HOME HEALTHCARE | Age: 63
End: 2023-09-06
Payer: COMMERCIAL

## 2023-09-06 VITALS
OXYGEN SATURATION: 97 % | DIASTOLIC BLOOD PRESSURE: 70 MMHG | HEART RATE: 75 BPM | TEMPERATURE: 96.2 F | SYSTOLIC BLOOD PRESSURE: 118 MMHG

## 2023-09-06 PROCEDURE — G0153 HHCP-SVS OF S/L PATH,EA 15MN: HCPCS

## 2023-09-06 NOTE — HOME HEALTH
Routine Visit Note: Pt calling neurologist to schedule follow up appointment today.    Skill/education provided: Neuro assess, med assess, Psych assess    Patient/caregiver response: Pt tolerated well    Plan for next visit: Follow up on scheduling neuro apt

## 2023-09-06 NOTE — HOME HEALTH
Routine Visit Note:    Skill/education provided: SLP instructed patient to provide solutions to 10 functional word math problems. He was provided a pen and paper.    Patient/caregiver response: 95 perceint accuracy with independence.     Plan for next visit: Cognitive linguistic therapy to restore cognition to increase safety, communication and function in home. Skilled Speech Therapy interventions are necessary due to a decline in cognitive linguistic skills and increased dependence on caregivers to function safely in his home.   SPECIFIC INTERVENTIONS AND GOALS TO ADDRESS ON NEXT VISIT:   Expressive communication tasks (word finding); Higher level thinking tasks; Memory tasks   FREQUENCY AND DURATION: 1w2 2w2   ANY OTHER FOLLOW UP NEEDED: None   REASSESSMENT DUE DATE: 9/23/23    Other pertinent info: None

## 2023-09-07 ENCOUNTER — HOME CARE VISIT (OUTPATIENT)
Dept: HOME HEALTH SERVICES | Facility: HOME HEALTHCARE | Age: 63
End: 2023-09-07
Payer: COMMERCIAL

## 2023-09-07 VITALS
RESPIRATION RATE: 18 BRPM | SYSTOLIC BLOOD PRESSURE: 112 MMHG | DIASTOLIC BLOOD PRESSURE: 74 MMHG | OXYGEN SATURATION: 98 % | TEMPERATURE: 96.9 F | HEART RATE: 86 BPM

## 2023-09-07 PROCEDURE — G0299 HHS/HOSPICE OF RN EA 15 MIN: HCPCS

## 2023-09-07 NOTE — HOME HEALTH
Routine Visit Note: Pt scheduled a follow up neurology appointment for next monday at 3pm. Pt still reports seizure activity. Pt seems to be post ictal.    Skill/education provided: Neuro assessment, psychosocial assessment, pain assessment    Patient/caregiver response: Tolerated well    Plan for next visit: Follow up on neuro appointment. Med rec/instruction, Neuro assessment

## 2023-09-07 NOTE — TELEPHONE ENCOUNTER
Tried to call and and speak with David regarding this but was unable to reach and left a message for him to call back.     I called neurology to see what the hold up was with his referral that was and they informed me that there was an issue with his referral. They scheduled him an appointment for September 25th. I called and spoke with Dr. Hood and informed her of this and that I was unable to get him scheduled sooner. She informed me that since the medication was started and adjusted by them that they would need to be the ones that needed to manage his Keppra. [facility] and the [place] office to see if I would be able to get him an appointment sooner since in the ED notes it said that the patient needed to follow up in 3-4 weeks post discharge. They were able to move his appointment up to Monday 9/11 at 3pm and I called and spoke with the patient and informed him of this information and supplied him with the phone number and the address. Patient verbalized his understanding and I called and spoke with Dr. Hood to inform her that I was able to get his appointment moved up and that he was going to be seen on Monday.

## 2023-09-11 ENCOUNTER — OFFICE VISIT (OUTPATIENT)
Dept: NEUROLOGY | Facility: CLINIC | Age: 63
End: 2023-09-11
Payer: COMMERCIAL

## 2023-09-11 ENCOUNTER — LAB (OUTPATIENT)
Dept: LAB | Facility: HOSPITAL | Age: 63
End: 2023-09-11
Payer: COMMERCIAL

## 2023-09-11 ENCOUNTER — HOME CARE VISIT (OUTPATIENT)
Dept: HOME HEALTH SERVICES | Facility: HOME HEALTHCARE | Age: 63
End: 2023-09-11
Payer: COMMERCIAL

## 2023-09-11 VITALS
SYSTOLIC BLOOD PRESSURE: 130 MMHG | HEART RATE: 70 BPM | OXYGEN SATURATION: 98 % | BODY MASS INDEX: 30.64 KG/M2 | WEIGHT: 195.2 LBS | DIASTOLIC BLOOD PRESSURE: 70 MMHG | HEIGHT: 67 IN

## 2023-09-11 DIAGNOSIS — R41.0 CONFUSION: ICD-10-CM

## 2023-09-11 DIAGNOSIS — R53.83 OTHER FATIGUE: ICD-10-CM

## 2023-09-11 DIAGNOSIS — R56.9 SEIZURE-LIKE ACTIVITY: Primary | ICD-10-CM

## 2023-09-11 DIAGNOSIS — G47.00 INSOMNIA, UNSPECIFIED TYPE: ICD-10-CM

## 2023-09-11 DIAGNOSIS — E55.9 VITAMIN D DEFICIENCY: ICD-10-CM

## 2023-09-11 DIAGNOSIS — E53.8 B12 DEFICIENCY: Primary | ICD-10-CM

## 2023-09-11 DIAGNOSIS — R06.83 SNORING: ICD-10-CM

## 2023-09-11 DIAGNOSIS — Z86.69 HISTORY OF OBSTRUCTIVE SLEEP APNEA: ICD-10-CM

## 2023-09-11 LAB
25(OH)D3 SERPL-MCNC: 28.1 NG/ML (ref 30–100)
FOLATE SERPL-MCNC: >20 NG/ML (ref 4.78–24.2)
VIT B12 BLD-MCNC: 270 PG/ML (ref 211–946)

## 2023-09-11 PROCEDURE — 82306 VITAMIN D 25 HYDROXY: CPT

## 2023-09-11 PROCEDURE — 82746 ASSAY OF FOLIC ACID SERUM: CPT

## 2023-09-11 PROCEDURE — 82607 VITAMIN B-12: CPT

## 2023-09-11 PROCEDURE — 36415 COLL VENOUS BLD VENIPUNCTURE: CPT

## 2023-09-11 RX ORDER — LEVETIRACETAM 750 MG/1
750 TABLET ORAL 2 TIMES DAILY
Qty: 180 TABLET | Refills: 1 | Status: SHIPPED | OUTPATIENT
Start: 2023-09-11 | End: 2024-03-09

## 2023-09-11 NOTE — PROGRESS NOTES
"[facility] NEUROLOGY         Date of Visit: 2023    Name: Pablo Mendoza    :  1960    PCP: Liana Hood MD    Visit Type: an initial evaluation         Subjective     Patient ID: Pablo is a 62 y.o. male.         History of Present Illness  I had the pleasure of seeing your patient for the first time today.  As you may know he is a 62-year-old male here today for hospital follow-up for altered mental status, stuttering speech, and possible seizure-like activity.    History:    Patient does have a history of diabetes type 2 most recent A1c was 7.6, hypertension, gout, cervical spinal stenosis followed by neurosurgery, obstructive sleep apnea not currently on sleep machine.  Last sleep study was several years ago.    Patient does have significant medical history for neurologic evaluation in the past.  Patient was evaluated in  for altered level of awareness diagnosed with TIA at that time.  In addition to this in  he was evaluated in the hospital for left-sided facial paralysis that lasted approximately 1 week.  Patient was never diagnosed with stroke.  At both of these they determined that likely patient was experiencing complex migraine or possibly cluster type headaches.  Patient then followed up with neurology outpatient for many years.  His last neurologist was Dr. Benz.  He was treated for complex migraine at that time.    Patient states that however he has been having some additional episodes more recently.  He states he feels episodes started in .  He describes them as stuttering type episodes with potential losses of awareness or possibly \"seizures in his sleep\".  The episodes include stuttering speech, slowness of speech and confusion.  It is becoming more and more frequent happening several times daily and almost every day per week.  They state that in addition to this he had an episode when he was with his sister at a hotel 1 night where he was up " walking around in the middle of the night.  When she asked him what was wrong he said he did not know.  She asked if he needed to use the bathroom he said he did not know.  She told him to go back to bed and he laid down.  His sister states that overall he is just more confused than he is normally.  She states he is typically very mentally intact.    Patient did end up in the hospital for evaluation in mid August.  At that time CTA head and neck, MRI brain, and EEG were all performed with no significant abnormalities.  There was some mild enlargement of 1 side of the pituitary gland that had been stable from previous imaging done in 2010.  No hydrocephalus or any other abnormalities were noted.  EEG showed no normal rhythm with no epileptiform abnormalities.  Patient was however placed on Keppra 750 mg twice daily for seizure prophylaxis and encouraged to follow-up outpatient for additional evaluation.        The following portions of the patient's history were reviewed and updated as appropriate: allergies, current medications, past family history, past medical history, past social history, past surgical history, and problem list.                 Review of Systems   Constitutional:  Positive for fatigue. Negative for activity change, appetite change and unexpected weight change.   HENT:  Negative for hearing loss and trouble swallowing.    Eyes:  Negative for visual disturbance.   Respiratory:  Negative for chest tightness and shortness of breath.    Cardiovascular:  Negative for palpitations.   Gastrointestinal:  Negative for constipation, diarrhea, nausea and vomiting.   Genitourinary:  Negative for decreased urine volume, difficulty urinating and frequency.   Musculoskeletal:  Positive for back pain and neck pain. Negative for gait problem.   Neurological:  Positive for speech difficulty (Stuttering speech with word finding difficulty). Negative for dizziness, tremors, seizures, syncope, facial asymmetry,  "weakness, light-headedness, numbness and headaches.   Psychiatric/Behavioral:  Positive for confusion, decreased concentration and sleep disturbance (Insomnia as well as frequent waking). Negative for agitation, behavioral problems, dysphoric mood and hallucinations. The patient is nervous/anxious.           Current Medications:    Current Outpatient Medications   Medication Instructions    acetaminophen (TYLENOL) 500 MG tablet 1 tablet, Oral, Every 6 Hours PRN    allopurinol (ZYLOPRIM) 300 MG tablet TAKE ONE TABLET BY MOUTH TWICE A DAY    amLODIPine (NORVASC) 10 MG tablet TAKE 1 TABLET BY MOUTH DAILY    aspirin 81 mg, Daily    atorvastatin (LIPITOR) 40 mg, Oral, Daily    cyclobenzaprine (FLEXERIL) 10 mg, Oral, 2 Times Daily PRN    fluticasone (Flonase) 50 MCG/ACT nasal spray 2 sprays, Nasal, As Needed    glipizide (GLUCOTROL) 5 MG tablet TAKE 1 TABLET BY MOUTH TWICE A DAY BEFORE A MEAL    hydroCHLOROthiazide (HYDRODIURIL) 25 MG tablet TAKE ONE TABLET BY MOUTH DAILY    hydrOXYzine (ATARAX) 25 mg, Oral, Daily PRN    ibuprofen (ADVIL,MOTRIN) 800 mg, Oral, Every 8 Hours PRN    Janumet XR  MG tablet TAKE ONE TABLET BY MOUTH TWICE A DAY    Jardiance 25 MG tablet tablet TAKE ONE TABLET BY MOUTH DAILY    levETIRAcetam (KEPPRA) 750 mg, Oral, 2 Times Daily    loratadine (CLARITIN) 10 MG tablet TAKE ONE TABLET BY MOUTH DAILY    metoprolol tartrate (LOPRESSOR) 100 MG tablet TAKE 1 TABLET BY MOUTH TWICE A DAY    multivitamin with minerals tablet tablet 1 tablet, Daily    rizatriptan (MAXALT) 10 mg, Oral, Once, AT ONSET OF HEADACHE MAY REPEAT ONE TABLET IN 2 HOURS IF NEEDED    sertraline (ZOLOFT) 50 MG tablet TAKE 1 TABLET BY MOUTH DAILY    sildenafil (VIAGRA) 50 mg, Oral, Daily PRN          /70   Pulse 70   Ht 170.2 cm (67.01\")   Wt 88.5 kg (195 lb 3.2 oz)   SpO2 98%   BMI 30.57 kg/m²                Objective     Neurological Exam  Mental Status  Awake, alert and oriented to person, place and time. Recent and " remote memory are intact. Speech is normal. Language is fluent with no aphasia. Language: Patient does spend time thinking of words with some stuttering noted intermittently systems are not consistent throughout exam. Attention and concentration are normal.    Cranial Nerves  CN II: Visual fields full to confrontation.  CN III, IV, VI: Extraocular movements intact bilaterally. Normal lids and orbits bilaterally. Pupils equal round and reactive to light bilaterally.  CN V: Facial sensation is normal.  CN VII: Full and symmetric facial movement.  CN IX, X: Palate elevates symmetrically  CN XI: Shoulder shrug strength is normal.  CN XII: Tongue midline without atrophy or fasciculations.    Motor  Normal muscle bulk throughout. Normal muscle tone. No abnormal involuntary movements. Strength is 5/5 throughout all four extremities.    Sensory  Sensation is intact to light touch, pinprick, vibration and proprioception in all four extremities.    Reflexes  Deep tendon reflexes are 2+ and symmetric in all four extremities.    Coordination    Finger-to-nose, rapid alternating movements and heel-to-shin normal bilaterally without dysmetria.    Gait  Normal casual, toe, heel and tandem gait.    Physical Exam  Constitutional:       Appearance: Normal appearance. He is normal weight.   HENT:      Head: Normocephalic.   Eyes:      General: Lids are normal.      Extraocular Movements: Extraocular movements intact.      Pupils: Pupils are equal, round, and reactive to light.   Pulmonary:      Effort: Pulmonary effort is normal.   Musculoskeletal:         General: Normal range of motion.   Skin:     General: Skin is warm.   Neurological:      Motor: Motor strength is normal.      Coordination: Coordination is intact.      Deep Tendon Reflexes: Reflexes are normal and symmetric.   Psychiatric:         Mood and Affect: Mood normal.         Speech: Speech normal.         Behavior: Behavior normal.         Thought Content: Thought  content normal.                   Assessment & Plan     Diagnoses and all orders for this visit:    1. Seizure-like activity (Primary)  -     EEG Continuous Monitoring With Video; Future  -     levETIRAcetam (KEPPRA) 750 MG tablet; Take 1 tablet by mouth 2 (Two) Times a Day for 180 days. Indications: Seizure  Dispense: 180 tablet; Refill: 1    2. Confusion    3. Insomnia, unspecified type  -     Ambulatory Referral to Sleep Medicine    4. Snoring  -     Ambulatory Referral to Sleep Medicine    5. History of obstructive sleep apnea  -     Ambulatory Referral to Sleep Medicine    6. Other fatigue  -     Vitamin B12; Future  -     Folate; Future  -     Vitamin D 25 Hydroxy; Future    At this time I would like to order epilepsy monitoring to further try to characterize patient's spells.  They do not seem consistent with seizure-like activity at this time however I am not sure as to the cause of the symptoms based on testing that has been completed to this point.    In addition I would like to refer patient back to sleep medicine as some of the symptoms could be related to an untreated obstructive sleep apnea or possibly a different type of sleep disorder.    We will also go ahead and check a vitamin B12 and vitamin D level.    In addition we will continue patient on Keppra for now at 750 mg twice daily.    Patient was instructed on driving and working restrictions now.  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  He also should not drive until epilepsy monitoring is complete.    Follow-up in 3 months or sooner if testing is completed.              Jessica TAVARES    Neurology    [facility] [place]    Phone: [phone]    9/11/2023 , 15:50 EDT

## 2023-09-11 NOTE — PATIENT INSTRUCTIONS
Patient was informed that per [place] state law she cannot drive for 90 days post seizure episode.   In addition we recommended no climbing heights, operating heavy machinery, fire electrical work, swimming tubs or bathtubs unattended.    We also discussed risk of SUDEP and the importance of adhering to medication compliance with taking medication every 12 hours and no missed doses.

## 2023-09-12 ENCOUNTER — TELEPHONE (OUTPATIENT)
Dept: INTERNAL MEDICINE | Facility: CLINIC | Age: 63
End: 2023-09-12

## 2023-09-12 ENCOUNTER — DOCUMENTATION (OUTPATIENT)
Dept: INTERNAL MEDICINE | Facility: CLINIC | Age: 63
End: 2023-09-12
Payer: COMMERCIAL

## 2023-09-12 RX ORDER — ERGOCALCIFEROL 1.25 MG/1
50000 CAPSULE ORAL WEEKLY
Qty: 4 CAPSULE | Refills: 3 | Status: SHIPPED | OUTPATIENT
Start: 2023-09-12 | End: 2024-01-10

## 2023-09-12 NOTE — TELEPHONE ENCOUNTER
I can't write this letter bc I have no documentation of pt having covid at this time.  He was seen in the office 12/4/20 and was flu negative

## 2023-09-12 NOTE — TELEPHONE ENCOUNTER
Caller: Pablo Mendoza    Relationship: Self    Best call back number: [phone]    What is the best time to reach you: ANYTIME     Who are you requesting to speak with (clinical staff, provider,  specific staff member): CLINICAL STAFF     What was the call regarding: PATIENT IS REQUESTING A LETTER STATING THAT HE WAS QUARANTINED TO HIS HOME IN DECEMBER OF 2020 DUE TO BEING COVID POSITIVE FOR 15 DAYS.     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    PATIENT WOULD LIKE TO PICK THIS LETTER UP IN THE OFFICE ONCE IT IS READY.     PLEASE ADVISE

## 2023-09-12 NOTE — PROGRESS NOTES
Can you call patient and let them know they need once weekly oral vitamin D which I sent the pharmacy as well as B12 injections? Find out if they would like to do those here in the office or at home.

## 2023-09-13 ENCOUNTER — HOME CARE VISIT (OUTPATIENT)
Dept: HOME HEALTH SERVICES | Facility: HOME HEALTHCARE | Age: 63
End: 2023-09-13
Payer: COMMERCIAL

## 2023-09-13 ENCOUNTER — TELEPHONE (OUTPATIENT)
Dept: NEUROLOGY | Facility: CLINIC | Age: 63
End: 2023-09-13
Payer: COMMERCIAL

## 2023-09-13 VITALS
OXYGEN SATURATION: 96 % | RESPIRATION RATE: 18 BRPM | DIASTOLIC BLOOD PRESSURE: 74 MMHG | HEART RATE: 87 BPM | SYSTOLIC BLOOD PRESSURE: 116 MMHG | TEMPERATURE: 97.1 F

## 2023-09-13 VITALS
DIASTOLIC BLOOD PRESSURE: 74 MMHG | OXYGEN SATURATION: 96 % | TEMPERATURE: 98 F | SYSTOLIC BLOOD PRESSURE: 118 MMHG | HEART RATE: 71 BPM | RESPIRATION RATE: 18 BRPM

## 2023-09-13 PROCEDURE — G0153 HHCP-SVS OF S/L PATH,EA 15MN: HCPCS

## 2023-09-13 PROCEDURE — G0299 HHS/HOSPICE OF RN EA 15 MIN: HCPCS

## 2023-09-13 RX ORDER — NEEDLES, SAFETY 18GX1 1/2"
NEEDLE, DISPOSABLE MISCELLANEOUS
Qty: 12 EACH | Refills: 0 | Status: SHIPPED | OUTPATIENT
Start: 2023-09-13

## 2023-09-13 RX ORDER — CYANOCOBALAMIN 1000 UG/ML
INJECTION, SOLUTION INTRAMUSCULAR; SUBCUTANEOUS
Qty: 12 ML | Refills: 0 | Status: SHIPPED | OUTPATIENT
Start: 2023-09-13

## 2023-09-13 NOTE — TELEPHONE ENCOUNTER
called and spoke with pt let him know about the vitiman D pill and the b12 injection sen b12 into pharmacy he is going to do them at home

## 2023-09-13 NOTE — HOME HEALTH
Pt reports twitching that will sometimes cause jolting pain. He twitches when he rests and gets irritable and has to get up to move. Getting up to move allows him to go back to sleep. Can't drive  Doesn't remember conversations. He has reported this to the neurologist who recommends a sleep study and follow up in three months. Pt also reports, Hypertension resolved when all of the neuro symptoms resolved. Performed cp and neuro assessment. Pt tolerated fairly well.    Plan for next visit: Plan to discharge and possibly refer to MSW for financial support.

## 2023-09-13 NOTE — TELEPHONE ENCOUNTER
----- Message from ANUSHA Dial sent at 9/12/2023  8:07 AM EDT -----  Can you call patient and let them know they need once weekly oral vitamin D which I sent the pharmacy as well as B12 injections? Find out if they would like to do those here in the office or at home.

## 2023-09-14 ENCOUNTER — TELEPHONE (OUTPATIENT)
Dept: NEUROLOGY | Facility: CLINIC | Age: 63
End: 2023-09-14
Payer: COMMERCIAL

## 2023-09-14 NOTE — TELEPHONE ENCOUNTER
PT CALLED SAID HE WAS TO  P.W FROM PROVIDER TODAY? CALLED OFFICE THERE IS NO P.W JUSTUS TOOK CALL.

## 2023-09-14 NOTE — HOME HEALTH
Routine Visit Note:     Skill/education provided: SLP instructed patient to complete a task in organization/memory. He was instructed to complete a task in calendar work.     Patient/caregiver response: Patient completed calendar at 100 percent accuracy with independence.     Patient engaged in conversation. His chief complaint was that he has difficulty maintaining focus to manage his finances, being more disorganized and having  increased anxiety (health, finances, employment). As he discussed his concerns, dysfluency increased (mild stuttering) and as he calmed down, stuttering stopped.    Plan for next visit: Cognitive linguistic therapy to restore cognition to increase safety, communication and function in home.  Skilled Speech Therapy interventions are necessary due to a decline in cognitive linguistic skills and increased dependence on caregivers to function safely in his home.   SPECIFIC INTERVENTIONS AND GOALS TO ADDRESS ON NEXT VISIT:   Expressive communication tasks (word finding); Higher level thinking tasks; Memory tasks   FREQUENCY AND DURATION: 1w2 2w2   ANY OTHER FOLLOW UP NEEDED: None   REASSESSMENT DUE DATE: 9/23/23   Other pertinent info: None

## 2023-09-19 ENCOUNTER — APPOINTMENT (OUTPATIENT)
Dept: CT IMAGING | Facility: HOSPITAL | Age: 63
DRG: 103 | End: 2023-09-19
Payer: COMMERCIAL

## 2023-09-19 ENCOUNTER — APPOINTMENT (OUTPATIENT)
Dept: NEUROLOGY | Facility: HOSPITAL | Age: 63
DRG: 103 | End: 2023-09-19
Payer: COMMERCIAL

## 2023-09-19 ENCOUNTER — HOSPITAL ENCOUNTER (INPATIENT)
Facility: HOSPITAL | Age: 63
LOS: 1 days | Discharge: HOME | DRG: 103 | End: 2023-09-20
Attending: EMERGENCY MEDICINE | Admitting: HOSPITALIST
Payer: COMMERCIAL

## 2023-09-19 ENCOUNTER — DOCUMENTATION (OUTPATIENT)
Dept: HOME HEALTH SERVICES | Facility: HOME HEALTHCARE | Age: 63
End: 2023-09-19
Payer: COMMERCIAL

## 2023-09-19 ENCOUNTER — HOME CARE VISIT (OUTPATIENT)
Dept: HOME HEALTH SERVICES | Facility: HOME HEALTHCARE | Age: 63
End: 2023-09-19
Payer: COMMERCIAL

## 2023-09-19 DIAGNOSIS — R47.9 SPEECH DISTURBANCE, UNSPECIFIED TYPE: ICD-10-CM

## 2023-09-19 DIAGNOSIS — R51.9 ACUTE NONINTRACTABLE HEADACHE, UNSPECIFIED HEADACHE TYPE: Primary | ICD-10-CM

## 2023-09-19 PROBLEM — R79.89 ELEVATED LACTIC ACID LEVEL: Status: ACTIVE | Noted: 2023-09-19

## 2023-09-19 PROBLEM — E87.6 HYPOKALEMIA: Status: ACTIVE | Noted: 2023-09-19

## 2023-09-19 LAB
ALBUMIN SERPL-MCNC: 4.8 G/DL (ref 3.5–5.2)
ALBUMIN/GLOB SERPL: 1.7 G/DL
ALP SERPL-CCNC: 141 U/L (ref 39–117)
ALT SERPL W P-5'-P-CCNC: 19 U/L (ref 1–41)
ANION GAP SERPL CALCULATED.3IONS-SCNC: 15 MMOL/L (ref 5–15)
AST SERPL-CCNC: 24 U/L (ref 1–40)
BACTERIA UR QL AUTO: NORMAL /HPF
BASOPHILS # BLD AUTO: 0.08 10*3/MM3 (ref 0–0.2)
BASOPHILS NFR BLD AUTO: 0.9 % (ref 0–1.5)
BILIRUB SERPL-MCNC: 0.4 MG/DL (ref 0–1.2)
BILIRUB UR QL STRIP: NEGATIVE
BUN SERPL-MCNC: 13 MG/DL (ref 8–23)
BUN/CREAT SERPL: 14.6 (ref 7–25)
CALCIUM SPEC-SCNC: 9.6 MG/DL (ref 8.6–10.5)
CHLORIDE SERPL-SCNC: 100 MMOL/L (ref 98–107)
CLARITY UR: CLEAR
CO2 SERPL-SCNC: 26 MMOL/L (ref 22–29)
COLOR UR: YELLOW
CREAT SERPL-MCNC: 0.89 MG/DL (ref 0.76–1.27)
D-LACTATE SERPL-SCNC: 1.5 MMOL/L (ref 0.5–2)
D-LACTATE SERPL-SCNC: 2.4 MMOL/L (ref 0.5–2)
D-LACTATE SERPL-SCNC: 2.5 MMOL/L (ref 0.5–2)
D-LACTATE SERPL-SCNC: 2.6 MMOL/L (ref 0.5–2)
D-LACTATE SERPL-SCNC: 3 MMOL/L (ref 0.5–2)
DEPRECATED RDW RBC AUTO: 45.4 FL (ref 37–54)
EGFRCR SERPLBLD CKD-EPI 2021: 98.1 ML/MIN/1.73
EOSINOPHIL # BLD AUTO: 0.26 10*3/MM3 (ref 0–0.4)
EOSINOPHIL NFR BLD AUTO: 2.9 % (ref 0.3–6.2)
ERYTHROCYTE [DISTWIDTH] IN BLOOD BY AUTOMATED COUNT: 18.2 % (ref 12.3–15.4)
GEN 5 2HR TROPONIN T REFLEX: 12 NG/L
GLOBULIN UR ELPH-MCNC: 2.8 GM/DL
GLUCOSE BLDC GLUCOMTR-MCNC: 101 MG/DL (ref 70–130)
GLUCOSE BLDC GLUCOMTR-MCNC: 136 MG/DL (ref 70–130)
GLUCOSE BLDC GLUCOMTR-MCNC: 138 MG/DL (ref 70–130)
GLUCOSE BLDC GLUCOMTR-MCNC: 158 MG/DL (ref 70–130)
GLUCOSE SERPL-MCNC: 96 MG/DL (ref 65–99)
GLUCOSE UR STRIP-MCNC: ABNORMAL MG/DL
HCT VFR BLD AUTO: 49.6 % (ref 37.5–51)
HGB BLD-MCNC: 16.2 G/DL (ref 13–17.7)
HGB UR QL STRIP.AUTO: NEGATIVE
HYALINE CASTS UR QL AUTO: NORMAL /LPF
IMM GRANULOCYTES # BLD AUTO: 0.06 10*3/MM3 (ref 0–0.05)
IMM GRANULOCYTES NFR BLD AUTO: 0.7 % (ref 0–0.5)
KETONES UR QL STRIP: NEGATIVE
LEUKOCYTE ESTERASE UR QL STRIP.AUTO: ABNORMAL
LYMPHOCYTES # BLD AUTO: 2.08 10*3/MM3 (ref 0.7–3.1)
LYMPHOCYTES NFR BLD AUTO: 22.9 % (ref 19.6–45.3)
MCH RBC QN AUTO: 25 PG (ref 26.6–33)
MCHC RBC AUTO-ENTMCNC: 32.7 G/DL (ref 31.5–35.7)
MCV RBC AUTO: 76.5 FL (ref 79–97)
MONOCYTES # BLD AUTO: 0.67 10*3/MM3 (ref 0.1–0.9)
MONOCYTES NFR BLD AUTO: 7.4 % (ref 5–12)
NEUTROPHILS NFR BLD AUTO: 5.94 10*3/MM3 (ref 1.7–7)
NEUTROPHILS NFR BLD AUTO: 65.2 % (ref 42.7–76)
NITRITE UR QL STRIP: NEGATIVE
NRBC BLD AUTO-RTO: 0.1 /100 WBC (ref 0–0.2)
PH UR STRIP.AUTO: 6 [PH] (ref 5–8)
PLATELET # BLD AUTO: 279 10*3/MM3 (ref 140–450)
PMV BLD AUTO: 9.9 FL (ref 6–12)
POTASSIUM SERPL-SCNC: 3.4 MMOL/L (ref 3.5–5.2)
PROT SERPL-MCNC: 7.6 G/DL (ref 6–8.5)
PROT UR QL STRIP: ABNORMAL
QT INTERVAL: 399 MS
QTC INTERVAL: 443 MS
RBC # BLD AUTO: 6.48 10*6/MM3 (ref 4.14–5.8)
RBC # UR STRIP: NORMAL /HPF
REF LAB TEST METHOD: NORMAL
SODIUM SERPL-SCNC: 141 MMOL/L (ref 136–145)
SP GR UR STRIP: 1.01 (ref 1–1.03)
SQUAMOUS #/AREA URNS HPF: NORMAL /HPF
TROPONIN T DELTA: 2 NG/L
TROPONIN T SERPL HS-MCNC: 10 NG/L
UROBILINOGEN UR QL STRIP: ABNORMAL
WBC # UR STRIP: NORMAL /HPF
WBC NRBC COR # BLD: 9.09 10*3/MM3 (ref 3.4–10.8)
YEAST URNS QL MICRO: NORMAL /HPF

## 2023-09-19 PROCEDURE — 99222 1ST HOSP IP/OBS MODERATE 55: CPT | Performed by: STUDENT IN AN ORGANIZED HEALTH CARE EDUCATION/TRAINING PROGRAM

## 2023-09-19 PROCEDURE — 80053 COMPREHEN METABOLIC PANEL: CPT | Performed by: EMERGENCY MEDICINE

## 2023-09-19 PROCEDURE — 82948 REAGENT STRIP/BLOOD GLUCOSE: CPT

## 2023-09-19 PROCEDURE — 95720 EEG PHY/QHP EA INCR W/VEEG: CPT | Performed by: PSYCHIATRY & NEUROLOGY

## 2023-09-19 PROCEDURE — 96375 TX/PRO/DX INJ NEW DRUG ADDON: CPT

## 2023-09-19 PROCEDURE — 96376 TX/PRO/DX INJ SAME DRUG ADON: CPT

## 2023-09-19 PROCEDURE — 36415 COLL VENOUS BLD VENIPUNCTURE: CPT | Performed by: EMERGENCY MEDICINE

## 2023-09-19 PROCEDURE — 25010000002 MORPHINE PER 10 MG: Performed by: EMERGENCY MEDICINE

## 2023-09-19 PROCEDURE — 81001 URINALYSIS AUTO W/SCOPE: CPT | Performed by: EMERGENCY MEDICINE

## 2023-09-19 PROCEDURE — 93010 ELECTROCARDIOGRAM REPORT: CPT | Performed by: INTERNAL MEDICINE

## 2023-09-19 PROCEDURE — 95816 EEG AWAKE AND DROWSY: CPT | Performed by: PSYCHIATRY & NEUROLOGY

## 2023-09-19 PROCEDURE — 25010000002 METOCLOPRAMIDE PER 10 MG: Performed by: EMERGENCY MEDICINE

## 2023-09-19 PROCEDURE — 25010000002 MAGNESIUM SULFATE IN D5W 1G/100ML (PREMIX) 1-5 GM/100ML-% SOLUTION: Performed by: STUDENT IN AN ORGANIZED HEALTH CARE EDUCATION/TRAINING PROGRAM

## 2023-09-19 PROCEDURE — G0378 HOSPITAL OBSERVATION PER HR: HCPCS

## 2023-09-19 PROCEDURE — 96361 HYDRATE IV INFUSION ADD-ON: CPT

## 2023-09-19 PROCEDURE — 95714 VEEG EA 12-26 HR UNMNTR: CPT

## 2023-09-19 PROCEDURE — 96374 THER/PROPH/DIAG INJ IV PUSH: CPT

## 2023-09-19 PROCEDURE — 25010000002 DIPHENHYDRAMINE PER 50 MG: Performed by: STUDENT IN AN ORGANIZED HEALTH CARE EDUCATION/TRAINING PROGRAM

## 2023-09-19 PROCEDURE — 99285 EMERGENCY DEPT VISIT HI MDM: CPT

## 2023-09-19 PROCEDURE — 25810000003 SODIUM CHLORIDE 0.9 % SOLUTION: Performed by: NURSE PRACTITIONER

## 2023-09-19 PROCEDURE — 84484 ASSAY OF TROPONIN QUANT: CPT | Performed by: EMERGENCY MEDICINE

## 2023-09-19 PROCEDURE — 83605 ASSAY OF LACTIC ACID: CPT | Performed by: EMERGENCY MEDICINE

## 2023-09-19 PROCEDURE — 95819 EEG AWAKE AND ASLEEP: CPT

## 2023-09-19 PROCEDURE — 85025 COMPLETE CBC W/AUTO DIFF WBC: CPT | Performed by: EMERGENCY MEDICINE

## 2023-09-19 PROCEDURE — 93005 ELECTROCARDIOGRAM TRACING: CPT | Performed by: EMERGENCY MEDICINE

## 2023-09-19 PROCEDURE — 70450 CT HEAD/BRAIN W/O DYE: CPT

## 2023-09-19 RX ORDER — INSULIN LISPRO 100 [IU]/ML
2-9 INJECTION, SOLUTION INTRAVENOUS; SUBCUTANEOUS
Status: DISCONTINUED | OUTPATIENT
Start: 2023-09-19 | End: 2023-09-20 | Stop reason: HOSPADM

## 2023-09-19 RX ORDER — HYDROXYZINE HYDROCHLORIDE 25 MG/1
25 TABLET, FILM COATED ORAL DAILY PRN
Status: DISCONTINUED | OUTPATIENT
Start: 2023-09-19 | End: 2023-09-20 | Stop reason: HOSPADM

## 2023-09-19 RX ORDER — NICOTINE POLACRILEX 4 MG
15 LOZENGE BUCCAL
Status: DISCONTINUED | OUTPATIENT
Start: 2023-09-19 | End: 2023-09-20 | Stop reason: HOSPADM

## 2023-09-19 RX ORDER — METOPROLOL TARTRATE 50 MG/1
100 TABLET, FILM COATED ORAL 2 TIMES DAILY
Status: DISCONTINUED | OUTPATIENT
Start: 2023-09-19 | End: 2023-09-20 | Stop reason: HOSPADM

## 2023-09-19 RX ORDER — ATORVASTATIN CALCIUM 80 MG/1
80 TABLET, FILM COATED ORAL NIGHTLY
Status: DISCONTINUED | OUTPATIENT
Start: 2023-09-19 | End: 2023-09-19

## 2023-09-19 RX ORDER — DEXTROSE MONOHYDRATE 25 G/50ML
25 INJECTION, SOLUTION INTRAVENOUS
Status: DISCONTINUED | OUTPATIENT
Start: 2023-09-19 | End: 2023-09-20 | Stop reason: HOSPADM

## 2023-09-19 RX ORDER — MAGNESIUM SULFATE 1 G/100ML
1 INJECTION INTRAVENOUS ONCE
Status: COMPLETED | OUTPATIENT
Start: 2023-09-19 | End: 2023-09-19

## 2023-09-19 RX ORDER — ASPIRIN 325 MG
325 TABLET ORAL DAILY
Status: DISCONTINUED | OUTPATIENT
Start: 2023-09-19 | End: 2023-09-19

## 2023-09-19 RX ORDER — SODIUM CHLORIDE 9 MG/ML
100 INJECTION, SOLUTION INTRAVENOUS CONTINUOUS
Status: DISCONTINUED | OUTPATIENT
Start: 2023-09-19 | End: 2023-09-20 | Stop reason: HOSPADM

## 2023-09-19 RX ORDER — KETOROLAC TROMETHAMINE 15 MG/ML
15 INJECTION, SOLUTION INTRAMUSCULAR; INTRAVENOUS EVERY 6 HOURS PRN
Status: DISCONTINUED | OUTPATIENT
Start: 2023-09-19 | End: 2023-09-20 | Stop reason: HOSPADM

## 2023-09-19 RX ORDER — ACETAMINOPHEN 650 MG/1
650 SUPPOSITORY RECTAL EVERY 4 HOURS PRN
Status: DISCONTINUED | OUTPATIENT
Start: 2023-09-19 | End: 2023-09-20 | Stop reason: HOSPADM

## 2023-09-19 RX ORDER — IBUPROFEN 600 MG/1
1 TABLET ORAL
Status: DISCONTINUED | OUTPATIENT
Start: 2023-09-19 | End: 2023-09-20 | Stop reason: HOSPADM

## 2023-09-19 RX ORDER — MORPHINE SULFATE 2 MG/ML
4 INJECTION, SOLUTION INTRAMUSCULAR; INTRAVENOUS ONCE
Status: COMPLETED | OUTPATIENT
Start: 2023-09-19 | End: 2023-09-19

## 2023-09-19 RX ORDER — AMLODIPINE BESYLATE 10 MG/1
10 TABLET ORAL DAILY
Status: DISCONTINUED | OUTPATIENT
Start: 2023-09-19 | End: 2023-09-20 | Stop reason: HOSPADM

## 2023-09-19 RX ORDER — PROCHLORPERAZINE EDISYLATE 5 MG/ML
2.5 INJECTION INTRAMUSCULAR; INTRAVENOUS EVERY 6 HOURS PRN
Status: DISCONTINUED | OUTPATIENT
Start: 2023-09-19 | End: 2023-09-20 | Stop reason: HOSPADM

## 2023-09-19 RX ORDER — ACETAMINOPHEN 325 MG/1
650 TABLET ORAL EVERY 4 HOURS PRN
Status: DISCONTINUED | OUTPATIENT
Start: 2023-09-19 | End: 2023-09-20 | Stop reason: HOSPADM

## 2023-09-19 RX ORDER — DIPHENHYDRAMINE HYDROCHLORIDE 50 MG/ML
12.5 INJECTION INTRAMUSCULAR; INTRAVENOUS EVERY 6 HOURS
Status: DISCONTINUED | OUTPATIENT
Start: 2023-09-19 | End: 2023-09-20 | Stop reason: HOSPADM

## 2023-09-19 RX ORDER — POTASSIUM CHLORIDE 750 MG/1
40 TABLET, FILM COATED, EXTENDED RELEASE ORAL ONCE
Status: COMPLETED | OUTPATIENT
Start: 2023-09-19 | End: 2023-09-19

## 2023-09-19 RX ORDER — ALLOPURINOL 300 MG/1
300 TABLET ORAL 2 TIMES DAILY
Status: DISCONTINUED | OUTPATIENT
Start: 2023-09-19 | End: 2023-09-20 | Stop reason: HOSPADM

## 2023-09-19 RX ORDER — SODIUM CHLORIDE 0.9 % (FLUSH) 0.9 %
10 SYRINGE (ML) INJECTION AS NEEDED
Status: DISCONTINUED | OUTPATIENT
Start: 2023-09-19 | End: 2023-09-19

## 2023-09-19 RX ORDER — NORTRIPTYLINE HYDROCHLORIDE 25 MG/1
25 CAPSULE ORAL NIGHTLY
Status: DISCONTINUED | OUTPATIENT
Start: 2023-09-19 | End: 2023-09-20

## 2023-09-19 RX ORDER — ONDANSETRON 2 MG/ML
4 INJECTION INTRAMUSCULAR; INTRAVENOUS EVERY 6 HOURS PRN
Status: DISCONTINUED | OUTPATIENT
Start: 2023-09-19 | End: 2023-09-20 | Stop reason: HOSPADM

## 2023-09-19 RX ORDER — ASPIRIN 300 MG/1
300 SUPPOSITORY RECTAL DAILY
Status: DISCONTINUED | OUTPATIENT
Start: 2023-09-19 | End: 2023-09-19

## 2023-09-19 RX ORDER — METOCLOPRAMIDE HYDROCHLORIDE 5 MG/ML
10 INJECTION INTRAMUSCULAR; INTRAVENOUS ONCE
Status: COMPLETED | OUTPATIENT
Start: 2023-09-19 | End: 2023-09-19

## 2023-09-19 RX ORDER — BISACODYL 10 MG
10 SUPPOSITORY, RECTAL RECTAL DAILY PRN
Status: DISCONTINUED | OUTPATIENT
Start: 2023-09-19 | End: 2023-09-20 | Stop reason: HOSPADM

## 2023-09-19 RX ADMIN — AMLODIPINE BESYLATE 10 MG: 10 TABLET ORAL at 11:43

## 2023-09-19 RX ADMIN — ALLOPURINOL 300 MG: 300 TABLET ORAL at 20:35

## 2023-09-19 RX ADMIN — SODIUM CHLORIDE 1000 ML: 9 INJECTION, SOLUTION INTRAVENOUS at 03:50

## 2023-09-19 RX ADMIN — MORPHINE SULFATE 4 MG: 2 INJECTION, SOLUTION INTRAMUSCULAR; INTRAVENOUS at 03:51

## 2023-09-19 RX ADMIN — POTASSIUM CHLORIDE 40 MEQ: 750 TABLET, EXTENDED RELEASE ORAL at 13:01

## 2023-09-19 RX ADMIN — ASPIRIN 325 MG: 325 TABLET ORAL at 08:52

## 2023-09-19 RX ADMIN — NORTRIPTYLINE HYDROCHLORIDE 25 MG: 25 CAPSULE ORAL at 20:33

## 2023-09-19 RX ADMIN — DIPHENHYDRAMINE HYDROCHLORIDE 12.5 MG: 50 INJECTION, SOLUTION INTRAMUSCULAR; INTRAVENOUS at 15:29

## 2023-09-19 RX ADMIN — METOPROLOL TARTRATE 100 MG: 50 TABLET, FILM COATED ORAL at 11:50

## 2023-09-19 RX ADMIN — LEVETIRACETAM 750 MG: 500 TABLET, FILM COATED ORAL at 11:43

## 2023-09-19 RX ADMIN — METOPROLOL TARTRATE 100 MG: 50 TABLET, FILM COATED ORAL at 20:33

## 2023-09-19 RX ADMIN — SERTRALINE 50 MG: 50 TABLET, FILM COATED ORAL at 11:43

## 2023-09-19 RX ADMIN — SODIUM CHLORIDE 100 ML/HR: 9 INJECTION, SOLUTION INTRAVENOUS at 12:36

## 2023-09-19 RX ADMIN — DIPHENHYDRAMINE HYDROCHLORIDE 12.5 MG: 50 INJECTION, SOLUTION INTRAMUSCULAR; INTRAVENOUS at 20:34

## 2023-09-19 RX ADMIN — METOCLOPRAMIDE 10 MG: 5 INJECTION, SOLUTION INTRAMUSCULAR; INTRAVENOUS at 03:51

## 2023-09-19 RX ADMIN — MAGNESIUM SULFATE HEPTAHYDRATE 1 G: 1 INJECTION, SOLUTION INTRAVENOUS at 15:28

## 2023-09-19 RX ADMIN — ALLOPURINOL 300 MG: 300 TABLET ORAL at 11:43

## 2023-09-19 RX ADMIN — LEVETIRACETAM 750 MG: 500 TABLET, FILM COATED ORAL at 20:34

## 2023-09-19 NOTE — PROGRESS NOTES
[facility] Acute Inpt Rehab Note    Referral received via stroke order set. Please note this is a screening only, rehab admissions will not actively be evaluating this patient. If felt patient is appropriate for our services once therapies begin, please call our office at [phone], to initiate a full referral.    Thank you,  Kayla Serna, RN  Rehab Admission Nurse

## 2023-09-19 NOTE — ED NOTES
"Nursing report ED to floor  Pablo Mendoza  62 y.o.  male    HPI :   Chief Complaint   Patient presents with    Headache       Admitting doctor:   Radha Sullivan MD    Admitting diagnosis:   The primary encounter diagnosis was Acute nonintractable headache, unspecified headache type. A diagnosis of Speech disturbance, unspecified type was also pertinent to this visit.    Code status:   Current Code Status       Date Active Code Status Order ID Comments User Context       Prior            Allergies:   Aspirin and Lisinopril    Isolation:   No active isolations    Intake and Output    Intake/Output Summary (Last 24 hours) at 9/19/2023 0555  Last data filed at 9/19/2023 0420  Gross per 24 hour   Intake 1000 ml   Output --   Net 1000 ml       Weight:       09/19/23 0336   Weight: 92 kg (202 lb 12.8 oz)       Most recent vitals:   Vitals:    09/19/23 0336 09/19/23 0337   BP:  145/91   Pulse: 76    Resp: 20    Temp: 98.2 °F (36.8 °C)    TempSrc: Oral    SpO2: 95%    Weight: 92 kg (202 lb 12.8 oz)    Height: 170.2 cm (67\")        Active LDAs/IV Access:   Lines, Drains & Airways       Active LDAs       Name Placement date Placement time Site Days    Peripheral IV Left Antecubital --  --  Antecubital  --    Peripheral IV 09/19/23 0345 Anterior;Left Forearm 09/19/23 0345  Forearm  less than 1                    Labs (abnormal labs have a star):   Labs Reviewed   COMPREHENSIVE METABOLIC PANEL - Abnormal; Notable for the following components:       Result Value    Potassium 3.4 (*)     Alkaline Phosphatase 141 (*)     All other components within normal limits    Narrative:     GFR Normal >60  Chronic Kidney Disease <60  Kidney Failure <15     URINALYSIS W/ MICROSCOPIC IF INDICATED (NO CULTURE) - Abnormal; Notable for the following components:    Glucose, UA >=1000 mg/dL (3+) (*)     Protein,  mg/dL (2+) (*)     Leuk Esterase, UA Trace (*)     All other components within normal limits   LACTIC ACID, PLASMA - " Abnormal; Notable for the following components:    Lactate 2.5 (*)     All other components within normal limits   CBC WITH AUTO DIFFERENTIAL - Abnormal; Notable for the following components:    RBC 6.48 (*)     MCV 76.5 (*)     MCH 25.0 (*)     RDW 18.2 (*)     Immature Grans % 0.7 (*)     Immature Grans, Absolute 0.06 (*)     All other components within normal limits   TROPONIN - Normal    Narrative:     High Sensitive Troponin T Reference Range:  <10.0 ng/L- Negative Female for AMI  <15.0 ng/L- Negative Male for AMI  >=10 - Abnormal Female indicating possible myocardial injury.  >=15 - Abnormal Male indicating possible myocardial injury.   Clinicians would have to utilize clinical acumen, EKG, Troponin, and serial changes to determine if it is an Acute Myocardial Infarction or myocardial injury due to an underlying chronic condition.        POCT GLUCOSE FINGERSTICK - Normal   URINALYSIS, MICROSCOPIC ONLY   LACTIC ACID, REFLEX   HIGH SENSITIVITIY TROPONIN T 2HR   CBC AND DIFFERENTIAL    Narrative:     The following orders were created for panel order CBC & Differential.  Procedure                               Abnormality         Status                     ---------                               -----------         ------                     CBC Auto Differential[[id number]]        Abnormal            Final result                 Please view results for these tests on the individual orders.       EKG:   ECG 12 Lead Stroke Evaluation   Preliminary Result   HEART RATE= 74  bpm   RR Interval= 811  ms   WA Interval= 187  ms   P Horizontal Axis= -16  deg   P Front Axis= 42  deg   QRSD Interval= 105  ms   QT Interval= 399  ms   QTcB= 443  ms   QRS Axis= -31  deg   T Wave Axis= 47  deg   - BORDERLINE ECG -   Sinus rhythm   Left axis deviation   Low voltage, precordial leads   Consider anterior infarct   Electronically Signed By:    Date and Time of Study: 2023-09-19 03:51:12          Meds given in ED:   Medications    sodium chloride 0.9 % flush 10 mL (has no administration in time range)   sodium chloride 0.9 % bolus 1,000 mL (0 mL Intravenous Stopped 9/19/23 0420)   morphine injection 4 mg (4 mg Intravenous Given 9/19/23 0351)   metoclopramide (REGLAN) injection 10 mg (10 mg Intravenous Given 9/19/23 0351)       Imaging results:  No radiology results for the last day    Ambulatory status:   - x1 assist    Social issues:   Social History     Socioeconomic History    Marital status:    Tobacco Use    Smoking status: Never     Passive exposure: Never    Smokeless tobacco: Never   Vaping Use    Vaping Use: Never used   Substance and Sexual Activity    Alcohol use: Yes     Comment: 3 DRINKS WEEKLY    Drug use: Not Currently    Sexual activity: Yes     Partners: Female       NIH Stroke Scale:  Interval: baseline    Darío Powell RN  09/19/23 05:55 EDT

## 2023-09-19 NOTE — ED PROVIDER NOTES
EMERGENCY DEPARTMENT ENCOUNTER    Room Number:  12/12  PCP: Liana Hood MD  Historian: Patient      HPI:  Chief Complaint: Headache  A complete HPI/ROS/PMH/PSH/SH/FH are unobtainable due to: None  Context: Pablo Mendoza is a 62 y.o. male who presents to the ED c/o generalized/global headache that has been gradually worsening now for the past 1 to 2 days.  He states that tonight he began having generalized weakness with it, nausea, as well as stuttering.  He does report that he has had this numerous times before without an obvious answer.  He states that neurology feels as though he may be having seizure-like activity.  He is currently taking Keppra with continuation of symptoms.  Currently, he denies vomiting, chest pain, shortness of breath, or fevers and chills.            PAST MEDICAL HISTORY  Active Ambulatory Problems     Diagnosis Date Noted    Type 2 diabetes mellitus, without long-term current use of insulin 01/19/2018    Mixed hyperlipidemia 01/19/2018    Essential hypertension 01/19/2018    Gout of multiple sites 01/19/2018    Nocturia 01/19/2018    Migraine headache 04/02/2018    Chronic maxillary sinusitis 04/10/2018    Other fatigue 06/06/2018    Arthritis 06/06/2018    Routine health maintenance 08/10/2018    Proteinuria 03/08/2019    Cellulitis of left foot 05/02/2019    Lumbar radiculopathy 08/02/2021    Stroke-like symptoms 10/04/2022    Polycythemia 10/28/2022    Dizziness 08/14/2023    Cervical stenosis of spine 08/14/2023    Cervical spondylosis without myelopathy 08/14/2023    Seizure 08/19/2023     Resolved Ambulatory Problems     Diagnosis Date Noted    No Resolved Ambulatory Problems     Past Medical History:   Diagnosis Date    Anesthesia complication     COVID-19     Diabetes mellitus     Diverticulitis     Elevated cholesterol     Gout     Hypertension     Low back pain     Migraines     Neuropathy     Numbness and tingling of both lower extremities     Seizures      Staph infection 2010    Stroke     TIA (transient ischemic attack)     Weakness of both legs          PAST SURGICAL HISTORY  Past Surgical History:   Procedure Laterality Date    CHOLECYSTECTOMY  2011    COLONOSCOPY      2015 or 2016    HERNIA REPAIR      LUMBAR FUSION N/A 11/03/2021    Procedure: Lumbar 3 to Lumbar 4 and Lumbar 4 to Lumbar 5 laminectomy with a fusion;  Surgeon: Jose Webster MD;  Location: [facility] OR;  Service: [employer] - Neuro;  Laterality: N/A;    SINUS SURGERY      TYMPANOPLASTY Bilateral          FAMILY HISTORY  Family History   Problem Relation Age of Onset    Arthritis Mother     Hypertension Mother     Heart disease Mother     Stroke Mother     Alcohol abuse Father     Arthritis Father     Hypertension Father     Nephrolithiasis Father     Diabetes Sister     Diabetes Sister     Stroke Brother 59    Hypertension Brother     Hyperlipidemia Brother     Throat cancer Maternal Grandmother     Diabetes Paternal Grandmother     Malig Hyperthermia Neg Hx          SOCIAL HISTORY  Social History     Socioeconomic History    Marital status:    Tobacco Use    Smoking status: Never     Passive exposure: Never    Smokeless tobacco: Never   Vaping Use    Vaping Use: Never used   Substance and Sexual Activity    Alcohol use: Yes     Comment: 3 DRINKS WEEKLY    Drug use: Not Currently    Sexual activity: Yes     Partners: Female         ALLERGIES  Aspirin and Lisinopril        REVIEW OF SYSTEMS  Review of Systems   Constitutional:  Negative for activity change, appetite change and fever.   HENT:  Negative for congestion and sore throat.    Eyes: Negative.    Respiratory:  Negative for cough and shortness of breath.    Cardiovascular:  Negative for chest pain and leg swelling.   Gastrointestinal:  Positive for nausea. Negative for abdominal pain, diarrhea and vomiting.   Endocrine: Negative.    Genitourinary:  Negative for decreased urine volume and dysuria.   Musculoskeletal:  Negative for  neck pain.   Skin:  Negative for rash and wound.   Allergic/Immunologic: Negative.    Neurological:  Positive for speech difficulty, weakness and headaches. Negative for numbness.   Hematological: Negative.    Psychiatric/Behavioral: Negative.     All other systems reviewed and are negative.         PHYSICAL EXAM  ED Triage Vitals   Temp Heart Rate Resp BP SpO2   09/19/23 0336 09/19/23 0336 09/19/23 0336 09/19/23 0337 09/19/23 0336   98.2 °F (36.8 °C) 76 20 145/91 95 %      Temp src Heart Rate Source Patient Position BP Location FiO2 (%)   09/19/23 0336 09/19/23 0336 -- -- --   Oral Monitor          Physical Exam  Constitutional:       General: He is not in acute distress.     Appearance: Normal appearance. He is not ill-appearing or toxic-appearing.   HENT:      Head: Normocephalic and atraumatic.   Eyes:      Extraocular Movements: Extraocular movements intact.      Pupils: Pupils are equal, round, and reactive to light.   Cardiovascular:      Rate and Rhythm: Normal rate and regular rhythm.      Heart sounds: No murmur heard.    No friction rub. No gallop.   Pulmonary:      Effort: Pulmonary effort is normal.      Breath sounds: Normal breath sounds.   Abdominal:      General: Abdomen is flat. There is no distension.      Palpations: Abdomen is soft.      Tenderness: There is no abdominal tenderness.   Musculoskeletal:         General: No swelling or tenderness. Normal range of motion.      Cervical back: Normal range of motion and neck supple.   Skin:     General: Skin is warm and dry.   Neurological:      General: No focal deficit present.      Mental Status: He is alert and oriented to person, place, and time.      Sensory: No sensory deficit.      Motor: No weakness.   Psychiatric:         Mood and Affect: Mood normal.         Behavior: Behavior normal.         Vital signs and nursing notes reviewed.          LAB RESULTS  Recent Results (from the past 24 hour(s))   POC Glucose Once    Collection Time:  09/19/23  3:34 AM    Specimen: Blood   Result Value Ref Range    Glucose 101 70 - 130 mg/dL   Comprehensive Metabolic Panel    Collection Time: 09/19/23  3:44 AM    Specimen: Blood   Result Value Ref Range    Glucose 96 65 - 99 mg/dL    BUN 13 8 - 23 mg/dL    Creatinine 0.89 0.76 - 1.27 mg/dL    Sodium 141 136 - 145 mmol/L    Potassium 3.4 (L) 3.5 - 5.2 mmol/L    Chloride 100 98 - 107 mmol/L    CO2 26.0 22.0 - 29.0 mmol/L    Calcium 9.6 8.6 - 10.5 mg/dL    Total Protein 7.6 6.0 - 8.5 g/dL    Albumin 4.8 3.5 - 5.2 g/dL    ALT (SGPT) 19 1 - 41 U/L    AST (SGOT) 24 1 - 40 U/L    Alkaline Phosphatase 141 (H) 39 - 117 U/L    Total Bilirubin 0.4 0.0 - 1.2 mg/dL    Globulin 2.8 gm/dL    A/G Ratio 1.7 g/dL    BUN/Creatinine Ratio 14.6 7.0 - 25.0    Anion Gap 15.0 5.0 - 15.0 mmol/L    eGFR 98.1 >60.0 mL/min/1.73   High Sensitivity Troponin T    Collection Time: 09/19/23  3:44 AM    Specimen: Blood   Result Value Ref Range    HS Troponin T 10 <15 ng/L   Lactic Acid, Plasma    Collection Time: 09/19/23  3:44 AM    Specimen: Blood   Result Value Ref Range    Lactate 2.5 (C) 0.5 - 2.0 mmol/L   CBC Auto Differential    Collection Time: 09/19/23  3:44 AM    Specimen: Blood   Result Value Ref Range    WBC 9.09 3.40 - 10.80 10*3/mm3    RBC 6.48 (H) 4.14 - 5.80 10*6/mm3    Hemoglobin 16.2 13.0 - 17.7 g/dL    Hematocrit 49.6 37.5 - 51.0 %    MCV 76.5 (L) 79.0 - 97.0 fL    MCH 25.0 (L) 26.6 - 33.0 pg    MCHC 32.7 31.5 - 35.7 g/dL    RDW 18.2 (H) 12.3 - 15.4 %    RDW-SD 45.4 37.0 - 54.0 fl    MPV 9.9 6.0 - 12.0 fL    Platelets 279 140 - 450 10*3/mm3    Neutrophil % 65.2 42.7 - 76.0 %    Lymphocyte % 22.9 19.6 - 45.3 %    Monocyte % 7.4 5.0 - 12.0 %    Eosinophil % 2.9 0.3 - 6.2 %    Basophil % 0.9 0.0 - 1.5 %    Immature Grans % 0.7 (H) 0.0 - 0.5 %    Neutrophils, Absolute 5.94 1.70 - 7.00 10*3/mm3    Lymphocytes, Absolute 2.08 0.70 - 3.10 10*3/mm3    Monocytes, Absolute 0.67 0.10 - 0.90 10*3/mm3    Eosinophils, Absolute 0.26 0.00 -  0.40 10*3/mm3    Basophils, Absolute 0.08 0.00 - 0.20 10*3/mm3    Immature Grans, Absolute 0.06 (H) 0.00 - 0.05 10*3/mm3    nRBC 0.1 0.0 - 0.2 /100 WBC   ECG 12 Lead Stroke Evaluation    Collection Time: 09/19/23  3:51 AM   Result Value Ref Range    QT Interval 399 ms    QTC Interval 443 ms   Urinalysis With Microscopic If Indicated (No Culture) - Urine, Clean Catch    Collection Time: 09/19/23  4:54 AM    Specimen: Urine, Clean Catch   Result Value Ref Range    Color, UA Yellow Yellow, Straw    Appearance, UA Clear Clear    pH, UA 6.0 5.0 - 8.0    Specific Gravity, UA 1.015 1.005 - 1.030    Glucose, UA >=1000 mg/dL (3+) (A) Negative    Ketones, UA Negative Negative    Bilirubin, UA Negative Negative    Blood, UA Negative Negative    Protein,  mg/dL (2+) (A) Negative    Leuk Esterase, UA Trace (A) Negative    Nitrite, UA Negative Negative    Urobilinogen, UA 0.2 E.U./dL 0.2 - 1.0 E.U./dL   Urinalysis, Microscopic Only - Urine, Clean Catch    Collection Time: 09/19/23  4:54 AM    Specimen: Urine, Clean Catch   Result Value Ref Range    RBC, UA None Seen None Seen, 0-2 /HPF    WBC, UA 0-2 None Seen, 0-2 /HPF    Bacteria, UA None Seen None Seen /HPF    Squamous Epithelial Cells, UA None Seen None Seen, 0-2 /HPF    Yeast, UA Small/1+ Yeast None Seen /HPF    Hyaline Casts, UA None Seen None Seen /LPF    Methodology Manual Light Microscopy        Ordered the above labs and reviewed the results.        RADIOLOGY  No Radiology Exams Resulted Within Past 24 Hours    Ordered the above noted radiological studies. Reviewed by me in PACS.            PROCEDURES  Procedures    EKG independently interpreted by myself as follows:    EKG          EKG time: 0351  Rhythm/Rate: NSR, 74  P waves and DC: nml  QRS, axis: nml, LAD  ST and T waves: nml     Interpreted Contemporaneously by me, independently viewed  unchanged compared to prior 8/19/23          MEDICATIONS GIVEN IN ER  Medications   sodium chloride 0.9 % flush 10 mL (has  no administration in time range)   sodium chloride 0.9 % bolus 1,000 mL (0 mL Intravenous Stopped 9/19/23 0420)   morphine injection 4 mg (4 mg Intravenous Given 9/19/23 0351)   metoclopramide (REGLAN) injection 10 mg (10 mg Intravenous Given 9/19/23 0351)                   MEDICAL DECISION MAKING, PROGRESS, and CONSULTS    All labs have been independently reviewed by me.  All radiology studies have been reviewed by me and I have also reviewed the radiology report.   EKG's independently viewed and interpreted by me.  Discussion below represents my analysis of pertinent findings related to patient's condition, differential diagnosis, treatment plan and final disposition.      Additional sources:  - Discussed/ obtained information from independent historians: History obtained from the patient as well as the EMS report    - External (non-ED) record review: Upon medical records review, the patient was last seen and evaluated in the outpatient office of neurology on 9/11/2023 in follow-up for his known seizure-like activity.  He was also admitted to the hospital from 8/19/2023 through 8/20/2023 where he was seen and evaluated for possible seizure activity.    - Chronic or social conditions impacting care: Seizures, headaches    - Shared decision making: Admission decision based on shared conversations have between myself, the patient at bedside, as well as ANUSHA Dias for [facility].      Orders placed during this visit:  Orders Placed This Encounter   Procedures    CT Head Without Contrast    Comprehensive Metabolic Panel    Urinalysis With Microscopic If Indicated (No Culture) - Urine, Clean Catch    High Sensitivity Troponin T    Lactic Acid, Plasma    CBC Auto Differential    STAT Lactic Acid, Reflex    High Sensitivity Troponin T 2Hr    Urinalysis, Microscopic Only - Urine, Clean Catch    [facility] (on-call MD unless specified) Details    POC Glucose Once    ECG 12 Lead Stroke Evaluation    Insert Peripheral IV     Inpatient Admission    CBC & Differential           Differential diagnosis includes but is not limited to:    Differential diagnosis includes but is not limited to migraine headache, tension headache, cluster headache, acute CVA, TIA, intracranial hemorrhage, intracranial mass effect, sepsis, or electrolyte disturbance.      Independent interpretation of labs, radiology studies, and discussions with consultants:    CT scan of the head was independently interpreted by myself with my interpretation showing no area of acute hemorrhage, infarct/ischemia, or mass effect.        ED Course as of 09/19/23 0623   Tue Sep 19, 2023   2746 The patient's presentation, work-up, as well as diagnosis and treatment plan was discussed at length with ANUSHA Dias for A.  She agrees to admit the patient to the hospital today for Dr. Sullivan. [BM]   0539 On reevaluation, the patient reports significant improvement in his headache as it is only mild at this point.  I informed him that his work-up thus far is unremarkable but we will admit him to the hospital for further neurologic evaluation.  He is in agreement with that plan and all questions have been answered. [BM]      ED Course User Index  [BM] Kody Vera MD             DIAGNOSIS  Final diagnoses:   Acute nonintractable headache, unspecified headache type   Speech disturbance, unspecified type         DISPOSITION  ADMISSION    Discussed treatment plan and reason for admission with pt/family and admitting physician.  Pt/family voiced understanding of the plan for admission for further testing/treatment as needed.               Latest Documented Vital Signs:  As of 06:23 EDT  BP- 132/79 HR- 65 Temp- 98.2 °F (36.8 °C) (Oral) O2 sat- 91%              --    Please note that portions of this were completed with a voice recognition program.       Note Disclaimer: At [facility], we believe that sharing information builds trust and better relationships. You are  receiving this note because you are receiving care at [facility] or recently visited. It is possible you will see health information before a provider has talked with you about it. This kind of information can be easy to misunderstand. To help you fully understand what it means for your health, we urge you to discuss this note with your provider.             Kody Vera MD  09/19/23 0616

## 2023-09-19 NOTE — ED TRIAGE NOTES
"Pt arrive per EMS from home after reports that tonight approx 2-3 hours pta began with blurred vision to  bilateral eyes with pressure \"all over head\" and also reports right sided weakness.  Pt states is already being seen by neuro for speech problems with stuttering and seizures but tonight states stuttering appears worse.  Pt states pain is 10/10 to head.  Reports has been seen for these headaches in the past several months as well  "

## 2023-09-19 NOTE — H&P
"    Patient Name:  Pablo Mendoza  YOB: 1960  MRN:  [id number]  Admit Date:  9/19/2023  Patient Care Team:  Liana Hood MD as PCP - General (Internal Medicine & Pediatrics)  Drew Neal MD as Consulting Physician (Hematology and Oncology)  Brigitte Bell PA-C as Referring Physician (Physician Assistant)      Subjective   History Present Illness     Chief Complaint   Patient presents with    Headache       Mr. Mendoza is a 62 y.o. non-smoker with a history of migraines, DM, HTN, HLD, TIA that presents to [facility] complaining of word finding difficulty, stuttered speech, blurred vision. Followed by Neurology outpatient, last seen 9/11/23. Started on Keppra in August for seizure prophylaxis. EEG at that time was negative for seizure activity. CTA H/N, MRI brain were also unremarkable. Yesterday he had headache \"all over\" rated 10/10 on pain scale.. He had blurred vision, stuttering of words, tremor of rt hand. Took ibuprofen w/some relief of headache. Felt generally weak but there was no change in mobility. Has been compliant w/Keppra. He talked to his sister who felt he needed to be evaluated in ED. On exam, he is awake, oriented. Rates headache a 3/10. Has mild stuttering of words but no dysarthria. Denies recent fever, chest pain, soa, n/v/d, dysuria. He has walked to bathroom without difficulty or impaired balance. Denies dizziness/lightheadedness.       Review of Systems   Constitutional:  Positive for fatigue. Negative for chills and fever.   HENT:  Negative for congestion and trouble swallowing.    Eyes:  Positive for visual disturbance.   Respiratory:  Negative for shortness of breath.    Cardiovascular:  Negative for chest pain.   Gastrointestinal:  Negative for diarrhea, nausea and vomiting.   Genitourinary:  Negative for dysuria.   Musculoskeletal:  Negative for gait problem.   Skin:  Negative for rash.   Neurological:  Positive for speech " difficulty and headaches. Negative for dizziness and light-headedness.   Psychiatric/Behavioral:  Negative for sleep disturbance.       Personal History     Past Medical History:   Diagnosis Date    Anesthesia complication     STATES HARD TO WAKE UP W/ALL SURGERIES    COVID-19     DEC 2020    Diabetes mellitus     Diverticulitis     Elevated cholesterol     Gout     Hypertension     Low back pain     Migraines     Mixed hyperlipidemia 01/19/2018    Neuropathy     Nocturia 01/19/2018    Numbness and tingling of both lower extremities     LEGS AND FEET     Seizures     AS A CHILD    Staph infection 2010    UNSURE OF SITE    Stroke     COGNITIVE DELAY    TIA (transient ischemic attack)     Type 2 diabetes mellitus, without long-term current use of insulin 01/19/2018    Weakness of both legs     LEFT WORSE      Past Surgical History:   Procedure Laterality Date    CHOLECYSTECTOMY  2011    COLONOSCOPY      2015 or 2016    HERNIA REPAIR      LUMBAR FUSION N/A 11/03/2021    Procedure: Lumbar 3 to Lumbar 4 and Lumbar 4 to Lumbar 5 laminectomy with a fusion;  Surgeon: Jose Webster MD;  Location: [facility];  Service: [employer] - Neuro;  Laterality: N/A;    SINUS SURGERY      TYMPANOPLASTY Bilateral      Family History   Problem Relation Age of Onset    Arthritis Mother     Hypertension Mother     Heart disease Mother     Stroke Mother     Alcohol abuse Father     Arthritis Father     Hypertension Father     Nephrolithiasis Father     Diabetes Sister     Diabetes Sister     Stroke Brother 59    Hypertension Brother     Hyperlipidemia Brother     Throat cancer Maternal Grandmother     Diabetes Paternal Grandmother     Malig Hyperthermia Neg Hx      Social History     Tobacco Use    Smoking status: Never     Passive exposure: Never    Smokeless tobacco: Never   Vaping Use    Vaping Use: Never used   Substance Use Topics    Alcohol use: Yes     Comment: 3 DRINKS WEEKLY    Drug use: Not Currently     No current  facility-administered medications on file prior to encounter.     Current Outpatient Medications on File Prior to Encounter   Medication Sig Dispense Refill    acetaminophen (TYLENOL) 500 MG tablet Take 1 tablet by mouth Every 6 (Six) Hours As Needed for Mild Pain. Indications: Pain      allopurinol (ZYLOPRIM) 300 MG tablet TAKE ONE TABLET BY MOUTH TWICE A DAY (Patient taking differently: Take 1 tablet by mouth 2 (Two) Times a Day.) 60 tablet 1    amLODIPine (NORVASC) 10 MG tablet TAKE 1 TABLET BY MOUTH DAILY (Patient taking differently: Take 1 tablet by mouth Daily.) 30 tablet 1    atorvastatin (Lipitor) 40 MG tablet Take 1 tablet by mouth Daily. Indications: High Amount of Fats in the Blood 90 tablet 1    glipizide (GLUCOTROL) 5 MG tablet TAKE 1 TABLET BY MOUTH TWICE A DAY BEFORE A MEAL 60 tablet 0    hydroCHLOROthiazide (HYDRODIURIL) 25 MG tablet TAKE ONE TABLET BY MOUTH DAILY (Patient taking differently: Take 1 tablet by mouth Daily.) 30 tablet 2    hydrOXYzine (ATARAX) 25 MG tablet Take 1 tablet by mouth Daily As Needed for Anxiety. 30 tablet 0    ibuprofen (ADVIL,MOTRIN) 800 MG tablet Take 1 tablet by mouth Every 8 (Eight) Hours As Needed for Mild Pain. 30 tablet 0    Janumet XR  MG tablet TAKE ONE TABLET BY MOUTH TWICE A DAY 60 tablet 3    Jardiance 25 MG tablet tablet TAKE ONE TABLET BY MOUTH DAILY 30 tablet 1    levETIRAcetam (KEPPRA) 750 MG tablet Take 1 tablet by mouth 2 (Two) Times a Day for 180 days. Indications: Seizure 180 tablet 1    metoprolol tartrate (LOPRESSOR) 100 MG tablet TAKE 1 TABLET BY MOUTH TWICE A DAY (Patient taking differently: Take 1 tablet by mouth 2 (Two) Times a Day.) 60 tablet 1    sertraline (ZOLOFT) 50 MG tablet TAKE 1 TABLET BY MOUTH DAILY 30 tablet 0    aspirin 81 MG chewable tablet Chew 1 tablet Daily. FOLLOW MD GUIDELINES ON HOLDING FOR OR (Patient not taking: Reported on 9/11/2023)      cyanocobalamin 1000 MCG/ML injection Inject 1 ML IM Q w X4 weeks, then 1 ml IM qow  "x 4 months. Total of 12 injections. (Patient not taking: Reported on 9/19/2023) 12 mL 0    cyclobenzaprine (FLEXERIL) 10 MG tablet Take 1 tablet by mouth 2 (Two) Times a Day As Needed for Muscle Spasms. 60 tablet 0    fluticasone (Flonase) 50 MCG/ACT nasal spray 2 sprays into the nostril(s) as directed by provider As Needed for Rhinitis.      loratadine (CLARITIN) 10 MG tablet TAKE ONE TABLET BY MOUTH DAILY (Patient not taking: Reported on 9/11/2023) 30 tablet 0    multivitamin with minerals tablet tablet Take 1 tablet by mouth Daily. HOLDING FOR 7 DAYS PRIOR TO OR (Patient not taking: Reported on 9/11/2023)      rizatriptan (MAXALT) 10 MG tablet Take 1 tablet by mouth 1 (One) Time for 1 dose. AT ONSET OF HEADACHE MAY REPEAT ONE TABLET IN 2 HOURS IF NEEDED (Patient taking differently: Take 1 tablet by mouth 1 (One) Time.) 15 tablet 0    sildenafil (Viagra) 50 MG tablet Take 1 tablet by mouth Daily As Needed for Erectile Dysfunction. 30 tablet 0    Syringe/Needle, Disp, (BD Eclipse Syringe) 25G X 1\" 3 ML misc Use with B12 Solution As Directed 12 each 0    vitamin D (ERGOCALCIFEROL) 1.25 MG (40226 UT) capsule capsule Take 1 capsule by mouth 1 (One) Time Per Week for 120 days. 4 capsule 3     Allergies   Allergen Reactions    Aspirin Nausea Only    Lisinopril Unknown - Low Severity     Elevated creatinine           Objective    Objective     Vital Signs  Temp:  [98.2 °F (36.8 °C)] 98.2 °F (36.8 °C)  Heart Rate:  [65-76] 69  Resp:  [18-20] 18  BP: (130-147)/(76-91) 133/87  SpO2:  [91 %-95 %] 95 %  on   ;   Device (Oxygen Therapy): room air  Body mass index is 31.76 kg/m².    Physical Exam  Vitals and nursing note reviewed.   Constitutional:       General: He is not in acute distress.  HENT:      Head: Normocephalic.      Mouth/Throat:      Mouth: Mucous membranes are moist.   Eyes:      Conjunctiva/sclera: Conjunctivae normal.   Cardiovascular:      Rate and Rhythm: Normal rate and regular rhythm.   Pulmonary:      " Effort: Pulmonary effort is normal. No respiratory distress.      Breath sounds: No wheezing or rales.   Abdominal:      General: Bowel sounds are normal.      Palpations: Abdomen is soft.   Musculoskeletal:      Cervical back: Neck supple.      Right lower leg: No edema.      Left lower leg: No edema.   Skin:     General: Skin is warm and dry.   Neurological:      General: No focal deficit present.      Mental Status: He is alert and oriented to person, place, and time.      Cranial Nerves: No dysarthria.      Motor: No weakness or tremor.      Comments: Mild stuttering of words   Psychiatric:         Mood and Affect: Mood normal.         Behavior: Behavior normal.       Results Review:  I reviewed the patient's new clinical results.  I reviewed the patient's new imaging results and agree with the interpretation.  I reviewed the patient's other test results and agree with the interpretation  I personally viewed and interpreted the patient's EKG/Telemetry data    Lab Results (last 24 hours)       Procedure Component Value Units Date/Time    POC Glucose Once [[id number]]  (Normal) Collected: 09/19/23 0334    Specimen: Blood Updated: 09/19/23 0336     Glucose 101 mg/dL     CBC & Differential [[id number]]  (Abnormal) Collected: 09/19/23 0344    Specimen: Blood Updated: 09/19/23 0356    Narrative:      The following orders were created for panel order CBC & Differential.  Procedure                               Abnormality         Status                     ---------                               -----------         ------                     CBC Auto Differential[[id number]]        Abnormal            Final result                 Please view results for these tests on the individual orders.    Comprehensive Metabolic Panel [[id number]]  (Abnormal) Collected: 09/19/23 0344    Specimen: Blood Updated: 09/19/23 0501     Glucose 96 mg/dL      BUN 13 mg/dL      Creatinine 0.89 mg/dL      Sodium 141 mmol/L      Potassium 3.4  mmol/L      Chloride 100 mmol/L      CO2 26.0 mmol/L      Calcium 9.6 mg/dL      Total Protein 7.6 g/dL      Albumin 4.8 g/dL      ALT (SGPT) 19 U/L      AST (SGOT) 24 U/L      Alkaline Phosphatase 141 U/L      Total Bilirubin 0.4 mg/dL      Globulin 2.8 gm/dL      A/G Ratio 1.7 g/dL      BUN/Creatinine Ratio 14.6     Anion Gap 15.0 mmol/L      eGFR 98.1 mL/min/1.73     Narrative:      GFR Normal >60  Chronic Kidney Disease <60  Kidney Failure <15      High Sensitivity Troponin T [[id number]]  (Normal) Collected: 09/19/23 0344    Specimen: Blood Updated: 09/19/23 0502     HS Troponin T 10 ng/L     Narrative:      High Sensitive Troponin T Reference Range:  <10.0 ng/L- Negative Female for AMI  <15.0 ng/L- Negative Male for AMI  >=10 - Abnormal Female indicating possible myocardial injury.  >=15 - Abnormal Male indicating possible myocardial injury.   Clinicians would have to utilize clinical acumen, EKG, Troponin, and serial changes to determine if it is an Acute Myocardial Infarction or myocardial injury due to an underlying chronic condition.         Lactic Acid, Plasma [[id number]]  (Abnormal) Collected: 09/19/23 0344    Specimen: Blood Updated: 09/19/23 0444     Lactate 2.5 mmol/L     CBC Auto Differential [[id number]]  (Abnormal) Collected: 09/19/23 0344    Specimen: Blood Updated: 09/19/23 0356     WBC 9.09 10*3/mm3      RBC 6.48 10*6/mm3      Hemoglobin 16.2 g/dL      Hematocrit 49.6 %      MCV 76.5 fL      MCH 25.0 pg      MCHC 32.7 g/dL      RDW 18.2 %      RDW-SD 45.4 fl      MPV 9.9 fL      Platelets 279 10*3/mm3      Neutrophil % 65.2 %      Lymphocyte % 22.9 %      Monocyte % 7.4 %      Eosinophil % 2.9 %      Basophil % 0.9 %      Immature Grans % 0.7 %      Neutrophils, Absolute 5.94 10*3/mm3      Lymphocytes, Absolute 2.08 10*3/mm3      Monocytes, Absolute 0.67 10*3/mm3      Eosinophils, Absolute 0.26 10*3/mm3      Basophils, Absolute 0.08 10*3/mm3      Immature Grans, Absolute 0.06 10*3/mm3      nRBC  0.1 /100 WBC     Urinalysis With Microscopic If Indicated (No Culture) - Urine, Clean Catch [[id number]]  (Abnormal) Collected: 09/19/23 0454    Specimen: Urine, Clean Catch Updated: 09/19/23 0511     Color, UA Yellow     Appearance, UA Clear     pH, UA 6.0     Specific Gravity, UA 1.015     Glucose, UA >=1000 mg/dL (3+)     Ketones, UA Negative     Bilirubin, UA Negative     Blood, UA Negative     Protein,  mg/dL (2+)     Leuk Esterase, UA Trace     Nitrite, UA Negative     Urobilinogen, UA 0.2 E.U./dL    Urinalysis, Microscopic Only - Urine, Clean Catch [[id number]] Collected: 09/19/23 0454    Specimen: Urine, Clean Catch Updated: 09/19/23 0516     RBC, UA None Seen /HPF      WBC, UA 0-2 /HPF      Bacteria, UA None Seen /HPF      Squamous Epithelial Cells, UA None Seen /HPF      Yeast, UA Small/1+ Yeast /HPF      Hyaline Casts, UA None Seen /LPF      Methodology Manual Light Microscopy    STAT Lactic Acid, Reflex [[id number]]  (Abnormal) Collected: 09/19/23 0628    Specimen: Blood Updated: 09/19/23 0801     Lactate 3.0 mmol/L     High Sensitivity Troponin T 2Hr [[id number]]  (Normal) Collected: 09/19/23 0628    Specimen: Blood Updated: 09/19/23 0729     HS Troponin T 12 ng/L      Troponin T Delta 2 ng/L     Narrative:      High Sensitive Troponin T Reference Range:  <10.0 ng/L- Negative Female for AMI  <15.0 ng/L- Negative Male for AMI  >=10 - Abnormal Female indicating possible myocardial injury.  >=15 - Abnormal Male indicating possible myocardial injury.   Clinicians would have to utilize clinical acumen, EKG, Troponin, and serial changes to determine if it is an Acute Myocardial Infarction or myocardial injury due to an underlying chronic condition.         STAT Lactic Acid, Reflex [[id number]]  (Abnormal) Collected: 09/19/23 0923    Specimen: Blood Updated: 09/19/23 1001     Lactate 2.4 mmol/L     POC Glucose Once [[id number]]  (Abnormal) Collected: 09/19/23 1123    Specimen: Blood Updated: 09/19/23 1127      Glucose 138 mg/dL     STAT Lactic Acid, Reflex [[id number]] Collected: 09/19/23 1205    Specimen: Blood Updated: 09/19/23 1216            Imaging Results (Last 24 Hours)       Procedure Component Value Units Date/Time    CT Head Without Contrast [[id number]] Collected: 09/19/23 0444     Updated: 09/19/23 0824    Narrative:      CT OF THE HEAD WITHOUT CONTRAST     HISTORY: Blurred vision     COMPARISON: August 19, 2023     TECHNIQUE: Axial CT imaging was obtained through the brain. No IV  contrast was administered.     FINDINGS:  No acute intracranial hemorrhage is seen. Ventricles are normal in size.  There is no midline shift or mass effect. Paranasal sinuses appear  clear. Right mastoid air cells appear relatively underpneumatized, which  may represent changes of chronic mastoiditis/otitis media.       Impression:      No acute intracranial findings.     Radiation dose reduction techniques were utilized, including automated  exposure control and exposure modulation based on body size.        This report was finalized on 9/19/2023 4:46 AM by Dr. Marva Jaimes M.D.               Results for orders placed during the hospital encounter of 08/14/23    Adult Transthoracic Echo Complete W/ Cont if Necessary Per Protocol    Interpretation Summary    Left ventricular systolic function is normal. Calculated left ventricular EF = 54.8% Normal left ventricular cavity size and wall thickness noted. All left ventricular wall segments contract normally. Left ventricular diastolic function is consistent with (grade II w/high LAP) pseudonormalization.    Left atrial volume is normal. Saline test results are negative.    No aortic valve regurgitation or stenosis is present. The aortic valve is abnormal in structure. There is mild thickening of the aortic valve. The aortic valve was poorly visualized but appears trileaflet.    Mild mitral annular calcification is present. There is moderate, anterior mitral leaflet thickening  present.    Trace tricuspid valve regurgitation is present. Insufficient TR velocity profile to estimate the right ventricular systolic pressure.      ECG 12 Lead Stroke Evaluation   Preliminary Result   HEART RATE= 74  bpm   RR Interval= 811  ms   FL Interval= 187  ms   P Horizontal Axis= -16  deg   P Front Axis= 42  deg   QRSD Interval= 105  ms   QT Interval= 399  ms   QTcB= 443  ms   QRS Axis= -31  deg   T Wave Axis= 47  deg   - BORDERLINE ECG -   Sinus rhythm   Left axis deviation   Low voltage, precordial leads   Consider anterior infarct   Electronically Signed By:    Date and Time of Study: 2023-09-19 03:51:12           Assessment/Plan     Active Hospital Problems    Diagnosis  POA    **Headache [R51.9]  Yes    Elevated lactic acid level [R79.89]  Yes    Hypokalemia [E87.6]  Yes    Type 2 diabetes mellitus, without long-term current use of insulin [E11.9]  Yes    Mixed hyperlipidemia [E78.2]  Yes    Essential hypertension [I10]  Yes      Resolved Hospital Problems   No resolved problems to display.       Mr. Mendoza is a 62 y.o. non-smoker with a history of migraines, DM, HTN, HLD, TIA who is admitted with headache with neurological changes    Headache w/neurological changes:  -CTH unremarkable. Neurology consulted. Defer further imaging to them. HA has improved. Continue Keppra for seizure prophylaxis. ASA, statin for now    Elevated lactic acid:  -Trending down w/IVF's. BP stable. No evidence of infection    Hypokalemia:  -K 3.4, replete x 1    DM:  -Monitor BG trends. Hold oral hypoglycemics. Correctional scale insulin    HTN:  -BP acceptable acutely. Continue amlodipine, metoprolol    HLD:  -statin      I discussed the patient's findings and my recommendations with patient.    VTE Prophylaxis - SCDs.  Code Status - Full code.       ANUSHA Hdz  [facility]  09/19/23  12:25 EDT

## 2023-09-19 NOTE — PLAN OF CARE
Goal Outcome Evaluation:   A&Ox4, VSS he does not complain of any pain at this time. Pt receiving NS @75ml/hr. Pt educated. This RN will continue to monitor

## 2023-09-19 NOTE — PLAN OF CARE
Goal Outcome Evaluation:         Order set received via stroke protocol - per Kayla LARKIN, patient has passed dysphagia screen and has no issue with communication/cognition.  However, patient continues to report inconsistent difficulty with fluency.  CT negative for any findings.  ST will follow up w/SLE.

## 2023-09-19 NOTE — CONSULTS
Neurology Consult Note    Consult Date: 9/19/2023    Referring MD: Radha Sullivan MD    Reason for Consult I have been asked to see the patient in neurological consultation to render advice and opinion regarding headaches    Pablo Mendoza is a 62 y.o. male with past medical history of hypertension, hyperlipidemia, diabetes mellitus, gout, migraines and TIAs in the past.  Patient states that he has been having worsening headaches since the last couple of days which brought him to the ER.  He states that his head hurts all over, no specific region, also complains of nausea along with photosensitivity and phonophobia.  He has no aggravating factors or relieving factors.  Denies congestion lacrimation or vision problems apart from blurry vision in both eyes.  He denies any increase in headache with cough or bending down or moving his head coughing or sexual activity.    He also states its his seizures which are making him have this headache.  I asked him to explain more about this seizure he states since past couple of years he has been having episodes of stuttering speech and twitching all over his body and he zones out.  He states he zones out even while standing or sitting or doing work and cannot remember what has happened.  He states he had several seizures at work in the past which were witnessed by other people where he is twitching all over.    He was admitted last month for similar complaints of history seizure-like spells with transient aphasia stuttering and right-sided symptoms he had a complete stroke investigations including MRI MRIs which were completely unremarkable including an EEG.        Past Medical History:   Diagnosis Date    Anesthesia complication     STATES HARD TO WAKE UP W/ALL SURGERIES    COVID-19     DEC 2020    Diabetes mellitus     Diverticulitis     Elevated cholesterol     Gout     Hypertension     Low back pain     Migraines     Mixed hyperlipidemia 01/19/2018    Neuropathy      "Nocturia 01/19/2018    Numbness and tingling of both lower extremities     LEGS AND FEET     Seizures     AS A CHILD    Staph infection 2010    UNSURE OF SITE    Stroke     COGNITIVE DELAY    TIA (transient ischemic attack)     Type 2 diabetes mellitus, without long-term current use of insulin 01/19/2018    Weakness of both legs     LEFT WORSE        Exam  /87 (BP Location: Left arm, Patient Position: Lying)   Pulse 69   Temp 98.2 °F (36.8 °C) (Oral)   Resp 18   Ht 170.2 cm (67\")   Wt 92 kg (202 lb 12.8 oz)   SpO2 95%   BMI 31.76 kg/m²     Higher integrative function: Oriented to time, place, person, Spontaneous speech, fund of vocabulary are normal.  Positive stuttering speech  CN II: Normal visual acuity   CN III IV VI: Extraocular movements are full without nystagmus. Pupils are equal, round, and reactive to light.   CN V: Sensation same on both sides of the face  CN VII: Facial movements are symmetric, no weakness.   CN VIII: Auditory acuity is normal.   Motor: Good strength 5/5 both UE and LE B/L  no pronator drift. No fasciculations, rigidity, spasticity or abnormal movements.   Sensation: Normal sensation to pin prick and light touch all four extremities   Coordination: Finger to nose test showed no dysmetria      DATA:    Lab Results   Component Value Date    GLUCOSE 96 09/19/2023    CALCIUM 9.6 09/19/2023     09/19/2023    K 3.4 (L) 09/19/2023    CO2 26.0 09/19/2023     09/19/2023    BUN 13 09/19/2023    CREATININE 0.89 09/19/2023    EGFRIFAFRI 101 02/03/2022    EGFRIFNONA 87 02/03/2022    BCR 14.6 09/19/2023    ANIONGAP 15.0 09/19/2023     Lab Results   Component Value Date    WBC 9.09 09/19/2023    HGB 16.2 09/19/2023    HCT 49.6 09/19/2023    MCV 76.5 (L) 09/19/2023     09/19/2023       Lab review:   Sodium 141  Creatinine 0.89  Normal LFTs  Blood glucose 138    Lactate 2.6    B12 270    WBC 9.09  Hemoglobin 16 and platelets 279    Urine analysis showed glucose greater " than thousand and trace leukocytes and protein      Imaging review:     CTA head and neck done on 8/14/2023-mild atherosclerosis bilateral ICA but no significant vessel stenosis or atherosclerosis    EEG 8/15/2023-normal, epileptiform discharges or seizures     CT head done on admission showed no acute hypodensity or hyperdensity    MRI C-spine done on 8/14/2023-degenerative changes C4-C5 and C6-C7    MRI brain done with and without contrast 8/14/2023-no DWI changes, no T2 flair changes, no GRE changes, postcontrast enhancement of      Diagnoses:  Status migrainosus    Differential diagnosis include  Functional neurological disorder  PNES    Other medical problems  B12 deficiency  Hypertension  Hyperlipidemia                           Pre-stroke MRS: 1  NIHSS: NIHSS:    Baseline  0-->Alert: keenly responsive  0-->Answers both questions correctly  0-->Performs both tasks correctly  0=normal  0=No visual loss  0=Normal symmetric movement  0-->No drift: limb holds 90 (or 45) degrees for full 10 secs  0-->No drift: limb holds 90 (or 45) degrees for full 10 secs  0-->No drift: limb holds 90 (or 45) degrees for full 10 secs  0-->No drift: limb holds 90 (or 45) degrees for full 10 secs  0=Absent  0=Normal; no sensory loss  0=No aphasia, normal  0=Normal  0=No abnormality    Total score: 0      Comment: It is a 62-year-old gentleman with history of hypertension hyperlipidemia type 2 diabetes comes in with headache which has been ongoing for the last couple of days he does have some migrainous features but does state also that his head hurts all over.  He has nausea and light sensitivity and also phonophobia.    Regarding his seizure-like spells which include episodes of zoning out while at work including twitching of all his muscles and stuttering speech, does not sound like true epileptic seizure.  Will however repeat prolonged EEG this admission.  Last admission Keppra was started to see if it helps but patient states it  did not help any of this seizures, again AEDs do not help PNES or functional neurological disorders.    I reviewed his MRI MRAs which were done last admission they look completely unremarkable.    I spoke about the diagnosis with patient extensively.         PLAN:   - Check ESR CRP  -B12 replacement subcutaneously  -Migraine cocktail every 6 as needed, will also start him on nortriptyline at night to help with his headaches  -We will repeat CT head and place him on continuous video EEG monitoring overnight to capture the spells.

## 2023-09-19 NOTE — PROGRESS NOTES
Patient is Current with [facility]. We will follow for discharge plans and HH needs.

## 2023-09-20 ENCOUNTER — TRANSCRIBE ORDERS (OUTPATIENT)
Dept: HOME HEALTH SERVICES | Facility: HOME HEALTHCARE | Age: 63
End: 2023-09-20
Payer: COMMERCIAL

## 2023-09-20 ENCOUNTER — READMISSION MANAGEMENT (OUTPATIENT)
Dept: CALL CENTER | Facility: HOSPITAL | Age: 63
End: 2023-09-20
Payer: COMMERCIAL

## 2023-09-20 VITALS
WEIGHT: 202.8 LBS | TEMPERATURE: 98.2 F | SYSTOLIC BLOOD PRESSURE: 130 MMHG | BODY MASS INDEX: 31.83 KG/M2 | RESPIRATION RATE: 18 BRPM | DIASTOLIC BLOOD PRESSURE: 74 MMHG | HEART RATE: 73 BPM | HEIGHT: 67 IN | OXYGEN SATURATION: 92 %

## 2023-09-20 DIAGNOSIS — R47.89 OTHER SPEECH DISTURBANCE: ICD-10-CM

## 2023-09-20 DIAGNOSIS — I10 PRIMARY HYPERTENSION: ICD-10-CM

## 2023-09-20 DIAGNOSIS — E11.29 TYPE 2 DIABETES MELLITUS WITH PROTEINURIA: ICD-10-CM

## 2023-09-20 DIAGNOSIS — G43.119 INTRACTABLE MIGRAINE WITH AURA WITHOUT STATUS MIGRAINOSUS: Primary | ICD-10-CM

## 2023-09-20 DIAGNOSIS — R80.9 TYPE 2 DIABETES MELLITUS WITH PROTEINURIA: ICD-10-CM

## 2023-09-20 PROBLEM — R79.89 ELEVATED LACTIC ACID LEVEL: Status: RESOLVED | Noted: 2023-09-19 | Resolved: 2023-09-20

## 2023-09-20 PROBLEM — G43.109 MIGRAINE WITH AURA: Status: ACTIVE | Noted: 2023-09-19

## 2023-09-20 PROBLEM — R51.9 HEADACHE: Status: ACTIVE | Noted: 2023-09-20

## 2023-09-20 PROBLEM — E87.6 HYPOKALEMIA: Status: RESOLVED | Noted: 2023-09-19 | Resolved: 2023-09-20

## 2023-09-20 LAB
ALBUMIN SERPL-MCNC: 4.1 G/DL (ref 3.5–5.2)
ALBUMIN/GLOB SERPL: 1.6 G/DL
ALP SERPL-CCNC: 121 U/L (ref 39–117)
ALT SERPL W P-5'-P-CCNC: 17 U/L (ref 1–41)
ANION GAP SERPL CALCULATED.3IONS-SCNC: 12.6 MMOL/L (ref 5–15)
AST SERPL-CCNC: 18 U/L (ref 1–40)
BILIRUB SERPL-MCNC: 0.5 MG/DL (ref 0–1.2)
BUN SERPL-MCNC: 13 MG/DL (ref 8–23)
BUN/CREAT SERPL: 13.3 (ref 7–25)
CALCIUM SPEC-SCNC: 8.9 MG/DL (ref 8.6–10.5)
CHLORIDE SERPL-SCNC: 103 MMOL/L (ref 98–107)
CHOLEST SERPL-MCNC: 95 MG/DL (ref 0–200)
CO2 SERPL-SCNC: 25.4 MMOL/L (ref 22–29)
CREAT SERPL-MCNC: 0.98 MG/DL (ref 0.76–1.27)
DEPRECATED RDW RBC AUTO: 42.6 FL (ref 37–54)
EGFRCR SERPLBLD CKD-EPI 2021: 88.3 ML/MIN/1.73
ERYTHROCYTE [DISTWIDTH] IN BLOOD BY AUTOMATED COUNT: 16.2 % (ref 12.3–15.4)
GLOBULIN UR ELPH-MCNC: 2.5 GM/DL
GLUCOSE BLDC GLUCOMTR-MCNC: 151 MG/DL (ref 70–130)
GLUCOSE BLDC GLUCOMTR-MCNC: 152 MG/DL (ref 70–130)
GLUCOSE BLDC GLUCOMTR-MCNC: 181 MG/DL (ref 70–130)
GLUCOSE SERPL-MCNC: 140 MG/DL (ref 65–99)
HBA1C MFR BLD: 7.3 % (ref 4.8–5.6)
HCT VFR BLD AUTO: 43.1 % (ref 37.5–51)
HDLC SERPL-MCNC: 24 MG/DL (ref 40–60)
HGB BLD-MCNC: 14.2 G/DL (ref 13–17.7)
LDLC SERPL CALC-MCNC: 30 MG/DL (ref 0–100)
LDLC/HDLC SERPL: 0.73 {RATIO}
MCH RBC QN AUTO: 24.8 PG (ref 26.6–33)
MCHC RBC AUTO-ENTMCNC: 32.9 G/DL (ref 31.5–35.7)
MCV RBC AUTO: 75.2 FL (ref 79–97)
PLATELET # BLD AUTO: 224 10*3/MM3 (ref 140–450)
PMV BLD AUTO: 10 FL (ref 6–12)
POTASSIUM SERPL-SCNC: 3.8 MMOL/L (ref 3.5–5.2)
PROT SERPL-MCNC: 6.6 G/DL (ref 6–8.5)
RBC # BLD AUTO: 5.73 10*6/MM3 (ref 4.14–5.8)
SODIUM SERPL-SCNC: 141 MMOL/L (ref 136–145)
TRIGL SERPL-MCNC: 267 MG/DL (ref 0–150)
VLDLC SERPL-MCNC: 41 MG/DL (ref 5–40)
WBC NRBC COR # BLD: 8.99 10*3/MM3 (ref 3.4–10.8)

## 2023-09-20 PROCEDURE — 96361 HYDRATE IV INFUSION ADD-ON: CPT

## 2023-09-20 PROCEDURE — 85027 COMPLETE CBC AUTOMATED: CPT | Performed by: NURSE PRACTITIONER

## 2023-09-20 PROCEDURE — 96372 THER/PROPH/DIAG INJ SC/IM: CPT

## 2023-09-20 PROCEDURE — 82948 REAGENT STRIP/BLOOD GLUCOSE: CPT

## 2023-09-20 PROCEDURE — 80053 COMPREHEN METABOLIC PANEL: CPT | Performed by: NURSE PRACTITIONER

## 2023-09-20 PROCEDURE — 97165 OT EVAL LOW COMPLEX 30 MIN: CPT

## 2023-09-20 PROCEDURE — 92523 SPEECH SOUND LANG COMPREHEN: CPT

## 2023-09-20 PROCEDURE — 25010000002 CYANOCOBALAMIN PER 1000 MCG: Performed by: NURSE PRACTITIONER

## 2023-09-20 PROCEDURE — 80061 LIPID PANEL: CPT | Performed by: NURSE PRACTITIONER

## 2023-09-20 PROCEDURE — G0378 HOSPITAL OBSERVATION PER HR: HCPCS

## 2023-09-20 PROCEDURE — 97535 SELF CARE MNGMENT TRAINING: CPT

## 2023-09-20 PROCEDURE — 83036 HEMOGLOBIN GLYCOSYLATED A1C: CPT | Performed by: NURSE PRACTITIONER

## 2023-09-20 PROCEDURE — 36415 COLL VENOUS BLD VENIPUNCTURE: CPT | Performed by: NURSE PRACTITIONER

## 2023-09-20 PROCEDURE — 99232 SBSQ HOSP IP/OBS MODERATE 35: CPT | Performed by: NURSE PRACTITIONER

## 2023-09-20 PROCEDURE — 95714 VEEG EA 12-26 HR UNMNTR: CPT

## 2023-09-20 PROCEDURE — 96376 TX/PRO/DX INJ SAME DRUG ADON: CPT

## 2023-09-20 PROCEDURE — 25010000002 DIPHENHYDRAMINE PER 50 MG: Performed by: STUDENT IN AN ORGANIZED HEALTH CARE EDUCATION/TRAINING PROGRAM

## 2023-09-20 PROCEDURE — 63710000001 INSULIN LISPRO (HUMAN) PER 5 UNITS: Performed by: NURSE PRACTITIONER

## 2023-09-20 PROCEDURE — 25810000003 SODIUM CHLORIDE 0.9 % SOLUTION: Performed by: NURSE PRACTITIONER

## 2023-09-20 RX ORDER — TOPIRAMATE 25 MG/1
25 TABLET ORAL NIGHTLY
Status: DISCONTINUED | OUTPATIENT
Start: 2023-09-20 | End: 2023-09-20 | Stop reason: HOSPADM

## 2023-09-20 RX ORDER — NORTRIPTYLINE HYDROCHLORIDE 25 MG/1
25 CAPSULE ORAL NIGHTLY
Qty: 30 CAPSULE | Refills: 0 | Status: CANCELLED | OUTPATIENT
Start: 2023-09-20

## 2023-09-20 RX ORDER — CYANOCOBALAMIN 1000 UG/ML
1000 INJECTION, SOLUTION INTRAMUSCULAR; SUBCUTANEOUS ONCE
Status: COMPLETED | OUTPATIENT
Start: 2023-09-20 | End: 2023-09-20

## 2023-09-20 RX ORDER — GLIPIZIDE 5 MG/1
TABLET ORAL
Qty: 60 TABLET | Refills: 0 | Status: SHIPPED | OUTPATIENT
Start: 2023-09-20 | End: 2023-09-25 | Stop reason: SDUPTHER

## 2023-09-20 RX ORDER — TOPIRAMATE 25 MG/1
25 TABLET ORAL NIGHTLY
Qty: 30 TABLET | Refills: 0 | Status: SHIPPED | OUTPATIENT
Start: 2023-09-20

## 2023-09-20 RX ADMIN — INSULIN LISPRO 2 UNITS: 100 INJECTION, SOLUTION INTRAVENOUS; SUBCUTANEOUS at 18:25

## 2023-09-20 RX ADMIN — DIPHENHYDRAMINE HYDROCHLORIDE 12.5 MG: 50 INJECTION, SOLUTION INTRAMUSCULAR; INTRAVENOUS at 08:47

## 2023-09-20 RX ADMIN — CYANOCOBALAMIN 1000 MCG: 1000 INJECTION, SOLUTION INTRAMUSCULAR; SUBCUTANEOUS at 14:24

## 2023-09-20 RX ADMIN — AMLODIPINE BESYLATE 10 MG: 10 TABLET ORAL at 08:46

## 2023-09-20 RX ADMIN — DIPHENHYDRAMINE HYDROCHLORIDE 12.5 MG: 50 INJECTION, SOLUTION INTRAMUSCULAR; INTRAVENOUS at 14:25

## 2023-09-20 RX ADMIN — INSULIN LISPRO 2 UNITS: 100 INJECTION, SOLUTION INTRAVENOUS; SUBCUTANEOUS at 12:12

## 2023-09-20 RX ADMIN — SERTRALINE 50 MG: 50 TABLET, FILM COATED ORAL at 08:46

## 2023-09-20 RX ADMIN — SODIUM CHLORIDE 100 ML/HR: 9 INJECTION, SOLUTION INTRAVENOUS at 12:05

## 2023-09-20 RX ADMIN — ALLOPURINOL 300 MG: 300 TABLET ORAL at 08:46

## 2023-09-20 RX ADMIN — SODIUM CHLORIDE 100 ML/HR: 9 INJECTION, SOLUTION INTRAVENOUS at 00:27

## 2023-09-20 RX ADMIN — LEVETIRACETAM 750 MG: 500 TABLET, FILM COATED ORAL at 08:46

## 2023-09-20 RX ADMIN — DIPHENHYDRAMINE HYDROCHLORIDE 12.5 MG: 50 INJECTION, SOLUTION INTRAMUSCULAR; INTRAVENOUS at 03:09

## 2023-09-20 RX ADMIN — ACETAMINOPHEN 650 MG: 325 TABLET, FILM COATED ORAL at 08:46

## 2023-09-20 RX ADMIN — METOPROLOL TARTRATE 100 MG: 50 TABLET, FILM COATED ORAL at 08:46

## 2023-09-20 NOTE — PLAN OF CARE
Goal Outcome Evaluation:  Plan of Care Reviewed With: patient           Outcome Evaluation: Pt admitted to [facility] for headache & neurological symptoms 2/2 seizure activity. Pt lives alone and is (I) w/ BADLs at baseline. Agreeable to OT eval this date. Performing bed mobility w/ SBA. Performing transfers, functional mobility, and standing ADL tasks w/ CGA. Setup to SBA for seated ADL tasks. Pt presents w/ mild strength/endurance deficits w/ activity, however did not hinder performance. Pt reports he is receiving HHPT since last admission and is planning to continue, denies need for HHOT at this time. OT will f/u while inpatient, anticipate d/c home.      Anticipated Discharge Disposition (OT): home

## 2023-09-20 NOTE — DISCHARGE SUMMARY
Patient Name: Pablo Mendoza  : 1960  MRN: [id number]    Date of Admission: 2023  Date of Discharge:  2023  Primary Care Physician: Liana Hood MD      Chief Complaint:   Headache      Discharge Diagnoses     Active Hospital Problems    Diagnosis  POA    Migraine with aura [G43.109]  Yes    Type 2 diabetes mellitus, without long-term current use of insulin [E11.9]  Yes    Mixed hyperlipidemia [E78.2]  Yes    Essential hypertension [I10]  Yes      Resolved Hospital Problems    Diagnosis Date Resolved POA    Elevated lactic acid level [R79.89] 2023 Yes    Hypokalemia [E87.6] 2023 Yes        Hospital Course     Mr. Mendoza is a 62-year-old male with hypertension, hyperlipidemia, diabetes, gout, migraines, and TIAs in the past who presented with complaints of recent worsening headaches.  He has complained of some associated nausea along with photosensitivity and photophobia.  He also reports he has seizures which are making him have this headache.  When he describes seizures he reported he was having episodes of stuttering speech, twitching all over his body and zoning out.  He reports he has had several seizures at work in the past which were witnessed by other people where he is twitching all over.  He was admitted last month with similar complaints of history of seizure-like spells and transient aphasia and stuttering as well as right-sided symptoms and had extensive evaluation including MRI brain with and without contrast, CTA head/neck, and EEG which were unrevealing.  He was started on trial of Keppra for consideration of seizure.  He has since followed up with [facility] as outpatient he was ordered a prolonged EEG study and patient was found to have low B12 and vitamin D levels and replacement was recommended though patient has not started yet.     He was given migraine cocktails yesterday and started on nortriptyline 25 mg nightly. CT head this  admission unremarkable. Initial spot EEG was unremarkable.  Prolonged EEG completed overnight which was unremarkable.    Neurology has cleared patient for discharge on Topamax 25 mg nightly for migraine.  He has outpatient follow-up arranged with neurology.      Day of Discharge     Subjective:  Feels much better than he did at time of admission.  No new complaints as of this morning.  Still has some slight headache and altered speech.    Physical Exam:  Temp:  [97.7 °F (36.5 °C)-98.2 °F (36.8 °C)] 98.2 °F (36.8 °C)  Heart Rate:  [71-74] 73  Resp:  [18] 18  BP: (129-131)/(69-75) 130/74  Body mass index is 31.76 kg/m².  Physical Exam  Vitals and nursing note reviewed.   Constitutional:       Appearance: Normal appearance.   Pulmonary:      Effort: Pulmonary effort is normal.   Neurological:      Mental Status: He is alert. Mental status is at baseline.   Psychiatric:         Mood and Affect: Mood normal.       Consultants     Consult Orders (all) (From admission, onward)       Start     Ordered    09/19/23 0652  Inpatient Neurology Consult Stroke  Once        Specialty:  Neurology  Provider:  (Not yet assigned)    09/19/23 0652        Procedures     Imaging Results (All)       Procedure Component Value Units Date/Time    CT Head Without Contrast [[id number]] Collected: 09/19/23 0444     Updated: 09/19/23 0824    Narrative:      CT OF THE HEAD WITHOUT CONTRAST     HISTORY: Blurred vision     COMPARISON: August 19, 2023     TECHNIQUE: Axial CT imaging was obtained through the brain. No IV  contrast was administered.     FINDINGS:  No acute intracranial hemorrhage is seen. Ventricles are normal in size.  There is no midline shift or mass effect. Paranasal sinuses appear  clear. Right mastoid air cells appear relatively underpneumatized, which  may represent changes of chronic mastoiditis/otitis media.       Impression:      No acute intracranial findings.            Pertinent Labs     Results from last 7 days   Lab Units  "09/20/23  0620 09/19/23  0344   WBC 10*3/mm3 8.99 9.09   HEMOGLOBIN g/dL 14.2 16.2   PLATELETS 10*3/mm3 224 279     Results from last 7 days   Lab Units 09/20/23  0620 09/19/23  0344   SODIUM mmol/L 141 141   POTASSIUM mmol/L 3.8 3.4*   CHLORIDE mmol/L 103 100   CO2 mmol/L 25.4 26.0   BUN mg/dL 13 13   CREATININE mg/dL 0.98 0.89   GLUCOSE mg/dL 140* 96   EGFR mL/min/1.73 88.3 98.1     Results from last 7 days   Lab Units 09/20/23  0620 09/19/23  0344   ALBUMIN g/dL 4.1 4.8   BILIRUBIN mg/dL 0.5 0.4   ALK PHOS U/L 121* 141*   AST (SGOT) U/L 18 24   ALT (SGPT) U/L 17 19     Results from last 7 days   Lab Units 09/20/23  0620 09/19/23  0344   CALCIUM mg/dL 8.9 9.6   ALBUMIN g/dL 4.1 4.8       Results from last 7 days   Lab Units 09/19/23  0628 09/19/23  0344   HSTROP T ng/L 12 10       Results from last 7 days   Lab Units 09/20/23  0620   CHOLESTEROL mg/dL 95   TRIGLYCERIDES mg/dL 267*   HDL CHOL mg/dL 24*   LDL CHOL mg/dL 30             Test Results Pending at Discharge       Discharge Details        Discharge Medications        New Medications        Instructions Start Date   cyanocobalamin 1000 MCG/ML injection   Inject 1 ML IM Q w X4 weeks, then 1 ml IM qow x 4 months. Total of 12 injections.      topiramate 25 MG tablet  Commonly known as: TOPAMAX   25 mg, Oral, Nightly             Continue These Medications        Instructions Start Date   acetaminophen 500 MG tablet  Commonly known as: TYLENOL   1 tablet, Oral, Every 6 Hours PRN      allopurinol 300 MG tablet  Commonly known as: ZYLOPRIM   TAKE ONE TABLET BY MOUTH TWICE A DAY      amLODIPine 10 MG tablet  Commonly known as: NORVASC   TAKE 1 TABLET BY MOUTH DAILY      atorvastatin 40 MG tablet  Commonly known as: Lipitor   40 mg, Oral, Daily      BD Eclipse Syringe 25G X 1\" 3 ML misc  Generic drug: Syringe/Needle (Disp)   Use with B12 Solution As Directed      glipizide 5 MG tablet  Commonly known as: GLUCOTROL   TAKE 1 TABLET BY MOUTH TWICE A DAY BEFORE A MEAL   "    hydroCHLOROthiazide 25 MG tablet  Commonly known as: HYDRODIURIL   TAKE ONE TABLET BY MOUTH DAILY      hydrOXYzine 25 MG tablet  Commonly known as: ATARAX   25 mg, Oral, Daily PRN      ibuprofen 800 MG tablet  Commonly known as: ADVIL,MOTRIN   800 mg, Oral, Every 8 Hours PRN      Janumet XR  MG tablet  Generic drug: SITagliptin-metFORMIN HCl ER   TAKE ONE TABLET BY MOUTH TWICE A DAY      Jardiance 25 MG tablet tablet  Generic drug: empagliflozin   TAKE ONE TABLET BY MOUTH DAILY      levETIRAcetam 750 MG tablet  Commonly known as: KEPPRA   750 mg, Oral, 2 Times Daily      metoprolol tartrate 100 MG tablet  Commonly known as: LOPRESSOR   TAKE 1 TABLET BY MOUTH TWICE A DAY      sertraline 50 MG tablet  Commonly known as: ZOLOFT   TAKE 1 TABLET BY MOUTH DAILY      sildenafil 50 MG tablet  Commonly known as: Viagra   50 mg, Oral, Daily PRN      vitamin D 1.25 MG (67620 UT) capsule capsule  Commonly known as: ERGOCALCIFEROL   50,000 Units, Oral, Weekly             Stop These Medications      aspirin 81 MG chewable tablet     cyclobenzaprine 10 MG tablet  Commonly known as: FLEXERIL     fluticasone 50 MCG/ACT nasal spray  Commonly known as: Flonase     loratadine 10 MG tablet  Commonly known as: CLARITIN     multivitamin with minerals tablet tablet     rizatriptan 10 MG tablet  Commonly known as: MAXALT              Allergies   Allergen Reactions    Aspirin Nausea Only    Lisinopril Unknown - Low Severity     Elevated creatinine           Discharge Disposition:  Home or Self Care      Discharge Diet:  Diet Order   Procedures    Diet: Diabetic Diets; Consistent Carbohydrate; Texture: Regular Texture (IDDSI 7); Fluid Consistency: Thin (IDDSI 0)       Discharge Activity:   Activity Instructions       Activity as Tolerated              CODE STATUS:    Code Status and Medical Interventions:   Ordered at: 09/19/23 0652     Code Status (Patient has no pulse and is not breathing):    CPR (Attempt to Resuscitate)      Medical Interventions (Patient has pulse or is breathing):    Full Support       Future Appointments   Date Time Provider Department Center   9/26/2023  2:00 PM Jose Webster MD [facility]   10/5/2023  2:30 PM LAB CHAIR 6 [facility]  LAB [facility] [place]   10/5/2023  3:00 PM CHAIR 10 CLAUDIA Flores MD  INFUS [facility]   10/25/2023  9:45 AM Brigitte Harris DNP, APRN [facility] SLP [facility] None   12/11/2023 11:00 AM Jessica Lopez APRN [facility] N ESPT [facility]   12/11/2023 12:30 PM LAB CHAIR 6 [facility]  LAB [facility] [place]   12/11/2023  1:00 PM Drew Neal MD [facility] CBC [facility] [place]   12/11/2023  1:30 PM CHAIR 13 [facility] LATRICIA Flores [facility] IN[facility]     Additional Instructions for the Follow-ups that You Need to Schedule       Discharge Follow-up with PCP   As directed       Currently Documented PCP:    Liana Hood MD    PCP Phone Number:    [phone]     Follow Up Details: 1 to 2 weeks (or sooner if problems)               Follow-up Information       Liana Hood MD .    Specialties: Internal Medicine & Pediatrics, Pediatrics  Why: 1 to 2 weeks (or sooner if problems)  Contact information:  [address]  [address]  [place] [address]  [phone]                             Additional Instructions for the Follow-ups that You Need to Schedule       Discharge Follow-up with PCP   As directed       Currently Documented PCP:    Liana Hood MD    PCP Phone Number:    [phone]     Follow Up Details: 1 to 2 weeks (or sooner if problems)            Time Spent on Discharge:  Greater than 30 minutes      John Eng MD  [facility]  09/20/23  14:29 EDT

## 2023-09-20 NOTE — THERAPY EVALUATION
Acute Care - Speech Language Pathology Initial Evaluation  [facility]     Patient Name: Pablo Mendoza  : 1960  MRN: [id number]  Today's Date: 2023               Admit Date: 2023     Visit Dx:    ICD-10-CM ICD-9-CM   1. Acute nonintractable headache, unspecified headache type  R51.9 784.0   2. Speech disturbance, unspecified type  R47.9 784.59     Patient Active Problem List   Diagnosis    Type 2 diabetes mellitus, without long-term current use of insulin    Mixed hyperlipidemia    Essential hypertension    Gout of multiple sites    Nocturia    Migraine headache    Chronic maxillary sinusitis    Other fatigue    Arthritis    Routine health maintenance    Proteinuria    Cellulitis of left foot    Lumbar radiculopathy    Stroke-like symptoms    Polycythemia    Dizziness    Cervical stenosis of spine    Cervical spondylosis without myelopathy    Seizure    Headache    Elevated lactic acid level    Hypokalemia     Past Medical History:   Diagnosis Date    Anesthesia complication     STATES HARD TO WAKE UP W/ALL SURGERIES    COVID-19     DEC 2020    Diabetes mellitus     Diverticulitis     Elevated cholesterol     Gout     Hypertension     Low back pain     Migraines     Mixed hyperlipidemia 2018    Neuropathy     Nocturia 2018    Numbness and tingling of both lower extremities     LEGS AND FEET     Seizures     AS A CHILD    Staph infection 2010    UNSURE OF SITE    Stroke     COGNITIVE DELAY    TIA (transient ischemic attack)     Type 2 diabetes mellitus, without long-term current use of insulin 2018    Weakness of both legs     LEFT WORSE      Past Surgical History:   Procedure Laterality Date    CHOLECYSTECTOMY      COLONOSCOPY       or 2016    HERNIA REPAIR      LUMBAR FUSION N/A 2021    Procedure: Lumbar 3 to Lumbar 4 and Lumbar 4 to Lumbar 5 laminectomy with a fusion;  Surgeon: Jose Webster MD;  Location: [facility] OR;  Service: [employer] - Neuro;   "Laterality: N/A;    SINUS SURGERY      TYMPANOPLASTY Bilateral        SLP Recommendation and Plan  SLP Diagnosis: mild, fluency disorder (09/20/23 1100)  SLP Diagnosis Comments: Speech/language evaluation completed as patient with complaints of stuttering since 2015, however, reports it has gotten worse since August. Patient states he has \"spells\" of stuttering specifically when he is \"up and moving\" or when he has a migraine. This date, patient presents with a mild fluency disorder which is prevalent in natural conversation and characterized by halting speech, blocks, and occasional sound/phonemic repetitions. Escape behaviors such as eye blinking during sound repetitions observed twice throughout assessment. The [place] Aphasia Screening Test was administered to assess language abilities; patient scored a 100/100. Patient reports he is following with home health speech therapy to address his fluency concerns. Recommend patient continue to follow with home health speech therapy following discharge. (09/20/23 1100)           Anticipated Discharge Disposition (SLP): anticipate therapy at next level of care (09/20/23 1100)        Predicted Duration Therapy Intervention (Days): until discharge (09/20/23 1100)                             Outcome Evaluation: Speech/language evaluation completed as patient with complaints of stuttering since 2015, however reports it has gotten worse since August. Patient states he has \"spells\" of stuttering specifically when he is \"up and moving\" or when he has a migraine. This date, patient presents with a mild fluency disorder which is prevalent in natural conversation and characterized by halting speech, blocks, and occasional sound/phonemic repetitions. Escape behaviors such as eye blinking during sound repetitions observed twice throughout assessment. The [place] Aphasia Screening Test was administered to assess language abilities; patient scored a 100/100. Patient reports he " "is following with home health speech therapy to address his fluency concerns. Recommend patient continue to follow with home health speech therapy following discharge. (09/20/23 8693)      SLP EVALUATION (last 72 hours)       SLP SLC Evaluation       Row Name 09/20/23 1100                   Communication Assessment/Intervention    Document Type evaluation  -CR        Subjective Information no complaints  -CR        Patient Observations alert;cooperative  -CR        Patient Effort good  -CR        Symptoms Noted During/After Treatment none  -CR           General Information    Patient Profile Reviewed yes  -CR        Pertinent History Of Current Problem 61 y/o male admitted with increased headache, blurry vision, slurred speech and word finding difficulties. Hx of possible seizures and TIA.  -CR        Precautions/Limitations, Vision WFL;for purposes of eval  -CR        Precautions/Limitations, Hearing WFL;for purposes of eval  -CR        Prior Level of Function-Communication WFL  -CR        Plans/Goals Discussed with patient;agreed upon  -CR        Barriers to Rehab none identified  -CR           Pain    Additional Documentation Pain Scale: Numbers Pre/Post-Treatment (Group)  -CR           Pain Scale: Numbers Pre/Post-Treatment    Pretreatment Pain Rating 0/10 - no pain  -CR        Posttreatment Pain Rating 0/10 - no pain  -CR           Expression Assessment/Intervention    Expression Assessment/Intervention verbal expression  -CR           Verbal Expression Assessment/Intervention    Conversational Discourse/Fluency delayed responses;other (see comments)  x1 block, x5 phonemic/sound repetitions  -CR           SLP Evaluation Clinical Impressions    SLP Diagnosis mild;fluency disorder  -CR        SLP Diagnosis Comments Speech/language evaluation completed as patient with complaints of stuttering since 2015, however, reports it has gotten worse since August. Patient states he has \"spells\" of stuttering specifically " "when he is \"up and moving\" or when he has a migraine. This date, patient presents with a mild fluency disorder which is prevalent in natural conversation and characterized by halting speech, blocks, and occasional sound/phonemic repetitions. Escape behaviors such as eye blinking during sound repetitions observed twice throughout assessment. The [place] Aphasia Screening Test was administered to assess language abilities; patient scored a 100/100. Patient reports he is following with home health speech therapy to address his fluency concerns. Recommend patient continue to follow with home health speech therapy following discharge.  -CR        Functional Impact functional impact in social situations;difficulty communicating wants, needs;difficulty communicating in an emergency;difficulty in expressing complex messages;restrictions in personal and social life  -CR           Recommendations    Therapy Frequency (SLP SLC) evaluation only  -CR        Predicted Duration Therapy Intervention (Days) until discharge  -CR        Anticipated Discharge Disposition (SLP) anticipate therapy at next level of care  -CR                  User Key  (r) = Recorded By, (t) = Taken By, (c) = Cosigned By      Initials Name Effective Dates    Lidia Wong SLP 08/28/23 -                        EDUCATION  The patient has been educated in the following areas:     Communication Impairment.                      Time Calculation:      Time Calculation- SLP       Row Name 09/20/23 1349             Time Calculation- SLP    SLP Start Time 0900  -CR      SLP Received On 09/20/23  -CR         Untimed Charges    86036-SC Eval Speech and Production w/ Language Minutes 45  -CR         Total Minutes    Untimed Charges Total Minutes 45  -CR       Total Minutes 45  -CR                User Key  (r) = Recorded By, (t) = Taken By, (c) = Cosigned By      Initials Name Provider Type    Lidia Wong SLP Speech and Language Pathologist      "               Therapy Charges for Today       Code Description Service Date Service Provider Modifiers Qty    [id number] HC ST EVAL SPEECH AND PROD W LANG  3 9/20/2023 Lidia Rodríguez, SLP GN 1                       BEVERLY Gan  9/20/2023   no

## 2023-09-20 NOTE — PROGRESS NOTES
"DOS: 2023  NAME: Pablo Mendoza   : 1960  PCP: Liana Hood MD  Chief Complaint   Patient presents with    Headache     Stroke    Subjective: Headache is improved, minimal at this point.  Denies current speech issues or unilateral weakness.  Was on continuous EEG overnight which has been removed. Pt seen in follow up today, however the problem is new to the examiner.      Objective:  Vital signs: /75 (BP Location: Left arm, Patient Position: Lying)   Pulse 71   Temp 98.1 °F (36.7 °C) (Oral)   Resp 18   Ht 170.2 cm (67\")   Wt 92 kg (202 lb 12.8 oz)   SpO2 93%   BMI 31.76 kg/m²       General appearance: Well developed, well nourished, alert and cooperative.   HEENT: Normocephalic.   Neck: Supple  Cardiac: Regular rate and rhythm.   Chest Exam: Clear to auscultation bilaterally, no wheezes, no rhonchi.  Extremities: Normal, no edema.   Skin: No rashes or birthmarks.     Higher integrative function: Oriented to time, place, person, intact recent and remote memory, attention span, concentration and language. Spontaneous speech, fund of vocabulary are normal.   CN II: Normal visual fields.   CN III IV VI: Extraocular movements are full without nystagmus. Pupils are equal, round, and reactive to light.   CN V: Normal facial sensation.  CN VII: Facial movements are symmetric, no weakness.   CN VIII: Auditory acuity is normal.   CN IX & X: Symmetric palatal movement.   CN XI: Sternocleidomastoid and trapezius are normal. No weakness.   CN XII: The tongue is midline. No atrophy or fasciculations.   Motor: Normal muscle strength, bulk, and tone in upper and lower extremities. No fasciculations, rigidity, spasticity or abnormal movements.   Sensation: Normal light touch in all extremities.  Station and gait: Deferred..   Coordination: Finger to nose test showed no dysmetria.     Scheduled Meds:allopurinol, 300 mg, Oral, BID  amLODIPine, 10 mg, Oral, Daily  diphenhydrAMINE, 12.5 mg, " Intravenous, Q6H  insulin lispro, 2-9 Units, Subcutaneous, 4x Daily AC & at Bedtime  levETIRAcetam, 750 mg, Oral, BID  metoprolol tartrate, 100 mg, Oral, BID  nortriptyline, 25 mg, Oral, Nightly  sertraline, 50 mg, Oral, Daily      Continuous Infusions:sodium chloride, 100 mL/hr, Last Rate: 100 mL/hr (09/20/23 0027)      PRN Meds:.  acetaminophen **OR** acetaminophen    bisacodyl    dextrose    dextrose    glucagon (human recombinant)    hydrOXYzine    ketorolac    ondansetron    ondansetron    prochlorperazine        Laboratory results:  Lab Results   Component Value Date    GLUCOSE 140 (H) 09/20/2023    CALCIUM 8.9 09/20/2023     09/20/2023    K 3.8 09/20/2023    CO2 25.4 09/20/2023     09/20/2023    BUN 13 09/20/2023    CREATININE 0.98 09/20/2023    EGFRIFAFRI 101 02/03/2022    EGFRIFNONA 87 02/03/2022    BCR 13.3 09/20/2023    ANIONGAP 12.6 09/20/2023     Lab Results   Component Value Date    WBC 8.99 09/20/2023    HGB 14.2 09/20/2023    HCT 43.1 09/20/2023    MCV 75.2 (L) 09/20/2023     09/20/2023     Lab Results   Component Value Date    CHOL 95 09/20/2023    CHOL 170 08/14/2023    CHOL 141 10/04/2022     Lab Results   Component Value Date    HDL 24 (L) 09/20/2023    HDL 29 (L) 08/14/2023    HDL 29 (L) 08/11/2023     Lab Results   Component Value Date    LDL 30 09/20/2023    LDL 63 08/14/2023    LDL 78 08/11/2023     Lab Results   Component Value Date    TRIG 267 (H) 09/20/2023    TRIG 511 (H) 08/14/2023    TRIG 377 (H) 08/11/2023         Lab 09/20/23  0620   HEMOGLOBIN A1C 7.30*      Review and interpretation of imaging:  EEG    Result Date: 9/19/2023  Table formatting from the original result was not included. EEG Report          # Indication: Seizure-like episodes History: 62-year-old male with seizure-like episodes.  The study includes time locked video Medical diagnoses: TIA, diabetes, hyperlipidemia, hypertension Current Facility-Administered Medications Medication Dose Route  Frequency Provider Last Rate Last Admin  acetaminophen (TYLENOL) tablet 650 mg  650 mg Oral Q4H PRN Tahira Walsh APRN      Or  acetaminophen (TYLENOL) suppository 650 mg  650 mg Rectal Q4H PRN Tahira Walsh APRN      allopurinol (ZYLOPRIM) tablet 300 mg  300 mg Oral BID Kristin Ly APRN   300 mg at 09/19/23 1143  amLODIPine (NORVASC) tablet 10 mg  10 mg Oral Daily Kristin Ly APRN   10 mg at 09/19/23 1143  bisacodyl (DULCOLAX) suppository 10 mg  10 mg Rectal Daily PRN Tahira Walsh APRN      dextrose (D50W) (25 g/50 mL) IV injection 25 g  25 g Intravenous Q15 Min PRN Kristin Ly APRN      dextrose (GLUTOSE) oral gel 15 g  15 g Oral Q15 Min PRN Kristin Ly APRN      diphenhydrAMINE (BENADRYL) injection 12.5 mg  12.5 mg Intravenous Q6H Hernan Champagne MD   12.5 mg at 09/19/23 1529  glucagon (GLUCAGEN) injection 1 mg  1 mg Intramuscular Q15 Min PRN Kristin Ly APRN      hydrOXYzine (ATARAX) tablet 25 mg  25 mg Oral Daily PRN Kristin Ly APRN      insulin lispro (HUMALOG/ADMELOG) injection 2-9 Units  2-9 Units Subcutaneous 4x Daily AC & at Bedtime Kristin Ly APRN      ketorolac (TORADOL) injection 15 mg  15 mg Intravenous Q6H PRN Hernan Champagne MD      levETIRAcetam (KEPPRA) tablet 750 mg  750 mg Oral BID Kristin Ly APRN   750 mg at 09/19/23 1143  metoprolol tartrate (LOPRESSOR) tablet 100 mg  100 mg Oral BID Kristin Ly APRN   100 mg at 09/19/23 1150  nortriptyline (PAMELOR) capsule 25 mg  25 mg Oral Nightly Hernan Champagne MD      ondansetron (ZOFRAN) injection 4 mg  4 mg Intravenous Q6H PRN Tahira Walsh APRN      ondansetron (ZOFRAN) injection 4 mg  4 mg Intravenous Q6H PRN Hernan Champagne MD      prochlorperazine (COMPAZINE) injection 2.5 mg  2.5 mg Intravenous Q6H PRN Hernan Champagne MD      sertraline (ZOLOFT)  tablet 50 mg  50 mg Oral Daily Kristin Ly APRN   50 mg at 09/19/23 1143  sodium chloride 0.9 % infusion  100 mL/hr Intravenous Continuous Kristin Ly APRN 100 mL/hr at 09/19/23 1236 100 mL/hr at 09/19/23 1236 Time of study: 26 minutes 2 seconds plus brief 34-second edition Technical summary: The 10-20 system was used for electrode placement.  Background: The background rhythm was composed of low amplitude 8 Hz moderately regulated and sustained posteriorly dominant alpha rhythm.  There are slower activities interspersed  Sleep: The patient became drowsy as noted by attenuation of the alpha rhythm and an increased proportion of slower activities were seen.  No vertex sharp transients or sleep spindles were seen  Hyperventilation: Not obtained  Photic stimulation: Not obtained  EKG: Sinus rhythm in the 60s  Video: No unusual involuntary movements were seen on the video clips reviewed Impression: Normal EEG during wakefulness and early drowsiness.  No focal or epileptiform activities were seen. Dictated utilizing Dragon dictation.      CT Head Without Contrast    Result Date: 9/19/2023  CT OF THE HEAD WITHOUT CONTRAST  HISTORY: Blurred vision  COMPARISON: August 19, 2023  TECHNIQUE: Axial CT imaging was obtained through the brain. No IV contrast was administered.  FINDINGS: No acute intracranial hemorrhage is seen. Ventricles are normal in size. There is no midline shift or mass effect. Paranasal sinuses appear clear. Right mastoid air cells appear relatively underpneumatized, which may represent changes of chronic mastoiditis/otitis media.      Impression: No acute intracranial findings.  Radiation dose reduction techniques were utilized, including automated exposure control and exposure modulation based on body size.   This report was finalized on 9/19/2023 4:46 AM by Dr. Marva Jaimes M.D.       Impression:  62-year-old male with hypertension, hyperlipidemia, diabetes, gout, migraines, and  TIAs in the past who presented with complaints of recent worsening headaches.  He has complained of some associated nausea along with photosensitivity and photophobia.  He also reports he has seizures which are making him have this headache.  When he describes seizures he reported he was having episodes of stuttering speech, twitching all over his body and zoning out.  He reports he has had several seizures at work in the past which were witnessed by other people where he is twitching all over.  He was admitted last month with similar complaints of history of seizure-like spells and transient aphasia and stuttering as well as right-sided symptoms and had extensive evaluation includingMRI brain with and without contrast, CTA head/neck, and EEG which were unrevealing.  He was started on trial of Keppra for consideration of seizure.  He has since followed up with ANUSHA Dial as outpatient he was ordered a prolonged EEG study and patient was found to have low B12 and vitamin D levels and replacement was recommended though patient has not started yet.    He was given migraine cocktails yesterday and started on nortriptyline 25 mg nightly. CT head this admission unremarkable. Initial spot EEG was unremarkable.  Prolonged EEG completed overnight, report pending.    Diagnosis:  Headache with associated neurological symptoms as described above, suspect patient most likely has migraine with aura but differential diagnosis also includes functional neurological disorder and PNES  Hypertension  Hyperlipidemia  B12 deficiency      Plan:  Follow-up continuous EEG overnight.  Continue nortriptyline 25 mg nightly for migraine, started this admission  Recommend completing B12 injections as outpatient, will give an additional year.  D/W Dr Champagne today. Will follow.     Addendum: cEEG unremarkable.  No further inpatient neurologic work-up.  Neurology will sign off.  Can be discharged from neurology standpoint with  previously planned neurology follow-up.    Due to interaction with nortriptyline and sertraline will change nortriptyline to topamax 25 mg nightly.

## 2023-09-20 NOTE — PAYOR COMM NOTE
"Pablo Virgen (62 y.o. Male)     PLEASE SEE ATTACHED FOR INPT AUTH         PLEASE CALL JERSEY MEJIA RN/ DEPT @ [phone]  OR FAX  DEPARTMENT @  [phone]    THANK YOU   JERSEY MEJIA RN  [facility]          Date of Birth   1960    Social Security Number       Address   [address] [place], [place] [address]    Home Phone   [phone]    MRN   [id number]       Restorationist   Pentecostal    Marital Status                               Admission Date   9/19/23    Admission Type   Emergency    Admitting Provider   John Eng MD    Attending Provider   John Eng MD    Department, Room/Bed   [facility] [address] [facility], S[address]/1       Discharge Date       Discharge Disposition       Discharge Destination                                 Attending Provider: John Eng MD    Allergies: Aspirin, Lisinopril    Isolation: None   Infection: None   Code Status: CPR    Ht: 170.2 cm (67\")   Wt: 92 kg (202 lb 12.8 oz)    Admission Cmt: None   Principal Problem: Headache [R51.9]                   Active Insurance as of 9/19/2023       Primary Coverage       Payor Plan Insurance Group Employer/Plan Group    [employer]HEM BLUE CROSS ANTHEM BLUE CROSS BLUE SHIELD PPO [id number]       Payor Plan Address Payor Plan Phone Number Payor Plan Fax Number Effective Dates    [phone]  3/1/2011 - None Entered    [place] [address]         Subscriber Name Subscriber Birth Date Member ID       PABLO VIRGEN 1960 [id number]                     Emergency Contacts        (Rel.) Home Phone Work Phone Mobile Phone    Lulú Sarmiento (Sister) [phone] -- [phone]    Sloane Obregon (Sister) [phone] -- [phone]              [place]: NPI [id number]  Tax ID [id number]     History & Physical        Kristin Ly APRN at 09/19/23 0852       Attestation signed by John Eng MD at 09/19/23 1740    I have seen and examined the " patient, performing an independent face-to-face diagnostic evaluation with plan of care reviewed and developed with ANUSHA Logan.  The majority of medical decision making (>50%) for this encounter was completed by myself and discussed with this APRN.  62 y.o. male with history of TIA and migraines comes in with headache and difficulty with speech and vision.  On exam alert and oriented.  No obvious neurologic deficits.  Data I have personally reviewed:  [x]  Laboratory   [x]  Microbiology   [x]  Radiology   [x]  EKG/Telemetry   [x]  Cardiology/Vascular   []  Pathology   [x]  Old records    Assessment/Plan  Appreciate neurology assistance.  Work-up per them.  He is receiving treatment for possible migraine.  Continue home seizure medications.  Uncertain significance of elevated lactic acid.  Certainly is hemodynamically stable with no evidence of sepsis.  He was on metformin prior to admission which has been held.  Monitor blood glucose with correctional factor insulin.  BP currently stable continue home meds.    Active Hospital Problems    Diagnosis  POA    **Headache [R51.9]  Yes    Elevated lactic acid level [R79.89]  Yes    Hypokalemia [E87.6]  Yes    Type 2 diabetes mellitus, without long-term current use of insulin [E11.9]  Yes    Mixed hyperlipidemia [E78.2]  Yes    Essential hypertension [I10]  Yes      Resolved Hospital Problems   No resolved problems to display.   Electronically signed by John Eng MD, 9/19/2023, 17:37 EDT.                         Patient Name:  Pablo Mendoza  YOB: 1960  MRN:  [id number]  Admit Date:  9/19/2023  Patient Care Team:  Liana Hood MD as PCP - General (Internal Medicine & Pediatrics)  Drew Neal MD as Consulting Physician (Hematology and Oncology)  Brigitte Bell PA-C as Referring Physician (Physician Assistant)      Subjective  History Present Illness     Chief Complaint   Patient presents with    Headache  "      Mr. Mendoza is a 62 y.o. non-smoker with a history of migraines, DM, HTN, HLD, TIA that presents to [facility] complaining of word finding difficulty, stuttered speech, blurred vision. Followed by Neurology outpatient, last seen 9/11/23. Started on Keppra in August for seizure prophylaxis. EEG at that time was negative for seizure activity. CTA H/N, MRI brain were also unremarkable. Yesterday he had headache \"all over\" rated 10/10 on pain scale.. He had blurred vision, stuttering of words, tremor of rt hand. Took ibuprofen w/some relief of headache. Felt generally weak but there was no change in mobility. Has been compliant w/Keppra. He talked to his sister who felt he needed to be evaluated in ED. On exam, he is awake, oriented. Rates headache a 3/10. Has mild stuttering of words but no dysarthria. Denies recent fever, chest pain, soa, n/v/d, dysuria. He has walked to bathroom without difficulty or impaired balance. Denies dizziness/lightheadedness.       Review of Systems   Constitutional:  Positive for fatigue. Negative for chills and fever.   HENT:  Negative for congestion and trouble swallowing.    Eyes:  Positive for visual disturbance.   Respiratory:  Negative for shortness of breath.    Cardiovascular:  Negative for chest pain.   Gastrointestinal:  Negative for diarrhea, nausea and vomiting.   Genitourinary:  Negative for dysuria.   Musculoskeletal:  Negative for gait problem.   Skin:  Negative for rash.   Neurological:  Positive for speech difficulty and headaches. Negative for dizziness and light-headedness.   Psychiatric/Behavioral:  Negative for sleep disturbance.       Personal History     Past Medical History:   Diagnosis Date    Anesthesia complication     STATES HARD TO WAKE UP W/ALL SURGERIES    COVID-19     DEC 2020    Diabetes mellitus     Diverticulitis     Elevated cholesterol     Gout     Hypertension     Low back pain     Migraines     Mixed hyperlipidemia 01/19/2018    " Neuropathy     Nocturia 01/19/2018    Numbness and tingling of both lower extremities     LEGS AND FEET     Seizures     AS A CHILD    Staph infection 2010    UNSURE OF SITE    Stroke     COGNITIVE DELAY    TIA (transient ischemic attack)     Type 2 diabetes mellitus, without long-term current use of insulin 01/19/2018    Weakness of both legs     LEFT WORSE      Past Surgical History:   Procedure Laterality Date    CHOLECYSTECTOMY  2011    COLONOSCOPY      2015 or 2016    HERNIA REPAIR      LUMBAR FUSION N/A 11/03/2021    Procedure: Lumbar 3 to Lumbar 4 and Lumbar 4 to Lumbar 5 laminectomy with a fusion;  Surgeon: Jose Webster MD;  Location: [facility] OR;  Service: Robotics - Neuro;  Laterality: N/A;    SINUS SURGERY      TYMPANOPLASTY Bilateral      Family History   Problem Relation Age of Onset    Arthritis Mother     Hypertension Mother     Heart disease Mother     Stroke Mother     Alcohol abuse Father     Arthritis Father     Hypertension Father     Nephrolithiasis Father     Diabetes Sister     Diabetes Sister     Stroke Brother 59    Hypertension Brother     Hyperlipidemia Brother     Throat cancer Maternal Grandmother     Diabetes Paternal Grandmother     Malig Hyperthermia Neg Hx      Social History     Tobacco Use    Smoking status: Never     Passive exposure: Never    Smokeless tobacco: Never   Vaping Use    Vaping Use: Never used   Substance Use Topics    Alcohol use: Yes     Comment: 3 DRINKS WEEKLY    Drug use: Not Currently     No current facility-administered medications on file prior to encounter.     Current Outpatient Medications on File Prior to Encounter   Medication Sig Dispense Refill    acetaminophen (TYLENOL) 500 MG tablet Take 1 tablet by mouth Every 6 (Six) Hours As Needed for Mild Pain. Indications: Pain      allopurinol (ZYLOPRIM) 300 MG tablet TAKE ONE TABLET BY MOUTH TWICE A DAY (Patient taking differently: Take 1 tablet by mouth 2 (Two) Times a Day.) 60 tablet 1    amLODIPine  (NORVASC) 10 MG tablet TAKE 1 TABLET BY MOUTH DAILY (Patient taking differently: Take 1 tablet by mouth Daily.) 30 tablet 1    atorvastatin (Lipitor) 40 MG tablet Take 1 tablet by mouth Daily. Indications: High Amount of Fats in the Blood 90 tablet 1    glipizide (GLUCOTROL) 5 MG tablet TAKE 1 TABLET BY MOUTH TWICE A DAY BEFORE A MEAL 60 tablet 0    hydroCHLOROthiazide (HYDRODIURIL) 25 MG tablet TAKE ONE TABLET BY MOUTH DAILY (Patient taking differently: Take 1 tablet by mouth Daily.) 30 tablet 2    hydrOXYzine (ATARAX) 25 MG tablet Take 1 tablet by mouth Daily As Needed for Anxiety. 30 tablet 0    ibuprofen (ADVIL,MOTRIN) 800 MG tablet Take 1 tablet by mouth Every 8 (Eight) Hours As Needed for Mild Pain. 30 tablet 0    Janumet XR  MG tablet TAKE ONE TABLET BY MOUTH TWICE A DAY 60 tablet 3    Jardiance 25 MG tablet tablet TAKE ONE TABLET BY MOUTH DAILY 30 tablet 1    levETIRAcetam (KEPPRA) 750 MG tablet Take 1 tablet by mouth 2 (Two) Times a Day for 180 days. Indications: Seizure 180 tablet 1    metoprolol tartrate (LOPRESSOR) 100 MG tablet TAKE 1 TABLET BY MOUTH TWICE A DAY (Patient taking differently: Take 1 tablet by mouth 2 (Two) Times a Day.) 60 tablet 1    sertraline (ZOLOFT) 50 MG tablet TAKE 1 TABLET BY MOUTH DAILY 30 tablet 0    aspirin 81 MG chewable tablet Chew 1 tablet Daily. FOLLOW MD GUIDELINES ON HOLDING FOR OR (Patient not taking: Reported on 9/11/2023)      cyanocobalamin 1000 MCG/ML injection Inject 1 ML IM Q w X4 weeks, then 1 ml IM qow x 4 months. Total of 12 injections. (Patient not taking: Reported on 9/19/2023) 12 mL 0    cyclobenzaprine (FLEXERIL) 10 MG tablet Take 1 tablet by mouth 2 (Two) Times a Day As Needed for Muscle Spasms. 60 tablet 0    fluticasone (Flonase) 50 MCG/ACT nasal spray 2 sprays into the nostril(s) as directed by provider As Needed for Rhinitis.      loratadine (CLARITIN) 10 MG tablet TAKE ONE TABLET BY MOUTH DAILY (Patient not taking: Reported on 9/11/2023) 30  "tablet 0    multivitamin with minerals tablet tablet Take 1 tablet by mouth Daily. HOLDING FOR 7 DAYS PRIOR TO OR (Patient not taking: Reported on 9/11/2023)      rizatriptan (MAXALT) 10 MG tablet Take 1 tablet by mouth 1 (One) Time for 1 dose. AT ONSET OF HEADACHE MAY REPEAT ONE TABLET IN 2 HOURS IF NEEDED (Patient taking differently: Take 1 tablet by mouth 1 (One) Time.) 15 tablet 0    sildenafil (Viagra) 50 MG tablet Take 1 tablet by mouth Daily As Needed for Erectile Dysfunction. 30 tablet 0    Syringe/Needle, Disp, (BD Eclipse Syringe) 25G X 1\" 3 ML misc Use with B12 Solution As Directed 12 each 0    vitamin D (ERGOCALCIFEROL) 1.25 MG (79126 UT) capsule capsule Take 1 capsule by mouth 1 (One) Time Per Week for 120 days. 4 capsule 3     Allergies   Allergen Reactions    Aspirin Nausea Only    Lisinopril Unknown - Low Severity     Elevated creatinine           Objective   Objective     Vital Signs  Temp:  [98.2 °F (36.8 °C)] 98.2 °F (36.8 °C)  Heart Rate:  [65-76] 69  Resp:  [18-20] 18  BP: (130-147)/(76-91) 133/87  SpO2:  [91 %-95 %] 95 %  on   ;   Device (Oxygen Therapy): room air  Body mass index is 31.76 kg/m².    Physical Exam  Vitals and nursing note reviewed.   Constitutional:       General: He is not in acute distress.  HENT:      Head: Normocephalic.      Mouth/Throat:      Mouth: Mucous membranes are moist.   Eyes:      Conjunctiva/sclera: Conjunctivae normal.   Cardiovascular:      Rate and Rhythm: Normal rate and regular rhythm.   Pulmonary:      Effort: Pulmonary effort is normal. No respiratory distress.      Breath sounds: No wheezing or rales.   Abdominal:      General: Bowel sounds are normal.      Palpations: Abdomen is soft.   Musculoskeletal:      Cervical back: Neck supple.      Right lower leg: No edema.      Left lower leg: No edema.   Skin:     General: Skin is warm and dry.   Neurological:      General: No focal deficit present.      Mental Status: He is alert and oriented to person, " place, and time.      Cranial Nerves: No dysarthria.      Motor: No weakness or tremor.      Comments: Mild stuttering of words   Psychiatric:         Mood and Affect: Mood normal.         Behavior: Behavior normal.       Results Review:  I reviewed the patient's new clinical results.  I reviewed the patient's new imaging results and agree with the interpretation.  I reviewed the patient's other test results and agree with the interpretation  I personally viewed and interpreted the patient's EKG/Telemetry data    Lab Results (last 24 hours)       Procedure Component Value Units Date/Time    POC Glucose Once [[id number]]  (Normal) Collected: 09/19/23 0334    Specimen: Blood Updated: 09/19/23 0336     Glucose 101 mg/dL     CBC & Differential [[id number]]  (Abnormal) Collected: 09/19/23 0344    Specimen: Blood Updated: 09/19/23 0356    Narrative:      The following orders were created for panel order CBC & Differential.  Procedure                               Abnormality         Status                     ---------                               -----------         ------                     CBC Auto Differential[[id number]]        Abnormal            Final result                 Please view results for these tests on the individual orders.    Comprehensive Metabolic Panel [[id number]]  (Abnormal) Collected: 09/19/23 0344    Specimen: Blood Updated: 09/19/23 0501     Glucose 96 mg/dL      BUN 13 mg/dL      Creatinine 0.89 mg/dL      Sodium 141 mmol/L      Potassium 3.4 mmol/L      Chloride 100 mmol/L      CO2 26.0 mmol/L      Calcium 9.6 mg/dL      Total Protein 7.6 g/dL      Albumin 4.8 g/dL      ALT (SGPT) 19 U/L      AST (SGOT) 24 U/L      Alkaline Phosphatase 141 U/L      Total Bilirubin 0.4 mg/dL      Globulin 2.8 gm/dL      A/G Ratio 1.7 g/dL      BUN/Creatinine Ratio 14.6     Anion Gap 15.0 mmol/L      eGFR 98.1 mL/min/1.73     Narrative:      GFR Normal >60  Chronic Kidney Disease <60  Kidney Failure <15      High  Sensitivity Troponin T [[id number]]  (Normal) Collected: 09/19/23 0344    Specimen: Blood Updated: 09/19/23 0502     HS Troponin T 10 ng/L     Narrative:      High Sensitive Troponin T Reference Range:  <10.0 ng/L- Negative Female for AMI  <15.0 ng/L- Negative Male for AMI  >=10 - Abnormal Female indicating possible myocardial injury.  >=15 - Abnormal Male indicating possible myocardial injury.   Clinicians would have to utilize clinical acumen, EKG, Troponin, and serial changes to determine if it is an Acute Myocardial Infarction or myocardial injury due to an underlying chronic condition.         Lactic Acid, Plasma [[id number]]  (Abnormal) Collected: 09/19/23 0344    Specimen: Blood Updated: 09/19/23 0444     Lactate 2.5 mmol/L     CBC Auto Differential [[id number]]  (Abnormal) Collected: 09/19/23 0344    Specimen: Blood Updated: 09/19/23 0356     WBC 9.09 10*3/mm3      RBC 6.48 10*6/mm3      Hemoglobin 16.2 g/dL      Hematocrit 49.6 %      MCV 76.5 fL      MCH 25.0 pg      MCHC 32.7 g/dL      RDW 18.2 %      RDW-SD 45.4 fl      MPV 9.9 fL      Platelets 279 10*3/mm3      Neutrophil % 65.2 %      Lymphocyte % 22.9 %      Monocyte % 7.4 %      Eosinophil % 2.9 %      Basophil % 0.9 %      Immature Grans % 0.7 %      Neutrophils, Absolute 5.94 10*3/mm3      Lymphocytes, Absolute 2.08 10*3/mm3      Monocytes, Absolute 0.67 10*3/mm3      Eosinophils, Absolute 0.26 10*3/mm3      Basophils, Absolute 0.08 10*3/mm3      Immature Grans, Absolute 0.06 10*3/mm3      nRBC 0.1 /100 WBC     Urinalysis With Microscopic If Indicated (No Culture) - Urine, Clean Catch [[id number]]  (Abnormal) Collected: 09/19/23 0454    Specimen: Urine, Clean Catch Updated: 09/19/23 0511     Color, UA Yellow     Appearance, UA Clear     pH, UA 6.0     Specific Gravity, UA 1.015     Glucose, UA >=1000 mg/dL (3+)     Ketones, UA Negative     Bilirubin, UA Negative     Blood, UA Negative     Protein,  mg/dL (2+)     Leuk Esterase, UA Trace      Nitrite, UA Negative     Urobilinogen, UA 0.2 E.U./dL    Urinalysis, Microscopic Only - Urine, Clean Catch [[id number]] Collected: 09/19/23 0454    Specimen: Urine, Clean Catch Updated: 09/19/23 0516     RBC, UA None Seen /HPF      WBC, UA 0-2 /HPF      Bacteria, UA None Seen /HPF      Squamous Epithelial Cells, UA None Seen /HPF      Yeast, UA Small/1+ Yeast /HPF      Hyaline Casts, UA None Seen /LPF      Methodology Manual Light Microscopy    STAT Lactic Acid, Reflex [[id number]]  (Abnormal) Collected: 09/19/23 0628    Specimen: Blood Updated: 09/19/23 0801     Lactate 3.0 mmol/L     High Sensitivity Troponin T 2Hr [[id number]]  (Normal) Collected: 09/19/23 0628    Specimen: Blood Updated: 09/19/23 0729     HS Troponin T 12 ng/L      Troponin T Delta 2 ng/L     Narrative:      High Sensitive Troponin T Reference Range:  <10.0 ng/L- Negative Female for AMI  <15.0 ng/L- Negative Male for AMI  >=10 - Abnormal Female indicating possible myocardial injury.  >=15 - Abnormal Male indicating possible myocardial injury.   Clinicians would have to utilize clinical acumen, EKG, Troponin, and serial changes to determine if it is an Acute Myocardial Infarction or myocardial injury due to an underlying chronic condition.         STAT Lactic Acid, Reflex [[id number]]  (Abnormal) Collected: 09/19/23 0923    Specimen: Blood Updated: 09/19/23 1001     Lactate 2.4 mmol/L     POC Glucose Once [[id number]]  (Abnormal) Collected: 09/19/23 1123    Specimen: Blood Updated: 09/19/23 1127     Glucose 138 mg/dL     STAT Lactic Acid, Reflex [[id number]] Collected: 09/19/23 1205    Specimen: Blood Updated: 09/19/23 1216            Imaging Results (Last 24 Hours)       Procedure Component Value Units Date/Time    CT Head Without Contrast [[id number]] Collected: 09/19/23 0444     Updated: 09/19/23 0824    Narrative:      CT OF THE HEAD WITHOUT CONTRAST     HISTORY: Blurred vision     COMPARISON: August 19, 2023     TECHNIQUE: Axial CT imaging was  obtained through the brain. No IV  contrast was administered.     FINDINGS:  No acute intracranial hemorrhage is seen. Ventricles are normal in size.  There is no midline shift or mass effect. Paranasal sinuses appear  clear. Right mastoid air cells appear relatively underpneumatized, which  may represent changes of chronic mastoiditis/otitis media.       Impression:      No acute intracranial findings.     Radiation dose reduction techniques were utilized, including automated  exposure control and exposure modulation based on body size.        This report was finalized on 9/19/2023 4:46 AM by Dr. Marva Jaimes M.D.               Results for orders placed during the hospital encounter of 08/14/23    Adult Transthoracic Echo Complete W/ Cont if Necessary Per Protocol    Interpretation Summary    Left ventricular systolic function is normal. Calculated left ventricular EF = 54.8% Normal left ventricular cavity size and wall thickness noted. All left ventricular wall segments contract normally. Left ventricular diastolic function is consistent with (grade II w/high LAP) pseudonormalization.    Left atrial volume is normal. Saline test results are negative.    No aortic valve regurgitation or stenosis is present. The aortic valve is abnormal in structure. There is mild thickening of the aortic valve. The aortic valve was poorly visualized but appears trileaflet.    Mild mitral annular calcification is present. There is moderate, anterior mitral leaflet thickening present.    Trace tricuspid valve regurgitation is present. Insufficient TR velocity profile to estimate the right ventricular systolic pressure.      ECG 12 Lead Stroke Evaluation   Preliminary Result   HEART RATE= 74  bpm   RR Interval= 811  ms   CO Interval= 187  ms   P Horizontal Axis= -16  deg   P Front Axis= 42  deg   QRSD Interval= 105  ms   QT Interval= 399  ms   QTcB= 443  ms   QRS Axis= -31  deg   T Wave Axis= 47  deg   - BORDERLINE ECG -   Sinus  rhythm   Left axis deviation   Low voltage, precordial leads   Consider anterior infarct   Electronically Signed By:    Date and Time of Study: 2023-09-19 03:51:12           Assessment/Plan     Active Hospital Problems    Diagnosis  POA    **Headache [R51.9]  Yes    Elevated lactic acid level [R79.89]  Yes    Hypokalemia [E87.6]  Yes    Type 2 diabetes mellitus, without long-term current use of insulin [E11.9]  Yes    Mixed hyperlipidemia [E78.2]  Yes    Essential hypertension [I10]  Yes      Resolved Hospital Problems   No resolved problems to display.       Mr. Mendoza is a 62 y.o. non-smoker with a history of migraines, DM, HTN, HLD, TIA who is admitted with headache with neurological changes    Headache w/neurological changes:  -CTH unremarkable. Neurology consulted. Defer further imaging to them. HA has improved. Continue Keppra for seizure prophylaxis. ASA, statin for now    Elevated lactic acid:  -Trending down w/IVF's. BP stable. No evidence of infection    Hypokalemia:  -K 3.4, replete x 1    DM:  -Monitor BG trends. Hold oral hypoglycemics. Correctional scale insulin    HTN:  -BP acceptable acutely. Continue amlodipine, metoprolol    HLD:  -statin      I discussed the patient's findings and my recommendations with patient.    VTE Prophylaxis - SCDs.  Code Status - Full code.       Kristin Ly APRJAIR  [facility]  09/19/23  12:25 EDT      Electronically signed by John Eng MD at 09/19/23 1740          Emergency Department Notes        Darío Powell RN at 09/19/23 0529          Nursing report ED to floor  Pablo Mendoza  62 y.o.  male    HPI :   Chief Complaint   Patient presents with    Headache       Admitting doctor:   Radha Sullivan MD    Admitting diagnosis:   The primary encounter diagnosis was Acute nonintractable headache, unspecified headache type. A diagnosis of Speech disturbance, unspecified type was also pertinent to this visit.    Code status:   Current  "Code Status       Date Active Code Status Order ID Comments User Context       Prior            Allergies:   Aspirin and Lisinopril    Isolation:   No active isolations    Intake and Output    Intake/Output Summary (Last 24 hours) at 9/19/2023 0555  Last data filed at 9/19/2023 0420  Gross per 24 hour   Intake 1000 ml   Output --   Net 1000 ml       Weight:       09/19/23 0336   Weight: 92 kg (202 lb 12.8 oz)       Most recent vitals:   Vitals:    09/19/23 0336 09/19/23 0337   BP:  145/91   Pulse: 76    Resp: 20    Temp: 98.2 °F (36.8 °C)    TempSrc: Oral    SpO2: 95%    Weight: 92 kg (202 lb 12.8 oz)    Height: 170.2 cm (67\")        Active LDAs/IV Access:   Lines, Drains & Airways       Active LDAs       Name Placement date Placement time Site Days    Peripheral IV Left Antecubital --  --  Antecubital  --    Peripheral IV 09/19/23 0345 Anterior;Left Forearm 09/19/23 0345  Forearm  less than 1                    Labs (abnormal labs have a star):   Labs Reviewed   COMPREHENSIVE METABOLIC PANEL - Abnormal; Notable for the following components:       Result Value    Potassium 3.4 (*)     Alkaline Phosphatase 141 (*)     All other components within normal limits    Narrative:     GFR Normal >60  Chronic Kidney Disease <60  Kidney Failure <15     URINALYSIS W/ MICROSCOPIC IF INDICATED (NO CULTURE) - Abnormal; Notable for the following components:    Glucose, UA >=1000 mg/dL (3+) (*)     Protein,  mg/dL (2+) (*)     Leuk Esterase, UA Trace (*)     All other components within normal limits   LACTIC ACID, PLASMA - Abnormal; Notable for the following components:    Lactate 2.5 (*)     All other components within normal limits   CBC WITH AUTO DIFFERENTIAL - Abnormal; Notable for the following components:    RBC 6.48 (*)     MCV 76.5 (*)     MCH 25.0 (*)     RDW 18.2 (*)     Immature Grans % 0.7 (*)     Immature Grans, Absolute 0.06 (*)     All other components within normal limits   TROPONIN - Normal    Narrative:     " High Sensitive Troponin T Reference Range:  <10.0 ng/L- Negative Female for AMI  <15.0 ng/L- Negative Male for AMI  >=10 - Abnormal Female indicating possible myocardial injury.  >=15 - Abnormal Male indicating possible myocardial injury.   Clinicians would have to utilize clinical acumen, EKG, Troponin, and serial changes to determine if it is an Acute Myocardial Infarction or myocardial injury due to an underlying chronic condition.        POCT GLUCOSE FINGERSTICK - Normal   URINALYSIS, MICROSCOPIC ONLY   LACTIC ACID, REFLEX   HIGH SENSITIVITIY TROPONIN T 2HR   CBC AND DIFFERENTIAL    Narrative:     The following orders were created for panel order CBC & Differential.  Procedure                               Abnormality         Status                     ---------                               -----------         ------                     CBC Auto Differential[[id number]]        Abnormal            Final result                 Please view results for these tests on the individual orders.       EKG:   ECG 12 Lead Stroke Evaluation   Preliminary Result   HEART RATE= 74  bpm   RR Interval= 811  ms   IL Interval= 187  ms   P Horizontal Axis= -16  deg   P Front Axis= 42  deg   QRSD Interval= 105  ms   QT Interval= 399  ms   QTcB= 443  ms   QRS Axis= -31  deg   T Wave Axis= 47  deg   - BORDERLINE ECG -   Sinus rhythm   Left axis deviation   Low voltage, precordial leads   Consider anterior infarct   Electronically Signed By:    Date and Time of Study: 2023-09-19 03:51:12          Meds given in ED:   Medications   sodium chloride 0.9 % flush 10 mL (has no administration in time range)   sodium chloride 0.9 % bolus 1,000 mL (0 mL Intravenous Stopped 9/19/23 0420)   morphine injection 4 mg (4 mg Intravenous Given 9/19/23 0351)   metoclopramide (REGLAN) injection 10 mg (10 mg Intravenous Given 9/19/23 0351)       Imaging results:  No radiology results for the last day    Ambulatory status:   - x1 assist    Social issues:    Social History     Socioeconomic History    Marital status:    Tobacco Use    Smoking status: Never     Passive exposure: Never    Smokeless tobacco: Never   Vaping Use    Vaping Use: Never used   Substance and Sexual Activity    Alcohol use: Yes     Comment: 3 DRINKS WEEKLY    Drug use: Not Currently    Sexual activity: Yes     Partners: Female       NIH Stroke Scale:  Interval: baseline    Darío Powell RN  09/19/23 05:55 EDT          Electronically signed by Darío Powell RN at 09/19/23 0555       Kody Vera MD at 09/19/23 0347           EMERGENCY DEPARTMENT ENCOUNTER    Room Number:  12/12  PCP: Liana Hood MD  Historian: Patient      HPI:  Chief Complaint: Headache  A complete HPI/ROS/PMH/PSH/SH/FH are unobtainable due to: None  Context: Pablo Mendoza is a 62 y.o. male who presents to the ED c/o generalized/global headache that has been gradually worsening now for the past 1 to 2 days.  He states that tonight he began having generalized weakness with it, nausea, as well as stuttering.  He does report that he has had this numerous times before without an obvious answer.  He states that neurology feels as though he may be having seizure-like activity.  He is currently taking Keppra with continuation of symptoms.  Currently, he denies vomiting, chest pain, shortness of breath, or fevers and chills.            PAST MEDICAL HISTORY  Active Ambulatory Problems     Diagnosis Date Noted    Type 2 diabetes mellitus, without long-term current use of insulin 01/19/2018    Mixed hyperlipidemia 01/19/2018    Essential hypertension 01/19/2018    Gout of multiple sites 01/19/2018    Nocturia 01/19/2018    Migraine headache 04/02/2018    Chronic maxillary sinusitis 04/10/2018    Other fatigue 06/06/2018    Arthritis 06/06/2018    Routine health maintenance 08/10/2018    Proteinuria 03/08/2019    Cellulitis of left foot 05/02/2019    Lumbar radiculopathy 08/02/2021    Stroke-like symptoms  10/04/2022    Polycythemia 10/28/2022    Dizziness 08/14/2023    Cervical stenosis of spine 08/14/2023    Cervical spondylosis without myelopathy 08/14/2023    Seizure 08/19/2023     Resolved Ambulatory Problems     Diagnosis Date Noted    No Resolved Ambulatory Problems     Past Medical History:   Diagnosis Date    Anesthesia complication     COVID-19     Diabetes mellitus     Diverticulitis     Elevated cholesterol     Gout     Hypertension     Low back pain     Migraines     Neuropathy     Numbness and tingling of both lower extremities     Seizures     Staph infection 2010    Stroke     TIA (transient ischemic attack)     Weakness of both legs          PAST SURGICAL HISTORY  Past Surgical History:   Procedure Laterality Date    CHOLECYSTECTOMY  2011    COLONOSCOPY      2015 or 2016    HERNIA REPAIR      LUMBAR FUSION N/A 11/03/2021    Procedure: Lumbar 3 to Lumbar 4 and Lumbar 4 to Lumbar 5 laminectomy with a fusion;  Surgeon: Jose Webster MD;  Location: [facility];  Service: [employer] - Neuro;  Laterality: N/A;    SINUS SURGERY      TYMPANOPLASTY Bilateral          FAMILY HISTORY  Family History   Problem Relation Age of Onset    Arthritis Mother     Hypertension Mother     Heart disease Mother     Stroke Mother     Alcohol abuse Father     Arthritis Father     Hypertension Father     Nephrolithiasis Father     Diabetes Sister     Diabetes Sister     Stroke Brother 59    Hypertension Brother     Hyperlipidemia Brother     Throat cancer Maternal Grandmother     Diabetes Paternal Grandmother     Malig Hyperthermia Neg Hx          SOCIAL HISTORY  Social History     Socioeconomic History    Marital status:    Tobacco Use    Smoking status: Never     Passive exposure: Never    Smokeless tobacco: Never   Vaping Use    Vaping Use: Never used   Substance and Sexual Activity    Alcohol use: Yes     Comment: 3 DRINKS WEEKLY    Drug use: Not Currently    Sexual activity: Yes     Partners: Female          ALLERGIES  Aspirin and Lisinopril        REVIEW OF SYSTEMS  Review of Systems   Constitutional:  Negative for activity change, appetite change and fever.   HENT:  Negative for congestion and sore throat.    Eyes: Negative.    Respiratory:  Negative for cough and shortness of breath.    Cardiovascular:  Negative for chest pain and leg swelling.   Gastrointestinal:  Positive for nausea. Negative for abdominal pain, diarrhea and vomiting.   Endocrine: Negative.    Genitourinary:  Negative for decreased urine volume and dysuria.   Musculoskeletal:  Negative for neck pain.   Skin:  Negative for rash and wound.   Allergic/Immunologic: Negative.    Neurological:  Positive for speech difficulty, weakness and headaches. Negative for numbness.   Hematological: Negative.    Psychiatric/Behavioral: Negative.     All other systems reviewed and are negative.         PHYSICAL EXAM  ED Triage Vitals   Temp Heart Rate Resp BP SpO2   09/19/23 0336 09/19/23 0336 09/19/23 0336 09/19/23 0337 09/19/23 0336   98.2 °F (36.8 °C) 76 20 145/91 95 %      Temp src Heart Rate Source Patient Position BP Location FiO2 (%)   09/19/23 0336 09/19/23 0336 -- -- --   Oral Monitor          Physical Exam  Constitutional:       General: He is not in acute distress.     Appearance: Normal appearance. He is not ill-appearing or toxic-appearing.   HENT:      Head: Normocephalic and atraumatic.   Eyes:      Extraocular Movements: Extraocular movements intact.      Pupils: Pupils are equal, round, and reactive to light.   Cardiovascular:      Rate and Rhythm: Normal rate and regular rhythm.      Heart sounds: No murmur heard.    No friction rub. No gallop.   Pulmonary:      Effort: Pulmonary effort is normal.      Breath sounds: Normal breath sounds.   Abdominal:      General: Abdomen is flat. There is no distension.      Palpations: Abdomen is soft.      Tenderness: There is no abdominal tenderness.   Musculoskeletal:         General: No swelling or  tenderness. Normal range of motion.      Cervical back: Normal range of motion and neck supple.   Skin:     General: Skin is warm and dry.   Neurological:      General: No focal deficit present.      Mental Status: He is alert and oriented to person, place, and time.      Sensory: No sensory deficit.      Motor: No weakness.   Psychiatric:         Mood and Affect: Mood normal.         Behavior: Behavior normal.         Vital signs and nursing notes reviewed.          LAB RESULTS  Recent Results (from the past 24 hour(s))   POC Glucose Once    Collection Time: 09/19/23  3:34 AM    Specimen: Blood   Result Value Ref Range    Glucose 101 70 - 130 mg/dL   Comprehensive Metabolic Panel    Collection Time: 09/19/23  3:44 AM    Specimen: Blood   Result Value Ref Range    Glucose 96 65 - 99 mg/dL    BUN 13 8 - 23 mg/dL    Creatinine 0.89 0.76 - 1.27 mg/dL    Sodium 141 136 - 145 mmol/L    Potassium 3.4 (L) 3.5 - 5.2 mmol/L    Chloride 100 98 - 107 mmol/L    CO2 26.0 22.0 - 29.0 mmol/L    Calcium 9.6 8.6 - 10.5 mg/dL    Total Protein 7.6 6.0 - 8.5 g/dL    Albumin 4.8 3.5 - 5.2 g/dL    ALT (SGPT) 19 1 - 41 U/L    AST (SGOT) 24 1 - 40 U/L    Alkaline Phosphatase 141 (H) 39 - 117 U/L    Total Bilirubin 0.4 0.0 - 1.2 mg/dL    Globulin 2.8 gm/dL    A/G Ratio 1.7 g/dL    BUN/Creatinine Ratio 14.6 7.0 - 25.0    Anion Gap 15.0 5.0 - 15.0 mmol/L    eGFR 98.1 >60.0 mL/min/1.73   High Sensitivity Troponin T    Collection Time: 09/19/23  3:44 AM    Specimen: Blood   Result Value Ref Range    HS Troponin T 10 <15 ng/L   Lactic Acid, Plasma    Collection Time: 09/19/23  3:44 AM    Specimen: Blood   Result Value Ref Range    Lactate 2.5 (C) 0.5 - 2.0 mmol/L   CBC Auto Differential    Collection Time: 09/19/23  3:44 AM    Specimen: Blood   Result Value Ref Range    WBC 9.09 3.40 - 10.80 10*3/mm3    RBC 6.48 (H) 4.14 - 5.80 10*6/mm3    Hemoglobin 16.2 13.0 - 17.7 g/dL    Hematocrit 49.6 37.5 - 51.0 %    MCV 76.5 (L) 79.0 - 97.0 fL    MCH  25.0 (L) 26.6 - 33.0 pg    MCHC 32.7 31.5 - 35.7 g/dL    RDW 18.2 (H) 12.3 - 15.4 %    RDW-SD 45.4 37.0 - 54.0 fl    MPV 9.9 6.0 - 12.0 fL    Platelets 279 140 - 450 10*3/mm3    Neutrophil % 65.2 42.7 - 76.0 %    Lymphocyte % 22.9 19.6 - 45.3 %    Monocyte % 7.4 5.0 - 12.0 %    Eosinophil % 2.9 0.3 - 6.2 %    Basophil % 0.9 0.0 - 1.5 %    Immature Grans % 0.7 (H) 0.0 - 0.5 %    Neutrophils, Absolute 5.94 1.70 - 7.00 10*3/mm3    Lymphocytes, Absolute 2.08 0.70 - 3.10 10*3/mm3    Monocytes, Absolute 0.67 0.10 - 0.90 10*3/mm3    Eosinophils, Absolute 0.26 0.00 - 0.40 10*3/mm3    Basophils, Absolute 0.08 0.00 - 0.20 10*3/mm3    Immature Grans, Absolute 0.06 (H) 0.00 - 0.05 10*3/mm3    nRBC 0.1 0.0 - 0.2 /100 WBC   ECG 12 Lead Stroke Evaluation    Collection Time: 09/19/23  3:51 AM   Result Value Ref Range    QT Interval 399 ms    QTC Interval 443 ms   Urinalysis With Microscopic If Indicated (No Culture) - Urine, Clean Catch    Collection Time: 09/19/23  4:54 AM    Specimen: Urine, Clean Catch   Result Value Ref Range    Color, UA Yellow Yellow, Straw    Appearance, UA Clear Clear    pH, UA 6.0 5.0 - 8.0    Specific Gravity, UA 1.015 1.005 - 1.030    Glucose, UA >=1000 mg/dL (3+) (A) Negative    Ketones, UA Negative Negative    Bilirubin, UA Negative Negative    Blood, UA Negative Negative    Protein,  mg/dL (2+) (A) Negative    Leuk Esterase, UA Trace (A) Negative    Nitrite, UA Negative Negative    Urobilinogen, UA 0.2 E.U./dL 0.2 - 1.0 E.U./dL   Urinalysis, Microscopic Only - Urine, Clean Catch    Collection Time: 09/19/23  4:54 AM    Specimen: Urine, Clean Catch   Result Value Ref Range    RBC, UA None Seen None Seen, 0-2 /HPF    WBC, UA 0-2 None Seen, 0-2 /HPF    Bacteria, UA None Seen None Seen /HPF    Squamous Epithelial Cells, UA None Seen None Seen, 0-2 /HPF    Yeast, UA Small/1+ Yeast None Seen /HPF    Hyaline Casts, UA None Seen None Seen /LPF    Methodology Manual Light Microscopy        Ordered the  above labs and reviewed the results.        RADIOLOGY  No Radiology Exams Resulted Within Past 24 Hours    Ordered the above noted radiological studies. Reviewed by me in PACS.            PROCEDURES  Procedures    EKG independently interpreted by myself as follows:    EKG          EKG time: 0351  Rhythm/Rate: NSR, 74  P waves and DC: nml  QRS, axis: nml, LAD  ST and T waves: nml     Interpreted Contemporaneously by me, independently viewed  unchanged compared to prior 8/19/23          MEDICATIONS GIVEN IN ER  Medications   sodium chloride 0.9 % flush 10 mL (has no administration in time range)   sodium chloride 0.9 % bolus 1,000 mL (0 mL Intravenous Stopped 9/19/23 0420)   morphine injection 4 mg (4 mg Intravenous Given 9/19/23 0351)   metoclopramide (REGLAN) injection 10 mg (10 mg Intravenous Given 9/19/23 0351)                   MEDICAL DECISION MAKING, PROGRESS, and CONSULTS    All labs have been independently reviewed by me.  All radiology studies have been reviewed by me and I have also reviewed the radiology report.   EKG's independently viewed and interpreted by me.  Discussion below represents my analysis of pertinent findings related to patient's condition, differential diagnosis, treatment plan and final disposition.      Additional sources:  - Discussed/ obtained information from independent historians: History obtained from the patient as well as the EMS report    - External (non-ED) record review: Upon medical records review, the patient was last seen and evaluated in the outpatient office of neurology on 9/11/2023 in follow-up for his known seizure-like activity.  He was also admitted to the hospital from 8/19/2023 through 8/20/2023 where he was seen and evaluated for possible seizure activity.    - Chronic or social conditions impacting care: Seizures, headaches    - Shared decision making: Admission decision based on shared conversations have between myself, the patient at bedside, as well as  ANUSHA Dias for [facility].      Orders placed during this visit:  Orders Placed This Encounter   Procedures    CT Head Without Contrast    Comprehensive Metabolic Panel    Urinalysis With Microscopic If Indicated (No Culture) - Urine, Clean Catch    High Sensitivity Troponin T    Lactic Acid, Plasma    CBC Auto Differential    STAT Lactic Acid, Reflex    High Sensitivity Troponin T 2Hr    Urinalysis, Microscopic Only - Urine, Clean Catch    [facility] (on-call MD unless specified) Details    POC Glucose Once    ECG 12 Lead Stroke Evaluation    Insert Peripheral IV    Inpatient Admission    CBC & Differential           Differential diagnosis includes but is not limited to:    Differential diagnosis includes but is not limited to migraine headache, tension headache, cluster headache, acute CVA, TIA, intracranial hemorrhage, intracranial mass effect, sepsis, or electrolyte disturbance.      Independent interpretation of labs, radiology studies, and discussions with consultants:    CT scan of the head was independently interpreted by myself with my interpretation showing no area of acute hemorrhage, infarct/ischemia, or mass effect.        ED Course as of 09/19/23 0623   Tue Sep 19, 2023   0563 The patient's presentation, work-up, as well as diagnosis and treatment plan was discussed at length with ANUSHA Dias for [facility].  She agrees to admit the patient to the hospital today for Dr. Sullivan. [BM]   6728 On reevaluation, the patient reports significant improvement in his headache as it is only mild at this point.  I informed him that his work-up thus far is unremarkable but we will admit him to the hospital for further neurologic evaluation.  He is in agreement with that plan and all questions have been answered. [BM]      ED Course User Index  [BM] Kody Vera MD             DIAGNOSIS  Final diagnoses:   Acute nonintractable headache, unspecified headache type   Speech disturbance, unspecified type  "        DISPOSITION  ADMISSION    Discussed treatment plan and reason for admission with pt/family and admitting physician.  Pt/family voiced understanding of the plan for admission for further testing/treatment as needed.               Latest Documented Vital Signs:  As of 06:23 EDT  BP- 132/79 HR- 65 Temp- 98.2 °F (36.8 °C) (Oral) O2 sat- 91%              --    Please note that portions of this were completed with a voice recognition program.       Note Disclaimer: At [facility], we believe that sharing information builds trust and better relationships. You are receiving this note because you are receiving care at [facility] or recently visited. It is possible you will see health information before a provider has talked with you about it. This kind of information can be easy to misunderstand. To help you fully understand what it means for your health, we urge you to discuss this note with your provider.             Kody Vera MD  09/19/23 0623      Electronically signed by Kody Vera MD at 09/19/23 0623       Day Nieto, RN at 09/19/23 0332          Pt arrive per EMS from home after reports that tonight approx 2-3 hours pta began with blurred vision to  bilateral eyes with pressure \"all over head\" and also reports right sided weakness.  Pt states is already being seen by neuro for speech problems with stuttering and seizures but tonight states stuttering appears worse.  Pt states pain is 10/10 to head.  Reports has been seen for these headaches in the past several months as well    Electronically signed by Day Nieto, RN at 09/19/23 5843       Medication Administration Report for Pablo Mendoza as of 09/20/23 1001     Legend:    Given Hold Not Given Due Canceled Entry Other Actions    Time Time (Time) Time Time-Action         Discontinued     Completed     Future     MAR Hold     Linked             Medications 09/19/23 09/20/23      acetaminophen (TYLENOL) tablet 650 mg  Dose: " 650 mg  Freq: Every 4 Hours PRN Route: PO  PRN Reasons: Mild Pain,Fever  PRN Comment: Temperature greater than or equal to 37 C  Start: 09/19/23 0651   Admin Instructions:   Based on patient request - if ordered for moderate or severe pain, provider allows for administration of a medication prescribed for a lower pain scale.    Do not exceed 4 grams of acetaminophen in a 24 hr period. Max dose of 2gm for AST/ALT greater than 120 units/L.    If given for pain, use the following pain scale:   Mild Pain = Pain Score of 1-3, CPOT 1-2  Moderate Pain = Pain Score of 4-6, CPOT 3-4  Severe Pain = Pain Score of 7-10, CPOT 5-8     0846-Given              Or  acetaminophen (TYLENOL) suppository 650 mg  Dose: 650 mg  Freq: Every 4 Hours PRN Route: RE  PRN Reasons: Mild Pain,Fever  PRN Comment: Temperature greater than or equal to 37 C  Start: 09/19/23 0651   Admin Instructions:   Based on patient request - if ordered for moderate or severe pain, provider allows for administration of a medication prescribed for a lower pain scale.    Do not exceed 4 grams of acetaminophen in a 24 hr period. Max dose of 2gm for AST/ALT greater than 120 units/L.    If given for pain, use the following pain scale:   Mild Pain = Pain Score of 1-3, CPOT 1-2  Moderate Pain = Pain Score of 4-6, CPOT 3-4  Severe Pain = Pain Score of 7-10, CPOT 5-8     0846-Not Given:  See Alt               allopurinol (ZYLOPRIM) tablet 300 mg  Dose: 300 mg  Freq: 2 Times Daily Route: PO  Indications of Use: GOUT  Start: 09/19/23 1100   Admin Instructions:   ([facility]) Take with food if GI upset occurs.    1143-Given     2035-Given           0846-Given     2100              amLODIPine (NORVASC) tablet 10 mg  Dose: 10 mg  Freq: Daily Route: PO  Indications of Use: HYPERTENSION  Start: 09/19/23 1100   Admin Instructions:   Hold for SBP less than 100, DBP less than 60  Caution: Look alike/sound alike drug alert. Avoid grapefruit juice.    1143-Given            0846-Given                bisacodyl (DULCOLAX) suppository 10 mg  Dose: 10 mg  Freq: Daily PRN Route: RE  PRN Reason: Constipation  Start: 09/19/23 0651   Admin Instructions:   Hold for diarrhea         dextrose (D50W) (25 g/50 mL) IV injection 25 g  Dose: 25 g  Freq: Every 15 Minutes PRN Route: IV  PRN Reason: Low Blood Sugar  PRN Comment: Blood Sugar Less Than 70  Start: 09/19/23 0903   Admin Instructions:   Blood sugar less than 70; patient has IV access - Unresponsive, NPO or Unable To Safely Swallow         dextrose (GLUTOSE) oral gel 15 g  Dose: 15 g  Freq: Every 15 Minutes PRN Route: PO  PRN Reason: Low Blood Sugar  PRN Comment: Blood sugar less than 70  Start: 09/19/23 0903   Admin Instructions:   BS<70, Patient Alert, Is not NPO, Can safely swallow.         diphenhydrAMINE (BENADRYL) injection 12.5 mg  Dose: 12.5 mg  Freq: Every 6 Hours Route: IV  Start: 09/19/23 1500   Admin Instructions:   25 mg may be given IV push over less than 1 minute.  Caution: Look alike/sound alike drug alert. This med may be ordered in other forms and routes. Before giving verify the last time the drug was given by any route/form.      1529-Given     2034-Given           0309-Given     0847-Given     1500     2100            glucagon (GLUCAGEN) injection 1 mg  Dose: 1 mg  Freq: Every 15 Minutes PRN Route: IM  PRN Reason: Low Blood Sugar  PRN Comment: Blood Glucose Less Than 70  Start: 09/19/23 0903   Admin Instructions:   Blood Glucose Less Than 70 - Patient Without IV Access - Unresponsive, NPO or Unable To Safely Swallow  Reconstitute powder for injection by adding 1 mL of -supplied sterile diluent or sterile water for injection to a vial containing 1 mg of the drug, to provide solutions containing 1 mg/mL. Shake vial gently to dissolve.         hydrOXYzine (ATARAX) tablet 25 mg  Dose: 25 mg  Freq: Daily PRN Route: PO  PRN Reason: Anxiety  Indications of Use: ANXIETY  Start: 09/19/23 0902   Admin Instructions:   Caution: Look  alike/sound alike drug alert         insulin lispro (HUMALOG/ADMELOG) injection 2-9 Units  Dose: 2-9 Units  Freq: 4 Times Daily Before Meals & Nightly Route: SC  Start: 09/19/23 1130   Admin Instructions:   Correction Insulin - Moderate Dose (Total Insulin Dose 40-60 units/day, Average Weight Patient, Patient Taking Oral Hypoglycemic)    Blood Glucose 150-199 mg/dL - 2 units  Blood Glucose 200-249 mg/dL - 4 units  Blood Glucose 250-299 mg/dL - 6 units  Blood Glucose 300-349 mg/dL - 7 units  Blood Glucose 350-400 mg/dL - 8 units  Blood Glucose greater than 400 mg/dL - 9 units & Call Provider   Caution: Look alike/sound alike drug alert    (1144)-Not Given     (1826)-Not Given     (2040)-Not Given          (0741)-Not Given [C]     1130 1730 2100            ketorolac (TORADOL) injection 15 mg  Dose: 15 mg  Freq: Every 6 Hours PRN Route: IV  PRN Reason: Severe Pain  Start: 09/19/23 1404   End: 09/24/23 1403   Admin Instructions:   Based on patient request - if ordered for moderate or severe pain, provider allows for administration of a medication prescribed for a lower pain scale.      If given for pain, use the following pain scale:  Mild Pain = Pain Score of 1-3, CPOT 1-2  Moderate Pain = Pain Score of 4-6, CPOT 3-4  Severe Pain = Pain Score of 7-10, CPOT 5-8         levETIRAcetam (KEPPRA) tablet 750 mg  Dose: 750 mg  Freq: 2 Times Daily Route: PO  Indications of Use: SEIZURE  Start: 09/19/23 1100   Admin Instructions:   Mucous membrane irritant. Do not crush or chew tablet or capsule unless administered through a feeding tube.  Caution: Look alike/sound alike drug alert.    1143-Given     2034-Given           0846-Given     2100              metoprolol tartrate (LOPRESSOR) tablet 100 mg  Dose: 100 mg  Freq: 2 Times Daily Route: PO  Indications of Use: HYPERTENSION  Start: 09/19/23 1000   Admin Instructions:   Hold for SBP less than 100, DBP less than 60, or heart rate less than 50    1150-Given      "2033-Given           0846-Given     2100              nortriptyline (PAMELOR) capsule 25 mg  Dose: 25 mg  Freq: Nightly Route: PO  Start: 09/19/23 2100 2033-Given            2100               ondansetron (ZOFRAN) injection 4 mg  Dose: 4 mg  Freq: Every 6 Hours PRN Route: IV  PRN Reasons: Nausea,Vomiting  Start: 09/19/23 1404   Admin Instructions:   \"If multiple N/V medications ordered, use in the following order: Ondansetron, Prochlorperazine, Promethazine. Use PO unless patient refuses or patient unable to swallow.\"           ondansetron (ZOFRAN) injection 4 mg  Dose: 4 mg  Freq: Every 6 Hours PRN Route: IV  PRN Reasons: Nausea,Vomiting  Start: 09/19/23 0651         prochlorperazine (COMPAZINE) injection 2.5 mg  Dose: 2.5 mg  Freq: Every 6 Hours PRN Route: IV  PRN Reasons: Nausea,Vomiting  Start: 09/19/23 1404   Admin Instructions:   \"If multiple N/V medications ordered, use in the following order: Ondansetron, Prochlorperazine, Promethazine. Use PO unless patient refuses or patient unable to swallow.\"           sertraline (ZOLOFT) tablet 50 mg  Dose: 50 mg  Freq: Daily Route: PO  Indications of Use: MAJOR DEPRESSIVE DISORDER  Start: 09/19/23 1100    1143-Given            0846-Given               sodium chloride 0.9 % infusion  Rate: 100 mL/hr Dose: 100 mL/hr  Freq: Continuous Route: IV  Start: 09/19/23 0708    1236-New Bag            0027-New Bag              Completed Medications  Medications 09/19/23 09/20/23       magnesium sulfate in D5W 1g/100mL (PREMIX)  Dose: 1 g  Freq: Once Route: IV  Start: 09/19/23 1500   End: 09/19/23 1528    1528-New Bag                metoclopramide (REGLAN) injection 10 mg  Dose: 10 mg  Freq: Once Route: IV  Start: 09/19/23 0403   End: 09/19/23 0351   Admin Instructions:   Doses of 10 mg or less can be given IV push undiluted over 1 to 2 minutes    0351-Given                morphine injection 4 mg  Dose: 4 mg  Freq: Once Route: IV  Start: 09/19/23 0403   End: 09/19/23 0351 "   Admin Instructions:   Based on patient request - if ordered for moderate or severe pain, provider allows for administration of a medication prescribed for a lower pain scale.  If given for pain, use the following pain scale:  Mild Pain = Pain Score of 1-3, CPOT 1-2  Moderate Pain = Pain Score of 4-6, CPOT 3-4  Severe Pain = Pain Score of 7-10, CPOT 5-8    0351-Given                potassium chloride (K-DUR,KLOR-CON) ER tablet 40 mEq  Dose: 40 mEq  Freq: Once Route: PO  Start: 09/19/23 1330   End: 09/19/23 1301   Admin Instructions:   Swallow whole; do not crush, split, or chew.    1301-Given                sodium chloride 0.9 % bolus 1,000 mL  Dose: 1,000 mL  Freq: Once Route: IV  Start: 09/19/23 0359   End: 09/19/23 0420    0350-New Bag     0420-Stopped              Discontinued Medications  Medications 09/19/23 09/20/23       aspirin tablet 325 mg  Dose: 325 mg  Freq: Daily Route: PO  Start: 09/19/23 0900   End: 09/19/23 1403   Admin Instructions:   If patient fails dysphagia, UT option MUST be given.  Do not exceed 4 grams of aspirin in a 24 hr period.    If given for pain, use the following pain scale:   Mild Pain = Pain Score of 1-3, CPOT 1-2  Moderate Pain = Pain Score of 4-6, CPOT 3-4  Severe Pain = Pain Score of 7-10, CPOT 5-8    0852-Given               Or  aspirin suppository 300 mg  Dose: 300 mg  Freq: Daily Route: RE  Start: 09/19/23 0900   End: 09/19/23 1403   Admin Instructions:   If patient fails dysphagia, UT option MUST be given.  Do not exceed 4 grams of aspirin in a 24 hr period.    If given for pain, use the following pain scale:   Mild Pain = Pain Score of 1-3, CPOT 1-2  Moderate Pain = Pain Score of 4-6, CPOT 3-4  Severe Pain = Pain Score of 7-10, CPOT 5-8    0852-Not Given:  See Alt                atorvastatin (LIPITOR) tablet 80 mg  Dose: 80 mg  Freq: Nightly Route: PO  Start: 09/19/23 2100   End: 09/19/23 1403   Admin Instructions:   Avoid grapefruit juice.         sodium chloride 0.9 %  flush 10 mL  Dose: 10 mL  Freq: As Needed Route: IV  PRN Reason: Line Care  Start: 09/19/23 0342   End: 09/19/23 1741                       Consult Notes (last 48 hours)        Hernan Champagne MD at 09/19/23 1310        Consult Orders    1. Inpatient Neurology Consult Stroke [[id number]] ordered by Tahira Walsh APRN at 09/19/23 0652                 Neurology Consult Note    Consult Date: 9/19/2023    Referring MD: Radha Sullivan MD    Reason for Consult I have been asked to see the patient in neurological consultation to render advice and opinion regarding headaches    Pablo Mendoza is a 62 y.o. male with past medical history of hypertension, hyperlipidemia, diabetes mellitus, gout, migraines and TIAs in the past.  Patient states that he has been having worsening headaches since the last couple of days which brought him to the ER.  He states that his head hurts all over, no specific region, also complains of nausea along with photosensitivity and phonophobia.  He has no aggravating factors or relieving factors.  Denies congestion lacrimation or vision problems apart from blurry vision in both eyes.  He denies any increase in headache with cough or bending down or moving his head coughing or sexual activity.    He also states its his seizures which are making him have this headache.  I asked him to explain more about this seizure he states since past couple of years he has been having episodes of stuttering speech and twitching all over his body and he zones out.  He states he zones out even while standing or sitting or doing work and cannot remember what has happened.  He states he had several seizures at work in the past which were witnessed by other people where he is twitching all over.    He was admitted last month for similar complaints of history seizure-like spells with transient aphasia stuttering and right-sided symptoms he had a complete stroke investigations including MRI MRIs  "which were completely unremarkable including an EEG.        Past Medical History:   Diagnosis Date    Anesthesia complication     STATES HARD TO WAKE UP W/ALL SURGERIES    COVID-19     DEC 2020    Diabetes mellitus     Diverticulitis     Elevated cholesterol     Gout     Hypertension     Low back pain     Migraines     Mixed hyperlipidemia 01/19/2018    Neuropathy     Nocturia 01/19/2018    Numbness and tingling of both lower extremities     LEGS AND FEET     Seizures     AS A CHILD    Staph infection 2010    UNSURE OF SITE    Stroke     COGNITIVE DELAY    TIA (transient ischemic attack)     Type 2 diabetes mellitus, without long-term current use of insulin 01/19/2018    Weakness of both legs     LEFT WORSE        Exam  /87 (BP Location: Left arm, Patient Position: Lying)   Pulse 69   Temp 98.2 °F (36.8 °C) (Oral)   Resp 18   Ht 170.2 cm (67\")   Wt 92 kg (202 lb 12.8 oz)   SpO2 95%   BMI 31.76 kg/m²     Higher integrative function: Oriented to time, place, person, Spontaneous speech, fund of vocabulary are normal.  Positive stuttering speech  CN II: Normal visual acuity   CN III IV VI: Extraocular movements are full without nystagmus. Pupils are equal, round, and reactive to light.   CN V: Sensation same on both sides of the face  CN VII: Facial movements are symmetric, no weakness.   CN VIII: Auditory acuity is normal.   Motor: Good strength 5/5 both UE and LE B/L  no pronator drift. No fasciculations, rigidity, spasticity or abnormal movements.   Sensation: Normal sensation to pin prick and light touch all four extremities   Coordination: Finger to nose test showed no dysmetria      DATA:    Lab Results   Component Value Date    GLUCOSE 96 09/19/2023    CALCIUM 9.6 09/19/2023     09/19/2023    K 3.4 (L) 09/19/2023    CO2 26.0 09/19/2023     09/19/2023    BUN 13 09/19/2023    CREATININE 0.89 09/19/2023    EGFRIFAFRI 101 02/03/2022    EGFRIFNONA 87 02/03/2022    BCR 14.6 09/19/2023    " ANIONGAP 15.0 09/19/2023     Lab Results   Component Value Date    WBC 9.09 09/19/2023    HGB 16.2 09/19/2023    HCT 49.6 09/19/2023    MCV 76.5 (L) 09/19/2023     09/19/2023       Lab review:   Sodium 141  Creatinine 0.89  Normal LFTs  Blood glucose 138    Lactate 2.6    B12 270    WBC 9.09  Hemoglobin 16 and platelets 279    Urine analysis showed glucose greater than thousand and trace leukocytes and protein      Imaging review:     CTA head and neck done on 8/14/2023-mild atherosclerosis bilateral ICA but no significant vessel stenosis or atherosclerosis    EEG 8/15/2023-normal, epileptiform discharges or seizures     CT head done on admission showed no acute hypodensity or hyperdensity    MRI C-spine done on 8/14/2023-degenerative changes C4-C5 and C6-C7    MRI brain done with and without contrast 8/14/2023-no DWI changes, no T2 flair changes, no GRE changes, postcontrast enhancement of      Diagnoses:  Status migrainosus    Differential diagnosis include  Functional neurological disorder  PNES    Other medical problems  B12 deficiency  Hypertension  Hyperlipidemia                           Pre-stroke MRS: 1  NIHSS: NIHSS:    Baseline  0-->Alert: keenly responsive  0-->Answers both questions correctly  0-->Performs both tasks correctly  0=normal  0=No visual loss  0=Normal symmetric movement  0-->No drift: limb holds 90 (or 45) degrees for full 10 secs  0-->No drift: limb holds 90 (or 45) degrees for full 10 secs  0-->No drift: limb holds 90 (or 45) degrees for full 10 secs  0-->No drift: limb holds 90 (or 45) degrees for full 10 secs  0=Absent  0=Normal; no sensory loss  0=No aphasia, normal  0=Normal  0=No abnormality    Total score: 0      Comment: It is a 62-year-old gentleman with history of hypertension hyperlipidemia type 2 diabetes comes in with headache which has been ongoing for the last couple of days he does have some migrainous features but does state also that his head hurts all over.  He  has nausea and light sensitivity and also phonophobia.    Regarding his seizure-like spells which include episodes of zoning out while at work including twitching of all his muscles and stuttering speech, does not sound like true epileptic seizure.  Will however repeat prolonged EEG this admission.  Last admission Keppra was started to see if it helps but patient states it did not help any of this seizures, again AEDs do not help PNES or functional neurological disorders.    I reviewed his MRI MRAs which were done last admission they look completely unremarkable.    I spoke about the diagnosis with patient extensively.         PLAN:   - Check ESR CRP  -B12 replacement subcutaneously  -Migraine cocktail every 6 as needed, will also start him on nortriptyline at night to help with his headaches  -We will repeat CT head and place him on continuous video EEG monitoring overnight to capture the spells.        Electronically signed by Hernan Champagne MD at 09/19/23 1096

## 2023-09-20 NOTE — PLAN OF CARE
"Goal Outcome Evaluation:              Outcome Evaluation: Speech/language evaluation completed as patient with complaints of stuttering since 2015, however reports it has gotten worse since August. Patient states he has \"spells\" of stuttering specifically when he is \"up and moving\" or when he has a migraine. This date, patient presents with a mild fluency disorder which is prevalent in natural conversation and characterized by halting speech, blocks, and occasional sound/phonemic repetitions. Escape behaviors such as eye blinking during sound repetitions observed twice throughout assessment. The [place] Aphasia Screening Test was administered to assess language abilities; patient scored a 100/100. Patient reports he is following with home health speech therapy to address his fluency concerns. Recommend patient continue to follow with home health speech therapy following discharge.         "

## 2023-09-20 NOTE — THERAPY EVALUATION
Patient Name: Pablo Mendoza  : 1960    MRN: [id number]                              Today's Date: 2023       Admit Date: 2023    Visit Dx:     ICD-10-CM ICD-9-CM   1. Acute nonintractable headache, unspecified headache type  R51.9 784.0   2. Speech disturbance, unspecified type  R47.9 784.59     Patient Active Problem List   Diagnosis    Type 2 diabetes mellitus, without long-term current use of insulin    Mixed hyperlipidemia    Essential hypertension    Gout of multiple sites    Nocturia    Migraine headache    Chronic maxillary sinusitis    Other fatigue    Arthritis    Routine health maintenance    Proteinuria    Cellulitis of left foot    Lumbar radiculopathy    Stroke-like symptoms    Polycythemia    Dizziness    Cervical stenosis of spine    Cervical spondylosis without myelopathy    Seizure    Headache    Elevated lactic acid level    Hypokalemia     Past Medical History:   Diagnosis Date    Anesthesia complication     STATES HARD TO WAKE UP W/ALL SURGERIES    COVID-19     DEC 2020    Diabetes mellitus     Diverticulitis     Elevated cholesterol     Gout     Hypertension     Low back pain     Migraines     Mixed hyperlipidemia 2018    Neuropathy     Nocturia 2018    Numbness and tingling of both lower extremities     LEGS AND FEET     Seizures     AS A CHILD    Staph infection 2010    UNSURE OF SITE    Stroke     COGNITIVE DELAY    TIA (transient ischemic attack)     Type 2 diabetes mellitus, without long-term current use of insulin 2018    Weakness of both legs     LEFT WORSE      Past Surgical History:   Procedure Laterality Date    CHOLECYSTECTOMY      COLONOSCOPY       or 2016    HERNIA REPAIR      LUMBAR FUSION N/A 2021    Procedure: Lumbar 3 to Lumbar 4 and Lumbar 4 to Lumbar 5 laminectomy with a fusion;  Surgeon: Jose Webster MD;  Location: [facility];  Service: [employer] - Neuro;  Laterality: N/A;    SINUS SURGERY      TYMPANOPLASTY  Bilateral       General Information       [facility] Name 09/20/23 1422          OT Time and Intention    Document Type evaluation  -KG     Mode of Treatment individual therapy;occupational therapy  -KG       [facility] Name 09/20/23 1422          General Information    Patient Profile Reviewed yes  -KG     Prior Level of Function independent:;ADL's  -KG     Existing Precautions/Restrictions no known precautions/restrictions  -KG     Barriers to Rehab none identified  -KG       Row Name 09/20/23 1422          Living Environment    People in Home --  Lives alone in a 1 story home. 1 step to enter.  -KG       [facility] Name 09/20/23 1422          Cognition    Orientation Status (Cognition) oriented to;person;place;situation;time  -KG       [facility] Name 09/20/23 1422          Safety Issues, Functional Mobility    Impairments Affecting Function (Mobility) endurance/activity tolerance;strength  -KG               User Key  (r) = Recorded By, (t) = Taken By, (c) = Cosigned By      Initials Name Provider Type    KG José Davis OT Occupational Therapist                     Mobility/ADL's       Row Name 09/20/23 1423          Bed Mobility    Bed Mobility supine-sit;sit-supine  -KG     Supine-Sit [place] (Bed Mobility) standby assist  -KG     Sit-Supine [place] (Bed Mobility) standby assist  -KG       Row Name 09/20/23 1423          Transfers    Transfers sit-stand transfer;stand-sit transfer;toilet transfer  -KG       Row Name 09/20/23 1423          Sit-Stand Transfer    Sit-Stand [place] (Transfers) contact guard  -KG       Row Name 09/20/23 1423          Stand-Sit Transfer    Stand-Sit [place] (Transfers) contact guard  -KG       Row Name 09/20/23 1423          Toilet Transfer    Type (Toilet Transfer) sit-stand;stand-sit  -KG     [place] Level (Toilet Transfer) contact guard  -KG       Row Name 09/20/23 1423          Functional Mobility    Functional Mobility- Ind. Level --  Performed functional mobility from EOB to  bathroom, to sink, and then back to EOB w/ CGA and no AD.  -KG       Row Name 09/20/23 1423          Activities of Daily Living    BADL Assessment/Intervention lower body dressing;grooming;toileting  -KG       Row Name 09/20/23 1423          Lower Body Dressing Assessment/Training    [place] Level (Lower Body Dressing) don;socks;set up  -KG     Position (Lower Body Dressing) edge of bed sitting  -KG       Row Name 09/20/23 1423          Grooming Assessment/Training    [place] Level (Grooming) hair care, combing/brushing;oral care regimen;wash face, hands;contact guard assist  -KG     Position (Grooming) unsupported standing  -KG       Row Name 09/20/23 1423          Toileting Assessment/Training    [place] Level (Toileting) adjust/manage clothing;perform perineal hygiene;standby assist  -KG     Position (Toileting) supported sitting  -KG               User Key  (r) = Recorded By, (t) = Taken By, (c) = Cosigned By      Initials Name Provider Type    José Hall OT Occupational Therapist                   Obj/Interventions       Row Name 09/20/23 1425          Sensory Assessment (Somatosensory)    Sensory Assessment (Somatosensory) sensation intact  -KG       Row Name 09/20/23 1425          Vision Assessment/Intervention    Visual Impairment/Limitations WNL  -KG       Row Name 09/20/23 1425          Range of Motion Comprehensive    General Range of Motion no range of motion deficits identified  -KG       Row Name 09/20/23 1425          Strength Comprehensive (MMT)    General Manual Muscle Testing (MMT) Assessment --  BUE strength 4/5 grossly  -KG       Row Name 09/20/23 1425          Balance    Balance Assessment sitting static balance;sitting dynamic balance;standing static balance;standing dynamic balance  -KG     Static Sitting Balance standby assist  -KG     Dynamic Sitting Balance standby assist  -KG     Static Standing Balance contact guard  -KG     Dynamic Standing Balance contact guard   -KG     Balance Interventions sitting;standing;static;dynamic;occupation based/functional task  -KG               User Key  (r) = Recorded By, (t) = Taken By, (c) = Cosigned By      Initials Name Provider Type    José Hall OT Occupational Therapist                   Goals/Plan       Row Name 09/20/23 1430          Transfer Goal 1 (OT)    Activity/Assistive Device (Transfer Goal 1, OT) sit-to-stand/stand-to-sit;bed-to-chair/chair-to-bed;toilet  -KG     [place] Level/Cues Needed (Transfer Goal 1, OT) standby assist  -KG     Time Frame (Transfer Goal 1, OT) short term goal (STG);2 weeks  -KG     Progress/Outcome (Transfer Goal 1, OT) new goal  -KG       Row Name 09/20/23 1430          Dressing Goal 1 (OT)    Activity/Device (Dressing Goal 1, OT) upper body dressing;lower body dressing  -KG     [place]/Cues Needed (Dressing Goal 1, OT) independent  -KG     Time Frame (Dressing Goal 1, OT) short term goal (STG);2 weeks  -KG     Progress/Outcome (Dressing Goal 1, OT) new goal  -KG       [facility] Name 09/20/23 1430          Grooming Goal 1 (OT)    Activity/Device (Grooming Goal 1, OT) hair care;oral care;wash face, hands  -KG     [place] (Grooming Goal 1, OT) standby assist  -KG     Time Frame (Grooming Goal 1, OT) short term goal (STG);2 weeks  -KG     Progress/Outcome (Grooming Goal 1, OT) new goal  -KG       Row Name 09/20/23 1430          Therapy Assessment/Plan (OT)    Planned Therapy Interventions (OT) activity tolerance training;occupation/activity based interventions;strengthening exercise  -KG               User Key  (r) = Recorded By, (t) = Taken By, (c) = Cosigned By      Initials Name Provider Type    José Hall OT Occupational Therapist                   Clinical Impression       Row Name 09/20/23 1426          Pain Assessment    Pretreatment Pain Rating 0/10 - no pain  -KG     Posttreatment Pain Rating 0/10 - no pain  -KG       Row Name 09/20/23 1426          Plan of Care Review     Plan of Care Reviewed With patient  -KG     Outcome Evaluation Pt admitted to [facility] for headache & neurological symptoms 2/2 seizure activity. Pt lives alone and is (I) w/ BADLs at baseline. Agreeable to OT eval this date. Performing bed mobility w/ SBA. Performing transfers, functional mobility, and standing ADL tasks w/ CGA. Setup to SBA for seated ADL tasks. Pt presents w/ mild strength/endurance deficits w/ activity, however did not hinder performance. Pt reports he is receiving HHPT since last admission and is planning to continue, denies need for HHOT at this time. OT will f/u while inpatient, anticipate d/c home.  -KG       Row Name 09/20/23 1426          Therapy Assessment/Plan (OT)    Rehab Potential (OT) good, to achieve stated therapy goals  -KG     Criteria for Skilled Therapeutic Interventions Met (OT) skilled treatment is necessary  -KG     Therapy Frequency (OT) 3 times/wk  -KG       Row Name 09/20/23 1426          Therapy Plan Review/Discharge Plan (OT)    Anticipated Discharge Disposition (OT) home  -KG       Row Name 09/20/23 1426          Vital Signs    Pre Patient Position Supine  -KG     Intra Patient Position Standing  -KG     Post Patient Position Supine  -KG       Row Name 09/20/23 1426          Positioning and Restraints    Pre-Treatment Position in bed  -KG     Post Treatment Position bed  -KG     In Bed supine;call light within reach;encouraged to call for assist  -KG               User Key  (r) = Recorded By, (t) = Taken By, (c) = Cosigned By      Initials Name Provider Type    KG José Davis, REYMUNDO Occupational Therapist                   Outcome Measures       Row Name 09/20/23 1431          How much help from another is currently needed...    Putting on and taking off regular lower body clothing? 4  -KG     Bathing (including washing, rinsing, and drying) 3  -KG     Toileting (which includes using toilet bed pan or urinal) 4  -KG     Putting on and taking off regular upper body clothing  4  -KG     Taking care of personal grooming (such as brushing teeth) 3  -KG     Eating meals 4  -KG     AM-PAC 6 Clicks Score (OT) 22  -KG       Row Name 09/20/23 1431          Modified Laura Scale    Modified Laura Scale 3 - Moderate disability.  Requiring some help, but able to walk without assistance.  -KG       Row Name 09/20/23 1431          Functional Assessment    Outcome Measure Options AM-PAC 6 Clicks Daily Activity (OT);Modified Laura  -KG               User Key  (r) = Recorded By, (t) = Taken By, (c) = Cosigned By      Initials Name Provider Type    José Hall OT Occupational Therapist                    Occupational Therapy Education       Title: PT OT SLP Therapies (In Progress)       Topic: Occupational Therapy (Not Started)       Point: ADL training (Not Started)       Description:   Instruct learner(s) on proper safety adaptation and remediation techniques during self care or transfers.   Instruct in proper use of assistive devices.                  Learner Progress:  Not documented in this visit.              Point: Home exercise program (Not Started)       Description:   Instruct learner(s) on appropriate technique for monitoring, assisting and/or progressing therapeutic exercises/activities.                  Learner Progress:  Not documented in this visit.              Point: Precautions (Not Started)       Description:   Instruct learner(s) on prescribed precautions during self-care and functional transfers.                  Learner Progress:  Not documented in this visit.              Point: Body mechanics (Not Started)       Description:   Instruct learner(s) on proper positioning and spine alignment during self-care, functional mobility activities and/or exercises.                  Learner Progress:  Not documented in this visit.                                  OT Recommendation and Plan  Planned Therapy Interventions (OT): activity tolerance training, occupation/activity based  interventions, strengthening exercise  Therapy Frequency (OT): 3 times/wk  Plan of Care Review  Plan of Care Reviewed With: patient  Outcome Evaluation: Pt admitted to [facility] for headache & neurological symptoms 2/2 seizure activity. Pt lives alone and is (I) w/ BADLs at baseline. Agreeable to OT eval this date. Performing bed mobility w/ SBA. Performing transfers, functional mobility, and standing ADL tasks w/ CGA. Setup to SBA for seated ADL tasks. Pt presents w/ mild strength/endurance deficits w/ activity, however did not hinder performance. Pt reports he is receiving HHPT since last admission and is planning to continue, denies need for HHOT at this time. OT will f/u while inpatient, anticipate d/c home.     Time Calculation:   Evaluation Complexity (OT)  Review Occupational Profile/Medical/Therapy History Complexity: brief/low complexity  Assessment, Occupational Performance/Identification of Deficit Complexity: 1-3 performance deficits  Clinical Decision Making Complexity (OT): problem focused assessment/low complexity  Overall Complexity of Evaluation (OT): low complexity     Time Calculation- OT       Row Name 09/20/23 1432             Time Calculation- OT    OT Start Time 1355  -KG      OT Stop Time 1420  -KG      OT Time Calculation (min) 25 min  -KG      Total Timed Code Minutes- OT 20 minute(s)  -KG      OT Non-Billable Time (min) 5 min  -KG      OT Received On 09/20/23  -KG      OT - Next Appointment 09/21/23  -KG      OT Goal Re-Cert Due Date 10/04/23  -KG         Timed Charges    92378 - OT Self Care/Mgmt Minutes 20  -KG         Untimed Charges    OT Eval/Re-eval Minutes 5  -KG         Total Minutes    Timed Charges Total Minutes 20  -KG      Untimed Charges Total Minutes 5  -KG       Total Minutes 25  -KG                User Key  (r) = Recorded By, (t) = Taken By, (c) = Cosigned By      Initials Name Provider Type    KG José Davis OT Occupational Therapist                  Therapy Charges for  Today       Code Description Service Date Service Provider Modifiers Qty    93456948493 HC OT EVAL LOW COMPLEXITY 3 9/20/2023 José Davis OT GO 1    23225967319 HC OT SELF CARE/MGMT/TRAIN EA 15 MIN 9/20/2023 José Davis OT GO 1                 José Davis OT  9/20/2023

## 2023-09-21 ENCOUNTER — TRANSITIONAL CARE MANAGEMENT TELEPHONE ENCOUNTER (OUTPATIENT)
Dept: CALL CENTER | Facility: HOSPITAL | Age: 63
End: 2023-09-21
Payer: COMMERCIAL

## 2023-09-21 ENCOUNTER — HOME CARE VISIT (OUTPATIENT)
Dept: HOME HEALTH SERVICES | Facility: HOME HEALTHCARE | Age: 63
End: 2023-09-21
Payer: COMMERCIAL

## 2023-09-21 VITALS
DIASTOLIC BLOOD PRESSURE: 74 MMHG | SYSTOLIC BLOOD PRESSURE: 114 MMHG | RESPIRATION RATE: 18 BRPM | TEMPERATURE: 97.8 F | OXYGEN SATURATION: 98 % | HEART RATE: 84 BPM

## 2023-09-21 PROCEDURE — G0299 HHS/HOSPICE OF RN EA 15 MIN: HCPCS

## 2023-09-21 NOTE — OUTREACH NOTE
Prep Survey      Flowsheet Row Responses   [facility] facility patient discharged from? [facility]   Is LACE score < 7 ? No   Eligibility [facility]   Date of Admission 09/19/23   Date of Discharge 09/20/23   Discharge Disposition Home or Self Care   Discharge diagnosis Headache   Does the patient have one of the following disease processes/diagnoses(primary or secondary)? Other   Does the patient have Home health ordered? No   Is there a DME ordered? No   Prep survey completed? Yes            TUNG AKBAR - Registered Nurse

## 2023-09-21 NOTE — OUTREACH NOTE
Call Center TCM Note      Flowsheet Row Responses   [facility] patient discharged from? [facility]   Does the patient have one of the following disease processes/diagnoses(primary or secondary)? Other   TCM attempt successful? No  [Sister, [place], on FREEDOM.]   Unsuccessful attempts Attempt 1   Call Status Left message            Celena Wright RN    9/21/2023, 10:14 EDT         dizziness

## 2023-09-21 NOTE — OUTREACH NOTE
Call Center TCM Note      Flowsheet Row Responses   [facility] patient discharged from? [place]   Does the patient have one of the following disease processes/diagnoses(primary or secondary)? Other   TCM attempt successful? Yes   Call start time 1548   Call end time 1554   Meds reviewed with patient/caregiver? Yes   Is the patient having any side effects they believe may be caused by any medication additions or changes? No   Does the patient have all medications ordered at discharge? No   What is keeping the patient from filling the prescriptions? --  [[facility] [place] will ask sister to  meds.]   Nursing Interventions No intervention needed   Is the patient taking all medications as directed (includes completed medication regime)? No   What is preventing the patient from taking all medications as directed? Other   Nursing Interventions Nurse provided patient education   Medication comments States will have sister  cyanocobalamin and glipizide. [facility] [place] has Topamax.   Comments PCP hospital f/u appt scheduled for 09/25/23. Neurosurgeon appt to evaluate neck 09/26/23.   Does the patient have an appointment with their PCP within 7-14 days of discharge? Yes   Has home health visited the patient within 72 hours of discharge? Yes   Home health comments  nurse visited today.   Psychosocial issues? No   Psychosocial comments Lives independently. Has sisters who are available to help.   Did the patient receive a copy of their discharge instructions? Yes   Nursing interventions Reviewed instructions with patient   What is the patient's perception of their health status since discharge? Improving   Is the patient/caregiver able to teach back signs and symptoms related to disease process for when to call PCP? Yes   Is the patient/caregiver able to teach back signs and symptoms related to disease process for when to call 911? Yes   Is the patient/caregiver able to teach back the hierarchy of who to call/visit for  symptoms/problems? PCP, Specialist, Home health nurse, Urgent Care, ED, 911 Yes   If the patient is a current smoker, are they able to teach back resources for cessation? Not a smoker   TCM call completed? Yes   Wrap up additional comments Patient states is improving. States still has some fatigue. Denies any further headache. Denies any needs/concerns today. TCM complete.   Call end time 1554   Would this patient benefit from a Referral to [facility] Social Work? No   Is the patient interested in additional calls from an ambulatory ? No            Celena Wright RN    9/21/2023, 15:57 EDT

## 2023-09-21 NOTE — PROGRESS NOTES
Discharge Planning Assessment  [facility]     Patient Name: Pablo Mendoza  MRN: [id number]  Today's Date: 9/21/2023    Admit Date: 9/19/2023    Plan: Home   Discharge Needs Assessment    No documentation.                  Discharge Plan       Row Name 09/21/23 1614       Plan    Plan Home    Final Discharge Disposition Code 01 - home or self-care    Final Note Home                  Continued Care and Services - Discharged on 9/20/2023 Admission date: 9/19/2023 - Discharge disposition: Home or Self Care   Coordination has not been started for this encounter.       Selected Continued Care - Prior Encounters Includes continued care and service providers with selected services from prior encounters from 6/21/2023 to 9/20/2023      Discharged on 8/20/2023 Admission date: 8/19/2023 - Discharge disposition: Home-Health Care Svc      Home Medical Care       Service Provider Selected Services Address Phone Fax Patient Preferred    [facility] [address] [address] [phone] --                          Expected Discharge Date and Time       Expected Discharge Date Expected Discharge Time    Sep 20, 2023            Demographic Summary    No documentation.                  Functional Status    No documentation.                  Psychosocial    No documentation.                  Abuse/Neglect    No documentation.                  Legal    No documentation.                  Substance Abuse    No documentation.                  Patient Forms    No documentation.                     Angelica Isbell, RN

## 2023-09-21 NOTE — HOME HEALTH
"Resumption of Care Note: Pt has been home since last night about 6pm. He states he went to the hospital for a persistent headache. EEG/CT scans were negative. Pt states, \"The neurologist thinks my lifelong high stress could be causing these symptoms.\" He says he feels \"ok,\" but when he does anything that causes stress he has neuro symptoms such as stuttering and confusion. Skilled nursing ordered for Neurovascular assessment, Cardiopulmonary assessment, and medication education. He is agreeable to resume  services. Pt has orders for PT/SLP/SN. Will see pt 2w2, 1w2.    Reason for hospitalization/new problems: Seizures    New/changed medications: Start taking cyancobalamin and topiramate. Stop taking aspirin, cyclobenzaprine, fluticasone, loratadine, multivitamin with minerals, rizatriptan    New/changed orders: None    Focus of Care: Seizures    Skills ordered: PT, SLP, SN    Plan for next visit: Follow up on doctors appointments, med rec/education, neurovascular assessment"

## 2023-09-21 NOTE — PAYOR COMM NOTE
"Pablo Virgen (62 y.o. Male)     PLEASE SEE ATTACHED FOR DC NOTICE    REF   #[id number]     THANK YOU  JERSEY MEJIA RN/ DEPT  [facility]   [phone]  [phone]          Date of Birth   1960    Social Security Number       Address   [address] [place] [address]    Home Phone   [phone]    MRN   [id number]       Episcopalian   Religion    Marital Status                               Admission Date   23    Admission Type   Emergency    Admitting Provider   John Eng MD    Attending Provider       Department, Room/Bed   [facility], S522/1       Discharge Date   2023    Discharge Disposition   Home or Self Care    Discharge Destination                                 Attending Provider: (none)   Allergies: Aspirin, Lisinopril    Isolation: None   Infection: None   Code Status: Prior    Ht: 170.2 cm (67\")   Wt: 92 kg (202 lb 12.8 oz)    Admission Cmt: None   Principal Problem: Headache [R51.9]                   Active Insurance as of 2023       Primary Coverage       Payor Plan Insurance Group Employer/Plan Group    [employer]HE[employer] BLUE CROSS AN[employer] BLUE CROSS BLUE SHIELD PPO [id number]       Payor Plan Address Payor Plan Phone Number Payor Plan Fax Number Effective Dates    [address] [phone]  3/1/2011 - None Entered    [place] [address]         Subscriber Name Subscriber Birth Date Member ID       PABLO VIRGEN 1960 [id number]                     Emergency Contacts        (Rel.) Home Phone Work Phone Mobile Phone    Sarmiento,Lulú (Sister) [phone] -- [phone]    EvangelistSloane cartwright (Sister) [phone] -- [phone]              [place]: NPI [id number]  Tax ID [id number]     Discharge Summary        John Eng MD at 23 1429              Patient Name: Pablo Virgen  : 1960  MRN: [id number]    Date of Admission: 2023  Date of Discharge:  " 9/20/2023  Primary Care Physician: Liana Hood MD      Chief Complaint:   Headache      Discharge Diagnoses     Active Hospital Problems    Diagnosis  POA    Migraine with aura [G43.109]  Yes    Type 2 diabetes mellitus, without long-term current use of insulin [E11.9]  Yes    Mixed hyperlipidemia [E78.2]  Yes    Essential hypertension [I10]  Yes      Resolved Hospital Problems    Diagnosis Date Resolved POA    Elevated lactic acid level [R79.89] 09/20/2023 Yes    Hypokalemia [E87.6] 09/20/2023 Yes        Hospital Course     Mr. Mendoza is a 62-year-old male with hypertension, hyperlipidemia, diabetes, gout, migraines, and TIAs in the past who presented with complaints of recent worsening headaches.  He has complained of some associated nausea along with photosensitivity and photophobia.  He also reports he has seizures which are making him have this headache.  When he describes seizures he reported he was having episodes of stuttering speech, twitching all over his body and zoning out.  He reports he has had several seizures at work in the past which were witnessed by other people where he is twitching all over.  He was admitted last month with similar complaints of history of seizure-like spells and transient aphasia and stuttering as well as right-sided symptoms and had extensive evaluation including MRI brain with and without contrast, CTA head/neck, and EEG which were unrevealing.  He was started on trial of Keppra for consideration of seizure.  He has since followed up with ANUSHA Dial as outpatient he was ordered a prolonged EEG study and patient was found to have low B12 and vitamin D levels and replacement was recommended though patient has not started yet.     He was given migraine cocktails yesterday and started on nortriptyline 25 mg nightly. CT head this admission unremarkable. Initial spot EEG was unremarkable.  Prolonged EEG completed overnight which was  unremarkable.    Neurology has cleared patient for discharge on Topamax 25 mg nightly for migraine.  He has outpatient follow-up arranged with neurology.      Day of Discharge     Subjective:  Feels much better than he did at time of admission.  No new complaints as of this morning.  Still has some slight headache and altered speech.    Physical Exam:  Temp:  [97.7 °F (36.5 °C)-98.2 °F (36.8 °C)] 98.2 °F (36.8 °C)  Heart Rate:  [71-74] 73  Resp:  [18] 18  BP: (129-131)/(69-75) 130/74  Body mass index is 31.76 kg/m².  Physical Exam  Vitals and nursing note reviewed.   Constitutional:       Appearance: Normal appearance.   Pulmonary:      Effort: Pulmonary effort is normal.   Neurological:      Mental Status: He is alert. Mental status is at baseline.   Psychiatric:         Mood and Affect: Mood normal.       Consultants     Consult Orders (all) (From admission, onward)       Start     Ordered    09/19/23 0652  Inpatient Neurology Consult Stroke  Once        Specialty:  Neurology  Provider:  (Not yet assigned)    09/19/23 0652        Procedures     Imaging Results (All)       Procedure Component Value Units Date/Time    CT Head Without Contrast [[id number]] Collected: 09/19/23 0444     Updated: 09/19/23 0824    Narrative:      CT OF THE HEAD WITHOUT CONTRAST     HISTORY: Blurred vision     COMPARISON: August 19, 2023     TECHNIQUE: Axial CT imaging was obtained through the brain. No IV  contrast was administered.     FINDINGS:  No acute intracranial hemorrhage is seen. Ventricles are normal in size.  There is no midline shift or mass effect. Paranasal sinuses appear  clear. Right mastoid air cells appear relatively underpneumatized, which  may represent changes of chronic mastoiditis/otitis media.       Impression:      No acute intracranial findings.            Pertinent Labs     Results from last 7 days   Lab Units 09/20/23  0620 09/19/23  0344   WBC 10*3/mm3 8.99 9.09   HEMOGLOBIN g/dL 14.2 16.2   PLATELETS 10*3/mm3  "902 696     Results from last 7 days   Lab Units 09/20/23  0620 09/19/23  0344   SODIUM mmol/L 141 141   POTASSIUM mmol/L 3.8 3.4*   CHLORIDE mmol/L 103 100   CO2 mmol/L 25.4 26.0   BUN mg/dL 13 13   CREATININE mg/dL 0.98 0.89   GLUCOSE mg/dL 140* 96   EGFR mL/min/1.73 88.3 98.1     Results from last 7 days   Lab Units 09/20/23  0620 09/19/23  0344   ALBUMIN g/dL 4.1 4.8   BILIRUBIN mg/dL 0.5 0.4   ALK PHOS U/L 121* 141*   AST (SGOT) U/L 18 24   ALT (SGPT) U/L 17 19     Results from last 7 days   Lab Units 09/20/23  0620 09/19/23  0344   CALCIUM mg/dL 8.9 9.6   ALBUMIN g/dL 4.1 4.8       Results from last 7 days   Lab Units 09/19/23  0628 09/19/23  0344   HSTROP T ng/L 12 10       Results from last 7 days   Lab Units 09/20/23  0620   CHOLESTEROL mg/dL 95   TRIGLYCERIDES mg/dL 267*   HDL CHOL mg/dL 24*   LDL CHOL mg/dL 30             Test Results Pending at Discharge       Discharge Details        Discharge Medications        New Medications        Instructions Start Date   cyanocobalamin 1000 MCG/ML injection   Inject 1 ML IM Q w X4 weeks, then 1 ml IM qow x 4 months. Total of 12 injections.      topiramate 25 MG tablet  Commonly known as: TOPAMAX   25 mg, Oral, Nightly             Continue These Medications        Instructions Start Date   acetaminophen 500 MG tablet  Commonly known as: TYLENOL   1 tablet, Oral, Every 6 Hours PRN      allopurinol 300 MG tablet  Commonly known as: ZYLOPRIM   TAKE ONE TABLET BY MOUTH TWICE A DAY      amLODIPine 10 MG tablet  Commonly known as: NORVASC   TAKE 1 TABLET BY MOUTH DAILY      atorvastatin 40 MG tablet  Commonly known as: Lipitor   40 mg, Oral, Daily      BD Eclipse Syringe 25G X 1\" 3 ML misc  Generic drug: Syringe/Needle (Disp)   Use with B12 Solution As Directed      glipizide 5 MG tablet  Commonly known as: GLUCOTROL   TAKE 1 TABLET BY MOUTH TWICE A DAY BEFORE A MEAL      hydroCHLOROthiazide 25 MG tablet  Commonly known as: HYDRODIURIL   TAKE ONE TABLET BY MOUTH DAILY    "   hydrOXYzine 25 MG tablet  Commonly known as: ATARAX   25 mg, Oral, Daily PRN      ibuprofen 800 MG tablet  Commonly known as: ADVIL,MOTRIN   800 mg, Oral, Every 8 Hours PRN      Janumet XR  MG tablet  Generic drug: SITagliptin-metFORMIN HCl ER   TAKE ONE TABLET BY MOUTH TWICE A DAY      Jardiance 25 MG tablet tablet  Generic drug: empagliflozin   TAKE ONE TABLET BY MOUTH DAILY      levETIRAcetam 750 MG tablet  Commonly known as: KEPPRA   750 mg, Oral, 2 Times Daily      metoprolol tartrate 100 MG tablet  Commonly known as: LOPRESSOR   TAKE 1 TABLET BY MOUTH TWICE A DAY      sertraline 50 MG tablet  Commonly known as: ZOLOFT   TAKE 1 TABLET BY MOUTH DAILY      sildenafil 50 MG tablet  Commonly known as: Viagra   50 mg, Oral, Daily PRN      vitamin D 1.25 MG (46527 UT) capsule capsule  Commonly known as: ERGOCALCIFEROL   50,000 Units, Oral, Weekly             Stop These Medications      aspirin 81 MG chewable tablet     cyclobenzaprine 10 MG tablet  Commonly known as: FLEXERIL     fluticasone 50 MCG/ACT nasal spray  Commonly known as: Flonase     loratadine 10 MG tablet  Commonly known as: CLARITIN     multivitamin with minerals tablet tablet     rizatriptan 10 MG tablet  Commonly known as: MAXALT              Allergies   Allergen Reactions    Aspirin Nausea Only    Lisinopril Unknown - Low Severity     Elevated creatinine           Discharge Disposition:  Home or Self Care      Discharge Diet:  Diet Order   Procedures    Diet: Diabetic Diets; Consistent Carbohydrate; Texture: Regular Texture (IDDSI 7); Fluid Consistency: Thin (IDDSI 0)       Discharge Activity:   Activity Instructions       Activity as Tolerated              CODE STATUS:    Code Status and Medical Interventions:   Ordered at: 09/19/23 0652     Code Status (Patient has no pulse and is not breathing):    CPR (Attempt to Resuscitate)     Medical Interventions (Patient has pulse or is breathing):    Full Support       Future Appointments   Date  Time Provider Department Center   9/26/2023  2:00 PM Jose Webster MD [facility] NS KEKE KEKE   10/5/2023  2:30 PM LAB CHAIR 6 [facility]  LAB KRES [place]   10/5/2023  3:00 PM CHAIR 10 CLAUDIA Flores MD BH INFUS KRE LAG   10/25/2023  9:45 AM Brigitte Harris DNP, APRN [facility] SLP EKKE None   12/11/2023 11:00 AM Jessica Lopez APRN [facility] N ESPT KEKE   12/11/2023 12:30 PM LAB CHAIR 6 [facility]  LAB KRES [place]   12/11/2023  1:00 PM Drew Neal MD [facility] CBC [facility]RES [place]   12/11/2023  1:30 PM CHAIR 13 [facility] INFUS KRE LAG     Additional Instructions for the Follow-ups that You Need to Schedule       Discharge Follow-up with PCP   As directed       Currently Documented PCP:    Liana Hood MD    PCP Phone Number:    [phone]     Follow Up Details: 1 to 2 weeks (or sooner if problems)               Follow-up Information       Liana Hood MD .    Specialties: Internal Medicine & Pediatrics, Pediatrics  Why: 1 to 2 weeks (or sooner if problems)  Contact information:  [address] [address] [phone]                             Additional Instructions for the Follow-ups that You Need to Schedule       Discharge Follow-up with PCP   As directed       Currently Documented PCP:    Liana Hood MD    PCP Phone Number:    [phone]     Follow Up Details: 1 to 2 weeks (or sooner if problems)            Time Spent on Discharge:  Greater than 30 minutes      John Eng MD  [facility]  09/20/23  14:29 EDT              Electronically signed by John Eng MD at 09/20/23 9676

## 2023-09-22 ENCOUNTER — HOME CARE VISIT (OUTPATIENT)
Dept: HOME HEALTH SERVICES | Facility: HOME HEALTHCARE | Age: 63
End: 2023-09-22
Payer: COMMERCIAL

## 2023-09-22 PROCEDURE — G0151 HHCP-SERV OF PT,EA 15 MIN: HCPCS

## 2023-09-24 VITALS
HEART RATE: 84 BPM | SYSTOLIC BLOOD PRESSURE: 132 MMHG | DIASTOLIC BLOOD PRESSURE: 84 MMHG | TEMPERATURE: 97.4 F | RESPIRATION RATE: 18 BRPM | OXYGEN SATURATION: 97 %

## 2023-09-24 NOTE — HOME HEALTH
Patient recently discharged from [facility] for headache. Patient was previously seen by PT for gait and balance training. Patient ambulated outside on uneven terrain of grass with good gait safety and steppage. Patient currently not appropriate for skilled physical therapy in home setting.

## 2023-09-25 ENCOUNTER — OFFICE VISIT (OUTPATIENT)
Dept: INTERNAL MEDICINE | Facility: CLINIC | Age: 63
End: 2023-09-25
Payer: COMMERCIAL

## 2023-09-25 VITALS
DIASTOLIC BLOOD PRESSURE: 84 MMHG | BODY MASS INDEX: 31.08 KG/M2 | WEIGHT: 198 LBS | TEMPERATURE: 98 F | OXYGEN SATURATION: 95 % | HEART RATE: 86 BPM | SYSTOLIC BLOOD PRESSURE: 130 MMHG | HEIGHT: 67 IN

## 2023-09-25 DIAGNOSIS — R80.9 TYPE 2 DIABETES MELLITUS WITH PROTEINURIA: ICD-10-CM

## 2023-09-25 DIAGNOSIS — D75.1 POLYCYTHEMIA: ICD-10-CM

## 2023-09-25 DIAGNOSIS — R79.89 LOW VITAMIN B12 LEVEL: ICD-10-CM

## 2023-09-25 DIAGNOSIS — Z09 HOSPITAL DISCHARGE FOLLOW-UP: Primary | ICD-10-CM

## 2023-09-25 DIAGNOSIS — G43.901 MIGRAINE WITH STATUS MIGRAINOSUS, NOT INTRACTABLE, UNSPECIFIED MIGRAINE TYPE: ICD-10-CM

## 2023-09-25 DIAGNOSIS — Z83.49 FAMILY HISTORY OF THYROID DISEASE: ICD-10-CM

## 2023-09-25 DIAGNOSIS — E11.29 TYPE 2 DIABETES MELLITUS WITH PROTEINURIA: ICD-10-CM

## 2023-09-25 RX ORDER — CYANOCOBALAMIN 1000 UG/ML
1000 INJECTION, SOLUTION INTRAMUSCULAR; SUBCUTANEOUS ONCE
Status: COMPLETED | OUTPATIENT
Start: 2023-09-25 | End: 2023-09-25

## 2023-09-25 RX ORDER — GLIPIZIDE 10 MG/1
10 TABLET, FILM COATED, EXTENDED RELEASE ORAL 2 TIMES DAILY
Qty: 60 TABLET | Refills: 1 | Status: SHIPPED | OUTPATIENT
Start: 2023-09-25

## 2023-09-25 RX ADMIN — CYANOCOBALAMIN 1000 MCG: 1000 INJECTION, SOLUTION INTRAMUSCULAR; SUBCUTANEOUS at 11:14

## 2023-09-25 NOTE — PROGRESS NOTES
Transitional Care Follow Up Visit  Subjective     Pablo YUSEF Mendoza is a 62 y.o. male who presents for a transitional care management visit.    Within 48 business hours after discharge our office contacted him via telephone to coordinate his care and needs.      I reviewed and discussed the details of that call along with the discharge summary, hospital problems, inpatient lab results, inpatient diagnostic studies, and consultation reports with Pablo.     Current outpatient and discharge medications have been reconciled for the patient.  Reviewed by: Liana Hood MD          9/20/2023     9:06 PM   Date of TCM Phone Call   [facility]   Date of Admission 9/19/2023   Date of Discharge 9/20/2023   Discharge Disposition Home or Self Care     Risk for Readmission (LACE) Score: 7 (9/20/2023  6:00 AM)      Fatigue  Associated symptoms include fatigue and headaches.   Headache   Course During Hospital Stay:  Pt here for hosp f/u.  He had a bad headache and took Maxalt.  Nothing was helping the pain.  He called EMS and went to the hospital.   He says when he got meds at the hospital he felt like he had electrical currents going down his head.  His hands were shaking and he was stuttering more.  Neurology saw him again.  He also had a video EEG done that did not show epileptic seizures. Neurology thought pt was having nonepileptic seizures.  B12 levels was low normal.  No MMA level.   Pt is still on keppra and topamax added.      Pt has strong family history of thyroid problems and migraines.  Thyroid labs last November were normal  also strong fam hx TIA/stroke.    T2DM - A1c last week 7.3  pt on janumet, jardiance, and glipizide     The following portions of the patient's history were reviewed and updated as appropriate: allergies, current medications, past family history, past medical history, past social history, past surgical history, and problem list.    Review of Systems   Constitutional:   Positive for fatigue.   Eyes: Negative.    Respiratory: Negative.     Cardiovascular: Negative.    Gastrointestinal: Negative.    Endocrine: Negative.    Genitourinary: Negative.    Musculoskeletal: Negative.    Skin: Negative.    Allergic/Immunologic: Negative.    Neurological:  Positive for headaches.   Hematological: Negative.    Psychiatric/Behavioral: Negative.     All other systems reviewed and are negative.    Objective     Wt Readings from Last 3 Encounters:   09/25/23 89.8 kg (198 lb)   09/19/23 92 kg (202 lb 12.8 oz)   09/11/23 88.5 kg (195 lb 3.2 oz)     Temp Readings from Last 3 Encounters:   09/25/23 98 °F (36.7 °C) (Infrared)   09/22/23 97.4 °F (36.3 °C) (Temporal)   09/21/23 97.8 °F (36.6 °C) (Oral)     BP Readings from Last 3 Encounters:   09/25/23 130/84   09/22/23 132/84   09/21/23 114/74     Pulse Readings from Last 3 Encounters:   09/25/23 86   09/22/23 84   09/21/23 84       Physical Exam  Vitals and nursing note reviewed.   Constitutional:       General: He is not in acute distress.     Appearance: He is well-developed.   HENT:      Head: Normocephalic and atraumatic.      Right Ear: External ear normal.      Left Ear: External ear normal.      Nose: Nose normal.   Eyes:      Conjunctiva/sclera: Conjunctivae normal.      Pupils: Pupils are equal, round, and reactive to light.   Cardiovascular:      Rate and Rhythm: Normal rate and regular rhythm.      Heart sounds: Normal heart sounds.   Pulmonary:      Effort: Pulmonary effort is normal. No respiratory distress.      Breath sounds: Normal breath sounds. No wheezing.   Musculoskeletal:         General: Normal range of motion.      Cervical back: Normal range of motion and neck supple.      Comments: Normal gait   Skin:     General: Skin is warm and dry.   Neurological:      Mental Status: He is alert and oriented to person, place, and time.   Psychiatric:         Behavior: Behavior normal.         Thought Content: Thought content normal.          Judgment: Judgment normal.       Assessment & Plan   Diagnoses and all orders for this visit:    1. Hospital discharge follow-up (Primary)    2. Low vitamin B12 level  -     cyanocobalamin injection 1,000 mcg  -     T4, Free  -     TSH  -     Methylmalonic Acid, Serum  -     Iron Profile  -     Ferritin    3. Migraine with status migrainosus, not intractable, unspecified migraine type  -     Rimegepant Sulfate (NURTEC) 75 MG tablet dispersible tablet; Take 1 tablet by mouth Daily As Needed (migraine head).  Dispense: 4 tablet; Refill: 0    4. Type 2 diabetes mellitus with proteinuria  -     glipizide (GLUCOTROL XL) 10 MG 24 hr tablet; Take 1 tablet by mouth 2 (Two) Times a Day.  Dispense: 60 tablet; Refill: 1    5. Polycythemia  -     T4, Free  -     TSH  -     Methylmalonic Acid, Serum  -     Iron Profile  -     Ferritin    6. Family history of thyroid disease  -     US Thyroid; Future

## 2023-09-26 ENCOUNTER — HOME CARE VISIT (OUTPATIENT)
Dept: HOME HEALTH SERVICES | Facility: HOME HEALTHCARE | Age: 63
End: 2023-09-26
Payer: COMMERCIAL

## 2023-09-26 ENCOUNTER — OFFICE VISIT (OUTPATIENT)
Dept: NEUROSURGERY | Facility: CLINIC | Age: 63
End: 2023-09-26
Payer: COMMERCIAL

## 2023-09-26 VITALS
OXYGEN SATURATION: 98 % | HEIGHT: 67 IN | SYSTOLIC BLOOD PRESSURE: 110 MMHG | BODY MASS INDEX: 31.08 KG/M2 | WEIGHT: 198 LBS | DIASTOLIC BLOOD PRESSURE: 80 MMHG | TEMPERATURE: 97.1 F | HEART RATE: 83 BPM

## 2023-09-26 DIAGNOSIS — M47.812 CERVICAL SPONDYLOSIS WITHOUT MYELOPATHY: Primary | ICD-10-CM

## 2023-09-26 PROCEDURE — 99213 OFFICE O/P EST LOW 20 MIN: CPT | Performed by: NEUROLOGICAL SURGERY

## 2023-09-26 NOTE — PROGRESS NOTES
"Subjective   Patient ID: Pablo Mendoza is a 62 y.o. male is here today for hospital follow-up for cervical canal stenosis.    Today patient states that he has tremors in B/L hands, stuttering, along with HA's, and disorientation    Neck Pain   Associated symptoms include headaches. Pertinent negatives include no fever.     This patient returns today.  He continues with some tremors in his hands but more difficulty with headaches and disorientation.  He has been stuttering and clearly has some brain dysfunction that is different than what he had in the past.    The following portions of the patient's history were reviewed and updated as appropriate: allergies, current medications, past family history, past medical history, past social history, past surgical history, and problem list.    Review of Systems   Constitutional:  Negative for chills and fever.   HENT:  Negative for congestion.    Musculoskeletal:  Positive for neck pain.   Neurological:  Positive for tremors and headaches.     I reviewed the review of systems listed by the patient and discussed by my MA    Objective     Vitals:    09/26/23 1416   BP: 110/80   Pulse: 83   Temp: 97.1 °F (36.2 °C)   SpO2: 98%   Weight: 89.8 kg (198 lb)   Height: 170.2 cm (67.01\")     Body mass index is 31 kg/m².    Tobacco Use: Low Risk     Smoking Tobacco Use: Never    Smokeless Tobacco Use: Never    Passive Exposure: Never          Physical Exam  Neurological:      Mental Status: He is alert and oriented to person, place, and time.     Neurologic Exam     Mental Status   Oriented to person, place, and time.         Assessment & Plan   Independent Review of Radiographic Studies:      I personally reviewed the images from the following studies.    I reviewed his MRI which was done on 14 August.  This shows a widely patent canal and neuroforamina at C2-3 and C3-4.  C4-5 shows some central disc bulging with a little flattening of the cord but not severe.  C5-6 shows a " right-sided disc herniation that is causing some cord compression but not severe there is no change in the cord signal.  C6-7 shows some narrowing more to the left side with a fairly large spondylitic spur.  C7-T1 looks okay.    Medical Decision Making:      I told the patient and his family I do not see anything here I would do anything with right now.  I think his best bet is to follow-up with neurology which she is planning to do.  There is nothing dangerous and nothing severe in the cervical spine and I would leave that alone until the rest of this is completely worked out.  I suspect that his symptoms are not even severe enough to consider anything there anyway.  He will call if that happens.    Diagnoses and all orders for this visit:    1. Cervical spondylosis without myelopathy (Primary)      Return if symptoms worsen or fail to improve.

## 2023-09-27 ENCOUNTER — HOME CARE VISIT (OUTPATIENT)
Dept: HOME HEALTH SERVICES | Facility: HOME HEALTHCARE | Age: 63
End: 2023-09-27
Payer: COMMERCIAL

## 2023-09-27 VITALS
OXYGEN SATURATION: 98 % | SYSTOLIC BLOOD PRESSURE: 130 MMHG | DIASTOLIC BLOOD PRESSURE: 80 MMHG | TEMPERATURE: 96 F | HEART RATE: 77 BPM

## 2023-09-27 LAB
FERRITIN SERPL-MCNC: 21.1 NG/ML (ref 30–400)
IRON SATN MFR SERPL: 10 % (ref 20–50)
IRON SERPL-MCNC: 49 MCG/DL (ref 59–158)
METHYLMALONATE SERPL-SCNC: 254 NMOL/L (ref 0–378)
T4 FREE SERPL-MCNC: 0.91 NG/DL (ref 0.93–1.7)
TIBC SERPL-MCNC: 508 MCG/DL
TSH SERPL DL<=0.005 MIU/L-ACNC: 4.07 UIU/ML (ref 0.27–4.2)
UIBC SERPL-MCNC: 459 MCG/DL (ref 112–346)

## 2023-09-27 PROCEDURE — G0299 HHS/HOSPICE OF RN EA 15 MIN: HCPCS

## 2023-09-27 RX ORDER — DOXYCYCLINE HYCLATE 50 MG/1
324 CAPSULE, GELATIN COATED ORAL
Qty: 90 TABLET | Refills: 1 | Status: SHIPPED | OUTPATIENT
Start: 2023-09-27

## 2023-09-27 NOTE — HOME HEALTH
Routine Visit Note: Pt is has started to become aware of the reality of his medical condition. He was tearful when describing the symptoms he has been experiencing. He is determined to get through this hard time and understands that it is a long process with some set backs. Pt is still experiencing anxiety, tremors, fatigue, forgetfulness, and headaches. Topamax is helping with the headaches. He is having a hard time with the application process for SSD and obtaining funds while unable to work. Recommended for our  to come help him but he refused and said his sisters are helping.    Skill/education provided: Neurovascular assessment/education, med rec    Patient/caregiver response: Tolerated well and verbalized understanding    Plan for next visit: Neurovascular assessment, follow up on SSD

## 2023-09-29 ENCOUNTER — HOME CARE VISIT (OUTPATIENT)
Dept: HOME HEALTH SERVICES | Facility: HOME HEALTHCARE | Age: 63
End: 2023-09-29
Payer: COMMERCIAL

## 2023-10-02 ENCOUNTER — HOME CARE VISIT (OUTPATIENT)
Dept: HOME HEALTH SERVICES | Facility: HOME HEALTHCARE | Age: 63
End: 2023-10-02
Payer: COMMERCIAL

## 2023-10-02 DIAGNOSIS — F41.9 ANXIETY: ICD-10-CM

## 2023-10-04 ENCOUNTER — HOME CARE VISIT (OUTPATIENT)
Dept: HOME HEALTH SERVICES | Facility: HOME HEALTHCARE | Age: 63
End: 2023-10-04
Payer: COMMERCIAL

## 2023-10-04 DIAGNOSIS — I10 ESSENTIAL HYPERTENSION: ICD-10-CM

## 2023-10-04 PROCEDURE — G0299 HHS/HOSPICE OF RN EA 15 MIN: HCPCS

## 2023-10-04 RX ORDER — HYDROCHLOROTHIAZIDE 25 MG/1
TABLET ORAL
Qty: 30 TABLET | Refills: 2 | Status: SHIPPED | OUTPATIENT
Start: 2023-10-04

## 2023-10-04 NOTE — TELEPHONE ENCOUNTER
Rx Refill Note  Requested Prescriptions     Pending Prescriptions Disp Refills    hydroCHLOROthiazide (HYDRODIURIL) 25 MG tablet [Pharmacy Med Name: hydroCHLOROthiazide 25 MG TABLET] 30 tablet 2     Sig: TAKE 1 TABLET BY MOUTH DAILY      Last office visit with prescribing clinician: 9/25/2023   Last telemedicine visit with prescribing clinician: Visit date not found   Next office visit with prescribing clinician: Visit date not found                         Would you like a call back once the refill request has been completed: [] Yes [] No    If the office needs to give you a call back, can they leave a voicemail: [] Yes [] No    Yesica Leach MA  10/04/23, 07:46 EDT

## 2023-10-05 ENCOUNTER — HOME CARE VISIT (OUTPATIENT)
Dept: HOME HEALTH SERVICES | Facility: HOME HEALTHCARE | Age: 63
End: 2023-10-05
Payer: COMMERCIAL

## 2023-10-05 VITALS
OXYGEN SATURATION: 96 % | RESPIRATION RATE: 18 BRPM | TEMPERATURE: 96.5 F | SYSTOLIC BLOOD PRESSURE: 130 MMHG | DIASTOLIC BLOOD PRESSURE: 70 MMHG | HEART RATE: 84 BPM

## 2023-10-05 DIAGNOSIS — I10 ESSENTIAL HYPERTENSION: ICD-10-CM

## 2023-10-05 DIAGNOSIS — M1A.09X0 CHRONIC GOUT OF MULTIPLE SITES, UNSPECIFIED CAUSE: ICD-10-CM

## 2023-10-05 RX ORDER — METOPROLOL TARTRATE 100 MG/1
TABLET ORAL
Qty: 60 TABLET | Refills: 1 | Status: SHIPPED | OUTPATIENT
Start: 2023-10-05

## 2023-10-05 RX ORDER — ALLOPURINOL 300 MG/1
TABLET ORAL
Qty: 60 TABLET | Refills: 1 | Status: SHIPPED | OUTPATIENT
Start: 2023-10-05

## 2023-10-05 RX ORDER — AMLODIPINE BESYLATE 10 MG/1
TABLET ORAL
Qty: 30 TABLET | Refills: 1 | Status: SHIPPED | OUTPATIENT
Start: 2023-10-05

## 2023-10-05 NOTE — HOME HEALTH
Routine Visit Note: Mr. Mendoza appears to have more energy and is less stressed. He is coming to terms with his health status which is a major improvement.     Skill/education provided: Neurovascular assessment, med instruction    Patient/caregiver response: Tolerated well and verbalized understanding    Plan for next visit: Med assessment/instruction
38 y/o female presents for preop evaluation for laparoscopic bilateral salpingectomy on 18. Pt , Miscarriage x 1 , TOP x 4.  x 2 ( & ).

## 2023-10-06 ENCOUNTER — HOSPITAL ENCOUNTER (OUTPATIENT)
Dept: ULTRASOUND IMAGING | Facility: HOSPITAL | Age: 63
Discharge: HOME | End: 2023-10-06
Admitting: INTERNAL MEDICINE
Payer: COMMERCIAL

## 2023-10-06 ENCOUNTER — LAB (OUTPATIENT)
Dept: LAB | Facility: HOSPITAL | Age: 63
End: 2023-10-06
Payer: COMMERCIAL

## 2023-10-06 ENCOUNTER — INFUSION (OUTPATIENT)
Dept: ONCOLOGY | Facility: HOSPITAL | Age: 63
End: 2023-10-06
Payer: COMMERCIAL

## 2023-10-06 VITALS
TEMPERATURE: 98 F | WEIGHT: 195 LBS | HEART RATE: 87 BPM | OXYGEN SATURATION: 94 % | BODY MASS INDEX: 30.53 KG/M2 | DIASTOLIC BLOOD PRESSURE: 86 MMHG | RESPIRATION RATE: 16 BRPM | SYSTOLIC BLOOD PRESSURE: 136 MMHG

## 2023-10-06 DIAGNOSIS — Z83.49 FAMILY HISTORY OF THYROID DISEASE: ICD-10-CM

## 2023-10-06 DIAGNOSIS — D75.1 POLYCYTHEMIA: ICD-10-CM

## 2023-10-06 LAB
BASOPHILS # BLD AUTO: 0.12 10*3/MM3 (ref 0–0.2)
BASOPHILS NFR BLD AUTO: 0.9 % (ref 0–1.5)
DEPRECATED RDW RBC AUTO: 43.4 FL (ref 37–54)
EOSINOPHIL # BLD AUTO: 0.16 10*3/MM3 (ref 0–0.4)
EOSINOPHIL NFR BLD AUTO: 1.3 % (ref 0.3–6.2)
ERYTHROCYTE [DISTWIDTH] IN BLOOD BY AUTOMATED COUNT: 17.9 % (ref 12.3–15.4)
HCT VFR BLD AUTO: 52.1 % (ref 37.5–51)
HGB BLD-MCNC: 17.2 G/DL (ref 13–17.7)
IMM GRANULOCYTES # BLD AUTO: 1 10*3/MM3 (ref 0–0.05)
IMM GRANULOCYTES NFR BLD AUTO: 7.8 % (ref 0–0.5)
LYMPHOCYTES # BLD AUTO: 2.15 10*3/MM3 (ref 0.7–3.1)
LYMPHOCYTES NFR BLD AUTO: 16.8 % (ref 19.6–45.3)
MCH RBC QN AUTO: 25.6 PG (ref 26.6–33)
MCHC RBC AUTO-ENTMCNC: 33 G/DL (ref 31.5–35.7)
MCV RBC AUTO: 77.5 FL (ref 79–97)
MONOCYTES # BLD AUTO: 0.66 10*3/MM3 (ref 0.1–0.9)
MONOCYTES NFR BLD AUTO: 5.2 % (ref 5–12)
NEUTROPHILS NFR BLD AUTO: 68 % (ref 42.7–76)
NEUTROPHILS NFR BLD AUTO: 8.67 10*3/MM3 (ref 1.7–7)
NRBC BLD AUTO-RTO: 0.3 /100 WBC (ref 0–0.2)
PLATELET # BLD AUTO: 290 10*3/MM3 (ref 140–450)
PMV BLD AUTO: 11.1 FL (ref 6–12)
RBC # BLD AUTO: 6.72 10*6/MM3 (ref 4.14–5.8)
WBC NRBC COR # BLD: 12.76 10*3/MM3 (ref 3.4–10.8)

## 2023-10-06 PROCEDURE — 76536 US EXAM OF HEAD AND NECK: CPT

## 2023-10-06 PROCEDURE — 36415 COLL VENOUS BLD VENIPUNCTURE: CPT

## 2023-10-06 PROCEDURE — G0463 HOSPITAL OUTPT CLINIC VISIT: HCPCS

## 2023-10-06 PROCEDURE — 85025 COMPLETE CBC W/AUTO DIFF WBC: CPT

## 2023-10-06 NOTE — NURSING NOTE
Pt presents for phlebo today for HCT > 50. While pt meets treatment parameters, this nurse noted his ferritin level 2 weeks ago w/ his PCP was low and patient did not get the message to start po iron. He has also been having significant neurological symptoms including tremors, spasm, stutter and balance issues which he states are exacerbated by stress.    R/w Dr. Johnson (MD#2) and we will not do the phlebo today. Pt to f/u as scheduled in 2 months at which time he will see Dr. Neal and he will evaluate the need and appropriateness for phlebo.     Copy of labs given w/ written instruction to start oral iron as noted by pt's PCP on 9/25 labs. Appts also reviewed and pt v/u.    Lab Results   Component Value Date    WBC 12.76 (H) 10/06/2023    HGB 17.2 10/06/2023    HCT 52.1 (H) 10/06/2023    MCV 77.5 (L) 10/06/2023     10/06/2023

## 2023-10-11 ENCOUNTER — TELEPHONE (OUTPATIENT)
Dept: NEUROLOGY | Facility: CLINIC | Age: 63
End: 2023-10-11
Payer: COMMERCIAL

## 2023-10-11 ENCOUNTER — HOME CARE VISIT (OUTPATIENT)
Dept: HOME HEALTH SERVICES | Facility: HOME HEALTHCARE | Age: 63
End: 2023-10-11
Payer: COMMERCIAL

## 2023-10-11 VITALS
RESPIRATION RATE: 18 BRPM | OXYGEN SATURATION: 97 % | HEART RATE: 87 BPM | TEMPERATURE: 97.2 F | DIASTOLIC BLOOD PRESSURE: 78 MMHG | SYSTOLIC BLOOD PRESSURE: 142 MMHG

## 2023-10-11 PROCEDURE — G0299 HHS/HOSPICE OF RN EA 15 MIN: HCPCS

## 2023-10-11 NOTE — TELEPHONE ENCOUNTER
Patients [place] insurance termed on 9/26/23, patient only has coverage through [employer]. Prior authorization has been started through [employer].

## 2023-10-11 NOTE — PROGRESS NOTES
Call pt about thyroid us - pt has thyroid nodule.  Radiology rec biopsy vs repeat u/s in 1 year.  Please set up video visit to discuss options.

## 2023-10-11 NOTE — TELEPHONE ENCOUNTER
WALLACE LESLIE NEEDED     Caller: [place]     Relationship: [employer] / NEURO DX MONITOR     Best call back number: [phone]    What was the call regarding: CALLING TO LET WALLACE  KNOW PT HAS BEEN SCHED FOR OCT 20 23 AND WILL NEED P.A FOR TESTING       PLEASE ADVISE

## 2023-10-11 NOTE — HOME HEALTH
Routine Visit Note: Pt has EEG study on 10/20 lasting until 10/23. Follow up on with sleep study doc on 10/25. Meets with neurologist on 12/11. Pt still reports feeling drained emotionally and physicially. Notes improvement, but slow.    Skill/education provided: Neurovascular assessment, pain assessment, med instruction    Patient/caregiver response: Tolerated well and verbalized understanding    Plan for next visit: Plan to recert

## 2023-10-12 ENCOUNTER — TELEPHONE (OUTPATIENT)
Dept: ONCOLOGY | Facility: CLINIC | Age: 63
End: 2023-10-12
Payer: COMMERCIAL

## 2023-10-12 DIAGNOSIS — D75.1 POLYCYTHEMIA: Primary | ICD-10-CM

## 2023-10-12 NOTE — TELEPHONE ENCOUNTER
Reviewed Dr. Neal's note and instructions with the pt, he v/u. A message has been sent to scheduling.

## 2023-10-12 NOTE — TELEPHONE ENCOUNTER
----- Message from Drew Neal MD sent at 10/10/2023 10:04 AM EDT -----  His hematocrit is now elevated so I recommend stopping the oral iron.   I recommend a CBC and possible phlebotomy in 2 weeks.    Thank you    ----- Message -----  From: Aminta Guerra RN  Sent: 10/10/2023   9:43 AM EDT  To: Drew Neal MD    Please advise    ----- Message -----  From: Ana Kimball RN  Sent: 10/6/2023   4:17 PM EDT  To: Aminta Guerra RN    This gentleman was in today for a HCT based phlebo today. He qualified but also had labs done by his PCP 2 weeks ago and ferritin was low. He's also been in and out of the hospital w/ neurological issues they're trying to find a cause for and stress makes these worse. I reviewed w/ Dr. Johnson (MD#2) who said to hold phlebo for now.    The patient returns in 2 months and sees Dr. Neal at that time. I wasn't sure if he'd want to check the patient's ferritin again at that time to reassess before pursuing phlebo that day. Pt was instructed to start oral iron by his PCP so hopefully things will improve and he'll get some answers for the neuro stuff by then. Can you review w/ Dr. Neal, making him aware and see if additional labs will be needed when he comes back in December?     Thanks!

## 2023-10-18 ENCOUNTER — HOME CARE VISIT (OUTPATIENT)
Dept: HOME HEALTH SERVICES | Facility: HOME HEALTHCARE | Age: 63
End: 2023-10-18
Payer: COMMERCIAL

## 2023-10-18 PROCEDURE — G0299 HHS/HOSPICE OF RN EA 15 MIN: HCPCS

## 2023-10-19 ENCOUNTER — TELEPHONE (OUTPATIENT)
Dept: INTERNAL MEDICINE | Facility: CLINIC | Age: 63
End: 2023-10-19
Payer: COMMERCIAL

## 2023-10-19 ENCOUNTER — TELEPHONE (OUTPATIENT)
Dept: NEUROLOGY | Facility: CLINIC | Age: 63
End: 2023-10-19
Payer: COMMERCIAL

## 2023-10-19 NOTE — TELEPHONE ENCOUNTER
Spoke with Srinivasa at [facility] prior authorization has been approved. Patient is scheduled for his EMU stay on 10/20/23. Authorization # [id number]  Dates : 10/11/23 - 10/31/23

## 2023-10-19 NOTE — TELEPHONE ENCOUNTER
HOME HEALTH CALLED BECAUSE PATIENT CERTIFICATION PERIOD IS UP TODAY AND IS NEEDING NEW ORDERS. PLEASE GIVE HIM A CALL BACK.

## 2023-10-20 VITALS
SYSTOLIC BLOOD PRESSURE: 124 MMHG | TEMPERATURE: 97.2 F | RESPIRATION RATE: 18 BRPM | OXYGEN SATURATION: 96 % | HEART RATE: 87 BPM | DIASTOLIC BLOOD PRESSURE: 72 MMHG

## 2023-10-22 NOTE — HOME HEALTH
60 Day Summary  Home Health need continues for: SN  Primary diagnoses: Epilepsy, unspecified, not intractable, without status epilepticus  Current level of functional ability: Needs assistance  Homebound status and living arrangements: Lives alone and is homebound  Goals accomplished and/or measurable progress toward unmet goals in past 60 days: Pt has not met goals but is making progress  Focus of care for next 60 days for each discipline ordered: Epilepsy, unspecified, not intractable, without status epilepticus  Skin integrity/wound status: Intact/no wounds  Estimated date when home care services will end: 12/14  Need for MSW referral? No  Plan for next visit: Follow up on appointments, med rec/instruction

## 2023-10-24 ENCOUNTER — INFUSION (OUTPATIENT)
Dept: ONCOLOGY | Facility: HOSPITAL | Age: 63
End: 2023-10-24
Payer: COMMERCIAL

## 2023-10-24 ENCOUNTER — LAB (OUTPATIENT)
Dept: LAB | Facility: HOSPITAL | Age: 63
End: 2023-10-24
Payer: COMMERCIAL

## 2023-10-24 VITALS — SYSTOLIC BLOOD PRESSURE: 113 MMHG | DIASTOLIC BLOOD PRESSURE: 72 MMHG | HEART RATE: 89 BPM

## 2023-10-24 DIAGNOSIS — D75.1 POLYCYTHEMIA: Primary | ICD-10-CM

## 2023-10-24 DIAGNOSIS — D75.1 POLYCYTHEMIA: ICD-10-CM

## 2023-10-24 LAB
BASOPHILS # BLD AUTO: 0.08 10*3/MM3 (ref 0–0.2)
BASOPHILS NFR BLD AUTO: 0.8 % (ref 0–1.5)
DEPRECATED RDW RBC AUTO: 43.5 FL (ref 37–54)
EOSINOPHIL # BLD AUTO: 0.12 10*3/MM3 (ref 0–0.4)
EOSINOPHIL NFR BLD AUTO: 1.3 % (ref 0.3–6.2)
ERYTHROCYTE [DISTWIDTH] IN BLOOD BY AUTOMATED COUNT: 18 % (ref 12.3–15.4)
HCT VFR BLD AUTO: 49.8 % (ref 37.5–51)
HGB BLD-MCNC: 16.3 G/DL (ref 13–17.7)
IMM GRANULOCYTES # BLD AUTO: 0.08 10*3/MM3 (ref 0–0.05)
IMM GRANULOCYTES NFR BLD AUTO: 0.8 % (ref 0–0.5)
LYMPHOCYTES # BLD AUTO: 1.59 10*3/MM3 (ref 0.7–3.1)
LYMPHOCYTES NFR BLD AUTO: 16.9 % (ref 19.6–45.3)
MCH RBC QN AUTO: 25.2 PG (ref 26.6–33)
MCHC RBC AUTO-ENTMCNC: 32.7 G/DL (ref 31.5–35.7)
MCV RBC AUTO: 77 FL (ref 79–97)
MONOCYTES # BLD AUTO: 0.58 10*3/MM3 (ref 0.1–0.9)
MONOCYTES NFR BLD AUTO: 6.2 % (ref 5–12)
NEUTROPHILS NFR BLD AUTO: 6.97 10*3/MM3 (ref 1.7–7)
NEUTROPHILS NFR BLD AUTO: 74 % (ref 42.7–76)
NRBC BLD AUTO-RTO: 0 /100 WBC (ref 0–0.2)
PLATELET # BLD AUTO: 156 10*3/MM3 (ref 140–450)
PMV BLD AUTO: 10.9 FL (ref 6–12)
RBC # BLD AUTO: 6.47 10*6/MM3 (ref 4.14–5.8)
WBC NRBC COR # BLD: 9.42 10*3/MM3 (ref 3.4–10.8)

## 2023-10-24 PROCEDURE — 85025 COMPLETE CBC W/AUTO DIFF WBC: CPT

## 2023-10-24 PROCEDURE — 99195 PHLEBOTOMY: CPT

## 2023-10-24 PROCEDURE — 36415 COLL VENOUS BLD VENIPUNCTURE: CPT

## 2023-10-24 RX ORDER — SODIUM CHLORIDE 9 MG/ML
250 INJECTION, SOLUTION INTRAVENOUS ONCE
OUTPATIENT
Start: 2023-10-31

## 2023-10-24 NOTE — NURSING NOTE
Lab Results   Component Value Date    WBC 9.42 10/24/2023    HGB 16.3 10/24/2023    HCT 49.8 10/24/2023    MCV 77.0 (L) 10/24/2023     10/24/2023   Patient here for phlebotomy for HCT > 50. He states that he has a pressure H/A and it is relieved with phlebo. Per Dr. Neal, phlebo done today. 250 cc NS given after phlebo.

## 2023-10-24 NOTE — PROGRESS NOTES
[facility]  [address]  [address]  [place] [address]  Phone   Fax       Pablo Mendoza  [id number]   1960  62 y.o.  male      Referring Provider: ANUSHA Dial  PCP: Liana Hood MD    Type of service: Initial Sleep Medicine Consult.  Date of service: 10/25/2023          CHIEF COMPLAINT: Snoring, insomnia, history of sleep apnea      HISTORY OF PRESENT ILLNESS:  Thank you for asking us to see Pablo Mendoza.  Pablo Mendoza 62 y.o. was seen today on 10/25/2023 at [facility].  Patient presents today with symptoms of snoring, frequent nighttime awakenings, The symptoms are chronic and  nonrestorative sleep.  He has a history of tonsillectomy around the age of 16 with adenoids removed as well.  Had nasal surgery around 2003.  He reports he was diagnosed with sleep apnea around 2000, tried CPAP for a time but had trouble tolerating it.  He believes he had a home sleep study around 2020 and was told he did not need a CPAP at that time.  He reports he has a history of stroke in 2007 and TIAs in 2015.  More recently he has been dealing with seizures and has been evaluated for epilepsy versus nonepileptic seizures.  He has worked as a  but has had to take an early medical halfway which has been a big life change for him, this occurred around July.  Sleep schedule has also changed during that time.  Has been having a lot of anxiety for which he follows with outside provider.  In the past he has taken hydroxyzine as needed which has helped with anxiety in his sleep.  Not currently seeing a counselor but did recommend discussing this further with PCP at upcoming follow-up.    Around 9-10pm he gets in bed, falls asleep around 1am after reading or may toss and turn for a while first to.  Wakes 3-4 times per night, uses restroom, may get a sip of orange juice, mind is racing which may make it harder to fall back  to sleep, can take 30 minutes to fall back to sleep, may be on his phone looking at social media news at this time.  Wakes up for the day around 9am.   May take 1-2 naps per day.        SLEEP HISTORY:  Sleep schedule:  Bedtime: 9-10pm gets in bed, falls asleep around 1am after readings   Wake time: 9 AM  Normally takes hours to fall asleep  Average hours of sleep: 4.5-5  Number of naps per day: 0-2    Symptoms:   In addition to the above, patient reports the following associated symptoms:  Have you ever awakened gasping for breath, coughing, choking: No   Change in weight:  No   Morning headaches:  Yes   Awaken with a sore throat or dry mouth:  No   Leg jerking at night:  Yes   Creepy crawly feeling in legs/urge to move legs: Yes   Teeth grinding: Yes   Have you ever awakened at night with a sour taste or burning sensation in your chest:  No   Do you have muscle weakness with laughing or anger:  Yes   Have you ever felt paralyzed while going to sleep or waking up:  Yes   Sleepwalking:   Nightmares: Yes   Nocturia (urination at night): 3-4 times per night  Memory Problem: Yes     Medical Conditions (PMH):   ADHD  Insomnia  Anxiety  Depression  Hypertension  Diabetes  Migraines  History of seizures  History of TIA/stroke    Social history:  Do you drive a commercial vehicle:  No   Shift work:  No   Tobacco use:  No   Alcohol use: Social  Caffeinated drinks: 2 per day  Occupation: Retired     Family History (parents and siblings) (pertaining to sleep medicine):  Sleep apnea  Thyroid disorder  Insomnia  Sleepwalking    Medications: reviewed    Allergies:  Aspirin and Lisinopril      REVIEW OF SYSTEMS:  Pertinent positive symptoms are:  Snoring  Witnessed apnea  [place] Sleepiness Scale of Total score: 16   Fatigue  Nasal congestion  Frequent urination  Anxiety  Depression  Dyspnea  Abdominal bloating          PHYSICAL EXAM:  CONSTITUTINONAL:   Vitals:    10/25/23 1030   BP: 122/81   Pulse: 62   SpO2: 96%  "  Weight: 88.5 kg (195 lb)   Height: 170.2 cm (67.01\")    Body mass index is 30.53 kg/m².   HEAD: atraumatic, normocephalic  NECK: Neck Circumference: 17 inches, trachea is midline  RESPIRATORY SYSTEM: Respirations even, unlabored, normal rate  CARDIOVASULAR SYSTEM: Normal rate, no edema  NEUROLOGICAL SYSTEM: Alert and oriented x 3, normal gait  PSYCHIATRIC SYSTEM: Mood is normal/ appropriate     Office note(s) from care team reviewed. Office note(s) reviewed: 9/11/2023 PCP note    Labs/ Test Results Reviewed:  TSH          11/11/2022    10:56 9/25/2023    11:24   TSH   TSH 2.010  4.070       Most Recent A1C          9/20/2023    06:20   HGBA1C Most Recent   Hemoglobin A1C 7.30               ASSESSMENT AND PLAN:   Witnessed apnea (R06.81): patient's symptoms and physical examination are concerning for possible sleep apnea (G47.30).   I discussed the signs, symptoms, and pathophysiology of sleep apnea with this patient.  I also discussed the potential complications of untreated sleep apnea including but not limited to resistant hypertension, insulin resistance, pulmonary hypertension, atrial fibrillation, stroke, nonrestorative sleep with hypersomnia which can increase risk for motor vehicle accidents, etc.   Different testing methods including home-based and lab based sleep studies were discussed with this patient.   Based on patient history and physical examination, will proceed with in lab polysomnogram with full EEG and video monitoring.  The order for the sleep study is placed in [facility].  The test will be scheduled after prior authorization has been obtained through patient's insurance.  Treatment and management will be discussed in more detail with this patient after the test is completed.  All questions were answered to patient's satisfaction.  He will bring his hydroxyzine with him and take if needed if feeling anxious night of in lab study.  He is going to double check with PCP that they are okay with this.  " He is also aware of importance of avoiding naps day of sleep study to aid in sleep efficiency night of study.  Snoring (R06.83): snoring is the sound created by turbulent airflow vibrating upper airway soft tissue.  I have also discussed factors affecting snoring including sleep deprivation, sleeping on the back and alcohol ingestion. To minimize snoring, patient is advised to have adequate sleep, sleep on their side, and avoid alcohol and sedative medications around bedtime.   Excessive daytime sleepiness: [place] Sleepiness Scale of Total score: 16.  There are many causes of excessive daytime sleepiness including but not limited to sleep apnea, shiftwork syndrome, depression, and other medical disorders such as heart disease, kidney disease, and liver failure.  The most serious cause of excessive sleepiness is due to neurological conditions such as narcolepsy/cataplexy.  More commonly excessive sleepiness is caused by sleep apnea with frequent awakenings during sleep time.  Patient was advised not to drive, operate heavy machinery, or do activities that require high concentration if feeling tired/drowsy.  Obesity: Body mass index is 30.53 kg/m².. Patients who are overweight or obese are at increased risk of sleep apnea/ sleep disordered breathing. Weight reduction and healthy lifestyle are encouraged in overweight/ obese patients as part of a comprehensive approach to sleep apnea treatment.    Inefficient sleep hygiene: Discussed good sleep hygiene in great detail. Try to maintain a regular bed time and wake time, avoid watching TV/ using electronic devices in bed (including cell phones), limit caffeinated and alcoholic beverages before bed, try to maintain a cool and quiet sleep environment, avoid daytime naps.  If waking up in the middle the night, recommended trying not to go on phone or using electronics.  We did discuss that if he is not able to fall asleep within 30 to 60 minutes getting up, going back out  to family room or living room, may be flipping through magazine until feeling sleepy, then getting back in bed.  Hypertension  Type 2 diabetes  Gout  Polycythemia  Lumbar radiculopathy  Cervical spine stenosis  Stroke in 2007, TIAs in 2015  Migraines  Seizures: Being evaluated to see if epileptic versus nonepileptic or possible conversion disorder  Anxiety: Following with PCP.  Did recommend possibly discussing counseling referral with PCP as well.      Patient will follow-up after study, 31 to 90 days after PAP therapy initiated if applicable, or contact the office sooner for questions or concerns. Patient's questions were answered.            Thank you again for asking me to consult on this patient.  Please do not hesitate to call me if you have additional questions or concerns.       Madie Harris DNP, APRN  [facility]

## 2023-10-25 ENCOUNTER — OFFICE VISIT (OUTPATIENT)
Dept: SLEEP MEDICINE | Facility: HOSPITAL | Age: 63
End: 2023-10-25
Payer: COMMERCIAL

## 2023-10-25 VITALS
BODY MASS INDEX: 30.61 KG/M2 | OXYGEN SATURATION: 96 % | DIASTOLIC BLOOD PRESSURE: 81 MMHG | SYSTOLIC BLOOD PRESSURE: 122 MMHG | HEART RATE: 62 BPM | HEIGHT: 67 IN | WEIGHT: 195 LBS

## 2023-10-25 DIAGNOSIS — G47.10 HYPERSOMNOLENCE: ICD-10-CM

## 2023-10-25 DIAGNOSIS — Z86.69 HISTORY OF OBSTRUCTIVE SLEEP APNEA: ICD-10-CM

## 2023-10-25 DIAGNOSIS — E66.9 CLASS 1 OBESITY WITH BODY MASS INDEX (BMI) OF 30.0 TO 30.9 IN ADULT, UNSPECIFIED OBESITY TYPE, UNSPECIFIED WHETHER SERIOUS COMORBIDITY PRESENT: ICD-10-CM

## 2023-10-25 DIAGNOSIS — G47.00 INSOMNIA, UNSPECIFIED TYPE: Primary | ICD-10-CM

## 2023-10-25 DIAGNOSIS — Z86.73 HISTORY OF STROKE: ICD-10-CM

## 2023-10-25 DIAGNOSIS — Z87.898 HISTORY OF SEIZURE: ICD-10-CM

## 2023-10-25 DIAGNOSIS — R06.83 SNORING: ICD-10-CM

## 2023-10-25 PROCEDURE — G0463 HOSPITAL OUTPT CLINIC VISIT: HCPCS

## 2023-10-29 DIAGNOSIS — F41.9 ANXIETY: ICD-10-CM

## 2023-10-30 ENCOUNTER — TELEMEDICINE (OUTPATIENT)
Dept: INTERNAL MEDICINE | Facility: CLINIC | Age: 63
End: 2023-10-30
Payer: COMMERCIAL

## 2023-10-30 VITALS — BODY MASS INDEX: 30.61 KG/M2 | WEIGHT: 195 LBS | HEIGHT: 67 IN

## 2023-10-30 DIAGNOSIS — G47.33 OSA (OBSTRUCTIVE SLEEP APNEA): ICD-10-CM

## 2023-10-30 DIAGNOSIS — G25.9 FUNCTIONAL MOVEMENT DISORDER: ICD-10-CM

## 2023-10-30 DIAGNOSIS — E04.1 THYROID NODULE: Primary | ICD-10-CM

## 2023-10-30 PROCEDURE — 99214 OFFICE O/P EST MOD 30 MIN: CPT | Performed by: INTERNAL MEDICINE

## 2023-10-30 PROCEDURE — 1159F MED LIST DOCD IN RCRD: CPT | Performed by: INTERNAL MEDICINE

## 2023-10-30 PROCEDURE — 3051F HG A1C>EQUAL 7.0%<8.0%: CPT | Performed by: INTERNAL MEDICINE

## 2023-10-30 PROCEDURE — 1160F RVW MEDS BY RX/DR IN RCRD: CPT | Performed by: INTERNAL MEDICINE

## 2023-10-30 NOTE — PROGRESS NOTES
Pablo Mendoza is a 62 y.o. male, who presents with a chief complaint of   Chief Complaint   Patient presents with    Results     Discuss Ultrasound results            HPI   Pt here for f/u    He had  video eeg done at [place].  He was diagnosed with a functional neurological disorder.  He is still on Keppra.     He had his sleep consult and is waiting on sleep study    Pt had a thyroid u/s done. He has a left thyroid nodule that is TI-RADS-4.  Nodule is borderline for biopsy and radiology reg 1 year f/u vs biopsy.  Pt has had this lesion for a long time and would like to proceed with biopsy. He would like answers.       The following portions of the patient's history were reviewed and updated as appropriate: allergies, current medications, past family history, past medical history, past social history, past surgical history and problem list.    Allergies: Aspirin and Lisinopril    Review of Systems   Constitutional: Negative.    HENT: Negative.     Eyes: Negative.    Respiratory: Negative.     Cardiovascular: Negative.    Gastrointestinal: Negative.    Endocrine: Negative.    Genitourinary: Negative.    Musculoskeletal: Negative.    Skin: Negative.    Allergic/Immunologic: Negative.    Neurological: Negative.    Hematological: Negative.    Psychiatric/Behavioral: Negative.     All other systems reviewed and are negative.            Wt Readings from Last 3 Encounters:   10/30/23 88.5 kg (195 lb)   10/25/23 88.5 kg (195 lb)   10/06/23 88.5 kg (195 lb)     Temp Readings from Last 3 Encounters:   10/18/23 97.2 °F (36.2 °C) (Oral)   10/11/23 97.2 °F (36.2 °C) (Oral)   10/06/23 98 °F (36.7 °C)     BP Readings from Last 3 Encounters:   10/25/23 122/81   10/24/23 113/72   10/18/23 124/72     Pulse Readings from Last 3 Encounters:   10/25/23 62   10/24/23 89   10/18/23 87     Body mass index is 30.53 kg/m².  SpO2 Readings from Last 3 Encounters:   10/25/23 96%   10/18/23 96%   10/11/23 97%          Physical  Exam  Vitals and nursing note reviewed.   Constitutional:       General: He is not in acute distress.     Appearance: He is well-developed.   HENT:      Head: Normocephalic and atraumatic.      Right Ear: External ear normal.      Left Ear: External ear normal.      Nose: Nose normal.   Eyes:      Conjunctiva/sclera: Conjunctivae normal.   Pulmonary:      Effort: Pulmonary effort is normal. No respiratory distress.   Musculoskeletal:      Cervical back: Normal range of motion and neck supple.   Skin:     Findings: No rash.   Neurological:      Mental Status: He is alert and oriented to person, place, and time.   Psychiatric:         Behavior: Behavior normal.         Thought Content: Thought content normal.         Judgment: Judgment normal.         Results for orders placed or performed in visit on 10/24/23   CBC Auto Differential    Specimen: Blood   Result Value Ref Range    WBC 9.42 3.40 - 10.80 10*3/mm3    RBC 6.47 (H) 4.14 - 5.80 10*6/mm3    Hemoglobin 16.3 13.0 - 17.7 g/dL    Hematocrit 49.8 37.5 - 51.0 %    MCV 77.0 (L) 79.0 - 97.0 fL    MCH 25.2 (L) 26.6 - 33.0 pg    MCHC 32.7 31.5 - 35.7 g/dL    RDW 18.0 (H) 12.3 - 15.4 %    RDW-SD 43.5 37.0 - 54.0 fl    MPV 10.9 6.0 - 12.0 fL    Platelets 156 140 - 450 10*3/mm3    Neutrophil % 74.0 42.7 - 76.0 %    Lymphocyte % 16.9 (L) 19.6 - 45.3 %    Monocyte % 6.2 5.0 - 12.0 %    Eosinophil % 1.3 0.3 - 6.2 %    Basophil % 0.8 0.0 - 1.5 %    Immature Grans % 0.8 (H) 0.0 - 0.5 %    Neutrophils, Absolute 6.97 1.70 - 7.00 10*3/mm3    Lymphocytes, Absolute 1.59 0.70 - 3.10 10*3/mm3    Monocytes, Absolute 0.58 0.10 - 0.90 10*3/mm3    Eosinophils, Absolute 0.12 0.00 - 0.40 10*3/mm3    Basophils, Absolute 0.08 0.00 - 0.20 10*3/mm3    Immature Grans, Absolute 0.08 (H) 0.00 - 0.05 10*3/mm3    nRBC 0.0 0.0 - 0.2 /100 WBC     Study Result    Narrative & Impression   Examination: Thyroid sonogram     TECHNIQUE: Sonographic images of the thyroid gland were obtained     HISTORY:  Thyroid disease     COMPARISON: None available     FINDINGS: The right thyroid lobe measures 4.7 x 1.6 x 1.8 cm, and the  left thyroid lobe measures 4.3 x 1.6 x 1.4 cm, with 4 mm isthmus  thickness. There is a solid appearing hypoechoic well marginated wider  than tall deep left thyroid nodule without microcalcification measuring  1.5 x 1.2 x 1 cm.     IMPRESSION:  Left thyroid nodule, TI-RADS-4. This nodule is borderline  for biopsy criteria. Consider 1 year follow-up versus biopsy as clinically indicated.     Result Review :                  Assessment and Plan    Diagnoses and all orders for this visit:    1. Thyroid nodule (Primary)  -     US Guided Thyroid Biopsy; Future  -     Tissue Pathology Exam; Standing    2. Functional movement disorder - reviewed video eeg admission results in epic with pt    3. TORSTEN (obstructive sleep apnea) - sleep study pending.                    Outpatient Medications Prior to Visit   Medication Sig Dispense Refill    acetaminophen (TYLENOL) 500 MG tablet Take 1 tablet by mouth Every 6 (Six) Hours As Needed for Mild Pain. Indications: Pain      allopurinol (ZYLOPRIM) 300 MG tablet TAKE 1 TABLET BY MOUTH TWICE A DAY 60 tablet 1    amLODIPine (NORVASC) 10 MG tablet TAKE 1 TABLET BY MOUTH DAILY 30 tablet 1    atorvastatin (Lipitor) 40 MG tablet Take 1 tablet by mouth Daily. Indications: High Amount of Fats in the Blood 90 tablet 1    glipizide (GLUCOTROL XL) 10 MG 24 hr tablet Take 1 tablet by mouth 2 (Two) Times a Day. 60 tablet 1    hydroCHLOROthiazide (HYDRODIURIL) 25 MG tablet TAKE 1 TABLET BY MOUTH DAILY 30 tablet 2    hydrOXYzine (ATARAX) 25 MG tablet Take 1 tablet by mouth Daily As Needed for Anxiety. 30 tablet 0    ibuprofen (ADVIL,MOTRIN) 800 MG tablet Take 1 tablet by mouth Every 8 (Eight) Hours As Needed for Mild Pain. 30 tablet 0    Janumet XR  MG tablet TAKE ONE TABLET BY MOUTH TWICE A DAY 60 tablet 3    Jardiance 25 MG tablet tablet TAKE ONE TABLET BY MOUTH DAILY  "30 tablet 1    levETIRAcetam (KEPPRA) 750 MG tablet Take 1 tablet by mouth 2 (Two) Times a Day for 180 days. Indications: Seizure 180 tablet 1    metoprolol tartrate (LOPRESSOR) 100 MG tablet TAKE 1 TABLET BY MOUTH TWICE A DAY 60 tablet 1    Rimegepant Sulfate (NURTEC) 75 MG tablet dispersible tablet Take 1 tablet by mouth Daily As Needed (migraine head). 4 tablet 0    sertraline (ZOLOFT) 50 MG tablet TAKE 1 TABLET BY MOUTH DAILY 30 tablet 0    sildenafil (Viagra) 50 MG tablet Take 1 tablet by mouth Daily As Needed for Erectile Dysfunction. 30 tablet 0    topiramate (TOPAMAX) 25 MG tablet Take 1 tablet by mouth Every Night. 30 tablet 0    vitamin D (ERGOCALCIFEROL) 1.25 MG (55931 UT) capsule capsule Take 1 capsule by mouth 1 (One) Time Per Week for 120 days. 4 capsule 3    cyanocobalamin 1000 MCG/ML injection Inject 1 ML IM Q w X4 weeks, then 1 ml IM qow x 4 months. Total of 12 injections. 12 mL 0    ferrous gluconate (FERGON) 324 MG tablet Take 1 tablet by mouth Daily With Breakfast. 90 tablet 1    Syringe/Needle, Disp, (BD Eclipse Syringe) 25G X 1\" 3 ML misc Use with B12 Solution As Directed 12 each 0     No facility-administered medications prior to visit.     No orders of the defined types were placed in this encounter.      Medications Discontinued During This Encounter   Medication Reason    ferrous gluconate (FERGON) 324 MG tablet *Therapy completed    Syringe/Needle, Disp, (BD Eclipse Syringe) 25G X 1\" 3 ML misc *Therapy completed    cyanocobalamin 1000 MCG/ML injection *Therapy completed         Return in about 3 months (around 1/30/2024) for Recheck.    Patient was given instructions and counseling regarding her condition or for health maintenance advice. Please see specific information pulled into the AVS if appropriate.  "

## 2023-10-31 ENCOUNTER — HOME CARE VISIT (OUTPATIENT)
Dept: HOME HEALTH SERVICES | Facility: HOME HEALTHCARE | Age: 63
End: 2023-10-31
Payer: COMMERCIAL

## 2023-10-31 PROCEDURE — G0299 HHS/HOSPICE OF RN EA 15 MIN: HCPCS

## 2023-11-01 VITALS — DIASTOLIC BLOOD PRESSURE: 82 MMHG | HEART RATE: 88 BPM | OXYGEN SATURATION: 96 % | SYSTOLIC BLOOD PRESSURE: 132 MMHG

## 2023-11-01 DIAGNOSIS — E55.9 VITAMIN D DEFICIENCY: ICD-10-CM

## 2023-11-01 RX ORDER — TOPIRAMATE 25 MG/1
25 TABLET ORAL NIGHTLY
Qty: 30 TABLET | Refills: 0 | Status: SHIPPED | OUTPATIENT
Start: 2023-11-01

## 2023-11-01 NOTE — TELEPHONE ENCOUNTER
Caller: Pablo Mendoza    Relationship: Self    Best call back number: [phone] (Mobile)     Requested Prescriptions:   Requested Prescriptions     Pending Prescriptions Disp Refills    topiramate (TOPAMAX) 25 MG tablet 30 tablet 0     Sig: Take 1 tablet by mouth Every Night. Indications: Migraine Headache        Pharmacy where request should be sent: [facility] [id number] [address] AT [facility] [phone] [facility] [phone]      Last office visit with prescribing clinician: 9/25/2023   Last telemedicine visit with prescribing clinician: 10/30/2023   Next office visit with prescribing clinician: Visit date not found     Additional details provided by patient: PATIENT STATES HE ORIGINALLY HAD THIS FILLED IN THE HOSPITAL AND IS NOW OUT OF MEDICATION AND IS BEING USED FOR SEIZURE ACTIVITY/TREMORS AND IS ASKING FOR DOCTOR YASMIN TO FILL THIS ASAP, PLEASE ADVISE PATIENT WHEN SENT TO PHARMACY ASAP    Does the patient have less than a 3 day supply:  [x] Yes  [] No    Would you like a call back once the refill request has been completed: [x] Yes [] No    If the office needs to give you a call back, can they leave a voicemail: [x] Yes [] No    King Gale Rep   11/01/23 09:07 EDT

## 2023-11-02 NOTE — HOME HEALTH
Routine Visit Note: Pt had video EEG last week. 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 He has a sleep study in november. He has come to terms with his inability to work due to medical condition    Skill/education provided: Neurovascular assessment, pschysocial assessment, pain assessment, med education    Patient/caregiver response: Tolerated well and verbalized understanding    Plan for next visit: Med education

## 2023-11-07 ENCOUNTER — TELEPHONE (OUTPATIENT)
Dept: INTERNAL MEDICINE | Facility: CLINIC | Age: 63
End: 2023-11-07

## 2023-11-07 DIAGNOSIS — R80.9 TYPE 2 DIABETES MELLITUS WITH PROTEINURIA: ICD-10-CM

## 2023-11-07 DIAGNOSIS — E11.29 TYPE 2 DIABETES MELLITUS WITH PROTEINURIA: ICD-10-CM

## 2023-11-07 DIAGNOSIS — E11.9 TYPE 2 DIABETES MELLITUS WITHOUT COMPLICATION, WITHOUT LONG-TERM CURRENT USE OF INSULIN: ICD-10-CM

## 2023-11-07 RX ORDER — EMPAGLIFLOZIN 25 MG/1
25 TABLET, FILM COATED ORAL DAILY
Qty: 30 TABLET | Refills: 1 | Status: SHIPPED | OUTPATIENT
Start: 2023-11-07

## 2023-11-07 NOTE — TELEPHONE ENCOUNTER
Caller: Pablo Mendoza    Relationship: Self    Best call back number: [phone]    What was the call regarding: PATIENT STATED PASSPORT INSURANCE HAS DROPPED HIM.    HE WOULD LIKE TO KNOW IF THE OFFICE CAN GET TOGETHER ANY SAMPLES OF MEDICATIONS HE CURRENTLY TAKES UNTIL HE CAN GET THIS RESOLVED.    PLEASE CALL.

## 2023-11-08 ENCOUNTER — HOME CARE VISIT (OUTPATIENT)
Dept: HOME HEALTH SERVICES | Facility: HOME HEALTHCARE | Age: 63
End: 2023-11-08
Payer: COMMERCIAL

## 2023-11-13 NOTE — TELEPHONE ENCOUNTER
I think probably the only thing we would be able to give him a sample of would be Jardiance, if we have any- I will check. Can you advise if you have any other suggestions what he can do about his other medications?

## 2023-11-15 ENCOUNTER — HOSPITAL ENCOUNTER (OUTPATIENT)
Dept: ULTRASOUND IMAGING | Facility: HOSPITAL | Age: 63
Discharge: HOME | End: 2023-11-15
Payer: COMMERCIAL

## 2023-11-15 ENCOUNTER — TELEPHONE (OUTPATIENT)
Dept: INTERNAL MEDICINE | Facility: CLINIC | Age: 63
End: 2023-11-15

## 2023-11-15 ENCOUNTER — HOME CARE VISIT (OUTPATIENT)
Dept: HOME HEALTH SERVICES | Facility: HOME HEALTHCARE | Age: 63
End: 2023-11-15
Payer: COMMERCIAL

## 2023-11-15 NOTE — TELEPHONE ENCOUNTER
Caller: Pablo Mendoza    Relationship: Self    Best call back number: [phone]    What was the call regarding: PATIENT STATED THAT THEY WERE INFORMED TO LET DR. HOFFMAN KNOW THAT HE HAD A SPELL YESTERDAY, 11/14/23, IN WHICH HE PASSED OUT WITH COFFEE AROUND 8 AM AND WOKE BACK UP AROUND 5 PM. STATED THAT THESE TIMES ARE THE ONLY TIMES HE REMEMBERS BUT THAT HE MAY HAVE GOTTEN UP SOONER AND WENT BACK DOWN. PLEASE CALL AND ADVISE ON FURTHER ACTION

## 2023-11-16 ENCOUNTER — TELEPHONE (OUTPATIENT)
Dept: INTERNAL MEDICINE | Facility: CLINIC | Age: 63
End: 2023-11-16
Payer: COMMERCIAL

## 2023-11-16 ENCOUNTER — HOME CARE VISIT (OUTPATIENT)
Dept: HOME HEALTH SERVICES | Facility: HOME HEALTHCARE | Age: 63
End: 2023-11-16
Payer: COMMERCIAL

## 2023-11-16 NOTE — TELEPHONE ENCOUNTER
Insurance is not covering him any more, wants to see if we have samples of Janumet and Jardiance and Keppra and Amlodipine and metolprol, really any medications on his med list. Also would like an order for a  to come and see patient. If someone could call David and let him know, asap He can take a verbal for the  order.

## 2023-11-17 VITALS
OXYGEN SATURATION: 98 % | DIASTOLIC BLOOD PRESSURE: 80 MMHG | RESPIRATION RATE: 18 BRPM | HEART RATE: 73 BPM | SYSTOLIC BLOOD PRESSURE: 134 MMHG | TEMPERATURE: 97.4 F

## 2023-11-17 NOTE — HOME HEALTH
"Per pt, \"[facility] cobra insurance would have been around $1,000 per month and I can not afford that.\" Pt states, \"Since I got my [employer] money they will not continue my ensurance\". Pt needs a  to assist with SSD and insurance coverage. Open enrollment for cobra closes monday 11/20.  Called and Instructed sister to call about rates by friday 11/17. This is a non-billable visit so pt will not be charged. He states, \"I to apply for passport but can't until the first of december. I have applied for disability but they will not know if it has been aproved until 12/20. Called Dr. Hood's office for med samples since he does not have income right now as well as an order for . Performed Neuro assessment, pain assessment, med rec, med education. Pt tolerated fairly well and verbalized understanding."

## 2023-11-20 NOTE — TELEPHONE ENCOUNTER
We can check on jardiance and janumet but the other meds are generic.  The cheapest place to get most generics is with [facility] or to [facility] at [facility]

## 2023-11-21 DIAGNOSIS — E11.9 TYPE 2 DIABETES MELLITUS WITHOUT COMPLICATION, WITHOUT LONG-TERM CURRENT USE OF INSULIN: ICD-10-CM

## 2023-11-21 DIAGNOSIS — E11.29 TYPE 2 DIABETES MELLITUS WITH PROTEINURIA: ICD-10-CM

## 2023-11-21 DIAGNOSIS — R80.9 TYPE 2 DIABETES MELLITUS WITH PROTEINURIA: ICD-10-CM

## 2023-11-21 NOTE — TELEPHONE ENCOUNTER
Spoke with David- gave the verbal for . He is aware about the medications and will let patient know to  the Jardiance. We do not currently have any Janumet samples, but are hoping to get some soon.    Dr. Hood- can you place an order for sample of Jardiance 25 please?

## 2023-11-22 ENCOUNTER — HOME CARE VISIT (OUTPATIENT)
Dept: HOME HEALTH SERVICES | Facility: HOME HEALTHCARE | Age: 63
End: 2023-11-22
Payer: COMMERCIAL

## 2023-11-22 DIAGNOSIS — E11.9 TYPE 2 DIABETES MELLITUS WITHOUT COMPLICATION, WITHOUT LONG-TERM CURRENT USE OF INSULIN: ICD-10-CM

## 2023-11-22 DIAGNOSIS — R80.9 TYPE 2 DIABETES MELLITUS WITH PROTEINURIA: ICD-10-CM

## 2023-11-22 DIAGNOSIS — E11.29 TYPE 2 DIABETES MELLITUS WITH PROTEINURIA: ICD-10-CM

## 2023-11-22 NOTE — TELEPHONE ENCOUNTER
It looks like this was sent as a prescription to the pharmacy. Can you order it as a sample please

## 2023-11-26 DIAGNOSIS — R80.9 TYPE 2 DIABETES MELLITUS WITH PROTEINURIA: ICD-10-CM

## 2023-11-26 DIAGNOSIS — F41.9 ANXIETY: ICD-10-CM

## 2023-11-26 DIAGNOSIS — E11.29 TYPE 2 DIABETES MELLITUS WITH PROTEINURIA: ICD-10-CM

## 2023-11-27 RX ORDER — TOPIRAMATE 25 MG/1
TABLET ORAL
Qty: 30 TABLET | Refills: 0 | Status: SHIPPED | OUTPATIENT
Start: 2023-11-27

## 2023-11-27 RX ORDER — GLIPIZIDE 10 MG/1
10 TABLET, FILM COATED, EXTENDED RELEASE ORAL 2 TIMES DAILY
Qty: 60 TABLET | Refills: 1 | Status: SHIPPED | OUTPATIENT
Start: 2023-11-27

## 2023-11-29 ENCOUNTER — HOME CARE VISIT (OUTPATIENT)
Dept: HOME HEALTH SERVICES | Facility: HOME HEALTHCARE | Age: 63
End: 2023-11-29
Payer: COMMERCIAL

## 2023-11-29 PROCEDURE — G0155 HHCP-SVS OF CSW,EA 15 MIN: HCPCS

## 2023-11-29 NOTE — HOME HEALTH
SANDI skelton on this date with patient. Patient has been disenrolled from medical coverage due to change in finances after receiving senior living payout. Patient has no income following short term disability and utilized money to pay overdue bill and debt. Patient now meets the criteria for medicaid again. PC to medicaid (2.5 hours between wait and over the phone interview) resulting in renewal of medical coverage and application to reestablish SNAP benefits. Spoke with Ada at [facility] center/[facility]. Patient will be sent paperwork for SNAP in order to send in affidavit that patient does not have income. Contact with sister who is assisting also. Patient struggling with paperwork and understanding of process since medical issue in August. Will follow up if patient has issue with completing paperwork next week to regain SNAP benefit.

## 2023-12-07 ENCOUNTER — TELEPHONE (OUTPATIENT)
Dept: INTERNAL MEDICINE | Facility: CLINIC | Age: 63
End: 2023-12-07

## 2023-12-07 DIAGNOSIS — I10 ESSENTIAL HYPERTENSION: ICD-10-CM

## 2023-12-07 DIAGNOSIS — M1A.09X0 CHRONIC GOUT OF MULTIPLE SITES, UNSPECIFIED CAUSE: ICD-10-CM

## 2023-12-07 NOTE — TELEPHONE ENCOUNTER
Caller: Pablo Mendoza    Relationship: Self    Best call back number: [phone]     What was the call regarding: PATIENT STATES THAT HE CONFIRMED WITH HIS INSURANCE THAT HE WILL NEED PRIOR AUTHORIZATION FROM DR HOFFMAN FOR HIS Janumet XR  MG tablet AND empagliflozin (Jardiance) 25 MG tablet tablet

## 2023-12-07 NOTE — TELEPHONE ENCOUNTER
Uric acid in past 12 months     Rx Refill Note  Requested Prescriptions     Pending Prescriptions Disp Refills    metoprolol tartrate (LOPRESSOR) 100 MG tablet [Pharmacy Med Name: METOPROLOL TARTRATE 100 MG TAB] 60 tablet 1     Sig: TAKE 1 TABLET BY MOUTH TWICE A DAY    amLODIPine (NORVASC) 10 MG tablet [Pharmacy Med Name: amLODIPine BESYLATE 10 MG TAB] 30 tablet 1     Sig: TAKE 1 TABLET BY MOUTH DAILY    allopurinol (ZYLOPRIM) 300 MG tablet [Pharmacy Med Name: ALLOPURINOL 300 MG TABLET] 60 tablet 1     Sig: TAKE 1 TABLET BY MOUTH TWICE A DAY      Last office visit with prescribing clinician: 9/25/2023   Last telemedicine visit with prescribing clinician: 10/30/2023   Next office visit with prescribing clinician: Visit date not found                         Would you like a call back once the refill request has been completed: [] Yes [] No    If the office needs to give you a call back, can they leave a voicemail: [] Yes [] No    Cindy Silva, PCT  12/07/23, 08:12 EST

## 2023-12-08 ENCOUNTER — HOME CARE VISIT (OUTPATIENT)
Dept: HOME HEALTH SERVICES | Facility: HOME HEALTHCARE | Age: 63
End: 2023-12-08
Payer: COMMERCIAL

## 2023-12-08 DIAGNOSIS — G43.801 OTHER MIGRAINE WITH STATUS MIGRAINOSUS, NOT INTRACTABLE: ICD-10-CM

## 2023-12-08 DIAGNOSIS — G89.29 CHRONIC BILATERAL LOW BACK PAIN WITH RIGHT-SIDED SCIATICA: ICD-10-CM

## 2023-12-08 DIAGNOSIS — M54.41 CHRONIC BILATERAL LOW BACK PAIN WITH RIGHT-SIDED SCIATICA: ICD-10-CM

## 2023-12-08 PROCEDURE — G0299 HHS/HOSPICE OF RN EA 15 MIN: HCPCS

## 2023-12-08 NOTE — TELEPHONE ENCOUNTER
Are you ok with refilling?     Rx Refill Note  Requested Prescriptions     Pending Prescriptions Disp Refills    ibuprofen (ADVIL,MOTRIN) 800 MG tablet [Pharmacy Med Name: IBUPROFEN 800 MG TABLET] 30 tablet 0     Sig: TAKE ONE TABLET BY MOUTH EVERY 8 HOURS AS NEEDED FOR MILD PAIN      Last office visit with prescribing clinician: 9/25/2023   Last telemedicine visit with prescribing clinician: 10/30/2023   Next office visit with prescribing clinician: Visit date not found                         Would you like a call back once the refill request has been completed: [] Yes [] No    If the office needs to give you a call back, can they leave a voicemail: [] Yes [] No    Cindy Silva, PCT  12/08/23, 13:49 EST

## 2023-12-10 VITALS
RESPIRATION RATE: 18 BRPM | HEART RATE: 80 BPM | SYSTOLIC BLOOD PRESSURE: 142 MMHG | OXYGEN SATURATION: 95 % | DIASTOLIC BLOOD PRESSURE: 82 MMHG

## 2023-12-10 NOTE — HOME HEALTH
Routine Visit Note: Pt reports overall improvement in his well being. He still has a lot of stressors but he is dealing with them appropriately with the help of his sister. He is still having neurologic symptoms but has upcoming neurologist appointment.    Skill/education provided: CP assess, neuro assessment, med education    Patient/caregiver response: Tolerated well and verbalized understanding    Plan for next visit: Neuro assessment/education, med education

## 2023-12-11 ENCOUNTER — OFFICE VISIT (OUTPATIENT)
Dept: NEUROLOGY | Facility: CLINIC | Age: 63
End: 2023-12-11
Payer: COMMERCIAL

## 2023-12-11 VITALS
DIASTOLIC BLOOD PRESSURE: 72 MMHG | BODY MASS INDEX: 31.39 KG/M2 | WEIGHT: 200 LBS | HEART RATE: 82 BPM | SYSTOLIC BLOOD PRESSURE: 110 MMHG | OXYGEN SATURATION: 97 % | HEIGHT: 67 IN

## 2023-12-11 DIAGNOSIS — F44.5 PSYCHOGENIC NONEPILEPTIC SEIZURE: Primary | ICD-10-CM

## 2023-12-11 DIAGNOSIS — R56.9 SEIZURE-LIKE ACTIVITY: ICD-10-CM

## 2023-12-11 PROCEDURE — 99214 OFFICE O/P EST MOD 30 MIN: CPT | Performed by: NURSE PRACTITIONER

## 2023-12-11 PROCEDURE — 3074F SYST BP LT 130 MM HG: CPT | Performed by: NURSE PRACTITIONER

## 2023-12-11 PROCEDURE — 3078F DIAST BP <80 MM HG: CPT | Performed by: NURSE PRACTITIONER

## 2023-12-11 PROCEDURE — 1160F RVW MEDS BY RX/DR IN RCRD: CPT | Performed by: NURSE PRACTITIONER

## 2023-12-11 PROCEDURE — 1159F MED LIST DOCD IN RCRD: CPT | Performed by: NURSE PRACTITIONER

## 2023-12-11 RX ORDER — METOPROLOL TARTRATE 100 MG/1
TABLET ORAL
Qty: 60 TABLET | Refills: 1 | Status: SHIPPED | OUTPATIENT
Start: 2023-12-11

## 2023-12-11 RX ORDER — ALLOPURINOL 300 MG/1
TABLET ORAL
Qty: 60 TABLET | Refills: 1 | Status: SHIPPED | OUTPATIENT
Start: 2023-12-11

## 2023-12-11 RX ORDER — AMLODIPINE BESYLATE 10 MG/1
TABLET ORAL
Qty: 30 TABLET | Refills: 1 | Status: SHIPPED | OUTPATIENT
Start: 2023-12-11

## 2023-12-11 RX ORDER — IBUPROFEN 800 MG/1
800 TABLET ORAL EVERY 8 HOURS PRN
Qty: 30 TABLET | Refills: 0 | Status: SHIPPED | OUTPATIENT
Start: 2023-12-11

## 2023-12-11 RX ORDER — LEVETIRACETAM 500 MG/1
TABLET ORAL
Start: 2023-12-11 | End: 2024-01-08

## 2023-12-11 NOTE — PATIENT INSTRUCTIONS
Decreased keppra to 1/2 tablet twice daily x 14 days,    Then decrease keppra to 1/2 tablet once daily x 14 days,    Then discontinue

## 2023-12-12 NOTE — PROGRESS NOTES
"[facility] NEUROLOGY         Date of Visit: 2023    Name: Pablo Mednoza    :  1960    PCP: Liana Hood MD    Visit Type: follow-up         Subjective     Patient ID: Pablo is a 63 y.o. male.         History of Present Illness  I had the pleasure of seeing your patient today.  As you may know he is a 63-year-old male here today for hospital follow-up for altered mental status, stuttering speech, and possible seizure-like activity.    History:    Patient does have a history of diabetes type 2 most recent A1c was 7.6, hypertension, gout, cervical spinal stenosis followed by neurosurgery, obstructive sleep apnea not currently on sleep machine.  Last sleep study was several years ago.    Patient does have significant medical history for neurologic evaluation in the past.  Patient was evaluated in  for altered level of awareness diagnosed with TIA at that time.  In addition to this in  he was evaluated in the hospital for left-sided facial paralysis that lasted approximately 1 week.  Patient was never diagnosed with stroke.  At both of these they determined that likely patient was experiencing complex migraine or possibly cluster type headaches.  Patient then followed up with neurology outpatient for many years.  His last neurologist was Dr. Benz.  He was treated for complex migraine at that time.    Patient states that however he has been having some additional episodes more recently.  He states he feels episodes started in .  He describes them as stuttering type episodes with potential losses of awareness or possibly \"seizures in his sleep\".  The episodes include stuttering speech, slowness of speech and confusion.  It is becoming more and more frequent happening several times daily and almost every day per week.  They state that in addition to this he had an episode when he was with his sister at a hotel 1 night where he was up walking around in the middle " of the night.  When she asked him what was wrong he said he did not know.  She asked if he needed to use the bathroom he said he did not know.  She told him to go back to bed and he laid down.  His sister states that overall he is just more confused than he is normally.  She states he is typically very mentally intact.    Patient did end up in the hospital for evaluation in mid August.  At that time CTA head and neck, MRI brain, and EEG were all performed with no significant abnormalities.  There was some mild enlargement of 1 side of the pituitary gland that had been stable from previous imaging done in 2010.  No hydrocephalus or any other abnormalities were noted.  EEG showed no normal rhythm with no epileptiform abnormalities.  Patient was however placed on Keppra 750 mg twice daily for seizure prophylaxis.    Patient did end up going for epilepsy monitoring unit after his last appointment with us for further characterization of spells as they did not sound seizure-like in nature.  Patient did complete 3 days in epilepsy monitoring at [facility].  He had several of his typical spells throughout the stay none of which correlated with abnormal EEG.  EEG was within normal limits off of all antiepileptic medications.  Patient was diagnosed at that time with pseudoseizure.  He was encouraged to find psychiatry for management and discharge for outpatient follow-up.    Current:    Patient states that he has been taking Keppra 500 mg twice daily as he was out of the 750 mg tablets and had 500 mg tablets at home.  He has not noticed any worsening in his symptoms however also no improvement.  He states symptoms are directly related to him getting stressed or anxious.  He states that when he stays home his symptoms seem to improve.  He is going to be seeing a psychologist with disability for evaluation next week.  He is not currently established with psychiatry.  He denies any other new symptoms.  He does  continue to have mild headaches as well as tremor however feels these again are mostly stress related.  No other new neurological complaints at today's visit.          The following portions of the patient's history were reviewed and updated as appropriate: allergies, current medications, past family history, past medical history, past social history, past surgical history, and problem list.                 Review of Systems   Constitutional:  Negative for activity change, appetite change, fatigue and unexpected weight change.   HENT:  Negative for hearing loss, tinnitus and trouble swallowing.    Eyes:  Negative for photophobia, pain and visual disturbance.   Respiratory:  Negative for chest tightness and shortness of breath.    Cardiovascular:  Negative for palpitations.   Gastrointestinal:  Negative for nausea and vomiting.   Musculoskeletal:  Negative for neck pain.   Neurological:  Positive for tremors, speech difficulty and headaches. Negative for dizziness, syncope, facial asymmetry, weakness, light-headedness and numbness.   Psychiatric/Behavioral:  Positive for confusion, dysphoric mood and sleep disturbance. The patient is nervous/anxious.             Current Medications:    Current Outpatient Medications   Medication Instructions    acetaminophen (TYLENOL) 500 MG tablet 1 tablet, Oral, Every 6 Hours PRN    allopurinol (ZYLOPRIM) 300 MG tablet TAKE 1 TABLET BY MOUTH TWICE A DAY    amLODIPine (NORVASC) 10 MG tablet TAKE 1 TABLET BY MOUTH DAILY    atorvastatin (LIPITOR) 40 mg, Oral, Daily    empagliflozin (JARDIANCE) 25 mg, Oral, Daily    glipizide (GLUCOTROL XL) 10 mg, Oral, 2 Times Daily    hydroCHLOROthiazide (HYDRODIURIL) 25 MG tablet TAKE 1 TABLET BY MOUTH DAILY    hydrOXYzine (ATARAX) 25 mg, Oral, Daily PRN    ibuprofen (ADVIL,MOTRIN) 800 mg, Oral, Every 8 Hours PRN    Janumet XR  MG tablet TAKE ONE TABLET BY MOUTH TWICE A DAY    levETIRAcetam (KEPPRA) 500 MG tablet Take 0.5 tablets by mouth 2  "(Two) Times a Day for 14 days, THEN 0.5 tablets Daily for 14 days. Then discontinue  Indications: Seizure    metoprolol tartrate (LOPRESSOR) 100 MG tablet TAKE 1 TABLET BY MOUTH TWICE A DAY    Rimegepant Sulfate (NURTEC) 75 mg, Oral, Daily PRN    sertraline (ZOLOFT) 50 MG tablet TAKE 1 TABLET BY MOUTH DAILY    sildenafil (VIAGRA) 50 mg, Oral, Daily PRN    topiramate (TOPAMAX) 25 MG tablet TAKE ONE TABLET BY MOUTH ONCE NIGHTLY *MIGRAINE HEADACHE*    vitamin D (ERGOCALCIFEROL) 50,000 Units, Oral, Weekly          /72   Pulse 82   Ht 170.2 cm (67.01\")   Wt 90.7 kg (200 lb)   SpO2 97%   BMI 31.32 kg/m²                Objective     Neurological Exam  Mental Status  Awake, alert and oriented to person, place and time. Recent and remote memory are intact. Speech is normal. Language is fluent with no aphasia. Language: Patient does spend time thinking of words with some stuttering noted intermittently systems are not consistent throughout exam. Attention and concentration are normal.    Cranial Nerves  CN II: Visual fields full to confrontation.  CN III, IV, VI: Extraocular movements intact bilaterally. Normal lids and orbits bilaterally. Pupils equal round and reactive to light bilaterally.  CN V: Facial sensation is normal.  CN VII: Full and symmetric facial movement.  CN IX, X: Palate elevates symmetrically  CN XI: Shoulder shrug strength is normal.  CN XII: Tongue midline without atrophy or fasciculations.    Motor  Normal muscle bulk throughout. Normal muscle tone. No abnormal involuntary movements. Strength is 5/5 throughout all four extremities.    Sensory  Sensation is intact to light touch, pinprick, vibration and proprioception in all four extremities.    Reflexes  Deep tendon reflexes are 2+ and symmetric in all four extremities.    Coordination    Finger-to-nose, rapid alternating movements and heel-to-shin normal bilaterally without dysmetria.    Gait  Normal casual, toe, heel and tandem " gait.      Physical Exam  Constitutional:       Appearance: Normal appearance. He is normal weight.   HENT:      Head: Normocephalic.   Eyes:      General: Lids are normal.      Extraocular Movements: Extraocular movements intact.      Pupils: Pupils are equal, round, and reactive to light.   Pulmonary:      Effort: Pulmonary effort is normal.   Musculoskeletal:         General: Normal range of motion.   Skin:     General: Skin is warm.   Neurological:      Motor: Motor strength is normal.     Coordination: Coordination is intact.      Deep Tendon Reflexes: Reflexes are normal and symmetric.   Psychiatric:         Mood and Affect: Mood normal.         Speech: Speech normal.         Behavior: Behavior normal.         Thought Content: Thought content normal.                     Assessment & Plan     Diagnoses and all orders for this visit:    1. Psychogenic nonepileptic seizure (Primary)  -     Cancel: Ambulatory Referral to Neurology  -     Ambulatory Referral to Neurology    2. Seizure-like activity  -     levETIRAcetam (KEPPRA) 500 MG tablet; Take 0.5 tablets by mouth 2 (Two) Times a Day for 14 days, THEN 0.5 tablets Daily for 14 days. Then discontinue  Indications: Seizure    At this time we will go ahead and start weaning patient off Keppra we will decrease him to 250 mg twice daily for 2 weeks, then 250 mg once daily for 2 weeks then 250 mg every other day for a week then discontinue.    I would like to refer him to Dr. Major for additional pseudoseizure management.    He should continue to follow seizure precautions until further management is obtained.    Follow-up with [facility] pseudoseizure clinic.              Jessica TAVARES    Neurology    [facility] [place]    Phone: [phone]    12/11/2023 , 20:49 EST

## 2023-12-13 ENCOUNTER — TELEPHONE (OUTPATIENT)
Dept: NEUROLOGY | Facility: CLINIC | Age: 63
End: 2023-12-13
Payer: COMMERCIAL

## 2023-12-13 ENCOUNTER — HOME CARE VISIT (OUTPATIENT)
Dept: HOME HEALTH SERVICES | Facility: HOME HEALTHCARE | Age: 63
End: 2023-12-13
Payer: COMMERCIAL

## 2023-12-13 PROCEDURE — G0299 HHS/HOSPICE OF RN EA 15 MIN: HCPCS

## 2023-12-13 NOTE — TELEPHONE ENCOUNTER
Recd call from David LARKIN with [facility], asking us to recall in pts Keppra instructions. I called into pts yayo

## 2023-12-14 NOTE — HOME HEALTH
Routine Visit Note: Pt visited with neurologist on 12/11. Neurologist wants to wean off of Keppra and refer pt to [facility] for further evaluation of PTSD/Neuro symptoms. Pt has sleep study on 12/27. Will need to reschedule visit with Dr. Hood for thyroid biopsy. Performed med rec. pt has 750mg tablets of keppra but needs 500mg tablets ordered for accurate dose. Ordered dose is 250 mg BID. Notified provider and medical assistant said she would fix his dose. Performed neuro assessment, med rec, med education. Pt tolerated well and verbalized understanding. Perform neuro assessment, disease process & management education, med rec and education. CHECK FOR CORRECT DOSE OF KEPPRA

## 2023-12-18 ENCOUNTER — HOME CARE VISIT (OUTPATIENT)
Dept: HOME HEALTH SERVICES | Facility: HOME HEALTHCARE | Age: 63
End: 2023-12-18
Payer: COMMERCIAL

## 2023-12-18 PROCEDURE — G0299 HHS/HOSPICE OF RN EA 15 MIN: HCPCS

## 2023-12-18 NOTE — HOME HEALTH
Discharge Summary/Summary of Care Provided: Pt was seen by  nurse for Neurovascular assessments, cardiopulmonary assessments, and meication education. Mr. Mendoza has come a long way since [facility] started seeing him in august. He has accepted his current health status and is adapting well to fit his medical needs. With the help of home care and his sister he has met all goals and is able to care for himself with occasional assistance from his sister. Mr. Mendoza was very appreciative of everyone who was a part of his care over the last few months. Instructed pt keep attending all doctors appointments and call his providers with any questions/concerns.    Patient received home health for diagnosis: Epilepsy, unspecified, not intractable, without status epilepticus   Current level of functional ability: Needs assistance leaving home  Living arrangements: Lives alone   Progress towards goals: All goals met      Other pertinent info: Pt was seen by MIAH Cotto Ed who is a licensed psychological associate who referred him to [facility] for therapy r/t neurological disease. Mr. Mendoza thinks it will be of great benefit nto speak with a therapist.

## 2024-01-01 NOTE — TELEPHONE ENCOUNTER
Rx Refill Note  Requested Prescriptions     Pending Prescriptions Disp Refills    allopurinol (ZYLOPRIM) 300 MG tablet [Pharmacy Med Name: ALLOPURINOL 300 MG TABLET] 180 tablet 1     Sig: TAKE ONE TABLET BY MOUTH TWICE A DAY      Last office visit with prescribing clinician: 11/18/2022   Last telemedicine visit with prescribing clinician: 2/24/2023   Next office visit with prescribing clinician: 3/3/2023                         Would you like a call back once the refill request has been completed: [] Yes [] No    If the office needs to give you a call back, can they leave a voicemail: [] Yes [] No    Yesica Leach MA  01/22/23, 16:53 EST    
mother

## 2024-01-03 DIAGNOSIS — F41.9 ANXIETY: ICD-10-CM

## 2024-01-03 RX ORDER — TOPIRAMATE 25 MG/1
TABLET ORAL
Qty: 30 TABLET | Refills: 0 | Status: SHIPPED | OUTPATIENT
Start: 2024-01-03

## 2024-01-03 NOTE — TELEPHONE ENCOUNTER
Rx Refill Note  Requested Prescriptions     Pending Prescriptions Disp Refills    sertraline (ZOLOFT) 50 MG tablet [Pharmacy Med Name: SERTRALINE HCL 50 MG TABLET] 30 tablet 0     Sig: TAKE 1 TABLET BY MOUTH DAILY    topiramate (TOPAMAX) 25 MG tablet [Pharmacy Med Name: TOPIRAMATE 25 MG TABLET] 30 tablet 0     Sig: TAKE ONE TABLET BY MOUTH ONCE NIGHTLY *MIGRAINE HEADACHE*      Last office visit with prescribing clinician: 9/25/2023   Last telemedicine visit with prescribing clinician: 10/30/2023   Next office visit with prescribing clinician: Visit date not found                         Would you like a call back once the refill request has been completed: [] Yes [] No    If the office needs to give you a call back, can they leave a voicemail: [] Yes [] No    Cindy Silva, PCT  01/03/24, 09:17 EST

## 2024-01-06 DIAGNOSIS — I10 ESSENTIAL HYPERTENSION: ICD-10-CM

## 2024-01-07 DIAGNOSIS — R56.9 SEIZURE-LIKE ACTIVITY: ICD-10-CM

## 2024-01-08 RX ORDER — LEVETIRACETAM 500 MG/1
TABLET ORAL
Qty: 21 TABLET | OUTPATIENT
Start: 2024-01-08

## 2024-01-11 RX ORDER — HYDROCHLOROTHIAZIDE 25 MG/1
TABLET ORAL
Qty: 30 TABLET | Refills: 2 | Status: SHIPPED | OUTPATIENT
Start: 2024-01-11

## 2024-01-11 NOTE — TELEPHONE ENCOUNTER
Rx Refill Note  Requested Prescriptions     Pending Prescriptions Disp Refills    hydroCHLOROthiazide (HYDRODIURIL) 25 MG tablet [Pharmacy Med Name: hydroCHLOROthiazide 25 MG TABLET] 30 tablet 2     Sig: TAKE 1 TABLET BY MOUTH DAILY      Last office visit with prescribing clinician: 9/25/2023   Last telemedicine visit with prescribing clinician: 10/30/2023   Next office visit with prescribing clinician: Visit date not found                         Would you like a call back once the refill request has been completed: [] Yes [] No    If the office needs to give you a call back, can they leave a voicemail: [] Yes [] No    Yesica Leach MA  01/11/24, 09:39 EST

## 2024-01-16 ENCOUNTER — HOSPITAL ENCOUNTER (OUTPATIENT)
Dept: SLEEP MEDICINE | Facility: HOSPITAL | Age: 64
End: 2024-01-16
Payer: COMMERCIAL

## 2024-01-16 DIAGNOSIS — G47.10 HYPERSOMNOLENCE: ICD-10-CM

## 2024-01-16 DIAGNOSIS — Z86.73 HISTORY OF STROKE: ICD-10-CM

## 2024-01-16 DIAGNOSIS — E66.9 CLASS 1 OBESITY WITH BODY MASS INDEX (BMI) OF 30.0 TO 30.9 IN ADULT, UNSPECIFIED OBESITY TYPE, UNSPECIFIED WHETHER SERIOUS COMORBIDITY PRESENT: ICD-10-CM

## 2024-01-16 DIAGNOSIS — Z87.898 HISTORY OF SEIZURE: ICD-10-CM

## 2024-01-16 DIAGNOSIS — R06.83 SNORING: ICD-10-CM

## 2024-01-16 PROCEDURE — 95810 POLYSOM 6/> YRS 4/> PARAM: CPT

## 2024-01-24 ENCOUNTER — TELEPHONE (OUTPATIENT)
Dept: ONCOLOGY | Facility: CLINIC | Age: 64
End: 2024-01-24
Payer: COMMERCIAL

## 2024-01-24 NOTE — TELEPHONE ENCOUNTER
Caller: Pablo Mendoza    Relationship to patient: Self    Best call back number: [phone]    Type of visit: LAB, F/U, AND PHLEBO    Requested date: NEXT WEEK    If rescheduling, when is the original appointment: 12/11/23

## 2024-01-25 DIAGNOSIS — G47.61 PLMD (PERIODIC LIMB MOVEMENT DISORDER): ICD-10-CM

## 2024-01-25 DIAGNOSIS — R06.83 SNORING: ICD-10-CM

## 2024-01-25 DIAGNOSIS — G47.33 OSA (OBSTRUCTIVE SLEEP APNEA): Primary | ICD-10-CM

## 2024-01-26 ENCOUNTER — TELEPHONE (OUTPATIENT)
Dept: SLEEP MEDICINE | Facility: HOSPITAL | Age: 64
End: 2024-01-26
Payer: COMMERCIAL

## 2024-01-26 NOTE — TELEPHONE ENCOUNTER
I  called Pt and went over sleep report and let Pt know that we are sending cppa order to [facility] . Pt will call back after setup to schedule follow up with Madie

## 2024-01-30 DIAGNOSIS — F41.9 ANXIETY: ICD-10-CM

## 2024-02-01 ENCOUNTER — LAB (OUTPATIENT)
Dept: LAB | Facility: HOSPITAL | Age: 64
End: 2024-02-01
Payer: COMMERCIAL

## 2024-02-01 ENCOUNTER — INFUSION (OUTPATIENT)
Dept: ONCOLOGY | Facility: HOSPITAL | Age: 64
End: 2024-02-01
Payer: COMMERCIAL

## 2024-02-01 VITALS
WEIGHT: 191.4 LBS | DIASTOLIC BLOOD PRESSURE: 79 MMHG | BODY MASS INDEX: 29.97 KG/M2 | OXYGEN SATURATION: 93 % | RESPIRATION RATE: 16 BRPM | TEMPERATURE: 97.7 F | SYSTOLIC BLOOD PRESSURE: 127 MMHG | HEART RATE: 77 BPM

## 2024-02-01 DIAGNOSIS — D75.1 POLYCYTHEMIA: Primary | ICD-10-CM

## 2024-02-01 DIAGNOSIS — D75.1 POLYCYTHEMIA: ICD-10-CM

## 2024-02-01 LAB
BASOPHILS # BLD AUTO: 0.08 10*3/MM3 (ref 0–0.2)
BASOPHILS NFR BLD AUTO: 0.8 % (ref 0–1.5)
DEPRECATED RDW RBC AUTO: 45.1 FL (ref 37–54)
EOSINOPHIL # BLD AUTO: 0.1 10*3/MM3 (ref 0–0.4)
EOSINOPHIL NFR BLD AUTO: 1 % (ref 0.3–6.2)
ERYTHROCYTE [DISTWIDTH] IN BLOOD BY AUTOMATED COUNT: 17.8 % (ref 12.3–15.4)
HCT VFR BLD AUTO: 51.6 % (ref 37.5–51)
HGB BLD-MCNC: 16.9 G/DL (ref 13–17.7)
IMM GRANULOCYTES # BLD AUTO: 0.07 10*3/MM3 (ref 0–0.05)
IMM GRANULOCYTES NFR BLD AUTO: 0.7 % (ref 0–0.5)
LYMPHOCYTES # BLD AUTO: 1.99 10*3/MM3 (ref 0.7–3.1)
LYMPHOCYTES NFR BLD AUTO: 19.8 % (ref 19.6–45.3)
MCH RBC QN AUTO: 25.6 PG (ref 26.6–33)
MCHC RBC AUTO-ENTMCNC: 32.8 G/DL (ref 31.5–35.7)
MCV RBC AUTO: 78.1 FL (ref 79–97)
MONOCYTES # BLD AUTO: 0.53 10*3/MM3 (ref 0.1–0.9)
MONOCYTES NFR BLD AUTO: 5.3 % (ref 5–12)
NEUTROPHILS NFR BLD AUTO: 7.3 10*3/MM3 (ref 1.7–7)
NEUTROPHILS NFR BLD AUTO: 72.4 % (ref 42.7–76)
NRBC BLD AUTO-RTO: 0 /100 WBC (ref 0–0.2)
PLATELET # BLD AUTO: 283 10*3/MM3 (ref 140–450)
PMV BLD AUTO: 10.6 FL (ref 6–12)
RBC # BLD AUTO: 6.61 10*6/MM3 (ref 4.14–5.8)
WBC NRBC COR # BLD AUTO: 10.07 10*3/MM3 (ref 3.4–10.8)

## 2024-02-01 PROCEDURE — 85025 COMPLETE CBC W/AUTO DIFF WBC: CPT

## 2024-02-01 PROCEDURE — 25810000003 SODIUM CHLORIDE 0.9 % SOLUTION: Performed by: INTERNAL MEDICINE

## 2024-02-01 PROCEDURE — 99195 PHLEBOTOMY: CPT

## 2024-02-01 PROCEDURE — 96360 HYDRATION IV INFUSION INIT: CPT

## 2024-02-01 PROCEDURE — 36415 COLL VENOUS BLD VENIPUNCTURE: CPT

## 2024-02-01 RX ORDER — SODIUM CHLORIDE 9 MG/ML
250 INJECTION, SOLUTION INTRAVENOUS ONCE
Status: COMPLETED | OUTPATIENT
Start: 2024-02-01 | End: 2024-02-01

## 2024-02-01 RX ADMIN — SODIUM CHLORIDE 250 ML: 9 INJECTION, SOLUTION INTRAVENOUS at 15:07

## 2024-02-08 DIAGNOSIS — M1A.09X0 CHRONIC GOUT OF MULTIPLE SITES, UNSPECIFIED CAUSE: ICD-10-CM

## 2024-02-08 DIAGNOSIS — I10 ESSENTIAL HYPERTENSION: ICD-10-CM

## 2024-02-08 DIAGNOSIS — E11.29 TYPE 2 DIABETES MELLITUS WITH PROTEINURIA: ICD-10-CM

## 2024-02-08 DIAGNOSIS — R80.9 TYPE 2 DIABETES MELLITUS WITH PROTEINURIA: ICD-10-CM

## 2024-02-08 RX ORDER — AMLODIPINE BESYLATE 10 MG/1
TABLET ORAL
Qty: 30 TABLET | Refills: 1 | Status: SHIPPED | OUTPATIENT
Start: 2024-02-08

## 2024-02-08 RX ORDER — GLIPIZIDE 10 MG/1
10 TABLET, FILM COATED, EXTENDED RELEASE ORAL 2 TIMES DAILY
Qty: 60 TABLET | Refills: 1 | Status: SHIPPED | OUTPATIENT
Start: 2024-02-08

## 2024-02-08 RX ORDER — METOPROLOL TARTRATE 100 MG/1
TABLET ORAL
Qty: 60 TABLET | Refills: 1 | Status: SHIPPED | OUTPATIENT
Start: 2024-02-08

## 2024-02-08 NOTE — TELEPHONE ENCOUNTER
Rx Refill Note  Requested Prescriptions     Pending Prescriptions Disp Refills    allopurinol (ZYLOPRIM) 300 MG tablet [Pharmacy Med Name: ALLOPURINOL 300 MG TABLET] 60 tablet 1     Sig: TAKE 1 TABLET BY MOUTH TWICE A DAY     Signed Prescriptions Disp Refills    amLODIPine (NORVASC) 10 MG tablet 30 tablet 1     Sig: TAKE 1 TABLET BY MOUTH DAILY     Authorizing Provider: TERA HOFFMAN     Ordering User: MAMIE BULLARD    metoprolol tartrate (LOPRESSOR) 100 MG tablet 60 tablet 1     Sig: TAKE 1 TABLET BY MOUTH TWICE A DAY     Authorizing Provider: TERA HOFFMAN     Ordering User: MAMIE BULLARD      Last office visit with prescribing clinician: 9/25/2023   Last telemedicine visit with prescribing clinician: 10/30/2023   Next office visit with prescribing clinician: Visit date not found                         Would you like a call back once the refill request has been completed: [] Yes [] No    If the office needs to give you a call back, can they leave a voicemail: [] Yes [] No    Mamie Bullard MA  02/08/24, 08:24 EST

## 2024-02-09 RX ORDER — ALLOPURINOL 300 MG/1
TABLET ORAL
Qty: 60 TABLET | Refills: 1 | Status: SHIPPED | OUTPATIENT
Start: 2024-02-09

## 2024-02-19 RX ORDER — ATORVASTATIN CALCIUM 40 MG/1
40 TABLET, FILM COATED ORAL DAILY
Qty: 90 TABLET | Refills: 1 | Status: SHIPPED | OUTPATIENT
Start: 2024-02-19

## 2024-03-05 DIAGNOSIS — F41.9 ANXIETY: ICD-10-CM

## 2024-03-05 NOTE — TELEPHONE ENCOUNTER
PHQ2 or PHQ9 on record in past 12 months     Rx Refill Note  Requested Prescriptions     Pending Prescriptions Disp Refills    sertraline (ZOLOFT) 50 MG tablet [Pharmacy Med Name: SERTRALINE HCL 50 MG TABLET] 30 tablet 0     Sig: TAKE 1 TABLET BY MOUTH DAILY      Last office visit with prescribing clinician: 9/25/2023   Last telemedicine visit with prescribing clinician: 10/30/2023   Next office visit with prescribing clinician: Visit date not found                         Would you like a call back once the refill request has been completed: [] Yes [] No    If the office needs to give you a call back, can they leave a voicemail: [] Yes [] No    Cindy Silva, PCT  03/05/24, 15:45 EST

## 2024-03-12 DIAGNOSIS — G89.29 CHRONIC BILATERAL LOW BACK PAIN WITH RIGHT-SIDED SCIATICA: ICD-10-CM

## 2024-03-12 DIAGNOSIS — M54.41 CHRONIC BILATERAL LOW BACK PAIN WITH RIGHT-SIDED SCIATICA: ICD-10-CM

## 2024-03-12 DIAGNOSIS — G43.801 OTHER MIGRAINE WITH STATUS MIGRAINOSUS, NOT INTRACTABLE: ICD-10-CM

## 2024-03-13 RX ORDER — IBUPROFEN 800 MG/1
800 TABLET ORAL EVERY 8 HOURS PRN
Qty: 30 TABLET | Refills: 0 | Status: SHIPPED | OUTPATIENT
Start: 2024-03-13

## 2024-03-13 NOTE — TELEPHONE ENCOUNTER
Rx Refill Note  Requested Prescriptions     Pending Prescriptions Disp Refills    ibuprofen (ADVIL,MOTRIN) 800 MG tablet [Pharmacy Med Name: IBUPROFEN 800 MG TABLET] 30 tablet 0     Sig: TAKE ONE TABLET BY MOUTH EVERY 8 HOURS AS NEEDED FOR MILD PAIN      Last office visit with prescribing clinician: 9/25/2023   Last telemedicine visit with prescribing clinician: 10/30/2023   Next office visit with prescribing clinician: Visit date not found                         Would you like a call back once the refill request has been completed: [] Yes [] No    If the office needs to give you a call back, can they leave a voicemail: [] Yes [] No    Lluvia Griffin, BRITTANEY  03/13/24, 16:32 EDT

## 2024-03-18 ENCOUNTER — TELEPHONE (OUTPATIENT)
Dept: SLEEP MEDICINE | Facility: HOSPITAL | Age: 64
End: 2024-03-18
Payer: COMMERCIAL

## 2024-03-18 NOTE — TELEPHONE ENCOUNTER
P t called in and stated that he returned his Cpap machine to [facility] . Pt started having problems with his eyes and got an infection. Pt went to ER and they suggested he not use machine for a few days til his eyes healed up. . Pt did not use til his eyes were better and then tried twice to use cpap machine again. Pt had problems with his eyes getting swelling and irritation. Pt has since returned the cpap machine to [facility].

## 2024-03-22 NOTE — TELEPHONE ENCOUNTER
FYI  
PATIENT STATES: THAT HE IS HAVING back surgery ON  9/15/2021 WITH DR. CAMEJO      PATIENT CAN BE REACHED ON: [phone]       
Sent to the wrong office   
Awake/Alert

## 2024-03-25 ENCOUNTER — TELEPHONE (OUTPATIENT)
Dept: INTERNAL MEDICINE | Facility: CLINIC | Age: 64
End: 2024-03-25
Payer: COMMERCIAL

## 2024-03-25 DIAGNOSIS — E04.1 THYROID NODULE: Primary | ICD-10-CM

## 2024-03-25 NOTE — TELEPHONE ENCOUNTER
Patient called and was asking if Dr. Hood can refer him to an ENT. Please advise and call patient back.

## 2024-04-03 DIAGNOSIS — F41.9 ANXIETY: ICD-10-CM

## 2024-04-04 DIAGNOSIS — R80.9 TYPE 2 DIABETES MELLITUS WITH PROTEINURIA: ICD-10-CM

## 2024-04-04 DIAGNOSIS — E11.29 TYPE 2 DIABETES MELLITUS WITH PROTEINURIA: ICD-10-CM

## 2024-04-04 DIAGNOSIS — I10 ESSENTIAL HYPERTENSION: ICD-10-CM

## 2024-04-04 DIAGNOSIS — E11.9 TYPE 2 DIABETES MELLITUS WITHOUT COMPLICATION, WITHOUT LONG-TERM CURRENT USE OF INSULIN: ICD-10-CM

## 2024-04-04 RX ORDER — EMPAGLIFLOZIN 25 MG/1
25 TABLET, FILM COATED ORAL DAILY
Qty: 30 TABLET | Refills: 0 | Status: SHIPPED | OUTPATIENT
Start: 2024-04-04

## 2024-04-04 RX ORDER — GLIPIZIDE 10 MG/1
10 TABLET, FILM COATED, EXTENDED RELEASE ORAL 2 TIMES DAILY
Qty: 60 TABLET | Refills: 0 | Status: SHIPPED | OUTPATIENT
Start: 2024-04-04

## 2024-04-04 RX ORDER — AMLODIPINE BESYLATE 10 MG/1
TABLET ORAL
Qty: 30 TABLET | Refills: 0 | Status: SHIPPED | OUTPATIENT
Start: 2024-04-04

## 2024-04-13 DIAGNOSIS — I10 ESSENTIAL HYPERTENSION: ICD-10-CM

## 2024-04-13 DIAGNOSIS — M1A.09X0 CHRONIC GOUT OF MULTIPLE SITES, UNSPECIFIED CAUSE: ICD-10-CM

## 2024-04-15 RX ORDER — METOPROLOL TARTRATE 100 MG/1
TABLET ORAL
Qty: 60 TABLET | Refills: 1 | Status: SHIPPED | OUTPATIENT
Start: 2024-04-15

## 2024-04-15 RX ORDER — ALLOPURINOL 300 MG/1
TABLET ORAL
Qty: 60 TABLET | Refills: 1 | Status: SHIPPED | OUTPATIENT
Start: 2024-04-15

## 2024-04-24 ENCOUNTER — TRANSCRIBE ORDERS (OUTPATIENT)
Dept: ADMINISTRATIVE | Facility: HOSPITAL | Age: 64
End: 2024-04-24
Payer: COMMERCIAL

## 2024-04-24 DIAGNOSIS — H90.A21 SENSORINEURAL HEARING LOSS, UNILATERAL, RIGHT EAR, WITH RESTRICTED HEARING ON THE CONTRALATERAL SIDE: Primary | ICD-10-CM

## 2024-04-25 ENCOUNTER — APPOINTMENT (OUTPATIENT)
Dept: ONCOLOGY | Facility: HOSPITAL | Age: 64
End: 2024-04-25
Payer: COMMERCIAL

## 2024-04-25 ENCOUNTER — OFFICE VISIT (OUTPATIENT)
Dept: ONCOLOGY | Facility: CLINIC | Age: 64
End: 2024-04-25
Payer: COMMERCIAL

## 2024-04-25 ENCOUNTER — LAB (OUTPATIENT)
Dept: LAB | Facility: HOSPITAL | Age: 64
End: 2024-04-25
Payer: COMMERCIAL

## 2024-04-25 VITALS
OXYGEN SATURATION: 94 % | RESPIRATION RATE: 16 BRPM | BODY MASS INDEX: 30.87 KG/M2 | HEART RATE: 79 BPM | WEIGHT: 196.7 LBS | DIASTOLIC BLOOD PRESSURE: 86 MMHG | SYSTOLIC BLOOD PRESSURE: 137 MMHG | TEMPERATURE: 97.8 F | HEIGHT: 67 IN

## 2024-04-25 DIAGNOSIS — L03.213 PRESEPTAL CELLULITIS OF RIGHT EYE: ICD-10-CM

## 2024-04-25 DIAGNOSIS — D75.1 POLYCYTHEMIA: ICD-10-CM

## 2024-04-25 DIAGNOSIS — M54.41 CHRONIC BILATERAL LOW BACK PAIN WITH RIGHT-SIDED SCIATICA: ICD-10-CM

## 2024-04-25 DIAGNOSIS — G89.29 CHRONIC BILATERAL LOW BACK PAIN WITH RIGHT-SIDED SCIATICA: ICD-10-CM

## 2024-04-25 DIAGNOSIS — I10 ESSENTIAL HYPERTENSION: ICD-10-CM

## 2024-04-25 DIAGNOSIS — D72.9 NEUTROPHILIA: ICD-10-CM

## 2024-04-25 DIAGNOSIS — D75.1 POLYCYTHEMIA: Primary | ICD-10-CM

## 2024-04-25 LAB
BASOPHILS # BLD AUTO: 0.07 10*3/MM3 (ref 0–0.2)
BASOPHILS NFR BLD AUTO: 0.6 % (ref 0–1.5)
DEPRECATED RDW RBC AUTO: 43 FL (ref 37–54)
EOSINOPHIL # BLD AUTO: 0.16 10*3/MM3 (ref 0–0.4)
EOSINOPHIL NFR BLD AUTO: 1.5 % (ref 0.3–6.2)
ERYTHROCYTE [DISTWIDTH] IN BLOOD BY AUTOMATED COUNT: 17.8 % (ref 12.3–15.4)
HCT VFR BLD AUTO: 50 % (ref 37.5–51)
HGB BLD-MCNC: 16.3 G/DL (ref 13–17.7)
IMM GRANULOCYTES # BLD AUTO: 0.1 10*3/MM3 (ref 0–0.05)
IMM GRANULOCYTES NFR BLD AUTO: 0.9 % (ref 0–0.5)
LYMPHOCYTES # BLD AUTO: 2.19 10*3/MM3 (ref 0.7–3.1)
LYMPHOCYTES NFR BLD AUTO: 19.9 % (ref 19.6–45.3)
MCH RBC QN AUTO: 24.7 PG (ref 26.6–33)
MCHC RBC AUTO-ENTMCNC: 32.6 G/DL (ref 31.5–35.7)
MCV RBC AUTO: 75.9 FL (ref 79–97)
MONOCYTES # BLD AUTO: 0.62 10*3/MM3 (ref 0.1–0.9)
MONOCYTES NFR BLD AUTO: 5.6 % (ref 5–12)
NEUTROPHILS NFR BLD AUTO: 7.84 10*3/MM3 (ref 1.7–7)
NEUTROPHILS NFR BLD AUTO: 71.5 % (ref 42.7–76)
NRBC BLD AUTO-RTO: 0 /100 WBC (ref 0–0.2)
PLATELET # BLD AUTO: 265 10*3/MM3 (ref 140–450)
PMV BLD AUTO: 10 FL (ref 6–12)
RBC # BLD AUTO: 6.59 10*6/MM3 (ref 4.14–5.8)
WBC NRBC COR # BLD AUTO: 10.98 10*3/MM3 (ref 3.4–10.8)

## 2024-04-25 PROCEDURE — 3075F SYST BP GE 130 - 139MM HG: CPT | Performed by: INTERNAL MEDICINE

## 2024-04-25 PROCEDURE — 3079F DIAST BP 80-89 MM HG: CPT | Performed by: INTERNAL MEDICINE

## 2024-04-25 PROCEDURE — 1126F AMNT PAIN NOTED NONE PRSNT: CPT | Performed by: INTERNAL MEDICINE

## 2024-04-25 PROCEDURE — 1159F MED LIST DOCD IN RCRD: CPT | Performed by: INTERNAL MEDICINE

## 2024-04-25 PROCEDURE — 99214 OFFICE O/P EST MOD 30 MIN: CPT | Performed by: INTERNAL MEDICINE

## 2024-04-25 PROCEDURE — 1160F RVW MEDS BY RX/DR IN RCRD: CPT | Performed by: INTERNAL MEDICINE

## 2024-04-25 PROCEDURE — 36415 COLL VENOUS BLD VENIPUNCTURE: CPT

## 2024-04-25 PROCEDURE — 85025 COMPLETE CBC W/AUTO DIFF WBC: CPT

## 2024-04-25 RX ORDER — AMOXICILLIN AND CLAVULANATE POTASSIUM 875; 125 MG/1; MG/1
1 TABLET, FILM COATED ORAL EVERY 12 HOURS SCHEDULED
Qty: 10 TABLET | Refills: 0 | Status: SHIPPED | OUTPATIENT
Start: 2024-04-25

## 2024-04-25 RX ORDER — SULFAMETHOXAZOLE AND TRIMETHOPRIM 800; 160 MG/1; MG/1
1 TABLET ORAL 2 TIMES DAILY
Qty: 10 TABLET | Refills: 0 | Status: SHIPPED | OUTPATIENT
Start: 2024-04-25

## 2024-04-25 RX ORDER — HYDROCHLOROTHIAZIDE 25 MG/1
TABLET ORAL
Qty: 30 TABLET | Refills: 2 | Status: SHIPPED | OUTPATIENT
Start: 2024-04-25

## 2024-04-25 RX ORDER — IBUPROFEN 800 MG/1
800 TABLET ORAL EVERY 8 HOURS PRN
Qty: 30 TABLET | Refills: 0 | Status: SHIPPED | OUTPATIENT
Start: 2024-04-25

## 2024-04-25 NOTE — PROGRESS NOTES
Subjective     CHIEF COMPLAINT:      Chief Complaint   Patient presents with    Follow-up     HISTORY OF PRESENT ILLNESS:     Pablo Mendoza is a 63 y.o. male patient who returns today for follow up on his polycythemia.  He returns today for follow-up reporting that he was stung by a bee on 4/19/2024.  The area over the right lateral orbit started to become swollen over time.  He went to [facility] ER.  He was diagnosed with preseptal cellulitis.  He was placed on Augmentin and Bactrim twice daily x 5 days.  He was given steroids p.o.  He was feeling very drained at that time.  Over the following 2 days, he noticed collection of pus.  He applied heat and the pus started to come out.  He feels that the area is slowly improving.    Patient reports that he previously had headaches that improved after he had the therapeutic phlebotomy.  The last therapeutic phlebotomy was 2 months ago.      ROS:  Pertinent ROS is in the HPI.     Past medical, surgical, social and family history were reviewed.     MEDICATIONS:    Current Outpatient Medications:     acetaminophen (TYLENOL) 500 MG tablet, Take 1 tablet by mouth Every 6 (Six) Hours As Needed for Mild Pain. Indications: Pain, Disp: , Rfl:     allopurinol (ZYLOPRIM) 300 MG tablet, TAKE 1 TABLET BY MOUTH TWICE A DAY, Disp: 60 tablet, Rfl: 1    amLODIPine (NORVASC) 10 MG tablet, TAKE 1 TABLET BY MOUTH DAILY, Disp: 30 tablet, Rfl: 0    empagliflozin (Jardiance) 25 MG tablet tablet, TAKE 1 TABLET BY MOUTH DAILY, Disp: 30 tablet, Rfl: 0    glipizide (GLUCOTROL XL) 10 MG 24 hr tablet, Take 1 tablet by mouth 2 (Two) Times a Day., Disp: 60 tablet, Rfl: 0    hydroCHLOROthiazide 25 MG tablet, TAKE 1 TABLET BY MOUTH DAILY, Disp: 30 tablet, Rfl: 2    hydrOXYzine (ATARAX) 25 MG tablet, Take 1 tablet by mouth Daily As Needed for Anxiety., Disp: 30 tablet, Rfl: 0    ibuprofen (ADVIL,MOTRIN) 800 MG tablet, TAKE ONE TABLET BY MOUTH EVERY 8 HOURS AS NEEDED FOR MILD PAIN, Disp: 30 tablet, Rfl:  "0    Janumet XR  MG tablet, TAKE ONE TABLET BY MOUTH TWICE A DAY, Disp: 60 tablet, Rfl: 3    metoprolol tartrate (LOPRESSOR) 100 MG tablet, TAKE 1 TABLET BY MOUTH TWICE A DAY, Disp: 60 tablet, Rfl: 1    Rimegepant Sulfate (NURTEC) 75 MG tablet dispersible tablet, Take 1 tablet by mouth Daily As Needed (migraine head)., Disp: 4 tablet, Rfl: 0    sertraline (ZOLOFT) 50 MG tablet, TAKE 1 TABLET BY MOUTH DAILY, Disp: 30 tablet, Rfl: 0    sildenafil (Viagra) 50 MG tablet, Take 1 tablet by mouth Daily As Needed for Erectile Dysfunction., Disp: 30 tablet, Rfl: 0    topiramate (TOPAMAX) 25 MG tablet, TAKE ONE TABLET BY MOUTH ONCE NIGHTLY *MIGRAINE HEADACHE*, Disp: 30 tablet, Rfl: 0    atorvastatin (LIPITOR) 40 MG tablet, TAKE 1 TABLET BY MOUTH DAILY, Disp: 90 tablet, Rfl: 1    levETIRAcetam (KEPPRA) 500 MG tablet, Take 0.5 tablets by mouth 2 (Two) Times a Day for 14 days, THEN 0.5 tablets Daily for 14 days. Then discontinue  Indications: Seizure, Disp: , Rfl:   Objective     VITAL SIGNS:     Vitals:    04/25/24 1048   BP: 137/86   Pulse: 79   Resp: 16   Temp: 97.8 °F (36.6 °C)   TempSrc: Temporal   SpO2: 94%   Weight: 89.2 kg (196 lb 11.2 oz)   Height: 170.2 cm (67.01\")   PainSc: 0-No pain     Body mass index is 30.8 kg/m².     Wt Readings from Last 5 Encounters:   04/25/24 89.2 kg (196 lb 11.2 oz)   02/01/24 86.8 kg (191 lb 6.4 oz)   12/11/23 90.7 kg (200 lb)   10/30/23 88.5 kg (195 lb)   10/25/23 88.5 kg (195 lb)     PHYSICAL EXAMINATION:   GENERAL: The patient appears in good general condition, not in acute distress.   SKIN: No ecchymosis.  EYES: No jaundice. No Pallor.  HEAD: Area of swelling with surrounding erythema at the right lateral periorbital area.  CHEST: Normal respiratory effort.  Lungs clear bilaterally.  No added sounds.  CVS: Normal S1-S2.  No murmurs.  ABDOMEN: Soft. No tenderness. No Hepatomegaly. No Splenomegaly. No masses.  EXTREMITIES: No joint deformity.     DIAGNOSTIC DATA:     Results from " last 7 days   Lab Units 04/25/24  1030 04/21/24  1423   WBC 10*3/mm3 10.98*  --    NEUTROS ABS 10*3/mm3 7.84* 12.6*   LYMPHS ABS x10(3)/ul  --  2.2   HEMOGLOBIN g/dL 16.3  --    HEMATOCRIT % 50.0  --    PLATELETS 10*3/mm3 265  --      Component      Latest Ref Rng 10/24/2023 2/1/2024 4/25/2024   Neutrophils Absolute      1.70 - 7.00 10*3/mm3 6.97  7.30 (H)  7.84 (H)    Lymphocytes Absolute      0.70 - 3.10 10*3/mm3 1.59  1.99  2.19    Monocytes Absolute      0.10 - 0.90 10*3/mm3 0.58  0.53  0.62    Eosinophils Absolute      0.00 - 0.40 10*3/mm3 0.12  0.10  0.16    Basophils Absolute      0.00 - 0.20 10*3/mm3 0.08  0.08  0.07    Immature Grans, Absolute      0.00 - 0.05 10*3/mm3 0.08 (H)  0.07 (H)  0.10 (H)       CT orbits on 4/21/2024:  Lateral right periorbital soft tissue stranding, compatible with preseptal cellulitis. No evidence of post septal inflammation. No drainable fluid collection or soft tissue mass.     Assessment & Plan    *Polycythemia.   Patient had polycythemia 4/16/2021 when HCT was 49.3%.   Hemoglobin increased to 19.1 and HCT increased to 53.6% on 10/4/2022.  Patient was having severe headaches, dizziness and confusion and was hospitalized.   CT and MRI showed no evidence of CVA.  CT scan revealed diffuse high attenuation of the vasculature concerning for polycythemia.   Patient does not smoke but he is exposed to smoke at work.   JAK2 mutation testing on 10/28/2022 was negative for exon 12 mutation and negative for V617F mutation.  Erythropoietin level was elevated at 22.5.  Patient had a therapeutic phlebotomy on 11/4/2022.  He felt better after the phlebotomy.  He stopped taking his multivitamin which contains iron.  CT scan on 1/13/2023 showed no evidence of underlying malignancy.  Last therapeutic phlebotomy was on 2/1/2024 for hematocrit of 51.6%.  4/25/2024: Hematocrit 50.0%.  Due to the patient having the preseptal cellulitis around the right eye, I recommended holding off and therapeutic  phlebotomy today.    *Leukocytosis with Neutrophilia.   No recent infection.   He has no lymphadenopathy or splenomegaly.   This was concerning for the polycythemia representing bone marrow disorder (polycythemia vera) rather than secondary polycythemia.   4/21/2024: Neutrophil count increased to 12,600.  This is attributed to development of preseptal cellulitis.  4/25/2024: Neutrophil count 7840.    *Preseptal cellulitis following bee sting.  Patient was stung by a bee on 4/19/2024.  He will to ER on 4/21/2024 and was given steroids.  He was placed on Augmentin and Bactrim twice daily x 5 days.  He will complete this course tomorrow.  Exam revealed persistent erythema and swelling.  I recommend extending the antibiotic course to a total of 10 days.    PLAN:    1.  We will hold off on therapeutic phlebotomy today.  2.  I sent the prescription to his pharmacy for Augmentin 875 mg twice daily and Bactrim twice daily x 5 days for each antibiotic.  3.  I sent a prescription for ibuprofen 800 mg 3 times daily that was previously prescribed by his PCP per the patient's request.  4.  I told the patient that if his symptoms do not improve with this antibiotic, he will need to contact us or his PCP since he will need to see plastic surgeon for possible drainage of an abscess.  5.  Return in 2 months for CBC.  He will have a therapeutic phlebotomy if hematocrit is >50%.  6.  I will see him in follow-up in 5 months with CBC and possible phlebotomy.        Drew Neal MD  04/25/24

## 2024-05-10 ENCOUNTER — HOSPITAL ENCOUNTER (OUTPATIENT)
Dept: MRI IMAGING | Facility: HOSPITAL | Age: 64
Discharge: HOME | End: 2024-05-10
Payer: COMMERCIAL

## 2024-05-10 DIAGNOSIS — H90.A21 SENSORINEURAL HEARING LOSS, UNILATERAL, RIGHT EAR, WITH RESTRICTED HEARING ON THE CONTRALATERAL SIDE: ICD-10-CM

## 2024-05-10 PROCEDURE — A9577 INJ MULTIHANCE: HCPCS | Performed by: OTOLARYNGOLOGY

## 2024-05-10 PROCEDURE — 70553 MRI BRAIN STEM W/O & W/DYE: CPT

## 2024-05-10 PROCEDURE — 82565 ASSAY OF CREATININE: CPT

## 2024-05-10 PROCEDURE — 0 GADOBENATE DIMEGLUMINE 529 MG/ML SOLUTION: Performed by: OTOLARYNGOLOGY

## 2024-05-10 RX ADMIN — GADOBENATE DIMEGLUMINE 18 ML: 529 INJECTION, SOLUTION INTRAVENOUS at 13:16

## 2024-05-11 LAB — CREAT BLDA-MCNC: 1.1 MG/DL (ref 0.6–1.3)

## 2024-05-16 DIAGNOSIS — F41.9 ANXIETY: ICD-10-CM

## 2024-05-16 DIAGNOSIS — E11.29 TYPE 2 DIABETES MELLITUS WITH PROTEINURIA: ICD-10-CM

## 2024-05-16 DIAGNOSIS — I10 ESSENTIAL HYPERTENSION: ICD-10-CM

## 2024-05-16 DIAGNOSIS — E11.9 TYPE 2 DIABETES MELLITUS WITHOUT COMPLICATION, WITHOUT LONG-TERM CURRENT USE OF INSULIN: ICD-10-CM

## 2024-05-16 DIAGNOSIS — R80.9 TYPE 2 DIABETES MELLITUS WITH PROTEINURIA: ICD-10-CM

## 2024-05-16 NOTE — TELEPHONE ENCOUNTER
Rx Refill Note  Requested Prescriptions     Pending Prescriptions Disp Refills    empagliflozin (Jardiance) 25 MG tablet tablet 30 tablet 0     Sig: Take 1 tablet by mouth Daily.    sertraline (ZOLOFT) 50 MG tablet 30 tablet 0     Sig: Take 1 tablet by mouth Daily. Indications: Major Depressive Disorder    amLODIPine (NORVASC) 10 MG tablet 30 tablet 0     Sig: Take 1 tablet by mouth Daily. Indications: High Blood Pressure Disorder    glipizide (GLUCOTROL XL) 10 MG 24 hr tablet 60 tablet 0     Sig: Take 1 tablet by mouth 2 (Two) Times a Day.      Last office visit with prescribing clinician: 9/25/2023   Last telemedicine visit with prescribing clinician: Visit date not found   Next office visit with prescribing clinician: Visit date not found                         Would you like a call back once the refill request has been completed: [] Yes [] No    If the office needs to give you a call back, can they leave a voicemail: [] Yes [] No    Sarwat Rodriguez, PCT  05/16/24, 13:54 EDT

## 2024-05-16 NOTE — TELEPHONE ENCOUNTER
Sent pt a message that an appt is needed    Rx Refill Note  Requested Prescriptions     Pending Prescriptions Disp Refills    empagliflozin (Jardiance) 25 MG tablet tablet 30 tablet 0     Sig: Take 1 tablet by mouth Daily.    sertraline (ZOLOFT) 50 MG tablet 30 tablet 0     Sig: Take 1 tablet by mouth Daily. Indications: Major Depressive Disorder    amLODIPine (NORVASC) 10 MG tablet 30 tablet 0     Sig: Take 1 tablet by mouth Daily. Indications: High Blood Pressure Disorder    glipizide (GLUCOTROL XL) 10 MG 24 hr tablet 60 tablet 0     Sig: Take 1 tablet by mouth 2 (Two) Times a Day.      Last office visit with prescribing clinician: 9/25/2023   Last telemedicine visit with prescribing clinician: Visit date not found   Next office visit with prescribing clinician: Visit date not found                         Would you like a call back once the refill request has been completed: [] Yes [] No    If the office needs to give you a call back, can they leave a voicemail: [] Yes [] No    Cindy Santoyo MA  05/16/24, 13:59 EDT

## 2024-05-16 NOTE — TELEPHONE ENCOUNTER
HgA1c in past 6 months    Recent or future visit with authorizing provider     Rx Refill Note  Requested Prescriptions     Pending Prescriptions Disp Refills    empagliflozin (Jardiance) 25 MG tablet tablet 30 tablet 0     Sig: Take 1 tablet by mouth Daily.    sertraline (ZOLOFT) 50 MG tablet 30 tablet 0     Sig: Take 1 tablet by mouth Daily. Indications: Major Depressive Disorder      Last office visit with prescribing clinician: 9/25/2023   Last telemedicine visit with prescribing clinician: Visit date not found   Next office visit with prescribing clinician: Visit date not found                         Would you like a call back once the refill request has been completed: [] Yes [] No    If the office needs to give you a call back, can they leave a voicemail: [] Yes [] No    Cindy Santoyo MA  05/16/24, 13:53 EDT

## 2024-05-17 RX ORDER — GLIPIZIDE 10 MG/1
10 TABLET, FILM COATED, EXTENDED RELEASE ORAL 2 TIMES DAILY
Qty: 60 TABLET | Refills: 0 | Status: SHIPPED | OUTPATIENT
Start: 2024-05-17

## 2024-05-17 RX ORDER — AMLODIPINE BESYLATE 10 MG/1
10 TABLET ORAL DAILY
Qty: 30 TABLET | Refills: 0 | Status: SHIPPED | OUTPATIENT
Start: 2024-05-17

## 2024-05-22 ENCOUNTER — TELEPHONE (OUTPATIENT)
Dept: INTERNAL MEDICINE | Facility: CLINIC | Age: 64
End: 2024-05-22
Payer: COMMERCIAL

## 2024-05-22 DIAGNOSIS — R80.9 TYPE 2 DIABETES MELLITUS WITH PROTEINURIA: Primary | ICD-10-CM

## 2024-05-22 DIAGNOSIS — E11.29 TYPE 2 DIABETES MELLITUS WITH PROTEINURIA: Primary | ICD-10-CM

## 2024-05-22 DIAGNOSIS — E04.1 THYROID NODULE: ICD-10-CM

## 2024-05-22 DIAGNOSIS — I10 ESSENTIAL HYPERTENSION: ICD-10-CM

## 2024-05-22 DIAGNOSIS — F41.9 ANXIETY: ICD-10-CM

## 2024-06-15 LAB
ALBUMIN SERPL-MCNC: 4.6 G/DL (ref 3.5–5.2)
ALBUMIN/CREAT UR: 434 MG/G CREAT (ref 0–29)
ALBUMIN/GLOB SERPL: 1.6 G/DL
ALP SERPL-CCNC: 155 U/L (ref 39–117)
ALT SERPL-CCNC: 29 U/L (ref 1–41)
AST SERPL-CCNC: 51 U/L (ref 1–40)
BASOPHILS # BLD AUTO: 0.06 10*3/MM3 (ref 0–0.2)
BASOPHILS NFR BLD AUTO: 0.7 % (ref 0–1.5)
BILIRUB SERPL-MCNC: 0.5 MG/DL (ref 0–1.2)
BUN SERPL-MCNC: 22 MG/DL (ref 8–23)
BUN/CREAT SERPL: 17.9 (ref 7–25)
CALCIUM SERPL-MCNC: 10.4 MG/DL (ref 8.6–10.5)
CHLORIDE SERPL-SCNC: 96 MMOL/L (ref 98–107)
CHOLEST SERPL-MCNC: 148 MG/DL (ref 0–200)
CO2 SERPL-SCNC: 28.2 MMOL/L (ref 22–29)
CREAT SERPL-MCNC: 1.23 MG/DL (ref 0.76–1.27)
CREAT UR-MCNC: 43.2 MG/DL
EGFRCR SERPLBLD CKD-EPI 2021: 66.8 ML/MIN/1.73
EOSINOPHIL # BLD AUTO: 0.16 10*3/MM3 (ref 0–0.4)
EOSINOPHIL NFR BLD AUTO: 1.8 % (ref 0.3–6.2)
ERYTHROCYTE [DISTWIDTH] IN BLOOD BY AUTOMATED COUNT: 19.4 % (ref 12.3–15.4)
GLOBULIN SER CALC-MCNC: 2.9 GM/DL
GLUCOSE SERPL-MCNC: 224 MG/DL (ref 65–99)
HBA1C MFR BLD: 10.6 % (ref 4.8–5.6)
HCT VFR BLD AUTO: 53.6 % (ref 37.5–51)
HDLC SERPL-MCNC: 29 MG/DL (ref 40–60)
HGB BLD-MCNC: 16.5 G/DL (ref 13–17.7)
IMM GRANULOCYTES # BLD AUTO: 0.04 10*3/MM3 (ref 0–0.05)
IMM GRANULOCYTES NFR BLD AUTO: 0.5 % (ref 0–0.5)
LDLC SERPL CALC-MCNC: 52 MG/DL (ref 0–100)
LDLC/HDLC SERPL: 1.01 {RATIO}
LYMPHOCYTES # BLD AUTO: 1.85 10*3/MM3 (ref 0.7–3.1)
LYMPHOCYTES NFR BLD AUTO: 20.9 % (ref 19.6–45.3)
MCH RBC QN AUTO: 24.2 PG (ref 26.6–33)
MCHC RBC AUTO-ENTMCNC: 30.8 G/DL (ref 31.5–35.7)
MCV RBC AUTO: 78.7 FL (ref 79–97)
MICROALBUMIN UR-MCNC: 187.4 UG/ML
MONOCYTES # BLD AUTO: 0.44 10*3/MM3 (ref 0.1–0.9)
MONOCYTES NFR BLD AUTO: 5 % (ref 5–12)
NEUTROPHILS # BLD AUTO: 6.32 10*3/MM3 (ref 1.7–7)
NEUTROPHILS NFR BLD AUTO: 71.1 % (ref 42.7–76)
NRBC BLD AUTO-RTO: 0 /100 WBC (ref 0–0.2)
PLATELET # BLD AUTO: 261 10*3/MM3 (ref 140–450)
POTASSIUM SERPL-SCNC: 4.8 MMOL/L (ref 3.5–5.2)
PROT SERPL-MCNC: 7.5 G/DL (ref 6–8.5)
RBC # BLD AUTO: 6.81 10*6/MM3 (ref 4.14–5.8)
SODIUM SERPL-SCNC: 138 MMOL/L (ref 136–145)
T4 FREE SERPL-MCNC: 1.22 NG/DL (ref 0.92–1.68)
TRIGL SERPL-MCNC: 448 MG/DL (ref 0–150)
TSH SERPL DL<=0.005 MIU/L-ACNC: 4.15 UIU/ML (ref 0.27–4.2)
VLDLC SERPL CALC-MCNC: 67 MG/DL (ref 5–40)
WBC # BLD AUTO: 8.87 10*3/MM3 (ref 3.4–10.8)

## 2024-06-17 DIAGNOSIS — E11.9 TYPE 2 DIABETES MELLITUS WITHOUT COMPLICATION, WITHOUT LONG-TERM CURRENT USE OF INSULIN: ICD-10-CM

## 2024-06-17 DIAGNOSIS — F41.9 ANXIETY: ICD-10-CM

## 2024-06-17 DIAGNOSIS — M1A.09X0 CHRONIC GOUT OF MULTIPLE SITES, UNSPECIFIED CAUSE: ICD-10-CM

## 2024-06-17 DIAGNOSIS — I10 ESSENTIAL HYPERTENSION: ICD-10-CM

## 2024-06-17 DIAGNOSIS — E11.29 TYPE 2 DIABETES MELLITUS WITH PROTEINURIA: ICD-10-CM

## 2024-06-17 DIAGNOSIS — R80.9 TYPE 2 DIABETES MELLITUS WITH PROTEINURIA: ICD-10-CM

## 2024-06-17 RX ORDER — EMPAGLIFLOZIN 25 MG/1
25 TABLET, FILM COATED ORAL DAILY
Qty: 30 TABLET | Refills: 0 | Status: SHIPPED | OUTPATIENT
Start: 2024-06-17

## 2024-06-17 RX ORDER — GLIPIZIDE 10 MG/1
10 TABLET, FILM COATED, EXTENDED RELEASE ORAL 2 TIMES DAILY
Qty: 60 TABLET | Refills: 0 | Status: SHIPPED | OUTPATIENT
Start: 2024-06-17

## 2024-06-17 RX ORDER — ALLOPURINOL 300 MG/1
TABLET ORAL
Qty: 60 TABLET | Refills: 1 | Status: SHIPPED | OUTPATIENT
Start: 2024-06-17

## 2024-06-17 RX ORDER — METOPROLOL TARTRATE 100 MG/1
TABLET ORAL
Qty: 60 TABLET | Refills: 1 | Status: SHIPPED | OUTPATIENT
Start: 2024-06-17

## 2024-06-17 RX ORDER — AMLODIPINE BESYLATE 10 MG/1
10 TABLET ORAL DAILY
Qty: 30 TABLET | Refills: 0 | Status: SHIPPED | OUTPATIENT
Start: 2024-06-17

## 2024-06-17 NOTE — TELEPHONE ENCOUNTER
Uric acid in past 12 months     Rx Refill Note  Requested Prescriptions     Pending Prescriptions Disp Refills    allopurinol (ZYLOPRIM) 300 MG tablet [Pharmacy Med Name: ALLOPURINOL 300 MG TABLET] 60 tablet 1     Sig: TAKE 1 TABLET BY MOUTH TWICE A DAY     Signed Prescriptions Disp Refills    glipizide (GLUCOTROL XL) 10 MG 24 hr tablet 60 tablet 0     Sig: TAKE 1 TABLET BY MOUTH 2 TIMES A DAY     Authorizing Provider: TERA HOFFMAN     Ordering User: RUBY SANTOYO    Jardiance 25 MG tablet tablet 30 tablet 0     Sig: TAKE 1 TABLET BY MOUTH DAILY     Authorizing Provider: TERA HOFFMAN     Ordering User: RUBY SANTOYO    sertraline (ZOLOFT) 50 MG tablet 30 tablet 0     Sig: TAKE 1 TABLET BY MOUTH DAILY     Authorizing Provider: TERA HOFFMAN     Ordering User: RUBY SANTOYO    metoprolol tartrate (LOPRESSOR) 100 MG tablet 60 tablet 1     Sig: TAKE 1 TABLET BY MOUTH TWICE A DAY     Authorizing Provider: TERA HOFFMAN     Ordering User: RUBY SANTOYO    amLODIPine (NORVASC) 10 MG tablet 30 tablet 0     Sig: TAKE 1 TABLET BY MOUTH DAILY     Authorizing Provider: TERA HOFFMAN     Ordering User: RUBY SANTOYO      Last office visit with prescribing clinician: 9/25/2023   Last telemedicine visit with prescribing clinician: Visit date not found   Next office visit with prescribing clinician: 6/21/2024                         Would you like a call back once the refill request has been completed: [] Yes [] No    If the office needs to give you a call back, can they leave a voicemail: [] Yes [] No    Ruby Santoyo MA  06/17/24, 10:54 EDT

## 2024-06-20 ENCOUNTER — INFUSION (OUTPATIENT)
Dept: ONCOLOGY | Facility: HOSPITAL | Age: 64
End: 2024-06-20
Payer: COMMERCIAL

## 2024-06-20 ENCOUNTER — LAB (OUTPATIENT)
Dept: LAB | Facility: HOSPITAL | Age: 64
End: 2024-06-20
Payer: COMMERCIAL

## 2024-06-20 VITALS
HEART RATE: 85 BPM | DIASTOLIC BLOOD PRESSURE: 75 MMHG | WEIGHT: 197.4 LBS | SYSTOLIC BLOOD PRESSURE: 138 MMHG | TEMPERATURE: 97.8 F | BODY MASS INDEX: 30.91 KG/M2 | RESPIRATION RATE: 16 BRPM | OXYGEN SATURATION: 92 %

## 2024-06-20 DIAGNOSIS — D72.9 NEUTROPHILIA: ICD-10-CM

## 2024-06-20 DIAGNOSIS — D75.1 POLYCYTHEMIA: ICD-10-CM

## 2024-06-20 DIAGNOSIS — D75.1 POLYCYTHEMIA: Primary | ICD-10-CM

## 2024-06-20 LAB
BASOPHILS # BLD AUTO: 0.08 10*3/MM3 (ref 0–0.2)
BASOPHILS NFR BLD AUTO: 0.7 % (ref 0–1.5)
DEPRECATED RDW RBC AUTO: 45.9 FL (ref 37–54)
EOSINOPHIL # BLD AUTO: 0.11 10*3/MM3 (ref 0–0.4)
EOSINOPHIL NFR BLD AUTO: 1 % (ref 0.3–6.2)
ERYTHROCYTE [DISTWIDTH] IN BLOOD BY AUTOMATED COUNT: 18.4 % (ref 12.3–15.4)
HCT VFR BLD AUTO: 50.2 % (ref 37.5–51)
HGB BLD-MCNC: 16.7 G/DL (ref 13–17.7)
IMM GRANULOCYTES # BLD AUTO: 0.05 10*3/MM3 (ref 0–0.05)
IMM GRANULOCYTES NFR BLD AUTO: 0.5 % (ref 0–0.5)
LYMPHOCYTES # BLD AUTO: 2.1 10*3/MM3 (ref 0.7–3.1)
LYMPHOCYTES NFR BLD AUTO: 19 % (ref 19.6–45.3)
MCH RBC QN AUTO: 25.8 PG (ref 26.6–33)
MCHC RBC AUTO-ENTMCNC: 33.3 G/DL (ref 31.5–35.7)
MCV RBC AUTO: 77.6 FL (ref 79–97)
MONOCYTES # BLD AUTO: 0.54 10*3/MM3 (ref 0.1–0.9)
MONOCYTES NFR BLD AUTO: 4.9 % (ref 5–12)
NEUTROPHILS NFR BLD AUTO: 73.9 % (ref 42.7–76)
NEUTROPHILS NFR BLD AUTO: 8.17 10*3/MM3 (ref 1.7–7)
NRBC BLD AUTO-RTO: 0 /100 WBC (ref 0–0.2)
PLATELET # BLD AUTO: 219 10*3/MM3 (ref 140–450)
PMV BLD AUTO: 10.5 FL (ref 6–12)
RBC # BLD AUTO: 6.47 10*6/MM3 (ref 4.14–5.8)
WBC NRBC COR # BLD AUTO: 11.05 10*3/MM3 (ref 3.4–10.8)

## 2024-06-20 PROCEDURE — 36415 COLL VENOUS BLD VENIPUNCTURE: CPT

## 2024-06-20 PROCEDURE — 99195 PHLEBOTOMY: CPT

## 2024-06-20 PROCEDURE — 85025 COMPLETE CBC W/AUTO DIFF WBC: CPT

## 2024-06-20 PROCEDURE — 25810000003 SODIUM CHLORIDE 0.9 % SOLUTION: Performed by: INTERNAL MEDICINE

## 2024-06-20 RX ORDER — SODIUM CHLORIDE 9 MG/ML
250 INJECTION, SOLUTION INTRAVENOUS ONCE
Status: COMPLETED | OUTPATIENT
Start: 2024-06-20 | End: 2024-06-20

## 2024-06-20 RX ADMIN — SODIUM CHLORIDE 250 ML: 9 INJECTION, SOLUTION INTRAVENOUS at 15:25

## 2024-06-26 ENCOUNTER — OFFICE VISIT (OUTPATIENT)
Dept: INTERNAL MEDICINE | Facility: CLINIC | Age: 64
End: 2024-06-26
Payer: COMMERCIAL

## 2024-06-26 VITALS
OXYGEN SATURATION: 97 % | WEIGHT: 197.8 LBS | SYSTOLIC BLOOD PRESSURE: 120 MMHG | DIASTOLIC BLOOD PRESSURE: 78 MMHG | HEIGHT: 67 IN | BODY MASS INDEX: 31.04 KG/M2 | HEART RATE: 83 BPM | TEMPERATURE: 97.8 F

## 2024-06-26 DIAGNOSIS — E04.1 THYROID NODULE: Primary | ICD-10-CM

## 2024-06-26 DIAGNOSIS — Z12.11 COLON CANCER SCREENING: ICD-10-CM

## 2024-06-26 DIAGNOSIS — I10 ESSENTIAL HYPERTENSION: ICD-10-CM

## 2024-06-26 DIAGNOSIS — R80.9 TYPE 2 DIABETES MELLITUS WITH PROTEINURIA: ICD-10-CM

## 2024-06-26 DIAGNOSIS — F41.9 ANXIETY: ICD-10-CM

## 2024-06-26 DIAGNOSIS — E11.9 TYPE 2 DIABETES MELLITUS WITHOUT COMPLICATION, WITHOUT LONG-TERM CURRENT USE OF INSULIN: ICD-10-CM

## 2024-06-26 DIAGNOSIS — M1A.09X0 CHRONIC GOUT OF MULTIPLE SITES, UNSPECIFIED CAUSE: ICD-10-CM

## 2024-06-26 DIAGNOSIS — E11.29 TYPE 2 DIABETES MELLITUS WITH PROTEINURIA: ICD-10-CM

## 2024-06-26 DIAGNOSIS — E78.2 MIXED HYPERLIPIDEMIA: ICD-10-CM

## 2024-06-26 PROCEDURE — 99214 OFFICE O/P EST MOD 30 MIN: CPT | Performed by: INTERNAL MEDICINE

## 2024-06-26 PROCEDURE — 1159F MED LIST DOCD IN RCRD: CPT | Performed by: INTERNAL MEDICINE

## 2024-06-26 PROCEDURE — 3046F HEMOGLOBIN A1C LEVEL >9.0%: CPT | Performed by: INTERNAL MEDICINE

## 2024-06-26 PROCEDURE — 1160F RVW MEDS BY RX/DR IN RCRD: CPT | Performed by: INTERNAL MEDICINE

## 2024-06-26 PROCEDURE — 3074F SYST BP LT 130 MM HG: CPT | Performed by: INTERNAL MEDICINE

## 2024-06-26 PROCEDURE — 1126F AMNT PAIN NOTED NONE PRSNT: CPT | Performed by: INTERNAL MEDICINE

## 2024-06-26 PROCEDURE — 3078F DIAST BP <80 MM HG: CPT | Performed by: INTERNAL MEDICINE

## 2024-06-26 RX ORDER — HYDROCHLOROTHIAZIDE 25 MG/1
25 TABLET ORAL DAILY
Qty: 90 TABLET | Refills: 1 | Status: SHIPPED | OUTPATIENT
Start: 2024-06-26

## 2024-06-26 RX ORDER — ALLOPURINOL 300 MG/1
300 TABLET ORAL 2 TIMES DAILY
Qty: 180 TABLET | Refills: 1 | Status: SHIPPED | OUTPATIENT
Start: 2024-06-26

## 2024-06-26 RX ORDER — GLIPIZIDE 10 MG/1
10 TABLET, FILM COATED, EXTENDED RELEASE ORAL 2 TIMES DAILY
Qty: 180 TABLET | Refills: 1 | Status: SHIPPED | OUTPATIENT
Start: 2024-06-26

## 2024-06-26 RX ORDER — LEVOCETIRIZINE DIHYDROCHLORIDE 5 MG/1
2.5 TABLET, FILM COATED ORAL AS NEEDED
COMMUNITY

## 2024-06-26 RX ORDER — SITAGLIPTIN AND METFORMIN HYDROCHLORIDE 1000; 50 MG/1; MG/1
2 TABLET, FILM COATED, EXTENDED RELEASE ORAL DAILY
Qty: 180 TABLET | Refills: 1 | Status: SHIPPED | OUTPATIENT
Start: 2024-06-26

## 2024-06-26 RX ORDER — AMLODIPINE BESYLATE 10 MG/1
10 TABLET ORAL DAILY
Qty: 90 TABLET | Refills: 1 | Status: SHIPPED | OUTPATIENT
Start: 2024-06-26

## 2024-06-26 RX ORDER — METOPROLOL TARTRATE 100 MG/1
100 TABLET ORAL 2 TIMES DAILY
Qty: 180 TABLET | Refills: 1 | Status: SHIPPED | OUTPATIENT
Start: 2024-06-26

## 2024-06-26 NOTE — PROGRESS NOTES
Pablo Mendoza is a 63 y.o. male, who presents with a chief complaint of   Chief Complaint   Patient presents with    Diabetes    Polycythemia    Migraine with status migrainosus, not intractable, unspecif    Hearing Problem     Seen ENT for hearing loss, needing help finding place for hearing aids who accepts passport           HPI   Pt here for f/u    He has had hearing loss over the past year and was dx with sudden sensorineural hearing loss.  He is trying to find an office that will accept passport for hearing aids.    He says his memory is shot.  He has veery little energy    T2DM - A1c 7.3->10.6  he says it took a while to get his meds when he changed to passport.  He is on glipizide and jardiance.  He is also supposed to be on janumet xr but does not have this med.  He has not been taking all of his meds on a regular basis.     Htn - on amlodipine, metoprolol, hctz    HLD - on atorvastain.  No cramps or myalgias.     Anxiety - on sertraline.  He has lots of anxiety around people or crowds.    The following portions of the patient's history were reviewed and updated as appropriate: allergies, current medications, past family history, past medical history, past social history, past surgical history and problem list.    Allergies: Aspirin and Lisinopril    Review of Systems   Constitutional: Negative.    HENT: Negative.     Eyes: Negative.    Respiratory: Negative.     Cardiovascular: Negative.    Gastrointestinal: Negative.    Endocrine: Negative.    Genitourinary: Negative.    Musculoskeletal: Negative.    Skin: Negative.    Allergic/Immunologic: Negative.    Neurological: Negative.    Hematological: Negative.    Psychiatric/Behavioral: Negative.     All other systems reviewed and are negative.            Wt Readings from Last 3 Encounters:   06/26/24 89.7 kg (197 lb 12.8 oz)   06/20/24 89.5 kg (197 lb 6.4 oz)   04/25/24 89.2 kg (196 lb 11.2 oz)     Temp Readings from Last 3 Encounters:   06/26/24  97.8 °F (36.6 °C) (Infrared)   06/20/24 97.8 °F (36.6 °C)   04/25/24 97.8 °F (36.6 °C) (Temporal)     BP Readings from Last 3 Encounters:   06/26/24 120/78   06/20/24 138/75   04/25/24 137/86     Pulse Readings from Last 3 Encounters:   06/26/24 83   06/20/24 85   04/25/24 79     Body mass index is 30.97 kg/m².  SpO2 Readings from Last 3 Encounters:   06/26/24 97%   06/20/24 92%   04/25/24 94%          Physical Exam  Vitals and nursing note reviewed.   Constitutional:       General: He is not in acute distress.     Appearance: He is well-developed.   HENT:      Head: Normocephalic and atraumatic.      Right Ear: External ear normal.      Left Ear: External ear normal.      Nose: Nose normal.   Eyes:      Conjunctiva/sclera: Conjunctivae normal.      Pupils: Pupils are equal, round, and reactive to light.   Cardiovascular:      Rate and Rhythm: Normal rate and regular rhythm.      Heart sounds: Normal heart sounds.   Pulmonary:      Effort: Pulmonary effort is normal. No respiratory distress.      Breath sounds: Normal breath sounds. No wheezing.   Musculoskeletal:         General: Normal range of motion.      Cervical back: Normal range of motion and neck supple.      Comments: Normal gait   Skin:     General: Skin is warm and dry.   Neurological:      Mental Status: He is alert and oriented to person, place, and time.   Psychiatric:         Behavior: Behavior normal.         Thought Content: Thought content normal.         Judgment: Judgment normal.         Results for orders placed or performed in visit on 06/20/24   CBC Auto Differential    Specimen: Blood   Result Value Ref Range    WBC 11.05 (H) 3.40 - 10.80 10*3/mm3    RBC 6.47 (H) 4.14 - 5.80 10*6/mm3    Hemoglobin 16.7 13.0 - 17.7 g/dL    Hematocrit 50.2 37.5 - 51.0 %    MCV 77.6 (L) 79.0 - 97.0 fL    MCH 25.8 (L) 26.6 - 33.0 pg    MCHC 33.3 31.5 - 35.7 g/dL    RDW 18.4 (H) 12.3 - 15.4 %    RDW-SD 45.9 37.0 - 54.0 fl    MPV 10.5 6.0 - 12.0 fL    Platelets  219 140 - 450 10*3/mm3    Neutrophil % 73.9 42.7 - 76.0 %    Lymphocyte % 19.0 (L) 19.6 - 45.3 %    Monocyte % 4.9 (L) 5.0 - 12.0 %    Eosinophil % 1.0 0.3 - 6.2 %    Basophil % 0.7 0.0 - 1.5 %    Immature Grans % 0.5 0.0 - 0.5 %    Neutrophils, Absolute 8.17 (H) 1.70 - 7.00 10*3/mm3    Lymphocytes, Absolute 2.10 0.70 - 3.10 10*3/mm3    Monocytes, Absolute 0.54 0.10 - 0.90 10*3/mm3    Eosinophils, Absolute 0.11 0.00 - 0.40 10*3/mm3    Basophils, Absolute 0.08 0.00 - 0.20 10*3/mm3    Immature Grans, Absolute 0.05 0.00 - 0.05 10*3/mm3    nRBC 0.0 0.0 - 0.2 /100 WBC     Result Review :                  Assessment and Plan    Diagnoses and all orders for this visit:    1. Thyroid nodule (Primary)  -     T4, Free; Future  -     TSH; Future    2. Type 2 diabetes mellitus with proteinuria - uncontrolled with A1c > 10  Encouraged pt to get a pill organizer so he takes all doses of meds.  Restart janumet xr  Encouraged activity as tolerated and improved diet.  If glucoses not getting better will add insulin at next OV.    -     SITagliptin-metFORMIN HCl ER (Janumet XR)  MG tablet; Take 2 tablets by mouth Daily. Indications: Type 2 Diabetes  Dispense: 180 tablet; Refill: 1  -     empagliflozin (Jardiance) 25 MG tablet tablet; Take 1 tablet by mouth Daily.  Dispense: 90 tablet; Refill: 1  -     glipizide (GLUCOTROL XL) 10 MG 24 hr tablet; Take 1 tablet by mouth 2 (Two) Times a Day.  Dispense: 180 tablet; Refill: 1  -     CBC & Differential; Future  -     Comprehensive Metabolic Panel; Future  -     Hemoglobin A1c; Future  -     Lipid Panel With LDL / HDL Ratio; Future  -     T4, Free; Future  -     TSH; Future  -     Microalbumin / Creatinine Urine Ratio - Urine, Clean Catch; Future    3. Type 2 diabetes mellitus without complication, without long-term current use of insulin  -     empagliflozin (Jardiance) 25 MG tablet tablet; Take 1 tablet by mouth Daily.  Dispense: 90 tablet; Refill: 1    4. Essential hypertension  -      amLODIPine (NORVASC) 10 MG tablet; Take 1 tablet by mouth Daily.  Dispense: 90 tablet; Refill: 1  -     hydroCHLOROthiazide 25 MG tablet; Take 1 tablet by mouth Daily.  Dispense: 90 tablet; Refill: 1  -     metoprolol tartrate (LOPRESSOR) 100 MG tablet; Take 1 tablet by mouth 2 (Two) Times a Day. Indications: High Blood Pressure Disorder  Dispense: 180 tablet; Refill: 1  -     CBC & Differential; Future  -     Comprehensive Metabolic Panel; Future  -     Hemoglobin A1c; Future  -     Lipid Panel With LDL / HDL Ratio; Future  -     T4, Free; Future  -     TSH; Future  -     Microalbumin / Creatinine Urine Ratio - Urine, Clean Catch; Future    5. Chronic gout of multiple sites, unspecified cause  -     allopurinol (ZYLOPRIM) 300 MG tablet; Take 1 tablet by mouth 2 (Two) Times a Day.  Dispense: 180 tablet; Refill: 1  -     Uric acid; Future    6. Anxiety  -     sertraline (ZOLOFT) 50 MG tablet; Take 1 tablet by mouth Daily.  Dispense: 90 tablet; Refill: 1    7. Mixed hyperlipidemia  -     Comprehensive Metabolic Panel; Future  -     Lipid Panel With LDL / HDL Ratio; Future           BMI is >= 30 and <35. (Class 1 Obesity). The following options were offered after discussion;: exercise counseling/recommendations and nutrition counseling/recommendations             Outpatient Medications Prior to Visit   Medication Sig Dispense Refill    acetaminophen (TYLENOL) 500 MG tablet Take 1 tablet by mouth Every 6 (Six) Hours As Needed for Mild Pain. Indications: Pain      atorvastatin (LIPITOR) 40 MG tablet TAKE 1 TABLET BY MOUTH DAILY 90 tablet 1    ibuprofen (ADVIL,MOTRIN) 800 MG tablet Take 1 tablet by mouth Every 8 (Eight) Hours As Needed for Mild Pain. 30 tablet 0    levocetirizine (XYZAL) 5 MG tablet Take 0.5 tablets by mouth As Needed for Allergies.      Rimegepant Sulfate (NURTEC) 75 MG tablet dispersible tablet Take 1 tablet by mouth Daily As Needed (migraine head). 4 tablet 0    allopurinol (ZYLOPRIM) 300 MG tablet  TAKE 1 TABLET BY MOUTH TWICE A DAY 60 tablet 1    amLODIPine (NORVASC) 10 MG tablet TAKE 1 TABLET BY MOUTH DAILY 30 tablet 0    glipizide (GLUCOTROL XL) 10 MG 24 hr tablet TAKE 1 TABLET BY MOUTH 2 TIMES A DAY 60 tablet 0    hydroCHLOROthiazide 25 MG tablet TAKE 1 TABLET BY MOUTH DAILY 30 tablet 2    Jardiance 25 MG tablet tablet TAKE 1 TABLET BY MOUTH DAILY 30 tablet 0    metoprolol tartrate (LOPRESSOR) 100 MG tablet TAKE 1 TABLET BY MOUTH TWICE A DAY 60 tablet 1    sertraline (ZOLOFT) 50 MG tablet TAKE 1 TABLET BY MOUTH DAILY 30 tablet 0    sildenafil (Viagra) 50 MG tablet Take 1 tablet by mouth Daily As Needed for Erectile Dysfunction. (Patient not taking: Reported on 6/26/2024) 30 tablet 0    amoxicillin-clavulanate (AUGMENTIN) 875-125 MG per tablet Take 1 tablet by mouth Every 12 (Twelve) Hours. 10 tablet 0    hydrOXYzine (ATARAX) 25 MG tablet Take 1 tablet by mouth Daily As Needed for Anxiety. (Patient not taking: Reported on 6/26/2024) 30 tablet 0    Janumet XR  MG tablet TAKE ONE TABLET BY MOUTH TWICE A DAY (Patient not taking: Reported on 6/26/2024) 60 tablet 3    levETIRAcetam (KEPPRA) 500 MG tablet Take 0.5 tablets by mouth 2 (Two) Times a Day for 14 days, THEN 0.5 tablets Daily for 14 days. Then discontinue  Indications: Seizure      sulfamethoxazole-trimethoprim (BACTRIM DS,SEPTRA DS) 800-160 MG per tablet Take 1 tablet by mouth 2 (Two) Times a Day. (Patient not taking: Reported on 6/26/2024) 10 tablet 0    topiramate (TOPAMAX) 25 MG tablet TAKE ONE TABLET BY MOUTH ONCE NIGHTLY *MIGRAINE HEADACHE* (Patient not taking: Reported on 6/26/2024) 30 tablet 0     No facility-administered medications prior to visit.     New Medications Ordered This Visit   Medications    SITagliptin-metFORMIN HCl ER (Janumet XR)  MG tablet     Sig: Take 2 tablets by mouth Daily. Indications: Type 2 Diabetes     Dispense:  180 tablet     Refill:  1    empagliflozin (Jardiance) 25 MG tablet tablet     Sig: Take 1  tablet by mouth Daily.     Dispense:  90 tablet     Refill:  1     Order Specific Question:   Lot Number?     Answer:   [id number]     Order Specific Question:   Expiration Date?     Answer:   9/1/2025     Order Specific Question:   Quantity     Answer:   1    glipizide (GLUCOTROL XL) 10 MG 24 hr tablet     Sig: Take 1 tablet by mouth 2 (Two) Times a Day.     Dispense:  180 tablet     Refill:  1    amLODIPine (NORVASC) 10 MG tablet     Sig: Take 1 tablet by mouth Daily.     Dispense:  90 tablet     Refill:  1    hydroCHLOROthiazide 25 MG tablet     Sig: Take 1 tablet by mouth Daily.     Dispense:  90 tablet     Refill:  1    metoprolol tartrate (LOPRESSOR) 100 MG tablet     Sig: Take 1 tablet by mouth 2 (Two) Times a Day. Indications: High Blood Pressure Disorder     Dispense:  180 tablet     Refill:  1    allopurinol (ZYLOPRIM) 300 MG tablet     Sig: Take 1 tablet by mouth 2 (Two) Times a Day.     Dispense:  180 tablet     Refill:  1    sertraline (ZOLOFT) 50 MG tablet     Sig: Take 1 tablet by mouth Daily.     Dispense:  90 tablet     Refill:  1     [unfilled]  Medications Discontinued During This Encounter   Medication Reason    amoxicillin-clavulanate (AUGMENTIN) 875-125 MG per tablet *Therapy completed    sulfamethoxazole-trimethoprim (BACTRIM DS,SEPTRA DS) 800-160 MG per tablet *Therapy completed    topiramate (TOPAMAX) 25 MG tablet *Therapy completed    Janumet XR  MG tablet Reorder    levETIRAcetam (KEPPRA) 500 MG tablet *Therapy completed    hydrOXYzine (ATARAX) 25 MG tablet *Therapy completed    hydroCHLOROthiazide 25 MG tablet Reorder    glipizide (GLUCOTROL XL) 10 MG 24 hr tablet Reorder    Jardiance 25 MG tablet tablet Reorder    sertraline (ZOLOFT) 50 MG tablet Reorder    allopurinol (ZYLOPRIM) 300 MG tablet Reorder    metoprolol tartrate (LOPRESSOR) 100 MG tablet Reorder    amLODIPine (NORVASC) 10 MG tablet Reorder         Return in about 3 months (around 9/26/2024) for Recheck,  labs.    Patient was given instructions and counseling regarding her condition or for health maintenance advice. Please see specific information pulled into the AVS if appropriate.

## 2024-07-10 DIAGNOSIS — M54.41 CHRONIC BILATERAL LOW BACK PAIN WITH RIGHT-SIDED SCIATICA: ICD-10-CM

## 2024-07-10 DIAGNOSIS — G89.29 CHRONIC BILATERAL LOW BACK PAIN WITH RIGHT-SIDED SCIATICA: ICD-10-CM

## 2024-07-10 RX ORDER — IBUPROFEN 800 MG/1
800 TABLET ORAL EVERY 8 HOURS PRN
Qty: 30 TABLET | Refills: 0 | Status: SHIPPED | OUTPATIENT
Start: 2024-07-10

## 2024-07-19 DIAGNOSIS — I10 ESSENTIAL HYPERTENSION: ICD-10-CM

## 2024-07-19 DIAGNOSIS — F41.9 ANXIETY: ICD-10-CM

## 2024-07-19 DIAGNOSIS — R80.9 TYPE 2 DIABETES MELLITUS WITH PROTEINURIA: ICD-10-CM

## 2024-07-19 DIAGNOSIS — E11.29 TYPE 2 DIABETES MELLITUS WITH PROTEINURIA: ICD-10-CM

## 2024-07-22 ENCOUNTER — APPOINTMENT (OUTPATIENT)
Dept: GENERAL RADIOLOGY | Facility: HOSPITAL | Age: 64
End: 2024-07-22
Payer: COMMERCIAL

## 2024-07-22 ENCOUNTER — APPOINTMENT (OUTPATIENT)
Dept: CT IMAGING | Facility: HOSPITAL | Age: 64
End: 2024-07-22
Payer: COMMERCIAL

## 2024-07-22 ENCOUNTER — APPOINTMENT (OUTPATIENT)
Dept: MRI IMAGING | Facility: HOSPITAL | Age: 64
End: 2024-07-22
Payer: COMMERCIAL

## 2024-07-22 ENCOUNTER — HOSPITAL ENCOUNTER (OUTPATIENT)
Facility: HOSPITAL | Age: 64
Setting detail: OBSERVATION
Discharge: HOME | End: 2024-07-23
Attending: EMERGENCY MEDICINE | Admitting: STUDENT IN AN ORGANIZED HEALTH CARE EDUCATION/TRAINING PROGRAM
Payer: COMMERCIAL

## 2024-07-22 DIAGNOSIS — F41.9 ANXIETY: ICD-10-CM

## 2024-07-22 DIAGNOSIS — R42 VERTIGO: Primary | ICD-10-CM

## 2024-07-22 DIAGNOSIS — I10 ESSENTIAL HYPERTENSION: ICD-10-CM

## 2024-07-22 LAB
ABO GROUP BLD: NORMAL
ALBUMIN SERPL-MCNC: 4.3 G/DL (ref 3.5–5.2)
ALBUMIN/GLOB SERPL: 1.3 G/DL
ALP SERPL-CCNC: 135 U/L (ref 39–117)
ALT SERPL W P-5'-P-CCNC: 18 U/L (ref 1–41)
ANION GAP SERPL CALCULATED.3IONS-SCNC: 14.3 MMOL/L (ref 5–15)
APTT PPP: 30.2 SECONDS (ref 22.7–35.4)
AST SERPL-CCNC: 28 U/L (ref 1–40)
BASOPHILS # BLD AUTO: 0.06 10*3/MM3 (ref 0–0.2)
BASOPHILS NFR BLD AUTO: 0.7 % (ref 0–1.5)
BILIRUB SERPL-MCNC: 0.5 MG/DL (ref 0–1.2)
BLD GP AB SCN SERPL QL: NEGATIVE
BUN SERPL-MCNC: 18 MG/DL (ref 8–23)
BUN/CREAT SERPL: 18.2 (ref 7–25)
CALCIUM SPEC-SCNC: 9.2 MG/DL (ref 8.6–10.5)
CHLORIDE SERPL-SCNC: 100 MMOL/L (ref 98–107)
CO2 SERPL-SCNC: 22.7 MMOL/L (ref 22–29)
CREAT SERPL-MCNC: 0.99 MG/DL (ref 0.76–1.27)
DEPRECATED RDW RBC AUTO: 43.8 FL (ref 37–54)
EGFRCR SERPLBLD CKD-EPI 2021: 86.7 ML/MIN/1.73
EOSINOPHIL # BLD AUTO: 0.14 10*3/MM3 (ref 0–0.4)
EOSINOPHIL NFR BLD AUTO: 1.6 % (ref 0.3–6.2)
ERYTHROCYTE [DISTWIDTH] IN BLOOD BY AUTOMATED COUNT: 15.8 % (ref 12.3–15.4)
GLOBULIN UR ELPH-MCNC: 3.2 GM/DL
GLUCOSE BLDC GLUCOMTR-MCNC: 156 MG/DL (ref 70–130)
GLUCOSE BLDC GLUCOMTR-MCNC: 165 MG/DL (ref 70–130)
GLUCOSE BLDC GLUCOMTR-MCNC: 224 MG/DL (ref 70–130)
GLUCOSE SERPL-MCNC: 248 MG/DL (ref 65–99)
HCT VFR BLD AUTO: 43.8 % (ref 37.5–51)
HGB BLD-MCNC: 13.9 G/DL (ref 13–17.7)
HOLD SPECIMEN: NORMAL
HOLD SPECIMEN: NORMAL
IMM GRANULOCYTES # BLD AUTO: 0.07 10*3/MM3 (ref 0–0.05)
IMM GRANULOCYTES NFR BLD AUTO: 0.8 % (ref 0–0.5)
INR PPP: 1.1 (ref 0.9–1.1)
LYMPHOCYTES # BLD AUTO: 1.69 10*3/MM3 (ref 0.7–3.1)
LYMPHOCYTES NFR BLD AUTO: 18.9 % (ref 19.6–45.3)
MCH RBC QN AUTO: 24.8 PG (ref 26.6–33)
MCHC RBC AUTO-ENTMCNC: 31.7 G/DL (ref 31.5–35.7)
MCV RBC AUTO: 78.1 FL (ref 79–97)
MONOCYTES # BLD AUTO: 0.52 10*3/MM3 (ref 0.1–0.9)
MONOCYTES NFR BLD AUTO: 5.8 % (ref 5–12)
NEUTROPHILS NFR BLD AUTO: 6.47 10*3/MM3 (ref 1.7–7)
NEUTROPHILS NFR BLD AUTO: 72.2 % (ref 42.7–76)
NRBC BLD AUTO-RTO: 0 /100 WBC (ref 0–0.2)
PLATELET # BLD AUTO: 252 10*3/MM3 (ref 140–450)
PMV BLD AUTO: 10.1 FL (ref 6–12)
POTASSIUM SERPL-SCNC: 3.7 MMOL/L (ref 3.5–5.2)
PROT SERPL-MCNC: 7.5 G/DL (ref 6–8.5)
PROTHROMBIN TIME: 14.4 SECONDS (ref 11.7–14.2)
RBC # BLD AUTO: 5.61 10*6/MM3 (ref 4.14–5.8)
RH BLD: POSITIVE
SODIUM SERPL-SCNC: 137 MMOL/L (ref 136–145)
T&S EXPIRATION DATE: NORMAL
TROPONIN T SERPL HS-MCNC: 9 NG/L
WBC NRBC COR # BLD AUTO: 8.95 10*3/MM3 (ref 3.4–10.8)
WHOLE BLOOD HOLD COAG: NORMAL
WHOLE BLOOD HOLD SPECIMEN: NORMAL

## 2024-07-22 PROCEDURE — G0378 HOSPITAL OBSERVATION PER HR: HCPCS

## 2024-07-22 PROCEDURE — 99291 CRITICAL CARE FIRST HOUR: CPT

## 2024-07-22 PROCEDURE — 93005 ELECTROCARDIOGRAM TRACING: CPT | Performed by: EMERGENCY MEDICINE

## 2024-07-22 PROCEDURE — 70498 CT ANGIOGRAPHY NECK: CPT

## 2024-07-22 PROCEDURE — 85025 COMPLETE CBC W/AUTO DIFF WBC: CPT | Performed by: EMERGENCY MEDICINE

## 2024-07-22 PROCEDURE — 85610 PROTHROMBIN TIME: CPT | Performed by: EMERGENCY MEDICINE

## 2024-07-22 PROCEDURE — 99214 OFFICE O/P EST MOD 30 MIN: CPT

## 2024-07-22 PROCEDURE — 63710000001 INSULIN LISPRO (HUMAN) PER 5 UNITS: Performed by: STUDENT IN AN ORGANIZED HEALTH CARE EDUCATION/TRAINING PROGRAM

## 2024-07-22 PROCEDURE — 84484 ASSAY OF TROPONIN QUANT: CPT | Performed by: EMERGENCY MEDICINE

## 2024-07-22 PROCEDURE — 96374 THER/PROPH/DIAG INJ IV PUSH: CPT

## 2024-07-22 PROCEDURE — 80053 COMPREHEN METABOLIC PANEL: CPT | Performed by: EMERGENCY MEDICINE

## 2024-07-22 PROCEDURE — 93010 ELECTROCARDIOGRAM REPORT: CPT | Performed by: INTERNAL MEDICINE

## 2024-07-22 PROCEDURE — 82948 REAGENT STRIP/BLOOD GLUCOSE: CPT

## 2024-07-22 PROCEDURE — 25010000002 DIAZEPAM PER 5 MG: Performed by: EMERGENCY MEDICINE

## 2024-07-22 PROCEDURE — 99285 EMERGENCY DEPT VISIT HI MDM: CPT

## 2024-07-22 PROCEDURE — 25510000001 IOPAMIDOL PER 1 ML: Performed by: EMERGENCY MEDICINE

## 2024-07-22 PROCEDURE — 85730 THROMBOPLASTIN TIME PARTIAL: CPT | Performed by: EMERGENCY MEDICINE

## 2024-07-22 PROCEDURE — 25010000002 ONDANSETRON PER 1 MG

## 2024-07-22 PROCEDURE — 70496 CT ANGIOGRAPHY HEAD: CPT

## 2024-07-22 PROCEDURE — 86901 BLOOD TYPING SEROLOGIC RH(D): CPT | Performed by: EMERGENCY MEDICINE

## 2024-07-22 PROCEDURE — 86850 RBC ANTIBODY SCREEN: CPT | Performed by: EMERGENCY MEDICINE

## 2024-07-22 PROCEDURE — 36415 COLL VENOUS BLD VENIPUNCTURE: CPT

## 2024-07-22 PROCEDURE — 86900 BLOOD TYPING SEROLOGIC ABO: CPT | Performed by: EMERGENCY MEDICINE

## 2024-07-22 PROCEDURE — 0042T HC CT CEREBRAL PERFUSION W/WO CONTRAST: CPT

## 2024-07-22 PROCEDURE — 71045 X-RAY EXAM CHEST 1 VIEW: CPT

## 2024-07-22 PROCEDURE — 70551 MRI BRAIN STEM W/O DYE: CPT

## 2024-07-22 PROCEDURE — 82565 ASSAY OF CREATININE: CPT

## 2024-07-22 RX ORDER — SODIUM CHLORIDE 0.9 % (FLUSH) 0.9 %
10 SYRINGE (ML) INJECTION AS NEEDED
Status: DISCONTINUED | OUTPATIENT
Start: 2024-07-22 | End: 2024-07-23 | Stop reason: HOSPADM

## 2024-07-22 RX ORDER — BISACODYL 5 MG/1
5 TABLET, DELAYED RELEASE ORAL DAILY PRN
Status: DISCONTINUED | OUTPATIENT
Start: 2024-07-22 | End: 2024-07-23 | Stop reason: HOSPADM

## 2024-07-22 RX ORDER — ACETAMINOPHEN 325 MG/1
650 TABLET ORAL EVERY 6 HOURS PRN
Status: DISCONTINUED | OUTPATIENT
Start: 2024-07-22 | End: 2024-07-23 | Stop reason: HOSPADM

## 2024-07-22 RX ORDER — METOPROLOL TARTRATE 50 MG
100 TABLET ORAL 2 TIMES DAILY
Status: DISCONTINUED | OUTPATIENT
Start: 2024-07-22 | End: 2024-07-23 | Stop reason: HOSPADM

## 2024-07-22 RX ORDER — ACETAMINOPHEN 650 MG/1
650 SUPPOSITORY RECTAL EVERY 4 HOURS PRN
Status: DISCONTINUED | OUTPATIENT
Start: 2024-07-22 | End: 2024-07-23 | Stop reason: HOSPADM

## 2024-07-22 RX ORDER — INSULIN LISPRO 100 [IU]/ML
2-7 INJECTION, SOLUTION INTRAVENOUS; SUBCUTANEOUS
Status: DISCONTINUED | OUTPATIENT
Start: 2024-07-22 | End: 2024-07-23 | Stop reason: HOSPADM

## 2024-07-22 RX ORDER — AMLODIPINE BESYLATE 5 MG/1
10 TABLET ORAL DAILY
Status: DISCONTINUED | OUTPATIENT
Start: 2024-07-22 | End: 2024-07-23 | Stop reason: HOSPADM

## 2024-07-22 RX ORDER — ONDANSETRON 2 MG/ML
4 INJECTION INTRAMUSCULAR; INTRAVENOUS EVERY 6 HOURS PRN
Status: DISCONTINUED | OUTPATIENT
Start: 2024-07-22 | End: 2024-07-23 | Stop reason: HOSPADM

## 2024-07-22 RX ORDER — IBUPROFEN 600 MG/1
1 TABLET ORAL
Status: DISCONTINUED | OUTPATIENT
Start: 2024-07-22 | End: 2024-07-23 | Stop reason: HOSPADM

## 2024-07-22 RX ORDER — GLIPIZIDE 10 MG/1
10 TABLET, FILM COATED, EXTENDED RELEASE ORAL 2 TIMES DAILY
Qty: 60 TABLET | Refills: 0 | OUTPATIENT
Start: 2024-07-22 | End: 2024-07-23 | Stop reason: HOSPADM

## 2024-07-22 RX ORDER — ALLOPURINOL 300 MG/1
300 TABLET ORAL 2 TIMES DAILY
Status: DISCONTINUED | OUTPATIENT
Start: 2024-07-22 | End: 2024-07-23 | Stop reason: HOSPADM

## 2024-07-22 RX ORDER — ONDANSETRON 2 MG/ML
INJECTION INTRAMUSCULAR; INTRAVENOUS
Status: COMPLETED
Start: 2024-07-22 | End: 2024-07-22

## 2024-07-22 RX ORDER — AMOXICILLIN 250 MG
2 CAPSULE ORAL 2 TIMES DAILY PRN
Status: DISCONTINUED | OUTPATIENT
Start: 2024-07-22 | End: 2024-07-23 | Stop reason: HOSPADM

## 2024-07-22 RX ORDER — SODIUM CHLORIDE 9 MG/ML
40 INJECTION, SOLUTION INTRAVENOUS AS NEEDED
Status: DISCONTINUED | OUTPATIENT
Start: 2024-07-22 | End: 2024-07-23 | Stop reason: HOSPADM

## 2024-07-22 RX ORDER — DIAZEPAM 10 MG/2ML
5 INJECTION, SOLUTION INTRAMUSCULAR; INTRAVENOUS ONCE
Status: COMPLETED | OUTPATIENT
Start: 2024-07-22 | End: 2024-07-22

## 2024-07-22 RX ORDER — CETIRIZINE HYDROCHLORIDE 10 MG/1
10 TABLET ORAL DAILY
Status: DISCONTINUED | OUTPATIENT
Start: 2024-07-22 | End: 2024-07-23 | Stop reason: HOSPADM

## 2024-07-22 RX ORDER — ATORVASTATIN CALCIUM 20 MG/1
40 TABLET, FILM COATED ORAL DAILY
Status: DISCONTINUED | OUTPATIENT
Start: 2024-07-22 | End: 2024-07-23 | Stop reason: HOSPADM

## 2024-07-22 RX ORDER — AMLODIPINE BESYLATE 10 MG/1
10 TABLET ORAL DAILY
Qty: 30 TABLET | Refills: 0 | Status: SHIPPED | OUTPATIENT
Start: 2024-07-22

## 2024-07-22 RX ORDER — DEXTROSE MONOHYDRATE 25 G/50ML
25 INJECTION, SOLUTION INTRAVENOUS
Status: DISCONTINUED | OUTPATIENT
Start: 2024-07-22 | End: 2024-07-23 | Stop reason: HOSPADM

## 2024-07-22 RX ORDER — IOPAMIDOL 755 MG/ML
150 INJECTION, SOLUTION INTRAVASCULAR
Status: COMPLETED | OUTPATIENT
Start: 2024-07-22 | End: 2024-07-22

## 2024-07-22 RX ORDER — NICOTINE POLACRILEX 4 MG
15 LOZENGE BUCCAL
Status: DISCONTINUED | OUTPATIENT
Start: 2024-07-22 | End: 2024-07-23 | Stop reason: HOSPADM

## 2024-07-22 RX ORDER — SODIUM CHLORIDE 0.9 % (FLUSH) 0.9 %
10 SYRINGE (ML) INJECTION EVERY 12 HOURS SCHEDULED
Status: DISCONTINUED | OUTPATIENT
Start: 2024-07-22 | End: 2024-07-23 | Stop reason: HOSPADM

## 2024-07-22 RX ORDER — POLYETHYLENE GLYCOL 3350 17 G/17G
17 POWDER, FOR SOLUTION ORAL DAILY PRN
Status: DISCONTINUED | OUTPATIENT
Start: 2024-07-22 | End: 2024-07-23 | Stop reason: HOSPADM

## 2024-07-22 RX ORDER — ONDANSETRON 4 MG/1
4 TABLET, ORALLY DISINTEGRATING ORAL EVERY 6 HOURS PRN
Status: DISCONTINUED | OUTPATIENT
Start: 2024-07-22 | End: 2024-07-23 | Stop reason: HOSPADM

## 2024-07-22 RX ORDER — CLOPIDOGREL BISULFATE 75 MG/1
75 TABLET ORAL DAILY
Status: DISCONTINUED | OUTPATIENT
Start: 2024-07-22 | End: 2024-07-23 | Stop reason: HOSPADM

## 2024-07-22 RX ORDER — BISACODYL 10 MG
10 SUPPOSITORY, RECTAL RECTAL DAILY PRN
Status: DISCONTINUED | OUTPATIENT
Start: 2024-07-22 | End: 2024-07-23 | Stop reason: HOSPADM

## 2024-07-22 RX ADMIN — INSULIN LISPRO 2 UNITS: 100 INJECTION, SOLUTION INTRAVENOUS; SUBCUTANEOUS at 16:38

## 2024-07-22 RX ADMIN — DIAZEPAM 5 MG: 5 INJECTION, SOLUTION INTRAMUSCULAR; INTRAVENOUS at 11:12

## 2024-07-22 RX ADMIN — CLOPIDOGREL BISULFATE 75 MG: 75 TABLET, FILM COATED ORAL at 14:25

## 2024-07-22 RX ADMIN — ACETAMINOPHEN 325MG 650 MG: 325 TABLET ORAL at 16:36

## 2024-07-22 RX ADMIN — CETIRIZINE HYDROCHLORIDE 10 MG: 10 TABLET ORAL at 14:25

## 2024-07-22 RX ADMIN — METOPROLOL TARTRATE 100 MG: 50 TABLET, FILM COATED ORAL at 20:16

## 2024-07-22 RX ADMIN — ATORVASTATIN CALCIUM 40 MG: 20 TABLET, FILM COATED ORAL at 14:25

## 2024-07-22 RX ADMIN — INSULIN LISPRO 2 UNITS: 100 INJECTION, SOLUTION INTRAVENOUS; SUBCUTANEOUS at 21:32

## 2024-07-22 RX ADMIN — IOPAMIDOL 150 ML: 755 INJECTION, SOLUTION INTRAVENOUS at 10:28

## 2024-07-22 RX ADMIN — SERTRALINE 50 MG: 50 TABLET, FILM COATED ORAL at 14:26

## 2024-07-22 RX ADMIN — AMLODIPINE BESYLATE 10 MG: 5 TABLET ORAL at 14:25

## 2024-07-22 RX ADMIN — Medication 10 ML: at 20:20

## 2024-07-22 RX ADMIN — ALLOPURINOL 300 MG: 300 TABLET ORAL at 14:26

## 2024-07-22 RX ADMIN — ALLOPURINOL 300 MG: 300 TABLET ORAL at 20:16

## 2024-07-22 RX ADMIN — INSULIN LISPRO 3 UNITS: 100 INJECTION, SOLUTION INTRAVENOUS; SUBCUTANEOUS at 14:26

## 2024-07-22 RX ADMIN — ONDANSETRON 4 MG: 2 INJECTION INTRAMUSCULAR; INTRAVENOUS at 10:31

## 2024-07-22 RX ADMIN — METOPROLOL TARTRATE 100 MG: 50 TABLET, FILM COATED ORAL at 14:25

## 2024-07-22 NOTE — ED PROVIDER NOTES
EMERGENCY DEPARTMENT ENCOUNTER  Room Number:  S507/1  PCP: Liana Hood MD  Independent Historians: Patient and EMS      HPI:  Chief Complaint: had concerns including Dizziness.     A complete HPI/ROS/PMH/PSH/SH/FH are unobtainable due to: None    Chronic or social conditions impacting patient care (Social Determinants of Health): None      Context: Pablo Mendoza is a 63 y.o. male with a medical history of diabetes, hyperlipidemia, migraines who presents to the ED c/o acute room spinning dizziness.  The patient reports that he went to bed at midnight last night.  He reports he woke up this morning with room spinning dizziness.  EMS states he was ataxic when they tried to get him onto the stretcher.  He is not on blood thinners.  He denies any pain.  He reports nausea.  He states he has had prior episodes of vertigo.      Review of prior external notes (non-ED) -and- Review of prior external test results outside of this encounter:  Laboratory evaluation dated 6/14/2024 shows CMP with an elevated sugar of 224    Prescription drug monitoring program review:         PAST MEDICAL HISTORY  Active Ambulatory Problems     Diagnosis Date Noted    Type 2 diabetes mellitus, without long-term current use of insulin 01/19/2018    Mixed hyperlipidemia 01/19/2018    Essential hypertension 01/19/2018    Gout of multiple sites 01/19/2018    Nocturia 01/19/2018    Migraine headache 04/02/2018    Chronic maxillary sinusitis 04/10/2018    Other fatigue 06/06/2018    Arthritis 06/06/2018    Routine health maintenance 08/10/2018    Proteinuria 03/08/2019    Cellulitis of left foot 05/02/2019    Lumbar radiculopathy 08/02/2021    Stroke-like symptoms 10/04/2022    Polycythemia 10/28/2022    Dizziness 08/14/2023    Cervical stenosis of spine 08/14/2023    Cervical spondylosis without myelopathy 08/14/2023    Seizure 08/19/2023    Migraine with aura 09/19/2023    Headache 09/20/2023     Resolved Ambulatory Problems      Diagnosis Date Noted    Elevated lactic acid level 09/19/2023    Hypokalemia 09/19/2023     Past Medical History:   Diagnosis Date    Anesthesia complication     COVID-19     Diabetes mellitus     Diverticulitis     Elevated cholesterol     Gout     Hypertension     Low back pain     Migraines     Neuropathy     Numbness and tingling of both lower extremities     Seizures     Staph infection 2010    Stroke     TIA (transient ischemic attack)     Weakness of both legs          PAST SURGICAL HISTORY  Past Surgical History:   Procedure Laterality Date    CHOLECYSTECTOMY  2011    COLONOSCOPY      2015 or 2016    HERNIA REPAIR      LUMBAR FUSION N/A 11/03/2021    Procedure: Lumbar 3 to Lumbar 4 and Lumbar 4 to Lumbar 5 laminectomy with a fusion;  Surgeon: Jose Webster MD;  Location: [facility] OR;  Service: Robotics - Neuro;  Laterality: N/A;    SINUS SURGERY      TYMPANOPLASTY Bilateral          FAMILY HISTORY  Family History   Problem Relation Age of Onset    Arthritis Mother     Hypertension Mother     Heart disease Mother     Stroke Mother     Alcohol abuse Father     Arthritis Father     Hypertension Father     Nephrolithiasis Father     Diabetes Sister     Diabetes Sister     Stroke Brother 59    Hypertension Brother     Hyperlipidemia Brother     Throat cancer Maternal Grandmother     Diabetes Paternal Grandmother     Malig Hyperthermia Neg Hx          SOCIAL HISTORY  Social History     Socioeconomic History    Marital status:    Tobacco Use    Smoking status: Never     Passive exposure: Never    Smokeless tobacco: Never   Vaping Use    Vaping status: Never Used   Substance and Sexual Activity    Alcohol use: Yes     Comment: 3 DRINKS WEEKLY    Drug use: Not Currently    Sexual activity: Yes     Partners: Female         ALLERGIES  Aspirin and Lisinopril      REVIEW OF SYSTEMS  Review of Systems  Included in HPI  All systems reviewed and negative except for those discussed in HPI.      PHYSICAL  EXAM    I have reviewed the triage vital signs and nursing notes.    ED Triage Vitals [07/22/24 1006]   Temp Heart Rate Resp BP SpO2   -- 78 18 123/82 98 %      Temp src Heart Rate Source Patient Position BP Location FiO2 (%)   -- Monitor -- -- --       Physical Exam  GENERAL: Awake, alert, no acute distress  SKIN: Warm, dry  HENT: Normocephalic, atraumatic  EYES: no scleral icterus  CV: regular rhythm, regular rate  RESPIRATORY: normal effort, lungs clear  ABDOMEN: soft, nontender, nondistended  MUSCULOSKELETAL: no deformity  NEURO: alert, moves all extremities, follows commands.  Equal upper and lower extremity strength and sensation.  Abnormal finger-nose bilaterally.  Normal heel shin bilaterally.  Cranial nerves II through XII intact.  There is right sided nystagmus with rightward gaze            LAB RESULTS  Recent Results (from the past 24 hour(s))   Comprehensive Metabolic Panel    Collection Time: 07/22/24 10:10 AM    Specimen: Blood   Result Value Ref Range    Glucose 248 (H) 65 - 99 mg/dL    BUN 18 8 - 23 mg/dL    Creatinine 0.99 0.76 - 1.27 mg/dL    Sodium 137 136 - 145 mmol/L    Potassium 3.7 3.5 - 5.2 mmol/L    Chloride 100 98 - 107 mmol/L    CO2 22.7 22.0 - 29.0 mmol/L    Calcium 9.2 8.6 - 10.5 mg/dL    Total Protein 7.5 6.0 - 8.5 g/dL    Albumin 4.3 3.5 - 5.2 g/dL    ALT (SGPT) 18 1 - 41 U/L    AST (SGOT) 28 1 - 40 U/L    Alkaline Phosphatase 135 (H) 39 - 117 U/L    Total Bilirubin 0.5 0.0 - 1.2 mg/dL    Globulin 3.2 gm/dL    A/G Ratio 1.3 g/dL    BUN/Creatinine Ratio 18.2 7.0 - 25.0    Anion Gap 14.3 5.0 - 15.0 mmol/L    eGFR 86.7 >60.0 mL/min/1.73   Protime-INR    Collection Time: 07/22/24 10:10 AM    Specimen: Blood   Result Value Ref Range    Protime 14.4 (H) 11.7 - 14.2 Seconds    INR 1.10 0.90 - 1.10   aPTT    Collection Time: 07/22/24 10:10 AM    Specimen: Blood   Result Value Ref Range    PTT 30.2 22.7 - 35.4 seconds   Single High Sensitivity Troponin T    Collection Time: 07/22/24 10:10 AM     Specimen: Blood   Result Value Ref Range    HS Troponin T 9 <22 ng/L   Type & Screen    Collection Time: 07/22/24 10:10 AM    Specimen: Blood   Result Value Ref Range    ABO Type O     RH type Positive     Antibody Screen Negative     T&S Expiration Date 7/25/2024 11:59:59 PM    Green Top (Gel)    Collection Time: 07/22/24 10:10 AM   Result Value Ref Range    Extra Tube Hold for add-ons.    Lavender Top    Collection Time: 07/22/24 10:10 AM   Result Value Ref Range    Extra Tube hold for add-on    Gold Top - SST    Collection Time: 07/22/24 10:10 AM   Result Value Ref Range    Extra Tube Hold for add-ons.    Light Blue Top    Collection Time: 07/22/24 10:10 AM   Result Value Ref Range    Extra Tube Hold for add-ons.    CBC Auto Differential    Collection Time: 07/22/24 10:10 AM    Specimen: Blood   Result Value Ref Range    WBC 8.95 3.40 - 10.80 10*3/mm3    RBC 5.61 4.14 - 5.80 10*6/mm3    Hemoglobin 13.9 13.0 - 17.7 g/dL    Hematocrit 43.8 37.5 - 51.0 %    MCV 78.1 (L) 79.0 - 97.0 fL    MCH 24.8 (L) 26.6 - 33.0 pg    MCHC 31.7 31.5 - 35.7 g/dL    RDW 15.8 (H) 12.3 - 15.4 %    RDW-SD 43.8 37.0 - 54.0 fl    MPV 10.1 6.0 - 12.0 fL    Platelets 252 140 - 450 10*3/mm3    Neutrophil % 72.2 42.7 - 76.0 %    Lymphocyte % 18.9 (L) 19.6 - 45.3 %    Monocyte % 5.8 5.0 - 12.0 %    Eosinophil % 1.6 0.3 - 6.2 %    Basophil % 0.7 0.0 - 1.5 %    Immature Grans % 0.8 (H) 0.0 - 0.5 %    Neutrophils, Absolute 6.47 1.70 - 7.00 10*3/mm3    Lymphocytes, Absolute 1.69 0.70 - 3.10 10*3/mm3    Monocytes, Absolute 0.52 0.10 - 0.90 10*3/mm3    Eosinophils, Absolute 0.14 0.00 - 0.40 10*3/mm3    Basophils, Absolute 0.06 0.00 - 0.20 10*3/mm3    Immature Grans, Absolute 0.07 (H) 0.00 - 0.05 10*3/mm3    nRBC 0.0 0.0 - 0.2 /100 WBC   ECG 12 Lead Stroke Evaluation    Collection Time: 07/22/24 11:48 AM   Result Value Ref Range    QT Interval 420 ms    QTC Interval 466 ms   POC Glucose Once    Collection Time: 07/22/24  1:32 PM    Specimen: Blood    Result Value Ref Range    Glucose 224 (H) 70 - 130 mg/dL   POC Glucose Once    Collection Time: 07/22/24  4:37 PM    Specimen: Blood   Result Value Ref Range    Glucose 165 (H) 70 - 130 mg/dL         RADIOLOGY  MRI Brain Without Contrast    Result Date: 7/22/2024  MRI OF THE BRAIN WITHOUT CONTRAST ON 07/22/2024  CLINICAL HISTORY: This is a 63-year-old male patient with a history of acute dizziness and ataxic gait.  TECHNIQUE: Axial T1, FLAIR, fat-suppressed T2, axial diffusion and gradient echo T2 and sagittal T1-weighted images were obtained of the entire head.  This is correlated to a contrast-enhanced CT angiogram and CT perfusion study of the brain earlier this morning on 07/22/2024 at 10:15 a.m. and a prior MRI of the brain and internal auditory canals from [facility] on 05/10/2024.  FINDINGS: The brain parenchyma is normal in signal intensity. Specifically, no diffusion weighted abnormality is identified with no acute infarct identified. On the gradient echo T2-weighted sequence, no acute or old blood breakdown products are seen intracranially. The ventricles are normal in size. I see no focal mass effect. There is no midline shift. No extra-axial fluid collections are identified. There are tiny subcentimeter mucous retention cysts in the inferior maxillary sinuses bilaterally. The remainder of the paranasal sinuses and the mastoid air cells and the middle ear cavities are clear. The orbits are normal in appearance. The calvarium and skull base are normal in appearance. Good flow voids are demonstrated in the cerebral vessels and in the dural venous sinuses. On the sagittal T1-weighted sequence, there is evidence of disc space narrowing and degenerative endplate changes at the visualized C4-5 and C5-6 cervical levels. Posterior spurs abut and deform the ventral surface of the cord at least mild to moderately narrowing the canal and there is some foraminal narrowing at these levels.      1.  Normal MRI of the brain with no acute infarct identified and the etiology of the patient's acute-onset dizziness and ataxic gait earlier today is not established on this exam.  2. The cervical spine is visualized down to the C5-6 level on the sagittal T1-weighted sequence and there is disc space narrowing, degenerative endplate changes, posterior spurs and mild-to-moderate canal narrowing at C4-5 and C5-6 and there is some foraminal narrowing at these levels. The remainder of the MRI of the head is normal.      CT Angiogram Head w AI Analysis of LVO, CT Angiogram Neck, CT CEREBRAL PERFUSION WITH & WITHOUT CONTRAST    Result Date: 7/22/2024  EMERGENCY CONTRAST-ENHANCED CT ANGIOGRAM OF THE HEAD AND NECK AND CT PERFUSION STUDY OF THE BRAIN ON 07/22/2024  CLINICAL HISTORY: This is a 63-year-old male patient with acute onset dizziness and ataxia this morning.  HEAD CT WITHOUT CONTRAST TECHNIQUE: Spiral CT images were obtained from the base of the skull to the vertex without intravenous contrast. The images were reformatted and submitted in 3 mm thick axial CT sections with brain algorithm and 2 mm thick sagittal and coronal reconstructions were performed and submitted in brain algorithm.  This is correlated to a prior head CT on 09/19/2023 and a prior MRI of the brain on 08/14/2023 and MRI of the internal auditory canals on 05/10/2024.  FINDINGS: The brain parenchyma is normal in attenuation. The ventricles are normal in size. I see no focal mass effect. There is no midline shift. No extra axial fluid collections are identified. There is no evidence of acute intra cranial hemorrhage. The calvarium and the skull base are normal in appearance. The paranasal sinuses and the mastoid air cells and the middle ear cavities are clear. The orbits are normal in appearance.      No acute intracranial abnormalities identified with no change when compared to recent MRI of the brain and internal auditory canals on 05/10/2024.  Specifically I see no acute completed infarct and no intracranial hemorrhage in the etiology of the patient's dizziness and ataxia that started this morning is not established on this exam. The results of the head CT without contrast were communicated to Dr. Champagne from stroke neurology by telephone on 07/22/2024 at 10:18 a.m. and he requested a contrast-enhanced CT angiogram of the head and neck and CT perfusion study of the brain.  CONTRAST-ENHANCED CT ANGIOGRAM OF THE HEAD AND NECK AND CT PERFUSION STUDY OF THE BRAIN TECHNIQUE: Spiral CT images were obtained from the mid cerebellum up through the majority of the cerebral hemispheres for 10 cm vertical slab of brain imaging during the arterial phase of contrast and images were analyzed with rapid software analysis for CT perfusion study of the brain subsequently spiral CT angio images were obtained from the top of the aortic arch up through the great vessels the head and neck during the arterial phase of contrast and images were reformatted and submitted in 1 mm thick axial, sagittal and coronal CT sections with soft tissue algorithm and 3D reconstructions were performed to complete the CT angiogram of the head and neck and finally spiral CT images were obtained from the base of the skull to the vertex delayed following intravenous contrast and these images were reformatted submitted in 3 mm thick axial CT sections with brain algorithm and 2 mm thick sagittal and coronal reconstructions were performed submitted in brain algorithm.  FINDINGS: CT PERFUSION STUDY OF THE BRAIN: CT perfusion study the brain consists of just 10 cm vertical slab of brain imaging from the mid cerebellum up through the cerebral hemispheres and excludes the inferior half of the cerebellum as well as the inferior juan and medulla which are not assessed. Within the 10 cm vertical slab of brain imaging I see no areas of reduced cerebral blood flow to less than 30% of normal and therefore no  acute completed infarct. Furthermore there is no areas of delayed time to maximal enhancement of greater than 6 seconds and therefore there is no jodie hypoperfused brain. There is a small localized 21 cc final brain parenchyma in the right temporal occipital region that demonstrates a delayed time to maximal enhancement of greater than 4 but less than 6 seconds that does not meet the strict criteria for hypoperfused brain is probably artifact limits likely low-grade hypoperfused brain.  CT ANGIOGRAM OF THE NECK: The nasopharynx, oropharynx, the hypopharynx, the true cords and the subglottic airway are normal in appearance. The thyroid gland enhances homogeneously and is normal in appearance. The lung apices are clear. The parotid, , parapharyngeal and submandibular spaces are symmetric and are normal in appearance. Cervical spondylosis is present. There is disc space narrowing, degenerative disc and endplate changes from C4-C7. tHE posterior disc osteophyte complex abuts the ventral surface cord, mild moderately narrowing the central portion of the canal at C4-5 and C5-6 and up to moderately narrowing the central canal at C6-7 and there is uncovertebral joint spurring into the foramina with mild to moderate right bony foraminal narrowing at C4-5, mild left and mild-moderate right bony foraminal narrowing at C5-6 and C6-7. There is common origin the left common carotid artery and brachiocephalic artery off the aortic arch constituting a bovine configuration of the aortic arch which is a normal anatomic variation. The left subclavian artery origins normal appearance and no stenosis is seen in the left subclavian artery. The left vertebral artery origin is normal in appearance. No stenosis is seen in the left vertebral artery from its origin to the vertebrobasilar junction. The left common carotid origin is normal in appearance. No stenosis is seen in the left common carotid artery, its bifurcation and the  left internal and external carotid arteries is normal in appearance. No stenosis is seen in the cervical segment of the left internal carotid artery using the NASCET criteria. The brachiocephalic artery origins normal in appearance and no stenosis is seen the brachiocephalic artery and its bifurcation into the right subclavian and common carotid arteries is normal in appearance and no stenosis is seen in the right subclavian artery. The right vertebral artery origin is normal in appearance. No stenosis is seen in the right vertebral artery from its origin to the vertebrobasilar junction. The right common carotid origins normal appearance. No stenosis seen in the right common carotid artery its bifurcation into the right internal and external carotid arteries is normal in appearance and no stenosis is seen in the cervical segment of the right internal carotid artery using the NASCET criteria.  CT ANGIOGRAM OF THE HEAD: The intracranial segments of the distal vertebral arteries are widely patent without stenosis to the vertebrobasilar junction. The right posterior inferior cerebellar artery origins normal in appearance. The basilar artery and the basilar tip is normal in appearance. There is an atretic P1 segment to the left posterior cerebral artery with persistent fetal origin of the left posterior cerebral artery off the back wall of the supracavernous left internal carotid artery which is a normal anatomic variation and both the right and left posterior cerebral and superior cerebellar arteries are within normal limits. The upper cervical, petrous, cavernous segments of the internal carotid arteries are normal in appearance calcified plaques mildly narrow the supracavernous segments. The carotid termini are normal in appearance. The A1 segments of the anterior cerebral arteries and the anterior communicating artery origin and the A2 segments of the anterior cerebral arteries are within normal limits. The M1  segments of the middle cerebral arteries and the middle cerebral artery bifurcations are within normal limits and there is good filling of the M2 segments, the sylvian fissures with no significant stenosis in the M2 segments.  IMPRESSION: 1. The CT of the head without contrast is within normal limits with no discernible acute completed infarct or intracranial hemorrhage identified the etiology of patient's dizziness and ataxia is not established on this exam and if clinical symptoms warrant an MRI of the brain is recommended for more comprehensive assessment. The results of the head CT without contrast were communicated to Dr. Champagne from stroke neurology by telephone on 07/22/2024 at 10:18 a.m. and he requested a contrast-enhanced CT angiogram of the head and neck and CT perfusion study of the brain. 2. Cervical spondylosis is present with disc space narrowing degenerative disc and endplate changes and posterior endplate spurring from C4-C7 posterior spurs abut the ventral surface cord mild moderately narrowing the central portion canal at C4-5 and C5-6 and at least mild to moderately narrowing up to moderately narrow the canal at C6-7. There is uncovertebral joint spurring into the foramina with mild to moderate right bony foraminal narrowing at C4-5 and mild left and mild-moderate right bony foraminal narrowing at C5-6 and C6-7 otherwise cervical spine is unremarkable. 3. There are calcified plaques that mildly narrow the supracavernous segments of the internal carotid arteries otherwise the CT angiographic evaluation great vessels of the head and neck is normal. In particular no stenosis is seen in the subclavian arteries, vertebral arteries or the common carotid arteries or the cervical segments of the internal carotid arteries using the NASCET criteria and other than the mild narrowing of the supracavernous segments of the internal carotid arteries no intracranial vascular stenoses or occlusions are  identified. Again if there remains any clinical suspicion of acute stroke I recommend an MRI of the brain for more complete assessment. The final results and recommendation were communicated to Dr. Champagne from stroke neurology by telephone on 07/22/2024 at 11 a.m.  AI analysis of LVO was utilized.  Radiation dose reduction techniques were utilized, including automated exposure control and exposure modulation based on body size.   This report was finalized on 7/22/2024 1:06 PM by Dr. Cheikh Kingston M.D on Workstation: [id number]      XR Chest 1 View    Result Date: 7/22/2024  CHEST SINGLE VIEW  HISTORY: Acute stroke. Known right rib fracture.  COMPARISON: [facility] chest 08/19/2023  FINDINGS: Lungs are diminished and allowing for this, the cardiomediastinal silhouette is within normal limits. Cardiac monitoring leads are present. There is calcified right paratracheal lymph node. Lungs appear clear and there is no evidence for pulmonary edema or pleural effusion. There is mild elevation of right hemidiaphragm.      No evidence for active disease in the chest.  This report was finalized on 7/22/2024 12:10 PM by Dr. Anup Padron M.D on Workstation: [id number]         MEDICATIONS GIVEN IN ER  Medications   sodium chloride 0.9 % flush 10 mL (has no administration in time range)   sertraline (ZOLOFT) tablet 50 mg (50 mg Oral Given 7/22/24 1426)   metoprolol tartrate (LOPRESSOR) tablet 100 mg (100 mg Oral Given 7/22/24 1425)   cetirizine (zyrTEC) tablet 10 mg (10 mg Oral Given 7/22/24 1425)   atorvastatin (LIPITOR) tablet 40 mg (40 mg Oral Given 7/22/24 1425)   amLODIPine (NORVASC) tablet 10 mg (10 mg Oral Given 7/22/24 1425)   allopurinol (ZYLOPRIM) tablet 300 mg (300 mg Oral Given 7/22/24 1426)   dextrose (GLUTOSE) oral gel 15 g (has no administration in time range)   dextrose (D50W) (25 g/50 mL) IV injection 25 g (has no administration in time range)   glucagon (GLUCAGEN) injection 1 mg (has no administration in time  range)   insulin lispro (HUMALOG/ADMELOG) injection 2-7 Units (2 Units Subcutaneous Given 7/22/24 1638)   clopidogrel (PLAVIX) tablet 75 mg (75 mg Oral Given 7/22/24 1425)   acetaminophen (TYLENOL) tablet 650 mg (650 mg Oral Given 7/22/24 1636)     Or   acetaminophen (TYLENOL) suppository 650 mg ( Rectal Not Given:  See Alt 7/22/24 1636)   sodium chloride 0.9 % flush 10 mL (has no administration in time range)   sodium chloride 0.9 % flush 10 mL (has no administration in time range)   sodium chloride 0.9 % infusion 40 mL (has no administration in time range)   ondansetron ODT (ZOFRAN-ODT) disintegrating tablet 4 mg (has no administration in time range)     Or   ondansetron (ZOFRAN) injection 4 mg (has no administration in time range)   melatonin tablet 5 mg (has no administration in time range)   sennosides-docusate (PERICOLACE) 8.6-50 MG per tablet 2 tablet (has no administration in time range)     And   polyethylene glycol (MIRALAX) packet 17 g (has no administration in time range)     And   bisacodyl (DULCOLAX) EC tablet 5 mg (has no administration in time range)     And   bisacodyl (DULCOLAX) suppository 10 mg (has no administration in time range)   ondansetron (ZOFRAN) 2 mg/mL injection  - ADS Override Pull (4 mg  Given 7/22/24 1031)   diazePAM (VALIUM) injection 5 mg (5 mg Intravenous Given 7/22/24 1112)   iopamidol (ISOVUE-370) 76 % injection 150 mL (150 mL Intravenous Given 7/22/24 1028)         ORDERS PLACED DURING THIS VISIT:  Orders Placed This Encounter   Procedures    CT Angiogram Head w AI Analysis of LVO    CT Angiogram Neck    CT CEREBRAL PERFUSION WITH & WITHOUT CONTRAST    XR Chest 1 View    MRI Brain Without Contrast    [facility] Draw    Comprehensive Metabolic Panel    Protime-INR    aPTT    Single High Sensitivity Troponin T    CBC Auto Differential    Basic Metabolic Panel    CBC Auto Differential    Magnesium    Phosphorus    Diet: Cardiac, Diabetic; Healthy Heart (2-3 Na+); Consistent  Carbohydrate; Fluid Consistency: Thin (IDDSI 0)    Initiate Department's Acute Stroke Process (Team D, Code 19, etc.)    Perform NIH Stroke Scale    Measure Actual Weight    Notify Provider    Notify Provider for SBP Greater Than 140 for Hemorrhagic Stroke Patient    Head of Bed 30 Degrees or Less    Undress and Gown    Continuous Pulse Oximetry    Vital Signs    Neuro Checks    No Hypotonic Fluids    Nursing Dysphagia Screening (Complete Prior to Giving anything PO)    RN to Place Order SLP Consult (IF swallow screen failed) - Eval & Treat Choosing Reason of RN Dysphagia Screen Failed    Vital Signs    Intake & Output    Weigh Patient    Oral Care    Saline Lock & Maintain IV Access    Place Sequential Compression Device    Maintain Sequential Compression Device    Code Status and Medical Interventions:    Inpatient Neurology Consult Stroke    Inpatient Neurology Consult Stroke    [facility] (on-call MD unless specified) Details    Oxygen Therapy- Nasal Cannula; Titrate 1-6 LPM Per SpO2; 90 - 95%    POC Glucose Once    POC Glucose 4x Daily Before Meals & at Bedtime    POC Glucose Once    POC Glucose Once    ECG 12 Lead Stroke Evaluation    Telemetry Scan    Type & Screen    Insert Large-Bore Peripheral IV - RIGHT AC Preferred    Insert Peripheral IV    Initiate Observation Status    CBC & Differential    Green Top (Gel)    Lavender Top    Gold Top - SST    Light Blue Top         OUTPATIENT MEDICATION MANAGEMENT:  Current Facility-Administered Medications Ordered in Epic   Medication Dose Route Frequency Provider Last Rate Last Admin    acetaminophen (TYLENOL) tablet 650 mg  650 mg Oral Q6H PRN Cele Mann MD   650 mg at 07/22/24 1636    Or    acetaminophen (TYLENOL) suppository 650 mg  650 mg Rectal Q4H PRN Cele Mann MD        allopurinol (ZYLOPRIM) tablet 300 mg  300 mg Oral BID Cele Mann MD   300 mg at 07/22/24 1426    amLODIPine (NORVASC) tablet 10 mg  10 mg Oral Daily Cele Mann MD    10 mg at 07/22/24 1425    atorvastatin (LIPITOR) tablet 40 mg  40 mg Oral Daily Cele Mann MD   40 mg at 07/22/24 1425    sennosides-docusate (PERICOLACE) 8.6-50 MG per tablet 2 tablet  2 tablet Oral BID PRN Cele Mann MD        And    polyethylene glycol (MIRALAX) packet 17 g  17 g Oral Daily PRN Cele Mann MD        And    bisacodyl (DULCOLAX) EC tablet 5 mg  5 mg Oral Daily PRN Cele Mann MD        And    bisacodyl (DULCOLAX) suppository 10 mg  10 mg Rectal Daily PRN Cele Mann MD        cetirizine (zyrTEC) tablet 10 mg  10 mg Oral Daily Cele Mann MD   10 mg at 07/22/24 1425    clopidogrel (PLAVIX) tablet 75 mg  75 mg Oral Daily Liana De León PA-C   75 mg at 07/22/24 1425    dextrose (D50W) (25 g/50 mL) IV injection 25 g  25 g Intravenous Q15 Min PRN Cele Mann MD        dextrose (GLUTOSE) oral gel 15 g  15 g Oral Q15 Min PRN Cele Mann MD        glucagon (GLUCAGEN) injection 1 mg  1 mg Intramuscular Q15 Min PRN Cele Mann MD        insulin lispro (HUMALOG/ADMELOG) injection 2-7 Units  2-7 Units Subcutaneous 4x Daily AC & at Bedtime Cele Mann MD   2 Units at 07/22/24 1638    melatonin tablet 5 mg  5 mg Oral Nightly PRN Cele Mann MD        metoprolol tartrate (LOPRESSOR) tablet 100 mg  100 mg Oral BID Cele Mann MD   100 mg at 07/22/24 1425    ondansetron ODT (ZOFRAN-ODT) disintegrating tablet 4 mg  4 mg Oral Q6H PRN Cele Mann MD        Or    ondansetron (ZOFRAN) injection 4 mg  4 mg Intravenous Q6H PRN Cele Mann MD        sertraline (ZOLOFT) tablet 50 mg  50 mg Oral Daily Cele Mann MD   50 mg at 07/22/24 1426    sodium chloride 0.9 % flush 10 mL  10 mL Intravenous PRN Bradley Lloyd MD        sodium chloride 0.9 % flush 10 mL  10 mL Intravenous Q12H Cele Mann MD        sodium chloride 0.9 % flush 10 mL  10 mL Intravenous PRN Cele Mann MD         sodium chloride 0.9 % infusion 40 mL  40 mL Intravenous PRN Cele Mann MD         No current [facility]-ordered outpatient medications on file.         PROCEDURES  Procedures      Critical care provider statement:    Critical care time (minutes): 40.   Critical care time was exclusive of:  Separately billable procedures and treating other patients   Critical care was necessary to treat or prevent imminent or life-threatening deterioration of the following conditions:  CNS Failure   Critical care was time spent personally by me on the following activities:  Development of treatment plan with patient or surrogate, discussions with consultants, evaluation of patient's response to treatment, examination of patient, obtaining history from patient or surrogate, ordering and performing treatments and interventions, ordering and review of laboratory studies, ordering and review of radiographic studies, pulse oximetry, re-evaluation of patient's condition and review of old charts. Critical Care indicators:        PROGRESS, DATA ANALYSIS, CONSULTS, AND MEDICAL DECISION MAKING  All labs have been independently interpreted by me.  All radiology studies have been reviewed by me. All EKG's have been independently viewed and interpreted by me.  Discussion below represents my analysis of pertinent findings related to patient's condition, differential diagnosis, treatment plan and final disposition.    Differential diagnosis includes but is not limited to stroke, TIA, peripheral vertigo, BPPV.    Clinical Scores:            Total (NIH Stroke Scale): 3      ED Course as of 07/22/24 1648   Mon Jul 22, 2024   1008 Discussing with Dr. Soriano with stroke neurology.  Team D has been initiated. [TR]   1026 The patient is not a TNK candidate given the time since onset of symptoms [TR]   1047 XR Chest 1 View  My independent interpretation of the imaging study is no dense consolidation [TR]   1111 Discussing with Dr Kingston with  neuroradiology. CTA and CT of the head are negative for acute process [TR]   1116 I reviewed the workup and findings with the patient at the bedside.  Answered all questions.  He is having some hypoxia from the Valium.  He is on supplemental oxygen.  He states his dizziness is not improved.  Plan admission for further evaluation.  He is agreeable. [TR]   1124 Discussing with Dr. Mann with A.  She agrees to admit. [TR]   1156 EKG          EKG time: 1148  Rhythm/Rate: Normal sinus, rate 74  P waves and ID: Normal P, normal ID  QRS, axis: Narrow QRS, left axis  ST and T waves: No acute    Independently Interpreted by me  Not significantly changed compared to prior 9/19/2023   [TR]      ED Course User Index  [TR] Bradley Lloyd MD             AS OF 16:48 EDT VITALS:    BP - 134/76  HR - 75  TEMP - 97.3 °F (36.3 °C) (Oral)  O2 SATS - 100%    COMPLEXITY OF CARE  The patient requires admission.      DIAGNOSIS  Final diagnoses:   Vertigo         DISPOSITION  ED Disposition       ED Disposition   Decision to Admit    Condition   --    Comment   Level of Care: Telemetry [5]   Diagnosis: Vertigo [[id number]]   Admitting Physician: ROSARIO MANN [[id number]]   Attending Physician: ROSARIO MANN [[id number]]                  Please note that portions of this document were completed with a voice recognition program.    Note Disclaimer: At [facility], we believe that sharing information builds trust and better relationships. You are receiving this note because you recently visited [facility]. It is possible you will see health information before a provider has talked with you about it. This kind of information can be easy to misunderstand. To help you fully understand what it means for your health, we urge you to discuss this note with your provider.         Bradley Lloyd MD  07/22/24 1131       Bradley Lloyd MD  07/22/24 1820

## 2024-07-22 NOTE — H&P
Patient Name:  Pablo Mendoza  YOB: 1960  MRN:  [id number]  Admit Date:  7/22/2024  Patient Care Team:  Liana Hood MD as PCP - General (Internal Medicine & Pediatrics)  Drew Neal MD as Consulting Physician (Hematology and Oncology)  Brigitte Bell PA-C as Referring Physician (Physician Assistant)      Subjective   History Present Illness     Chief Complaint   Patient presents with    Dizziness       Mr. Mendoza is a 63 y.o. non-smoker with a history of hypertension, hyperlipidemia, TORSTEN, migraine with aura, childhood seizures/PNES, TIA, diabetes that presents to [facility] complaining of dizziness.     History of Present Illness  Patient reports he was in his usual state of health up until last night when he went to bed.  This morning when he woke up he initially felt sweaty/hot despite having an air conditioned home.  He attempted to sit up but found himself very dizzy and falling over.  He waited a few minutes to see if the spinning would get better, when it did not he called his sister who was able to call 911 and have EMTs dispatched to his house.  He was so dizzy he had to crawl to the door to unlock it for the EMTs.  He had associated nausea with dry heaving.  He also reported photophobia, phonophobia.    Review of Systems   Constitutional:  Positive for diaphoresis. Negative for fatigue and fever.   HENT:          Phonophobia   Eyes:  Positive for photophobia.   Respiratory:  Negative for cough and shortness of breath.    Cardiovascular:  Negative for chest pain and leg swelling.   Gastrointestinal:  Positive for nausea. Negative for abdominal pain and vomiting.        Dry heaving   Neurological:  Positive for dizziness and weakness.        Personal History     Past Medical History:   Diagnosis Date    Anesthesia complication     STATES HARD TO WAKE UP W/ALL SURGERIES    COVID-19     DEC 2020    Diabetes mellitus     Diverticulitis      Elevated cholesterol     Gout     Hypertension     Low back pain     Migraines     Mixed hyperlipidemia 01/19/2018    Neuropathy     Nocturia 01/19/2018    Numbness and tingling of both lower extremities     LEGS AND FEET     Seizures     AS A CHILD    Staph infection 2010    UNSURE OF SITE    Stroke     COGNITIVE DELAY    TIA (transient ischemic attack)     Type 2 diabetes mellitus, without long-term current use of insulin 01/19/2018    Weakness of both legs     LEFT WORSE      Past Surgical History:   Procedure Laterality Date    CHOLECYSTECTOMY  2011    COLONOSCOPY      2015 or 2016    HERNIA REPAIR      LUMBAR FUSION N/A 11/03/2021    Procedure: Lumbar 3 to Lumbar 4 and Lumbar 4 to Lumbar 5 laminectomy with a fusion;  Surgeon: Jose Webster MD;  Location: [facility] OR;  Service: [employer] - Neuro;  Laterality: N/A;    SINUS SURGERY      TYMPANOPLASTY Bilateral      Family History   Problem Relation Age of Onset    Arthritis Mother     Hypertension Mother     Heart disease Mother     Stroke Mother     Alcohol abuse Father     Arthritis Father     Hypertension Father     Nephrolithiasis Father     Diabetes Sister     Diabetes Sister     Stroke Brother 59    Hypertension Brother     Hyperlipidemia Brother     Throat cancer Maternal Grandmother     Diabetes Paternal Grandmother     Malig Hyperthermia Neg Hx      Social History     Tobacco Use    Smoking status: Never     Passive exposure: Never    Smokeless tobacco: Never   Vaping Use    Vaping status: Never Used   Substance Use Topics    Alcohol use: Yes     Comment: 3 DRINKS WEEKLY    Drug use: Not Currently     No current facility-administered medications on file prior to encounter.     Current Outpatient Medications on File Prior to Encounter   Medication Sig Dispense Refill    acetaminophen (TYLENOL) 500 MG tablet Take 1 tablet by mouth Every 6 (Six) Hours As Needed for Mild Pain. Indications: Pain      allopurinol (ZYLOPRIM) 300 MG tablet Take 1 tablet by  mouth 2 (Two) Times a Day. 180 tablet 1    amLODIPine (NORVASC) 10 MG tablet Take 1 tablet by mouth Daily. 90 tablet 1    atorvastatin (LIPITOR) 40 MG tablet TAKE 1 TABLET BY MOUTH DAILY 90 tablet 1    empagliflozin (Jardiance) 25 MG tablet tablet Take 1 tablet by mouth Daily. 90 tablet 1    glipizide (GLUCOTROL XL) 10 MG 24 hr tablet Take 1 tablet by mouth 2 (Two) Times a Day. 180 tablet 1    hydroCHLOROthiazide 25 MG tablet Take 1 tablet by mouth Daily. 90 tablet 1    ibuprofen (ADVIL,MOTRIN) 800 MG tablet TAKE ONE TABLET BY MOUTH EVERY 8 HOURS AS NEEDED FOR MILD PAIN 30 tablet 0    levocetirizine (XYZAL) 5 MG tablet Take 0.5 tablets by mouth As Needed for Allergies.      metoprolol tartrate (LOPRESSOR) 100 MG tablet Take 1 tablet by mouth 2 (Two) Times a Day. Indications: High Blood Pressure Disorder 180 tablet 1    Rimegepant Sulfate (NURTEC) 75 MG tablet dispersible tablet Take 1 tablet by mouth Daily As Needed (migraine head). 4 tablet 0    sertraline (ZOLOFT) 50 MG tablet Take 1 tablet by mouth Daily. 90 tablet 1    SITagliptin-metFORMIN HCl ER (Janumet XR)  MG tablet Take 2 tablets by mouth Daily. Indications: Type 2 Diabetes 180 tablet 1    [DISCONTINUED] sildenafil (Viagra) 50 MG tablet Take 1 tablet by mouth Daily As Needed for Erectile Dysfunction. (Patient not taking: Reported on 6/26/2024) 30 tablet 0     Allergies   Allergen Reactions    Aspirin Nausea Only    Lisinopril Unknown - Low Severity     Elevated creatinine           Objective    Objective     Vital Signs  Temp:  [97.3 °F (36.3 °C)-98 °F (36.7 °C)] 97.3 °F (36.3 °C)  Heart Rate:  [73-78] 75  Resp:  [18] 18  BP: (122-158)/(76-89) 134/76  SpO2:  [92 %-100 %] 100 %  on   ;   Device (Oxygen Therapy): room air  Body mass index is 42.75 kg/m².    Physical Exam  Constitutional:       General: He is not in acute distress.     Appearance: Normal appearance. He is not toxic-appearing.   HENT:      Head: Normocephalic and atraumatic.       Mouth/Throat:      Mouth: Mucous membranes are moist.      Pharynx: Oropharynx is clear.   Eyes:      Conjunctiva/sclera: Conjunctivae normal.      Pupils: Pupils are equal, round, and reactive to light.   Cardiovascular:      Rate and Rhythm: Normal rate and regular rhythm.   Pulmonary:      Effort: Pulmonary effort is normal. No respiratory distress.      Breath sounds: Normal breath sounds. No wheezing.   Abdominal:      General: Abdomen is flat.      Palpations: Abdomen is soft.      Tenderness: There is no abdominal tenderness.   Musculoskeletal:         General: No tenderness.      Right lower leg: No edema.      Left lower leg: No edema.   Skin:     General: Skin is warm and dry.   Neurological:      General: No focal deficit present.      Mental Status: He is alert and oriented to person, place, and time. Mental status is at baseline.      Motor: No weakness.      Comments: Normal neurologic exam, patient awake alert, follows commands         Results Review:  I reviewed the patient's new clinical results.  I reviewed the patient's new imaging results and agree with the interpretation.  I reviewed the patient's other test results and agree with the interpretation  I personally viewed and interpreted the patient's EKG/Telemetry data  Discussed with ED provider.    Lab Results (last 24 hours)       Procedure Component Value Units Date/Time    CBC & Differential [[id number]]  (Abnormal) Collected: 07/22/24 1010    Specimen: Blood Updated: 07/22/24 1019    Narrative:      The following orders were created for panel order CBC & Differential.  Procedure                               Abnormality         Status                     ---------                               -----------         ------                     CBC Auto Differential[[id number]]        Abnormal            Final result                 Please view results for these tests on the individual orders.    Comprehensive Metabolic Panel [[id number]]   (Abnormal) Collected: 07/22/24 1010    Specimen: Blood Updated: 07/22/24 1101     Glucose 248 mg/dL      BUN 18 mg/dL      Creatinine 0.99 mg/dL      Sodium 137 mmol/L      Potassium 3.7 mmol/L      Chloride 100 mmol/L      CO2 22.7 mmol/L      Calcium 9.2 mg/dL      Total Protein 7.5 g/dL      Albumin 4.3 g/dL      ALT (SGPT) 18 U/L      AST (SGOT) 28 U/L      Alkaline Phosphatase 135 U/L      Total Bilirubin 0.5 mg/dL      Globulin 3.2 gm/dL      A/G Ratio 1.3 g/dL      BUN/Creatinine Ratio 18.2     Anion Gap 14.3 mmol/L      eGFR 86.7 mL/min/1.73     Narrative:      GFR Normal >60  Chronic Kidney Disease <60  Kidney Failure <15      Protime-INR [[id number]]  (Abnormal) Collected: 07/22/24 1010    Specimen: Blood Updated: 07/22/24 1053     Protime 14.4 Seconds      INR 1.10    aPTT [[id number]]  (Normal) Collected: 07/22/24 1010    Specimen: Blood Updated: 07/22/24 1053     PTT 30.2 seconds     Single High Sensitivity Troponin T [[id number]]  (Normal) Collected: 07/22/24 1010    Specimen: Blood Updated: 07/22/24 1101     HS Troponin T 9 ng/L     Narrative:      High Sensitive Troponin T Reference Range:  <14.0 ng/L- Negative Female for AMI  <22.0 ng/L- Negative Male for AMI  >=14 - Abnormal Female indicating possible myocardial injury.  >=22 - Abnormal Male indicating possible myocardial injury.   Clinicians would have to utilize clinical acumen, EKG, Troponin, and serial changes to determine if it is an Acute Myocardial Infarction or myocardial injury due to an underlying chronic condition.         CBC Auto Differential [[id number]]  (Abnormal) Collected: 07/22/24 1010    Specimen: Blood Updated: 07/22/24 1019     WBC 8.95 10*3/mm3      RBC 5.61 10*6/mm3      Hemoglobin 13.9 g/dL      Hematocrit 43.8 %      MCV 78.1 fL      MCH 24.8 pg      MCHC 31.7 g/dL      RDW 15.8 %      RDW-SD 43.8 fl      MPV 10.1 fL      Platelets 252 10*3/mm3      Neutrophil % 72.2 %      Lymphocyte % 18.9 %      Monocyte % 5.8 %       Eosinophil % 1.6 %      Basophil % 0.7 %      Immature Grans % 0.8 %      Neutrophils, Absolute 6.47 10*3/mm3      Lymphocytes, Absolute 1.69 10*3/mm3      Monocytes, Absolute 0.52 10*3/mm3      Eosinophils, Absolute 0.14 10*3/mm3      Basophils, Absolute 0.06 10*3/mm3      Immature Grans, Absolute 0.07 10*3/mm3      nRBC 0.0 /100 WBC     POC Glucose Once [[id number]]  (Abnormal) Collected: 07/22/24 1332    Specimen: Blood Updated: 07/22/24 1334     Glucose 224 mg/dL     POC Glucose Once [[id number]]  (Abnormal) Collected: 07/22/24 1637    Specimen: Blood Updated: 07/22/24 1638     Glucose 165 mg/dL             Imaging Results (Last 24 Hours)       Procedure Component Value Units Date/Time    MRI Brain Without Contrast [[id number]] Collected: 07/22/24 1646     Updated: 07/22/24 1652    Narrative:      MRI OF THE BRAIN WITHOUT CONTRAST ON 07/22/2024     CLINICAL HISTORY: This is a 63-year-old male patient with a history of  acute dizziness and ataxic gait.     TECHNIQUE: Axial T1, FLAIR, fat-suppressed T2, axial diffusion and  gradient echo T2 and sagittal T1-weighted images were obtained of the  entire head.     This is correlated to a contrast-enhanced CT angiogram and CT perfusion  study of the brain earlier this morning on 07/22/2024 at 10:15 a.m. and  a prior MRI of the brain and internal auditory canals from [facility] on 05/10/2024.     FINDINGS: The brain parenchyma is normal in signal intensity.  Specifically, no diffusion weighted abnormality is identified with no  acute infarct identified. On the gradient echo T2-weighted sequence, no  acute or old blood breakdown products are seen intracranially. The  ventricles are normal in size. I see no focal mass effect. There is no  midline shift. No extra-axial fluid collections are identified. There  are tiny subcentimeter mucous retention cysts in the inferior maxillary  sinuses bilaterally. The remainder of the paranasal sinuses and the  mastoid air  cells and the middle ear cavities are clear. The orbits are  normal in appearance. The calvarium and skull base are normal in  appearance. Good flow voids are demonstrated in the cerebral vessels and  in the dural venous sinuses. On the sagittal T1-weighted sequence, there  is evidence of disc space narrowing and degenerative endplate changes at  the visualized C4-5 and C5-6 cervical levels. Posterior spurs abut and  deform the ventral surface of the cord at least mild to moderately  narrowing the canal and there is some foraminal narrowing at these  levels.       Impression:      1. Normal MRI of the brain with no acute infarct identified and the  etiology of the patient's acute-onset dizziness and ataxic gait earlier  today is not established on this exam.  2. The cervical spine is visualized down to the C5-6 level on the  sagittal T1-weighted sequence and there is disc space narrowing,  degenerative endplate changes, posterior spurs and mild-to-moderate  canal narrowing at C4-5 and C5-6 and there is some foraminal narrowing  at these levels. The remainder of the MRI of the head is normal.     This report was finalized on 7/22/2024 4:49 PM by Dr. Cheikh Kingston M.D  on Workstation: [id number]       CT Angiogram Head w AI Analysis of LVO [[id number]] Collected: 07/22/24 1152     Updated: 07/22/24 1309    Narrative:      EMERGENCY CONTRAST-ENHANCED CT ANGIOGRAM OF THE HEAD AND NECK AND CT  PERFUSION STUDY OF THE BRAIN ON 07/22/2024     CLINICAL HISTORY: This is a 63-year-old male patient with acute onset  dizziness and ataxia this morning.     HEAD CT WITHOUT CONTRAST TECHNIQUE: Spiral CT images were obtained from  the base of the skull to the vertex without intravenous contrast. The  images were reformatted and submitted in 3 mm thick axial CT sections  with brain algorithm and 2 mm thick sagittal and coronal reconstructions  were performed and submitted in brain algorithm.     This is correlated to a prior head CT  on 09/19/2023 and a prior MRI of  the brain on 08/14/2023 and MRI of the internal auditory canals on  05/10/2024.     FINDINGS: The brain parenchyma is normal in attenuation. The ventricles  are normal in size. I see no focal mass effect. There is no midline  shift. No extra axial fluid collections are identified. There is no  evidence of acute intra cranial hemorrhage. The calvarium and the skull  base are normal in appearance. The paranasal sinuses and the mastoid air  cells and the middle ear cavities are clear. The orbits are normal in  appearance.       Impression:      No acute intracranial abnormalities identified with no  change when compared to recent MRI of the brain and internal auditory  canals on 05/10/2024. Specifically I see no acute completed infarct and  no intracranial hemorrhage in the etiology of the patient's dizziness  and ataxia that started this morning is not established on this exam.  The results of the head CT without contrast were communicated to Dr. Champagne from stroke neurology by telephone on 07/22/2024 at 10:18 a.m.  and he requested a contrast-enhanced CT angiogram of the head and neck  and CT perfusion study of the brain.     CONTRAST-ENHANCED CT ANGIOGRAM OF THE HEAD AND NECK AND CT PERFUSION  STUDY OF THE BRAIN TECHNIQUE: Spiral CT images were obtained from the  mid cerebellum up through the majority of the cerebral hemispheres for  10 cm vertical slab of brain imaging during the arterial phase of  contrast and images were analyzed with rapid software analysis for CT  perfusion study of the brain subsequently spiral CT angio images were  obtained from the top of the aortic arch up through the great vessels  the head and neck during the arterial phase of contrast and images were  reformatted and submitted in 1 mm thick axial, sagittal and coronal CT  sections with soft tissue algorithm and 3D reconstructions were  performed to complete the CT angiogram of the head and neck and  finally  spiral CT images were obtained from the base of the skull to the vertex  delayed following intravenous contrast and these images were reformatted  submitted in 3 mm thick axial CT sections with brain algorithm and 2 mm  thick sagittal and coronal reconstructions were performed submitted in  brain algorithm.     FINDINGS:  CT PERFUSION STUDY OF THE BRAIN: CT perfusion study the brain consists  of just 10 cm vertical slab of brain imaging from the mid cerebellum up  through the cerebral hemispheres and excludes the inferior half of the  cerebellum as well as the inferior juan and medulla which are not  assessed. Within the 10 cm vertical slab of brain imaging I see no areas  of reduced cerebral blood flow to less than 30% of normal and therefore  no acute completed infarct. Furthermore there is no areas of delayed  time to maximal enhancement of greater than 6 seconds and therefore  there is no jodie hypoperfused brain. There is a small localized 21 cc  final brain parenchyma in the right temporal occipital region that  demonstrates a delayed time to maximal enhancement of greater than 4 but  less than 6 seconds that does not meet the strict criteria for  hypoperfused brain is probably artifact limits likely low-grade  hypoperfused brain.     CT ANGIOGRAM OF THE NECK: The nasopharynx, oropharynx, the hypopharynx,  the true cords and the subglottic airway are normal in appearance. The  thyroid gland enhances homogeneously and is normal in appearance. The  lung apices are clear. The parotid, , parapharyngeal and  submandibular spaces are symmetric and are normal in appearance.  Cervical spondylosis is present. There is disc space narrowing,  degenerative disc and endplate changes from C4-C7. tHE posterior disc  osteophyte complex abuts the ventral surface cord, mild moderately  narrowing the central portion of the canal at C4-5 and C5-6 and up to  moderately narrowing the central canal at C6-7 and  there is  uncovertebral joint spurring into the foramina with mild to moderate  right bony foraminal narrowing at C4-5, mild left and mild-moderate  right bony foraminal narrowing at C5-6 and C6-7. There is common origin  the left common carotid artery and brachiocephalic artery off the aortic  arch constituting a bovine configuration of the aortic arch which is a  normal anatomic variation. The left subclavian artery origins normal  appearance and no stenosis is seen in the left subclavian artery. The  left vertebral artery origin is normal in appearance. No stenosis is  seen in the left vertebral artery from its origin to the vertebrobasilar  junction. The left common carotid origin is normal in appearance. No  stenosis is seen in the left common carotid artery, its bifurcation and  the left internal and external carotid arteries is normal in appearance.  No stenosis is seen in the cervical segment of the left internal carotid  artery using the NASCET criteria. The brachiocephalic artery origins  normal in appearance and no stenosis is seen the brachiocephalic artery  and its bifurcation into the right subclavian and common carotid  arteries is normal in appearance and no stenosis is seen in the right  subclavian artery. The right vertebral artery origin is normal in  appearance. No stenosis is seen in the right vertebral artery from its  origin to the vertebrobasilar junction. The right common carotid origins  normal appearance. No stenosis seen in the right common carotid artery  its bifurcation into the right internal and external carotid arteries is  normal in appearance and no stenosis is seen in the cervical segment of  the right internal carotid artery using the NASCET criteria.     CT ANGIOGRAM OF THE HEAD: The intracranial segments of the distal  vertebral arteries are widely patent without stenosis to the  vertebrobasilar junction. The right posterior inferior cerebellar artery  origins normal in  appearance. The basilar artery and the basilar tip is  normal in appearance. There is an atretic P1 segment to the left  posterior cerebral artery with persistent fetal origin of the left  posterior cerebral artery off the back wall of the supracavernous left  internal carotid artery which is a normal anatomic variation and both  the right and left posterior cerebral and superior cerebellar arteries  are within normal limits. The upper cervical, petrous, cavernous  segments of the internal carotid arteries are normal in appearance  calcified plaques mildly narrow the supracavernous segments. The carotid  termini are normal in appearance. The A1 segments of the anterior  cerebral arteries and the anterior communicating artery origin and the  A2 segments of the anterior cerebral arteries are within normal limits.  The M1 segments of the middle cerebral arteries and the middle cerebral  artery bifurcations are within normal limits and there is good filling  of the M2 segments, the sylvian fissures with no significant stenosis in  the M2 segments.     IMPRESSION:  1. The CT of the head without contrast is within normal limits with no  discernible acute completed infarct or intracranial hemorrhage  identified the etiology of patient's dizziness and ataxia is not  established on this exam and if clinical symptoms warrant an MRI of the  brain is recommended for more comprehensive assessment. The results of  the head CT without contrast were communicated to Dr. Champagne from  stroke neurology by telephone on 07/22/2024 at 10:18 a.m. and he  requested a contrast-enhanced CT angiogram of the head and neck and CT  perfusion study of the brain.  2. Cervical spondylosis is present with disc space narrowing  degenerative disc and endplate changes and posterior endplate spurring  from C4-C7 posterior spurs abut the ventral surface cord mild moderately  narrowing the central portion canal at C4-5 and C5-6 and at least mild  to  moderately narrowing up to moderately narrow the canal at C6-7. There  is uncovertebral joint spurring into the foramina with mild to moderate  right bony foraminal narrowing at C4-5 and mild left and mild-moderate  right bony foraminal narrowing at C5-6 and C6-7 otherwise cervical spine  is unremarkable.  3. There are calcified plaques that mildly narrow the supracavernous  segments of the internal carotid arteries otherwise the CT angiographic  evaluation great vessels of the head and neck is normal. In particular  no stenosis is seen in the subclavian arteries, vertebral arteries or  the common carotid arteries or the cervical segments of the internal  carotid arteries using the NASCET criteria and other than the mild  narrowing of the supracavernous segments of the internal carotid  arteries no intracranial vascular stenoses or occlusions are identified.  Again if there remains any clinical suspicion of acute stroke I  recommend an MRI of the brain for more complete assessment. The final  results and recommendation were communicated to Dr. Champagne from  stroke neurology by telephone on 07/22/2024 at 11 a.m.      AI analysis of LVO was utilized.     Radiation dose reduction techniques were utilized, including automated  exposure control and exposure modulation based on body size.        This report was finalized on 7/22/2024 1:06 PM by Dr. Cheikh Kingston M.D  on Workstation: [id number]       CT Angiogram Neck [[id number]] Collected: 07/22/24 1152     Updated: 07/22/24 1309    Narrative:      EMERGENCY CONTRAST-ENHANCED CT ANGIOGRAM OF THE HEAD AND NECK AND CT  PERFUSION STUDY OF THE BRAIN ON 07/22/2024     CLINICAL HISTORY: This is a 63-year-old male patient with acute onset  dizziness and ataxia this morning.     HEAD CT WITHOUT CONTRAST TECHNIQUE: Spiral CT images were obtained from  the base of the skull to the vertex without intravenous contrast. The  images were reformatted and submitted in 3 mm thick  axial CT sections  with brain algorithm and 2 mm thick sagittal and coronal reconstructions  were performed and submitted in brain algorithm.     This is correlated to a prior head CT on 09/19/2023 and a prior MRI of  the brain on 08/14/2023 and MRI of the internal auditory canals on  05/10/2024.     FINDINGS: The brain parenchyma is normal in attenuation. The ventricles  are normal in size. I see no focal mass effect. There is no midline  shift. No extra axial fluid collections are identified. There is no  evidence of acute intra cranial hemorrhage. The calvarium and the skull  base are normal in appearance. The paranasal sinuses and the mastoid air  cells and the middle ear cavities are clear. The orbits are normal in  appearance.       Impression:      No acute intracranial abnormalities identified with no  change when compared to recent MRI of the brain and internal auditory  canals on 05/10/2024. Specifically I see no acute completed infarct and  no intracranial hemorrhage in the etiology of the patient's dizziness  and ataxia that started this morning is not established on this exam.  The results of the head CT without contrast were communicated to Dr. Champagne from stroke neurology by telephone on 07/22/2024 at 10:18 a.m.  and he requested a contrast-enhanced CT angiogram of the head and neck  and CT perfusion study of the brain.     CONTRAST-ENHANCED CT ANGIOGRAM OF THE HEAD AND NECK AND CT PERFUSION  STUDY OF THE BRAIN TECHNIQUE: Spiral CT images were obtained from the  mid cerebellum up through the majority of the cerebral hemispheres for  10 cm vertical slab of brain imaging during the arterial phase of  contrast and images were analyzed with rapid software analysis for CT  perfusion study of the brain subsequently spiral CT angio images were  obtained from the top of the aortic arch up through the great vessels  the head and neck during the arterial phase of contrast and images were  reformatted and  submitted in 1 mm thick axial, sagittal and coronal CT  sections with soft tissue algorithm and 3D reconstructions were  performed to complete the CT angiogram of the head and neck and finally  spiral CT images were obtained from the base of the skull to the vertex  delayed following intravenous contrast and these images were reformatted  submitted in 3 mm thick axial CT sections with brain algorithm and 2 mm  thick sagittal and coronal reconstructions were performed submitted in  brain algorithm.     FINDINGS:  CT PERFUSION STUDY OF THE BRAIN: CT perfusion study the brain consists  of just 10 cm vertical slab of brain imaging from the mid cerebellum up  through the cerebral hemispheres and excludes the inferior half of the  cerebellum as well as the inferior juan and medulla which are not  assessed. Within the 10 cm vertical slab of brain imaging I see no areas  of reduced cerebral blood flow to less than 30% of normal and therefore  no acute completed infarct. Furthermore there is no areas of delayed  time to maximal enhancement of greater than 6 seconds and therefore  there is no jodie hypoperfused brain. There is a small localized 21 cc  final brain parenchyma in the right temporal occipital region that  demonstrates a delayed time to maximal enhancement of greater than 4 but  less than 6 seconds that does not meet the strict criteria for  hypoperfused brain is probably artifact limits likely low-grade  hypoperfused brain.     CT ANGIOGRAM OF THE NECK: The nasopharynx, oropharynx, the hypopharynx,  the true cords and the subglottic airway are normal in appearance. The  thyroid gland enhances homogeneously and is normal in appearance. The  lung apices are clear. The parotid, , parapharyngeal and  submandibular spaces are symmetric and are normal in appearance.  Cervical spondylosis is present. There is disc space narrowing,  degenerative disc and endplate changes from C4-C7. tHE posterior  disc  osteophyte complex abuts the ventral surface cord, mild moderately  narrowing the central portion of the canal at C4-5 and C5-6 and up to  moderately narrowing the central canal at C6-7 and there is  uncovertebral joint spurring into the foramina with mild to moderate  right bony foraminal narrowing at C4-5, mild left and mild-moderate  right bony foraminal narrowing at C5-6 and C6-7. There is common origin  the left common carotid artery and brachiocephalic artery off the aortic  arch constituting a bovine configuration of the aortic arch which is a  normal anatomic variation. The left subclavian artery origins normal  appearance and no stenosis is seen in the left subclavian artery. The  left vertebral artery origin is normal in appearance. No stenosis is  seen in the left vertebral artery from its origin to the vertebrobasilar  junction. The left common carotid origin is normal in appearance. No  stenosis is seen in the left common carotid artery, its bifurcation and  the left internal and external carotid arteries is normal in appearance.  No stenosis is seen in the cervical segment of the left internal carotid  artery using the NASCET criteria. The brachiocephalic artery origins  normal in appearance and no stenosis is seen the brachiocephalic artery  and its bifurcation into the right subclavian and common carotid  arteries is normal in appearance and no stenosis is seen in the right  subclavian artery. The right vertebral artery origin is normal in  appearance. No stenosis is seen in the right vertebral artery from its  origin to the vertebrobasilar junction. The right common carotid origins  normal appearance. No stenosis seen in the right common carotid artery  its bifurcation into the right internal and external carotid arteries is  normal in appearance and no stenosis is seen in the cervical segment of  the right internal carotid artery using the NASCET criteria.     CT ANGIOGRAM OF THE HEAD: The  intracranial segments of the distal  vertebral arteries are widely patent without stenosis to the  vertebrobasilar junction. The right posterior inferior cerebellar artery  origins normal in appearance. The basilar artery and the basilar tip is  normal in appearance. There is an atretic P1 segment to the left  posterior cerebral artery with persistent fetal origin of the left  posterior cerebral artery off the back wall of the supracavernous left  internal carotid artery which is a normal anatomic variation and both  the right and left posterior cerebral and superior cerebellar arteries  are within normal limits. The upper cervical, petrous, cavernous  segments of the internal carotid arteries are normal in appearance  calcified plaques mildly narrow the supracavernous segments. The carotid  termini are normal in appearance. The A1 segments of the anterior  cerebral arteries and the anterior communicating artery origin and the  A2 segments of the anterior cerebral arteries are within normal limits.  The M1 segments of the middle cerebral arteries and the middle cerebral  artery bifurcations are within normal limits and there is good filling  of the M2 segments, the sylvian fissures with no significant stenosis in  the M2 segments.     IMPRESSION:  1. The CT of the head without contrast is within normal limits with no  discernible acute completed infarct or intracranial hemorrhage  identified the etiology of patient's dizziness and ataxia is not  established on this exam and if clinical symptoms warrant an MRI of the  brain is recommended for more comprehensive assessment. The results of  the head CT without contrast were communicated to Dr. Champagne from  stroke neurology by telephone on 07/22/2024 at 10:18 a.m. and he  requested a contrast-enhanced CT angiogram of the head and neck and CT  perfusion study of the brain.  2. Cervical spondylosis is present with disc space narrowing  degenerative disc and endplate  changes and posterior endplate spurring  from C4-C7 posterior spurs abut the ventral surface cord mild moderately  narrowing the central portion canal at C4-5 and C5-6 and at least mild  to moderately narrowing up to moderately narrow the canal at C6-7. There  is uncovertebral joint spurring into the foramina with mild to moderate  right bony foraminal narrowing at C4-5 and mild left and mild-moderate  right bony foraminal narrowing at C5-6 and C6-7 otherwise cervical spine  is unremarkable.  3. There are calcified plaques that mildly narrow the supracavernous  segments of the internal carotid arteries otherwise the CT angiographic  evaluation great vessels of the head and neck is normal. In particular  no stenosis is seen in the subclavian arteries, vertebral arteries or  the common carotid arteries or the cervical segments of the internal  carotid arteries using the NASCET criteria and other than the mild  narrowing of the supracavernous segments of the internal carotid  arteries no intracranial vascular stenoses or occlusions are identified.  Again if there remains any clinical suspicion of acute stroke I  recommend an MRI of the brain for more complete assessment. The final  results and recommendation were communicated to Dr. Champagne from  stroke neurology by telephone on 07/22/2024 at 11 a.m.      AI analysis of LVO was utilized.     Radiation dose reduction techniques were utilized, including automated  exposure control and exposure modulation based on body size.        This report was finalized on 7/22/2024 1:06 PM by Dr. Cheikh Kingston M.D  on Workstation: [id number]       CT CEREBRAL PERFUSION WITH & WITHOUT CONTRAST [[id number]] Collected: 07/22/24 1152     Updated: 07/22/24 1309    Narrative:      EMERGENCY CONTRAST-ENHANCED CT ANGIOGRAM OF THE HEAD AND NECK AND CT  PERFUSION STUDY OF THE BRAIN ON 07/22/2024     CLINICAL HISTORY: This is a 63-year-old male patient with acute onset  dizziness and ataxia  this morning.     HEAD CT WITHOUT CONTRAST TECHNIQUE: Spiral CT images were obtained from  the base of the skull to the vertex without intravenous contrast. The  images were reformatted and submitted in 3 mm thick axial CT sections  with brain algorithm and 2 mm thick sagittal and coronal reconstructions  were performed and submitted in brain algorithm.     This is correlated to a prior head CT on 09/19/2023 and a prior MRI of  the brain on 08/14/2023 and MRI of the internal auditory canals on  05/10/2024.     FINDINGS: The brain parenchyma is normal in attenuation. The ventricles  are normal in size. I see no focal mass effect. There is no midline  shift. No extra axial fluid collections are identified. There is no  evidence of acute intra cranial hemorrhage. The calvarium and the skull  base are normal in appearance. The paranasal sinuses and the mastoid air  cells and the middle ear cavities are clear. The orbits are normal in  appearance.       Impression:      No acute intracranial abnormalities identified with no  change when compared to recent MRI of the brain and internal auditory  canals on 05/10/2024. Specifically I see no acute completed infarct and  no intracranial hemorrhage in the etiology of the patient's dizziness  and ataxia that started this morning is not established on this exam.  The results of the head CT without contrast were communicated to Dr. Champagne from stroke neurology by telephone on 07/22/2024 at 10:18 a.m.  and he requested a contrast-enhanced CT angiogram of the head and neck  and CT perfusion study of the brain.     CONTRAST-ENHANCED CT ANGIOGRAM OF THE HEAD AND NECK AND CT PERFUSION  STUDY OF THE BRAIN TECHNIQUE: Spiral CT images were obtained from the  mid cerebellum up through the majority of the cerebral hemispheres for  10 cm vertical slab of brain imaging during the arterial phase of  contrast and images were analyzed with rapid software analysis for CT  perfusion study of  the brain subsequently spiral CT angio images were  obtained from the top of the aortic arch up through the great vessels  the head and neck during the arterial phase of contrast and images were  reformatted and submitted in 1 mm thick axial, sagittal and coronal CT  sections with soft tissue algorithm and 3D reconstructions were  performed to complete the CT angiogram of the head and neck and finally  spiral CT images were obtained from the base of the skull to the vertex  delayed following intravenous contrast and these images were reformatted  submitted in 3 mm thick axial CT sections with brain algorithm and 2 mm  thick sagittal and coronal reconstructions were performed submitted in  brain algorithm.     FINDINGS:  CT PERFUSION STUDY OF THE BRAIN: CT perfusion study the brain consists  of just 10 cm vertical slab of brain imaging from the mid cerebellum up  through the cerebral hemispheres and excludes the inferior half of the  cerebellum as well as the inferior juan and medulla which are not  assessed. Within the 10 cm vertical slab of brain imaging I see no areas  of reduced cerebral blood flow to less than 30% of normal and therefore  no acute completed infarct. Furthermore there is no areas of delayed  time to maximal enhancement of greater than 6 seconds and therefore  there is no jodie hypoperfused brain. There is a small localized 21 cc  final brain parenchyma in the right temporal occipital region that  demonstrates a delayed time to maximal enhancement of greater than 4 but  less than 6 seconds that does not meet the strict criteria for  hypoperfused brain is probably artifact limits likely low-grade  hypoperfused brain.     CT ANGIOGRAM OF THE NECK: The nasopharynx, oropharynx, the hypopharynx,  the true cords and the subglottic airway are normal in appearance. The  thyroid gland enhances homogeneously and is normal in appearance. The  lung apices are clear. The parotid, , parapharyngeal  and  submandibular spaces are symmetric and are normal in appearance.  Cervical spondylosis is present. There is disc space narrowing,  degenerative disc and endplate changes from C4-C7. tHE posterior disc  osteophyte complex abuts the ventral surface cord, mild moderately  narrowing the central portion of the canal at C4-5 and C5-6 and up to  moderately narrowing the central canal at C6-7 and there is  uncovertebral joint spurring into the foramina with mild to moderate  right bony foraminal narrowing at C4-5, mild left and mild-moderate  right bony foraminal narrowing at C5-6 and C6-7. There is common origin  the left common carotid artery and brachiocephalic artery off the aortic  arch constituting a bovine configuration of the aortic arch which is a  normal anatomic variation. The left subclavian artery origins normal  appearance and no stenosis is seen in the left subclavian artery. The  left vertebral artery origin is normal in appearance. No stenosis is  seen in the left vertebral artery from its origin to the vertebrobasilar  junction. The left common carotid origin is normal in appearance. No  stenosis is seen in the left common carotid artery, its bifurcation and  the left internal and external carotid arteries is normal in appearance.  No stenosis is seen in the cervical segment of the left internal carotid  artery using the NASCET criteria. The brachiocephalic artery origins  normal in appearance and no stenosis is seen the brachiocephalic artery  and its bifurcation into the right subclavian and common carotid  arteries is normal in appearance and no stenosis is seen in the right  subclavian artery. The right vertebral artery origin is normal in  appearance. No stenosis is seen in the right vertebral artery from its  origin to the vertebrobasilar junction. The right common carotid origins  normal appearance. No stenosis seen in the right common carotid artery  its bifurcation into the right internal and  external carotid arteries is  normal in appearance and no stenosis is seen in the cervical segment of  the right internal carotid artery using the NASCET criteria.     CT ANGIOGRAM OF THE HEAD: The intracranial segments of the distal  vertebral arteries are widely patent without stenosis to the  vertebrobasilar junction. The right posterior inferior cerebellar artery  origins normal in appearance. The basilar artery and the basilar tip is  normal in appearance. There is an atretic P1 segment to the left  posterior cerebral artery with persistent fetal origin of the left  posterior cerebral artery off the back wall of the supracavernous left  internal carotid artery which is a normal anatomic variation and both  the right and left posterior cerebral and superior cerebellar arteries  are within normal limits. The upper cervical, petrous, cavernous  segments of the internal carotid arteries are normal in appearance  calcified plaques mildly narrow the supracavernous segments. The carotid  termini are normal in appearance. The A1 segments of the anterior  cerebral arteries and the anterior communicating artery origin and the  A2 segments of the anterior cerebral arteries are within normal limits.  The M1 segments of the middle cerebral arteries and the middle cerebral  artery bifurcations are within normal limits and there is good filling  of the M2 segments, the sylvian fissures with no significant stenosis in  the M2 segments.     IMPRESSION:  1. The CT of the head without contrast is within normal limits with no  discernible acute completed infarct or intracranial hemorrhage  identified the etiology of patient's dizziness and ataxia is not  established on this exam and if clinical symptoms warrant an MRI of the  brain is recommended for more comprehensive assessment. The results of  the head CT without contrast were communicated to Dr. Champagne from  stroke neurology by telephone on 07/22/2024 at 10:18 a.m. and  he  requested a contrast-enhanced CT angiogram of the head and neck and CT  perfusion study of the brain.  2. Cervical spondylosis is present with disc space narrowing  degenerative disc and endplate changes and posterior endplate spurring  from C4-C7 posterior spurs abut the ventral surface cord mild moderately  narrowing the central portion canal at C4-5 and C5-6 and at least mild  to moderately narrowing up to moderately narrow the canal at C6-7. There  is uncovertebral joint spurring into the foramina with mild to moderate  right bony foraminal narrowing at C4-5 and mild left and mild-moderate  right bony foraminal narrowing at C5-6 and C6-7 otherwise cervical spine  is unremarkable.  3. There are calcified plaques that mildly narrow the supracavernous  segments of the internal carotid arteries otherwise the CT angiographic  evaluation great vessels of the head and neck is normal. In particular  no stenosis is seen in the subclavian arteries, vertebral arteries or  the common carotid arteries or the cervical segments of the internal  carotid arteries using the NASCET criteria and other than the mild  narrowing of the supracavernous segments of the internal carotid  arteries no intracranial vascular stenoses or occlusions are identified.  Again if there remains any clinical suspicion of acute stroke I  recommend an MRI of the brain for more complete assessment. The final  results and recommendation were communicated to Dr. Champagne from  stroke neurology by telephone on 07/22/2024 at 11 a.m.      AI analysis of LVO was utilized.     Radiation dose reduction techniques were utilized, including automated  exposure control and exposure modulation based on body size.        This report was finalized on 7/22/2024 1:06 PM by Dr. Cheikh Kingston M.D  on Workstation: [id number]       XR Chest 1 View [[id number]] Collected: 07/22/24 1129     Updated: 07/22/24 1213    Narrative:      CHEST SINGLE VIEW     HISTORY: Acute  stroke. Known right rib fracture.     COMPARISON: [facility] chest 08/19/2023     FINDINGS: Lungs are diminished and allowing for this, the  cardiomediastinal silhouette is within normal limits. Cardiac monitoring  leads are present. There is calcified right paratracheal lymph node.  Lungs appear clear and there is no evidence for pulmonary edema or  pleural effusion. There is mild elevation of right hemidiaphragm.       Impression:      No evidence for active disease in the chest.     This report was finalized on 7/22/2024 12:10 PM by Dr. Anup Padron M.D on Workstation: [id number]               Results for orders placed during the hospital encounter of 08/14/23    Adult Transthoracic Echo Complete W/ Cont if Necessary Per Protocol    Interpretation Summary    Left ventricular systolic function is normal. Calculated left ventricular EF = 54.8% Normal left ventricular cavity size and wall thickness noted. All left ventricular wall segments contract normally. Left ventricular diastolic function is consistent with (grade II w/high LAP) pseudonormalization.    Left atrial volume is normal. Saline test results are negative.    No aortic valve regurgitation or stenosis is present. The aortic valve is abnormal in structure. There is mild thickening of the aortic valve. The aortic valve was poorly visualized but appears trileaflet.    Mild mitral annular calcification is present. There is moderate, anterior mitral leaflet thickening present.    Trace tricuspid valve regurgitation is present. Insufficient TR velocity profile to estimate the right ventricular systolic pressure.      ECG 12 Lead Stroke Evaluation   Preliminary Result   HEART RATE=74  bpm   RR Tylyjyqe=240  ms   MI Shxajqlh=185  ms   P Horizontal Axis=-4  deg   P Front Axis=31  deg   QRSD Lrhjipoq=067  ms   QT Tuzhdgck=375  ms   RIsO=564  ms   QRS Axis=-36  deg   T Wave Axis=33  deg   - ABNORMAL ECG -   Sinus rhythm   Probable left atrial enlargement   Left  axis deviation   Low voltage, precordial leads   Consider anterior infarct   Minimal ST elevation, lateral leads   Date and Time of Study:2024-07-22 11:48:15      Telemetry Scan   Final Result           Assessment/Plan     Active Hospital Problems    Diagnosis  POA    **Dizziness [R42]  Yes    Migraine with aura [G43.109]  Yes    Seizure [R56.9]  Yes    Mixed hyperlipidemia [E78.2]  Yes    Essential hypertension [I10]  Yes    Type 2 diabetes mellitus, without long-term current use of insulin [E11.9]  Yes    Gout of multiple sites [M10.9]  Yes      Resolved Hospital Problems   No resolved problems to display.       Mr. Mendoza is a 63 y.o. non-smoker with a history of hypertension, hyperlipidemia, TORSTEN, migraine with aura, childhood seizures/PNES, TIA, diabetes who presents with dizziness/ataxia after waking up this morning.      Acute dizziness  TIA history  Childhood seizures  PNES  -Neurology consulted, differential includes stroke vs vestibular migraine vs BPPV plus migraine   - CTA head/neck, CT perfusion and CT head all negative for acute pathology  -MRI is pending; Plavix has been started given allergy to aspirin, if MRI is negative plan to DC Plavix  -Atorvastatin 40 mg    DM  - hold home oral meds; start SSI  - A1c 10.6 last month    HTN  HLD  - cont amlodipine, metoprolol    TORSTEN  - ok for home CPAP if available    Gout  - allopurinol    History of migraine with aura  - On nurtec at home    I discussed the patient's findings and my recommendations with patient, nursing staff, and ED provider.    VTE Prophylaxis - SCDs.  Code Status - Full code.       Cele Mann MD  [facility]  07/22/24  17:01 EDT

## 2024-07-22 NOTE — ED NOTES
Nursing report ED to floor  Pablo Mendoza  63 y.o.  male    HPI :  HPI (Adult)  Stated Reason for Visit: Dizziness  History Obtained From: EMS    Chief Complaint  Chief Complaint   Patient presents with    Dizziness       Admitting doctor:   Cele Mann MD    Admitting diagnosis:   The encounter diagnosis was Vertigo.    Code status:   Current Code Status       Date Active Code Status Order ID Comments User Context       Prior            Allergies:   Aspirin and Lisinopril    Isolation:   No active isolations    Intake and Output  No intake or output data in the 24 hours ending 07/22/24 1214    Weight:   There were no vitals filed for this visit.    Most recent vitals:   Vitals:    07/22/24 1150 07/22/24 1151 07/22/24 1159 07/22/24 1201   BP:    122/78   Pulse: 73 75 74 75   Resp:       Temp:       SpO2: 95% 94% 94% 92%       Active LDAs/IV Access:   Lines, Drains & Airways       Active LDAs       Name Placement date Placement time Site Days    Peripheral IV Left Antecubital --  --  Antecubital  --                    Labs (abnormal labs have a star):   Labs Reviewed   COMPREHENSIVE METABOLIC PANEL - Abnormal; Notable for the following components:       Result Value    Glucose 248 (*)     Alkaline Phosphatase 135 (*)     All other components within normal limits    Narrative:     GFR Normal >60  Chronic Kidney Disease <60  Kidney Failure <15     PROTIME-INR - Abnormal; Notable for the following components:    Protime 14.4 (*)     All other components within normal limits   CBC WITH AUTO DIFFERENTIAL - Abnormal; Notable for the following components:    MCV 78.1 (*)     MCH 24.8 (*)     RDW 15.8 (*)     Lymphocyte % 18.9 (*)     Immature Grans % 0.8 (*)     Immature Grans, Absolute 0.07 (*)     All other components within normal limits   APTT - Normal   SINGLE HS TROPONIN T - Normal    Narrative:     High Sensitive Troponin T Reference Range:  <14.0 ng/L- Negative Female for AMI  <22.0 ng/L- Negative Male  for AMI  >=14 - Abnormal Female indicating possible myocardial injury.  >=22 - Abnormal Male indicating possible myocardial injury.   Clinicians would have to utilize clinical acumen, EKG, Troponin, and serial changes to determine if it is an Acute Myocardial Infarction or myocardial injury due to an underlying chronic condition.        RAINBOW DRAW    Narrative:     The following orders were created for panel order Newman Draw.  Procedure                               Abnormality         Status                     ---------                               -----------         ------                     Green Top (Gel)[[id number]]                                  Final result               Lavender Top[[id number]]                                     Final result               Gold Top - SST[[id number]]                                   Final result               Light Blue Top[[id number]]                                   Final result                 Please view results for these tests on the individual orders.   POCT GLUCOSE FINGERSTICK   TYPE AND SCREEN   CBC AND DIFFERENTIAL    Narrative:     The following orders were created for panel order CBC & Differential.  Procedure                               Abnormality         Status                     ---------                               -----------         ------                     CBC Auto Differential[[id number]]        Abnormal            Final result                 Please view results for these tests on the individual orders.   GREEN TOP   LAVENDER TOP   GOLD TOP - SST   LIGHT BLUE TOP       EKG:   ECG 12 Lead Stroke Evaluation   Preliminary Result   HEART RATE=74  bpm   RR Rcwpgbvj=376  ms   TX Udzmnjxx=283  ms   P Horizontal Axis=-4  deg   P Front Axis=31  deg   QRSD Fntmtsde=712  ms   QT Kmgbfsoh=025  ms   KNsU=757  ms   QRS Axis=-36  deg   T Wave Axis=33  deg   - ABNORMAL ECG -   Sinus rhythm   Probable left atrial enlargement   Left axis deviation   Low  voltage, precordial leads   Consider anterior infarct   Minimal ST elevation, lateral leads   Date and Time of Study:2024-07-22 11:48:15          Meds given in ED:   Medications   sodium chloride 0.9 % flush 10 mL (has no administration in time range)   ondansetron (ZOFRAN) 2 mg/mL injection  - ADS Override Pull (4 mg  Given 7/22/24 1031)   diazePAM (VALIUM) injection 5 mg (5 mg Intravenous Given 7/22/24 1112)   iopamidol (ISOVUE-370) 76 % injection 150 mL (150 mL Intravenous Given 7/22/24 1028)       Imaging results:  XR Chest 1 View    Result Date: 7/22/2024  No evidence for active disease in the chest.  This report was finalized on 7/22/2024 12:10 PM by Dr. Anup Padron M.D on Workstation: [id number]      CT Angiogram Head w AI Analysis of LVO    Result Date: 7/22/2024  No acute intracranial abnormalities identified with no change when compared to recent MRI of the brain and internal auditory canals on 05/10/2024. Specifically I see no acute completed infarct and no intracranial hemorrhage in the etiology of the patient's dizziness and ataxia that started this morning is not established on this exam. The results of the head CT without contrast were communicated to Dr. Champagne from stroke neurology by telephone on 07/22/2024 at 10:18 a.m. and he requested a contrast-enhanced CT angiogram of the head and neck and CT perfusion study of the brain.  CONTRAST-ENHANCED CT ANGIOGRAM OF THE HEAD AND NECK AND CT PERFUSION STUDY OF THE BRAIN TECHNIQUE: Spiral CT images were obtained from the mid cerebellum up through the majority of the cerebral hemispheres for 10 cm vertical slab of brain imaging during the arterial phase of contrast and images were analyzed with rapid software analysis for CT perfusion study of the brain subsequently spiral CT angio images were obtained from the top of the aortic arch up through the great vessels the head and neck during the arterial phase of contrast and images were reformatted  and submitted in 1 mm thick axial, sagittal and coronal CT sections with soft tissue algorithm and 3D reconstructions were performed to complete the CT angiogram of the head and neck and finally spiral CT images were obtained from the base of the skull to the vertex delayed following intravenous contrast and these images were reformatted submitted in 3 mm thick axial CT sections with brain algorithm and 2 mm thick sagittal and coronal reconstructions were performed submitted in brain algorithm.  FINDINGS: CT PERFUSION STUDY OF THE BRAIN: CT perfusion study the brain consists of just 10 cm vertical slab of brain imaging from the mid cerebellum up through the cerebral hemispheres and excludes the inferior half of the cerebellum as well as the inferior juan and medulla which are not assessed. Within the 10 cm vertical slab of brain imaging I see no areas of reduced cerebral blood flow to less than 30% of normal and therefore no acute completed infarct. Furthermore there is no areas of delayed time to maximal enhancement of greater than 6 seconds and therefore there is no jodie hypoperfused brain. There is a small localized 21 cc final brain parenchyma in the right temporal occipital region that demonstrates a delayed time to maximal enhancement of greater than 4 but less than 6 seconds that does not meet the strict criteria for hypoperfused brain is probably artifact limits likely low-grade hypoperfused brain.  CT ANGIOGRAM OF THE NECK: The nasopharynx, oropharynx, the hypopharynx, the true cords and the subglottic airway are normal in appearance. The thyroid gland enhances homogeneously and is normal in appearance. The lung apices are clear. The parotid, , parapharyngeal and submandibular spaces are symmetric and are normal in appearance. Cervical spondylosis is present. There is disc space narrowing, degenerative disc and endplate changes from C4-C7. tHE posterior disc osteophyte complex abuts the ventral  surface cord, mild moderately narrowing the central portion of the canal at C4-5 and C5-6 and up to moderately narrowing the central canal at C6-7 and there is uncovertebral joint spurring into the foramina with mild to moderate right bony foraminal narrowing at C4-5, mild left and mild-moderate right bony foraminal narrowing at C5-6 and C6-7. There is common origin the left common carotid artery and brachiocephalic artery off the aortic arch constituting a bovine configuration of the aortic arch which is a normal anatomic variation. The left subclavian artery origins normal appearance and no stenosis is seen in the left subclavian artery. The left vertebral artery origin is normal in appearance. No stenosis is seen in the left vertebral artery from its origin to the vertebrobasilar junction. The left common carotid origin is normal in appearance. No stenosis is seen in the left common carotid artery, its bifurcation and the left internal and external carotid arteries is normal in appearance. No stenosis is seen in the cervical segment of the left internal carotid artery using the NASCET criteria. The brachiocephalic artery origins normal in appearance and no stenosis is seen the brachiocephalic artery and its bifurcation into the right subclavian and common carotid arteries is normal in appearance and no stenosis is seen in the right subclavian artery. The right vertebral artery origin is normal in appearance. No stenosis is seen in the right vertebral artery from its origin to the vertebrobasilar junction. The right common carotid origins normal appearance. No stenosis seen in the right common carotid artery its bifurcation into the right internal and external carotid arteries is normal in appearance and no stenosis is seen in the cervical segment of the right internal carotid artery using the NASCET criteria.  CT ANGIOGRAM OF THE HEAD: The intracranial segments of the distal vertebral arteries are widely patent  without stenosis to the vertebrobasilar junction. The right posterior inferior cerebellar artery origins normal in appearance. The basilar artery and the basilar tip is normal in appearance. There is an atretic P1 segment to the left posterior cerebral artery with persistent fetal origin of the left posterior cerebral artery off the back wall of the supracavernous left internal carotid artery which is a normal anatomic variation and both the right and left posterior cerebral and superior cerebellar arteries are within normal limits. The upper cervical, petrous, cavernous segments of the internal carotid arteries are normal in appearance calcified plaques mildly narrow the supracavernous segments. The carotid termini are normal in appearance. The A1 segments of the anterior cerebral arteries and the anterior communicating artery origin and the A2 segments of the anterior cerebral arteries are within normal limits. The M1 segments of the middle cerebral arteries and the middle cerebral artery bifurcations are within normal limits and there is good filling of the M2 segments, the sylvian fissures with no significant stenosis in the M2 segments.  IMPRESSION: 1. The CT of the head without contrast is within normal limits with no discernible acute completed infarct or intracranial hemorrhage identified the etiology of patient's dizziness and ataxia is not established on this exam and if clinical symptoms warrant an MRI of the brain is recommended for more comprehensive assessment. The results of the head CT without contrast were communicated to Dr. Champagne from stroke neurology by telephone on 07/22/2024 at 10:18 a.m. and he requested a contrast-enhanced CT angiogram of the head and neck and CT perfusion study of the brain. 2. Cervical spondylosis is present with disc space narrowing degenerative disc and endplate changes and posterior endplate spurring from C4-C7 posterior spurs abut the ventral surface cord mild  moderately narrowing the central portion canal at C4-5 and C5-6 and at least mild to moderately narrowing up to moderately narrow the canal at C6-7. There is uncovertebral joint spurring into the foramina with mild to moderate right bony foraminal narrowing at C4-5 and mild left and mild-moderate right bony foraminal narrowing at C5-6 and C6-7 otherwise cervical spine is unremarkable. 3. There are calcified plaques that mildly narrow the supracavernous segments of the internal carotid arteries otherwise the CT angiographic evaluation great vessels of the head and neck is normal. In particular no stenosis is seen in the subclavian arteries, vertebral arteries or the common carotid arteries or the cervical segments of the internal carotid arteries using the NASCET criteria and other than the mild narrowing of the supracavernous segments of the internal carotid arteries no intracranial vascular stenoses or occlusions are identified. Again if there remains any clinical suspicion of acute stroke I recommend an MRI of the brain for more complete assessment. The final results and recommendation were communicated to Dr. Champagne from stroke neurology by telephone on 07/22/2024 at 11 a.m.  AI analysis of LVO was utilized.  Radiation dose reduction techniques were utilized, including automated exposure control and exposure modulation based on body size.       CT Angiogram Neck    Result Date: 7/22/2024  No acute intracranial abnormalities identified with no change when compared to recent MRI of the brain and internal auditory canals on 05/10/2024. Specifically I see no acute completed infarct and no intracranial hemorrhage in the etiology of the patient's dizziness and ataxia that started this morning is not established on this exam. The results of the head CT without contrast were communicated to Dr. Champagne from stroke neurology by telephone on 07/22/2024 at 10:18 a.m. and he requested a contrast-enhanced CT angiogram of  the head and neck and CT perfusion study of the brain.  CONTRAST-ENHANCED CT ANGIOGRAM OF THE HEAD AND NECK AND CT PERFUSION STUDY OF THE BRAIN TECHNIQUE: Spiral CT images were obtained from the mid cerebellum up through the majority of the cerebral hemispheres for 10 cm vertical slab of brain imaging during the arterial phase of contrast and images were analyzed with rapid software analysis for CT perfusion study of the brain subsequently spiral CT angio images were obtained from the top of the aortic arch up through the great vessels the head and neck during the arterial phase of contrast and images were reformatted and submitted in 1 mm thick axial, sagittal and coronal CT sections with soft tissue algorithm and 3D reconstructions were performed to complete the CT angiogram of the head and neck and finally spiral CT images were obtained from the base of the skull to the vertex delayed following intravenous contrast and these images were reformatted submitted in 3 mm thick axial CT sections with brain algorithm and 2 mm thick sagittal and coronal reconstructions were performed submitted in brain algorithm.  FINDINGS: CT PERFUSION STUDY OF THE BRAIN: CT perfusion study the brain consists of just 10 cm vertical slab of brain imaging from the mid cerebellum up through the cerebral hemispheres and excludes the inferior half of the cerebellum as well as the inferior juan and medulla which are not assessed. Within the 10 cm vertical slab of brain imaging I see no areas of reduced cerebral blood flow to less than 30% of normal and therefore no acute completed infarct. Furthermore there is no areas of delayed time to maximal enhancement of greater than 6 seconds and therefore there is no jodie hypoperfused brain. There is a small localized 21 cc final brain parenchyma in the right temporal occipital region that demonstrates a delayed time to maximal enhancement of greater than 4 but less than 6 seconds that does not meet  the strict criteria for hypoperfused brain is probably artifact limits likely low-grade hypoperfused brain.  CT ANGIOGRAM OF THE NECK: The nasopharynx, oropharynx, the hypopharynx, the true cords and the subglottic airway are normal in appearance. The thyroid gland enhances homogeneously and is normal in appearance. The lung apices are clear. The parotid, , parapharyngeal and submandibular spaces are symmetric and are normal in appearance. Cervical spondylosis is present. There is disc space narrowing, degenerative disc and endplate changes from C4-C7. tHE posterior disc osteophyte complex abuts the ventral surface cord, mild moderately narrowing the central portion of the canal at C4-5 and C5-6 and up to moderately narrowing the central canal at C6-7 and there is uncovertebral joint spurring into the foramina with mild to moderate right bony foraminal narrowing at C4-5, mild left and mild-moderate right bony foraminal narrowing at C5-6 and C6-7. There is common origin the left common carotid artery and brachiocephalic artery off the aortic arch constituting a bovine configuration of the aortic arch which is a normal anatomic variation. The left subclavian artery origins normal appearance and no stenosis is seen in the left subclavian artery. The left vertebral artery origin is normal in appearance. No stenosis is seen in the left vertebral artery from its origin to the vertebrobasilar junction. The left common carotid origin is normal in appearance. No stenosis is seen in the left common carotid artery, its bifurcation and the left internal and external carotid arteries is normal in appearance. No stenosis is seen in the cervical segment of the left internal carotid artery using the NASCET criteria. The brachiocephalic artery origins normal in appearance and no stenosis is seen the brachiocephalic artery and its bifurcation into the right subclavian and common carotid arteries is normal in appearance and  no stenosis is seen in the right subclavian artery. The right vertebral artery origin is normal in appearance. No stenosis is seen in the right vertebral artery from its origin to the vertebrobasilar junction. The right common carotid origins normal appearance. No stenosis seen in the right common carotid artery its bifurcation into the right internal and external carotid arteries is normal in appearance and no stenosis is seen in the cervical segment of the right internal carotid artery using the NASCET criteria.  CT ANGIOGRAM OF THE HEAD: The intracranial segments of the distal vertebral arteries are widely patent without stenosis to the vertebrobasilar junction. The right posterior inferior cerebellar artery origins normal in appearance. The basilar artery and the basilar tip is normal in appearance. There is an atretic P1 segment to the left posterior cerebral artery with persistent fetal origin of the left posterior cerebral artery off the back wall of the supracavernous left internal carotid artery which is a normal anatomic variation and both the right and left posterior cerebral and superior cerebellar arteries are within normal limits. The upper cervical, petrous, cavernous segments of the internal carotid arteries are normal in appearance calcified plaques mildly narrow the supracavernous segments. The carotid termini are normal in appearance. The A1 segments of the anterior cerebral arteries and the anterior communicating artery origin and the A2 segments of the anterior cerebral arteries are within normal limits. The M1 segments of the middle cerebral arteries and the middle cerebral artery bifurcations are within normal limits and there is good filling of the M2 segments, the sylvian fissures with no significant stenosis in the M2 segments.  IMPRESSION: 1. The CT of the head without contrast is within normal limits with no discernible acute completed infarct or intracranial hemorrhage identified the  etiology of patient's dizziness and ataxia is not established on this exam and if clinical symptoms warrant an MRI of the brain is recommended for more comprehensive assessment. The results of the head CT without contrast were communicated to Dr. Champagne from stroke neurology by telephone on 07/22/2024 at 10:18 a.m. and he requested a contrast-enhanced CT angiogram of the head and neck and CT perfusion study of the brain. 2. Cervical spondylosis is present with disc space narrowing degenerative disc and endplate changes and posterior endplate spurring from C4-C7 posterior spurs abut the ventral surface cord mild moderately narrowing the central portion canal at C4-5 and C5-6 and at least mild to moderately narrowing up to moderately narrow the canal at C6-7. There is uncovertebral joint spurring into the foramina with mild to moderate right bony foraminal narrowing at C4-5 and mild left and mild-moderate right bony foraminal narrowing at C5-6 and C6-7 otherwise cervical spine is unremarkable. 3. There are calcified plaques that mildly narrow the supracavernous segments of the internal carotid arteries otherwise the CT angiographic evaluation great vessels of the head and neck is normal. In particular no stenosis is seen in the subclavian arteries, vertebral arteries or the common carotid arteries or the cervical segments of the internal carotid arteries using the NASCET criteria and other than the mild narrowing of the supracavernous segments of the internal carotid arteries no intracranial vascular stenoses or occlusions are identified. Again if there remains any clinical suspicion of acute stroke I recommend an MRI of the brain for more complete assessment. The final results and recommendation were communicated to Dr. Champagne from stroke neurology by telephone on 07/22/2024 at 11 a.m.  AI analysis of LVO was utilized.  Radiation dose reduction techniques were utilized, including automated exposure control and  exposure modulation based on body size.       CT CEREBRAL PERFUSION WITH & WITHOUT CONTRAST    Result Date: 7/22/2024  No acute intracranial abnormalities identified with no change when compared to recent MRI of the brain and internal auditory canals on 05/10/2024. Specifically I see no acute completed infarct and no intracranial hemorrhage in the etiology of the patient's dizziness and ataxia that started this morning is not established on this exam. The results of the head CT without contrast were communicated to Dr. Champagne from stroke neurology by telephone on 07/22/2024 at 10:18 a.m. and he requested a contrast-enhanced CT angiogram of the head and neck and CT perfusion study of the brain.  CONTRAST-ENHANCED CT ANGIOGRAM OF THE HEAD AND NECK AND CT PERFUSION STUDY OF THE BRAIN TECHNIQUE: Spiral CT images were obtained from the mid cerebellum up through the majority of the cerebral hemispheres for 10 cm vertical slab of brain imaging during the arterial phase of contrast and images were analyzed with rapid software analysis for CT perfusion study of the brain subsequently spiral CT angio images were obtained from the top of the aortic arch up through the great vessels the head and neck during the arterial phase of contrast and images were reformatted and submitted in 1 mm thick axial, sagittal and coronal CT sections with soft tissue algorithm and 3D reconstructions were performed to complete the CT angiogram of the head and neck and finally spiral CT images were obtained from the base of the skull to the vertex delayed following intravenous contrast and these images were reformatted submitted in 3 mm thick axial CT sections with brain algorithm and 2 mm thick sagittal and coronal reconstructions were performed submitted in brain algorithm.  FINDINGS: CT PERFUSION STUDY OF THE BRAIN: CT perfusion study the brain consists of just 10 cm vertical slab of brain imaging from the mid cerebellum up through the  cerebral hemispheres and excludes the inferior half of the cerebellum as well as the inferior juan and medulla which are not assessed. Within the 10 cm vertical slab of brain imaging I see no areas of reduced cerebral blood flow to less than 30% of normal and therefore no acute completed infarct. Furthermore there is no areas of delayed time to maximal enhancement of greater than 6 seconds and therefore there is no jodie hypoperfused brain. There is a small localized 21 cc final brain parenchyma in the right temporal occipital region that demonstrates a delayed time to maximal enhancement of greater than 4 but less than 6 seconds that does not meet the strict criteria for hypoperfused brain is probably artifact limits likely low-grade hypoperfused brain.  CT ANGIOGRAM OF THE NECK: The nasopharynx, oropharynx, the hypopharynx, the true cords and the subglottic airway are normal in appearance. The thyroid gland enhances homogeneously and is normal in appearance. The lung apices are clear. The parotid, , parapharyngeal and submandibular spaces are symmetric and are normal in appearance. Cervical spondylosis is present. There is disc space narrowing, degenerative disc and endplate changes from C4-C7. tHE posterior disc osteophyte complex abuts the ventral surface cord, mild moderately narrowing the central portion of the canal at C4-5 and C5-6 and up to moderately narrowing the central canal at C6-7 and there is uncovertebral joint spurring into the foramina with mild to moderate right bony foraminal narrowing at C4-5, mild left and mild-moderate right bony foraminal narrowing at C5-6 and C6-7. There is common origin the left common carotid artery and brachiocephalic artery off the aortic arch constituting a bovine configuration of the aortic arch which is a normal anatomic variation. The left subclavian artery origins normal appearance and no stenosis is seen in the left subclavian artery. The left vertebral  artery origin is normal in appearance. No stenosis is seen in the left vertebral artery from its origin to the vertebrobasilar junction. The left common carotid origin is normal in appearance. No stenosis is seen in the left common carotid artery, its bifurcation and the left internal and external carotid arteries is normal in appearance. No stenosis is seen in the cervical segment of the left internal carotid artery using the NASCET criteria. The brachiocephalic artery origins normal in appearance and no stenosis is seen the brachiocephalic artery and its bifurcation into the right subclavian and common carotid arteries is normal in appearance and no stenosis is seen in the right subclavian artery. The right vertebral artery origin is normal in appearance. No stenosis is seen in the right vertebral artery from its origin to the vertebrobasilar junction. The right common carotid origins normal appearance. No stenosis seen in the right common carotid artery its bifurcation into the right internal and external carotid arteries is normal in appearance and no stenosis is seen in the cervical segment of the right internal carotid artery using the NASCET criteria.  CT ANGIOGRAM OF THE HEAD: The intracranial segments of the distal vertebral arteries are widely patent without stenosis to the vertebrobasilar junction. The right posterior inferior cerebellar artery origins normal in appearance. The basilar artery and the basilar tip is normal in appearance. There is an atretic P1 segment to the left posterior cerebral artery with persistent fetal origin of the left posterior cerebral artery off the back wall of the supracavernous left internal carotid artery which is a normal anatomic variation and both the right and left posterior cerebral and superior cerebellar arteries are within normal limits. The upper cervical, petrous, cavernous segments of the internal carotid arteries are normal in appearance calcified plaques  mildly narrow the supracavernous segments. The carotid termini are normal in appearance. The A1 segments of the anterior cerebral arteries and the anterior communicating artery origin and the A2 segments of the anterior cerebral arteries are within normal limits. The M1 segments of the middle cerebral arteries and the middle cerebral artery bifurcations are within normal limits and there is good filling of the M2 segments, the sylvian fissures with no significant stenosis in the M2 segments.  IMPRESSION: 1. The CT of the head without contrast is within normal limits with no discernible acute completed infarct or intracranial hemorrhage identified the etiology of patient's dizziness and ataxia is not established on this exam and if clinical symptoms warrant an MRI of the brain is recommended for more comprehensive assessment. The results of the head CT without contrast were communicated to Dr. Champagne from stroke neurology by telephone on 07/22/2024 at 10:18 a.m. and he requested a contrast-enhanced CT angiogram of the head and neck and CT perfusion study of the brain. 2. Cervical spondylosis is present with disc space narrowing degenerative disc and endplate changes and posterior endplate spurring from C4-C7 posterior spurs abut the ventral surface cord mild moderately narrowing the central portion canal at C4-5 and C5-6 and at least mild to moderately narrowing up to moderately narrow the canal at C6-7. There is uncovertebral joint spurring into the foramina with mild to moderate right bony foraminal narrowing at C4-5 and mild left and mild-moderate right bony foraminal narrowing at C5-6 and C6-7 otherwise cervical spine is unremarkable. 3. There are calcified plaques that mildly narrow the supracavernous segments of the internal carotid arteries otherwise the CT angiographic evaluation great vessels of the head and neck is normal. In particular no stenosis is seen in the subclavian arteries, vertebral arteries  or the common carotid arteries or the cervical segments of the internal carotid arteries using the NASCET criteria and other than the mild narrowing of the supracavernous segments of the internal carotid arteries no intracranial vascular stenoses or occlusions are identified. Again if there remains any clinical suspicion of acute stroke I recommend an MRI of the brain for more complete assessment. The final results and recommendation were communicated to Dr. Champagne from stroke neurology by telephone on 07/22/2024 at 11 a.m.  AI analysis of LVO was utilized.  Radiation dose reduction techniques were utilized, including automated exposure control and exposure modulation based on body size.        Ambulatory status:   - bedrest  Social issues:   Social History     Socioeconomic History    Marital status:    Tobacco Use    Smoking status: Never     Passive exposure: Never    Smokeless tobacco: Never   Vaping Use    Vaping status: Never Used   Substance and Sexual Activity    Alcohol use: Yes     Comment: 3 DRINKS WEEKLY    Drug use: Not Currently    Sexual activity: Yes     Partners: Female       Peripheral Neurovascular  Peripheral Neurovascular (Adult)  Peripheral Neurovascular WDL: WDL    Neuro Cognitive  Neuro Cognitive (Adult)  Cognitive/Neuro/Behavioral WDL: .WDL except  Additional Documentation: NIH Stroke Scale (group)  NIH Stroke Scale  1a. Level of Consciousness: 0-->Alert, keenly responsive  1b. LOC Questions: 0-->Answers both questions correctly  1c. LOC Commands: 0-->Performs both tasks correctly  2. Best Gaze: 0-->Normal  3. Visual: 0-->No visual loss  4. Facial Palsy: 0-->Normal symmetrical movements  5a. Motor Arm, Left: 0-->No drift, limb holds 90 (or 45) degrees for full 10 secs  5b. Motor Arm, Right: 1-->Drift, limb holds 90 (or 45) degrees, but drifts down before full 10 secs, does not hit bed or other support  6a. Motor Leg, Left: 0-->No drift, leg holds 30 degree position for full 5  secs  6b. Motor Leg, Right: 0-->No drift, leg holds 30 degree position for full 5 secs  7. Limb Ataxia: 1-->Present in one limb  8. Sensory: 0-->Normal, no sensory loss  9. Best Language: 0-->No aphasia, normal  10. Dysarthria: 0-->Normal  11. Extinction and Inattention (formerly Neglect): 0-->No abnormality  Total (NIH Stroke Scale): 2    Learning  Learning Assessment (Adult)  Learning Readiness and Ability: no barriers identified    Respiratory  Respiratory (Adult)  Airway WDL: WDL  Respiratory WDL  Respiratory WDL: WDL    Abdominal Pain       Pain Assessments  Pain (Adult)  (0-10) Pain Rating: Rest: 5  Pain Side/Orientation: right, other (see comments) (Right rib, known fractures)    NIH Stroke Scale  NIH Stroke Scale  1a. Level of Consciousness: 0-->Alert, keenly responsive  1b. LOC Questions: 0-->Answers both questions correctly  1c. LOC Commands: 0-->Performs both tasks correctly  2. Best Gaze: 0-->Normal  3. Visual: 0-->No visual loss  4. Facial Palsy: 0-->Normal symmetrical movements  5a. Motor Arm, Left: 0-->No drift, limb holds 90 (or 45) degrees for full 10 secs  5b. Motor Arm, Right: 1-->Drift, limb holds 90 (or 45) degrees, but drifts down before full 10 secs, does not hit bed or other support  6a. Motor Leg, Left: 0-->No drift, leg holds 30 degree position for full 5 secs  6b. Motor Leg, Right: 0-->No drift, leg holds 30 degree position for full 5 secs  7. Limb Ataxia: 1-->Present in one limb  8. Sensory: 0-->Normal, no sensory loss  9. Best Language: 0-->No aphasia, normal  10. Dysarthria: 0-->Normal  11. Extinction and Inattention (formerly Neglect): 0-->No abnormality  Total (NIH Stroke Scale): 2    Anupama Severino RN  07/22/24 12:14 EDT

## 2024-07-22 NOTE — PLAN OF CARE
Goal Outcome Evaluation:  Plan of Care Reviewed With: patient        Progress: improving  Outcome Evaluation: VSS. A&Ox4. MRI negative. Upon arrival to the floor pt said the room was spinning, but it has since stopped. Tylenol given prn for headache. Bed alarm on for safety. Assist x1 to BR.

## 2024-07-22 NOTE — PROGRESS NOTES
Clinical Pharmacy Services: Medication History    Pablo Mendoza is a 63 y.o. male presenting to [facility] for   Chief Complaint   Patient presents with    Dizziness       He  has a past medical history of Anesthesia complication, COVID-19, Diabetes mellitus, Diverticulitis, Elevated cholesterol, Gout, Hypertension, Low back pain, Migraines, Mixed hyperlipidemia (01/19/2018), Neuropathy, Nocturia (01/19/2018), Numbness and tingling of both lower extremities, Seizures, Staph infection (2010), Stroke, TIA (transient ischemic attack), Type 2 diabetes mellitus, without long-term current use of insulin (01/19/2018), and Weakness of both legs.    Allergies as of 07/22/2024 - Reviewed 07/22/2024   Allergen Reaction Noted    Aspirin Nausea Only 07/17/2020    Lisinopril Unknown - Low Severity 10/04/2019       Medication information was obtained from: Patient   Pharmacy and Phone Number:     Prior to Admission Medications       Prescriptions Last Dose Informant Patient Reported? Taking?    acetaminophen (TYLENOL) 500 MG tablet  Self Yes Yes    Take 1 tablet by mouth Every 6 (Six) Hours As Needed for Mild Pain. Indications: Pain    allopurinol (ZYLOPRIM) 300 MG tablet  Pharmacy No Yes    Take 1 tablet by mouth 2 (Two) Times a Day.    amLODIPine (NORVASC) 10 MG tablet  Pharmacy No Yes    Take 1 tablet by mouth Daily.    atorvastatin (LIPITOR) 40 MG tablet  Pharmacy No Yes    TAKE 1 TABLET BY MOUTH DAILY    empagliflozin (Jardiance) 25 MG tablet tablet  Pharmacy No Yes    Take 1 tablet by mouth Daily.    glipizide (GLUCOTROL XL) 10 MG 24 hr tablet  Pharmacy No Yes    Take 1 tablet by mouth 2 (Two) Times a Day.    hydroCHLOROthiazide 25 MG tablet  Pharmacy No Yes    Take 1 tablet by mouth Daily.    ibuprofen (ADVIL,MOTRIN) 800 MG tablet  Self No Yes    TAKE ONE TABLET BY MOUTH EVERY 8 HOURS AS NEEDED FOR MILD PAIN    levocetirizine (XYZAL) 5 MG tablet  Self Yes Yes    Take 0.5 tablets by mouth As Needed for  Allergies.    metoprolol tartrate (LOPRESSOR) 100 MG tablet  Pharmacy No Yes    Take 1 tablet by mouth 2 (Two) Times a Day. Indications: High Blood Pressure Disorder    Rimegepant Sulfate (NURTEC) 75 MG tablet dispersible tablet  Self No Yes    Take 1 tablet by mouth Daily As Needed (migraine head).    sertraline (ZOLOFT) 50 MG tablet  Pharmacy No Yes    Take 1 tablet by mouth Daily.    SITagliptin-metFORMIN HCl ER (Janumet XR)  MG tablet  Pharmacy No Yes    Take 2 tablets by mouth Daily. Indications: Type 2 Diabetes              Medication notes:     This medication list is complete to the best of my knowledge as of 7/22/2024    Please call if questions.    Jam Frederick  Medication History Technician  [phone]    7/22/2024 12:43 EDT

## 2024-07-22 NOTE — CONSULTS
Neurology Consult Note    Consult Date: 7/22/2024    Referring MD: No ref. provider found    Reason for Consult I have been asked to see the patient in neurological consultation to render advice and opinion regarding dizziness.    Pablo Mendoza is a 63 y.o. male with PMH of diabetes, HTN, HLD, migraines, childhood seizure, cognitive delay, TORSTEN and PNES presents to the ED with acute dizziness. Last known well was 12 am. He woke up this morning with his typical migraine and dizziness. Patient describes the dizziness and room spinning and continuous. He states he has had previous episodes of vertigo like this before but he states this has lasted longer than usual. He admits to generalized weakness. He denies numbness/tingling, speech deficit, or visual deficit.  He states it was difficult for him to walk and EMS thought he had an ataxic gait when they attempted to get him on the stretcher. He state his headache was bilateral and throbbing. He had associate photophobia, phonophobia, and nausea. He denies emesis. When I was evaluating patient he was given valium prior to exam so was difficult to assess and obtain history from. Team D imaging had been done which was unremarkable. He unfortunately was outside of the window for TNK due to last known well. He does state that dizziness has not improved since being placed on the valium. BP on arrival was 123/83 mmHg and pulse 78. Blood glucose 248.     He was last seen by neurology in September 2023 for worsening headaches and seizure like activity. Headaches hurt all over with associated N, photophobia and phonophobia. His seizure he describes as stuttering, sometimes right sided weakness, staring off into space and twitching all over his body. He was admitted August 2023 for seizure like spell and EEG, CTA, and MRI were unremarkable at the time. Patient started on Keppra 500 mg BID at the time. Continuous EEG in September 2023 was unremarkable. Patient diagnoses at the  time were most likely migraine with aura but PNES and functional neurological disorder also on differential. Follows ANUSHA Hillman, outpatient who ordered prolonged EEG which was also unremarkable. He was found to have low B12 and vitamin D. She believes patient most likely has psychogenic nonepileptic seizure and discontinued his Keppra in December 2023.      Past Medical/Surgical Hx:  Past Medical History:   Diagnosis Date    Anesthesia complication     STATES HARD TO WAKE UP W/ALL SURGERIES    COVID-19     DEC 2020    Diabetes mellitus     Diverticulitis     Elevated cholesterol     Gout     Hypertension     Low back pain     Migraines     Mixed hyperlipidemia 01/19/2018    Neuropathy     Nocturia 01/19/2018    Numbness and tingling of both lower extremities     LEGS AND FEET     Seizures     AS A CHILD    Staph infection 2010    UNSURE OF SITE    Stroke     COGNITIVE DELAY    TIA (transient ischemic attack)     Type 2 diabetes mellitus, without long-term current use of insulin 01/19/2018    Weakness of both legs     LEFT WORSE      Past Surgical History:   Procedure Laterality Date    CHOLECYSTECTOMY  2011    COLONOSCOPY      2015 or 2016    HERNIA REPAIR      LUMBAR FUSION N/A 11/03/2021    Procedure: Lumbar 3 to Lumbar 4 and Lumbar 4 to Lumbar 5 laminectomy with a fusion;  Surgeon: Jose Webster MD;  Location: [facility];  Service: [employer] - Neuro;  Laterality: N/A;    SINUS SURGERY      TYMPANOPLASTY Bilateral        Medications On Admission  Medications Prior to Admission   Medication Sig Dispense Refill Last Dose    acetaminophen (TYLENOL) 500 MG tablet Take 1 tablet by mouth Every 6 (Six) Hours As Needed for Mild Pain. Indications: Pain       allopurinol (ZYLOPRIM) 300 MG tablet Take 1 tablet by mouth 2 (Two) Times a Day. 180 tablet 1     amLODIPine (NORVASC) 10 MG tablet Take 1 tablet by mouth Daily. 90 tablet 1     atorvastatin (LIPITOR) 40 MG tablet TAKE 1 TABLET BY MOUTH DAILY 90 tablet 1      empagliflozin (Jardiance) 25 MG tablet tablet Take 1 tablet by mouth Daily. 90 tablet 1     glipizide (GLUCOTROL XL) 10 MG 24 hr tablet Take 1 tablet by mouth 2 (Two) Times a Day. 180 tablet 1     hydroCHLOROthiazide 25 MG tablet Take 1 tablet by mouth Daily. 90 tablet 1     ibuprofen (ADVIL,MOTRIN) 800 MG tablet TAKE ONE TABLET BY MOUTH EVERY 8 HOURS AS NEEDED FOR MILD PAIN 30 tablet 0     levocetirizine (XYZAL) 5 MG tablet Take 0.5 tablets by mouth As Needed for Allergies.       metoprolol tartrate (LOPRESSOR) 100 MG tablet Take 1 tablet by mouth 2 (Two) Times a Day. Indications: High Blood Pressure Disorder 180 tablet 1     Rimegepant Sulfate (NURTEC) 75 MG tablet dispersible tablet Take 1 tablet by mouth Daily As Needed (migraine head). 4 tablet 0     sertraline (ZOLOFT) 50 MG tablet Take 1 tablet by mouth Daily. 90 tablet 1     SITagliptin-metFORMIN HCl ER (Janumet XR)  MG tablet Take 2 tablets by mouth Daily. Indications: Type 2 Diabetes 180 tablet 1        Allergies:  Allergies   Allergen Reactions    Aspirin Nausea Only    Lisinopril Unknown - Low Severity     Elevated creatinine           Social Hx:  Social History     Socioeconomic History    Marital status:    Tobacco Use    Smoking status: Never     Passive exposure: Never    Smokeless tobacco: Never   Vaping Use    Vaping status: Never Used   Substance and Sexual Activity    Alcohol use: Yes     Comment: 3 DRINKS WEEKLY    Drug use: Not Currently    Sexual activity: Yes     Partners: Female       Family Hx:  Family History   Problem Relation Age of Onset    Arthritis Mother     Hypertension Mother     Heart disease Mother     Stroke Mother     Alcohol abuse Father     Arthritis Father     Hypertension Father     Nephrolithiasis Father     Diabetes Sister     Diabetes Sister     Stroke Brother 59    Hypertension Brother     Hyperlipidemia Brother     Throat cancer Maternal Grandmother     Diabetes Paternal Grandmother     Herrera  Hyperthermia Neg Hx        Exam    /83   Pulse 76   Temp 98 °F (36.7 °C)   Resp 18   SpO2 92%   gen: NAD, vitals reviewed  MS: lethargic after getting valium and oriented x3, recent/remote memory intact, normal attention/concentration, language intact, no neglect, normal fund of knowledge  CN: visual acuity grossly normal, visual fields full, PERRL, EOMI without nystagmus, facial sensation equal, no facial droop, hearing symmetric, palate elevates symmetrically, shoulder shrug equal, tongue midline, negative head impulse test and test of skew  Motor: 5/5 throughout upper and lower extremities, normal tone  Sensation: intact to light touch throughout  Coordination: mild dysmetria with finger to nose bilaterally. Normal heel to shin bilaterally.  Gait: unable to test due to lethargy induced from medication.    DATA:    Lab Results   Component Value Date    GLUCOSE 248 (H) 07/22/2024    CALCIUM 9.2 07/22/2024     07/22/2024    K 3.7 07/22/2024    CO2 22.7 07/22/2024     07/22/2024    BUN 18 07/22/2024    CREATININE 0.99 07/22/2024    EGFRIFAFRI 101 02/03/2022    EGFRIFNONA 87 02/03/2022    BCR 18.2 07/22/2024    ANIONGAP 14.3 07/22/2024     Lab Results   Component Value Date    WBC 8.95 07/22/2024    HGB 13.9 07/22/2024    HCT 43.8 07/22/2024    MCV 78.1 (L) 07/22/2024     07/22/2024     Lab Results   Component Value Date    LDL 52 06/14/2024    LDL 30 09/20/2023    LDL 63 08/14/2023     Lab Results   Component Value Date    HGBA1C 10.60 (H) 06/14/2024     Lab Results   Component Value Date    INR 1.10 07/22/2024    INR 1.0 07/12/2024    INR 1.01 08/14/2023    PROTIME 14.4 (H) 07/22/2024    PROTIME 11.7 07/12/2024    PROTIME 13.4 08/14/2023       Lab review:   Glucose 248  HgB A1c 10.60  LDL 52    Imaging review:   HEAD CT WITHOUT CONTRAST TECHNIQUE: Spiral CT images were obtained from  the base of the skull to the vertex without intravenous contrast. The  images were reformatted and  submitted in 3 mm thick axial CT sections  with brain algorithm and 2 mm thick sagittal and coronal reconstructions  were performed and submitted in brain algorithm.     This is correlated to a prior head CT on 09/19/2023 and a prior MRI of  the brain on 08/14/2023 and MRI of the internal auditory canals on  05/10/2024.     FINDINGS: The brain parenchyma is normal in attenuation. The ventricles  are normal in size. I see no focal mass effect. There is no midline  shift. No extra axial fluid collections are identified. There is no  evidence of acute intra cranial hemorrhage. The calvarium and the skull  base are normal in appearance. The paranasal sinuses and the mastoid air  cells and the middle ear cavities are clear. The orbits are normal in  appearance.     IMPRESSION:  CT PERFUSION STUDY OF THE BRAIN: CT perfusion study the brain consists  of just 10 cm vertical slab of brain imaging from the mid cerebellum up  through the cerebral hemispheres and excludes the inferior half of the  cerebellum as well as the inferior juan and medulla which are not  assessed. Within the 10 cm vertical slab of brain imaging I see no areas  of reduced cerebral blood flow to less than 30% of normal and therefore  no acute completed infarct. Furthermore there is no areas of delayed  time to maximal enhancement of greater than 6 seconds and therefore  there is no jodie hypoperfused brain. There is a small localized 21 cc  final brain parenchyma in the right temporal occipital region that  demonstrates a delayed time to maximal enhancement of greater than 4 but  less than 6 seconds that does not meet the strict criteria for  hypoperfused brain is probably artifact limits likely low-grade  hypoperfused brain.     CT ANGIOGRAM OF THE NECK: The nasopharynx, oropharynx, the hypopharynx,  the true cords and the subglottic airway are normal in appearance. The  thyroid gland enhances homogeneously and is normal in appearance. The  lung apices  are clear. The parotid, , parapharyngeal and  submandibular spaces are symmetric and are normal in appearance.  Cervical spondylosis is present. There is disc space narrowing,  degenerative disc and endplate changes from C4-C7. tHE posterior disc  osteophyte complex abuts the ventral surface cord, mild moderately  narrowing the central portion of the canal at C4-5 and C5-6 and up to  moderately narrowing the central canal at C6-7 and there is  uncovertebral joint spurring into the foramina with mild to moderate  right bony foraminal narrowing at C4-5, mild left and mild-moderate  right bony foraminal narrowing at C5-6 and C6-7. There is common origin  the left common carotid artery and brachiocephalic artery off the aortic  arch constituting a bovine configuration of the aortic arch which is a  normal anatomic variation. The left subclavian artery origins normal  appearance and no stenosis is seen in the left subclavian artery. The  left vertebral artery origin is normal in appearance. No stenosis is  seen in the left vertebral artery from its origin to the vertebrobasilar  junction. The left common carotid origin is normal in appearance. No  stenosis is seen in the left common carotid artery, its bifurcation and  the left internal and external carotid arteries is normal in appearance.  No stenosis is seen in the cervical segment of the left internal carotid  artery using the NASCET criteria. The brachiocephalic artery origins  normal in appearance and no stenosis is seen the brachiocephalic artery  and its bifurcation into the right subclavian and common carotid  arteries is normal in appearance and no stenosis is seen in the right  subclavian artery. The right vertebral artery origin is normal in  appearance. No stenosis is seen in the right vertebral artery from its  origin to the vertebrobasilar junction. The right common carotid origins  normal appearance. No stenosis seen in the right common carotid  artery  its bifurcation into the right internal and external carotid arteries is  normal in appearance and no stenosis is seen in the cervical segment of  the right internal carotid artery using the NASCET criteria.     CT ANGIOGRAM OF THE HEAD: The intracranial segments of the distal  vertebral arteries are widely patent without stenosis to the  vertebrobasilar junction. The right posterior inferior cerebellar artery  origins normal in appearance. The basilar artery and the basilar tip is  normal in appearance. There is an atretic P1 segment to the left  posterior cerebral artery with persistent fetal origin of the left  posterior cerebral artery off the back wall of the supracavernous left  internal carotid artery which is a normal anatomic variation and both  the right and left posterior cerebral and superior cerebellar arteries  are within normal limits. The upper cervical, petrous, cavernous  segments of the internal carotid arteries are normal in appearance  calcified plaques mildly narrow the supracavernous segments. The carotid  termini are normal in appearance. The A1 segments of the anterior  cerebral arteries and the anterior communicating artery origin and the  A2 segments of the anterior cerebral arteries are within normal limits.  The M1 segments of the middle cerebral arteries and the middle cerebral  artery bifurcations are within normal limits and there is good filling  of the M2 segments, the sylvian fissures with no significant stenosis in  the M2 segments.     IMPRESSION:  1. The CT of the head without contrast is within normal limits with no  discernible acute completed infarct or intracranial hemorrhage  identified the etiology of patient's dizziness and ataxia is not  established on this exam and if clinical symptoms warrant an MRI of the  brain is recommended for more comprehensive assessment. The results of  the head CT without contrast were communicated to Dr. Champagne from  stroke  neurology by telephone on 07/22/2024 at 10:18 a.m. and he  requested a contrast-enhanced CT angiogram of the head and neck and CT  perfusion study of the brain.  2. Cervical spondylosis is present with disc space narrowing  degenerative disc and endplate changes and posterior endplate spurring  from C4-C7 posterior spurs abut the ventral surface cord mild moderately  narrowing the central portion canal at C4-5 and C5-6 and at least mild  to moderately narrowing up to moderately narrow the canal at C6-7. There  is uncovertebral joint spurring into the foramina with mild to moderate  right bony foraminal narrowing at C4-5 and mild left and mild-moderate  right bony foraminal narrowing at C5-6 and C6-7 otherwise cervical spine  is unremarkable.  3. There are calcified plaques that mildly narrow the supracavernous  segments of the internal carotid arteries otherwise the CT angiographic  evaluation great vessels of the head and neck is normal. In particular  no stenosis is seen in the subclavian arteries, vertebral arteries or  the common carotid arteries or the cervical segments of the internal  carotid arteries using the NASCET criteria and other than the mild  narrowing of the supracavernous segments of the internal carotid  arteries no intracranial vascular stenoses or occlusions are identified.  Again if there remains any clinical suspicion of acute stroke I  recommend an MRI of the brain for more complete assessment. The final  results and recommendation were communicated to Dr. Champagne from  stroke neurology by telephone on 07/22/2024 at 11 a.m.     Diagnoses:  Acute dizziness  History of migraine with aura  History of PNES  HTN  HLD  Type 2 Diabetes    Comment: Patient symptoms concerning for potential stroke vs vestibular migraine vs BPPV plus migraine. Patient is difficult to assess secondary to valium being given prior to evaluation. Patient states his LKW was 12am and woke up this morning with acute room  spinning dizziness, headache and difficulty walking. He states it was different than previous vertigo episodes due to lasting longer. He states his headache was like his typical migraine and has gotten a lot better since arrival to the hospital. Unable to get patient up to walk due to lethargy from medication effects. However, EMS reported ataxic gait when trying to get patient onto stretcher. Patient HINTs exam negative but difficult to assess cause he had a hard time keeping his eyes open. Team D imaging done including CTH/CTA/CTP which were essentially unremarkable. Patient not candidate for TNK due to being outside the window for treatment. Patient given valium and has been hypoxic also since which is being managed by primary care team. Plan is to admit and complete stroke work up including MRI brain with and without contrast.     NIHSS: 2     PLAN:   -Pt was not a candidate for tnk given due to being outside window for treatment  -Admit to 5th floor  -Plavix 75 mg daily start due to allergy to ASA as long as passes swallow evaluation. Will plan to discontinue if MRI is negative.   -Atorvastatin 40 mg BID  -Maintain permissive HTN for 24-48hrs, do not treat unless BP > 220/120 if no contraindication  -Perform bedside swallow test  -Telemetry to monitor for arrhythmia  -VTE prophylaxis  -Neuro checks, if change in exam call neuro oncall  -PT/OT when appropriate   -MRI brain without contrast    Recommendations discussed with Dr. Champagne who is in agreement with plan.

## 2024-07-23 ENCOUNTER — READMISSION MANAGEMENT (OUTPATIENT)
Dept: CALL CENTER | Facility: HOSPITAL | Age: 64
End: 2024-07-23
Payer: COMMERCIAL

## 2024-07-23 VITALS
RESPIRATION RATE: 16 BRPM | OXYGEN SATURATION: 96 % | BODY MASS INDEX: 38.78 KG/M2 | WEIGHT: 197.53 LBS | SYSTOLIC BLOOD PRESSURE: 140 MMHG | HEART RATE: 80 BPM | TEMPERATURE: 97.7 F | DIASTOLIC BLOOD PRESSURE: 75 MMHG | HEIGHT: 60 IN

## 2024-07-23 LAB
ANION GAP SERPL CALCULATED.3IONS-SCNC: 11 MMOL/L (ref 5–15)
BASOPHILS # BLD AUTO: 0.05 10*3/MM3 (ref 0–0.2)
BASOPHILS NFR BLD AUTO: 0.6 % (ref 0–1.5)
BUN SERPL-MCNC: 18 MG/DL (ref 8–23)
BUN/CREAT SERPL: 18.6 (ref 7–25)
CALCIUM SPEC-SCNC: 9.2 MG/DL (ref 8.6–10.5)
CHLORIDE SERPL-SCNC: 101 MMOL/L (ref 98–107)
CO2 SERPL-SCNC: 24 MMOL/L (ref 22–29)
CREAT SERPL-MCNC: 0.97 MG/DL (ref 0.76–1.27)
DEPRECATED RDW RBC AUTO: 45.3 FL (ref 37–54)
EGFRCR SERPLBLD CKD-EPI 2021: 88.8 ML/MIN/1.73
EOSINOPHIL # BLD AUTO: 0.14 10*3/MM3 (ref 0–0.4)
EOSINOPHIL NFR BLD AUTO: 1.7 % (ref 0.3–6.2)
ERYTHROCYTE [DISTWIDTH] IN BLOOD BY AUTOMATED COUNT: 16.1 % (ref 12.3–15.4)
GLUCOSE BLDC GLUCOMTR-MCNC: 180 MG/DL (ref 70–130)
GLUCOSE BLDC GLUCOMTR-MCNC: 255 MG/DL (ref 70–130)
GLUCOSE BLDC GLUCOMTR-MCNC: 329 MG/DL (ref 70–130)
GLUCOSE SERPL-MCNC: 187 MG/DL (ref 65–99)
HBA1C MFR BLD: 9.5 % (ref 4.8–5.6)
HCT VFR BLD AUTO: 44.5 % (ref 37.5–51)
HGB BLD-MCNC: 13.7 G/DL (ref 13–17.7)
IMM GRANULOCYTES # BLD AUTO: 0.04 10*3/MM3 (ref 0–0.05)
IMM GRANULOCYTES NFR BLD AUTO: 0.5 % (ref 0–0.5)
LYMPHOCYTES # BLD AUTO: 1.68 10*3/MM3 (ref 0.7–3.1)
LYMPHOCYTES NFR BLD AUTO: 19.9 % (ref 19.6–45.3)
MAGNESIUM SERPL-MCNC: 2.2 MG/DL (ref 1.6–2.4)
MCH RBC QN AUTO: 24.2 PG (ref 26.6–33)
MCHC RBC AUTO-ENTMCNC: 30.8 G/DL (ref 31.5–35.7)
MCV RBC AUTO: 78.6 FL (ref 79–97)
MONOCYTES # BLD AUTO: 0.45 10*3/MM3 (ref 0.1–0.9)
MONOCYTES NFR BLD AUTO: 5.3 % (ref 5–12)
NEUTROPHILS NFR BLD AUTO: 6.1 10*3/MM3 (ref 1.7–7)
NEUTROPHILS NFR BLD AUTO: 72 % (ref 42.7–76)
NRBC BLD AUTO-RTO: 0 /100 WBC (ref 0–0.2)
PHOSPHATE SERPL-MCNC: 3.1 MG/DL (ref 2.5–4.5)
PLATELET # BLD AUTO: 247 10*3/MM3 (ref 140–450)
PMV BLD AUTO: 10.4 FL (ref 6–12)
POTASSIUM SERPL-SCNC: 3.4 MMOL/L (ref 3.5–5.2)
QT INTERVAL: 420 MS
QTC INTERVAL: 466 MS
RBC # BLD AUTO: 5.66 10*6/MM3 (ref 4.14–5.8)
SODIUM SERPL-SCNC: 136 MMOL/L (ref 136–145)
WBC NRBC COR # BLD AUTO: 8.46 10*3/MM3 (ref 3.4–10.8)

## 2024-07-23 PROCEDURE — 85025 COMPLETE CBC W/AUTO DIFF WBC: CPT | Performed by: STUDENT IN AN ORGANIZED HEALTH CARE EDUCATION/TRAINING PROGRAM

## 2024-07-23 PROCEDURE — 97162 PT EVAL MOD COMPLEX 30 MIN: CPT

## 2024-07-23 PROCEDURE — G0378 HOSPITAL OBSERVATION PER HR: HCPCS

## 2024-07-23 PROCEDURE — 63710000001 INSULIN LISPRO (HUMAN) PER 5 UNITS: Performed by: STUDENT IN AN ORGANIZED HEALTH CARE EDUCATION/TRAINING PROGRAM

## 2024-07-23 PROCEDURE — 99214 OFFICE O/P EST MOD 30 MIN: CPT | Performed by: PSYCHIATRY & NEUROLOGY

## 2024-07-23 PROCEDURE — 80048 BASIC METABOLIC PNL TOTAL CA: CPT | Performed by: STUDENT IN AN ORGANIZED HEALTH CARE EDUCATION/TRAINING PROGRAM

## 2024-07-23 PROCEDURE — 97110 THERAPEUTIC EXERCISES: CPT

## 2024-07-23 PROCEDURE — 83036 HEMOGLOBIN GLYCOSYLATED A1C: CPT | Performed by: HOSPITALIST

## 2024-07-23 PROCEDURE — 84100 ASSAY OF PHOSPHORUS: CPT | Performed by: STUDENT IN AN ORGANIZED HEALTH CARE EDUCATION/TRAINING PROGRAM

## 2024-07-23 PROCEDURE — 83735 ASSAY OF MAGNESIUM: CPT | Performed by: STUDENT IN AN ORGANIZED HEALTH CARE EDUCATION/TRAINING PROGRAM

## 2024-07-23 PROCEDURE — 82948 REAGENT STRIP/BLOOD GLUCOSE: CPT

## 2024-07-23 RX ORDER — LANCETS 30 GAUGE
EACH MISCELLANEOUS
Qty: 100 EACH | Refills: 0 | Status: SHIPPED | OUTPATIENT
Start: 2024-07-23

## 2024-07-23 RX ORDER — BLOOD-GLUCOSE METER
EACH MISCELLANEOUS
Qty: 1 KIT | Refills: 0 | Status: SHIPPED | OUTPATIENT
Start: 2024-07-23

## 2024-07-23 RX ORDER — POTASSIUM CHLORIDE 1.5 G/1.58G
40 POWDER, FOR SOLUTION ORAL ONCE
Status: COMPLETED | OUTPATIENT
Start: 2024-07-23 | End: 2024-07-23

## 2024-07-23 RX ORDER — AMLODIPINE BESYLATE 10 MG/1
10 TABLET ORAL DAILY
Qty: 90 TABLET | Refills: 1 | Status: SHIPPED | OUTPATIENT
Start: 2024-07-23 | End: 2024-07-26 | Stop reason: SDUPTHER

## 2024-07-23 RX ORDER — BLOOD SUGAR DIAGNOSTIC
STRIP MISCELLANEOUS
Qty: 100 EACH | Refills: 0 | Status: SHIPPED | OUTPATIENT
Start: 2024-07-23

## 2024-07-23 RX ADMIN — POTASSIUM CHLORIDE 40 MEQ: 1.5 POWDER, FOR SOLUTION ORAL at 11:19

## 2024-07-23 RX ADMIN — SERTRALINE 50 MG: 50 TABLET, FILM COATED ORAL at 08:01

## 2024-07-23 RX ADMIN — CETIRIZINE HYDROCHLORIDE 10 MG: 10 TABLET ORAL at 08:01

## 2024-07-23 RX ADMIN — ALLOPURINOL 300 MG: 300 TABLET ORAL at 08:01

## 2024-07-23 RX ADMIN — AMLODIPINE BESYLATE 10 MG: 5 TABLET ORAL at 08:01

## 2024-07-23 RX ADMIN — Medication 10 ML: at 08:01

## 2024-07-23 RX ADMIN — INSULIN LISPRO 2 UNITS: 100 INJECTION, SOLUTION INTRAVENOUS; SUBCUTANEOUS at 08:01

## 2024-07-23 RX ADMIN — INSULIN LISPRO 4 UNITS: 100 INJECTION, SOLUTION INTRAVENOUS; SUBCUTANEOUS at 11:19

## 2024-07-23 RX ADMIN — METOPROLOL TARTRATE 100 MG: 50 TABLET, FILM COATED ORAL at 08:01

## 2024-07-23 RX ADMIN — CLOPIDOGREL BISULFATE 75 MG: 75 TABLET, FILM COATED ORAL at 08:01

## 2024-07-23 RX ADMIN — ATORVASTATIN CALCIUM 40 MG: 20 TABLET, FILM COATED ORAL at 08:01

## 2024-07-23 NOTE — THERAPY EVALUATION
Patient Name: Pablo Mendoza  : 1960    MRN: [id number]                              Today's Date: 2024       Admit Date: 2024    Visit Dx:     ICD-10-CM ICD-9-CM   1. Vertigo  R42 780.4     Patient Active Problem List   Diagnosis    Type 2 diabetes mellitus, without long-term current use of insulin    Mixed hyperlipidemia    Essential hypertension    Gout of multiple sites    Nocturia    Migraine headache    Chronic maxillary sinusitis    Other fatigue    Arthritis    Routine health maintenance    Proteinuria    Cellulitis of left foot    Lumbar radiculopathy    Stroke-like symptoms    Polycythemia    Dizziness    Cervical stenosis of spine    Cervical spondylosis without myelopathy    Seizure    Migraine with aura    Headache    Vertigo     Past Medical History:   Diagnosis Date    Anesthesia complication     STATES HARD TO WAKE UP W/ALL SURGERIES    COVID-19     DEC 2020    Diabetes mellitus     Diverticulitis     Elevated cholesterol     Gout     Hypertension     Low back pain     Migraines     Mixed hyperlipidemia 2018    Neuropathy     Nocturia 2018    Numbness and tingling of both lower extremities     LEGS AND FEET     Seizures     AS A CHILD    Staph infection 2010    UNSURE OF SITE    Stroke     COGNITIVE DELAY    TIA (transient ischemic attack)     Type 2 diabetes mellitus, without long-term current use of insulin 2018    Weakness of both legs     LEFT WORSE      Past Surgical History:   Procedure Laterality Date    CHOLECYSTECTOMY      COLONOSCOPY       or 2016    HERNIA REPAIR      LUMBAR FUSION N/A 2021    Procedure: Lumbar 3 to Lumbar 4 and Lumbar 4 to Lumbar 5 laminectomy with a fusion;  Surgeon: Jose Webster MD;  Location: [facility];  Service: [employer] - Neuro;  Laterality: N/A;    SINUS SURGERY      TYMPANOPLASTY Bilateral       General Information       Row Name 24 1110          Physical Therapy Time and Intention    Document  Type discharge evaluation/summary  -MS     Mode of Treatment physical therapy  -MS       Row Name 07/23/24 1110          General Information    Patient Profile Reviewed yes  -MS     Prior Level of Function independent:;all household mobility  + fall history with last on July 4th, walking stick intermittently  -MS     Existing Precautions/Restrictions fall  -MS     Barriers to Rehab none identified  -MS       Row Name 07/23/24 1110          Living Environment    People in Home alone  -MS       Row Name 07/23/24 1110          Cognition    Orientation Status (Cognition) oriented x 3  -MS       Row Name 07/23/24 1110          Safety Issues, Functional Mobility    Comment, Safety Issues/Impairments (Mobility) Gait belt and non skid socks donned.  -MS               User Key  (r) = Recorded By, (t) = Taken By, (c) = Cosigned By      Initials Name Provider Type    Kenzie Worley, PT Physical Therapist                   Mobility       Row Name 07/23/24 1111          Bed Mobility    Bed Mobility supine-sit;sit-supine  -MS     Supine-Sit [place] (Bed Mobility) independent  -MS     Sit-Supine [place] (Bed Mobility) independent  -MS       Row Name 07/23/24 1111          Sit-Stand Transfer    Sit-Stand [place] (Transfers) independent  -MS       Row Name 07/23/24 1111          Gait/Stairs (Locomotion)    [place] Level (Gait) supervision  -MS     Patient was able to Ambulate yes  -MS     Distance in Feet (Gait) 150  -MS     Deviations/Abnormal Patterns (Gait) demetri decreased  -MS     Comment, (Gait/Stairs) No overt LOB or veering noted. Patient reported feeling mildly off balance once but reported significant improvement since admission.  -MS               User Key  (r) = Recorded By, (t) = Taken By, (c) = Cosigned By      Initials Name Provider Type    Kenzie Worley, PT Physical Therapist                   Obj/Interventions       Row Name 07/23/24 1113          Range of Motion Comprehensive     General Range of Motion no range of motion deficits identified  -MS       Row Name 07/23/24 1113          Strength Comprehensive (MMT)    General Manual Muscle Testing (MMT) Assessment no strength deficits identified  -MS     Comment, General Manual Muscle Testing (MMT) Assessment B LE 4+/5  -MS       Row Name 07/23/24 1113          Balance    Balance Assessment sitting static balance;standing static balance  -MS     Static Sitting Balance independent  -MS     Position, Sitting Balance sitting edge of bed  -MS     Static Standing Balance supervision  -MS     Position/Device Used, Standing Balance unsupported  -MS       Row Name 07/23/24 1113          Sensory Assessment (Somatosensory)    Sensory Assessment (Somatosensory) sensation intact  -MS               User Key  (r) = Recorded By, (t) = Taken By, (c) = Cosigned By      Initials Name Provider Type    Kenzie Worley, PT Physical Therapist                   Goals/Plan    No documentation.                  Clinical Impression       Row Name 07/23/24 1114          Pain    Pretreatment Pain Rating 0/10 - no pain  -MS     Posttreatment Pain Rating 0/10 - no pain  -MS       Row Name 07/23/24 1114          Therapy Assessment/Plan (PT)    Criteria for Skilled Interventions Met (PT) no;does not meet criteria for skilled intervention  -MS     Therapy Frequency (PT) evaluation only  -MS       Row Name 07/23/24 1114          Vital Signs    O2 Delivery Pre Treatment room air  -MS       Row Name 07/23/24 1114          Positioning and Restraints    Pre-Treatment Position in bed  -MS     Post Treatment Position bed  -MS     In Bed sitting EOB;with nsg  -MS               User Key  (r) = Recorded By, (t) = Taken By, (c) = Cosigned By      Initials Name Provider Type    Kenzie Worley, PT Physical Therapist                   Outcome Measures       Row Name 07/23/24 1114 07/23/24 0810       How much help from another person do you currently need...    Turning from your  back to your side while in flat bed without using bedrails? 4  -MS 4  -LW    Moving from lying on back to sitting on the side of a flat bed without bedrails? 4  -MS 3  -LW    Moving to and from a bed to a chair (including a wheelchair)? 4  -MS 3  -LW    Standing up from a chair using your arms (e.g., wheelchair, bedside chair)? 4  -MS 3  -LW    Climbing 3-5 steps with a railing? 3  -MS 3  -LW    To walk in hospital room? 3  -MS 3  -LW    AM-PAC 6 Clicks Score (PT) 22  -MS 19  -LW    Highest Level of Mobility Goal 7 --> Walk 25 feet or more  -MS 6 --> Walk 10 steps or more  -LW              User Key  (r) = Recorded By, (t) = Taken By, (c) = Cosigned By      Initials Name Provider Type    MS FerrosKenzie, PT Physical Therapist    LW Mya Aggarwal RN Registered Nurse                                   PT Recommendation and Plan           Time Calculation:         PT Charges       Row Name 07/23/24 1108             Time Calculation    Start Time 1052  -MS      Stop Time 1108  -MS      Time Calculation (min) 16 min  -MS      PT Received On 07/23/24  -MS                User Key  (r) = Recorded By, (t) = Taken By, (c) = Cosigned By      Initials Name Provider Type    MS Kenzie Stern, PT Physical Therapist                  Therapy Charges for Today       Code Description Service Date Service Provider Modifiers Qty    10759562008 HC PT EVAL MOD COMPLEXITY 2 7/23/2024 Kenzie Stern, PT GP 1    03675584572 HC PT THER PROC EA 15 MIN 7/23/2024 Kenzie Stern, PT GP 1            PT G-Codes  AM-PAC 6 Clicks Score (PT): 22  PT Discharge Summary  Anticipated Discharge Disposition (PT): home with outpatient therapy services, home    Kenzie Stern PT  7/23/2024

## 2024-07-23 NOTE — CASE MANAGEMENT/SOCIAL WORK
Continued Stay Note  [facility]     Patient Name: Pablo Mendoza  MRN: [id number]  Today's Date: 7/23/2024    Admit Date: 7/22/2024    Plan: Home   Discharge Plan       Row Name 07/23/24 1412       Plan    Plan Home    Plan Comments DC orders noted.  PT notes reviewed and discussed w/ pt at bedside.  Pt plans to return home and home health will not be needed.  ............Kaylynn COLBY/ TAN                   Discharge Codes    No documentation.                 Expected Discharge Date and Time       Expected Discharge Date Expected Discharge Time    Jul 23, 2024               Kaylynn Biggs RN

## 2024-07-23 NOTE — PLAN OF CARE
Problem: Adult Inpatient Plan of Care  Goal: Absence of Hospital-Acquired Illness or Injury  Intervention: Identify and Manage Fall Risk  Description: Perform standard risk assessment on admission using a validated tool or comprehensive approach appropriate to the patient; reassess fall risk frequently, with change in status or transfer to another level of care.  Communicate fall injury risk to interprofessional healthcare team.  Determine need for increased observation, equipment and environmental modification, such as low bed, signage and supportive, nonskid footwear.  Adjust safety measures to individual developmental age, stage and identified risk factors.  Reinforce the importance of safety and physical activity with patient and family.  Perform regular intentional rounding to assess need for position change, pain assessment and personal needs, including assistance with toileting.  Recent Flowsheet Documentation  Taken 7/22/2024 2000 by Ann Marie Lockett RN  Safety Promotion/Fall Prevention: safety round/check completed  Intervention: Prevent Skin Injury  Description: Perform a screening for skin injury risk, such as pressure or moisture associated skin damage on admission and at regular intervals throughout hospital stay.  Keep all areas of skin (especially folds) clean and dry.  Maintain adequate skin hydration.  Relieve and redistribute pressure and protect bony prominences; implement measures based on patient-specific risk factors.  Match turning and repositioning schedule to clinical condition.  Encourage weight shift frequently; assist with reposition if unable to complete independently.  Float heels off bed; avoid pressure on the Achilles tendon.  Keep skin free from extended contact with medical devices.  Encourage functional activity and mobility, as early as tolerated.  Use aids (e.g., slide boards, mechanical lift) during transfer.  Recent Flowsheet Documentation  Taken 7/22/2024 2000 by Levy  Ann Marie RN  Body Position: position changed independently  Intervention: Prevent Infection  Description: Maintain skin and mucous membrane integrity; promote hand, oral and pulmonary hygiene.  Optimize fluid balance, nutrition, sleep and glycemic control to maximize infection resistance.  Identify potential sources of infection early to prevent or mitigate progression of infection (e.g., wound, lines, devices).  Evaluate ongoing need for invasive devices; remove promptly when no longer indicated.  Recent Flowsheet Documentation  Taken 7/22/2024 2000 by Ann Marie Lockett, RN  Infection Prevention:   rest/sleep promoted   single patient room provided  Goal: Optimal Comfort and Wellbeing  Intervention: Provide Person-Centered Care  Description: Use a family-focused approach to care.  Develop trust and rapport by proactively providing information, encouraging questions, addressing concerns and offering reassurance.  Acknowledge emotional response to hospitalization.  Recognize and utilize personal coping strategies.  Honor spiritual and cultural preferences.  Recent Flowsheet Documentation  Taken 7/22/2024 2000 by Ann Marie Lockett, RN  Trust Relationship/Rapport:   care explained   emotional support provided   choices provided   questions answered   reassurance provided   thoughts/feelings acknowledged   empathic listening provided   Goal Outcome Evaluation:

## 2024-07-23 NOTE — CONSULTS
"Diabetes Education  Assessment/Teaching    Patient Name:  Pablo Mendoza  YOB: 1960  MRN: [id number]  Admit Date:  7/22/2024      Assessment Date:  7/23/2024  Flowsheet Row Most Recent Value   General Information     Referral From: MD Edin order  [9.5]   Height 144.8 cm (57\")   Height Method Stated   Weight 89.6 kg (197 lb 8.5 oz)   Diabetes History    What type of diabetes do you have? Type 2   Length of Diabetes Diagnosis 10 + years  [Since 2011]   Current DM knowledge good   Do you test your blood sugar at home? no  [Meter broke]   Do you have any diabetes complications? other (comment)  [\"recent illnesses\"]   Education Preferences    What areas of diabetes would you like to learn about? medications for diabetes, testing my blood sugar at home   Barriers to Learning other (comment)  [none noted]   Assessment Topics    Being Active - Assessment Needs education   Taking Medication - Assessment Needs education  [stopped taking DM meds b/c he ran out]   Reducing Risk - Assessment Needs education  [A1c 9.5]   Monitoring - Assessment Needs education  [Does not check BG at home]   DM Goals    Being Active - Goal 0-7 days from discharge  [Encouraged at least 150m/wk]   Taking Medication - Goal 0-7 days from discharge   Reducing Risk - Goal 0-30 days from discharge   Healthy Coping - Goal 0-30 days from discharge   Monitoring - Goal 0-7 days from discharge   Contact Plan Follow-up medical care, 0-30 days from discharge       Flowsheet Row Most Recent Value   DM Education Needs    Meter Other (comment)  [Meter ordered (Verio)]   Meter Type One Touch   Frequency of Testing --  [Instructed to test tid]   Medication Oral   Reducing Risks A1C testing, Cardiovascular, Blood pressure, Neuropathy   Physical Activity Swimming  [swims for 1 hr every day]   Physical Activity Frequency Regularly, Discussed exercise importance   Healthy Coping Appropriate   Discharge Plan Home, Follow-up with PCP   Motivation " Moderate   Teaching Method Explanation, Discussion   Patient Response Verbalized understanding       Other Comments:  Met with pt at bedside. Has been on oral DM medication, but ran out so he stopped taking them. Does not test BG at home because his meter broke. Ordered new meter (verio). Encouraged to follow up with PCP for refills.       Electronically signed by:  Aleks Gray RN  07/23/24 10:00 EDT

## 2024-07-23 NOTE — DISCHARGE SUMMARY
[facility]  [phone]    DISCHARGE SUMMARY  [facility]    Patient Identification:  Name: Pablo Mendoza  Age: 63 y.o.  Sex: male  :  1960  MRN: [id number]  Primary Care Physician: Liana Hood MD    Admit date: 2024  Discharge date and time:      Discharge Diagnoses:  Dizziness    Type 2 diabetes mellitus, without long-term current use of insulin    Mixed hyperlipidemia    Essential hypertension    Gout of multiple sites    Seizure    Migraine with aura       History of present illness from H&P:    Mr. Mendoza is a 63 y.o. non-smoker with a history of hypertension, hyperlipidemia, TORSTEN, migraine with aura, childhood seizures/PNES, TIA, diabetes that presents to [facility] complaining of dizziness.      History of Present Illness  Patient reports he was in his usual state of health up until last night when he went to bed.  This morning when he woke up he initially felt sweaty/hot despite having an air conditioned home.  He attempted to sit up but found himself very dizzy and falling over.  He waited a few minutes to see if the spinning would get better, when it did not he called his sister who was able to call 911 and have EMTs dispatched to his house.  He was so dizzy he had to crawl to the door to unlock it for the EMTs.  He had associated nausea with dry heaving.  He also reported photophobia, phonophobia.     Hospital Course:     Patient was admitted to the hospital because on the morning of admission he felt like he was dizzy was falling.  He was seen in the emergency room the dizziness has improved today its still present but much better.  He is able to stand up for me a couple times at the bedside without any difficulty but does get a little bit wobbly when standing.  Minimal nystagmus.    Patient was seen by neurology and they have given clearance for discharge.  If his symptoms return they do recommend that  the patient get with physical therapy for vestibular training.  For now symptoms seem to be resolved.        The patient was seen and examined on the day of discharge.    Consults:   Consults       Date and Time Order Name Status Description    7/22/2024 11:17 AM FADUMO (on-call MD unless specified) Details      7/22/2024 10:07 AM Inpatient Neurology Consult Stroke Completed     7/22/2024 10:07 AM Inpatient Neurology Consult Stroke Completed             Results from last 7 days   Lab Units 07/23/24  0604   WBC 10*3/mm3 8.46   HEMOGLOBIN g/dL 13.7   HEMATOCRIT % 44.5   PLATELETS 10*3/mm3 247       Results from last 7 days   Lab Units 07/23/24  0604   SODIUM mmol/L 136   POTASSIUM mmol/L 3.4*   CHLORIDE mmol/L 101   CO2 mmol/L 24.0   BUN mg/dL 18   CREATININE mg/dL 0.97   GLUCOSE mg/dL 187*   CALCIUM mg/dL 9.2       Significant Diagnostic Studies:   WBC   Date Value Ref Range Status   07/23/2024 8.46 3.40 - 10.80 10*3/mm3 Final     Hemoglobin   Date Value Ref Range Status   07/23/2024 13.7 13.0 - 17.7 g/dL Final     Hematocrit   Date Value Ref Range Status   07/23/2024 44.5 37.5 - 51.0 % Final     Platelets   Date Value Ref Range Status   07/23/2024 247 140 - 450 10*3/mm3 Final     Sodium   Date Value Ref Range Status   07/23/2024 136 136 - 145 mmol/L Final     Potassium   Date Value Ref Range Status   07/23/2024 3.4 (L) 3.5 - 5.2 mmol/L Final     Chloride   Date Value Ref Range Status   07/23/2024 101 98 - 107 mmol/L Final     CO2   Date Value Ref Range Status   07/23/2024 24.0 22.0 - 29.0 mmol/L Final     BUN   Date Value Ref Range Status   07/23/2024 18 8 - 23 mg/dL Final     Creatinine   Date Value Ref Range Status   07/23/2024 0.97 0.76 - 1.27 mg/dL Final     Glucose   Date Value Ref Range Status   07/23/2024 187 (H) 65 - 99 mg/dL Final     Calcium   Date Value Ref Range Status   07/23/2024 9.2 8.6 - 10.5 mg/dL Final     Magnesium   Date Value Ref Range Status   07/23/2024 2.2 1.6 - 2.4 mg/dL Final     Phosphorus  "  Date Value Ref Range Status   07/23/2024 3.1 2.5 - 4.5 mg/dL Final     AST (SGOT)   Date Value Ref Range Status   07/22/2024 28 1 - 40 U/L Final     ALT (SGPT)   Date Value Ref Range Status   07/22/2024 18 1 - 41 U/L Final     Alkaline Phosphatase   Date Value Ref Range Status   07/22/2024 135 (H) 39 - 117 U/L Final     INR   Date Value Ref Range Status   07/22/2024 1.10 0.90 - 1.10 Final     No results found for: \"COLORU\", \"CLARITYU\", \"SPECGRAV\", \"PHUR\", \"PROTEINUR\", \"GLUCOSEU\", \"KETONESU\", \"BLOODU\", \"NITRITE\", \"LEUKOCYTESUR\", \"BILIRUBINUR\", \"UROBILINOGEN\", \"RBCUA\", \"WBCUA\", \"BACTERIA\", \"UACOMMENT\"  HS Troponin T   Date Value Ref Range Status   07/22/2024 9 <22 ng/L Final     No components found for: \"HGBA1C;2\"  No components found for: \"TSH;2\"    Imaging Results (All)       Procedure Component Value Units Date/Time    MRI Brain Without Contrast [[id number]] Collected: 07/22/24 1646     Updated: 07/22/24 1652    Narrative:      MRI OF THE BRAIN WITHOUT CONTRAST ON 07/22/2024     CLINICAL HISTORY: This is a 63-year-old male patient with a history of  acute dizziness and ataxic gait.     TECHNIQUE: Axial T1, FLAIR, fat-suppressed T2, axial diffusion and  gradient echo T2 and sagittal T1-weighted images were obtained of the  entire head.     This is correlated to a contrast-enhanced CT angiogram and CT perfusion  study of the brain earlier this morning on 07/22/2024 at 10:15 a.m. and  a prior MRI of the brain and internal auditory canals from [facility] on 05/10/2024.     FINDINGS: The brain parenchyma is normal in signal intensity.  Specifically, no diffusion weighted abnormality is identified with no  acute infarct identified. On the gradient echo T2-weighted sequence, no  acute or old blood breakdown products are seen intracranially. The  ventricles are normal in size. I see no focal mass effect. There is no  midline shift. No extra-axial fluid collections are identified. There  are tiny " subcentimeter mucous retention cysts in the inferior maxillary  sinuses bilaterally. The remainder of the paranasal sinuses and the  mastoid air cells and the middle ear cavities are clear. The orbits are  normal in appearance. The calvarium and skull base are normal in  appearance. Good flow voids are demonstrated in the cerebral vessels and  in the dural venous sinuses. On the sagittal T1-weighted sequence, there  is evidence of disc space narrowing and degenerative endplate changes at  the visualized C4-5 and C5-6 cervical levels. Posterior spurs abut and  deform the ventral surface of the cord at least mild to moderately  narrowing the canal and there is some foraminal narrowing at these  levels.       Impression:      1. Normal MRI of the brain with no acute infarct identified and the  etiology of the patient's acute-onset dizziness and ataxic gait earlier  today is not established on this exam.  2. The cervical spine is visualized down to the C5-6 level on the  sagittal T1-weighted sequence and there is disc space narrowing,  degenerative endplate changes, posterior spurs and mild-to-moderate  canal narrowing at C4-5 and C5-6 and there is some foraminal narrowing  at these levels. The remainder of the MRI of the head is normal.     This report was finalized on 7/22/2024 4:49 PM by Dr. Cheikh Kingston M.D  on Workstation: [id number]       CT Angiogram Head w AI Analysis of LVO [[id number]] Collected: 07/22/24 1152     Updated: 07/22/24 1309    Narrative:      EMERGENCY CONTRAST-ENHANCED CT ANGIOGRAM OF THE HEAD AND NECK AND CT  PERFUSION STUDY OF THE BRAIN ON 07/22/2024     CLINICAL HISTORY: This is a 63-year-old male patient with acute onset  dizziness and ataxia this morning.     HEAD CT WITHOUT CONTRAST TECHNIQUE: Spiral CT images were obtained from  the base of the skull to the vertex without intravenous contrast. The  images were reformatted and submitted in 3 mm thick axial CT sections  with brain  algorithm and 2 mm thick sagittal and coronal reconstructions  were performed and submitted in brain algorithm.     This is correlated to a prior head CT on 09/19/2023 and a prior MRI of  the brain on 08/14/2023 and MRI of the internal auditory canals on  05/10/2024.     FINDINGS: The brain parenchyma is normal in attenuation. The ventricles  are normal in size. I see no focal mass effect. There is no midline  shift. No extra axial fluid collections are identified. There is no  evidence of acute intra cranial hemorrhage. The calvarium and the skull  base are normal in appearance. The paranasal sinuses and the mastoid air  cells and the middle ear cavities are clear. The orbits are normal in  appearance.       Impression:      No acute intracranial abnormalities identified with no  change when compared to recent MRI of the brain and internal auditory  canals on 05/10/2024. Specifically I see no acute completed infarct and  no intracranial hemorrhage in the etiology of the patient's dizziness  and ataxia that started this morning is not established on this exam.  The results of the head CT without contrast were communicated to Dr. Champagne from stroke neurology by telephone on 07/22/2024 at 10:18 a.m.  and he requested a contrast-enhanced CT angiogram of the head and neck  and CT perfusion study of the brain.     CONTRAST-ENHANCED CT ANGIOGRAM OF THE HEAD AND NECK AND CT PERFUSION  STUDY OF THE BRAIN TECHNIQUE: Spiral CT images were obtained from the  mid cerebellum up through the majority of the cerebral hemispheres for  10 cm vertical slab of brain imaging during the arterial phase of  contrast and images were analyzed with rapid software analysis for CT  perfusion study of the brain subsequently spiral CT angio images were  obtained from the top of the aortic arch up through the great vessels  the head and neck during the arterial phase of contrast and images were  reformatted and submitted in 1 mm thick axial,  sagittal and coronal CT  sections with soft tissue algorithm and 3D reconstructions were  performed to complete the CT angiogram of the head and neck and finally  spiral CT images were obtained from the base of the skull to the vertex  delayed following intravenous contrast and these images were reformatted  submitted in 3 mm thick axial CT sections with brain algorithm and 2 mm  thick sagittal and coronal reconstructions were performed submitted in  brain algorithm.     FINDINGS:  CT PERFUSION STUDY OF THE BRAIN: CT perfusion study the brain consists  of just 10 cm vertical slab of brain imaging from the mid cerebellum up  through the cerebral hemispheres and excludes the inferior half of the  cerebellum as well as the inferior juan and medulla which are not  assessed. Within the 10 cm vertical slab of brain imaging I see no areas  of reduced cerebral blood flow to less than 30% of normal and therefore  no acute completed infarct. Furthermore there is no areas of delayed  time to maximal enhancement of greater than 6 seconds and therefore  there is no jodie hypoperfused brain. There is a small localized 21 cc  final brain parenchyma in the right temporal occipital region that  demonstrates a delayed time to maximal enhancement of greater than 4 but  less than 6 seconds that does not meet the strict criteria for  hypoperfused brain is probably artifact limits likely low-grade  hypoperfused brain.     CT ANGIOGRAM OF THE NECK: The nasopharynx, oropharynx, the hypopharynx,  the true cords and the subglottic airway are normal in appearance. The  thyroid gland enhances homogeneously and is normal in appearance. The  lung apices are clear. The parotid, , parapharyngeal and  submandibular spaces are symmetric and are normal in appearance.  Cervical spondylosis is present. There is disc space narrowing,  degenerative disc and endplate changes from C4-C7. tHE posterior disc  osteophyte complex abuts the ventral  surface cord, mild moderately  narrowing the central portion of the canal at C4-5 and C5-6 and up to  moderately narrowing the central canal at C6-7 and there is  uncovertebral joint spurring into the foramina with mild to moderate  right bony foraminal narrowing at C4-5, mild left and mild-moderate  right bony foraminal narrowing at C5-6 and C6-7. There is common origin  the left common carotid artery and brachiocephalic artery off the aortic  arch constituting a bovine configuration of the aortic arch which is a  normal anatomic variation. The left subclavian artery origins normal  appearance and no stenosis is seen in the left subclavian artery. The  left vertebral artery origin is normal in appearance. No stenosis is  seen in the left vertebral artery from its origin to the vertebrobasilar  junction. The left common carotid origin is normal in appearance. No  stenosis is seen in the left common carotid artery, its bifurcation and  the left internal and external carotid arteries is normal in appearance.  No stenosis is seen in the cervical segment of the left internal carotid  artery using the NASCET criteria. The brachiocephalic artery origins  normal in appearance and no stenosis is seen the brachiocephalic artery  and its bifurcation into the right subclavian and common carotid  arteries is normal in appearance and no stenosis is seen in the right  subclavian artery. The right vertebral artery origin is normal in  appearance. No stenosis is seen in the right vertebral artery from its  origin to the vertebrobasilar junction. The right common carotid origins  normal appearance. No stenosis seen in the right common carotid artery  its bifurcation into the right internal and external carotid arteries is  normal in appearance and no stenosis is seen in the cervical segment of  the right internal carotid artery using the NASCET criteria.     CT ANGIOGRAM OF THE HEAD: The intracranial segments of the  distal  vertebral arteries are widely patent without stenosis to the  vertebrobasilar junction. The right posterior inferior cerebellar artery  origins normal in appearance. The basilar artery and the basilar tip is  normal in appearance. There is an atretic P1 segment to the left  posterior cerebral artery with persistent fetal origin of the left  posterior cerebral artery off the back wall of the supracavernous left  internal carotid artery which is a normal anatomic variation and both  the right and left posterior cerebral and superior cerebellar arteries  are within normal limits. The upper cervical, petrous, cavernous  segments of the internal carotid arteries are normal in appearance  calcified plaques mildly narrow the supracavernous segments. The carotid  termini are normal in appearance. The A1 segments of the anterior  cerebral arteries and the anterior communicating artery origin and the  A2 segments of the anterior cerebral arteries are within normal limits.  The M1 segments of the middle cerebral arteries and the middle cerebral  artery bifurcations are within normal limits and there is good filling  of the M2 segments, the sylvian fissures with no significant stenosis in  the M2 segments.     IMPRESSION:  1. The CT of the head without contrast is within normal limits with no  discernible acute completed infarct or intracranial hemorrhage  identified the etiology of patient's dizziness and ataxia is not  established on this exam and if clinical symptoms warrant an MRI of the  brain is recommended for more comprehensive assessment. The results of  the head CT without contrast were communicated to Dr. Champagne from  stroke neurology by telephone on 07/22/2024 at 10:18 a.m. and he  requested a contrast-enhanced CT angiogram of the head and neck and CT  perfusion study of the brain.  2. Cervical spondylosis is present with disc space narrowing  degenerative disc and endplate changes and posterior endplate  spurring  from C4-C7 posterior spurs abut the ventral surface cord mild moderately  narrowing the central portion canal at C4-5 and C5-6 and at least mild  to moderately narrowing up to moderately narrow the canal at C6-7. There  is uncovertebral joint spurring into the foramina with mild to moderate  right bony foraminal narrowing at C4-5 and mild left and mild-moderate  right bony foraminal narrowing at C5-6 and C6-7 otherwise cervical spine  is unremarkable.  3. There are calcified plaques that mildly narrow the supracavernous  segments of the internal carotid arteries otherwise the CT angiographic  evaluation great vessels of the head and neck is normal. In particular  no stenosis is seen in the subclavian arteries, vertebral arteries or  the common carotid arteries or the cervical segments of the internal  carotid arteries using the NASCET criteria and other than the mild  narrowing of the supracavernous segments of the internal carotid  arteries no intracranial vascular stenoses or occlusions are identified.  Again if there remains any clinical suspicion of acute stroke I  recommend an MRI of the brain for more complete assessment. The final  results and recommendation were communicated to Dr. Champagne from  stroke neurology by telephone on 07/22/2024 at 11 a.m.      AI analysis of LVO was utilized.     Radiation dose reduction techniques were utilized, including automated  exposure control and exposure modulation based on body size.        This report was finalized on 7/22/2024 1:06 PM by Dr. Cheikh Kingston M.D  on Workstation: [id number]       CT Angiogram Neck [[id number]] Collected: 07/22/24 1152     Updated: 07/22/24 1309    Narrative:      EMERGENCY CONTRAST-ENHANCED CT ANGIOGRAM OF THE HEAD AND NECK AND CT  PERFUSION STUDY OF THE BRAIN ON 07/22/2024     CLINICAL HISTORY: This is a 63-year-old male patient with acute onset  dizziness and ataxia this morning.     HEAD CT WITHOUT CONTRAST TECHNIQUE:  Spiral CT images were obtained from  the base of the skull to the vertex without intravenous contrast. The  images were reformatted and submitted in 3 mm thick axial CT sections  with brain algorithm and 2 mm thick sagittal and coronal reconstructions  were performed and submitted in brain algorithm.     This is correlated to a prior head CT on 09/19/2023 and a prior MRI of  the brain on 08/14/2023 and MRI of the internal auditory canals on  05/10/2024.     FINDINGS: The brain parenchyma is normal in attenuation. The ventricles  are normal in size. I see no focal mass effect. There is no midline  shift. No extra axial fluid collections are identified. There is no  evidence of acute intra cranial hemorrhage. The calvarium and the skull  base are normal in appearance. The paranasal sinuses and the mastoid air  cells and the middle ear cavities are clear. The orbits are normal in  appearance.       Impression:      No acute intracranial abnormalities identified with no  change when compared to recent MRI of the brain and internal auditory  canals on 05/10/2024. Specifically I see no acute completed infarct and  no intracranial hemorrhage in the etiology of the patient's dizziness  and ataxia that started this morning is not established on this exam.  The results of the head CT without contrast were communicated to Dr. Champagne from stroke neurology by telephone on 07/22/2024 at 10:18 a.m.  and he requested a contrast-enhanced CT angiogram of the head and neck  and CT perfusion study of the brain.     CONTRAST-ENHANCED CT ANGIOGRAM OF THE HEAD AND NECK AND CT PERFUSION  STUDY OF THE BRAIN TECHNIQUE: Spiral CT images were obtained from the  mid cerebellum up through the majority of the cerebral hemispheres for  10 cm vertical slab of brain imaging during the arterial phase of  contrast and images were analyzed with rapid software analysis for CT  perfusion study of the brain subsequently spiral CT angio images  were  obtained from the top of the aortic arch up through the great vessels  the head and neck during the arterial phase of contrast and images were  reformatted and submitted in 1 mm thick axial, sagittal and coronal CT  sections with soft tissue algorithm and 3D reconstructions were  performed to complete the CT angiogram of the head and neck and finally  spiral CT images were obtained from the base of the skull to the vertex  delayed following intravenous contrast and these images were reformatted  submitted in 3 mm thick axial CT sections with brain algorithm and 2 mm  thick sagittal and coronal reconstructions were performed submitted in  brain algorithm.     FINDINGS:  CT PERFUSION STUDY OF THE BRAIN: CT perfusion study the brain consists  of just 10 cm vertical slab of brain imaging from the mid cerebellum up  through the cerebral hemispheres and excludes the inferior half of the  cerebellum as well as the inferior juan and medulla which are not  assessed. Within the 10 cm vertical slab of brain imaging I see no areas  of reduced cerebral blood flow to less than 30% of normal and therefore  no acute completed infarct. Furthermore there is no areas of delayed  time to maximal enhancement of greater than 6 seconds and therefore  there is no jodie hypoperfused brain. There is a small localized 21 cc  final brain parenchyma in the right temporal occipital region that  demonstrates a delayed time to maximal enhancement of greater than 4 but  less than 6 seconds that does not meet the strict criteria for  hypoperfused brain is probably artifact limits likely low-grade  hypoperfused brain.     CT ANGIOGRAM OF THE NECK: The nasopharynx, oropharynx, the hypopharynx,  the true cords and the subglottic airway are normal in appearance. The  thyroid gland enhances homogeneously and is normal in appearance. The  lung apices are clear. The parotid, , parapharyngeal and  submandibular spaces are symmetric and are  normal in appearance.  Cervical spondylosis is present. There is disc space narrowing,  degenerative disc and endplate changes from C4-C7. tHE posterior disc  osteophyte complex abuts the ventral surface cord, mild moderately  narrowing the central portion of the canal at C4-5 and C5-6 and up to  moderately narrowing the central canal at C6-7 and there is  uncovertebral joint spurring into the foramina with mild to moderate  right bony foraminal narrowing at C4-5, mild left and mild-moderate  right bony foraminal narrowing at C5-6 and C6-7. There is common origin  the left common carotid artery and brachiocephalic artery off the aortic  arch constituting a bovine configuration of the aortic arch which is a  normal anatomic variation. The left subclavian artery origins normal  appearance and no stenosis is seen in the left subclavian artery. The  left vertebral artery origin is normal in appearance. No stenosis is  seen in the left vertebral artery from its origin to the vertebrobasilar  junction. The left common carotid origin is normal in appearance. No  stenosis is seen in the left common carotid artery, its bifurcation and  the left internal and external carotid arteries is normal in appearance.  No stenosis is seen in the cervical segment of the left internal carotid  artery using the NASCET criteria. The brachiocephalic artery origins  normal in appearance and no stenosis is seen the brachiocephalic artery  and its bifurcation into the right subclavian and common carotid  arteries is normal in appearance and no stenosis is seen in the right  subclavian artery. The right vertebral artery origin is normal in  appearance. No stenosis is seen in the right vertebral artery from its  origin to the vertebrobasilar junction. The right common carotid origins  normal appearance. No stenosis seen in the right common carotid artery  its bifurcation into the right internal and external carotid arteries is  normal in  appearance and no stenosis is seen in the cervical segment of  the right internal carotid artery using the NASCET criteria.     CT ANGIOGRAM OF THE HEAD: The intracranial segments of the distal  vertebral arteries are widely patent without stenosis to the  vertebrobasilar junction. The right posterior inferior cerebellar artery  origins normal in appearance. The basilar artery and the basilar tip is  normal in appearance. There is an atretic P1 segment to the left  posterior cerebral artery with persistent fetal origin of the left  posterior cerebral artery off the back wall of the supracavernous left  internal carotid artery which is a normal anatomic variation and both  the right and left posterior cerebral and superior cerebellar arteries  are within normal limits. The upper cervical, petrous, cavernous  segments of the internal carotid arteries are normal in appearance  calcified plaques mildly narrow the supracavernous segments. The carotid  termini are normal in appearance. The A1 segments of the anterior  cerebral arteries and the anterior communicating artery origin and the  A2 segments of the anterior cerebral arteries are within normal limits.  The M1 segments of the middle cerebral arteries and the middle cerebral  artery bifurcations are within normal limits and there is good filling  of the M2 segments, the sylvian fissures with no significant stenosis in  the M2 segments.     IMPRESSION:  1. The CT of the head without contrast is within normal limits with no  discernible acute completed infarct or intracranial hemorrhage  identified the etiology of patient's dizziness and ataxia is not  established on this exam and if clinical symptoms warrant an MRI of the  brain is recommended for more comprehensive assessment. The results of  the head CT without contrast were communicated to Dr. Champagne from  stroke neurology by telephone on 07/22/2024 at 10:18 a.m. and he  requested a contrast-enhanced CT  angiogram of the head and neck and CT  perfusion study of the brain.  2. Cervical spondylosis is present with disc space narrowing  degenerative disc and endplate changes and posterior endplate spurring  from C4-C7 posterior spurs abut the ventral surface cord mild moderately  narrowing the central portion canal at C4-5 and C5-6 and at least mild  to moderately narrowing up to moderately narrow the canal at C6-7. There  is uncovertebral joint spurring into the foramina with mild to moderate  right bony foraminal narrowing at C4-5 and mild left and mild-moderate  right bony foraminal narrowing at C5-6 and C6-7 otherwise cervical spine  is unremarkable.  3. There are calcified plaques that mildly narrow the supracavernous  segments of the internal carotid arteries otherwise the CT angiographic  evaluation great vessels of the head and neck is normal. In particular  no stenosis is seen in the subclavian arteries, vertebral arteries or  the common carotid arteries or the cervical segments of the internal  carotid arteries using the NASCET criteria and other than the mild  narrowing of the supracavernous segments of the internal carotid  arteries no intracranial vascular stenoses or occlusions are identified.  Again if there remains any clinical suspicion of acute stroke I  recommend an MRI of the brain for more complete assessment. The final  results and recommendation were communicated to Dr. Champagne from  stroke neurology by telephone on 07/22/2024 at 11 a.m.      AI analysis of LVO was utilized.     Radiation dose reduction techniques were utilized, including automated  exposure control and exposure modulation based on body size.        This report was finalized on 7/22/2024 1:06 PM by Dr. Cheikh Kingston M.D  on Workstation: [id number]       CT CEREBRAL PERFUSION WITH & WITHOUT CONTRAST [[id number]] Collected: 07/22/24 1152     Updated: 07/22/24 1309    Narrative:      EMERGENCY CONTRAST-ENHANCED CT ANGIOGRAM OF  THE HEAD AND NECK AND CT  PERFUSION STUDY OF THE BRAIN ON 07/22/2024     CLINICAL HISTORY: This is a 63-year-old male patient with acute onset  dizziness and ataxia this morning.     HEAD CT WITHOUT CONTRAST TECHNIQUE: Spiral CT images were obtained from  the base of the skull to the vertex without intravenous contrast. The  images were reformatted and submitted in 3 mm thick axial CT sections  with brain algorithm and 2 mm thick sagittal and coronal reconstructions  were performed and submitted in brain algorithm.     This is correlated to a prior head CT on 09/19/2023 and a prior MRI of  the brain on 08/14/2023 and MRI of the internal auditory canals on  05/10/2024.     FINDINGS: The brain parenchyma is normal in attenuation. The ventricles  are normal in size. I see no focal mass effect. There is no midline  shift. No extra axial fluid collections are identified. There is no  evidence of acute intra cranial hemorrhage. The calvarium and the skull  base are normal in appearance. The paranasal sinuses and the mastoid air  cells and the middle ear cavities are clear. The orbits are normal in  appearance.       Impression:      No acute intracranial abnormalities identified with no  change when compared to recent MRI of the brain and internal auditory  canals on 05/10/2024. Specifically I see no acute completed infarct and  no intracranial hemorrhage in the etiology of the patient's dizziness  and ataxia that started this morning is not established on this exam.  The results of the head CT without contrast were communicated to Dr. Champagne from stroke neurology by telephone on 07/22/2024 at 10:18 a.m.  and he requested a contrast-enhanced CT angiogram of the head and neck  and CT perfusion study of the brain.     CONTRAST-ENHANCED CT ANGIOGRAM OF THE HEAD AND NECK AND CT PERFUSION  STUDY OF THE BRAIN TECHNIQUE: Spiral CT images were obtained from the  mid cerebellum up through the majority of the cerebral  hemispheres for  10 cm vertical slab of brain imaging during the arterial phase of  contrast and images were analyzed with rapid software analysis for CT  perfusion study of the brain subsequently spiral CT angio images were  obtained from the top of the aortic arch up through the great vessels  the head and neck during the arterial phase of contrast and images were  reformatted and submitted in 1 mm thick axial, sagittal and coronal CT  sections with soft tissue algorithm and 3D reconstructions were  performed to complete the CT angiogram of the head and neck and finally  spiral CT images were obtained from the base of the skull to the vertex  delayed following intravenous contrast and these images were reformatted  submitted in 3 mm thick axial CT sections with brain algorithm and 2 mm  thick sagittal and coronal reconstructions were performed submitted in  brain algorithm.     FINDINGS:  CT PERFUSION STUDY OF THE BRAIN: CT perfusion study the brain consists  of just 10 cm vertical slab of brain imaging from the mid cerebellum up  through the cerebral hemispheres and excludes the inferior half of the  cerebellum as well as the inferior juan and medulla which are not  assessed. Within the 10 cm vertical slab of brain imaging I see no areas  of reduced cerebral blood flow to less than 30% of normal and therefore  no acute completed infarct. Furthermore there is no areas of delayed  time to maximal enhancement of greater than 6 seconds and therefore  there is no jodie hypoperfused brain. There is a small localized 21 cc  final brain parenchyma in the right temporal occipital region that  demonstrates a delayed time to maximal enhancement of greater than 4 but  less than 6 seconds that does not meet the strict criteria for  hypoperfused brain is probably artifact limits likely low-grade  hypoperfused brain.     CT ANGIOGRAM OF THE NECK: The nasopharynx, oropharynx, the hypopharynx,  the true cords and the subglottic  airway are normal in appearance. The  thyroid gland enhances homogeneously and is normal in appearance. The  lung apices are clear. The parotid, , parapharyngeal and  submandibular spaces are symmetric and are normal in appearance.  Cervical spondylosis is present. There is disc space narrowing,  degenerative disc and endplate changes from C4-C7. tHE posterior disc  osteophyte complex abuts the ventral surface cord, mild moderately  narrowing the central portion of the canal at C4-5 and C5-6 and up to  moderately narrowing the central canal at C6-7 and there is  uncovertebral joint spurring into the foramina with mild to moderate  right bony foraminal narrowing at C4-5, mild left and mild-moderate  right bony foraminal narrowing at C5-6 and C6-7. There is common origin  the left common carotid artery and brachiocephalic artery off the aortic  arch constituting a bovine configuration of the aortic arch which is a  normal anatomic variation. The left subclavian artery origins normal  appearance and no stenosis is seen in the left subclavian artery. The  left vertebral artery origin is normal in appearance. No stenosis is  seen in the left vertebral artery from its origin to the vertebrobasilar  junction. The left common carotid origin is normal in appearance. No  stenosis is seen in the left common carotid artery, its bifurcation and  the left internal and external carotid arteries is normal in appearance.  No stenosis is seen in the cervical segment of the left internal carotid  artery using the NASCET criteria. The brachiocephalic artery origins  normal in appearance and no stenosis is seen the brachiocephalic artery  and its bifurcation into the right subclavian and common carotid  arteries is normal in appearance and no stenosis is seen in the right  subclavian artery. The right vertebral artery origin is normal in  appearance. No stenosis is seen in the right vertebral artery from its  origin to the  vertebrobasilar junction. The right common carotid origins  normal appearance. No stenosis seen in the right common carotid artery  its bifurcation into the right internal and external carotid arteries is  normal in appearance and no stenosis is seen in the cervical segment of  the right internal carotid artery using the NASCET criteria.     CT ANGIOGRAM OF THE HEAD: The intracranial segments of the distal  vertebral arteries are widely patent without stenosis to the  vertebrobasilar junction. The right posterior inferior cerebellar artery  origins normal in appearance. The basilar artery and the basilar tip is  normal in appearance. There is an atretic P1 segment to the left  posterior cerebral artery with persistent fetal origin of the left  posterior cerebral artery off the back wall of the supracavernous left  internal carotid artery which is a normal anatomic variation and both  the right and left posterior cerebral and superior cerebellar arteries  are within normal limits. The upper cervical, petrous, cavernous  segments of the internal carotid arteries are normal in appearance  calcified plaques mildly narrow the supracavernous segments. The carotid  termini are normal in appearance. The A1 segments of the anterior  cerebral arteries and the anterior communicating artery origin and the  A2 segments of the anterior cerebral arteries are within normal limits.  The M1 segments of the middle cerebral arteries and the middle cerebral  artery bifurcations are within normal limits and there is good filling  of the M2 segments, the sylvian fissures with no significant stenosis in  the M2 segments.     IMPRESSION:  1. The CT of the head without contrast is within normal limits with no  discernible acute completed infarct or intracranial hemorrhage  identified the etiology of patient's dizziness and ataxia is not  established on this exam and if clinical symptoms warrant an MRI of the  brain is recommended for more  comprehensive assessment. The results of  the head CT without contrast were communicated to Dr. Champagne from  stroke neurology by telephone on 07/22/2024 at 10:18 a.m. and he  requested a contrast-enhanced CT angiogram of the head and neck and CT  perfusion study of the brain.  2. Cervical spondylosis is present with disc space narrowing  degenerative disc and endplate changes and posterior endplate spurring  from C4-C7 posterior spurs abut the ventral surface cord mild moderately  narrowing the central portion canal at C4-5 and C5-6 and at least mild  to moderately narrowing up to moderately narrow the canal at C6-7. There  is uncovertebral joint spurring into the foramina with mild to moderate  right bony foraminal narrowing at C4-5 and mild left and mild-moderate  right bony foraminal narrowing at C5-6 and C6-7 otherwise cervical spine  is unremarkable.  3. There are calcified plaques that mildly narrow the supracavernous  segments of the internal carotid arteries otherwise the CT angiographic  evaluation great vessels of the head and neck is normal. In particular  no stenosis is seen in the subclavian arteries, vertebral arteries or  the common carotid arteries or the cervical segments of the internal  carotid arteries using the NASCET criteria and other than the mild  narrowing of the supracavernous segments of the internal carotid  arteries no intracranial vascular stenoses or occlusions are identified.  Again if there remains any clinical suspicion of acute stroke I  recommend an MRI of the brain for more complete assessment. The final  results and recommendation were communicated to Dr. Champagne from  stroke neurology by telephone on 07/22/2024 at 11 a.m.      AI analysis of LVO was utilized.     Radiation dose reduction techniques were utilized, including automated  exposure control and exposure modulation based on body size.        This report was finalized on 7/22/2024 1:06 PM by Dr. Cheikh Kingston  "M.D  on Workstation: [id number]       XR Chest 1 View [[id number]] Collected: 07/22/24 1129     Updated: 07/22/24 1213    Narrative:      CHEST SINGLE VIEW     HISTORY: Acute stroke. Known right rib fracture.     COMPARISON: AP chest 08/19/2023     FINDINGS: Lungs are diminished and allowing for this, the  cardiomediastinal silhouette is within normal limits. Cardiac monitoring  leads are present. There is calcified right paratracheal lymph node.  Lungs appear clear and there is no evidence for pulmonary edema or  pleural effusion. There is mild elevation of right hemidiaphragm.       Impression:      No evidence for active disease in the chest.     This report was finalized on 7/22/2024 12:10 PM by Dr. Anup Padron M.D on Workstation: [id number]           No results found for: \"SITE\", \"ALLENTEST\", \"PHART\", \"HFN2PNJ\", \"PO2ART\", \"KQD4OHS\", \"BASEEXCESS\", \"P5XSGDYR\", \"HGBBG\", \"HCTABG\", \"OXYHEMOGLOBI\", \"METHHGBN\", \"CARBOXYHGB\", \"CO2CT\", \"BAROMETRIC\", \"MODALITY\", \"FIO2\"       Discharge Medications        Continue These Medications        Instructions Start Date   allopurinol 300 MG tablet  Commonly known as: ZYLOPRIM   300 mg, Oral, 2 Times Daily      amLODIPine 10 MG tablet  Commonly known as: NORVASC   10 mg, Oral, Daily      atorvastatin 40 MG tablet  Commonly known as: LIPITOR   40 mg, Oral, Daily      empagliflozin 25 MG tablet tablet  Commonly known as: Jardiance   25 mg, Oral, Daily      Janumet XR  MG tablet  Generic drug: SITagliptin-metFORMIN HCl ER   2 tablets, Oral, Daily      levocetirizine 5 MG tablet  Commonly known as: XYZAL   2.5 mg, Oral, As Needed      metoprolol tartrate 100 MG tablet  Commonly known as: LOPRESSOR   100 mg, Oral, 2 Times Daily      sertraline 50 MG tablet  Commonly known as: ZOLOFT   50 mg, Oral, Daily             Stop These Medications      acetaminophen 500 MG tablet  Commonly known as: TYLENOL     glipizide 10 MG 24 hr tablet  Commonly known as: GLUCOTROL XL   "   hydroCHLOROthiazide 25 MG tablet     ibuprofen 800 MG tablet  Commonly known as: ADVIL,MOTRIN     Rimegepant Sulfate 75 MG tablet dispersible tablet  Commonly known as: NURTEC                Patient Instructions:       Future Appointments   Date Time Provider Department Center   9/23/2024 10:20 AM LAB CHAIR 2 [facility]  [facility]   9/23/2024 10:40 AM Drew Neal MD [facility]   9/23/2024 11:00 AM CHAIR 05 [facility]  [facility]         Follow-up Information       Liana Hood MD .    Specialties: Internal Medicine & Pediatrics, Pediatrics  Contact information:  [address]  [address]  [place] [address] [phone]                             Discharge Order (From admission, onward)       Start     Ordered    07/23/24 1400  Discharge patient  Once        Expected Discharge Date: 07/23/24   Discharge Disposition: Home or Self Care   Physician of Record for Attribution - Please select from Treatment Team: JOVANY RUIZ [[id number]]   Review needed by CMO to determine Physician of Record: No      Question Answer Comment   Physician of Record for Attribution - Please select from Treatment Team JOVANY RUIZ    Review needed by CMO to determine Physician of Record No        07/23/24 1359                    Diet Order   Procedures    Diet: Cardiac, Diabetic; Healthy Heart (2-3 Na+); Consistent Carbohydrate; Fluid Consistency: Thin (IDDSI 0)         Discharge instructions:  Follow up with your primary care provider in 1-2 weeks with a cbc and cmp         Total time spent discharging patient including evaluation, post hospitalization follow up,  medication and post hospitalization instructions and education, total time exceeds 30 minutes.    Signed:  Jovany Ruiz MD  7/23/2024  13:59 EDT

## 2024-07-23 NOTE — PROGRESS NOTES
"DOS: 2024  NAME: Pablo Mendoza   : 1960  PCP: Liana Hood MD  Chief Complaint   Patient presents with    Dizziness     Chief complaint: Dizziness  Subjective: Patient new to me today.  He presented to the hospital yesterday with acute onset of predominantly episodic positional dizziness.  He has a more longstanding history of sudden sensorineural hearing loss right greater than left.  He saw ENT for that who recommended hearing aids.  He was treated with Valium in the emergency department which significantly improved his symptoms.  Today he feels about back to his baseline.  He denies associated headache to me.    Objective:  Vital signs: /75 (BP Location: Right arm, Patient Position: Lying)   Pulse 80   Temp 97.7 °F (36.5 °C) (Oral)   Resp 16   Ht 144.8 cm (57\")   Wt 89.6 kg (197 lb 8.5 oz)   SpO2 96%   BMI 42.75 kg/m²    Gen: NAD, vitals reviewed  MS: oriented x3, recent/remote memory intact, normal attention/concentration, language intact, no neglect.  CN: visual acuity grossly normal, PERRL, EOMI with no nystagmus, negative head impulse test bilaterally, negative test of skew, no facial droop, no dysarthria  Motor: 5/5 throughout upper and lower extremities, normal tone  Coordination: No dysmetria or overshoot  Sensory: intact to vibration throughout  Gait: Slightly unsteady station, positive Romberg, somewhat wide-based and ataxic at times    Laboratory results:  Lab Results   Component Value Date    GLUCOSE 187 (H) 2024    CALCIUM 9.2 2024     2024    K 3.4 (L) 2024    CO2 24.0 2024     2024    BUN 18 2024    CREATININE 0.97 2024    EGFRIFAFRI 101 2022    EGFRIFNONA 87 2022    BCR 18.6 2024    ANIONGAP 11.0 2024     Lab Results   Component Value Date    WBC 8.46 2024    HGB 13.7 2024    HCT 44.5 2024    MCV 78.6 (L) 2024     2024     Lab Results "   Component Value Date    LDL 52 06/14/2024    LDL 30 09/20/2023    LDL 63 08/14/2023         Lab 07/23/24  0604   HEMOGLOBIN A1C 9.50*        Review of labs: A1c 9.5%    Review and interpretation of imaging: I personally reviewed his MRI of the brain performed since admission which shows no acute abnormality.    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  I do not appreciate any signs of acute stroke.  MRI brain is reassuring with no acute changes.    Plan:  Consider trial of vestibular PT if symptoms recur    No further inpatient neurologic workup needed.    Management discussed with Dr. Ruiz.

## 2024-07-23 NOTE — PLAN OF CARE
Goal Outcome Evaluation:      Patient is a 63 y.o. male admitted to [facility] for dizziness and room spinning sensation on 7/22/2024. PMHx includes hypertension, hyperlipidemia, TORSTEN, migraine with aura, childhood seizures/PNES, TIA, and diabetes. CTA head/neck, CT perfusion and CT head all negative for acute pathology per chart. Today, patient completed bed mobility independently, required SV for STS transfers, and required SV for ambulation with no AD for 150 ft. No safety concerns demo'd this date. Patient reports improved sx since admission though did state he has had these episodes of feeling like he is on a montez wheel with the room spinning over the past year. Discussed and educated patient on following up with vestibular OPPT to further address these impairments. Handout provided. No further acute skilled PT needs- will sign off.        Anticipated Discharge Disposition (PT): home with outpatient therapy services, home

## 2024-07-23 NOTE — DISCHARGE PLACEMENT REQUEST
"Pablo Virgen (63 y.o. Male)       Date of Birth   1960    Social Security Number       Address   [address] [facility] [address]    Home Phone   [phone]    MRN   [id number]       [employer]   [employer]    Marital Status                               Admission Date   7/22/24    Admission Type   Emergency    Admitting Provider   Cele Mann MD    Attending Provider   Jovany Ruzi MD    Department, Room/Bed   [address], [address]/1       Discharge Date       Discharge Disposition       Discharge Destination                                 Attending Provider: Jovany Ruiz MD    Allergies: Aspirin, Lisinopril    Isolation: None   Infection: None   Code Status: CPR    Ht: 144.8 cm (57\")   Wt: 89.6 kg (197 lb 8.5 oz)    Admission Cmt: None   Principal Problem: Dizziness [R42]                   Active Insurance as of 7/22/2024       Primary Coverage       Payor Plan Insurance Group Employer/Plan Group    [facility] BY [employer] [facility] BY EVGENY [id number]       Payor Plan Address Payor Plan Phone Number Payor Plan Fax Number Effective Dates    [address] [address]   9/1/2023 - None Entered    [facility] [id number]         Subscriber Name Subscriber Birth Date Member ID       PABLO VIRGEN 1960 [id number]                     Emergency Contacts        (Rel.) Home Phone Work Phone Mobile Phone    SarmientoNicoleLulú (Sister) [phone] -- [phone]    Sloane Obregon (Sister) [phone] -- [phone]                "

## 2024-07-23 NOTE — CASE MANAGEMENT/SOCIAL WORK
Discharge Planning Assessment  [facility]     Patient Name: Pablo Mendoza  MRN: [id number]  Today's Date: 7/23/2024    Admit Date: 7/22/2024    Plan: Home with possible home health if needed.   Discharge Needs Assessment       Row Name 07/23/24 0821       Living Environment    People in Home alone    Current Living Arrangements home    Potentially Unsafe Housing Conditions none    In the past 12 months has the electric, gas, oil, or water company threatened to shut off services in your home? Yes    Primary Care Provided by self    Provides Primary Care For no one    Family Caregiver if Needed sibling(s);other relative(s)    Quality of Family Relationships supportive;helpful    Able to Return to Prior Arrangements yes       Resource/Environmental Concerns    Resource/Environmental Concerns none    Transportation Concerns no car       Transportation Needs    In the past 12 months, has lack of transportation kept you from medical appointments or from getting medications? yes    In the past 12 months, has lack of transportation kept you from meetings, work, or from getting things needed for daily living? Yes       Food Insecurity    Within the past 12 months, you worried that your food would run out before you got the money to buy more. Sometimes    Within the past 12 months, the food you bought just didn't last and you didn't have money to get more. Sometimes       Transition Planning    Patient/Family Anticipates Transition to home;home with help/services    Patient/Family Anticipated Services at Transition home health care    Transportation Anticipated family or friend will provide       Discharge Needs Assessment    Equipment Currently Used at Home cane, straight;walker, rolling    Concerns to be Addressed discharge planning    Anticipated Changes Related to Illness none    Equipment Needed After Discharge none    Outpatient/Agency/Support Group Needs homecare agency    Discharge Facility/Level of Care Needs  home with home health    Provided Post Acute Provider List? N/A    Provided Post Acute Provider Quality & Resource List? N/A                   Discharge Plan       Row Name 07/23/24 0880       Plan    Plan Home with possible home health if needed.    Plan Comments S/W pt at bedside.  Facesheet info confirmed.  Pt lives alone in a single story house w/ 1 STE.  Home DME includes a cane and walker.  He has a glucometer, but it stopped working so he has not sydney able to check his bl[place] glucose at home recently.  Referral sent to DM educator.  His sisters Lulú and Sloane, as well as nieces and nephews check on patient at home and can assist as needed.  Pt does not have a car - family provides transport. Pt has used [facility] in the past and would like to use them again if needed.  Referral sent to [facility] intake - eval pending.  Pt states finances have not been good - he had to file for bankruptcy last year.  But he has recently been approved for disability and has started receiving benefits which has helped.  He is enrolled in [employer] [facility] [employer].  Pt plans to return home upon DC.  His family will provide transport.  CCP will continue to follow and assist as needed ............Kaylynn COLBY/ CCP                  Continued Care and Services - Admitted Since 7/22/2024       Home Medical Care       Service Provider Request Status Selected Services Address Phone Fax Patient Preferred    [facility] Pending - Request Sent N/A [id number] [address] [phone] --                     Demographic Summary       Row Name 07/23/24 0816       General Information    Admission Type observation    Arrived From home    Referral Source admission list    Reason for Consult discharge planning    Preferred Language English                   Functional Status       Row Name 07/23/24 0821       Functional Status    Usual Activity Tolerance moderate    Current Activity Tolerance moderate       Physical  Activity    On average, how many days per week do you engage in moderate to strenuous exercise (like a brisk walk)? 7 days    On average, how many minutes do you engage in exercise at this level? 60 min    Number of minutes of exercise per week 420       Functional Status, IADL    Medications independent    Meal Preparation independent;assistive person    Housekeeping independent;assistive person    Laundry independent;assistive person    Shopping independent;assistive person       Mental Status    General Appearance WDL WDL       Mental Status Summary    Recent Changes in Mental Status/Cognitive Functioning no changes       Employment/    Employment Status retired                             Kaylynn Biggs RN

## 2024-07-24 ENCOUNTER — TRANSITIONAL CARE MANAGEMENT TELEPHONE ENCOUNTER (OUTPATIENT)
Dept: CALL CENTER | Facility: HOSPITAL | Age: 64
End: 2024-07-24
Payer: COMMERCIAL

## 2024-07-24 LAB — CREAT BLDA-MCNC: 1 MG/DL (ref 0.6–1.3)

## 2024-07-24 RX ORDER — HYDROCHLOROTHIAZIDE 25 MG/1
25 TABLET ORAL DAILY
Qty: 30 TABLET | Refills: 0 | Status: SHIPPED | OUTPATIENT
Start: 2024-07-24 | End: 2024-07-26

## 2024-07-24 NOTE — OUTREACH NOTE
Call Center TCM Note      Flowsheet Row Responses   [facility] patient discharged from? [facility]   Does the patient have one of the following disease processes/diagnoses(primary or secondary)? Other   TCM attempt successful? Yes   Call start time 1132   Call end time 1134   Discharge diagnosis Dizziness   Meds reviewed with patient/caregiver? Yes   Is the patient having any side effects they believe may be caused by any medication additions or changes? No   Does the patient have all medications ordered at discharge? Yes   Prescription comments Updated Amlodipine rx in place. Glopizide, HCTZ, Ibuprofen, Rimegepant Sulfate, Tylenol discontinued   Is the patient taking all medications as directed (includes completed medication regime)? Yes   Comments TCM APPT WITH PCP DR TERA HOFFMAN IS 07/26/2024   Does the patient have an appointment with their PCP within 7-14 days of discharge? Yes   What is the Home health agency?  [facility]   Has home health visited the patient within 72 hours of discharge? Yes   Home health comments Per CM notes no HH required   What DME was ordered? alcohol pads, Glucose blood, monitor, Lancets   Has all DME been delivered? Yes   Psychosocial issues? No   Did the patient receive a copy of their discharge instructions? Yes   Nursing interventions Reviewed instructions with patient   What is the patient's perception of their health status since discharge? Improving   Is the patient/caregiver able to teach back signs and symptoms related to disease process for when to call PCP? Yes   Is the patient/caregiver able to teach back signs and symptoms related to disease process for when to call 911? Yes   Is the patient/caregiver able to teach back the hierarchy of who to call/visit for symptoms/problems? PCP, Specialist, Home health nurse, Urgent Care, ED, 911 Yes   If the patient is a current smoker, are they able to teach back resources for cessation? Not a smoker   TCM call completed? Yes    Wrap up additional comments D/C DX: acute dizziness with feeling of falling,  sweats,  nausea - Pt is tired and still feels a bit off balance, but much better than before admit. No quesitons at this time. TCM APPT with PCP Dr Liana Hood is 07/26/2024   Call end time 2026            Natalee Nevarez MA    7/24/2024, 11:38 EDT

## 2024-07-26 ENCOUNTER — OFFICE VISIT (OUTPATIENT)
Dept: INTERNAL MEDICINE | Facility: CLINIC | Age: 64
End: 2024-07-26
Payer: COMMERCIAL

## 2024-07-26 VITALS
SYSTOLIC BLOOD PRESSURE: 118 MMHG | HEART RATE: 83 BPM | WEIGHT: 201 LBS | TEMPERATURE: 97.8 F | HEIGHT: 60 IN | OXYGEN SATURATION: 94 % | BODY MASS INDEX: 39.46 KG/M2 | DIASTOLIC BLOOD PRESSURE: 72 MMHG

## 2024-07-26 DIAGNOSIS — F44.5 PSYCHOGENIC NONEPILEPTIC SEIZURE: ICD-10-CM

## 2024-07-26 DIAGNOSIS — G25.9 FUNCTIONAL MOVEMENT DISORDER: ICD-10-CM

## 2024-07-26 DIAGNOSIS — Z09 HOSPITAL DISCHARGE FOLLOW-UP: Primary | ICD-10-CM

## 2024-07-26 DIAGNOSIS — I10 ESSENTIAL HYPERTENSION: ICD-10-CM

## 2024-07-26 DIAGNOSIS — D35.01 ADENOMA OF RIGHT ADRENAL GLAND: ICD-10-CM

## 2024-07-26 DIAGNOSIS — R42 DIZZINESS: ICD-10-CM

## 2024-07-26 DIAGNOSIS — E11.649 TYPE 2 DIABETES MELLITUS WITH HYPOGLYCEMIA WITHOUT COMA, WITHOUT LONG-TERM CURRENT USE OF INSULIN: ICD-10-CM

## 2024-07-26 RX ORDER — BLOOD-GLUCOSE SENSOR
1 EACH MISCELLANEOUS
Qty: 6 EACH | Refills: 4 | Status: SHIPPED | OUTPATIENT
Start: 2024-07-26

## 2024-07-26 RX ORDER — IBUPROFEN 800 MG/1
800 TABLET ORAL EVERY 8 HOURS PRN
COMMUNITY

## 2024-07-26 NOTE — PROGRESS NOTES
Transitional Care Follow Up Visit  Subjective     Pablo Mendoza is a 63 y.o. male who presents for a transitional care management visit.    Within 48 business hours after discharge our office contacted him via telephone to coordinate his care and needs.      I reviewed and discussed the details of that call along with the discharge summary, hospital problems, inpatient lab results, inpatient diagnostic studies, and consultation reports with Pablo.     Current outpatient and discharge medications have been reconciled for the patient.  Reviewed by: Liana Hood MD          7/23/2024     6:09 PM   Date of TCM Phone Call   [facility]   Date of Admission 7/22/2024   Date of Discharge 7/23/2024   Discharge Disposition Home or Self Care     Risk for Readmission (LACE) Score: 2 (7/23/2024  6:00 AM)      History of Present Illness   Course During Hospital Stay:  Pt was admitted to [facility] 7/22 -7/23 bc of severe nausea.  He felt so bad he couldn't walk and called ems.  Here in the office today he says he feels much better.  If he moves too quickly he feels bad but overall better. He was evaluated by neurology and cleared for d/c.  He had extensive brain imaging that was unremarkable.  Reports reviewed in epic along with d/c summary.     Last out pt visit with neurology was dec 2023.  He has weaned off his keppra.  No other seizure like clinic.     Imaging on 7/12 showed a new adrenal adenoma on the right and f/u imaging recommended    T2DM - A1c 9.5  A1c uncontrolled but pt has hypoglycemic and hyperglycemic episodes.  During admission glipizide stopped.  He is to continue janumet xr and jardiance    HTN - hctz stopped.  He is still on amlodipine 10mg and metoprolol 100mg bid     The following portions of the patient's history were reviewed and updated as appropriate: allergies, current medications, past family history, past medical history, past social history, past surgical history, and problem  "list.    Review of Systems   Constitutional: Negative.    HENT: Negative.     Eyes: Negative.    Respiratory: Negative.     Cardiovascular: Negative.    Gastrointestinal: Negative.    Endocrine: Negative.    Genitourinary: Negative.    Musculoskeletal: Negative.    Skin: Negative.    Allergic/Immunologic: Negative.    Neurological: Negative.    Hematological: Negative.    Psychiatric/Behavioral: Negative.     All other systems reviewed and are negative.      Objective   /72 (BP Location: Left arm, Patient Position: Sitting, Cuff Size: Large Adult)   Pulse 83   Temp 97.8 °F (36.6 °C) (Infrared)   Ht 144.8 cm (57\")   Wt 91.2 kg (201 lb)   SpO2 94%   BMI 43.50 kg/m²   Physical Exam  Vitals and nursing note reviewed.   Constitutional:       General: He is not in acute distress.     Appearance: He is well-developed.   HENT:      Head: Normocephalic and atraumatic.      Right Ear: External ear normal.      Left Ear: External ear normal.      Nose: Nose normal.   Eyes:      Conjunctiva/sclera: Conjunctivae normal.      Pupils: Pupils are equal, round, and reactive to light.   Cardiovascular:      Rate and Rhythm: Normal rate and regular rhythm.      Heart sounds: Normal heart sounds.   Pulmonary:      Effort: Pulmonary effort is normal. No respiratory distress.      Breath sounds: Normal breath sounds. No wheezing.   Musculoskeletal:         General: Normal range of motion.      Cervical back: Normal range of motion and neck supple.      Comments: Normal gait   Skin:     General: Skin is warm and dry.   Neurological:      Mental Status: He is alert and oriented to person, place, and time.   Psychiatric:         Behavior: Behavior normal.         Thought Content: Thought content normal.         Judgment: Judgment normal.         Common labs          6/20/2024    14:52 7/22/2024    10:10 7/22/2024    10:13 7/23/2024    06:04   Common Labs   Glucose  248   187    BUN  18   18    Creatinine  0.99  1.00  0.97  "   Sodium  137   136    Potassium  3.7   3.4    Chloride  100   101    Calcium  9.2   9.2    Albumin  4.3      Total Bilirubin  0.5      Alkaline Phosphatase  135      AST (SGOT)  28      ALT (SGPT)  18      WBC 11.05  8.95   8.46    Hemoglobin 16.7  13.9   13.7    Hematocrit 50.2  43.8   44.5    Platelets 219  252   247    Hemoglobin A1C    9.50      Reviewed imaging from [facility] and  ED  CT Angiogram Head w AI Analysis of LVO (07/22/2024 10:28)   CT Angiogram Neck (07/22/2024 10:28)   CT CEREBRAL PERFUSION WITH & WITHOUT CONTRAST (07/22/2024 10:28)   MRI Brain Without Contrast (07/22/2024 16:23)   CT chest w IV contrast (07/12/2024 03:09)   CT Abdomen Pelvis With Contrast (07/12/2024 03:09)     Assessment & Plan   Diagnoses and all orders for this visit:    1. Hospital discharge follow-up (Primary)    2. Adenoma of right adrenal gland - new issue that needs further work up  -     CT Abdomen Pelvis With Contrast; Future    3. Dizziness - slowly improving.   -     Ambulatory Referral to Physical Therapy for Evaluation & Treatment    4. Functional movement disorder- off keppra.    -     Ambulatory Referral to Neurology  -     Ambulatory Referral to Physical Therapy for Evaluation & Treatment    5. Psychogenic nonepileptic seizure  -     Ambulatory Referral to Neurology    6. Type 2 diabetes mellitus with hypoglycemia without coma, without long-term current use of insulin - uncontrolled.  Glipizide stopped during hospitalization bc of concern for hypoglycemia.  Will get CGM to help with monitoring as insulin is next step for patient.  He lives alone and needs close glucose monitoring.   -     Continuous Glucose Sensor (FreeStyle Mahogany 3 Sensor) misc; Use 1 each Every 14 (Fourteen) Days.  Dispense: 6 each; Refill: 4    7. Essential hypertension- cont amlodipine and metoptrolol.  Stop hctz

## 2024-08-13 ENCOUNTER — HOSPITAL ENCOUNTER (OUTPATIENT)
Facility: HOSPITAL | Age: 64
Discharge: HOME | End: 2024-08-13
Admitting: INTERNAL MEDICINE
Payer: COMMERCIAL

## 2024-08-13 DIAGNOSIS — D35.01 ADENOMA OF RIGHT ADRENAL GLAND: ICD-10-CM

## 2024-08-13 PROCEDURE — 74178 CT ABD&PLV WO CNTR FLWD CNTR: CPT

## 2024-08-13 PROCEDURE — 25510000001 IOPAMIDOL 61 % SOLUTION: Performed by: INTERNAL MEDICINE

## 2024-08-13 RX ADMIN — IOPAMIDOL 85 ML: 612 INJECTION, SOLUTION INTRAVENOUS at 17:09

## 2024-08-26 NOTE — TELEPHONE ENCOUNTER
Rx Refill Note  Requested Prescriptions     Pending Prescriptions Disp Refills    atorvastatin (LIPITOR) 40 MG tablet [Pharmacy Med Name: ATORVASTATIN 40 MG TABLET] 90 tablet 1     Sig: TAKE 1 TABLET BY MOUTH DAILY      Last office visit with prescribing clinician: 7/26/2024   Last telemedicine visit with prescribing clinician: Visit date not found   Next office visit with prescribing clinician: Visit date not found                         Would you like a call back once the refill request has been completed: [] Yes [] No    If the office needs to give you a call back, can they leave a voicemail: [] Yes [] No    Stephany Velez MA  08/26/24, 09:54 EDT

## 2024-09-23 ENCOUNTER — APPOINTMENT (OUTPATIENT)
Dept: ONCOLOGY | Facility: HOSPITAL | Age: 64
End: 2024-09-23
Payer: COMMERCIAL

## 2024-09-23 ENCOUNTER — LAB (OUTPATIENT)
Dept: LAB | Facility: HOSPITAL | Age: 64
End: 2024-09-23
Payer: COMMERCIAL

## 2024-09-23 ENCOUNTER — OFFICE VISIT (OUTPATIENT)
Dept: ONCOLOGY | Facility: CLINIC | Age: 64
End: 2024-09-23
Payer: COMMERCIAL

## 2024-09-23 VITALS
HEART RATE: 71 BPM | OXYGEN SATURATION: 95 % | DIASTOLIC BLOOD PRESSURE: 82 MMHG | WEIGHT: 192.3 LBS | TEMPERATURE: 97.6 F | SYSTOLIC BLOOD PRESSURE: 135 MMHG | BODY MASS INDEX: 37.76 KG/M2 | HEIGHT: 60 IN

## 2024-09-23 DIAGNOSIS — D72.9 NEUTROPHILIA: ICD-10-CM

## 2024-09-23 DIAGNOSIS — D75.1 POLYCYTHEMIA: Primary | ICD-10-CM

## 2024-09-23 DIAGNOSIS — D75.1 POLYCYTHEMIA: ICD-10-CM

## 2024-09-23 LAB
BASOPHILS # BLD AUTO: 0.09 10*3/MM3 (ref 0–0.2)
BASOPHILS NFR BLD AUTO: 0.9 % (ref 0–1.5)
DEPRECATED RDW RBC AUTO: 44.9 FL (ref 37–54)
EOSINOPHIL # BLD AUTO: 0.19 10*3/MM3 (ref 0–0.4)
EOSINOPHIL NFR BLD AUTO: 1.9 % (ref 0.3–6.2)
ERYTHROCYTE [DISTWIDTH] IN BLOOD BY AUTOMATED COUNT: 17.7 % (ref 12.3–15.4)
HCT VFR BLD AUTO: 49.4 % (ref 37.5–51)
HGB BLD-MCNC: 15.9 G/DL (ref 13–17.7)
IMM GRANULOCYTES # BLD AUTO: 0.07 10*3/MM3 (ref 0–0.05)
IMM GRANULOCYTES NFR BLD AUTO: 0.7 % (ref 0–0.5)
LYMPHOCYTES # BLD AUTO: 1.9 10*3/MM3 (ref 0.7–3.1)
LYMPHOCYTES NFR BLD AUTO: 19.3 % (ref 19.6–45.3)
MCH RBC QN AUTO: 25.1 PG (ref 26.6–33)
MCHC RBC AUTO-ENTMCNC: 32.2 G/DL (ref 31.5–35.7)
MCV RBC AUTO: 77.9 FL (ref 79–97)
MONOCYTES # BLD AUTO: 0.46 10*3/MM3 (ref 0.1–0.9)
MONOCYTES NFR BLD AUTO: 4.7 % (ref 5–12)
NEUTROPHILS NFR BLD AUTO: 7.11 10*3/MM3 (ref 1.7–7)
NEUTROPHILS NFR BLD AUTO: 72.5 % (ref 42.7–76)
NRBC BLD AUTO-RTO: 0 /100 WBC (ref 0–0.2)
PLATELET # BLD AUTO: 195 10*3/MM3 (ref 140–450)
PMV BLD AUTO: 10.6 FL (ref 6–12)
RBC # BLD AUTO: 6.34 10*6/MM3 (ref 4.14–5.8)
WBC NRBC COR # BLD AUTO: 9.82 10*3/MM3 (ref 3.4–10.8)

## 2024-09-23 PROCEDURE — 85025 COMPLETE CBC W/AUTO DIFF WBC: CPT

## 2024-09-23 PROCEDURE — 99213 OFFICE O/P EST LOW 20 MIN: CPT | Performed by: NURSE PRACTITIONER

## 2024-09-23 PROCEDURE — 3075F SYST BP GE 130 - 139MM HG: CPT | Performed by: NURSE PRACTITIONER

## 2024-09-23 PROCEDURE — 1126F AMNT PAIN NOTED NONE PRSNT: CPT | Performed by: NURSE PRACTITIONER

## 2024-09-23 PROCEDURE — 3079F DIAST BP 80-89 MM HG: CPT | Performed by: NURSE PRACTITIONER

## 2024-09-23 PROCEDURE — 36415 COLL VENOUS BLD VENIPUNCTURE: CPT

## 2024-10-31 ENCOUNTER — TELEPHONE (OUTPATIENT)
Dept: INTERNAL MEDICINE | Facility: CLINIC | Age: 64
End: 2024-10-31

## 2024-10-31 NOTE — TELEPHONE ENCOUNTER
Caller: [facility]    Relationship:     Best call back number:        What was the call regarding: ABBI IS CALLING FROM [facility]  FAXED OVER PAPERWORK TO OFFICE 10/31/24 FOR FREE STYLE NIC 3 PLUS/CGM SUPPLIES   NEEDS DIAGNOSE CODES SENT BACK     PLEASE FAX BACK  [phone]

## 2024-11-14 ENCOUNTER — TELEPHONE (OUTPATIENT)
Dept: GASTROENTEROLOGY | Facility: CLINIC | Age: 64
End: 2024-11-14
Payer: COMMERCIAL

## 2024-11-14 ENCOUNTER — PREP FOR SURGERY (OUTPATIENT)
Dept: SURGERY | Facility: SURGERY CENTER | Age: 64
End: 2024-11-14
Payer: COMMERCIAL

## 2024-11-14 DIAGNOSIS — Z12.11 ENCOUNTER FOR SCREENING COLONOSCOPY: Primary | ICD-10-CM

## 2024-11-14 RX ORDER — SODIUM CHLORIDE 0.9 % (FLUSH) 0.9 %
3 SYRINGE (ML) INJECTION EVERY 12 HOURS SCHEDULED
OUTPATIENT
Start: 2024-11-14

## 2024-11-14 RX ORDER — SODIUM CHLORIDE, SODIUM LACTATE, POTASSIUM CHLORIDE, CALCIUM CHLORIDE 600; 310; 30; 20 MG/100ML; MG/100ML; MG/100ML; MG/100ML
30 INJECTION, SOLUTION INTRAVENOUS CONTINUOUS PRN
OUTPATIENT
Start: 2024-11-14 | End: 2024-11-15

## 2024-11-14 RX ORDER — SODIUM CHLORIDE 0.9 % (FLUSH) 0.9 %
10 SYRINGE (ML) INJECTION AS NEEDED
OUTPATIENT
Start: 2024-11-14

## 2024-11-15 NOTE — TELEPHONE ENCOUNTER
Rx Refill Note  Requested Prescriptions     Pending Prescriptions Disp Refills    ibuprofen (ADVIL,MOTRIN) 800 MG tablet [Pharmacy Med Name: IBUPROFEN 800 MG TABLET] 30 tablet      Sig: TAKE ONE TABLET BY MOUTH EVERY 8 HOURS AS NEEDED FOR MILD PAIN      Last office visit with prescribing clinician: 7/26/2024   Last telemedicine visit with prescribing clinician: Visit date not found   Next office visit with prescribing clinician: Visit date not found                         Would you like a call back once the refill request has been completed: [] Yes [] No    If the office needs to give you a call back, can they leave a voicemail: [] Yes [] No    Michael Bates  11/15/24, 18:39 EST

## 2024-11-18 RX ORDER — IBUPROFEN 800 MG/1
800 TABLET, FILM COATED ORAL EVERY 8 HOURS PRN
Qty: 30 TABLET | Refills: 0 | Status: SHIPPED | OUTPATIENT
Start: 2024-11-18

## 2024-12-03 ENCOUNTER — OFFICE VISIT (OUTPATIENT)
Dept: ONCOLOGY | Facility: CLINIC | Age: 64
End: 2024-12-03
Payer: COMMERCIAL

## 2024-12-03 ENCOUNTER — INFUSION (OUTPATIENT)
Dept: ONCOLOGY | Facility: HOSPITAL | Age: 64
End: 2024-12-03
Payer: COMMERCIAL

## 2024-12-03 ENCOUNTER — LAB (OUTPATIENT)
Dept: LAB | Facility: HOSPITAL | Age: 64
End: 2024-12-03
Payer: COMMERCIAL

## 2024-12-03 VITALS
HEIGHT: 60 IN | RESPIRATION RATE: 16 BRPM | SYSTOLIC BLOOD PRESSURE: 151 MMHG | OXYGEN SATURATION: 97 % | BODY MASS INDEX: 37.15 KG/M2 | HEART RATE: 81 BPM | DIASTOLIC BLOOD PRESSURE: 90 MMHG | WEIGHT: 189.2 LBS | TEMPERATURE: 98.1 F

## 2024-12-03 DIAGNOSIS — D75.1 POLYCYTHEMIA: Primary | ICD-10-CM

## 2024-12-03 LAB
BASOPHILS # BLD AUTO: 0.07 10*3/MM3 (ref 0–0.2)
BASOPHILS NFR BLD AUTO: 0.6 % (ref 0–1.5)
DEPRECATED RDW RBC AUTO: 45.8 FL (ref 37–54)
EOSINOPHIL # BLD AUTO: 0.1 10*3/MM3 (ref 0–0.4)
EOSINOPHIL NFR BLD AUTO: 0.9 % (ref 0.3–6.2)
ERYTHROCYTE [DISTWIDTH] IN BLOOD BY AUTOMATED COUNT: 18.3 % (ref 12.3–15.4)
HCT VFR BLD AUTO: 55.2 % (ref 37.5–51)
HGB BLD-MCNC: 17.7 G/DL (ref 13–17.7)
IMM GRANULOCYTES # BLD AUTO: 0.09 10*3/MM3 (ref 0–0.05)
IMM GRANULOCYTES NFR BLD AUTO: 0.8 % (ref 0–0.5)
LYMPHOCYTES # BLD AUTO: 1.62 10*3/MM3 (ref 0.7–3.1)
LYMPHOCYTES NFR BLD AUTO: 14.5 % (ref 19.6–45.3)
MCH RBC QN AUTO: 25.1 PG (ref 26.6–33)
MCHC RBC AUTO-ENTMCNC: 32.1 G/DL (ref 31.5–35.7)
MCV RBC AUTO: 78.3 FL (ref 79–97)
MONOCYTES # BLD AUTO: 0.55 10*3/MM3 (ref 0.1–0.9)
MONOCYTES NFR BLD AUTO: 4.9 % (ref 5–12)
NEUTROPHILS NFR BLD AUTO: 78.3 % (ref 42.7–76)
NEUTROPHILS NFR BLD AUTO: 8.76 10*3/MM3 (ref 1.7–7)
NRBC BLD AUTO-RTO: 0 /100 WBC (ref 0–0.2)
PLATELET # BLD AUTO: 225 10*3/MM3 (ref 140–450)
PMV BLD AUTO: 9.7 FL (ref 6–12)
RBC # BLD AUTO: 7.05 10*6/MM3 (ref 4.14–5.8)
WBC NRBC COR # BLD AUTO: 11.19 10*3/MM3 (ref 3.4–10.8)

## 2024-12-03 PROCEDURE — 36415 COLL VENOUS BLD VENIPUNCTURE: CPT | Performed by: NURSE PRACTITIONER

## 2024-12-03 PROCEDURE — 85025 COMPLETE CBC W/AUTO DIFF WBC: CPT | Performed by: NURSE PRACTITIONER

## 2024-12-03 PROCEDURE — 96360 HYDRATION IV INFUSION INIT: CPT

## 2024-12-03 PROCEDURE — 99195 PHLEBOTOMY: CPT

## 2024-12-03 PROCEDURE — 25810000003 SODIUM CHLORIDE 0.9 % SOLUTION: Performed by: INTERNAL MEDICINE

## 2024-12-03 RX ORDER — GLIPIZIDE 10 MG/1
TABLET, FILM COATED, EXTENDED RELEASE ORAL
COMMUNITY
Start: 2024-10-17

## 2024-12-03 RX ORDER — TOPIRAMATE 25 MG/1
25 TABLET, FILM COATED ORAL DAILY
COMMUNITY

## 2024-12-03 RX ORDER — SODIUM CHLORIDE 9 MG/ML
250 INJECTION, SOLUTION INTRAVENOUS ONCE
Status: COMPLETED | OUTPATIENT
Start: 2024-12-03 | End: 2024-12-03

## 2024-12-03 RX ORDER — SODIUM CHLORIDE 9 MG/ML
250 INJECTION, SOLUTION INTRAVENOUS ONCE
OUTPATIENT
Start: 2024-12-10

## 2024-12-03 RX ORDER — SERTRALINE HYDROCHLORIDE 100 MG/1
100 TABLET, FILM COATED ORAL DAILY
COMMUNITY
Start: 2024-10-01 | End: 2025-03-30

## 2024-12-03 RX ADMIN — SODIUM CHLORIDE 250 ML: 9 INJECTION, SOLUTION INTRAVENOUS at 15:35

## 2024-12-03 NOTE — PROGRESS NOTES
Subjective     CHIEF COMPLAINT:      Chief Complaint   Patient presents with    Follow-up     HISTORY OF PRESENT ILLNESS:     Pablo Mendoza is a 64 y.o. male patient who returns today for follow up on his polycythemia.  Patient returns to the office today reporting he has had increased headaches recently.  He has recently had a period where he has been zoned out for a few days and states he is somewhat down.  We discussed his hematocrit today he was not particularly surprised with the way he has been feeling.      ROS:  Pertinent ROS is in the HPI.     Past medical, surgical, social and family history were reviewed.     MEDICATIONS:    Current Outpatient Medications:     Alcohol Swabs (Alcohol Pads) 70 % pads, Apply one alcohol swab to injection site of skin immediately prior to insulin injection. Formulary Compliance Approval., Disp: 100 each, Rfl: 0    allopurinol (ZYLOPRIM) 300 MG tablet, Take 1 tablet by mouth 2 (Two) Times a Day., Disp: 180 tablet, Rfl: 1    amLODIPine (NORVASC) 10 MG tablet, TAKE 1 TABLET BY MOUTH DAILY, Disp: 30 tablet, Rfl: 0    atorvastatin (LIPITOR) 40 MG tablet, TAKE 1 TABLET BY MOUTH DAILY, Disp: 90 tablet, Rfl: 1    Blood Glucose Monitoring Suppl (OneTouch Verio Flex System) w/Device kit, Use as directed to test blood glucose, Disp: 1 kit, Rfl: 0    Continuous Glucose Sensor (FreeStyle Mahogany 3 Sensor) misc, Use 1 each Every 14 (Fourteen) Days., Disp: 6 each, Rfl: 4    empagliflozin (Jardiance) 25 MG tablet tablet, Take 1 tablet by mouth Daily., Disp: 90 tablet, Rfl: 1    glucose blood test strip, Use to test blood glucose up to four times daily as needed. Formulary Compliance Approval. Diagnosis: Type 2 Diabetes - Not Insulin Dependent, Disp: 100 each, Rfl: 0    ibuprofen (ADVIL,MOTRIN) 800 MG tablet, TAKE ONE TABLET BY MOUTH EVERY 8 HOURS AS NEEDED FOR MILD PAIN, Disp: 30 tablet, Rfl: 0    Lancets misc, Use to test blood glucose up to four times daily as needed. Formulary  "Compliance Approval. Diagnosis: Type 2 Diabetes - Not Insulin Dependent, Disp: 100 each, Rfl: 0    levocetirizine (XYZAL) 5 MG tablet, Take 0.5 tablets by mouth As Needed for Allergies., Disp: , Rfl:     metoprolol tartrate (LOPRESSOR) 100 MG tablet, Take 1 tablet by mouth 2 (Two) Times a Day. Indications: High Blood Pressure Disorder, Disp: 180 tablet, Rfl: 1    Rimegepant Sulfate (NURTEC) 75 MG tablet dispersible tablet, Take  by mouth As Needed (migraine)., Disp: , Rfl:     sertraline (ZOLOFT) 100 MG tablet, Take 1 tablet by mouth Daily., Disp: , Rfl:     SITagliptin (JANUVIA) 50 MG tablet, Take 1 tablet by mouth 2 (Two) Times a Day., Disp: , Rfl:     SITagliptin-metFORMIN HCl ER (Janumet XR)  MG tablet, Take 2 tablets by mouth Daily. Indications: Type 2 Diabetes, Disp: 180 tablet, Rfl: 1    topiramate (TOPAMAX) 25 MG tablet, Take 1 tablet by mouth Daily., Disp: , Rfl:     glipizide (GLUCOTROL XL) 10 MG 24 hr tablet, , Disp: , Rfl:   No current facility-administered medications for this visit.  Objective     VITAL SIGNS:     Vitals:    12/03/24 1443   BP: 151/90   Pulse: 81   Resp: 16   Temp: 98.1 °F (36.7 °C)   TempSrc: Oral   SpO2: 97%   Weight: 85.8 kg (189 lb 3.2 oz)   Height: 144.8 cm (57.01\")   PainSc: 0-No pain       Body mass index is 40.93 kg/m².     Wt Readings from Last 5 Encounters:   12/03/24 85.8 kg (189 lb 3.2 oz)   09/23/24 87.2 kg (192 lb 4.8 oz)   07/26/24 91.2 kg (201 lb)   07/22/24 89.6 kg (197 lb 8.5 oz)   06/26/24 89.7 kg (197 lb 12.8 oz)     PHYSICAL EXAMINATION:   GENERAL: The patient appears in good general condition, not in acute distress.   SKIN: No ecchymosis. No rash.  EYES: No jaundice. No Pallor.  CHEST: Normal respiratory effort.  Lungs clear bilaterally.  No added sounds.  CVS: Normal S1-S2.  No murmurs.  ABDOMEN: Soft. No tenderness. No Hepatomegaly. No Splenomegaly. No masses.  EXTREMITIES: No joint deformity.  No edema.    DIAGNOSTIC DATA:     Results from last 7 days "   Lab Units 12/03/24  1421   WBC 10*3/mm3 11.19*   NEUTROS ABS 10*3/mm3 8.76*   HEMOGLOBIN g/dL 17.7   HEMATOCRIT % 55.2*   PLATELETS 10*3/mm3 225       Component      Latest Ref Rng 10/24/2023 2/1/2024 4/25/2024 9/23/24   Neutrophils Absolute      1.70 - 7.00 10*3/mm3 6.97  7.30 (H)  7.84 (H)  7.11   Lymphocytes Absolute      0.70 - 3.10 10*3/mm3 1.59  1.99  2.19  1.9   Monocytes Absolute      0.10 - 0.90 10*3/mm3 0.58  0.53  0.62  0.46   Eosinophils Absolute      0.00 - 0.40 10*3/mm3 0.12  0.10  0.16  0.19   Basophils Absolute      0.00 - 0.20 10*3/mm3 0.08  0.08  0.07  0.09   Immature Grans, Absolute      0.00 - 0.05 10*3/mm3 0.08 (H)  0.07 (H)  0.10 (H)  0.07      CT orbits on 4/21/2024:  Lateral right periorbital soft tissue stranding, compatible with preseptal cellulitis. No evidence of post septal inflammation. No drainable fluid collection or soft tissue mass.     Assessment & Plan    *Polycythemia.   Patient had polycythemia 4/16/2021 when HCT was 49.3%.   Hemoglobin increased to 19.1 and HCT increased to 53.6% on 10/4/2022.  Patient was having severe headaches, dizziness and confusion and was hospitalized.   CT and MRI showed no evidence of CVA.  CT scan revealed diffuse high attenuation of the vasculature concerning for polycythemia.   Patient does not smoke but he is exposed to smoke at work.   JAK2 mutation testing on 10/28/2022 was negative for exon 12 mutation and negative for V617F mutation.  Erythropoietin level was elevated at 22.5.  Patient had a therapeutic phlebotomy on 11/4/2022.  He felt better after the phlebotomy.  He stopped taking his multivitamin which contains iron.  CT scan on 1/13/2023 showed no evidence of underlying malignancy.  Last therapeutic phlebotomy was on 2/1/2024 for hematocrit of 51.6%.  4/25/2024: Hematocrit 50.0%.  Due to the patient having the preseptal cellulitis around the right eye, recommended holding off and therapeutic phlebotomy today.  9/23/2024: Hemotocrit  49.4%. NO phelobotomy needed today since under 50. Will return in 2 months.   12/3/2024: Hematocrit increased today to 55.2%.  Will proceed with phlebotomy today.  Discussed with Dr. Neal and will reduce threshold for phlebotomy to hematocrit of 48%.  Discussed this with patient Dr. Pink and he verbalized understanding.  Will keep every 2 month interval.    *Leukocytosis with Neutrophilia.   No recent infection.   He has no lymphadenopathy or splenomegaly.   This was concerning for the polycythemia representing bone marrow disorder (polycythemia vera) rather than secondary polycythemia.   4/21/2024: Neutrophil count increased to 12,600.  This is attributed to development of preseptal cellulitis.  4/25/2024: Neutrophil count 7840.  9/23/2024: ANC 7110  12/3/2024 ANC 8760    *Preseptal cellulitis following bee sting.  Patient was stung by a bee on 4/19/2024.  He will to ER on 4/21/2024 and was given steroids.  He was placed on Augmentin and Bactrim twice daily x 5 days.  He will complete this course tomorrow.  Exam revealed persistent erythema and swelling.  Extended the antibiotic course to a total of 10 days.  This is resolved    PLAN:    1.  Proceed with phlebotomy today  2.  Return in 2 months for CBC.  He will have a therapeutic phlebotomy if hematocrit is >48%.  We will see Dr. Johnson in 4 months with CBC and possible phlebotomy.        Solange Novoa, ANUSHA  12/03/24

## 2024-12-19 DIAGNOSIS — E11.29 TYPE 2 DIABETES MELLITUS WITH PROTEINURIA: ICD-10-CM

## 2024-12-19 DIAGNOSIS — R80.9 TYPE 2 DIABETES MELLITUS WITH PROTEINURIA: ICD-10-CM

## 2024-12-20 NOTE — TELEPHONE ENCOUNTER
High  Duplicate Therapy: SITagliptin, Janumet XR    Rx Refill Note  Requested Prescriptions     Pending Prescriptions Disp Refills    Janumet XR  MG tablet [Pharmacy Med Name: JANUMET XR 50-1,000 MG TABLET] 180 tablet 1     Sig: TAKE 2 TABLETS BY MOUTH DAILY FOR DIABETES      Last office visit with prescribing clinician: 7/26/2024   Last telemedicine visit with prescribing clinician: Visit date not found   Next office visit with prescribing clinician: Visit date not found                         Would you like a call back once the refill request has been completed: [] Yes [] No    If the office needs to give you a call back, can they leave a voicemail: [] Yes [] No    Cindy Santoyo MA  12/20/24, 10:04 EST

## 2024-12-24 RX ORDER — IBUPROFEN 800 MG/1
800 TABLET, FILM COATED ORAL EVERY 8 HOURS PRN
Qty: 30 TABLET | Refills: 0 | Status: SHIPPED | OUTPATIENT
Start: 2024-12-24

## 2024-12-24 RX ORDER — SITAGLIPTIN AND METFORMIN HYDROCHLORIDE 1000; 50 MG/1; MG/1
TABLET, FILM COATED, EXTENDED RELEASE ORAL
Qty: 180 TABLET | Refills: 1 | Status: SHIPPED | OUTPATIENT
Start: 2024-12-24

## 2024-12-24 NOTE — TELEPHONE ENCOUNTER
Rx Refill Note  Requested Prescriptions     Pending Prescriptions Disp Refills    ibuprofen (ADVIL,MOTRIN) 800 MG tablet 30 tablet 0     Sig: Take 1 tablet by mouth Every 8 (Eight) Hours As Needed for Mild Pain.      Last office visit with prescribing clinician: 7/26/2024   Last telemedicine visit with prescribing clinician: Visit date not found   Next office visit with prescribing clinician: Visit date not found                         Would you like a call back once the refill request has been completed: [] Yes [] No    If the office needs to give you a call back, can they leave a voicemail: [] Yes [] No    Cindy Santoyo MA  12/24/24, 08:23 EST

## 2024-12-24 NOTE — TELEPHONE ENCOUNTER
2022       RE: Yusuf Stubbs  [address] [place] [address]     Dear Colleague,    Thank you for referring your patient, Yusuf Stubbs, to the [facility] [place] at [facility]. Please see a copy of my visit note below.    Endocrinology Fellow note    Chief complaint:  Yusuf is a 29 year old female seen in consultation at the request of Dr. Carolina MD.     HISTORY OF PRESENT ILLNESS  30 yo female,  currently pregnant (17w 1d based on LMP) who presents for initial evaluation of Graves Disease.     Was initially diagnosed with hyperthyroidism secondary to Graves Disease in . She had been pregnant at the time. She was started on methimazole 15 mg daily. At the time of initial presentation she had been experiencing weight loss of 20 pounds, palpitations, heat intolerance and anxiety.     She followed with endocrinology and was treated through another pregnancy in 2017.  Radioactive iodine ablation was contemplated (initially deferred due to concern for worsening of thyroid eye disease) and ultimately pursued due to patient preference: Radioactive iodine uptake scan revealed an 80% uptake. She underwent radioactive iodine ablation on 2019 and received 14.6 mCi.     She was last seen by Endocrinology at [facility] in 2019 by Dr. Sharma at which time the patient was prescribed methimazole (the medication was resumed due to persistent hyperthyroidism after radioiodine therapy).  She has a history of thyroid related eye disease, which had not been active at the time.     During this pregnancy, she was initially started on PTU 50 mg daily in 2021 (prescribed 2021) by Family Medicine providers, then transitioned to methimazole 5 mg TID on 22 at 16 weeks gestation. While on the PTU she had remained hyperthyroid on biochemical workup.     She had previously been prescribed metoprolol but had not been taking  Pt requesting   Normal serum potassium in past 12 months     Rx Refill Note  Requested Prescriptions     Pending Prescriptions Disp Refills    ibuprofen (ADVIL,MOTRIN) 800 MG tablet 30 tablet 0     Sig: Take 1 tablet by mouth Every 8 (Eight) Hours As Needed for Mild Pain.      Last office visit with prescribing clinician: 7/26/2024   Last telemedicine visit with prescribing clinician: Visit date not found   Next office visit with prescribing clinician: Visit date not found                         Would you like a call back once the refill request has been completed: [] Yes [] No    If the office needs to give you a call back, can they leave a voicemail: [] Yes [] No    Cindy Santoyo MA  12/24/24, 08:24 EST     this; has been taking this regularly since 23 (25 mg daily).     She had tested positive for COVID in 2021.     She has been intermittently lost to follow up for several appointments.     Palpitations, heat intolerance, shortness of breath, not gaining weight.   Difficulty seeing at night. No double vision.     REVIEW OF SYSTEMS  .  10 system ROS otherwise as per the HPI or negative    Past Medical History  Past Medical History:   Diagnosis Date     Endocrine disease      Hypertension      Hyperthyroidism 2016     Precipitous delivery, delivered (current hospitalization) 2016      delivery 2016     Unspecified fetal growth retardation, unspecified (weight)      Urinary tract infection        Medications  Current Outpatient Medications   Medication     methimazole (TAPAZOLE) 5 MG tablet     metoprolol succinate ER (TOPROL-XL) 25 MG 24 hr tablet     Prenatal Vit-Fe Fumarate-FA (PRENATAL MULTIVITAMIN W/IRON) 27-0.8 MG tablet     white petrolatum-mineral oil (REFRESH LACRI-LUBE) 56.8-42.5 % Oint     No current facility-administered medications for this visit.       Current Outpatient Medications   Medication Sig Dispense Refill     methimazole (TAPAZOLE) 5 MG tablet Take 1 tablet (5 mg) by mouth 3 times daily 90 tablet 0     metoprolol succinate ER (TOPROL-XL) 25 MG 24 hr tablet Take 1 tablet (25 mg) by mouth daily 60 tablet 0     Prenatal Vit-Fe Fumarate-FA (PRENATAL MULTIVITAMIN W/IRON) 27-0.8 MG tablet Take 1 tablet by mouth daily 90 tablet 3     white petrolatum-mineral oil (REFRESH LACRI-LUBE) 56.8-42.5 % Oint [WHITE PETROLATUM-MINERAL OIL (REFRESH LACRI-LUBE) 56.8-42.5 % OINT] Apply to eyelid/eye at night  0       Allergies  No Known Allergies      Family History  family history includes Mental retardation in her brother; No Known Problems in her father; Thyroid Disease in her mother.    Social History  Social History     Tobacco Use     Smoking status: Former Smoker     Types:  Cigarettes     Smokeless tobacco: Never Used   Substance Use Topics     Alcohol use: No     Drug use: No       Physical Exam  GENERAL :  In no apparent distress  SKIN: Visible skin clear, no visible rash   EYES: Mild proptosis, no conjunctival redness, stare, retraction  NECK: No visible masses.   THYROID: ** goiter  RESP: No audible cough, normal work of breathing     NEURO: awake, alert, responds appropriately to questions. Tremor **    [facility]    [facility] 11/16/2021   TSH 0.30 - 5.00 uIU/mL <0.01 (L) <0.01 (L)   T3 45 - 175 ng/dL     FREE T4 0.70 - 1.80 ng/dL 3.10 (H) 3.00 (H)     [facility] 9/16/2020 2/8/2019   TSH 0.30 - 5.00 uIU/mL <0.01 (L) <0.01 (L)   T3 45 - 175 ng/dL  364 (H)   FREE T4 0.70 - 1.80 ng/dL       [facility] 8/28/2018 8/16/2018   TSH 0.30 - 5.00 uIU/mL <0.01 (L) 0.05 (L)   T3 45 - 175 ng/dL  208 (H)   FREE T4 0.70 - 1.80 ng/dL       [facility] 7/25/2018 2/16/2018   TSH 0.30 - 5.00 uIU/mL 2.89 <0.01 (L)   T3 45 - 175 ng/dL  236 (H)   FREE T4 0.70 - 1.80 ng/dL       TSH Receptor Antibody >40 on 01/29/2016.  Earliest TFTs 01/2016 : TSH <0.01, FT4 2.5     NM thyroid uptake and scan (04/04/2019)  FINDINGS: 24-hour uptake is 80%, normal range 10-30%.. 3 hour uptake 84%, normal range for to 10%.  Scintigraphic evaluation shows no focal abnormality.     IMPRESSION:  CONCLUSION:  1.  Abnormal thyroid uptake.  2.  Normal scintigraphic evaluation.    ASSESSMENT/PLAN:     Pregnancy (17w1d)  Graves Disease s/p radioactive iodine ablation in 04/2019  History of thyroid eye disease; not currently active  She continues to be symptomatic with ongoing palpitations, heat intolerance and difficulty gaining weight. Her last TFTS were in 12/2021 and she has been on the methimazole 5 mg TID dose since 01/05/2021. She is also taking metoprolol 25 mg daily. We will  re-check thyroid function tests as per below. We will aim for a total T4 approximately 1.5 times the upper limit of normal (to prevent hypothyroidism which could adversely affect the fetus). She is also exhibiting thyroid eye symptoms, therefore will place a referral to ophthalmology.      -TSH, total T3, total T4  - TSI and TRAB  (will also plan to re-check these at ~ 30 weeks of gestation)  - Continue methimazole 5 mg TID   - Continue metoprolol 25 mg daily  - Eye referral for evaluation of thyroid eye disease  - Discussed how to monitor pulse and update clinic if noting tachycardia  - RTC in one month at [facility]     Raheel Cruz MD  Endocrinology Fellow, PGY IV    Due to the COVID 19 pandemic this visit was a video visit in order to help prevent spread of infection in this high risk patient and the general population. The patient gave verbal consent for the visit today.    Start time 10.35 am  Stop time  11.10 am  Total time 35 minutes       I have seen and examined the patient, reviewed and edited the fellow's note, and agree with the plan of care.    Matthew Echols MD   Division of Diabetes, Endocrinology and Metabolism  Department of Medicine

## 2025-01-16 DIAGNOSIS — I10 ESSENTIAL HYPERTENSION: ICD-10-CM

## 2025-01-16 DIAGNOSIS — E11.9 TYPE 2 DIABETES MELLITUS WITHOUT COMPLICATION, WITHOUT LONG-TERM CURRENT USE OF INSULIN: ICD-10-CM

## 2025-01-16 DIAGNOSIS — R80.9 TYPE 2 DIABETES MELLITUS WITH PROTEINURIA: ICD-10-CM

## 2025-01-16 DIAGNOSIS — E11.29 TYPE 2 DIABETES MELLITUS WITH PROTEINURIA: ICD-10-CM

## 2025-01-16 RX ORDER — EMPAGLIFLOZIN 25 MG/1
25 TABLET, FILM COATED ORAL DAILY
Qty: 90 TABLET | Refills: 1 | Status: SHIPPED | OUTPATIENT
Start: 2025-01-16

## 2025-01-16 NOTE — TELEPHONE ENCOUNTER
Normal serum potassium in past 12 months    Active on medication list     Rx Refill Note  Requested Prescriptions     Pending Prescriptions Disp Refills    hydroCHLOROthiazide 25 MG tablet [Pharmacy Med Name: hydroCHLOROthiazide 25 MG TABLET] 90 tablet 1     Sig: TAKE 1 TABLET BY MOUTH DAILY     Signed Prescriptions Disp Refills    Jardiance 25 MG tablet tablet 90 tablet 1     Sig: TAKE 1 TABLET BY MOUTH DAILY     Authorizing Provider: TERA HOFFMAN     Ordering User: RUBY SANTOYO      Last office visit with prescribing clinician: 7/26/2024   Last telemedicine visit with prescribing clinician: Visit date not found   Next office visit with prescribing clinician: Visit date not found                         Would you like a call back once the refill request has been completed: [] Yes [] No    If the office needs to give you a call back, can they leave a voicemail: [] Yes [] No    Ruby Santoyo MA  01/16/25, 14:57 EST

## 2025-01-20 RX ORDER — HYDROCHLOROTHIAZIDE 25 MG/1
25 TABLET ORAL DAILY
Qty: 90 TABLET | Refills: 1 | OUTPATIENT
Start: 2025-01-20

## 2025-01-23 NOTE — TELEPHONE ENCOUNTER
"Chief Complaint  Follow-up of the Left Shoulder and Follow-up of the Right Knee     Subjective      Mary Julian presents to Methodist Behavioral Hospital Swan. Positive Neer. Negative " Rx Refill Note  Requested Prescriptions     Pending Prescriptions Disp Refills    glipizide (GLUCOTROL XL) 10 MG 24 hr tablet [Pharmacy Med Name: glipiZIDE XL 10 MG TABLET] 60 tablet 1     Sig: TAKE 1 TABLET BY MOUTH TWICE A DAY      Last office visit with prescribing clinician: 9/25/2023   Last telemedicine visit with prescribing clinician: 10/30/2023   Next office visit with prescribing clinician: Visit date not found                         Would you like a call back once the refill request has been completed: [] Yes [] No    If the office needs to give you a call back, can they leave a voicemail: [] Yes [] No    Cindy Silva, PCT  02/08/24, 09:35 EST     Liat Dorantes MD 01/23/25

## 2025-03-08 DIAGNOSIS — I10 ESSENTIAL HYPERTENSION: ICD-10-CM

## 2025-03-10 RX ORDER — HYDROCHLOROTHIAZIDE 25 MG/1
25 TABLET ORAL DAILY
Qty: 90 TABLET | Refills: 1 | Status: SHIPPED | OUTPATIENT
Start: 2025-03-10

## 2025-03-10 NOTE — TELEPHONE ENCOUNTER
The original prescription was discontinued on 7/23/2024 by Jovany Ruiz MD for the following reason: Stop Taking at Discharge.     Rx Refill Note  Requested Prescriptions     Pending Prescriptions Disp Refills    hydroCHLOROthiazide 25 MG tablet [Pharmacy Med Name: hydroCHLOROthiazide 25 MG TABLET] 90 tablet 1     Sig: Take 1 tablet by mouth Daily.      Last office visit with prescribing clinician: 7/26/2024   Last telemedicine visit with prescribing clinician: Visit date not found   Next office visit with prescribing clinician: Visit date not found                         Would you like a call back once the refill request has been completed: [] Yes [] No    If the office needs to give you a call back, can they leave a voicemail: [] Yes [] No    Cindy Santoyo MA  03/10/25, 11:10 EDT

## 2025-04-07 DIAGNOSIS — I10 ESSENTIAL HYPERTENSION: ICD-10-CM

## 2025-04-07 DIAGNOSIS — M1A.09X0 CHRONIC GOUT OF MULTIPLE SITES, UNSPECIFIED CAUSE: ICD-10-CM

## 2025-04-07 RX ORDER — METOPROLOL TARTRATE 100 MG/1
100 TABLET ORAL EVERY 12 HOURS SCHEDULED
Qty: 60 TABLET | OUTPATIENT
Start: 2025-04-07

## 2025-04-07 RX ORDER — AMLODIPINE BESYLATE 10 MG/1
10 TABLET ORAL DAILY
Qty: 30 TABLET | Refills: 0 | OUTPATIENT
Start: 2025-04-07

## 2025-04-07 RX ORDER — ALLOPURINOL 300 MG/1
300 TABLET ORAL EVERY 12 HOURS SCHEDULED
Qty: 60 TABLET | OUTPATIENT
Start: 2025-04-07

## 2025-04-07 NOTE — TELEPHONE ENCOUNTER
Rx Refill Note  Requested Prescriptions     Refused Prescriptions Disp Refills    amLODIPine (NORVASC) 10 MG tablet [Pharmacy Med Name: amLODIPine BESYLATE 10MG TAB] 30 tablet 0     Sig: TAKE 1 TABLET BY MOUTH DAILY     Refused By: BRADY CAO     Reason for Refusal: Patient needs an appointment    metoprolol tartrate (LOPRESSOR) 100 MG tablet [Pharmacy Med Name: METOPROLOL TARTRATE 100 MG TAB] 60 tablet      Sig: TAKE 1 TABLET BY MOUTH TWICE A DAY     Refused By: BRADY CAO     Reason for Refusal: Patient needs an appointment    allopurinol (ZYLOPRIM) 300 MG tablet [Pharmacy Med Name: ALLOPURINOL 300 MG TABLET] 60 tablet      Sig: TAKE 1 TABLET BY MOUTH TWICE A DAY     Refused By: BRADY CAO     Reason for Refusal: Patient needs an appointment      Last office visit with prescribing clinician: 7/26/2024   Last telemedicine visit with prescribing clinician: Visit date not found   Next office visit with prescribing clinician: Visit date not found                         Would you like a call back once the refill request has been completed: [] Yes [] No    If the office needs to give you a call back, can they leave a voicemail: [] Yes [] No    Brady Cao MA  04/07/25, 15:00 EDT

## 2025-04-14 ENCOUNTER — TELEPHONE (OUTPATIENT)
Dept: INTERNAL MEDICINE | Facility: CLINIC | Age: 65
End: 2025-04-14

## 2025-04-14 DIAGNOSIS — E11.9 TYPE 2 DIABETES MELLITUS WITHOUT COMPLICATION, WITHOUT LONG-TERM CURRENT USE OF INSULIN: ICD-10-CM

## 2025-04-14 DIAGNOSIS — I10 ESSENTIAL HYPERTENSION: ICD-10-CM

## 2025-04-14 DIAGNOSIS — R80.9 TYPE 2 DIABETES MELLITUS WITH PROTEINURIA: ICD-10-CM

## 2025-04-14 DIAGNOSIS — E11.29 TYPE 2 DIABETES MELLITUS WITH PROTEINURIA: ICD-10-CM

## 2025-04-14 DIAGNOSIS — M1A.09X0 CHRONIC GOUT OF MULTIPLE SITES, UNSPECIFIED CAUSE: ICD-10-CM

## 2025-04-14 NOTE — TELEPHONE ENCOUNTER
Caller: Reji Pablo YUSEF    Relationship: Self    Best call back number: [phone]    Requested Prescriptions:   Requested Prescriptions     Pending Prescriptions Disp Refills    sertraline (ZOLOFT) 100 MG tablet 30 tablet 5     Sig: Take 1 tablet by mouth Daily for 180 days.    metoprolol tartrate (LOPRESSOR) 100 MG tablet 180 tablet 1     Sig: Take 1 tablet by mouth 2 (Two) Times a Day. Indications: High Blood Pressure    hydroCHLOROthiazide 25 MG tablet 90 tablet 1     Sig: Take 1 tablet by mouth Daily.    levocetirizine (XYZAL) 5 MG tablet       Sig: Take 0.5 tablets by mouth As Needed for Allergies.    empagliflozin (Jardiance) 25 MG tablet tablet 90 tablet 1     Sig: Take 1 tablet by mouth Daily.    amLODIPine (NORVASC) 10 MG tablet 30 tablet 0     Sig: Take 1 tablet by mouth Daily.    allopurinol (ZYLOPRIM) 300 MG tablet 180 tablet 1     Sig: Take 1 tablet by mouth 2 (Two) Times a Day.        Pharmacy where request should be sent: [facility] [id number] [address] [facility] [phone]  - [phone]     Last office visit with prescribing clinician: 7/26/2024   Last telemedicine visit with prescribing clinician: Visit date not found   Next office visit with prescribing clinician: 4/23/2025     King Mandel   04/14/25 13:55 EDT

## 2025-04-14 NOTE — TELEPHONE ENCOUNTER
Relationship: Self    Best call back number: [phone]    What orders are you requesting (i.e. lab or imaging): LAB    In what timeframe would the patient need to come in: WEEK OF 4-14-25      Additional notes: PATIENT HAS APPOINTMENT WITH DR HOFFMAN SCHEDULED APRIL 23, 2025. PATIENT REQUESTS LABS BE DRAWN BEFORE APPOINTMENT  PLEASE CALL TO SCHEDULE AFTER ORDERS ARE ENTERED

## 2025-04-15 RX ORDER — METOPROLOL TARTRATE 100 MG/1
100 TABLET ORAL 2 TIMES DAILY
Qty: 180 TABLET | Refills: 1 | Status: SHIPPED | OUTPATIENT
Start: 2025-04-15

## 2025-04-15 RX ORDER — ALLOPURINOL 300 MG/1
300 TABLET ORAL 2 TIMES DAILY
Qty: 180 TABLET | Refills: 1 | Status: SHIPPED | OUTPATIENT
Start: 2025-04-15

## 2025-04-15 RX ORDER — HYDROCHLOROTHIAZIDE 25 MG/1
25 TABLET ORAL DAILY
Qty: 90 TABLET | Refills: 1 | OUTPATIENT
Start: 2025-04-15

## 2025-04-15 RX ORDER — SERTRALINE HYDROCHLORIDE 100 MG/1
100 TABLET, FILM COATED ORAL DAILY
Qty: 30 TABLET | Refills: 5 | OUTPATIENT
Start: 2025-04-15 | End: 2025-10-12

## 2025-04-15 RX ORDER — LEVOCETIRIZINE DIHYDROCHLORIDE 5 MG/1
2.5 TABLET, FILM COATED ORAL AS NEEDED
OUTPATIENT
Start: 2025-04-15

## 2025-04-15 RX ORDER — AMLODIPINE BESYLATE 10 MG/1
10 TABLET ORAL DAILY
Qty: 30 TABLET | Refills: 0 | Status: SHIPPED | OUTPATIENT
Start: 2025-04-15

## 2025-04-15 NOTE — TELEPHONE ENCOUNTER
Rx Refill Note  Requested Prescriptions     Signed Prescriptions Disp Refills    metoprolol tartrate (LOPRESSOR) 100 MG tablet 180 tablet 1     Sig: Take 1 tablet by mouth 2 (Two) Times a Day. Indications: High Blood Pressure     Authorizing Provider: TERA HOFFMAN     Ordering User: BRADY CAO    amLODIPine (NORVASC) 10 MG tablet 30 tablet 0     Sig: Take 1 tablet by mouth Daily.     Authorizing Provider: TERA HOFFMAN     Ordering User: BRADY CAO    allopurinol (ZYLOPRIM) 300 MG tablet 180 tablet 1     Sig: Take 1 tablet by mouth 2 (Two) Times a Day.     Authorizing Provider: TERA HOFFMAN     Ordering User: BRADY CAO     Refused Prescriptions Disp Refills    sertraline (ZOLOFT) 100 MG tablet 30 tablet 5     Sig: Take 1 tablet by mouth Daily for 180 days.     Refused By: BRADY CAO     Reason for Refusal: Patient has requested refill too soon    hydroCHLOROthiazide 25 MG tablet 90 tablet 1     Sig: Take 1 tablet by mouth Daily.     Refused By: BRADY CAO     Reason for Refusal: Patient has requested refill too soon    levocetirizine (XYZAL) 5 MG tablet       Sig: Take 0.5 tablets by mouth As Needed for Allergies.     Refused By: BRADY CAO     Reason for Refusal: Other    empagliflozin (Jardiance) 25 MG tablet tablet 90 tablet 1     Sig: Take 1 tablet by mouth Daily.     Refused By: BRADY CAO     Reason for Refusal: Patient has requested refill too soon      Last office visit with prescribing clinician: 7/26/2024   Last telemedicine visit with prescribing clinician: Visit date not found   Next office visit with prescribing clinician: 4/14/2025                         Would you like a call back once the refill request has been completed: [] Yes [] No    If the office needs to give you a call back, can they leave a voicemail: [] Yes [] No    Brady Cao MA  04/15/25, 09:50 EDT

## 2025-04-29 ENCOUNTER — OFFICE VISIT (OUTPATIENT)
Dept: INTERNAL MEDICINE | Facility: CLINIC | Age: 65
End: 2025-04-29
Payer: COMMERCIAL

## 2025-04-29 VITALS
DIASTOLIC BLOOD PRESSURE: 72 MMHG | BODY MASS INDEX: 40.02 KG/M2 | OXYGEN SATURATION: 95 % | SYSTOLIC BLOOD PRESSURE: 110 MMHG | HEART RATE: 78 BPM | WEIGHT: 185 LBS | TEMPERATURE: 98.2 F

## 2025-04-29 DIAGNOSIS — M1A.09X0 CHRONIC GOUT OF MULTIPLE SITES, UNSPECIFIED CAUSE: ICD-10-CM

## 2025-04-29 DIAGNOSIS — F41.9 ANXIETY: ICD-10-CM

## 2025-04-29 DIAGNOSIS — D22.9 ATYPICAL MOLE: ICD-10-CM

## 2025-04-29 DIAGNOSIS — Z12.11 COLON CANCER SCREENING: ICD-10-CM

## 2025-04-29 DIAGNOSIS — E11.9 TYPE 2 DIABETES MELLITUS WITHOUT COMPLICATION, WITHOUT LONG-TERM CURRENT USE OF INSULIN: Primary | ICD-10-CM

## 2025-04-29 DIAGNOSIS — D75.1 POLYCYTHEMIA: ICD-10-CM

## 2025-04-29 DIAGNOSIS — I10 ESSENTIAL HYPERTENSION: ICD-10-CM

## 2025-04-29 DIAGNOSIS — E78.2 MIXED HYPERLIPIDEMIA: ICD-10-CM

## 2025-04-29 RX ORDER — HYDROCHLOROTHIAZIDE 25 MG/1
25 TABLET ORAL DAILY
Qty: 90 TABLET | Refills: 1 | Status: SHIPPED | OUTPATIENT
Start: 2025-04-29

## 2025-04-29 RX ORDER — SILDENAFIL 50 MG/1
50 TABLET, FILM COATED ORAL DAILY PRN
COMMUNITY

## 2025-04-29 RX ORDER — GLIPIZIDE 10 MG/1
10 TABLET, FILM COATED, EXTENDED RELEASE ORAL DAILY
Qty: 90 TABLET | Refills: 1 | Status: SHIPPED | OUTPATIENT
Start: 2025-04-29

## 2025-04-29 RX ORDER — SERTRALINE HYDROCHLORIDE 100 MG/1
100 TABLET, FILM COATED ORAL DAILY
Qty: 90 TABLET | Refills: 1 | Status: SHIPPED | OUTPATIENT
Start: 2025-04-29 | End: 2025-11-25

## 2025-04-29 RX ORDER — ATORVASTATIN CALCIUM 40 MG/1
40 TABLET, FILM COATED ORAL DAILY
Qty: 90 TABLET | Refills: 1 | OUTPATIENT
Start: 2025-04-29

## 2025-04-29 RX ORDER — METOPROLOL TARTRATE 100 MG/1
100 TABLET ORAL 2 TIMES DAILY
Qty: 180 TABLET | Refills: 1 | Status: SHIPPED | OUTPATIENT
Start: 2025-04-29

## 2025-04-29 RX ORDER — ATORVASTATIN CALCIUM 40 MG/1
40 TABLET, FILM COATED ORAL DAILY
Qty: 90 TABLET | Refills: 1 | Status: SHIPPED | OUTPATIENT
Start: 2025-04-29

## 2025-04-29 RX ORDER — ALBENDAZOLE 200 MG/1
400 TABLET, FILM COATED ORAL ONCE
Qty: 2 TABLET | Refills: 0 | Status: SHIPPED | OUTPATIENT
Start: 2025-04-29 | End: 2025-04-29

## 2025-04-29 RX ORDER — AMLODIPINE BESYLATE 10 MG/1
10 TABLET ORAL DAILY
Qty: 90 TABLET | Refills: 1 | Status: SHIPPED | OUTPATIENT
Start: 2025-04-29

## 2025-04-29 RX ORDER — ALLOPURINOL 300 MG/1
300 TABLET ORAL 2 TIMES DAILY
Qty: 180 TABLET | Refills: 1 | Status: SHIPPED | OUTPATIENT
Start: 2025-04-29

## 2025-04-29 RX ORDER — METFORMIN HYDROCHLORIDE 500 MG/1
1000 TABLET, EXTENDED RELEASE ORAL 2 TIMES DAILY
Qty: 360 TABLET | Refills: 1 | Status: SHIPPED | OUTPATIENT
Start: 2025-04-29

## 2025-04-29 NOTE — PROGRESS NOTES
Pablo Mendoza is a 64 y.o. male, who presents with a chief complaint of   Chief Complaint   Patient presents with    Diabetes     Follow up            Diabetes  Associated symptoms:     fatigue         He has had hearing loss over the past year and was dx with sudden sensorineural hearing loss.  He now has hearing aids as of September 2024.     Gout: he takes allopurinol, no recent flarees    He says his memory is shot.  He has veery little energy     T2DM - A1c 7.3->10.6->9.5->11.6% He had an eye exam in November. His BG has been quite elevated. He checks AM sugar and it has been 300s. He has been unable to purchase Janumet due to costs. He had some leftover metformin at home which he took a few of when he noticed his BG was elevated. Otherwise he is essentially not on any glucose lowering medications aside from Jardiance.    He has had GE and cleaned himself with lion frederick mushrooms and apple cider vinegar. He describes worms that he saw when he threw up and felt itchiness around the anal area. Since having the vomiting he has felt a little bit better.      HTN - on amlodipine, metoprolol, hctz     HLD - has not been on atorvastatin, he is unsure if it was discontinued or if it was too expensive     Anxiety - on sertraline.  He feels this is working well for him    Dr. Neal: Hematology, follows for polycythemia, has not had this done due to insurance issues,     TORSTEN: unable to tolerate CPAP       The following portions of the patient's history were reviewed and updated as appropriate: allergies, current medications, past family history, past medical history, past social history, past surgical history and problem list.    Allergies: Aspirin and Lisinopril    Review of Systems   Constitutional:  Positive for fatigue. Negative for chills and fever.   HENT:  Positive for hearing loss.    Respiratory:  Negative for chest tightness.              Wt Readings from Last 3 Encounters:   04/29/25 83.9 kg (185  lb)   12/03/24 85.8 kg (189 lb 3.2 oz)   09/23/24 87.2 kg (192 lb 4.8 oz)     Temp Readings from Last 3 Encounters:   04/29/25 98.2 °F (36.8 °C) (Infrared)   12/03/24 98.1 °F (36.7 °C) (Oral)   09/23/24 97.6 °F (36.4 °C) (Oral)     BP Readings from Last 3 Encounters:   04/29/25 110/72   12/03/24 151/90   09/23/24 135/82     Pulse Readings from Last 3 Encounters:   04/29/25 78   12/03/24 81   09/23/24 71     Body mass index is 40.02 kg/m².  SpO2 Readings from Last 3 Encounters:   04/29/25 95%   12/03/24 97%   09/23/24 95%          Physical Exam  Vitals reviewed.   Constitutional:       General: He is not in acute distress.     Appearance: Normal appearance. He is normal weight. He is not ill-appearing or toxic-appearing.   HENT:      Head: Normocephalic.      Right Ear: Ear canal and external ear normal.      Left Ear: Ear canal and external ear normal.      Ears:      Comments: Bilateral hearing aids present  Scarred bilateral TM     Nose: Nose normal.      Mouth/Throat:      Pharynx: Oropharynx is clear. No oropharyngeal exudate.   Eyes:      General:         Right eye: No discharge.         Left eye: No discharge.      Extraocular Movements: Extraocular movements intact.      Conjunctiva/sclera: Conjunctivae normal.   Cardiovascular:      Rate and Rhythm: Normal rate and regular rhythm.      Pulses: Normal pulses.      Heart sounds: Normal heart sounds.   Pulmonary:      Effort: Pulmonary effort is normal.      Breath sounds: Normal breath sounds.   Abdominal:      General: Bowel sounds are normal. There is no distension.      Palpations: Abdomen is soft.      Tenderness: There is no abdominal tenderness. There is no guarding.   Musculoskeletal:         General: Normal range of motion.      Cervical back: Normal range of motion.   Skin:     General: Skin is warm and dry.      Capillary Refill: Capillary refill takes 2 to 3 seconds.      Comments: Left forearm scabbed area  Central Chest pearly nodule ~3cm    Neurological:      General: No focal deficit present.      Mental Status: He is alert and oriented to person, place, and time. Mental status is at baseline.   Psychiatric:         Mood and Affect: Mood normal.         Behavior: Behavior normal.         Thought Content: Thought content normal.         Results for orders placed or performed in visit on 12/03/24   CBC Auto Differential    Collection Time: 12/03/24  2:21 PM    Specimen: Blood   Result Value Ref Range    WBC 11.19 (H) 3.40 - 10.80 10*3/mm3    RBC 7.05 (H) 4.14 - 5.80 10*6/mm3    Hemoglobin 17.7 13.0 - 17.7 g/dL    Hematocrit 55.2 (H) 37.5 - 51.0 %    MCV 78.3 (L) 79.0 - 97.0 fL    MCH 25.1 (L) 26.6 - 33.0 pg    MCHC 32.1 31.5 - 35.7 g/dL    RDW 18.3 (H) 12.3 - 15.4 %    RDW-SD 45.8 37.0 - 54.0 fl    MPV 9.7 6.0 - 12.0 fL    Platelets 225 140 - 450 10*3/mm3    Neutrophil % 78.3 (H) 42.7 - 76.0 %    Lymphocyte % 14.5 (L) 19.6 - 45.3 %    Monocyte % 4.9 (L) 5.0 - 12.0 %    Eosinophil % 0.9 0.3 - 6.2 %    Basophil % 0.6 0.0 - 1.5 %    Immature Grans % 0.8 (H) 0.0 - 0.5 %    Neutrophils, Absolute 8.76 (H) 1.70 - 7.00 10*3/mm3    Lymphocytes, Absolute 1.62 0.70 - 3.10 10*3/mm3    Monocytes, Absolute 0.55 0.10 - 0.90 10*3/mm3    Eosinophils, Absolute 0.10 0.00 - 0.40 10*3/mm3    Basophils, Absolute 0.07 0.00 - 0.20 10*3/mm3    Immature Grans, Absolute 0.09 (H) 0.00 - 0.05 10*3/mm3    nRBC 0.0 0.0 - 0.2 /100 WBC     *Note: Due to a large number of results and/or encounters for the requested time period, some results have not been displayed. A complete set of results can be found in Results Review.     Result Review :                  Assessment and Plan      Mr. Mendoza is a 63yo male with HTN, uncontrolled T2DM, HLD, TORSTEN, PNES, polycythemia, and gout who presents for follow-up of his chronic conditions. Patient's diabetes has been uncontrolled due to insurance issues/affordability of medications. Will re-prescribe metformin and have him continue with  Jardiance. Patient should engage in increased activity and improve diet. BP is well controlled on current regimen. Will resume statin. Recommended he contact his hematologist to assess need for further phlebotomy sessions. Patient has several moles/skin findings he states have been changing, for which a Dermatology referral was placed.     Diagnoses and all orders for this visit:    1. Type 2 diabetes mellitus without complication, without long-term current use of insulin (Primary)  -     CBC & Differential; Future  -     Comprehensive Metabolic Panel; Future  -     Hemoglobin A1c; Future  -     Lipid Panel With LDL / HDL Ratio; Future  -     TSH; Future  -     T4, Free; Future  -     Microalbumin / Creatinine Urine Ratio - Urine, Clean Catch; Future  -     metFORMIN ER (GLUCOPHAGE-XR) 500 MG 24 hr tablet; Take 2 tablets by mouth 2 (Two) Times a Day.  Dispense: 360 tablet; Refill: 1    2. Mixed hyperlipidemia  -     Comprehensive Metabolic Panel; Future  -     Lipid Panel With LDL / HDL Ratio; Future    3. Essential hypertension  -     metoprolol tartrate (LOPRESSOR) 100 MG tablet; Take 1 tablet by mouth 2 (Two) Times a Day. Indications: High Blood Pressure  Dispense: 180 tablet; Refill: 1  -     hydroCHLOROthiazide 25 MG tablet; Take 1 tablet by mouth Daily.  Dispense: 90 tablet; Refill: 1  -     amLODIPine (NORVASC) 10 MG tablet; Take 1 tablet by mouth Daily.  Dispense: 90 tablet; Refill: 1  -     CBC & Differential; Future  -     Comprehensive Metabolic Panel; Future  -     Hemoglobin A1c; Future  -     Lipid Panel With LDL / HDL Ratio; Future  -     TSH; Future  -     T4, Free; Future  -     Microalbumin / Creatinine Urine Ratio - Urine, Clean Catch; Future    4. Chronic gout of multiple sites, unspecified cause  -     allopurinol (ZYLOPRIM) 300 MG tablet; Take 1 tablet by mouth 2 (Two) Times a Day.  Dispense: 180 tablet; Refill: 1    5. Anxiety    6. Polycythemia    7. Atypical mole  -     Ambulatory Referral  to Dermatology    8. Colon cancer screening  -     Ambulatory Referral For Screening Colonoscopy    Other orders  -     albendazole (ALBENZA) 200 MG tablet; Take 2 tablets by mouth 1 time for 1 dose.  Dispense: 2 tablet; Refill: 0  -     sertraline (ZOLOFT) 100 MG tablet; Take 1 tablet by mouth Daily for 210 days.  Dispense: 90 tablet; Refill: 1  -     glipizide (GLUCOTROL XL) 10 MG 24 hr tablet; Take 1 tablet by mouth Daily.  Dispense: 90 tablet; Refill: 1  -     atorvastatin (LIPITOR) 40 MG tablet; Take 1 tablet by mouth Daily.  Dispense: 90 tablet; Refill: 1             I have seen and examined the patient independently.  I have reviewed the resident's findings as noted above and added to the note where appropriate.  Agree with above.    Liana Hood MD  Attending Physician  Internal Medicine and Pediatrics            Outpatient Medications Prior to Visit   Medication Sig Dispense Refill    Alcohol Swabs (Alcohol Pads) 70 % pads Apply one alcohol swab to injection site of skin immediately prior to insulin injection. Formulary Compliance Approval. 100 each 0    Blood Glucose Monitoring Suppl (OneTouch Verio Flex System) w/Device kit Use as directed to test blood glucose 1 kit 0    Continuous Glucose Sensor (FreeStyle Mahogany 3 Sensor) misc Use 1 each Every 14 (Fourteen) Days. 6 each 4    glucose blood test strip Use to test blood glucose up to four times daily as needed. Formulary Compliance Approval. Diagnosis: Type 2 Diabetes - Not Insulin Dependent 100 each 0    ibuprofen (ADVIL,MOTRIN) 800 MG tablet Take 1 tablet by mouth Every 8 (Eight) Hours As Needed for Mild Pain. 30 tablet 0    Jardiance 25 MG tablet tablet TAKE 1 TABLET BY MOUTH DAILY 90 tablet 1    Lancets misc Use to test blood glucose up to four times daily as needed. Formulary Compliance Approval. Diagnosis: Type 2 Diabetes - Not Insulin Dependent 100 each 0    levocetirizine (XYZAL) 5 MG tablet Take 0.5 tablets by mouth As Needed for Allergies.       sildenafil (VIAGRA) 50 MG tablet Take 1 tablet by mouth Daily As Needed for Erectile Dysfunction.      allopurinol (ZYLOPRIM) 300 MG tablet Take 1 tablet by mouth 2 (Two) Times a Day. 180 tablet 1    amLODIPine (NORVASC) 10 MG tablet Take 1 tablet by mouth Daily. 30 tablet 0    hydroCHLOROthiazide 25 MG tablet TAKE 1 TABLET BY MOUTH DAILY 90 tablet 1    metoprolol tartrate (LOPRESSOR) 100 MG tablet Take 1 tablet by mouth 2 (Two) Times a Day. Indications: High Blood Pressure 180 tablet 1    sertraline (ZOLOFT) 100 MG tablet Take 1 tablet by mouth Daily.      Rimegepant Sulfate (NURTEC) 75 MG tablet dispersible tablet Take  by mouth As Needed (migraine). (Patient not taking: Reported on 4/29/2025)      atorvastatin (LIPITOR) 40 MG tablet TAKE 1 TABLET BY MOUTH DAILY (Patient not taking: Reported on 4/29/2025) 90 tablet 1    glipizide (GLUCOTROL XL) 10 MG 24 hr tablet  (Patient not taking: Reported on 4/29/2025)      Janumet XR  MG tablet TAKE 2 TABLETS BY MOUTH DAILY FOR DIABETES (Patient not taking: Reported on 4/29/2025) 180 tablet 1    topiramate (TOPAMAX) 25 MG tablet Take 1 tablet by mouth Daily. (Patient not taking: Reported on 4/29/2025)       No facility-administered medications prior to visit.     New Medications Ordered This Visit   Medications    albendazole (ALBENZA) 200 MG tablet     Sig: Take 2 tablets by mouth 1 time for 1 dose.     Dispense:  2 tablet     Refill:  0    allopurinol (ZYLOPRIM) 300 MG tablet     Sig: Take 1 tablet by mouth 2 (Two) Times a Day.     Dispense:  180 tablet     Refill:  1    metoprolol tartrate (LOPRESSOR) 100 MG tablet     Sig: Take 1 tablet by mouth 2 (Two) Times a Day. Indications: High Blood Pressure     Dispense:  180 tablet     Refill:  1    hydroCHLOROthiazide 25 MG tablet     Sig: Take 1 tablet by mouth Daily.     Dispense:  90 tablet     Refill:  1    amLODIPine (NORVASC) 10 MG tablet     Sig: Take 1 tablet by mouth Daily.     Dispense:  90 tablet      Refill:  1    sertraline (ZOLOFT) 100 MG tablet     Sig: Take 1 tablet by mouth Daily for 210 days.     Dispense:  90 tablet     Refill:  1    glipizide (GLUCOTROL XL) 10 MG 24 hr tablet     Sig: Take 1 tablet by mouth Daily.     Dispense:  90 tablet     Refill:  1    atorvastatin (LIPITOR) 40 MG tablet     Sig: Take 1 tablet by mouth Daily.     Dispense:  90 tablet     Refill:  1    metFORMIN ER (GLUCOPHAGE-XR) 500 MG 24 hr tablet     Sig: Take 2 tablets by mouth 2 (Two) Times a Day.     Dispense:  360 tablet     Refill:  1     [unfilled]  Medications Discontinued During This Encounter   Medication Reason    Janumet XR  MG tablet *Therapy completed    topiramate (TOPAMAX) 25 MG tablet *Therapy completed    atorvastatin (LIPITOR) 40 MG tablet Reorder    glipizide (GLUCOTROL XL) 10 MG 24 hr tablet Reorder    sertraline (ZOLOFT) 100 MG tablet Reorder    hydroCHLOROthiazide 25 MG tablet Reorder    metoprolol tartrate (LOPRESSOR) 100 MG tablet Reorder    amLODIPine (NORVASC) 10 MG tablet Reorder    allopurinol (ZYLOPRIM) 300 MG tablet Reorder         Return in about 3 months (around 7/29/2025) for Recheck, labs.    Patient was given instructions and counseling regarding her condition or for health maintenance advice. Please see specific information pulled into the AVS if appropriate.

## 2025-06-23 RX ORDER — IBUPROFEN 800 MG/1
800 TABLET, FILM COATED ORAL EVERY 8 HOURS PRN
Qty: 30 TABLET | Refills: 0 | Status: SHIPPED | OUTPATIENT
Start: 2025-06-23

## 2025-07-16 DIAGNOSIS — I10 ESSENTIAL HYPERTENSION: ICD-10-CM

## 2025-07-16 DIAGNOSIS — E78.2 MIXED HYPERLIPIDEMIA: ICD-10-CM

## 2025-07-16 DIAGNOSIS — E11.9 TYPE 2 DIABETES MELLITUS WITHOUT COMPLICATION, WITHOUT LONG-TERM CURRENT USE OF INSULIN: ICD-10-CM

## (undated) DEVICE — SOL ISO/ALC RUB 70PCT 4OZ

## (undated) DEVICE — NEEDLE, QUINCKE, 20GX3.5": Brand: MEDLINE

## (undated) DEVICE — Device

## (undated) DEVICE — ANTIBACTERIAL UNDYED BRAIDED (POLYGLACTIN 910), SYNTHETIC ABSORBABLE SUTURE: Brand: COATED VICRYL

## (undated) DEVICE — DISPOSABLE IRRIGATION BIPOLAR CORD, M1000 TYPE: Brand: KIRWAN

## (undated) DEVICE — TOOL MR8-15MH30 MR8 15CM MATCH 3MM: Brand: MIDAS REX MR8

## (undated) DEVICE — TOOL MR8-15BA50T MR8 15CM BAL SYMTRI 5MM: Brand: MIDAS REX MR8

## (undated) DEVICE — SYR CONTRL LUERLOK 10CC

## (undated) DEVICE — SYR LUERLOK 30CC

## (undated) DEVICE — PK NEURO SPINE 40

## (undated) DEVICE — SPHR MARKR STEALTH STATION

## (undated) DEVICE — PENCL E/S ULTRAVAC TELESCP NOSE HOLSTR 10FT

## (undated) DEVICE — GLV SURG SENSICARE W/ALOE PF LF 7.5 STRL

## (undated) DEVICE — CONN TBG Y 5 IN 1 LF STRL

## (undated) DEVICE — CODMAN® SURGICAL PATTIES 3/4" X 3/4" (1.91CM X 1.91CM): Brand: CODMAN®

## (undated) DEVICE — APPL CHLORAPREP HI/LITE 26ML ORNG

## (undated) DEVICE — DRP MICROSCOPE 4 BINOCULAR CV 54X150IN

## (undated) DEVICE — ADHS LIQ MASTISOL 2/3ML

## (undated) DEVICE — INTENDED FOR TISSUE SEPARATION, AND OTHER PROCEDURES THAT REQUIRE A SHARP SURGICAL BLADE TO PUNCTURE OR CUT.: Brand: BARD-PARKER ® STAINLESS STEEL BLADES

## (undated) DEVICE — SMOKE EVACUATION TUBING WITH 7/8 IN TO 1/4 IN REDUCER: Brand: BUFFALO FILTER

## (undated) DEVICE — PATIENT RETURN ELECTRODE, SINGLE-USE, CONTACT QUALITY MONITORING, ADULT, WITH 9FT CORD, FOR PATIENTS WEIGING OVER 33LBS. (15KG): Brand: MEGADYNE

## (undated) DEVICE — GLV SURG BIOGEL LTX PF 7

## (undated) DEVICE — PK ATS CUST W CARDIOTOMY RESEVOIR

## (undated) DEVICE — TOOL MR8-31TD3030 MR8 TWST DRIL 3MMX30MM: Brand: MIDAS REX

## (undated) DEVICE — 3M™ STERI-STRIP™ ANTIMICROBIAL SKIN CLOSURES 1/2 INCH X 4 INCHES 50/CARTON 4 CARTONS/CASE A1847: Brand: 3M™ STERI-STRIP™

## (undated) DEVICE — PENCL ES MEGADINE EZ/CLEAN BUTN W/HOLSTR 10FT

## (undated) DEVICE — DRAPE,REIN 53X77,STERILE: Brand: MEDLINE

## (undated) DEVICE — TOTAL TRAY, 16FR 10ML SIL FOLEY, URN: Brand: MEDLINE

## (undated) DEVICE — NDL BIOP BONE MARRW JAMSHIDI 11G 101MM

## (undated) DEVICE — TRAP FLD MINIVAC MEGADYNE 100ML

## (undated) DEVICE — IMPLANTABLE DEVICE
Type: IMPLANTABLE DEVICE | Site: SPINE LUMBAR | Status: NON-FUNCTIONAL
Removed: 2021-11-03